# Patient Record
Sex: FEMALE | Race: WHITE | Employment: OTHER | URBAN - METROPOLITAN AREA
[De-identification: names, ages, dates, MRNs, and addresses within clinical notes are randomized per-mention and may not be internally consistent; named-entity substitution may affect disease eponyms.]

---

## 2017-02-02 ENCOUNTER — DOCUMENTATION ONLY (OUTPATIENT)
Dept: FAMILY MEDICINE CLINIC | Age: 67
End: 2017-02-02

## 2017-02-02 NOTE — PROGRESS NOTES
Patient on Passport report for [facility] ED visit on 2/1 and was DX with URI. Patient not called. Patient has F/U appt with Dr Santos Sullivan on 3/21.

## 2017-02-23 DIAGNOSIS — M79.7 FIBROMYALGIA: ICD-10-CM

## 2017-02-23 RX ORDER — TRAMADOL HYDROCHLORIDE 50 MG/1
50 TABLET ORAL
Qty: 90 TAB | Refills: 1 | Status: SHIPPED | OUTPATIENT
Start: 2017-02-23 | End: 2017-05-15 | Stop reason: SDUPTHER

## 2017-03-16 ENCOUNTER — HOSPITAL ENCOUNTER (EMERGENCY)
Age: 67
Discharge: HOME | End: 2017-03-16
Attending: EMERGENCY MEDICINE | Admitting: EMERGENCY MEDICINE
Payer: MEDICARE

## 2017-03-16 ENCOUNTER — APPOINTMENT (OUTPATIENT)
Dept: CT IMAGING | Age: 67
End: 2017-03-16
Attending: EMERGENCY MEDICINE
Payer: MEDICARE

## 2017-03-16 VITALS
RESPIRATION RATE: 16 BRPM | HEIGHT: 64 IN | SYSTOLIC BLOOD PRESSURE: 147 MMHG | WEIGHT: 185.19 LBS | DIASTOLIC BLOOD PRESSURE: 70 MMHG | OXYGEN SATURATION: 96 % | HEART RATE: 85 BPM | TEMPERATURE: 98.2 F | BODY MASS INDEX: 31.62 KG/M2

## 2017-03-16 DIAGNOSIS — R31.9 HEMATURIA: ICD-10-CM

## 2017-03-16 DIAGNOSIS — R10.2 SUPRAPUBIC PAIN: ICD-10-CM

## 2017-03-16 DIAGNOSIS — R31.9 URINARY TRACT INFECTION WITH HEMATURIA, SITE UNSPECIFIED: Primary | ICD-10-CM

## 2017-03-16 DIAGNOSIS — N39.0 URINARY TRACT INFECTION WITH HEMATURIA, SITE UNSPECIFIED: Primary | ICD-10-CM

## 2017-03-16 LAB
ALBUMIN SERPL BCP-MCNC: 3.7 G/DL (ref 3.5–5)
ALBUMIN/GLOB SERPL: 1.3 {RATIO} (ref 1.1–2.2)
ALP SERPL-CCNC: 80 U/L (ref 45–117)
ALT SERPL-CCNC: 27 U/L (ref 12–78)
ANION GAP BLD CALC-SCNC: 9 MMOL/L (ref 5–15)
APPEARANCE UR: ABNORMAL
AST SERPL W P-5'-P-CCNC: 21 U/L (ref 15–37)
BACTERIA URNS QL MICRO: NEGATIVE /HPF
BASOPHILS # BLD AUTO: 0.1 K/UL (ref 0–0.1)
BASOPHILS # BLD: 1 % (ref 0–1)
BILIRUB SERPL-MCNC: 0.4 MG/DL (ref 0.2–1)
BILIRUB UR QL: NEGATIVE
BUN SERPL-MCNC: 20 MG/DL (ref 6–20)
BUN/CREAT SERPL: 14 (ref 12–20)
CALCIUM SERPL-MCNC: 8.8 MG/DL (ref 8.5–10.1)
CHLORIDE SERPL-SCNC: 105 MMOL/L (ref 97–108)
CO2 SERPL-SCNC: 26 MMOL/L (ref 21–32)
COLOR UR: ABNORMAL
CREAT SERPL-MCNC: 1.39 MG/DL (ref 0.55–1.02)
EOSINOPHIL # BLD: 0.3 K/UL (ref 0–0.4)
EOSINOPHIL NFR BLD: 2 % (ref 0–7)
EPITH CASTS URNS QL MICRO: ABNORMAL /LPF
ERYTHROCYTE [DISTWIDTH] IN BLOOD BY AUTOMATED COUNT: 12.8 % (ref 11.5–14.5)
GLOBULIN SER CALC-MCNC: 2.9 G/DL (ref 2–4)
GLUCOSE SERPL-MCNC: 107 MG/DL (ref 65–100)
GLUCOSE UR STRIP.AUTO-MCNC: NEGATIVE MG/DL
HCT VFR BLD AUTO: 35.2 % (ref 35–47)
HGB BLD-MCNC: 12 G/DL (ref 11.5–16)
HGB UR QL STRIP: ABNORMAL
KETONES UR QL STRIP.AUTO: NEGATIVE MG/DL
LEUKOCYTE ESTERASE UR QL STRIP.AUTO: ABNORMAL
LYMPHOCYTES # BLD AUTO: 20 % (ref 12–49)
LYMPHOCYTES # BLD: 2.3 K/UL (ref 0.8–3.5)
MCH RBC QN AUTO: 32.3 PG (ref 26–34)
MCHC RBC AUTO-ENTMCNC: 34.1 G/DL (ref 30–36.5)
MCV RBC AUTO: 94.6 FL (ref 80–99)
MONOCYTES # BLD: 1.1 K/UL (ref 0–1)
MONOCYTES NFR BLD AUTO: 10 % (ref 5–13)
NEUTS SEG # BLD: 7.9 K/UL (ref 1.8–8)
NEUTS SEG NFR BLD AUTO: 67 % (ref 32–75)
NITRITE UR QL STRIP.AUTO: NEGATIVE
PH UR STRIP: 6.5 [PH] (ref 5–8)
PLATELET # BLD AUTO: 258 K/UL (ref 150–400)
POTASSIUM SERPL-SCNC: 3.8 MMOL/L (ref 3.5–5.1)
PROT SERPL-MCNC: 6.6 G/DL (ref 6.4–8.2)
PROT UR STRIP-MCNC: >300 MG/DL
RBC # BLD AUTO: 3.72 M/UL (ref 3.8–5.2)
RBC #/AREA URNS HPF: >100 /HPF (ref 0–5)
SODIUM SERPL-SCNC: 140 MMOL/L (ref 136–145)
SP GR UR REFRACTOMETRY: 1.02 (ref 1–1.03)
UA: UC IF INDICATED,UAUC: ABNORMAL
UROBILINOGEN UR QL STRIP.AUTO: 0.2 EU/DL (ref 0.2–1)
WBC # BLD AUTO: 11.7 K/UL (ref 3.6–11)
WBC URNS QL MICRO: ABNORMAL /HPF (ref 0–4)

## 2017-03-16 PROCEDURE — 81001 URINALYSIS AUTO W/SCOPE: CPT | Performed by: EMERGENCY MEDICINE

## 2017-03-16 PROCEDURE — 74176 CT ABD & PELVIS W/O CONTRAST: CPT

## 2017-03-16 PROCEDURE — 80053 COMPREHEN METABOLIC PANEL: CPT | Performed by: EMERGENCY MEDICINE

## 2017-03-16 PROCEDURE — 74011250636 HC RX REV CODE- 250/636: Performed by: EMERGENCY MEDICINE

## 2017-03-16 PROCEDURE — 87077 CULTURE AEROBIC IDENTIFY: CPT | Performed by: EMERGENCY MEDICINE

## 2017-03-16 PROCEDURE — 87086 URINE CULTURE/COLONY COUNT: CPT | Performed by: EMERGENCY MEDICINE

## 2017-03-16 PROCEDURE — 85025 COMPLETE CBC W/AUTO DIFF WBC: CPT | Performed by: EMERGENCY MEDICINE

## 2017-03-16 PROCEDURE — 36415 COLL VENOUS BLD VENIPUNCTURE: CPT | Performed by: EMERGENCY MEDICINE

## 2017-03-16 PROCEDURE — 99283 EMERGENCY DEPT VISIT LOW MDM: CPT

## 2017-03-16 PROCEDURE — 74011250637 HC RX REV CODE- 250/637: Performed by: EMERGENCY MEDICINE

## 2017-03-16 PROCEDURE — 87186 SC STD MICRODIL/AGAR DIL: CPT | Performed by: EMERGENCY MEDICINE

## 2017-03-16 PROCEDURE — 96365 THER/PROPH/DIAG IV INF INIT: CPT

## 2017-03-16 PROCEDURE — 74011000258 HC RX REV CODE- 258: Performed by: EMERGENCY MEDICINE

## 2017-03-16 RX ORDER — HYDROCODONE BITARTRATE AND ACETAMINOPHEN 7.5; 325 MG/1; MG/1
1 TABLET ORAL
Status: COMPLETED | OUTPATIENT
Start: 2017-03-16 | End: 2017-03-16

## 2017-03-16 RX ORDER — CEPHALEXIN 500 MG/1
500 CAPSULE ORAL 3 TIMES DAILY
Qty: 21 CAP | Refills: 0 | Status: SHIPPED | OUTPATIENT
Start: 2017-03-16 | End: 2017-03-16

## 2017-03-16 RX ORDER — CEPHALEXIN 500 MG/1
500 CAPSULE ORAL 3 TIMES DAILY
Qty: 21 CAP | Refills: 0 | Status: SHIPPED | OUTPATIENT
Start: 2017-03-16 | End: 2017-03-23

## 2017-03-16 RX ADMIN — HYDROCODONE BITARTRATE AND ACETAMINOPHEN 1 TABLET: 7.5; 325 TABLET ORAL at 07:35

## 2017-03-16 RX ADMIN — CEFTRIAXONE 1 G: 1 INJECTION, POWDER, FOR SOLUTION INTRAMUSCULAR; INTRAVENOUS at 06:20

## 2017-03-16 NOTE — ED PROVIDER NOTES
HPI Comments: Georgia Aleman is a 74 y.o. female with PMhx significant for HTN, GERD, arthritis, hypercholesterolemia, and fibromyalgia who presents ambulatory to the ED with cc of hematuria x \"couple of hours\" PTA. She also c/o lower abdominal pain, dysuria, and urinary frequency. She notes that the blood was initially light pink but turned red PTA. She states that her abdominal pain yesterday was in her right upper quadrant and radiated to the right lower quadrant. She reports a h/o kidney stones. She specifically denies back pain, fever, nausea, vomiting, or other acute complaints at this time. SHx: +EtOH, -tobacco, -illicit drug use    PCP: Trish Leigh MD    There are no other complaints, changes or physical findings at this time. The history is provided by the patient. No  was used.         Past Medical History:   Diagnosis Date    Arthritis     Cancer ([facility] [address]     skin cancer    Chronic pain     CHRONIC BOWEL PAIN    Diverticulitis     Fibromyalgia     GERD (gastroesophageal reflux disease)     Goiter     Hiatal hernia     Hypercholesterolemia     Hypertension     IBS (irritable bowel syndrome)     IBS (irritable bowel syndrome)     CONSTIPATION, CHRONIC SINCE HER 25s    Ill-defined condition     rectocele    Insomnia     TAKES TRAZODONE NIGHTLY    Migraine     REDUCED IN FREQUENCY AND SEVERITY SINCE MENOPAUSE    Other ill-defined conditions(799.89)     Psychiatric disorder 3/11    DEPRESSION       Past Surgical History:   Procedure Laterality Date    COLONOSCOPY N/A 6/21/2016    COLONOSCOPY performed by Herminio Pang [facility]    ENDOSCOPY, COLON, DIAGNOSTIC  11/2010    Dr. Severo Pastures-     HX CHOLECYSTECTOMY  2006   Wilian Marrero  2007    Dr. Pond/ diverticulitis    HX GI  X3  LAST ONE 12/10    COLONOSCOPY    HX GYN  1982    D&C WITH SUCTION    HX HYSTERECTOMY      HX ORTHOPAEDIC      right foot surgery    HX ORTHOPAEDIC neck surgery 3/21/11 Dr. Jame Abbasi HX TONSILLECTOMY           Family History:   Problem Relation Age of Onset    Cancer Father      lung and bone    Stroke Sister     Cancer Sister 76     PANCREATIC    Hypertension Mother     Alzheimer Mother     High Cholesterol Mother     Cancer Other      COLON    Cancer Other      COUSIN    Diabetes Maternal Aunt     Cancer Maternal Uncle      COLON    Cancer Maternal Grandfather      COLON    Cancer Daughter      IN REMISSION-OVARIAN CA-IN CLINICAL TRIAL       Social History     Social History    Marital status:      Spouse name: N/A    Number of children: N/A    Years of education: N/A     Occupational History    Not on file. Social History Main Topics    Smoking status: Former Smoker     Packs/day: 1.00     Years: 45.00     Quit date: 1/1/2007    Smokeless tobacco: Never Used    Alcohol use 0.0 oz/week     0 Standard drinks or equivalent per week      Comment: rarely    Drug use: No    Sexual activity: Yes     Partners: Male     Other Topics Concern    Not on file     Social History Narrative         ALLERGIES: Codeine; Morphine; Other medication; and Shellfish containing products    Review of Systems   Constitutional: Negative for activity change, appetite change, fatigue and fever. HENT: Negative. Negative for congestion, rhinorrhea and sore throat. Respiratory: Negative. Negative for cough, shortness of breath and wheezing. Cardiovascular: Negative. Negative for chest pain and leg swelling. Gastrointestinal: Positive for abdominal pain (lower). Negative for abdominal distention, constipation, diarrhea, nausea and vomiting. Endocrine: Negative. Genitourinary: Positive for dysuria, frequency and hematuria. Negative for difficulty urinating, menstrual problem, vaginal bleeding and vaginal discharge. Musculoskeletal: Negative. Negative for arthralgias, back pain, joint swelling and myalgias. Skin: Negative.   Negative for rash.   Neurological: Negative. Negative for dizziness, weakness, light-headedness and headaches. Psychiatric/Behavioral: Negative. Patient Vitals for the past 12 hrs:   Temp Pulse Resp BP SpO2   03/16/17 0730 - - - 147/70 96 %   03/16/17 0433 98.2 °F (36.8 °C) 85 16 133/71 100 %            Physical Exam   Constitutional: She is oriented to person, place, and time. She appears well-developed and well-nourished. Comfortable appearing, in no acute distress. HENT:   Head: Atraumatic. Eyes: EOM are normal.   Cardiovascular: Normal rate, regular rhythm, normal heart sounds and intact distal pulses. Exam reveals no gallop and no friction rub. No murmur heard. Pulmonary/Chest: Effort normal and breath sounds normal. No respiratory distress. She has no wheezes. She has no rales. She exhibits no tenderness. Abdominal: Soft. Bowel sounds are normal. She exhibits no distension and no mass. There is no rebound and no guarding. Mild suprapubic tenderness. Musculoskeletal: Normal range of motion. She exhibits no edema or tenderness. Neurological: She is oriented to person, place, and time. Skin: Skin is warm. Psychiatric: She has a normal mood and affect. Nursing note and vitals reviewed. MDM  Number of Diagnoses or Management Options  Hematuria:   Suprapubic pain:   Urinary tract infection with hematuria, site unspecified:   Diagnosis management comments:   Hematuria with unknown etiology; possibly secondary to kidney stones, UTI, cystitis, RCC. Will obtain basic blood work, UA, and CT scan to rule out stones/mass.        Amount and/or Complexity of Data Reviewed  Clinical lab tests: ordered and reviewed  Tests in the radiology section of CPT®: ordered and reviewed  Review and summarize past medical records: yes  Independent visualization of images, tracings, or specimens: yes    Patient Progress  Patient progress: stable    ED Course       Procedures      Progress Note:  7:29 AM  Pt was reevaluated; she is still feeling suprapubic discomfort. Requested dose of pain medication for discharge. No vomiting or additional episode of hematuria. Written by Maryam Spence ED Scribe, as dictated by Praveen Johnson MD.        LABORATORY TESTS:  Recent Results (from the past 12 hour(s))   URINALYSIS W/ REFLEX CULTURE    Collection Time: 03/16/17  4:55 AM   Result Value Ref Range    Color RED      Appearance HAZY (A) CLEAR      Specific gravity 1.025 1.003 - 1.030      pH (UA) 6.5 5.0 - 8.0      Protein >300 (A) NEG mg/dL    Glucose NEGATIVE  NEG mg/dL    Ketone NEGATIVE  NEG mg/dL    Bilirubin NEGATIVE  NEG      Blood LARGE (A) NEG      Urobilinogen 0.2 0.2 - 1.0 EU/dL    Nitrites NEGATIVE  NEG      Leukocyte Esterase LARGE (A) NEG      WBC 10-20 0 - 4 /hpf    RBC >100 (H) 0 - 5 /hpf    Epithelial cells FEW FEW /lpf    Bacteria NEGATIVE  NEG /hpf    UA:UC IF INDICATED URINE CULTURE ORDERED (A) CNI     CBC WITH AUTOMATED DIFF    Collection Time: 03/16/17  4:55 AM   Result Value Ref Range    WBC 11.7 (H) 3.6 - 11.0 K/uL    RBC 3.72 (L) 3.80 - 5.20 M/uL    HGB 12.0 11.5 - 16.0 g/dL    HCT 35.2 35.0 - 47.0 %    MCV 94.6 80.0 - 99.0 FL    MCH 32.3 26.0 - 34.0 PG    MCHC 34.1 30.0 - 36.5 g/dL    RDW 12.8 11.5 - 14.5 %    PLATELET 887 758 - 871 K/uL    NEUTROPHILS 67 32 - 75 %    LYMPHOCYTES 20 12 - 49 %    MONOCYTES 10 5 - 13 %    EOSINOPHILS 2 0 - 7 %    BASOPHILS 1 0 - 1 %    ABS. NEUTROPHILS 7.9 1.8 - 8.0 K/UL    ABS. LYMPHOCYTES 2.3 0.8 - 3.5 K/UL    ABS. MONOCYTES 1.1 (H) 0.0 - 1.0 K/UL    ABS. EOSINOPHILS 0.3 0.0 - 0.4 K/UL    ABS.  BASOPHILS 0.1 0.0 - 0.1 K/UL   METABOLIC PANEL, COMPREHENSIVE    Collection Time: 03/16/17  4:55 AM   Result Value Ref Range    Sodium 140 136 - 145 mmol/L    Potassium 3.8 3.5 - 5.1 mmol/L    Chloride 105 97 - 108 mmol/L    CO2 26 21 - 32 mmol/L    Anion gap 9 5 - 15 mmol/L    Glucose 107 (H) 65 - 100 mg/dL    BUN 20 6 - 20 MG/DL    Creatinine 1.39 (H) 0.55 - 1.02 MG/DL BUN/Creatinine ratio 14 12 - 20      GFR est AA 46 (L) >60 ml/min/1.73m2    GFR est non-AA 38 (L) >60 ml/min/1.73m2    Calcium 8.8 8.5 - 10.1 MG/DL    Bilirubin, total 0.4 0.2 - 1.0 MG/DL    ALT (SGPT) 27 12 - 78 U/L    AST (SGOT) 21 15 - 37 U/L    Alk. phosphatase 80 45 - 117 U/L    Protein, total 6.6 6.4 - 8.2 g/dL    Albumin 3.7 3.5 - 5.0 g/dL    Globulin 2.9 2.0 - 4.0 g/dL    A-G Ratio 1.3 1.1 - 2.2         IMAGING RESULTS:  CT ABD PELV WO CONT   Final Result   INDICATION: hematuria, flank pain, r/o KS     COMPARISON: None     TECHNIQUE:   Thin axial images were obtained through the abdomen and pelvis. Coronal and  sagittal reconstructions were generated. Oral contrast was not administered. CT  dose reduction was achieved through use of a standardized protocol tailored for  this examination and automatic exposure control for dose modulation.      The absence of intravenous contrast material reduces the sensitivity for  evaluation of the solid parenchymal organs of the abdomen.      FINDINGS:   LUNG BASES: Clear. INCIDENTALLY IMAGED HEART AND MEDIASTINUM: Unremarkable. LIVER: No mass or biliary dilatation. GALLBLADDER: Surgically absent  SPLEEN: No mass. PANCREAS: No mass or ductal dilatation. ADRENALS: Unremarkable. KIDNEYS/URETERS: No mass, calculus, or hydronephrosis. STOMACH: Unremarkable. SMALL BOWEL: No dilatation or wall thickening. COLON: No dilatation or wall thickening. Patient is post sigmoid resection  APPENDIX: Unremarkable. PERITONEUM: No ascites or pneumoperitoneum. RETROPERITONEUM: No lymphadenopathy or aortic aneurysm. REPRODUCTIVE ORGANS: Uterus is surgically absent  URINARY BLADDER: Bladder is decompressed  BONES: No destructive bone lesion. ADDITIONAL COMMENTS: N/A     IMPRESSION  IMPRESSION:  1. No evidence of renal or ureteral calculus or urinary tract obstruction. 2. No evidence of acute abdominal or pelvic process.         MEDICATIONS GIVEN:  Medications   cefTRIAXone (ROCEPHIN) 1 g in 0.9% sodium chloride (MBP/ADV) 50 mL (1 g IntraVENous New Bag 3/16/17 6596)   HYDROcodone-acetaminophen (NORCO) 7.5-325 mg per tablet 1 Tab (1 Tab Oral Given 3/16/17 4330)       IMPRESSION:  1. Urinary tract infection with hematuria, site unspecified    2. Hematuria    3. Suprapubic pain        PLAN:  1. Current Discharge Medication List      START taking these medications    Details   cephALEXin (KEFLEX) 500 mg capsule Take 1 Cap by mouth three (3) times daily for 7 days. Qty: 21 Cap, Refills: 0         STOP taking these medications       VOLTAREN 1 % gel Comments:   Reason for Stopping:         acetaminophen (TYLENOL ARTHRITIS PAIN) 650 mg CR tablet Comments:   Reason for Stopping:         acetaminophen (TYLENOL) 325 mg tablet Comments:   Reason for Stoppin.   Follow-up Information     Follow up With Details Comments [address], [place] Schedule an appointment as soon as possible for a visit in 1 week  [address] [facility]  [address] [phone]      Lindsay Frausto MD In 3 days As needed if ongoing pain and discomfort [address]  [address] [facility]  [phone]      [facility] EMERGENCY DEPT  As needed, If symptoms worsen [address]  [address]   [address]        Return to ED if worse       DISCHARGE NOTE:  7:29 AM  The patient has been re-evaluated and is ready for discharge. Reviewed available results with patient. Counseled patient on diagnosis and care plan. Patient has expressed understanding, and all questions have been answered. Patient agrees with plan and agrees to follow up as recommended, or return to the ED if their symptoms worsen. Discharge instructions have been provided and explained to the patient, along with reasons to return to the ED.       This note is prepared by Viral Partida, acting as Scribe for Rakel Herrmann MD.    Rakel Herrmann MD: The scribe's documentation has been prepared under my direction and personally reviewed by me in its entirety. I confirm that the note above accurately reflects all work, treatment, procedures, and medical decision making performed by me.

## 2017-03-16 NOTE — ED NOTES
Discharge instructions reviewed with patient, copy given by Dr. Tari Francisco . Pt is accomponied by family, denies use of wheelchair.

## 2017-03-16 NOTE — ED NOTES
Pt ambulatory to ED from triage. Pt A+Ox4. Pt speaking in full sentences. Pt denies chest pain and shortness of breath. Pt c/o blood in urine since yesterday and a constant pressure in her abdomen.

## 2017-03-19 LAB
BACTERIA SPEC CULT: ABNORMAL
CC UR VC: ABNORMAL
SERVICE CMNT-IMP: ABNORMAL

## 2017-03-21 ENCOUNTER — OFFICE VISIT (OUTPATIENT)
Dept: FAMILY MEDICINE CLINIC | Age: 67
End: 2017-03-21

## 2017-03-21 ENCOUNTER — HOSPITAL ENCOUNTER (OUTPATIENT)
Dept: LAB | Age: 67
Discharge: HOME | End: 2017-03-21
Payer: MEDICARE

## 2017-03-21 VITALS
SYSTOLIC BLOOD PRESSURE: 130 MMHG | OXYGEN SATURATION: 97 % | BODY MASS INDEX: 31.24 KG/M2 | DIASTOLIC BLOOD PRESSURE: 78 MMHG | HEART RATE: 76 BPM | TEMPERATURE: 98.7 F | WEIGHT: 183 LBS | RESPIRATION RATE: 16 BRPM | HEIGHT: 64 IN

## 2017-03-21 DIAGNOSIS — Z00.00 ROUTINE GENERAL MEDICAL EXAMINATION AT A HEALTH CARE FACILITY: Primary | ICD-10-CM

## 2017-03-21 DIAGNOSIS — I10 ESSENTIAL HYPERTENSION: ICD-10-CM

## 2017-03-21 DIAGNOSIS — E55.9 HYPOVITAMINOSIS D: ICD-10-CM

## 2017-03-21 DIAGNOSIS — N18.2 CKD (CHRONIC KIDNEY DISEASE), STAGE 2 (MILD): ICD-10-CM

## 2017-03-21 DIAGNOSIS — Z11.59 NEED FOR HEPATITIS C SCREENING TEST: ICD-10-CM

## 2017-03-21 DIAGNOSIS — K58.1 IRRITABLE BOWEL SYNDROME WITH CONSTIPATION: ICD-10-CM

## 2017-03-21 DIAGNOSIS — F32.A DEPRESSION, UNSPECIFIED DEPRESSION TYPE: ICD-10-CM

## 2017-03-21 DIAGNOSIS — M79.7 FIBROMYALGIA: ICD-10-CM

## 2017-03-21 DIAGNOSIS — E78.2 MIXED HYPERLIPIDEMIA: ICD-10-CM

## 2017-03-21 DIAGNOSIS — Z51.81 ENCOUNTER FOR MEDICATION MONITORING: ICD-10-CM

## 2017-03-21 DIAGNOSIS — K21.9 GASTROESOPHAGEAL REFLUX DISEASE WITHOUT ESOPHAGITIS: ICD-10-CM

## 2017-03-21 LAB
BILIRUB UR QL STRIP: NEGATIVE
GLUCOSE UR-MCNC: NEGATIVE MG/DL
KETONES P FAST UR STRIP-MCNC: NEGATIVE MG/DL
PH UR STRIP: 6.5 [PH] (ref 4.6–8)
PROT UR QL STRIP: NEGATIVE MG/DL
SP GR UR STRIP: 1.02 (ref 1–1.03)
UA UROBILINOGEN AMB POC: NORMAL (ref 0.2–1)
URINALYSIS CLARITY POC: CLEAR
URINALYSIS COLOR POC: YELLOW
URINE BLOOD POC: NEGATIVE
URINE LEUKOCYTES POC: NEGATIVE
URINE NITRITES POC: NEGATIVE

## 2017-03-21 PROCEDURE — 86803 HEPATITIS C AB TEST: CPT

## 2017-03-21 PROCEDURE — 36415 COLL VENOUS BLD VENIPUNCTURE: CPT

## 2017-03-21 PROCEDURE — 80048 BASIC METABOLIC PNL TOTAL CA: CPT

## 2017-03-21 PROCEDURE — 80061 LIPID PANEL: CPT

## 2017-03-21 PROCEDURE — 82306 VITAMIN D 25 HYDROXY: CPT

## 2017-03-21 RX ORDER — ROSUVASTATIN CALCIUM 20 MG/1
20 TABLET, COATED ORAL
Qty: 90 TAB | Refills: 1 | Status: ON HOLD | OUTPATIENT
Start: 2017-03-21 | End: 2017-08-30 | Stop reason: SDUPTHER

## 2017-03-21 RX ORDER — ROSUVASTATIN CALCIUM 40 MG/1
10 TABLET, COATED ORAL
COMMUNITY
End: 2017-03-21 | Stop reason: CLARIF

## 2017-03-21 NOTE — MR AVS SNAPSHOT
Visit Information Date & Time Provider Department Dept. Phone Encounter #  
 3/21/2017  9:15 AM Melissa Lucas MD [facility] [phone] [id number] Follow-up Instructions Return in about 3 months (around 6/21/2017). Upcoming Health Maintenance Date Due Hepatitis C Screening 1950 MEDICARE YEARLY EXAM 1/26/2017 BREAST CANCER SCRN MAMMOGRAM 4/21/2018 GLAUCOMA SCREENING Q2Y 11/2/2018 COLONOSCOPY 6/21/2021 DTaP/Tdap/Td series (3 - Td) 11/21/2026 Allergies as of 3/21/2017  Review Complete On: 3/21/2017 By: Melissa Lucas MD  
  
 Severity Noted Reaction Type Reactions Codeine High 03/15/2010    Other (comments) Severe Headache Morphine High 03/15/2010    Hives Other Medication  03/15/2011    Rash BANDAIDS Shellfish Containing Products  03/15/2011    Hives Current Immunizations  Reviewed on 3/21/2017 Name Date Influenza High Dose Vaccine PF 9/19/2016 10:00 AM, 10/30/2015, 11/11/2014 Influenza Vaccine 11/19/2013 Influenza Vaccine Split 11/26/2012, 12/12/2011, 9/24/2010 Pneumococcal Conjugate (PCV-13) 5/18/2015 Pneumococcal Vaccine (Unspecified Type) 11/19/2013 Tdap 5/26/2014 12:40 PM  
 Zoster Vaccine, Live 4/16/2012 Reviewed by Melissa Lucas MD on 3/21/2017 at 10:34 AM  
You Were Diagnosed With   
  
 Codes Comments Routine general medical examination at a health care facility    -  Primary ICD-10-CM: Z00.00 ICD-9-CM: V70.0 Essential hypertension     ICD-10-CM: I10 
ICD-9-CM: 401.9 Mixed hyperlipidemia     ICD-10-CM: E78.2 ICD-9-CM: 272.2 Fibromyalgia     ICD-10-CM: M79.7 ICD-9-CM: 729.1 Gastroesophageal reflux disease without esophagitis     ICD-10-CM: K21.9 ICD-9-CM: 530.81 Hypovitaminosis D     ICD-10-CM: E55.9 ICD-9-CM: 268.9 Depression, unspecified depression type     ICD-10-CM: F32.9 ICD-9-CM: 550 Irritable bowel syndrome with constipation     ICD-10-CM: K58.1 ICD-9-CM: 831.9 CKD (chronic kidney disease), stage 2 (mild)     ICD-10-CM: N18.2 ICD-9-CM: 585.2 Encounter for medication monitoring     ICD-10-CM: Z51.81 
ICD-9-CM: V58.83 Need for hepatitis C screening test     ICD-10-CM: Z11.59 
ICD-9-CM: V73.89 Vitals BP Pulse Temp Resp Height(growth percentile) Weight(growth percentile) 130/78 (BP 1 Location: Right arm, BP Patient Position: Sitting) 76 98.7 °F (37.1 °C) (Oral) 16 5' 3.5\" (1.613 m) 183 lb (83 kg) SpO2 BMI OB Status Smoking Status 97% 31.91 kg/m2 Hysterectomy Former Smoker Vitals History BMI and BSA Data Body Mass Index Body Surface Area  
 31.91 kg/m 2 1.93 m 2 Preferred Pharmacy Pharmacy Name Phone [address] [facility] [phone] Your Updated Medication List  
  
   
This list is accurate as of: 3/21/17 10:54 AM.  Always use your most recent med list.  
  
  
  
  
 ALPRAZolam 0.5 mg tablet Commonly known as:  XANAX  
TAKE 1 TABLET BY MOUTH TWICE A DAY AS NEEDED FOR ANXIETY  
  
 aspirin delayed-release 81 mg tablet Take 81 mg by mouth daily. cephALEXin 500 mg capsule Commonly known as:  Reyes Rapp Take 1 Cap by mouth three (3) times daily for 7 days. citalopram 40 mg tablet Commonly known as:  Evelyn Babe Take 1 Tab by mouth daily. cyclobenzaprine 10 mg tablet Commonly known as:  FLEXERIL  
TAKE 1 TABLET BY MOUTH 3 TIMES A DAY AS NEEDED FOR MUSCLE SPASMS  
  
 dicyclomine 10 mg capsule Commonly known as:  BENTYL TAKE 2 CAPSULES FOUR TIMES A DAY AS NEEDED DULoxetine 60 mg capsule Commonly known as:  CYMBALTA Take 1 Cap by mouth two (2) times a day.  
  
 gabapentin 300 mg capsule Commonly known as:  NEURONTIN  
TAKE 1  CAPSULES TWICE A DAY AND 2 CAPSULES AT BEDTIME  
  
 hydroCHLOROthiazide 25 mg tablet Commonly known as:  HYDRODIURIL  
TAKE 1 TABLET DAILY  
  
 lisinopril 40 mg tablet Commonly known as:  PRINIVIL, ZESTRIL  
TAKE 1 TABLET DAILY NexIUM 40 mg capsule Generic drug:  esomeprazole TAKE 1 CAPSULE DAILY  
  
 rosuvastatin 20 mg tablet Commonly known as:  CRESTOR Take 1 Tab by mouth nightly. sucralfate 1 gram tablet Commonly known as:  CARAFATE  
TAKE 1 TABLET FOUR TIMES A DAY  
  
 traMADol 50 mg tablet Commonly known as:  ULTRAM  
Take 1 Tab by mouth every six (6) hours as needed for Pain. traZODone 50 mg tablet Commonly known as:  DESYREL  
TAKE TWO TABLETS NIGHTLY Prescriptions Sent to Pharmacy Refills  
 rosuvastatin (CRESTOR) 20 mg tablet 1 Sig: Take 1 Tab by mouth nightly. Class: Normal  
 Pharmacy: [address], [address] [place] [address] #: [phone] Route: Oral  
  
We Performed the Following AMB POC URINALYSIS DIP STICK AUTO W/ MICRO [91602 CPT(R)] LIPID PANEL [07032 CPT(R)] METABOLIC PANEL, BASIC [03696 CPT(R)] VITAMIN D, 25 HYDROXY [id number] CPT(R)] Follow-up Instructions Return in about 3 months (around 6/21/2017). To-Do List   
 03/27/2017 9:00 AM  
  Appointment with [facility] CT 2 at [facility] ([phone]) CONTRAST STUDY: 1. The patient should not eat solid food four hours before the appointment but should be encouraged to drink clear liquids. 2.  If you have to drink oral contrast, please pick it up any weekday prior to your appointment, if you cannot please check in 2 hrs before appt time. 3.  The patient will require IV access for contrast administration. 4.  The patient should not take Ibuprofen (Advil, Motrin, etc.) and Naproxen Sodium (Aleve, etc.)  on the day of the exam. Stopping non-steroidal anti-inflammatory agents (NSAIDs) like Ibuprofen decreases the risk of kidney damage from the x-ray contrast (dye).  5.  Bring any non [facility] facility films/images pertaining to the area of interest with you on the day of appointment. 6.  Bring current lab work if available (within last 90 days CMP) ***If scheduled at [facility], iSTAT is not available, labs will need to be done before appointment*** 7. Check in at registration at least 30 minutes before appt time unless you were instructed to do otherwise. Introducing hospitals & [facility] SERVICES! Dear Marcella Reza: 
Thank you for requesting a [employer] account. Our records indicate that you already have an active [employer] account. You can access your account anytime at [email or web address] Did you know that you can access your hospital and ER discharge instructions at any time in [employer]? You can also review all of your test results from your hospital stay or ER visit. Additional Information If you have questions, please visit the Frequently Asked Questions section of the [employer] website at [email or web address] Remember, [employer] is NOT to be used for urgent needs. For medical emergencies, dial 911. Now available from your iPhone and Android! Please provide this summary of care documentation to your next provider. Your primary care clinician is listed as Maria A Rashid. If you have any questions after today's visit, please call [phone].

## 2017-03-21 NOTE — PROGRESS NOTES
This is a Subsequent Medicare Annual Wellness Visit providing Personalized Prevention Plan Services (PPPS) (Performed 12 months after initial AWV and PPPS )    I have reviewed the patient's medical history in detail and updated the computerized patient record. HPI:  Pt was recently treated at the ED for gross hematuria, was given Keflex. Symptoms are resolving and she is being followed by Urology for this issue. She has a CT w/contrast scheduled for 3/27/17. HTN follow up:  Compliant w/ meds, low salt diet, and exercise. No home bp monitoring. No swelling, headache or dizziness. No chest pain, SOB, palpitations. Otherwise feeling well since the last visit. Hypercholesterolemia follow up:  Compliant w/ low fat, low cholesterol diet. Livalo was not covered by her insurance. waitin for prior auth. Exercising some. No muscle more than her usual from the fibromyalgia, no abdominal pain, no skin discoloration. Patient is not fasting today. Depression Review:  Patient is seen for followup of depression and fibromyalgia and IBS. Pain has been stable on current medication. Has hx of cervical neck disc disease. Currently taking cymbalta and Celexa and gabapentin. Ongoing symptoms include fatigue and stress. She experiences the following side effects from the treatment: none.     History     Past Medical History:   Diagnosis Date    Arthritis     Cancer ([facility] [address]     skin cancer    Chronic pain     CHRONIC BOWEL PAIN    Diverticulitis     Fibromyalgia     GERD (gastroesophageal reflux disease)     Goiter     Hiatal hernia     Hypercholesterolemia     Hypertension     IBS (irritable bowel syndrome)     IBS (irritable bowel syndrome)     CONSTIPATION, CHRONIC SINCE HER 25s    Ill-defined condition     rectocele    Insomnia     TAKES TRAZODONE NIGHTLY    Migraine     REDUCED IN FREQUENCY AND SEVERITY SINCE MENOPAUSE    Other ill-defined conditions(249.89)     Psychiatric disorder 3/11    DEPRESSION Past Surgical History:   Procedure Laterality Date    COLONOSCOPY N/A 6/21/2016    COLONOSCOPY performed by Governor Alfred MD at [facility] ENDOSCOPY    ENDOSCOPY, COLON, DIAGNOSTIC  11/2010    Dr. Aj Connors-     HX CHOLECYSTECTOMY  2006   Piter Haile  2007    Dr. Pond/ diverticulitis    HX GI  X3  LAST ONE 12/10    COLONOSCOPY    HX GYN  1982    D&C WITH SUCTION    HX HYSTERECTOMY      HX ORTHOPAEDIC      right foot surgery    HX ORTHOPAEDIC      neck surgery 3/21/11 Dr. Mara Rashid HX TONSILLECTOMY       Current Outpatient Prescriptions   Medication Sig Dispense Refill    cephALEXin (KEFLEX) 500 mg capsule Take 1 Cap by mouth three (3) times daily for 7 days. 21 Cap 0    traMADol (ULTRAM) 50 mg tablet Take 1 Tab by mouth every six (6) hours as needed for Pain. 90 Tab 1    lisinopril (PRINIVIL, ZESTRIL) 40 mg tablet TAKE 1 TABLET DAILY 90 Tab 3    NEXIUM 40 mg capsule TAKE 1 CAPSULE DAILY 90 Cap 1    cyclobenzaprine (FLEXERIL) 10 mg tablet TAKE 1 TABLET BY MOUTH 3 TIMES A DAY AS NEEDED FOR MUSCLE SPASMS  0    ALPRAZolam (XANAX) 0.5 mg tablet TAKE 1 TABLET BY MOUTH TWICE A DAY AS NEEDED FOR ANXIETY 180 Tab 1    predniSONE (STERAPRED DS) 10 mg dose pack See administration instruction per 10mg dose pack 21 Tab 0    sucralfate (CARAFATE) 1 gram tablet TAKE 1 TABLET FOUR TIMES A  Tab 3    pitavastatin (LIVALO) 4 mg tab tablet Take 1 Tab by mouth nightly. 30 Tab 6    linaclotide (LINZESS) 145 mcg cap capsule Take 145 mcg by mouth every other day.  gabapentin (NEURONTIN) 300 mg capsule TAKE 1  CAPSULES TWICE A DAY AND 2 CAPSULES AT BEDTIME 360 Cap 1    traZODone (DESYREL) 50 mg tablet TAKE TWO TABLETS NIGHTLY 135 Tab 2    dicyclomine (BENTYL) 10 mg capsule TAKE 2 CAPSULES FOUR TIMES A DAY AS NEEDED 360 Cap 2    DULoxetine (CYMBALTA) 60 mg capsule Take 1 Cap by mouth two (2) times a day.  180 Cap 3    hydrochlorothiazide (HYDRODIURIL) 25 mg tablet TAKE 1 TABLET DAILY 90 Tab 3    potassium 99 mg tablet Take 99 mg by mouth two (2) times a week.  citalopram (CELEXA) 40 mg tablet Take 1 Tab by mouth daily. 90 Tab 3    aspirin delayed-release 81 mg tablet Take 81 mg by mouth daily. Allergies   Allergen Reactions    Codeine Other (comments)     Severe Headache    Morphine Hives    Other Medication Rash     BANDAIDS    Shellfish Containing Products Hives     Family History   Problem Relation Age of Onset   [facility] Cancer Father      lung and bone    Stroke Sister     Cancer Sister 76     PANCREATIC    Hypertension Mother     Alzheimer Mother     High Cholesterol Mother     Cancer Other      COLON    Cancer Other      COUSIN    Diabetes Maternal Aunt     Cancer Maternal Uncle      COLON    Cancer Maternal Grandfather      COLON    Cancer Daughter      IN REMISSION-OVARIAN CA-IN CLINICAL TRIAL     Social History   Substance Use Topics    Smoking status: Former Smoker     Packs/day: 1.00     Years: 45.00     Quit date: 1/1/2007    Smokeless tobacco: Never Used    Alcohol use 0.0 oz/week     0 Standard drinks or equivalent per week      Comment: rarely     Patient Active Problem List   Diagnosis Code    IBS (irritable bowel syndrome) K58.9    Fibromyalgia M79.7    Hiatal hernia K44.9    GERD (gastroesophageal reflux disease) K21.9    Hypercholesterolemia E78.00    Hypovitaminosis D E55.9    Diverticulitis K57.92    Depression F32.9    Essential hypertension I10    Encounter for medication monitoring Z51.81    DDD (degenerative disc disease), cervical M50.30       Depression Risk Factor Screening:   No flowsheet data found. Alcohol Risk Factor Screening: On any occasion during the past 3 months, have you had more than 3 drinks containing alcohol? No    Do you average more than 7 drinks per week? No      Functional Ability and Level of Safety:     Hearing Loss   none    Activities of Daily Living   Self-care.    Requires assistance with: no ADLs    Fall Risk Fall Risk Assessment, last 12 mths 3/21/2017   Able to walk? Yes   Fall in past 12 months? No     Abuse Screen   Patient is not abused    Review of Systems   Constitutional: negative  Eyes: negative  Ears, nose, mouth, throat, and face: negative  Respiratory: negative  Cardiovascular: negative  Gastrointestinal: negative  Genitourinary:negative  Integument/breast: negative  Hematologic/lymphatic: negative  Musculoskeletal:negative  Neurological: negative  Behavioral/Psych: negative    Physical Examination     Evaluation of Cognitive Function:  Mood/affect:  happy  Appearance: age appropriate  Family member/caregiver input: none    Visit Vitals    /78 (BP 1 Location: Right arm, BP Patient Position: Sitting)    Pulse 76    Temp 98.7 °F (37.1 °C) (Oral)    Resp 16    Ht 5' 3.5\" (1.613 m)    Wt 183 lb (83 kg)    SpO2 97%    BMI 31.91 kg/m2     General appearance: alert, cooperative, no distress, appears stated age  Head: Normocephalic, without obvious abnormality, atraumatic  Eyes: conjunctivae/corneas clear. PERRL, EOM's intact. Fundi benign  Ears: normal TM's and external ear canals AU  Nose: Nares normal. Septum midline. Mucosa normal. No drainage or sinus tenderness. Throat: Lips, mucosa, and tongue normal. Teeth and gums normal  Neck: supple, symmetrical, trachea midline, no adenopathy, thyroid: enlarged, no carotid bruit and no JVD  Lungs: clear to auscultation bilaterally  Breasts: normal appearance, no masses or tenderness  Heart: regular rate and rhythm, S1, S2 normal, no murmur, click, rub or gallop  Abdomen: soft, non-tender.  Bowel sounds normal. No masses,  no organomegaly  Pulses: 2+ and symmetric  Lymph nodes: Cervical, supraclavicular, and axillary nodes normal.    Patient Care Team:  Ramón Ansari MD as PCP - General    Advice/Referrals/Counseling   Education and counseling provided:  Are appropriate based on today's review and evaluation  Colorectal cancer screening tests      Assessment/Plan   Mala Hyde was seen today for complete physical.    Diagnoses and all orders for this visit:    Routine general medical examination at a health care facility  -     AMB POC URINALYSIS DIP STICK AUTO W/ MICRO    Essential hypertension  Discussed sodium restriction, high k rich diet, maintaining ideal body weight and regular exercise program such as daily walking 30 min perday 4-5 times per week, as physiologic means to achieve blood pressure control.  Medication compliance advised. Mixed hyperlipidemia  -     LIPID PANEL        -     rosuvastatin (CRESTOR) 20 mg tablet; Take 1 Tab by mouth nightly. Fibromyalgia        -     Stable on current medicaitons    Gastroesophageal reflux disease without esophagitis        -     Stable    Hypovitaminosis D  -     VITAMIN D, 25 HYDROXY    Depression, unspecified depression type        -     Stable on current medications    Irritable bowel syndrome with constipation        -      Stable    CKD (chronic kidney disease), stage 2 (mild)  -     METABOLIC PANEL, BASIC    Encounter for medication monitoring    Need for hepatitis C screening test  Check level. Follow-up Disposition:  Return in about 3 months (around 6/21/2017). the following changes in treatment are made: rosuvastatin (CRESTOR) 20 mg tablet; Take 1 Tab by mouth nightly. Pt was previously taking Crestor 10 mg tablet. reviewed diet, exercise and weight control  cardiovascular risk and specific lipid/LDL goals reviewed. Pt was seen with student NP and I agree with note as outline. Assessment and plan discussed with the pt who understand her dx and treatment plan. I have discussed diagnosis listed in this note with pt and/or family. I have discussed treatment plans and options and the risk/benefit analysis of those options, including safe use of medications and possible medication side effects. Through the use of shared decision making we have agreed to the above plan. The patient has received an after-visit summary and questions were answered concerning future plans and follow up. Advise pt of any urgent changes then to proceed to the ER.

## 2017-03-22 LAB
25(OH)D3+25(OH)D2 SERPL-MCNC: 37.7 NG/ML (ref 30–100)
BUN SERPL-MCNC: 18 MG/DL (ref 8–27)
BUN/CREAT SERPL: 16 (ref 11–26)
CALCIUM SERPL-MCNC: 9.4 MG/DL (ref 8.7–10.3)
CHLORIDE SERPL-SCNC: 103 MMOL/L (ref 96–106)
CHOLEST SERPL-MCNC: 160 MG/DL (ref 100–199)
CO2 SERPL-SCNC: 24 MMOL/L (ref 18–29)
CREAT SERPL-MCNC: 1.14 MG/DL (ref 0.57–1)
GLUCOSE SERPL-MCNC: 100 MG/DL (ref 65–99)
HDLC SERPL-MCNC: 43 MG/DL
INTERPRETATION, 910389: NORMAL
INTERPRETATION: NORMAL
LDLC SERPL CALC-MCNC: 95 MG/DL (ref 0–99)
PDF IMAGE, 910387: NORMAL
POTASSIUM SERPL-SCNC: 4.9 MMOL/L (ref 3.5–5.2)
SODIUM SERPL-SCNC: 141 MMOL/L (ref 134–144)
TRIGL SERPL-MCNC: 111 MG/DL (ref 0–149)
VLDLC SERPL CALC-MCNC: 22 MG/DL (ref 5–40)

## 2017-03-23 LAB — HCV AB S/CO SERPL IA: 0.1 S/CO RATIO (ref 0–0.9)

## 2017-03-27 ENCOUNTER — HOSPITAL ENCOUNTER (OUTPATIENT)
Dept: CT IMAGING | Age: 67
Discharge: HOME | End: 2017-03-27
Attending: UROLOGY
Payer: MEDICARE

## 2017-03-27 DIAGNOSIS — R31.9 HEMATURIA: ICD-10-CM

## 2017-03-27 PROCEDURE — 74011636320 HC RX REV CODE- 636/320: Performed by: UROLOGY

## 2017-03-27 PROCEDURE — 74178 CT ABD&PLV WO CNTR FLWD CNTR: CPT

## 2017-03-27 PROCEDURE — 74011250636 HC RX REV CODE- 250/636: Performed by: UROLOGY

## 2017-03-27 RX ORDER — SODIUM CHLORIDE 9 MG/ML
50 INJECTION, SOLUTION INTRAVENOUS
Status: COMPLETED | OUTPATIENT
Start: 2017-03-27 | End: 2017-03-27

## 2017-03-27 RX ORDER — SODIUM CHLORIDE 0.9 % (FLUSH) 0.9 %
10 SYRINGE (ML) INJECTION
Status: COMPLETED | OUTPATIENT
Start: 2017-03-27 | End: 2017-03-27

## 2017-03-27 RX ADMIN — IOPAMIDOL 100 ML: 612 INJECTION, SOLUTION INTRAVENOUS at 08:54

## 2017-03-27 RX ADMIN — SODIUM CHLORIDE 50 ML/HR: 900 INJECTION, SOLUTION INTRAVENOUS at 08:54

## 2017-03-27 RX ADMIN — Medication 10 ML: at 08:54

## 2017-03-30 DIAGNOSIS — F32.A DEPRESSION: ICD-10-CM

## 2017-03-30 RX ORDER — CITALOPRAM 40 MG/1
TABLET, FILM COATED ORAL
Qty: 90 TAB | Refills: 2 | Status: SHIPPED | OUTPATIENT
Start: 2017-03-30 | End: 2018-02-16 | Stop reason: SDUPTHER

## 2017-05-15 ENCOUNTER — HOSPITAL ENCOUNTER (OUTPATIENT)
Dept: LAB | Age: 67
Discharge: HOME | End: 2017-05-15
Payer: MEDICARE

## 2017-05-15 ENCOUNTER — OFFICE VISIT (OUTPATIENT)
Dept: FAMILY MEDICINE CLINIC | Age: 67
End: 2017-05-15

## 2017-05-15 VITALS
HEIGHT: 64 IN | DIASTOLIC BLOOD PRESSURE: 69 MMHG | TEMPERATURE: 98.2 F | HEART RATE: 74 BPM | BODY MASS INDEX: 31.86 KG/M2 | RESPIRATION RATE: 16 BRPM | SYSTOLIC BLOOD PRESSURE: 140 MMHG | WEIGHT: 186.6 LBS | OXYGEN SATURATION: 96 %

## 2017-05-15 DIAGNOSIS — M25.50 ARTHRALGIA OF MULTIPLE JOINTS: ICD-10-CM

## 2017-05-15 DIAGNOSIS — G89.29 ENCOUNTER FOR CHRONIC PAIN MANAGEMENT: Primary | ICD-10-CM

## 2017-05-15 DIAGNOSIS — M79.7 FIBROMYALGIA: ICD-10-CM

## 2017-05-15 DIAGNOSIS — M50.30 DDD (DEGENERATIVE DISC DISEASE), CERVICAL: ICD-10-CM

## 2017-05-15 PROCEDURE — 80307 DRUG TEST PRSMV CHEM ANLYZR: CPT

## 2017-05-15 RX ORDER — TRAMADOL HYDROCHLORIDE 50 MG/1
50 TABLET ORAL
Qty: 90 TAB | Refills: 1 | Status: SHIPPED | OUTPATIENT
Start: 2017-05-15 | End: 2017-08-14 | Stop reason: SDUPTHER

## 2017-05-15 RX ORDER — OXYBUTYNIN CHLORIDE 5 MG/1
TABLET ORAL
Refills: 0 | COMMUNITY
Start: 2017-04-07 | End: 2017-07-17

## 2017-05-15 RX ORDER — ROSUVASTATIN CALCIUM 10 MG/1
TABLET, FILM COATED ORAL
COMMUNITY
Start: 2017-02-21 | End: 2017-07-13

## 2017-05-15 RX ORDER — CIPROFLOXACIN 500 MG/1
TABLET ORAL
Refills: 0 | COMMUNITY
Start: 2017-03-31 | End: 2017-05-15 | Stop reason: ALTCHOICE

## 2017-05-15 RX ORDER — LINACLOTIDE 290 UG/1
290 CAPSULE, GELATIN COATED ORAL 3 TIMES WEEKLY
COMMUNITY
Start: 2017-03-03 | End: 2019-03-27 | Stop reason: ALTCHOICE

## 2017-05-15 RX ORDER — MELATONIN
1000 DAILY
COMMUNITY
End: 2019-12-06 | Stop reason: ALTCHOICE

## 2017-05-15 NOTE — MR AVS SNAPSHOT
Visit Information Date & Time Provider Department Dept. Phone Encounter #  
 5/15/2017  3:15 PM Areatha Mcardle, MD [facility] [phone] [id number] Follow-up Instructions Return in about 3 months (around 8/15/2017). Upcoming Health Maintenance Date Due INFLUENZA AGE 9 TO ADULT 8/1/2017 MEDICARE YEARLY EXAM 3/22/2018 BREAST CANCER SCRN MAMMOGRAM 4/21/2018 GLAUCOMA SCREENING Q2Y 11/2/2018 COLONOSCOPY 6/21/2021 DTaP/Tdap/Td series (3 - Td) 11/21/2026 Allergies as of 5/15/2017  Review Complete On: 3/21/2017 By: Areatha Mcardle, MD  
  
 Severity Noted Reaction Type Reactions Codeine High 03/15/2010    Other (comments) Severe Headache Morphine High 03/15/2010    Hives Other Medication  03/15/2011    Rash BANDAIDS Shellfish Containing Products  03/15/2011    Hives Current Immunizations  Reviewed on 5/15/2017 Name Date Influenza High Dose Vaccine PF 9/19/2016 10:00 AM, 10/30/2015, 11/11/2014 Influenza Vaccine 11/19/2013 Influenza Vaccine Split 11/26/2012, 12/12/2011, 9/24/2010 Pneumococcal Conjugate (PCV-13) 5/18/2015 Pneumococcal Vaccine (Unspecified Type) 11/19/2013 Tdap 5/26/2014 12:40 PM  
 Zoster Vaccine, Live 4/16/2012 Reviewed by Areatha Mcardle, MD on 5/15/2017 at  4:03 PM  
You Were Diagnosed With   
  
 Codes Comments Fibromyalgia    -  Primary ICD-10-CM: M79.7 ICD-9-CM: 729.1 DDD (degenerative disc disease), cervical     ICD-10-CM: M50.30 ICD-9-CM: 722.4 Encounter for chronic pain management     ICD-10-CM: G89.29 ICD-9-CM: V65.49 Vitals BP Pulse Temp Resp Height(growth percentile) Weight(growth percentile) 140/69 (BP 1 Location: Left arm, BP Patient Position: Sitting) 74 98.2 °F (36.8 °C) (Oral) 16 5' 3.5\" (1.613 m) 186 lb 9.6 oz (84.6 kg) SpO2 BMI OB Status Smoking Status 96% 32.54 kg/m2 Hysterectomy Former Smoker BMI and BSA Data Body Mass Index Body Surface Area 32.54 kg/m 2 1.95 m 2 Preferred Pharmacy Pharmacy Name [address] [facility] [phone] Your Updated Medication List  
  
   
This list is accurate as of: 5/15/17  4:29 PM.  Always use your most recent med list.  
  
  
  
  
 ALPRAZolam 0.5 mg tablet Commonly known as:  XANAX  
TAKE 1 TABLET BY MOUTH TWICE A DAY AS NEEDED FOR ANXIETY  
  
 aspirin delayed-release 81 mg tablet Take 81 mg by mouth every other day. citalopram 40 mg tablet Commonly known as:  CELEXA  
TAKE 1 TABLET DAILY  
  
 cyclobenzaprine 10 mg tablet Commonly known as:  FLEXERIL  
TAKE 1 TABLET BY MOUTH 3 TIMES A DAY AS NEEDED FOR MUSCLE SPASMS  
  
 dicyclomine 10 mg capsule Commonly known as:  BENTYL TAKE 2 CAPSULES FOUR TIMES A DAY AS NEEDED DULoxetine 60 mg capsule Commonly known as:  CYMBALTA Take 1 Cap by mouth two (2) times a day.  
  
 gabapentin 300 mg capsule Commonly known as:  NEURONTIN  
TAKE 1  CAPSULES TWICE A DAY AND 2 CAPSULES AT BEDTIME  
  
 hydroCHLOROthiazide 25 mg tablet Commonly known as:  HYDRODIURIL  
TAKE 1 TABLET DAILY LINZESS 290 mcg Cap capsule Generic drug:  linaclotide Take 290 mcg by mouth daily as needed. lisinopril 40 mg tablet Commonly known as:  PRINIVIL, ZESTRIL  
TAKE 1 TABLET DAILY NexIUM 40 mg capsule Generic drug:  esomeprazole TAKE 1 CAPSULE DAILY  
  
 oxybutynin 5 mg tablet Commonly known as:  MEREUIYW Take 1 tab twice daily * CRESTOR 10 mg tablet Generic drug:  rosuvastatin * rosuvastatin 20 mg tablet Commonly known as:  CRESTOR Take 1 Tab by mouth nightly. sucralfate 1 gram tablet Commonly known as:  CARAFATE  
TAKE 1 TABLET FOUR TIMES A DAY  
  
 traMADol 50 mg tablet Commonly known as:  ULTRAM  
Take 1 Tab by mouth every six (6) hours as needed for Pain. traZODone 50 mg tablet Commonly known as:  DESYREL  
TAKE TWO TABLETS NIGHTLY AS NEEDED  
  
 VITAMIN D3 1,000 unit tablet Generic drug:  cholecalciferol Take 1,000 Units by mouth every other day. * Notice: This list has 2 medication(s) that are the same as other medications prescribed for you. Read the directions carefully, and ask your doctor or other care provider to review them with you. Prescriptions Printed Refills  
 traMADol (ULTRAM) 50 mg tablet 1 Sig: Take 1 Tab by mouth every six (6) hours as needed for Pain. Class: Print Route: Oral  
  
We Performed the Following 9-DRUG SCREEN + OXY, UR [id number] CPT(R)] Follow-up Instructions Return in about 3 months (around 8/15/2017). Introducing [facility]! Dear Fabrizio Dempsey: 
Thank you for requesting a [facility] account. Our records indicate that you already have an active [facility] account. You can access your account anytime at [email or web address] Did you know that you can access your hospital and ER discharge instructions at any time in [facility]? You can also review all of your test results from your hospital stay or ER visit. Additional Information If you have questions, please visit the Frequently Asked Questions section of the [facility] website at [email or web address] Remember, [facility] is NOT to be used for urgent needs. For medical emergencies, dial 911. Now available from your iPhone and Android! Please provide this summary of care documentation to your next provider. Your primary care clinician is listed as Raeann Rios. If you have any questions after today's visit, please call [phone].

## 2017-05-15 NOTE — PROGRESS NOTES
HISTORY OF PRESENT ILLNESS  Danna Alejo is a 74 y.o. female. HPI   Encounter for pain management. 1./2. Medical history/Past medical History  Chronic Pain:  Patient has long standing fibromyalgia. Has increased pain in the neck with pain radiating down the arms. Has increase pain in the arms and hands at night also. Aches in the legs and feet and back. Pain is 7-8/10. Has hx of cervical neck disc disease also. Currently taking cymbalta and Celexa and gabapentin. She also taken motrin for less severe pain. Pt takes tramadol for more severe pain  Ongoing symptoms include fatigue and pain. 3. Applicable records from prior treatment providers are apart of [facility] under the media tab. 4. Diagnostic, therapeutic and laboratory results are available in the [address] [place] [address] chart. 5. Consultation notes are available for review in the media tab of the [address] [place] [address] chart. 6. Treatment goals include pain control so that the pt may be as active and function with her daily activities and improved comfort level. Previously pt has been limited by pain. 7. The risks and benefits of treatment has been discussed at this office visit with the pt. She understands that the medication has addicting potential.  Additionally the pt has been advised that narcotic pain medication may impair mental and/or physical ability required for performance of tasks such as driving or operating any other machinery. 8. Pt has an updated signed pain contract on file and can be found under the FYI section of the [facility] chart. 9. The pain contract is reviewed. Pain medication will be continued at the previous dosage. Urine drug screening will be done today. Diagnostic studies are not indicated at this time. Interventional procedure are not indicated at this time. 10. Medication prescibed is .  has been reviewed at this OV by me.     11. Patient instructions have been reviewed in detail as outlined above and in the pain contract. 12. Re-eval is planned for 3 months. Patient Active Problem List   Diagnosis Code    IBS (irritable bowel syndrome) K58.9    Fibromyalgia M79.7    Hiatal hernia K44.9    GERD (gastroesophageal reflux disease) K21.9    Hypercholesterolemia E78.00    Hypovitaminosis D E55.9    Diverticulitis K57.92    Depression F32.9    Essential hypertension I10    Encounter for medication monitoring Z51.81    DDD (degenerative disc disease), cervical M50.30       Current Outpatient Prescriptions   Medication Sig Dispense Refill    traMADol (ULTRAM) 50 mg tablet Take 1 Tab by mouth every six (6) hours as needed for Pain. 90 Tab 1    LINZESS 290 mcg cap capsule Take 290 mcg by mouth daily as needed.  oxybutynin (DITROPAN) 5 mg tablet Take 1 tab twice daily  0    cholecalciferol (VITAMIN D3) 1,000 unit tablet Take 1,000 Units by mouth every other day.  citalopram (CELEXA) 40 mg tablet TAKE 1 TABLET DAILY 90 Tab 2    rosuvastatin (CRESTOR) 20 mg tablet Take 1 Tab by mouth nightly. 90 Tab 1    lisinopril (PRINIVIL, ZESTRIL) 40 mg tablet TAKE 1 TABLET DAILY 90 Tab 3    NEXIUM 40 mg capsule TAKE 1 CAPSULE DAILY 90 Cap 1    cyclobenzaprine (FLEXERIL) 10 mg tablet TAKE 1 TABLET BY MOUTH 3 TIMES A DAY AS NEEDED FOR MUSCLE SPASMS  0    ALPRAZolam (XANAX) 0.5 mg tablet TAKE 1 TABLET BY MOUTH TWICE A DAY AS NEEDED FOR ANXIETY 180 Tab 1    sucralfate (CARAFATE) 1 gram tablet TAKE 1 TABLET FOUR TIMES A  Tab 3    gabapentin (NEURONTIN) 300 mg capsule TAKE 1  CAPSULES TWICE A DAY AND 2 CAPSULES AT BEDTIME 360 Cap 1    traZODone (DESYREL) 50 mg tablet TAKE TWO TABLETS NIGHTLY AS NEEDED 135 Tab 2    dicyclomine (BENTYL) 10 mg capsule TAKE 2 CAPSULES FOUR TIMES A DAY AS NEEDED 360 Cap 2    DULoxetine (CYMBALTA) 60 mg capsule Take 1 Cap by mouth two (2) times a day.  180 Cap 3    hydrochlorothiazide (HYDRODIURIL) 25 mg tablet TAKE 1 TABLET DAILY 90 Tab 3    aspirin delayed-release 81 mg tablet Take 81 mg by mouth every other day.       CRESTOR 10 mg tablet          Allergies   Allergen Reactions    Codeine Other (comments)     Severe Headache    Morphine Hives    Other Medication Rash     BANDAIDS    Shellfish Containing Products Hives       Past Medical History:   Diagnosis Date    Arthritis     Cancer ([address]     skin cancer    Chronic pain     CHRONIC BOWEL PAIN    Diverticulitis     Fibromyalgia     GERD (gastroesophageal reflux disease)     Goiter     Hiatal hernia     Hypercholesterolemia     Hypertension     IBS (irritable bowel syndrome)     IBS (irritable bowel syndrome)     CONSTIPATION, CHRONIC SINCE HER 25s    Ill-defined condition     rectocele    Insomnia     TAKES TRAZODONE NIGHTLY    Migraine     REDUCED IN FREQUENCY AND SEVERITY SINCE MENOPAUSE    Other ill-defined conditions     Psychiatric disorder 3/11    DEPRESSION       Past Surgical History:   Procedure Laterality Date    COLONOSCOPY N/A 6/21/2016    COLON[facility]    ENDOSCOPY, COLON, DIAGNOSTIC  11/2010    Dr. Lenard Moura-     HX CHOLECYSTECTOMY  2006   Rosendo Davidson  2007    Dr. Pond/ diverticulitis    HX GI  X3  LAST ONE 12/10    COLONOSCOPY    HX GYN  1982    D&C WITH SUCTION    HX HYSTERECTOMY      HX ORTHOPAEDIC      right foot surgery    HX ORTHOPAEDIC      neck surgery 3/21/11 Dr. Leonor Nyhan HX TONSILLECTOMY         Family History   Problem Relation Age of Onset    Cancer Father      lung and bone    Stroke Sister     Cancer Sister 76     PANCREATIC    Hypertension Mother     Alzheimer Mother     High Cholesterol Mother     Cancer Other      COLON    Cancer Other      COUSIN    Diabetes Maternal Aunt     Cancer Maternal Uncle      COLON    Cancer Maternal Grandfather      COLON    Cancer Daughter      IN REMISSION-OVARIAN CA-IN CLINICAL TRIAL       Social History   Substance Use Topics    Smoking status: Former Smoker Packs/day: 1.00     Years: 45.00     Quit date: 1/1/2007    Smokeless tobacco: Never Used    Alcohol use 0.0 oz/week     0 Standard drinks or equivalent per week      Comment: rarely       Review of Systems   Constitutional: Positive for malaise/fatigue. HENT: Negative for congestion. Eyes: Negative for blurred vision. Respiratory: Negative for cough and shortness of breath. Cardiovascular: Negative for chest pain, palpitations and leg swelling. Gastrointestinal: Negative for abdominal pain, constipation and heartburn. Genitourinary: Negative for dysuria, frequency and urgency. Musculoskeletal: Positive for back pain, joint pain, myalgias and neck pain. Negative for falls. Neurological: Negative for dizziness, tingling and headaches. Endo/Heme/Allergies: Negative for environmental allergies. Psychiatric/Behavioral: Negative for depression. The patient does not have insomnia. Physical Exam   Constitutional: She appears well-developed and well-nourished. /69 (BP 1 Location: Left arm, BP Patient Position: Sitting)  Pulse 74  Temp 98.2 °F (36.8 °C) (Oral)   Resp 16  Ht 5' 3.5\" (1.613 m)  Wt 186 lb 9.6 oz (84.6 kg)  SpO2 96%  BMI 32.54 kg/m2     HENT:   Right Ear: Tympanic membrane and ear canal normal.   Left Ear: Tympanic membrane and ear canal normal.   Nose: No mucosal edema or rhinorrhea. Mouth/Throat: Oropharynx is clear and moist and mucous membranes are normal.   Neck: Normal range of motion. Neck supple. No thyromegaly present. Cardiovascular: Normal rate and regular rhythm. No murmur heard. Pulmonary/Chest: Effort normal and breath sounds normal.   Abdominal: Soft. Bowel sounds are normal. There is no tenderness. Musculoskeletal: Normal range of motion. She exhibits no edema. Cervical back: She exhibits tenderness. She exhibits normal range of motion and no bony tenderness. Lumbar back: She exhibits tenderness.  She exhibits normal range of motion and no bony tenderness. Lymphadenopathy:     She has no cervical adenopathy. Skin: Skin is warm and dry. Psychiatric: She has a normal mood and affect. Nursing note and vitals reviewed. ASSESSMENT and PLAN  Hannah Huitron was seen today for medication management. Diagnoses and all orders for this visit:    Encounter for chronic pain management  -     9-DRUG SCREEN + OXY, UR  The pt has signed medication agreement. Pain contract is reviewed. Pain medications will be continued at the previous dosage. Urine drug screening will be done today. Diagnostic  studies are not indicated at this time. Interventional procedure are not indicated at this time. Re-eval in 3 months. Fibromyalgia  -     traMADol (ULTRAM) 50 mg tablet; Take 1 Tab by mouth every six (6) hours as needed for Pain. Arthralgia of multiple joints  -     traMADol (ULTRAM) 50 mg tablet; Take 1 Tab by mouth every six (6) hours as needed for Pain. DDD (degenerative disc disease), cervical  -     traMADol (ULTRAM) 50 mg tablet; Take 1 Tab by mouth every six (6) hours as needed for Pain. Follow-up Disposition:  Return in about 3 months (around 8/15/2017). reviewed medications and side effects in detail    I have discussed diagnosis listed in this note with pt and/or family. I have discussed treatment plans and options and the risk/benefit analysis of those options, including safe use of medications and possible medication side effects. Through the use of shared decision making we have agreed to the above plan. The patient has received an after-visit summary and questions were answered concerning future plans and follow up. Advise pt of any urgent changes then to proceed to the ER.

## 2017-05-16 LAB
AMPHETAMINES UR QL SCN: NEGATIVE NG/ML
BARBITURATES UR QL SCN: NEGATIVE NG/ML
BENZODIAZ UR QL SCN: NEGATIVE NG/ML
BZE UR QL SCN: NEGATIVE NG/ML
CANNABINOIDS UR QL SCN: NEGATIVE NG/ML
CREAT UR-MCNC: 135.3 MG/DL (ref 20–300)
METHADONE UR QL SCN: NEGATIVE NG/ML
OPIATES UR QL SCN: NEGATIVE NG/ML
OXYCODONE+OXYMORPHONE UR QL SCN: NEGATIVE NG/ML
PCP UR QL SCN: NEGATIVE NG/ML
PH UR: 5.7 [PH] (ref 4.5–8.9)
PLEASE NOTE:, 733163: NORMAL
PROPOXYPH UR QL SCN: NEGATIVE NG/ML

## 2017-05-21 RX ORDER — HYDROCHLOROTHIAZIDE 25 MG/1
TABLET ORAL
Qty: 90 TAB | Refills: 2 | Status: SHIPPED | OUTPATIENT
Start: 2017-05-21 | End: 2018-06-21 | Stop reason: SDUPTHER

## 2017-05-30 DIAGNOSIS — K21.9 GASTROESOPHAGEAL REFLUX DISEASE WITHOUT ESOPHAGITIS: ICD-10-CM

## 2017-05-30 RX ORDER — ESOMEPRAZOLE MAGNESIUM 40 MG/1
CAPSULE, DELAYED RELEASE ORAL
Qty: 90 CAP | Refills: 3 | Status: SHIPPED | OUTPATIENT
Start: 2017-05-30 | End: 2019-05-31 | Stop reason: ALTCHOICE

## 2017-06-06 DIAGNOSIS — F32.A DEPRESSION, UNSPECIFIED DEPRESSION TYPE: ICD-10-CM

## 2017-06-06 DIAGNOSIS — K58.9 IRRITABLE BOWEL SYNDROME WITHOUT DIARRHEA: ICD-10-CM

## 2017-06-06 RX ORDER — ALPRAZOLAM 0.5 MG/1
TABLET ORAL
Qty: 180 TAB | Refills: 1 | Status: SHIPPED | OUTPATIENT
Start: 2017-06-06 | End: 2017-10-25 | Stop reason: SDUPTHER

## 2017-06-06 RX ORDER — DICYCLOMINE HYDROCHLORIDE 10 MG/1
CAPSULE ORAL
Qty: 720 CAP | Refills: 3 | Status: SHIPPED | OUTPATIENT
Start: 2017-06-06 | End: 2018-06-21 | Stop reason: SDUPTHER

## 2017-06-15 NOTE — TELEPHONE ENCOUNTER
----- Message from Cachorro Sloan sent at 6/15/2017 10:06 AM EDT -----  Regarding: Dr. Mony Collins  Pt is checking on status of prescription of Xanax. Best contact number is [phone].

## 2017-06-15 NOTE — TELEPHONE ENCOUNTER
Spoke to [facility], mailed Xanax out on 6/8, patient should receive medication today. Patient notified.

## 2017-07-13 ENCOUNTER — APPOINTMENT (OUTPATIENT)
Dept: GENERAL RADIOLOGY | Age: 67
End: 2017-07-13
Attending: EMERGENCY MEDICINE
Payer: MEDICARE

## 2017-07-13 ENCOUNTER — HOSPITAL ENCOUNTER (EMERGENCY)
Age: 67
Discharge: HOME | End: 2017-07-13
Attending: EMERGENCY MEDICINE
Payer: MEDICARE

## 2017-07-13 VITALS
HEIGHT: 64 IN | OXYGEN SATURATION: 97 % | SYSTOLIC BLOOD PRESSURE: 130 MMHG | RESPIRATION RATE: 20 BRPM | HEART RATE: 81 BPM | WEIGHT: 186.29 LBS | TEMPERATURE: 97.8 F | DIASTOLIC BLOOD PRESSURE: 69 MMHG | BODY MASS INDEX: 31.8 KG/M2

## 2017-07-13 DIAGNOSIS — R07.9 ACUTE CHEST PAIN: Primary | ICD-10-CM

## 2017-07-13 LAB
ALBUMIN SERPL BCP-MCNC: 3.7 G/DL (ref 3.5–5)
ALBUMIN/GLOB SERPL: 1.2 {RATIO} (ref 1.1–2.2)
ALP SERPL-CCNC: 81 U/L (ref 45–117)
ALT SERPL-CCNC: 32 U/L (ref 12–78)
ANION GAP BLD CALC-SCNC: 6 MMOL/L (ref 5–15)
AST SERPL W P-5'-P-CCNC: 23 U/L (ref 15–37)
BASOPHILS # BLD AUTO: 0.1 K/UL (ref 0–0.1)
BASOPHILS # BLD: 1 % (ref 0–1)
BILIRUB SERPL-MCNC: 0.2 MG/DL (ref 0.2–1)
BUN SERPL-MCNC: 28 MG/DL (ref 6–20)
BUN/CREAT SERPL: 20 (ref 12–20)
CALCIUM SERPL-MCNC: 9.2 MG/DL (ref 8.5–10.1)
CHLORIDE SERPL-SCNC: 105 MMOL/L (ref 97–108)
CK MB CFR SERPL CALC: 2.5 % (ref 0–2.5)
CK MB SERPL-MCNC: 3.2 NG/ML (ref 5–25)
CK SERPL-CCNC: 127 U/L (ref 26–192)
CK SERPL-CCNC: 132 U/L (ref 26–192)
CO2 SERPL-SCNC: 31 MMOL/L (ref 21–32)
CREAT SERPL-MCNC: 1.37 MG/DL (ref 0.55–1.02)
EOSINOPHIL # BLD: 0.2 K/UL (ref 0–0.4)
EOSINOPHIL NFR BLD: 4 % (ref 0–7)
ERYTHROCYTE [DISTWIDTH] IN BLOOD BY AUTOMATED COUNT: 12.9 % (ref 11.5–14.5)
GLOBULIN SER CALC-MCNC: 3 G/DL (ref 2–4)
GLUCOSE SERPL-MCNC: 107 MG/DL (ref 65–100)
HCT VFR BLD AUTO: 35.2 % (ref 35–47)
HGB BLD-MCNC: 11.9 G/DL (ref 11.5–16)
LYMPHOCYTES # BLD AUTO: 38 % (ref 12–49)
LYMPHOCYTES # BLD: 2.6 K/UL (ref 0.8–3.5)
MCH RBC QN AUTO: 32.4 PG (ref 26–34)
MCHC RBC AUTO-ENTMCNC: 33.8 G/DL (ref 30–36.5)
MCV RBC AUTO: 95.9 FL (ref 80–99)
MONOCYTES # BLD: 0.6 K/UL (ref 0–1)
MONOCYTES NFR BLD AUTO: 9 % (ref 5–13)
NEUTS SEG # BLD: 3.3 K/UL (ref 1.8–8)
NEUTS SEG NFR BLD AUTO: 48 % (ref 32–75)
PLATELET # BLD AUTO: 245 K/UL (ref 150–400)
POTASSIUM SERPL-SCNC: 4.2 MMOL/L (ref 3.5–5.1)
PROT SERPL-MCNC: 6.7 G/DL (ref 6.4–8.2)
RBC # BLD AUTO: 3.67 M/UL (ref 3.8–5.2)
SODIUM SERPL-SCNC: 142 MMOL/L (ref 136–145)
TROPONIN I SERPL-MCNC: <0.04 NG/ML
TROPONIN I SERPL-MCNC: <0.04 NG/ML
WBC # BLD AUTO: 6.7 K/UL (ref 3.6–11)

## 2017-07-13 PROCEDURE — 84484 ASSAY OF TROPONIN QUANT: CPT | Performed by: EMERGENCY MEDICINE

## 2017-07-13 PROCEDURE — 71020 XR CHEST PA LAT: CPT

## 2017-07-13 PROCEDURE — 85025 COMPLETE CBC W/AUTO DIFF WBC: CPT | Performed by: EMERGENCY MEDICINE

## 2017-07-13 PROCEDURE — 36415 COLL VENOUS BLD VENIPUNCTURE: CPT | Performed by: EMERGENCY MEDICINE

## 2017-07-13 PROCEDURE — 82553 CREATINE MB FRACTION: CPT | Performed by: EMERGENCY MEDICINE

## 2017-07-13 PROCEDURE — 82550 ASSAY OF CK (CPK): CPT | Performed by: EMERGENCY MEDICINE

## 2017-07-13 PROCEDURE — 99285 EMERGENCY DEPT VISIT HI MDM: CPT

## 2017-07-13 PROCEDURE — 93005 ELECTROCARDIOGRAM TRACING: CPT

## 2017-07-13 PROCEDURE — 80053 COMPREHEN METABOLIC PANEL: CPT | Performed by: EMERGENCY MEDICINE

## 2017-07-13 NOTE — ED PROVIDER NOTES
HPI Comments: Birdie Jarrell is a 74 y.o. female with significant PMHx of hypertension, hiatal hernia, fibromyalgia, and GERD, presents ambulatory to the ED with c/o substernal chest pain x ~1530. Pt notes associated symptoms chest tightness and pain radiates to right ear. She states she took 162 mg of ASA today without relief. She reports she has been having intermittent episodes with similar symptoms for a \"while\" and notes symptoms resolve after she takes some ASA. Pt denies cardiac history, SOB, nausea, vomiting, lightheadedness, any exacerbating/modifying factors, tobacco use, alcohol use, or illicit drug use. PCP: Krystal Núñez MD      There are no other complaints, changes or physical findings at this time. Written by JOSE ALBERTO Borja, as dictated by Ricardo Holder DO. The history is provided by the patient. No  was used.         Past Medical History:   Diagnosis Date    Arthritis     [address] [address]     skin cancer    Chronic pain     CHRONIC BOWEL PAIN    Diverticulitis     Fibromyalgia     GERD (gastroesophageal reflux disease)     Goiter     Hiatal hernia     Hypercholesterolemia     Hypertension     IBS (irritable bowel syndrome)     IBS (irritable bowel syndrome)     CONSTIPATION, CHRONIC SINCE HER 25s    Ill-defined condition     rectocele    Insomnia     TAKES TRAZODONE NIGHTLY    Migraine     REDUCED IN FREQUENCY AND SEVERITY SINCE MENOPAUSE    Other ill-defined conditions     Psychiatric disorder 3/11    DEPRESSION       Past Surgical History:   Procedure Laterality Date    COLONOSCOPY N/A 6/21/2016    [facility] ENDOSCOPY    ENDOSCOPY, COLON, DIAGNOSTIC  11/2010    Dr. Saravanan Davila-     HX CHOLECYSTECTOMY  2006   Macy Necessary  2007    Dr. Pond/ diverticulitis    HX GI  X3  LAST ONE 12/10    COLONOSCOPY    HX GYN  1982    D&C WITH SUCTION    HX HYSTERECTOMY      HX ORTHOPAEDIC right foot surgery    HX ORTHOPAEDIC      neck surgery 3/21/11 Dr. Eva Ibarra HX TONSILLECTOMY           Family History:   Problem Relation Age of Onset    Cancer Father      lung and bone    Stroke Sister     Cancer Sister 76     PANCREATIC    Hypertension Mother     Alzheimer Mother     High Cholesterol Mother     Cancer Other      COLON    Cancer Other      COUSIN    Diabetes Maternal Aunt     Cancer Maternal Uncle      COLON    Cancer Maternal Grandfather      COLON    Cancer Daughter      IN REMISSION-OVARIAN CA-IN CLINICAL TRIAL       Social History     Social History    Marital status:      Spouse name: N/A    Number of children: N/A    Years of education: N/A     Occupational History    Not on file. Social History Main Topics    Smoking status: Former Smoker     Packs/day: 1.00     Years: 45.00     Quit date: 1/1/2007    Smokeless tobacco: Never Used    Alcohol use 0.0 oz/week     0 Standard drinks or equivalent per week      Comment: rarely    Drug use: No    Sexual activity: Yes     Partners: Male     Other Topics Concern    Not on file     Social History Narrative         ALLERGIES: Codeine; Morphine; Other medication; and Shellfish containing products    Review of Systems   Constitutional: Negative for fatigue and fever. HENT: Negative. Eyes: Negative. Respiratory: Positive for chest tightness. Negative for shortness of breath and wheezing. Cardiovascular: Positive for chest pain (substernal). Negative for leg swelling. Gastrointestinal: Negative for blood in stool, constipation, diarrhea, nausea and vomiting. Endocrine: Negative. Genitourinary: Negative for difficulty urinating and dysuria. Musculoskeletal: Positive for myalgias (pain radiates to right ear). Skin: Negative for rash. Allergic/Immunologic: Negative. Neurological: Negative for weakness, light-headedness and numbness. Hematological: Negative. Psychiatric/Behavioral: Negative. Patient Vitals for the past 12 hrs:   Temp Pulse Resp BP SpO2   07/13/17 1815 - 73 18 106/43 97 %   07/13/17 1805 - 77 22 - 98 %   07/13/17 1804 - - - 133/56 -   07/13/17 1740 - 80 18 100/59 98 %   07/13/17 1605 97.8 °F (36.6 °C) 83 18 (!) 119/98 98 %              Physical Exam   Constitutional: She is oriented to person, place, and time. She appears well-developed and well-nourished. No distress. HENT:   Head: Normocephalic and atraumatic. Mouth/Throat: Oropharynx is clear and moist.   Eyes: Conjunctivae and EOM are normal.   Neck: Neck supple. No JVD present. No tracheal deviation present. Cardiovascular: Normal rate, regular rhythm and intact distal pulses. Exam reveals no gallop and no friction rub. No murmur heard. Pulmonary/Chest: Effort normal and breath sounds normal. No stridor. No respiratory distress. She has no wheezes. Abdominal: Soft. Bowel sounds are normal. She exhibits no distension and no mass. There is no tenderness. There is no guarding. Musculoskeletal: Normal range of motion. She exhibits no edema or tenderness. No deformity   Neurological: She is alert and oriented to person, place, and time. She has normal strength. No focal deficits   Skin: Skin is warm, dry and intact. No rash noted. Psychiatric: She has a normal mood and affect. Her behavior is normal. Judgment and thought content normal.   Nursing note and vitals reviewed.        MDM  Number of Diagnoses or Management Options  Acute chest pain:   Diagnosis management comments: DDx: acs, atypical chest pain, anxiety, GERD, malignancy, pneumonia       Amount and/or Complexity of Data Reviewed  Clinical lab tests: ordered and reviewed  Tests in the radiology section of CPT®: reviewed and ordered  Tests in the medicine section of CPT®: ordered and reviewed  Independent visualization of images, tracings, or specimens: yes    Patient Progress  Patient progress: stable    ED Course       Procedures    EKG interpretation: (Preliminary) 16:11  Rhythm: normal sinus rhythm; and regular . Rate (approx.): 82; Axis: left axis deviation; TN interval: normal; QRS interval: normal ; ST/T wave: normal; Other findings: normal.  Written by JOSE ABLERTO Paula, as dictated by Jordin Khalil DO.    LABORATORY TESTS:  Recent Results (from the past 12 hour(s))   EKG, 12 LEAD, INITIAL    Collection Time: 07/13/17  4:11 PM   Result Value Ref Range    Ventricular Rate 82 BPM    Atrial Rate 82 BPM    QRS Duration 84 ms    Q-T Interval 394 ms    QTC Calculation (Bezet) 460 ms    Calculated R Axis -29 degrees    Calculated T Axis 44 degrees    Diagnosis       Accelerated Junctional rhythm  Cannot rule out Anterior infarct , age undetermined  When compared with ECG of 10-NOV-2016 09:19,  Junctional rhythm has replaced Sinus rhythm  ST now depressed in Anterior leads  T wave inversion no longer evident in Inferior leads  T wave inversion now evident in Lateral leads     CBC WITH AUTOMATED DIFF    Collection Time: 07/13/17  5:10 PM   Result Value Ref Range    WBC 6.7 3.6 - 11.0 K/uL    RBC 3.67 (L) 3.80 - 5.20 M/uL    HGB 11.9 11.5 - 16.0 g/dL    HCT 35.2 35.0 - 47.0 %    MCV 95.9 80.0 - 99.0 FL    MCH 32.4 26.0 - 34.0 PG    MCHC 33.8 30.0 - 36.5 g/dL    RDW 12.9 11.5 - 14.5 %    PLATELET 440 630 - 941 K/uL    NEUTROPHILS 48 32 - 75 %    LYMPHOCYTES 38 12 - 49 %    MONOCYTES 9 5 - 13 %    EOSINOPHILS 4 0 - 7 %    BASOPHILS 1 0 - 1 %    ABS. NEUTROPHILS 3.3 1.8 - 8.0 K/UL    ABS. LYMPHOCYTES 2.6 0.8 - 3.5 K/UL    ABS. MONOCYTES 0.6 0.0 - 1.0 K/UL    ABS. EOSINOPHILS 0.2 0.0 - 0.4 K/UL    ABS.  BASOPHILS 0.1 0.0 - 0.1 K/UL   METABOLIC PANEL, COMPREHENSIVE    Collection Time: 07/13/17  5:10 PM   Result Value Ref Range    Sodium 142 136 - 145 mmol/L    Potassium 4.2 3.5 - 5.1 mmol/L    Chloride 105 97 - 108 mmol/L    CO2 31 21 - 32 mmol/L    Anion gap 6 5 - 15 mmol/L    Glucose 107 (H) 65 - 100 mg/dL    BUN 28 (H) 6 - 20 MG/DL Creatinine 1.37 (H) 0.55 - 1.02 MG/DL    BUN/Creatinine ratio 20 12 - 20      GFR est AA 46 (L) >60 ml/min/1.73m2    GFR est non-AA 38 (L) >60 ml/min/1.73m2    Calcium 9.2 8.5 - 10.1 MG/DL    Bilirubin, total 0.2 0.2 - 1.0 MG/DL    ALT (SGPT) 32 12 - 78 U/L    AST (SGOT) 23 15 - 37 U/L    Alk. phosphatase 81 45 - 117 U/L    Protein, total 6.7 6.4 - 8.2 g/dL    Albumin 3.7 3.5 - 5.0 g/dL    Globulin 3.0 2.0 - 4.0 g/dL    A-G Ratio 1.2 1.1 - 2.2     CK W/ REFLX CKMB    Collection Time: 07/13/17  5:10 PM   Result Value Ref Range     26 - 192 U/L   TROPONIN I    Collection Time: 07/13/17  5:10 PM   Result Value Ref Range    Troponin-I, Qt. <0.04 <0.05 ng/mL   CK W/ CKMB & INDEX    Collection Time: 07/13/17  7:23 PM   Result Value Ref Range     26 - 192 U/L    CK - MB 3.2 <3.6 NG/ML    CK-MB Index 2.5 0 - 2.5     TROPONIN I    Collection Time: 07/13/17  7:23 PM   Result Value Ref Range    Troponin-I, Qt. <0.04 <0.05 ng/mL       IMAGING RESULTS:    History: Chest pain.     Frontal and lateral views of the chest show clear lungs. The heart, mediastinum  and pulmonary vasculature are normal.  The bony thorax is unremarkable. The  patient is status post cervical spine surgery.     IMPRESSION  IMPRESSION:  NORMAL CHEST. IMPRESSION:  1. Acute chest pain        PLAN:  1. Follow-up Information     Follow up With Details Comments Contact Shahrzad Floyd MD Schedule an appointment as soon as possible for a visit  [address] [address] [phone]      [facility] Schedule an appointment as soon as possible for a visit  [address]  [address]   [phone]      [facility] EMERGENCY DEPT  As needed, If symptoms worsen [address] [facility]  [address]   [address]        2. Return to ED if worse     DISCHARGE NOTE  8:16 PM  The patient has been re-evaluated and is ready for discharge.  Reviewed available results with patient. Counseled pt on diagnosis and care plan. Pt has expressed understanding, and all questions have been answered. Pt agrees with plan and agrees to F/U as recommended, or return to the ED if their sxs worsen. Discharge instructions have been provided and explained to the pt, along with reasons to return to the ED. Written by Marissa Joyner ED Scribe, as dictated by Joey Guadalupe DO. This note is prepared by Marissa Joyner, acting as Scribe for Joey Guadalupe DO. Joey Guadalupe DO : The scribe's documentation has been prepared under my direction and personally reviewed by me in its entirety. I confirm that the note above accurately reflects all work, treatment, procedures, and medical decision making performed by me.

## 2017-07-13 NOTE — ED TRIAGE NOTES
Patient arrives ambulatory to ED with complaint of substernal chest tightness started approx 30 mins PTA while lying on sofa; pt states pain radiates to R ear and no where else; pt denies any cardiac hx; pt denies N/V.

## 2017-07-14 ENCOUNTER — PATIENT OUTREACH (OUTPATIENT)
Dept: FAMILY MEDICINE CLINIC | Age: 67
End: 2017-07-14

## 2017-07-14 LAB
ATRIAL RATE: 82 BPM
CALCULATED R AXIS, ECG10: -29 DEGREES
CALCULATED T AXIS, ECG11: 44 DEGREES
DIAGNOSIS, 93000: NORMAL
Q-T INTERVAL, ECG07: 394 MS
QRS DURATION, ECG06: 84 MS
QTC CALCULATION (BEZET), ECG08: 460 MS
VENTRICULAR RATE, ECG03: 82 BPM

## 2017-07-14 NOTE — ED NOTES
Dr. Phill Cifuentes at bedside to provide discharge paperwork. Vital signs stable. Pt in no apparent distress at this time. Mental status at baseline. Ambulatory to waiting room with steady gate, discharge paperwork in hand. Accompanied by significant other.

## 2017-07-14 NOTE — PROGRESS NOTES
[facility] . Patient  to ED [facility] ED on  with C/P. Contacted patient to perform post ED discharge assessment. Verified  and address with patient as identifiers. Provided introduction to self, and explanation of the NN role. Performed medication reconciliation with patient, and patient verbalizes understanding of administration of home medications. Reviewed discharge instructions with patient. Patient verbalizes understand of discharge instructions and follow-up care. Patient scheduled to f/u with Dr Dorantes Both on  at 10:435am.  Reviewed red flags with patient, and patient verbalizes understanding. Patient given an opportunity to ask questions. Contact information provided to the patient for future reference or further questions. Patient states she has not had any other episodes of C/P, has not been doing much today, just taking it easy and cleaning kitchen. Has not made an appt with [facility], gave patient # to call and make an appt today. Red Flags:  Return to C/P, SOB, N/V or palpitations.

## 2017-07-14 NOTE — DISCHARGE INSTRUCTIONS
Chest Pain: Care Instructions  Your Care Instructions  There are many things that can cause chest pain. Some are not serious and will get better on their own in a few days. But some kinds of chest pain need more testing and treatment. Your doctor may have recommended a follow-up visit in the next 8 to 12 hours. If you are not getting better, you may need more tests or treatment. Even though your doctor has released you, you still need to watch for any problems. The doctor carefully checked you, but sometimes problems can develop later. If you have new symptoms or if your symptoms do not get better, get medical care right away. If you have worse or different chest pain or pressure that lasts more than 5 minutes or you passed out (lost consciousness), call 911 or seek other emergency help right away. A medical visit is only one step in your treatment. Even if you feel better, you still need to do what your doctor recommends, such as going to all suggested follow-up appointments and taking medicines exactly as directed. This will help you recover and help prevent future problems. How can you care for yourself at home? · Rest until you feel better. · Take your medicine exactly as prescribed. Call your doctor if you think you are having a problem with your medicine. · Do not drive after taking a prescription pain medicine. When should you call for help? Call 911 if:  · You passed out (lost consciousness). · You have severe difficulty breathing. · You have symptoms of a heart attack. These may include:  ¨ Chest pain or pressure, or a strange feeling in your chest.  ¨ Sweating. ¨ Shortness of breath. ¨ Nausea or vomiting. ¨ Pain, pressure, or a strange feeling in your back, neck, jaw, or upper belly or in one or both shoulders or arms. ¨ Lightheadedness or sudden weakness. ¨ A fast or irregular heartbeat.   After you call 911, the  may tell you to chew 1 adult-strength or 2 to 4 low-dose aspirin. Wait for an ambulance. Do not try to drive yourself. Call your doctor today if:  · You have any trouble breathing. · Your chest pain gets worse. · You are dizzy or lightheaded, or you feel like you may faint. · You are not getting better as expected. · You are having new or different chest pain. Where can you learn more? Go to http://mila-mary.info/. Enter A120 in the search box to learn more about \"Chest Pain: Care Instructions. \"  Current as of: March 20, 2017  Content Version: 11.3  © 9323-0882 [employer]. Care instructions adapted under license by [employer] (which disclaims liability or warranty for this information). If you have questions about a medical condition or this instruction, always ask your healthcare professional. [address] any warranty or liability for your use of this information.

## 2017-07-17 ENCOUNTER — OFFICE VISIT (OUTPATIENT)
Dept: FAMILY MEDICINE CLINIC | Age: 67
End: 2017-07-17

## 2017-07-17 ENCOUNTER — HOSPITAL ENCOUNTER (OUTPATIENT)
Dept: LAB | Age: 67
Discharge: HOME | End: 2017-07-17
Payer: MEDICARE

## 2017-07-17 VITALS
SYSTOLIC BLOOD PRESSURE: 110 MMHG | DIASTOLIC BLOOD PRESSURE: 52 MMHG | WEIGHT: 187.8 LBS | TEMPERATURE: 97.6 F | HEIGHT: 64 IN | OXYGEN SATURATION: 97 % | BODY MASS INDEX: 32.06 KG/M2 | RESPIRATION RATE: 16 BRPM | HEART RATE: 76 BPM

## 2017-07-17 DIAGNOSIS — K58.1 IRRITABLE BOWEL SYNDROME WITH CONSTIPATION: ICD-10-CM

## 2017-07-17 DIAGNOSIS — M50.30 DDD (DEGENERATIVE DISC DISEASE), CERVICAL: ICD-10-CM

## 2017-07-17 DIAGNOSIS — E78.00 HYPERCHOLESTEROLEMIA: ICD-10-CM

## 2017-07-17 DIAGNOSIS — M79.7 FIBROMYALGIA: ICD-10-CM

## 2017-07-17 DIAGNOSIS — I10 ESSENTIAL HYPERTENSION: ICD-10-CM

## 2017-07-17 DIAGNOSIS — Z51.81 ENCOUNTER FOR MEDICATION MONITORING: ICD-10-CM

## 2017-07-17 DIAGNOSIS — F32.A DEPRESSION, UNSPECIFIED DEPRESSION TYPE: ICD-10-CM

## 2017-07-17 DIAGNOSIS — R07.9 CHEST PAIN, UNSPECIFIED TYPE: Primary | ICD-10-CM

## 2017-07-17 DIAGNOSIS — Z12.31 ENCOUNTER FOR SCREENING MAMMOGRAM FOR BREAST CANCER: ICD-10-CM

## 2017-07-17 PROCEDURE — 80061 LIPID PANEL: CPT

## 2017-07-17 PROCEDURE — 36415 COLL VENOUS BLD VENIPUNCTURE: CPT

## 2017-07-17 NOTE — PROGRESS NOTES
HISTORY OF PRESENT ILLNESS  Cirilo Cohen is a 74 y.o. female. HPI   Follow up from ER. Had episode of chest discomfort and had had a few times over the past several weeks. Pain in the middle of the chest that feels like an ache with tightness. Comes on just at rest usually. Nonexertional.  No radiation of pain. No diaphoresis or SOB. No palpitations. Labs and EKG in the ER were normal but told to follow up with cardiology. HTN follow up:  Compliant w/ meds, low salt diet, and exercise. No home bp monitoring. No swelling, headache or dizziness. Hypercholesterolemia follow up:  Compliant w/ low fat, low cholesterol diet. Tolerating crestor. Exercising some. No muscle more than her usual from the fibromyalgia, no abdominal pain, no skin discoloration. Patient is not fasting today. Depression Review:  Patient is seen for followup of depression and fibromyalgia and IBS. Pain is 7-8/10. Has hx of cervical neck disc disease. Currently taking cymbalta and Celexa and gabapentin. Ongoing symptoms include fatigue and stress. She experiences the following side effects from the treatment: none.     Patient Active Problem List   Diagnosis Code    IBS (irritable bowel syndrome) K58.9    Fibromyalgia M79.7    Hiatal hernia K44.9    GERD (gastroesophageal reflux disease) K21.9    Hypercholesterolemia E78.00    Hypovitaminosis D E55.9    Diverticulitis K57.92    Depression F32.9    Essential hypertension I10    Encounter for medication monitoring Z51.81    DDD (degenerative disc disease), cervical M50.30       Current Outpatient Prescriptions   Medication Sig Dispense Refill    ALPRAZolam (XANAX) 0.5 mg tablet TAKE 1 TABLET BY MOUTH TWICE A DAY AS NEEDED FOR ANXIETY 180 Tab 1    dicyclomine (BENTYL) 10 mg capsule TAKE 2 CAPSULES FOUR TIMES A DAY AS NEEDED 720 Cap 3    NEXIUM 40 mg capsule TAKE 1 CAPSULE DAILY 90 Cap 3    hydroCHLOROthiazide (HYDRODIURIL) 25 mg tablet TAKE 1 TABLET DAILY 90 Tab 2    traMADol (ULTRAM) 50 mg tablet Take 1 Tab by mouth every six (6) hours as needed for Pain. 90 Tab 1    LINZESS 290 mcg cap capsule Take 290 mcg by mouth Three (3) times a week.  cholecalciferol (VITAMIN D3) 1,000 unit tablet Take 1,000 Units by mouth every other day.  citalopram (CELEXA) 40 mg tablet TAKE 1 TABLET DAILY 90 Tab 2    rosuvastatin (CRESTOR) 20 mg tablet Take 1 Tab by mouth nightly. 90 Tab 1    lisinopril (PRINIVIL, ZESTRIL) 40 mg tablet TAKE 1 TABLET DAILY 90 Tab 3    cyclobenzaprine (FLEXERIL) 10 mg tablet TAKE 1 TABLET BY MOUTH 3 TIMES A DAY AS NEEDED FOR MUSCLE SPASMS  0    sucralfate (CARAFATE) 1 gram tablet TAKE 1 TABLET FOUR TIMES A  Tab 3    gabapentin (NEURONTIN) 300 mg capsule Take 600 mg by mouth three (3) times daily. 360 Cap 1    traZODone (DESYREL) 50 mg tablet TAKE TWO TABLETS NIGHTLY AS NEEDED 135 Tab 2    DULoxetine (CYMBALTA) 60 mg capsule Take 1 Cap by mouth two (2) times a day. 180 Cap 3    aspirin delayed-release 81 mg tablet Take 81 mg by mouth every other day.          Allergies   Allergen Reactions    Codeine Other (comments)     Severe Headache    Morphine Hives    Other Medication Rash     BANDAIDS    Shellfish Containing Products Hives         Past Medical History:   Diagnosis Date    Arthritis     Cancer ([facility] [address]     skin cancer    Chronic pain     CHRONIC BOWEL PAIN    Diverticulitis     Fibromyalgia     GERD (gastroesophageal reflux disease)     Goiter     Hiatal hernia     Hypercholesterolemia     Hypertension     IBS (irritable bowel syndrome)     IBS (irritable bowel syndrome)     CONSTIPATION, CHRONIC SINCE HER 25s    Ill-defined condition     rectocele    Insomnia     TAKES TRAZODONE NIGHTLY    Migraine     REDUCED IN FREQUENCY AND SEVERITY SINCE MENOPAUSE    Other ill-defined conditions     Psychiatric disorder 3/11    DEPRESSION         Past Surgical History:   Procedure Laterality Date    COLONOSCOPY N/A 6/21/2016    COLONOSCOPY performed by [facility]undrea [facility]man [facility], DIAGNOSTIC  11/2010    Dr. Sixto Putnam-     HX CHOLECYSTECTOMY  2006   Dana Morrison  2007    Dr. Pond/ diverticulitis    HX GI  X3  LAST ONE 12/10    COLONOSCOPY    HX GYN  1982    D&C WITH SUCTION    HX HYSTERECTOMY      HX ORTHOPAEDIC      right foot surgery    HX ORTHOPAEDIC      neck surgery 3/21/11 Dr. Arben Suh HX TONSILLECTOMY           Family History   Problem Relation Age of Onset    Cancer Father      lung and bone    Stroke Sister     Cancer Sister 76     PANCREATIC    Hypertension Mother     Alzheimer Mother     High Cholesterol Mother     Cancer Other      COLON    Cancer Other      COUSIN    Diabetes Maternal Aunt     Cancer Maternal Uncle      COLON    Cancer Maternal Grandfather      COLON    Cancer Daughter      IN REMISSION-OVARIAN CA-IN CLINICAL TRIAL       Social History   Substance Use Topics    Smoking status: Former Smoker     Packs/day: 1.00     Years: 45.00     Quit date: 1/1/2007    Smokeless tobacco: Never Used    Alcohol use 0.0 oz/week     0 Standard drinks or equivalent per week      Comment: rarely        Patient Active Problem List   Diagnosis Code    IBS (irritable bowel syndrome) K58.9    Fibromyalgia M79.7    Hiatal hernia K44.9    GERD (gastroesophageal reflux disease) K21.9    Hypercholesterolemia E78.00    Hypovitaminosis D E55.9    Diverticulitis K57.92    Depression F32.9    Essential hypertension I10    Encounter for medication monitoring Z51.81    DDD (degenerative disc disease), cervical M50.30       Current Outpatient Prescriptions   Medication Sig Dispense Refill    ALPRAZolam (XANAX) 0.5 mg tablet TAKE 1 TABLET BY MOUTH TWICE A DAY AS NEEDED FOR ANXIETY 180 Tab 1    dicyclomine (BENTYL) 10 mg capsule TAKE 2 CAPSULES FOUR TIMES A DAY AS NEEDED 720 Cap 3    NEXIUM 40 mg capsule TAKE 1 CAPSULE DAILY 90 Cap 3    hydroCHLOROthiazide (HYDRODIURIL) 25 mg tablet TAKE 1 TABLET DAILY 90 Tab 2    traMADol (ULTRAM) 50 mg tablet Take 1 Tab by mouth every six (6) hours as needed for Pain. 90 Tab 1    LINZESS 290 mcg cap capsule Take 290 mcg by mouth Three (3) times a week.  cholecalciferol (VITAMIN D3) 1,000 unit tablet Take 1,000 Units by mouth every other day.  citalopram (CELEXA) 40 mg tablet TAKE 1 TABLET DAILY 90 Tab 2    rosuvastatin (CRESTOR) 20 mg tablet Take 1 Tab by mouth nightly. 90 Tab 1    lisinopril (PRINIVIL, ZESTRIL) 40 mg tablet TAKE 1 TABLET DAILY 90 Tab 3    cyclobenzaprine (FLEXERIL) 10 mg tablet TAKE 1 TABLET BY MOUTH 3 TIMES A DAY AS NEEDED FOR MUSCLE SPASMS  0    sucralfate (CARAFATE) 1 gram tablet TAKE 1 TABLET FOUR TIMES A  Tab 3    gabapentin (NEURONTIN) 300 mg capsule Take 600 mg by mouth three (3) times daily. 360 Cap 1    traZODone (DESYREL) 50 mg tablet TAKE TWO TABLETS NIGHTLY AS NEEDED 135 Tab 2    DULoxetine (CYMBALTA) 60 mg capsule Take 1 Cap by mouth two (2) times a day. 180 Cap 3    aspirin delayed-release 81 mg tablet Take 81 mg by mouth every other day.          Allergies   Allergen Reactions    Codeine Other (comments)     Severe Headache    Morphine Hives    Other Medication Rash     BANDAIDS    Shellfish Containing Products Hives         Past Medical History:   Diagnosis Date    Arthritis     Cancer ([facility] [address]     skin cancer    Chronic pain     CHRONIC BOWEL PAIN    Diverticulitis     Fibromyalgia     GERD (gastroesophageal reflux disease)     Goiter     Hiatal hernia     Hypercholesterolemia     Hypertension     IBS (irritable bowel syndrome)     IBS (irritable bowel syndrome)     CONSTIPATION, CHRONIC SINCE HER 25s    Ill-defined condition     rectocele    Insomnia     TAKES TRAZODONE NIGHTLY    Migraine     REDUCED IN FREQUENCY AND SEVERITY SINCE MENOPAUSE    Other ill-defined conditions     Psychiatric disorder 3/11    DEPRESSION         Past Surgical History: Procedure Laterality Date    COLONOSCOPY N/A 6/21/2016    COLONOSCOPY performed [facility] Juni [facility]cedo [facility], [facility]STIC  11/2010    Dr. Roberto Burciaga-     HX CHOLECYSTECTOMY  2006   Allie Chong  2007    Dr. Pond/ diverticulitis    HX GI  X3  LAST ONE 12/10    COLONOSCOPY    HX GYN  1982    D&C WITH SUCTION    HX HYSTERECTOMY      HX ORTHOPAEDIC      right foot surgery    HX ORTHOPAEDIC      neck surgery 3/21/11 Dr. Saskia Espinosa HX TONSILLECTOMY           Family History   Problem Relation Age of Onset    Cancer Father      lung and bone    Stroke Sister     Cancer Sister 76     PANCREATIC    Hypertension Mother     Alzheimer Mother     High Cholesterol Mother     Cancer Other      COLON    Cancer Other      COUSIN    Diabetes Maternal Aunt     Cancer Maternal Uncle      COLON    Cancer Maternal Grandfather      COLON    Cancer Daughter      IN REMISSION-OVARIAN CA-IN CLINICAL TRIAL       Social History   Substance Use Topics    Smoking status: Former Smoker     Packs/day: 1.00     Years: 45.00     Quit date: 1/1/2007    Smokeless tobacco: Never Used    Alcohol use 0.0 oz/week     0 Standard drinks or equivalent per week      Comment: rarely     Lab Results   Component Value Date/Time    WBC 6.7 07/13/2017 05:10 PM    Hemoglobin (POC) 12.2 08/19/2010 11:52 AM    HGB 11.9 07/13/2017 05:10 PM    Hematocrit (POC) 36 08/19/2010 11:52 AM    HCT 35.2 07/13/2017 05:10 PM    PLATELET 795 12/85/5952 05:10 PM    MCV 95.9 07/13/2017 05:10 PM       Lab Results   Component Value Date/Time    Cholesterol, total 160 03/21/2017 10:56 AM    HDL Cholesterol 43 03/21/2017 10:56 AM    LDL, calculated 95 03/21/2017 10:56 AM    Triglyceride 111 03/21/2017 10:56 AM    CHOL/HDL Ratio 4.1 08/20/2010 03:00 AM       Lab Results   Component Value Date/Time    Sodium 142 07/13/2017 05:10 PM    Potassium 4.2 07/13/2017 05:10 PM    Chloride 105 07/13/2017 05:10 PM    CO2 31 07/13/2017 05:10 PM Anion gap 6 07/13/2017 05:10 PM    Glucose 107 07/13/2017 05:10 PM    BUN 28 07/13/2017 05:10 PM    Creatinine 1.37 07/13/2017 05:10 PM    BUN/Creatinine ratio 20 07/13/2017 05:10 PM    GFR est AA 46 07/13/2017 05:10 PM    GFR est non-AA 38 07/13/2017 05:10 PM    Calcium 9.2 07/13/2017 05:10 PM    Bilirubin, total 0.2 07/13/2017 05:10 PM    ALT (SGPT) 32 07/13/2017 05:10 PM    AST (SGOT) 23 07/13/2017 05:10 PM    Alk. phosphatase 81 07/13/2017 05:10 PM    Protein, total 6.7 07/13/2017 05:10 PM    Albumin 3.7 07/13/2017 05:10 PM    Globulin 3.0 07/13/2017 05:10 PM    A-G Ratio 1.2 07/13/2017 05:10 PM      Lab Results   Component Value Date/Time    Hemoglobin A1c 5.5 11/19/2013 10:40 AM    Hemoglobin A1c (POC) 5.1 09/19/2016 11:30 AM           Review of Systems   Constitutional: Positive for malaise/fatigue. HENT: Negative for congestion. Eyes: Negative for blurred vision. Respiratory: Negative for cough and shortness of breath. Cardiovascular: Negative for palpitations and leg swelling. Gastrointestinal: Negative for abdominal pain, constipation and heartburn. Genitourinary: Negative for dysuria, frequency and urgency. Neurological: Negative for dizziness, tingling and headaches. Endo/Heme/Allergies: Negative for environmental allergies. Psychiatric/Behavioral: Negative for depression. The patient does not have insomnia. Physical Exam   Constitutional: She appears well-developed and well-nourished. /52 (BP 1 Location: Left arm, BP Patient Position: Sitting)  Pulse 76  Temp 97.6 °F (36.4 °C) (Oral)   Resp 16  Ht 5' 4\" (1.626 m)  Wt 187 lb 12.8 oz (85.2 kg)  SpO2 97%  BMI 32.24 kg/m2     HENT:   Right Ear: Tympanic membrane and ear canal normal.   Left Ear: Tympanic membrane and ear canal normal.   Nose: No mucosal edema or rhinorrhea. Mouth/Throat: Oropharynx is clear and moist and mucous membranes are normal.   Neck: Normal range of motion. Neck supple.  No thyromegaly present. Cardiovascular: Normal rate and regular rhythm. No murmur heard. Pulmonary/Chest: Effort normal and breath sounds normal.   Abdominal: Soft. Bowel sounds are normal. There is no tenderness. Musculoskeletal: Normal range of motion. She exhibits no edema. Lymphadenopathy:     She has no cervical adenopathy. Neurological: She has normal strength. No sensory deficit. Coordination and gait normal.   Skin: Skin is warm and dry. Psychiatric: She has a normal mood and affect. Nursing note and vitals reviewed. ASSESSMENT and PLAN  Checo Collins was seen today for hospital follow up. Diagnoses and all orders for this visit:    Chest pain, unspecified type  -     REFERRAL TO CARDIOLOGY  -     STRESS ECHO ICLUD PERFORM ECG ALDO WITH     Essential hypertension  Stable     Hypercholesterolemia  -     LIPID PANEL    DDD (degenerative disc disease), cervical  Stable     Depression, unspecified depression type  Stable     Irritable bowel syndrome with constipation  Stable     Fibromyalgia  Stable     Encounter for medication monitoring    Encounter for screening mammogram for breast cancer  -     [facility] MAMMO BI SCREENING INCL CAD; Future      Follow-up Disposition: I have discussed diagnosis listed in this note with pt and/or family. I have discussed treatment plans and options and the risk/benefit analysis of those options, including safe use of medications and possible medication side effects. Through the use of shared decision making we have agreed to the above plan. The patient has received an after-visit summary and questions were answered concerning future plans and follow up. Advise pt of any urgent changes then to proceed to the ER.       reviewed diet, exercise and weight control  cardiovascular risk and specific lipid/LDL goals reviewed  reviewed medications and side effects in detail

## 2017-07-17 NOTE — MR AVS SNAPSHOT
Visit Information Date & Time Provider Department Dept. Phone Encounter #  
 7/17/2017 10:45 AM Lennox Means, MD [facility] [phone] [id number] Your Appointments 7/26/2017  1:30 PM  
STRESS TEST with [facility]ES[facility], [facility]) Appt Note: STRESS ECHO ICLUD PERFORM ECG ALDO WITH  [[id number]] (Order [id number]) jamie w/Marvin ordered,cp jaandres [address] [facility]  
[phone] [address] [facility]  
  
    
 8/2/2017 10:00 AM  
New Patient with Arnold Julio MD  
[facility]) Appt Note: 0cp,,np, cp annelise; np, Dr. Yordan Hankins referred/ stress s/d 7/26/17,jaa  
 [id number] [facility]  
[phone] [address] [facility]  
  
    
 8/14/2017  9:45 AM  
ROUTINE CARE with Lennox Means, MD  
[facility]) Appt Note: routine follow up  
 [address] [facility] [id number] [phone] [address] Upcoming Health Maintenance Date Due INFLUENZA AGE 9 TO ADULT 8/1/2017 MEDICARE YEARLY EXAM 3/22/2018 BREAST CANCER SCRN MAMMOGRAM 4/21/2018 GLAUCOMA SCREENING Q2Y 11/2/2018 COLONOSCOPY 6/21/2021 DTaP/Tdap/Td series (3 - Td) 11/21/2026 Allergies as of 7/17/2017  Review Complete On: 7/17/2017 By: Rhett Jimenez LPN Severity Noted Reaction Type Reactions Codeine High 03/15/2010    Other (comments) Severe Headache Morphine High 03/15/2010    Hives Other Medication  03/15/2011    Rash BANDAIDS Shellfish Containing Products  03/15/2011    Hives Current Immunizations  Reviewed on 7/17/2017 Name Date Influenza High Dose Vaccine PF 9/19/2016 10:00 AM, 10/30/2015, 11/11/2014 Influenza Vaccine 11/19/2013 Influenza Vaccine Split 11/26/2012, 12/12/2011, 9/24/2010 Pneumococcal Conjugate (PCV-13) 5/18/2015 Pneumococcal Vaccine (Unspecified Type) 11/19/2013 Tdap 5/26/2014 12:40 PM  
 Zoster Vaccine, Live 4/16/2012 Reviewed by Traci French MD on 7/17/2017 at 11:27 AM  
You Were Diagnosed With   
  
 Codes Comments Hypercholesterolemia    -  Primary ICD-10-CM: E78.00 ICD-9-CM: 272.0 Chest pain, unspecified type     ICD-10-CM: R07.9 ICD-9-CM: 786.50   
 DDD (degenerative disc disease), cervical     ICD-10-CM: M50.30 ICD-9-CM: 722.4 Essential hypertension     ICD-10-CM: I10 
ICD-9-CM: 401.9 Depression, unspecified depression type     ICD-10-CM: F32.9 ICD-9-CM: 679 Irritable bowel syndrome with constipation     ICD-10-CM: K58.1 ICD-9-CM: 440.3 Fibromyalgia     ICD-10-CM: M79.7 ICD-9-CM: 729.1 Encounter for medication monitoring     ICD-10-CM: Z51.81 
ICD-9-CM: V58.83 Vitals BP Pulse Temp Resp Height(growth percentile) Weight(growth percentile) 110/52 (BP 1 Location: Left arm, BP Patient Position: Sitting) 76 97.6 °F (36.4 °C) (Oral) 16 5' 4\" (1.626 m) 187 lb 12.8 oz (85.2 kg) SpO2 BMI OB Status Smoking Status 97% 32.24 kg/m2 Hysterectomy Former Smoker Vitals History BMI and BSA Data Body Mass Index Body Surface Area  
 32.24 kg/m 2 1.96 m 2 Preferred Pharmacy Pharmacy Name [address] [facility] [phone] Your Updated Medication List  
  
   
This list is accurate as of: 7/17/17 11:51 AM.  Always use your most recent med list.  
  
  
  
  
 ALPRAZolam 0.5 mg tablet Commonly known as:  XANAX  
TAKE 1 TABLET BY MOUTH TWICE A DAY AS NEEDED FOR ANXIETY  
  
 aspirin delayed-release 81 mg tablet Take 81 mg by mouth every other day. citalopram 40 mg tablet Commonly known as:  CELEXA  
TAKE 1 TABLET DAILY cyclobenzaprine 10 mg tablet Commonly known as:  FLEXERIL  
TAKE 1 TABLET BY MOUTH 3 TIMES A DAY AS NEEDED FOR MUSCLE SPASMS  
  
 dicyclomine 10 mg capsule Commonly known as:  BENTYL TAKE 2 CAPSULES FOUR TIMES A DAY AS NEEDED DULoxetine 60 mg capsule Commonly known as:  CYMBALTA Take 1 Cap by mouth two (2) times a day.  
  
 gabapentin 300 mg capsule Commonly known as:  NEURONTIN Take 600 mg by mouth three (3) times daily. hydroCHLOROthiazide 25 mg tablet Commonly known as:  HYDRODIURIL  
TAKE 1 TABLET DAILY LINZESS 290 mcg Cap capsule Generic drug:  linaclotide Take 290 mcg by mouth Three (3) times a week. lisinopril 40 mg tablet Commonly known as:  PRINIVIL, ZESTRIL  
TAKE 1 TABLET DAILY NexIUM 40 mg capsule Generic drug:  esomeprazole TAKE 1 CAPSULE DAILY  
  
 oxybutynin 5 mg tablet Commonly known as:  DHIFOJKW Take 1 tab twice daily  
  
 rosuvastatin 20 mg tablet Commonly known as:  CRESTOR Take 1 Tab by mouth nightly. sucralfate 1 gram tablet Commonly known as:  CARAFATE  
TAKE 1 TABLET FOUR TIMES A DAY  
  
 traMADol 50 mg tablet Commonly known as:  ULTRAM  
Take 1 Tab by mouth every six (6) hours as needed for Pain. traZODone 50 mg tablet Commonly known as:  DESYREL  
TAKE TWO TABLETS NIGHTLY AS NEEDED  
  
 VITAMIN D3 1,000 unit tablet Generic drug:  cholecalciferol Take 1,000 Units by mouth every other day. We Performed the Following LIPID PANEL [43725 CPT(R)] REFERRAL TO CARDIOLOGY [YDG90 Custom] Comments:  
 Stress echo and move her appt up from 8/29 to see cardiology. STRESS ECHO ICLUD PERFORM ECG LADO WITH DR [84973 CPT(R)] Referral Information Referral ID Referred By Referred To  
  
 [id number] Miles DOWELL Not Available Visits Status Start Date End Date 1 New Request 7/17/17 7/17/18  If your referral has a status of pending review or denied, additional information will be sent to support the outcome of this decision. Introducing [facility]! Dear Angie Agudelo: 
Thank you for requesting a [employer] account. Our records indicate that you already have an active [employer] account. You can access your account anytime at [email or web address] Did you know that you can access your hospital and ER discharge instructions at any time in [employer]? You can also review all of your test results from your hospital stay or ER visit. Additional Information If you have questions, please visit the Frequently Asked Questions section of the [employer] website at [email or web address] Remember, [employer] is NOT to be used for urgent needs. For medical emergencies, dial 911. Now available from your iPhone and Android! Please provide this summary of care documentation to your next provider. Your primary care clinician is listed as Timmy Loza. If you have any questions after today's visit, please call [phone].

## 2017-07-17 NOTE — PROGRESS NOTES
Chief Complaint   Patient presents with   [facility] Follow Up     ED [facility] ER 7/13/2017 for chest pain       Patient denies any more chest pain since ER visit. Patient states she has an appt with Dr. Jason Hoang 8/29/2017 at 10am.    CMP 7/13/2016    Lipid 3/21/2017    Mammogram 4/21/2016    Colonoscopy 6/21/2016 by Dr. aBssam Chacon- repeat in 3 years.

## 2017-07-17 NOTE — PROGRESS NOTES
Appointment at [facility] for a stress echo on July 26, 2017 at 1:30 and an office visit with Dr Kashif Robles on August 2, 2017 at 10:00 am to go over the test results.

## 2017-07-18 LAB
CHOLEST SERPL-MCNC: 166 MG/DL (ref 100–199)
HDLC SERPL-MCNC: 40 MG/DL
INTERPRETATION, 910389: NORMAL
LDLC SERPL CALC-MCNC: 86 MG/DL (ref 0–99)
TRIGL SERPL-MCNC: 201 MG/DL (ref 0–149)
VLDLC SERPL CALC-MCNC: 40 MG/DL (ref 5–40)

## 2017-07-26 ENCOUNTER — CLINICAL SUPPORT (OUTPATIENT)
Dept: CARDIOLOGY CLINIC | Age: 67
End: 2017-07-26

## 2017-07-26 DIAGNOSIS — R07.89 CHEST PAIN, ATYPICAL: Primary | ICD-10-CM

## 2017-07-27 ENCOUNTER — HOSPITAL ENCOUNTER (OUTPATIENT)
Dept: MRI IMAGING | Age: 67
Discharge: HOME | End: 2017-07-27
Attending: ORTHOPAEDIC SURGERY
Payer: MEDICARE

## 2017-07-27 DIAGNOSIS — M54.42 LOW BACK PAIN WITH BILATERAL SCIATICA: ICD-10-CM

## 2017-07-27 DIAGNOSIS — M54.6 PAIN IN THORACIC SPINE: ICD-10-CM

## 2017-07-27 DIAGNOSIS — M47.817 LUMBOSACRAL SPONDYLOSIS WITHOUT MYELOPATHY: ICD-10-CM

## 2017-07-27 DIAGNOSIS — M54.41 LOW BACK PAIN WITH BILATERAL SCIATICA: ICD-10-CM

## 2017-07-27 PROCEDURE — 72146 MRI CHEST SPINE W/O DYE: CPT

## 2017-07-27 PROCEDURE — 72148 MRI LUMBAR SPINE W/O DYE: CPT

## 2017-08-02 ENCOUNTER — OFFICE VISIT (OUTPATIENT)
Dept: CARDIOLOGY CLINIC | Age: 67
End: 2017-08-02

## 2017-08-02 VITALS
BODY MASS INDEX: 31.96 KG/M2 | HEIGHT: 64 IN | WEIGHT: 187.2 LBS | DIASTOLIC BLOOD PRESSURE: 70 MMHG | RESPIRATION RATE: 16 BRPM | SYSTOLIC BLOOD PRESSURE: 124 MMHG | OXYGEN SATURATION: 97 % | HEART RATE: 73 BPM

## 2017-08-02 DIAGNOSIS — R07.9 CHEST PAIN, UNSPECIFIED TYPE: Primary | ICD-10-CM

## 2017-08-02 DIAGNOSIS — E78.00 HYPERCHOLESTEROLEMIA: ICD-10-CM

## 2017-08-02 DIAGNOSIS — K44.9 HIATAL HERNIA: ICD-10-CM

## 2017-08-02 DIAGNOSIS — I10 ESSENTIAL HYPERTENSION: ICD-10-CM

## 2017-08-02 DIAGNOSIS — K21.9 GASTROESOPHAGEAL REFLUX DISEASE, ESOPHAGITIS PRESENCE NOT SPECIFIED: ICD-10-CM

## 2017-08-02 NOTE — PROGRESS NOTES
Chief Complaint   Patient presents with   [address]     new pt; referred by pcp for sharp chest pain x 1 mo; per pt, duration 10 mins, goes away with [facility]

## 2017-08-02 NOTE — PROGRESS NOTES
Subjective/HPI:     Tonya Peter is a 74 y.o. female is here for new patient consultation. The patient has medical hx significant for HTN, hyperlipidemia, GERD, and hiatal hernia. The pt presents with c/o sharp midsternal chest pain that radiated up into her right neck and ear. This was occurring about once a week for a month or so and when it didn't go away with ASA she proceeded to the ED on 7/13. She was evaluated with acute w/u and was told to f/u with cardiology outpt. Her PCP ordered a stress test for further evaluation before her appt today. Previous cardiac evaluation includes stress test in 2012. She reports since the ED visit she has not had any further episodes of chest pain. She denies sob/KASPER, palpitations, lightheadedness, edema, orthopnea, or PND. Family med hx significant for cancer.     Patient Active Problem List    Diagnosis Date Noted    DDD (degenerative disc disease), cervical 11/21/2016    Encounter for medication monitoring 10/14/2015    Essential hypertension 05/18/2015    Depression 08/13/2012    Diverticulitis     Hypovitaminosis D 08/19/2010    IBS (irritable bowel syndrome)     Fibromyalgia     Hiatal hernia     GERD (gastroesophageal reflux disease)     Hypercholesterolemia       Brady Morocho MD  Past Medical History:   Diagnosis Date    Arthritis     Cancer ([facility] [address]     skin cancer    Chronic pain     CHRONIC BOWEL PAIN    Diverticulitis     Fibromyalgia     GERD (gastroesophageal reflux disease)     Goiter     Hiatal hernia     Hypercholesterolemia     Hypertension     IBS (irritable bowel syndrome)     IBS (irritable bowel syndrome)     CONSTIPATION, CHRONIC SINCE HER 25s    Ill-defined condition     rectocele    Insomnia     TAKES TRAZODONE NIGHTLY    Migraine     REDUCED IN FREQUENCY AND SEVERITY SINCE MENOPAUSE    Other ill-defined conditions     Psychiatric disorder 3/11    DEPRESSION      Past Surgical History:   Procedure Laterality Date    COLONOSCOPY N/A 6/21/2016    COLON[facility] performed [facility]el [facility] at [facility] ENDOSCOPY    ENDOSCOPY, COLON, DIAGNOSTIC  11/2010    Dr. Banegas [place]-     HX CHOLECYSTECTOMY  2006   Carlito Hughes  2007    Dr. Pond/ diverticulitis    HX GI  X3  LAST ONE 12/10    COLONOSCOPY    HX GYN  1982    D&C WITH SUCTION    HX HYSTERECTOMY      HX ORTHOPAEDIC      right foot surgery    HX ORTHOPAEDIC      neck surgery 3/21/11 Dr. Sanchez Ria HX TONSILLECTOMY       Allergies   Allergen Reactions    Latex Hives    Codeine Other (comments)     Severe Headache    Morphine Hives    Other Medication Rash     BANDAIDS    Shellfish Containing Products Hives      Family History   Problem Relation Age of Onset    Cancer Father      lung and bone    Stroke Sister     Cancer Sister 76     PANCREATIC    Hypertension Mother     Alzheimer Mother     High Cholesterol Mother     Cancer Other      COLON    Cancer Other      COUSIN    Diabetes Maternal Aunt     Cancer Maternal Uncle      COLON    Cancer Maternal Grandfather      COLON    Cancer Daughter      IN REMISSION-OVARIAN CA-IN CLINICAL TRIAL      Current Outpatient Prescriptions   Medication Sig    ALPRAZolam (XANAX) 0.5 mg tablet TAKE 1 TABLET BY MOUTH TWICE A DAY AS NEEDED FOR ANXIETY    dicyclomine (BENTYL) 10 mg capsule TAKE 2 CAPSULES FOUR TIMES A DAY AS NEEDED    NEXIUM 40 mg capsule TAKE 1 CAPSULE DAILY    hydroCHLOROthiazide (HYDRODIURIL) 25 mg tablet TAKE 1 TABLET DAILY    traMADol (ULTRAM) 50 mg tablet Take 1 Tab by mouth every six (6) hours as needed for Pain.  LINZESS 290 mcg cap capsule Take 290 mcg by mouth Three (3) times a week.  cholecalciferol (VITAMIN D3) 1,000 unit tablet Take 1,000 Units by mouth every other day.  citalopram (CELEXA) 40 mg tablet TAKE 1 TABLET DAILY    rosuvastatin (CRESTOR) 20 mg tablet Take 1 Tab by mouth nightly.     lisinopril (PRINIVIL, ZESTRIL) 40 mg tablet TAKE 1 TABLET DAILY    cyclobenzaprine (FLEXERIL) 10 mg tablet TAKE 1 TABLET BY MOUTH 3 TIMES A DAY AS NEEDED FOR MUSCLE SPASMS    sucralfate (CARAFATE) 1 gram tablet TAKE 1 TABLET FOUR TIMES A DAY    gabapentin (NEURONTIN) 300 mg capsule Take 600 mg by mouth three (3) times daily.  traZODone (DESYREL) 50 mg tablet TAKE TWO TABLETS NIGHTLY AS NEEDED    DULoxetine (CYMBALTA) 60 mg capsule Take 1 Cap by mouth two (2) times a day.  aspirin delayed-release 81 mg tablet Take 81 mg by mouth every other day. No current facility-administered medications for this visit. Vitals:    08/02/17 0959 08/02/17 1011   BP: 120/70 124/70   Pulse: 73    Resp: 16    SpO2: 97%    Weight: 187 lb 3.2 oz (84.9 kg)    Height: 5' 4\" (1.626 m)        I have reviewed the nurses notes, vitals, problem list, allergy list, medical history, family, social history and medications. Review of Symptoms:  General: Pt denies excessive weight gain or loss. Pt is able to conduct ADL's  HEENT: Denies blurred vision, headaches, epistaxis and difficulty swallowing. Respiratory: Denies shortness of breath, KASPER, wheezing or stridor. Cardiovascular: + precordial pain, denies palpitations, edema or PND  Gastrointestinal: Denies poor appetite, indigestion, abdominal pain or blood in stool  Urinary: Denies dysuria, pyuria  Musculoskeletal: Denies pain or swelling from muscles or joints  Neurologic: Denies tremor, paresthesias, or sensory motor disturbance  Skin: Denies rash, itching or texture change. Psych: Denies depression        Physical Exam:      General: Well developed, cooperative, alert in no acute distress, appears states age. HEENT: Supple, No carotid bruits, no JVD, trach is midline. PERRL, EOM intact  Heart:  Normal S1/S2 negative S3 or S4.  Regular, no murmur, gallop or rub.   Respiratory: Clear bilaterally x 4, no wheezing or rales  Abdomen:   Soft, non-tender, no masses, bowel sounds are active.   Extremities:  No edema, normal cap refill, no cyanosis, atraumatic. Neuro: A&Ox3, speech clear, gait stable. Skin: Skin color is normal. No rashes or lesions. Non diaphoretic  Vascular: 2+ pulses symmetric in all extremities    Cardiographics    ECG: SR    Stress echo-Stress results: Duration of exercise was 6 min and 0 sec. Maximal work  rate was 7 METs. Target heart rate was achieved. There was normal resting  blood pressure with an appropriate response to stress. There was no chest  pain during stress. Maximal systolic blood pressure during stress was 160  mmHg. Maximal diastolic blood pressure during stress was 70 mmHg. ECG conclusions: The stress ECG was negative for ischemia. Baseline: Left ventricular size was normal. Overall left ventricular  systolic function was normal.    Peak stress: There was no evidence for new left ventricular regional wall  motion abnormalities. There was an appropriate reduction in left  ventricular size. There was an appropriate augmentation in LV function. Impressions and recommendations: This was a normal stress echocardiography  Study.       Results for orders placed or performed during the hospital encounter of 07/13/17   EKG, 12 LEAD, INITIAL   Result Value Ref Range    Ventricular Rate 82 BPM    Atrial Rate 82 BPM    QRS Duration 84 ms    Q-T Interval 394 ms    QTC Calculation (Bezet) 460 ms    Calculated R Axis -29 degrees    Calculated T Axis 44 degrees    Diagnosis       Sinus rhythm  Leftward axis  When compared with ECG of 10-NOV-2016 09:19,  No significant change  Confirmed by Deniz Ojeda ([id number]) on 7/14/2017 8:05:13 AM           Cardiology Labs:  Lab Results   Component Value Date/Time    Cholesterol, total 166 07/17/2017 11:31 AM    HDL Cholesterol 40 07/17/2017 11:31 AM    LDL, calculated 86 07/17/2017 11:31 AM    Triglyceride 201 07/17/2017 11:31 AM    CHOL/HDL Ratio 4.1 08/20/2010 03:00 AM       Lab Results   Component Value Date/Time    Sodium 142 07/13/2017 05:10 PM    Potassium 4.2 07/13/2017 05:10 PM    Chloride 105 07/13/2017 05:10 PM    CO2 31 07/13/2017 05:10 PM    Anion gap 6 07/13/2017 05:10 PM    Glucose 107 07/13/2017 05:10 PM    BUN 28 07/13/2017 05:10 PM    Creatinine 1.37 07/13/2017 05:10 PM    BUN/Creatinine ratio 20 07/13/2017 05:10 PM    GFR est AA 46 07/13/2017 05:10 PM    GFR est non-AA 38 07/13/2017 05:10 PM    Calcium 9.2 07/13/2017 05:10 PM    AST (SGOT) 23 07/13/2017 05:10 PM    Alk. phosphatase 81 07/13/2017 05:10 PM    Protein, total 6.7 07/13/2017 05:10 PM    Albumin 3.7 07/13/2017 05:10 PM    Globulin 3.0 07/13/2017 05:10 PM    A-G Ratio 1.2 07/13/2017 05:10 PM    ALT (SGPT) 32 07/13/2017 05:10 PM           Assessment:     Assessment:      Diagnoses and all orders for this visit:    1. Chest pain, unspecified type  -     AMB POC EKG ROUTINE W/ 12 LEADS, INTER & REP    2. Hypercholesterolemia    3. Essential hypertension    4. Gastroesophageal reflux disease, esophagitis presence not specified    5. Hiatal hernia      Specialty Problems        Cardiology Problems    Hypercholesterolemia        Essential hypertension              ICD-10-CM ICD-9-CM    1. Chest pain, unspecified type R07.9 786.50 AMB POC EKG ROUTINE W/ 12 LEADS, INTER & REP   2. Hypercholesterolemia E78.00 272.0    3. Essential hypertension I10 401.9    4. Gastroesophageal reflux disease, esophagitis presence not specified K21.9 530.81    5. Hiatal hernia K44.9 553.3      Orders Placed This Encounter    AMB POC EKG ROUTINE W/ 12 LEADS, INTER & REP     Order Specific Question:   Reason for Exam:     Answer:   Routine       PLAN:    Patient presents with c/o midsternal chest pain that radiated up her right neck. She reports no further episodes since the ED visit on 7/13. EKG is normotensive. Stress echo is neg, NL EF. Lipids at goal. No further evaluation indicated at this time. Follow up as needed.      Karl Ayala MD

## 2017-08-14 ENCOUNTER — OFFICE VISIT (OUTPATIENT)
Dept: FAMILY MEDICINE CLINIC | Age: 67
End: 2017-08-14

## 2017-08-14 ENCOUNTER — HOSPITAL ENCOUNTER (OUTPATIENT)
Dept: LAB | Age: 67
Discharge: HOME | End: 2017-08-14
Payer: MEDICARE

## 2017-08-14 VITALS
RESPIRATION RATE: 16 BRPM | HEART RATE: 75 BPM | SYSTOLIC BLOOD PRESSURE: 129 MMHG | HEIGHT: 64 IN | OXYGEN SATURATION: 96 % | DIASTOLIC BLOOD PRESSURE: 53 MMHG | WEIGHT: 191.4 LBS | TEMPERATURE: 97.6 F | BODY MASS INDEX: 32.68 KG/M2

## 2017-08-14 DIAGNOSIS — M79.7 FIBROMYALGIA: ICD-10-CM

## 2017-08-14 DIAGNOSIS — E78.00 HYPERCHOLESTEROLEMIA: ICD-10-CM

## 2017-08-14 DIAGNOSIS — M48.061 SPINAL STENOSIS OF LUMBAR REGION: ICD-10-CM

## 2017-08-14 DIAGNOSIS — I10 ESSENTIAL HYPERTENSION: Primary | ICD-10-CM

## 2017-08-14 DIAGNOSIS — M50.30 DDD (DEGENERATIVE DISC DISEASE), CERVICAL: ICD-10-CM

## 2017-08-14 DIAGNOSIS — Z51.81 ENCOUNTER FOR MEDICATION MONITORING: ICD-10-CM

## 2017-08-14 DIAGNOSIS — M25.50 ARTHRALGIA OF MULTIPLE JOINTS: ICD-10-CM

## 2017-08-14 DIAGNOSIS — G89.29 ENCOUNTER FOR CHRONIC PAIN MANAGEMENT: ICD-10-CM

## 2017-08-14 DIAGNOSIS — F32.A DEPRESSION, UNSPECIFIED DEPRESSION TYPE: ICD-10-CM

## 2017-08-14 DIAGNOSIS — R21 RASH AND NONSPECIFIC SKIN ERUPTION: ICD-10-CM

## 2017-08-14 PROCEDURE — 85027 COMPLETE CBC AUTOMATED: CPT

## 2017-08-14 PROCEDURE — 80307 DRUG TEST PRSMV CHEM ANLYZR: CPT

## 2017-08-14 PROCEDURE — 82570 ASSAY OF URINE CREATININE: CPT

## 2017-08-14 PROCEDURE — 85651 RBC SED RATE NONAUTOMATED: CPT

## 2017-08-14 PROCEDURE — 36415 COLL VENOUS BLD VENIPUNCTURE: CPT

## 2017-08-14 RX ORDER — TRAMADOL HYDROCHLORIDE 50 MG/1
50 TABLET ORAL
Qty: 90 TAB | Refills: 0 | Status: SHIPPED | OUTPATIENT
Start: 2017-08-14 | End: 2017-08-30

## 2017-08-14 RX ORDER — TRIAMCINOLONE ACETONIDE 1 MG/G
CREAM TOPICAL
Qty: 15 G | Refills: 0 | Status: SHIPPED | OUTPATIENT
Start: 2017-08-14 | End: 2017-08-14 | Stop reason: SDUPTHER

## 2017-08-14 RX ORDER — TRIAMCINOLONE ACETONIDE 1 MG/G
CREAM TOPICAL
Qty: 15 G | Refills: 0 | Status: ON HOLD | OUTPATIENT
Start: 2017-08-14 | End: 2018-06-27

## 2017-08-14 NOTE — MR AVS SNAPSHOT
Visit Information Date & Time Provider Department Dept. Phone Encounter #  
 8/14/2017  9:45 AM Chetan Paige MD [facility] [phone] [id number] Follow-up Instructions Return in about 4 months (around 12/14/2017). Upcoming Health Maintenance Date Due INFLUENZA AGE 9 TO ADULT 8/1/2017 MEDICARE YEARLY EXAM 3/22/2018 BREAST CANCER SCRN MAMMOGRAM 4/21/2018 GLAUCOMA SCREENING Q2Y 11/2/2018 COLONOSCOPY 6/21/2021 DTaP/Tdap/Td series (3 - Td) 11/21/2026 Allergies as of 8/14/2017  Review Complete On: 8/14/2017 By: Jett Marie LPN Severity Noted Reaction Type Reactions Latex  07/19/2017    Hives Codeine High 03/15/2010    Other (comments) Severe Headache Morphine High 03/15/2010    Hives Other Medication  03/15/2011    Rash BANDAIDS Shellfish Containing Products  03/15/2011    Hives Current Immunizations  Reviewed on 8/14/2017 Name Date Influenza High Dose Vaccine PF 9/19/2016 10:00 AM, 10/30/2015, 11/11/2014 Influenza Vaccine 11/19/2013 Influenza Vaccine Split 11/26/2012, 12/12/2011, 9/24/2010 Pneumococcal Conjugate (PCV-13) 5/18/2015 Pneumococcal Vaccine (Unspecified Type) 11/19/2013 Tdap 5/26/2014 12:40 PM  
 Zoster Vaccine, Live 4/16/2012 Reviewed by Chetan Paige MD on 8/14/2017 at 10:04 AM  
You Were Diagnosed With   
  
 Codes Comments Spinal stenosis of lumbar region    -  Primary ICD-10-CM: M48.06 
ICD-9-CM: 724.02 Essential hypertension     ICD-10-CM: I10 
ICD-9-CM: 401.9 Fibromyalgia     ICD-10-CM: M79.7 ICD-9-CM: 729.1 Hypercholesterolemia     ICD-10-CM: E78.00 ICD-9-CM: 272.0 Encounter for chronic pain management     ICD-10-CM: G89.29 ICD-9-CM: V65.49 Arthralgia of multiple joints     ICD-10-CM: M25.50 ICD-9-CM: 719.49   
 DDD (degenerative disc disease), cervical     ICD-10-CM: M50.30 ICD-9-CM: 722.4 Rash and nonspecific skin eruption     ICD-10-CM: R21 
ICD-9-CM: 782.1 Vitals BP Pulse Temp Resp Height(growth percentile) Weight(growth percentile) 129/53 (BP 1 Location: Left arm, BP Patient Position: Sitting) 75 97.6 °F (36.4 °C) (Oral) 16 5' 4\" (1.626 m) 191 lb 6.4 oz (86.8 kg) SpO2 BMI OB Status Smoking Status 96% 32.85 kg/m2 Hysterectomy Former Smoker Vitals History BMI and BSA Data Body Mass Index Body Surface Area  
 32.85 kg/m 2 1.98 m 2 Preferred Pharmacy Pharmacy Name Phone [phone], [address] AT [place] [address] [phone] Your Updated Medication List  
  
   
This list is accurate as of: 8/14/17 10:30 AM.  Always use your most recent med list.  
  
  
  
  
 ALPRAZolam 0.5 mg tablet Commonly known as:  XANAX  
TAKE 1 TABLET BY MOUTH TWICE A DAY AS NEEDED FOR ANXIETY  
  
 aspirin delayed-release 81 mg tablet Take 81 mg by mouth every other day. citalopram 40 mg tablet Commonly known as:  CELEXA  
TAKE 1 TABLET DAILY  
  
 cyclobenzaprine 10 mg tablet Commonly known as:  FLEXERIL  
TAKE 1 TABLET BY MOUTH 3 TIMES A DAY AS NEEDED FOR MUSCLE SPASMS  
  
 dicyclomine 10 mg capsule Commonly known as:  BENTYL TAKE 2 CAPSULES FOUR TIMES A DAY AS NEEDED DULoxetine 60 mg capsule Commonly known as:  CYMBALTA Take 1 Cap by mouth two (2) times a day.  
  
 gabapentin 300 mg capsule Commonly known as:  NEURONTIN Take 600 mg by mouth three (3) times daily. hydroCHLOROthiazide 25 mg tablet Commonly known as:  HYDRODIURIL  
TAKE 1 TABLET DAILY LINZESS 290 mcg Cap capsule Generic drug:  linaclotide Take 290 mcg by mouth Three (3) times a week. lisinopril 40 mg tablet Commonly known as:  PRINIVIL, ZESTRIL  
TAKE 1 TABLET DAILY NexIUM 40 mg capsule Generic drug:  esomeprazole TAKE 1 CAPSULE DAILY rosuvastatin 20 mg tablet Commonly known as:  CRESTOR Take 1 Tab by mouth nightly. traMADol 50 mg tablet Commonly known as:  ULTRAM  
Take 1 Tab by mouth every six (6) hours as needed for Pain. traZODone 50 mg tablet Commonly known as:  DESYREL  
TAKE TWO TABLETS NIGHTLY AS NEEDED  
  
 triamcinolone acetonide 0.1 % topical cream  
Commonly known as:  KENALOG Apply  to affected area two (2) times daily as needed for Skin Irritation. use thin layer VITAMIN D3 1,000 unit tablet Generic drug:  cholecalciferol Take 1,000 Units by mouth every other day. Prescriptions Printed Refills  
 traMADol (ULTRAM) 50 mg tablet 0 Sig: Take 1 Tab by mouth every six (6) hours as needed for Pain. Class: Print Route: Oral  
  
Prescriptions Sent to Pharmacy Refills  
 triamcinolone acetonide (KENALOG) 0.1 % topical cream 0 Sig: Apply  to affected area two (2) times daily as needed for Skin Irritation. use thin layer Class: Normal  
 Pharmacy: [facility] [address] [place] [address] Ph #: [phone] Route: Topical  
  
We Performed the Following 9-DRUG SCREEN + OXY, UR [id number] CPT(R)] CBC W/O DIFF [11734 CPT(R)] [address] MONITORING [[id number] Custom] SED RATE (ESR) [id number] CPT(R)] Follow-up Instructions Return in about 4 months (around 12/14/2017). To-Do List   
 08/28/2017 10:50 AM  
  Appointment with [facility]GIL[facility] 1 at [facility] ([phone]) Shower or bathe using soap and water. Do not use deodorant, powder, perfumes, or lotion the day of your exam.  If your prior mammograms were not performed at [facility] 6 please bring films with you or forward prior images 2 days before your procedure.   Check in at registration 15min before your appointment time unless you were instructed to do otherwise. A script is not necessary, but if you have one, please bring it on the day of the mammogram or have it faxed to the department. [facility] [phone] [facility]  [phone] [facility] [address]  [phone] [phone] [facility] 1[address] [facility] [phone] [facility]! Dear Sergio Jennings: 
Thank you for requesting a [employer] account. Our records indicate that you already have an active [employer] account. You can access your account anytime at [email or web address] Did you know that you can access your hospital and ER discharge instructions at any time in [employer]? You can also review all of your test results from your hospital stay or ER visit. Additional Information If you have questions, please visit the Frequently Asked Questions section of the [employer] website at [email or web address] Remember, [employer] is NOT to be used for urgent needs. For medical emergencies, dial 911. Now available from your iPhone and Android! Please provide this summary of care documentation to your next provider. Your primary care clinician is listed as Abbe Dean. If you have any questions after today's visit, please call [phone].

## 2017-08-14 NOTE — PROGRESS NOTES
Chief Complaint   Patient presents with    Hypertension     follow up     1. Have you been to the ER, urgent care clinic since your last visit? Hospitalized since your last visit? No    2. Have you seen or consulted any other health care providers outside of the [address] [place] [address] since your last visit? Include any pap smears or colon screening.  No

## 2017-08-14 NOTE — PROGRESS NOTES
HISTORY OF PRESENT ILLNESS  Mariposa Greene is a 74 y.o. female. HPI   Follow up on chronic medical problems. Follow up on chest pain. Sx have resolved. Had negative cardiac eval.  Planning for back surgery for the end of this month. HTN follow up:  Compliant w/ meds, low salt diet, and exercise. No home bp monitoring. No swelling, headache or dizziness. No chest pain, SOB, palpitations. Otherwise feeling well since the last visit. Hypercholesterolemia follow up:  Compliant w/ low fat, low cholesterol diet. Exercising some. Tolerating crestor. No muscle more than her usual from the fibromyalgia, no abdominal pain, no skin discoloration. Patient is not fasting today. Depression Review:  Patient is seen for followup of depression and fibromyalgia and IBS. Increase pain in the neck with pain radiating down the arms over the past several weeks. Has increase pain in the arms hands at night also. Pain is 7-8/10. Has hx of cervical neck disc disease. However the lower back pain and leg pain has been worse over the past several months. She has spinal stenosis. Currently taking cymbalta and Celexa and gabapentin. Ongoing symptoms include fatigue and stress. She experiences the following side effects from the treatment: none. Encounter for pain management. 1./2. Medical history/Past medical History  Chronic Pain:  Patient has long standing fibromyalgia. Has increased pain in the neck  And lowe back pain d/t spinal stenosis. Pain radiating down the arms. Has increase pain in the arms and hands at night also. Aches in the legs and feet and back. Pain is 7-8/10. Currently taking cymbalta and Celexa and gabapentin. She also takes motrin for less severe pain. Pt takes tramadol for more severe pain  Ongoing symptoms include fatigue and pain. 3. Applicable records from prior treatment providers are apart of [facility] under the media tab.   4. Diagnostic, therapeutic and laboratory results are available in the [facility] chart. 5. Consultation notes are available for review in the media tab of the [facility] chart. 6. Treatment goals include pain control so that the pt may be as active and function with her daily activities and improved comfort level. Previously pt has been limited by pain. 7. The risks and benefits of treatment has been discussed at this office visit with the pt. She understands that the medication has addicting potential.  Additionally the pt has been advised that narcotic pain medication may impair mental and/or physical ability required for performance of tasks such as driving or operating any other machinery. 8. Pt has an updated signed pain contract on file and can be found under the FYI section of the [facility] chart. 9. The pain contract is reviewed. Pain medication will be continued at the previous dosage. Urine drug screening will be done today. Diagnostic studies are not indicated at this time. Interventional procedure are not indicated at this time. 10. Medication prescibed is .  has been reviewed at this OV by me. 11. Patient instructions have been reviewed in detail as outlined above and in the pain contract. 12. Re-eval is planned for 3 months. Patient Active Problem List   Diagnosis Code    IBS (irritable bowel syndrome) K58.9    Fibromyalgia M79.7    Hiatal hernia K44.9    GERD (gastroesophageal reflux disease) K21.9    Hypercholesterolemia E78.00    Hypovitaminosis D E55.9    Diverticulitis K57.92    Depression F32.9    Essential hypertension I10    Encounter for medication monitoring Z51.81    DDD (degenerative disc disease), cervical M50.30       Current Outpatient Prescriptions   Medication Sig Dispense Refill    traMADol (ULTRAM) 50 mg tablet Take 1 Tab by mouth every six (6) hours as needed for Pain.  90 Tab 0    triamcinolone acetonide (KENALOG) 0.1 % topical cream Apply  to affected area two (2) times daily as needed for Skin Irritation. use thin layer 15 g 0    ALPRAZolam (XANAX) 0.5 mg tablet TAKE 1 TABLET BY MOUTH TWICE A DAY AS NEEDED FOR ANXIETY 180 Tab 1    dicyclomine (BENTYL) 10 mg capsule TAKE 2 CAPSULES FOUR TIMES A DAY AS NEEDED 720 Cap 3    NEXIUM 40 mg capsule TAKE 1 CAPSULE DAILY 90 Cap 3    hydroCHLOROthiazide (HYDRODIURIL) 25 mg tablet TAKE 1 TABLET DAILY 90 Tab 2    LINZESS 290 mcg cap capsule Take 290 mcg by mouth Three (3) times a week.  cholecalciferol (VITAMIN D3) 1,000 unit tablet Take 1,000 Units by mouth every other day.  citalopram (CELEXA) 40 mg tablet TAKE 1 TABLET DAILY 90 Tab 2    rosuvastatin (CRESTOR) 20 mg tablet Take 1 Tab by mouth nightly. 90 Tab 1    lisinopril (PRINIVIL, ZESTRIL) 40 mg tablet TAKE 1 TABLET DAILY 90 Tab 3    cyclobenzaprine (FLEXERIL) 10 mg tablet TAKE 1 TABLET BY MOUTH 3 TIMES A DAY AS NEEDED FOR MUSCLE SPASMS  0    gabapentin (NEURONTIN) 300 mg capsule Take 600 mg by mouth three (3) times daily. 360 Cap 1    traZODone (DESYREL) 50 mg tablet TAKE TWO TABLETS NIGHTLY AS NEEDED 135 Tab 2    DULoxetine (CYMBALTA) 60 mg capsule Take 1 Cap by mouth two (2) times a day. 180 Cap 3    aspirin delayed-release 81 mg tablet Take 81 mg by mouth every other day.          Allergies   Allergen Reactions    Latex Hives    Codeine Other (comments)     Severe Headache    Morphine Hives    Other Medication Rash     BANDAIDS    Shellfish Containing Products Hives         Past Medical History:   Diagnosis Date    Arthritis     Cancer ([facility] [address]     skin cancer    Chronic pain     CHRONIC BOWEL PAIN    Diverticulitis     Fibromyalgia     GERD (gastroesophageal reflux disease)     Goiter     Hiatal hernia     Hypercholesterolemia     Hypertension     IBS (irritable bowel syndrome)     IBS (irritable bowel syndrome)     CONSTIPATION, CHRONIC SINCE HER 20s    Ill-defined condition     rectocele    Insomnia     TAKES TRAZODONE NIGHTLY    Migraine REDUCED IN FREQUENCY AND SEVERITY SINCE MENOPAUSE    Other ill-defined conditions     Psychiatric disorder 3/11    DEPRESSION         Past Surgical History:   Procedure Laterality Date    COLONOSCOPY N/A 6/21/2016    COLON[facility], DI[facility]NOSTIC  11/2010    Dr. Marga Holloway-     HX CHOLECYSTECTOMY  2006   Delynn Najjar  2007    Dr. Pond/ diverticulitis    HX GI  X3  LAST ONE 12/10    COLONOSCOPY    HX GYN  1982    D&C WITH SUCTION    HX HYSTERECTOMY      HX ORTHOPAEDIC      right foot surgery    HX ORTHOPAEDIC      neck surgery 3/21/11 Dr. Nina Short HX TONSILLECTOMY           Family History   Problem Relation Age of Onset    Cancer Father      lung and bone    Stroke Sister     Cancer Sister 76     PANCREATIC    Hypertension Mother     Alzheimer Mother     High Cholesterol Mother     Cancer Other      COLON    Cancer Other      COUSIN    Diabetes Maternal Aunt     Cancer Maternal Uncle      COLON    Cancer Maternal Grandfather      COLON    Cancer Daughter      IN REMISSION-OVARIAN CA-IN CLINICAL TRIAL       Social History   Substance Use Topics    Smoking status: Former Smoker     Packs/day: 1.00     Years: 45.00     Quit date: 1/1/2007    Smokeless tobacco: Never Used    Alcohol use No        Patient Active Problem List   Diagnosis Code    IBS (irritable bowel syndrome) K58.9    Fibromyalgia M79.7    Hiatal hernia K44.9    GERD (gastroesophageal reflux disease) K21.9    Hypercholesterolemia E78.00    Hypovitaminosis D E55.9    Diverticulitis K57.92    Depression F32.9    Essential hypertension I10    Encounter for medication monitoring Z51.81    DDD (degenerative disc disease), cervical M50.30       Current Outpatient Prescriptions   Medication Sig Dispense Refill    traMADol (ULTRAM) 50 mg tablet Take 1 Tab by mouth every six (6) hours as needed for Pain.  90 Tab 0    triamcinolone acetonide (KENALOG) 0.1 % topical cream Apply  to affected area two (2) times daily as needed for Skin Irritation. use thin layer 15 g 0    ALPRAZolam (XANAX) 0.5 mg tablet TAKE 1 TABLET BY MOUTH TWICE A DAY AS NEEDED FOR ANXIETY 180 Tab 1    dicyclomine (BENTYL) 10 mg capsule TAKE 2 CAPSULES FOUR TIMES A DAY AS NEEDED 720 Cap 3    NEXIUM 40 mg capsule TAKE 1 CAPSULE DAILY 90 Cap 3    hydroCHLOROthiazide (HYDRODIURIL) 25 mg tablet TAKE 1 TABLET DAILY 90 Tab 2    LINZESS 290 mcg cap capsule Take 290 mcg by mouth Three (3) times a week.  cholecalciferol (VITAMIN D3) 1,000 unit tablet Take 1,000 Units by mouth every other day.  citalopram (CELEXA) 40 mg tablet TAKE 1 TABLET DAILY 90 Tab 2    rosuvastatin (CRESTOR) 20 mg tablet Take 1 Tab by mouth nightly. 90 Tab 1    lisinopril (PRINIVIL, ZESTRIL) 40 mg tablet TAKE 1 TABLET DAILY 90 Tab 3    cyclobenzaprine (FLEXERIL) 10 mg tablet TAKE 1 TABLET BY MOUTH 3 TIMES A DAY AS NEEDED FOR MUSCLE SPASMS  0    gabapentin (NEURONTIN) 300 mg capsule Take 600 mg by mouth three (3) times daily. 360 Cap 1    traZODone (DESYREL) 50 mg tablet TAKE TWO TABLETS NIGHTLY AS NEEDED 135 Tab 2    DULoxetine (CYMBALTA) 60 mg capsule Take 1 Cap by mouth two (2) times a day. 180 Cap 3    aspirin delayed-release 81 mg tablet Take 81 mg by mouth every other day.          Allergies   Allergen Reactions    Latex Hives    Codeine Other (comments)     Severe Headache    Morphine Hives    Other Medication Rash     BANDAIDS    Shellfish Containing Products Hives         Past Medical History:   Diagnosis Date    Arthritis     Cancer ([facility] [address]     skin cancer    Chronic pain     CHRONIC BOWEL PAIN    Diverticulitis     Fibromyalgia     GERD (gastroesophageal reflux disease)     Goiter     Hiatal hernia     Hypercholesterolemia     Hypertension     IBS (irritable bowel syndrome)     IBS (irritable bowel syndrome)     CONSTIPATION, CHRONIC SINCE HER 20s    Ill-defined condition     rectocele    Insomnia     TAKES TRAZODONE NIGHTLY    Migraine     REDUCED IN FREQUENCY AND SEVERITY SINCE MENOPAUSE    Other ill-defined conditions     Psychiatric disorder 3/11    DEPRESSION         Past Surgical History:   Procedure Laterality Date    COLONOSCOPY N/A 6/21/2016    COL[facility]el[facility], DI[facility]ST[facility]  11/2010    Dr. Ramiro Ann-     HX CHOLECYSTECTOMY  2006   Gracie Allen  2007    Dr. Pond/ diverticulitis    HX GI  X3  LAST ONE 12/10    COLONOSCOPY    HX GYN  1982    D&C WITH SUCTION    HX HYSTERECTOMY      HX ORTHOPAEDIC      right foot surgery    HX ORTHOPAEDIC      neck surgery 3/21/11 Dr. Sia Montero HX TONSILLECTOMY           Family History   Problem Relation Age of Onset    Cancer Father      lung and bone    Stroke Sister     Cancer Sister 76     PANCREATIC    Hypertension Mother     Alzheimer Mother     High Cholesterol Mother     Cancer Other      COLON    Cancer Other      COUSIN    Diabetes Maternal Aunt     Cancer Maternal Uncle      COLON    Cancer Maternal Grandfather      COLON    Cancer Daughter      IN REMISSION-OVARIAN CA-IN CLINICAL TRIAL       Social History   Substance Use Topics    Smoking status: Former Smoker     Packs/day: 1.00     Years: 45.00     Quit date: 1/1/2007    Smokeless tobacco: Never Used    Alcohol use No     Lab Results   Component Value Date/Time    WBC 6.7 07/13/2017 05:10 PM    Hemoglobin (POC) 12.2 08/19/2010 11:52 AM    HGB 11.9 07/13/2017 05:10 PM    Hematocrit (POC) 36 08/19/2010 11:52 AM    HCT 35.2 07/13/2017 05:10 PM    PLATELET 194 46/86/4287 05:10 PM    MCV 95.9 07/13/2017 05:10 PM       Lab Results   Component Value Date/Time    Cholesterol, total 166 07/17/2017 11:31 AM    HDL Cholesterol 40 07/17/2017 11:31 AM    LDL, calculated 86 07/17/2017 11:31 AM    Triglyceride 201 07/17/2017 11:31 AM    CHOL/HDL Ratio 4.1 08/20/2010 03:00 AM       Lab Results   Component Value Date/Time    Sodium 142 07/13/2017 05:10 PM    Potassium 4.2 07/13/2017 05:10 PM    Chloride 105 07/13/2017 05:10 PM    CO2 31 07/13/2017 05:10 PM    Anion gap 6 07/13/2017 05:10 PM    Glucose 107 07/13/2017 05:10 PM    BUN 28 07/13/2017 05:10 PM    Creatinine 1.37 07/13/2017 05:10 PM    BUN/Creatinine ratio 20 07/13/2017 05:10 PM    GFR est AA 46 07/13/2017 05:10 PM    GFR est non-AA 38 07/13/2017 05:10 PM    Calcium 9.2 07/13/2017 05:10 PM    Bilirubin, total 0.2 07/13/2017 05:10 PM    ALT (SGPT) 32 07/13/2017 05:10 PM    AST (SGOT) 23 07/13/2017 05:10 PM    Alk. phosphatase 81 07/13/2017 05:10 PM    Protein, total 6.7 07/13/2017 05:10 PM    Albumin 3.7 07/13/2017 05:10 PM    Globulin 3.0 07/13/2017 05:10 PM    A-G Ratio 1.2 07/13/2017 05:10 PM      Lab Results   Component Value Date/Time    Hemoglobin A1c 5.5 11/19/2013 10:40 AM    Hemoglobin A1c (POC) 5.1 09/19/2016 11:30 AM           Review of Systems   Constitutional: Positive for malaise/fatigue. HENT: Negative for congestion. Eyes: Negative for blurred vision. Respiratory: Negative for cough and shortness of breath. Cardiovascular: Negative for chest pain, palpitations and leg swelling. Gastrointestinal: Negative for abdominal pain, constipation and heartburn. Genitourinary: Negative for dysuria, frequency and urgency. Musculoskeletal: Positive for back pain, joint pain, myalgias and neck pain. Negative for falls. Neurological: Negative for dizziness, tingling and headaches. Endo/Heme/Allergies: Negative for environmental allergies. Psychiatric/Behavioral: Negative for depression. The patient does not have insomnia. Skin: has rash around the ankle area over the past 2-3 days. No itching noted. Physical Exam   Constitutional: She appears well-developed and well-nourished.    /53 (BP 1 Location: Left arm, BP Patient Position: Sitting)  Pulse 75  Temp 97.6 °F (36.4 °C) (Oral)   Resp 16  Ht 5' 4\" (1.626 m)  Wt 191 lb 6.4 oz (86.8 kg)  SpO2 96%  BMI 32.85 kg/m2    HENT:   Right Ear: Tympanic membrane and ear canal normal.   Left Ear: Tympanic membrane and ear canal normal.   Nose: No mucosal edema or rhinorrhea. Mouth/Throat: Oropharynx is clear and moist and mucous membranes are normal.   Neck: Normal range of motion. Neck supple. No thyromegaly present. Cardiovascular: Normal rate and regular rhythm. No murmur heard. Pulmonary/Chest: Effort normal and breath sounds normal.   Abdominal: Soft. Bowel sounds are normal. There is no tenderness. Musculoskeletal: Normal range of motion. She exhibits no edema. Cervical back and lumbar spine: She exhibits tenderness, bony tenderness and pain. She exhibits normal range of motion and no spasm. Lymphadenopathy:     She has no cervical adenopathy. Neurological: She has normal strength. No sensory deficit. Coordination and gait normal.   Skin: Skin is warm and dry. Nonspecific skin rash on the left ankle greater than the left. Psychiatric: She has a normal mood and affect. Nursing note and vitals reviewed. ASSESSMENT and PLAN  Diagnoses and all orders for this visit:    1. Essential hypertension  Stable     2. Hypercholesterolemia  Continue to monitor. Work on diet and exercise. 3. Spinal stenosis of lumbar region//  4. Arthralgia of multiple joints  -     traMADol (ULTRAM) 50 mg tablet; Take 1 Tab by mouth every six (6) hours as needed for Pain. Pt is cleared for upcoming lumbar back surgery pending her pre-op labs and EKG being WNL. 5. DDD (degenerative disc disease), cervical  -     traMADol (ULTRAM) 50 mg tablet; Take 1 Tab by mouth every six (6) hours as needed for Pain. 8. Fibromyalgia  -     traMADol (ULTRAM) 50 mg tablet; Take 1 Tab by mouth every six (6) hours as needed for Pain    6.  Encounter for chronic pain management  -     9-DRUG SCREEN + OXY, UR  -     COMPLIANCE DRUG SCREEN/PRESCRIPTION MONITORING  The pt has signed medication agreement. Pain contract is reviewed. Pain medications will be continued at the previous dosage. Urine drug screening will be done today. Diagnostic  studies are not indicated at this time. Interventional procedure are not indicated at this time. Re-eval in 3 months. 7. Rash and nonspecific skin eruption  -     CBC W/O DIFF  -     SED RATE (ESR)  -     triamcinolone acetonide (KENALOG) 0.1 % topical cream; Apply  to affected area two (2) times daily as needed for Skin Irritation. use thin layer    9. Depression, unspecified depression type  Stable. 10. Encounter for medication monitoring          Follow-up Disposition:  Return in about 4 months (around 12/14/2017). reviewed diet, exercise and weight control  cardiovascular risk and specific lipid/LDL goals reviewed  reviewed medications and side effects in detail    I have discussed diagnosis listed in this note with pt and/or family. I have discussed treatment plans and options and the risk/benefit analysis of those options, including safe use of medications and possible medication side effects. Through the use of shared decision making we have agreed to the above plan. The patient has received an after-visit summary and questions were answered concerning future plans and follow up. Advise pt of any urgent changes then to proceed to the ER.

## 2017-08-15 ENCOUNTER — PATIENT OUTREACH (OUTPATIENT)
Dept: FAMILY MEDICINE CLINIC | Age: 67
End: 2017-08-15

## 2017-08-15 LAB
AMPHETAMINES UR QL SCN: NEGATIVE NG/ML
BARBITURATES UR QL SCN: NEGATIVE NG/ML
BENZODIAZ UR QL SCN: POSITIVE NG/ML
BZE UR QL SCN: NEGATIVE NG/ML
CANNABINOIDS UR QL SCN: NEGATIVE NG/ML
CREAT UR-MCNC: 92.6 MG/DL (ref 20–300)
ERYTHROCYTE [DISTWIDTH] IN BLOOD BY AUTOMATED COUNT: 12.7 % (ref 12.3–15.4)
ERYTHROCYTE [SEDIMENTATION RATE] IN BLOOD BY WESTERGREN METHOD: 7 MM/HR (ref 0–40)
HCT VFR BLD AUTO: 33.9 % (ref 34–46.6)
HGB BLD-MCNC: 10.9 G/DL (ref 11.1–15.9)
MCH RBC QN AUTO: 31.1 PG (ref 26.6–33)
MCHC RBC AUTO-ENTMCNC: 32.2 G/DL (ref 31.5–35.7)
MCV RBC AUTO: 97 FL (ref 79–97)
METHADONE UR QL SCN: NEGATIVE NG/ML
OPIATES UR QL SCN: NEGATIVE NG/ML
OXYCODONE+OXYMORPHONE UR QL SCN: NEGATIVE NG/ML
PCP UR QL SCN: NEGATIVE NG/ML
PH UR: 5.3 [PH] (ref 4.5–8.9)
PLATELET # BLD AUTO: 291 X10E3/UL (ref 150–379)
PLEASE NOTE:, 733163: NORMAL
PROPOXYPH UR QL SCN: NEGATIVE NG/ML
RBC # BLD AUTO: 3.51 X10E6/UL (ref 3.77–5.28)
WBC # BLD AUTO: 6.9 X10E3/UL (ref 3.4–10.8)

## 2017-08-18 ENCOUNTER — HOSPITAL ENCOUNTER (OUTPATIENT)
Dept: PREADMISSION TESTING | Age: 67
Discharge: HOME | End: 2017-08-18
Attending: ORTHOPAEDIC SURGERY
Payer: MEDICARE

## 2017-08-18 VITALS
DIASTOLIC BLOOD PRESSURE: 83 MMHG | BODY MASS INDEX: 32.18 KG/M2 | HEIGHT: 64 IN | OXYGEN SATURATION: 97 % | HEART RATE: 87 BPM | TEMPERATURE: 98.7 F | WEIGHT: 188.49 LBS | SYSTOLIC BLOOD PRESSURE: 162 MMHG

## 2017-08-18 LAB
25(OH)D3 SERPL-MCNC: 31.2 NG/ML (ref 30–100)
ABO + RH BLD: NORMAL
ALBUMIN SERPL-MCNC: 3.9 G/DL (ref 3.5–5)
ALBUMIN/GLOB SERPL: 1 {RATIO} (ref 1.1–2.2)
ALP SERPL-CCNC: 102 U/L (ref 45–117)
ALT SERPL-CCNC: 40 U/L (ref 12–78)
ANION GAP SERPL CALC-SCNC: 7 MMOL/L (ref 5–15)
APPEARANCE UR: ABNORMAL
AST SERPL-CCNC: 29 U/L (ref 15–37)
BACTERIA URNS QL MICRO: ABNORMAL /HPF
BILIRUB SERPL-MCNC: 0.4 MG/DL (ref 0.2–1)
BILIRUB UR QL: NEGATIVE
BLOOD GROUP ANTIBODIES SERPL: NORMAL
BUN SERPL-MCNC: 24 MG/DL (ref 6–20)
BUN/CREAT SERPL: 16 (ref 12–20)
CALCIUM SERPL-MCNC: 9 MG/DL (ref 8.5–10.1)
CHLORIDE SERPL-SCNC: 106 MMOL/L (ref 97–108)
CO2 SERPL-SCNC: 28 MMOL/L (ref 21–32)
COLOR UR: ABNORMAL
CREAT SERPL-MCNC: 1.47 MG/DL (ref 0.55–1.02)
EPITH CASTS URNS QL MICRO: ABNORMAL /LPF
ERYTHROCYTE [DISTWIDTH] IN BLOOD BY AUTOMATED COUNT: 12.8 % (ref 11.5–14.5)
EST. AVERAGE GLUCOSE BLD GHB EST-MCNC: 134 MG/DL
GLOBULIN SER CALC-MCNC: 4 G/DL (ref 2–4)
GLUCOSE SERPL-MCNC: 73 MG/DL (ref 65–100)
GLUCOSE UR STRIP.AUTO-MCNC: NEGATIVE MG/DL
HBA1C MFR BLD: 6.3 % (ref 4.2–6.3)
HCT VFR BLD AUTO: 34.9 % (ref 35–47)
HGB BLD-MCNC: 11.7 G/DL (ref 11.5–16)
HGB UR QL STRIP: NEGATIVE
INR PPP: 1 (ref 0.9–1.1)
KETONES UR QL STRIP.AUTO: NEGATIVE MG/DL
LEUKOCYTE ESTERASE UR QL STRIP.AUTO: ABNORMAL
MCH RBC QN AUTO: 31.6 PG (ref 26–34)
MCHC RBC AUTO-ENTMCNC: 33.5 G/DL (ref 30–36.5)
MCV RBC AUTO: 94.3 FL (ref 80–99)
NITRITE UR QL STRIP.AUTO: NEGATIVE
PH UR STRIP: 5.5 [PH] (ref 5–8)
PLATELET # BLD AUTO: 296 K/UL (ref 150–400)
POTASSIUM SERPL-SCNC: 3.4 MMOL/L (ref 3.5–5.1)
PREALB SERPL-MCNC: 30.4 MG/DL (ref 20–40)
PROT SERPL-MCNC: 7.9 G/DL (ref 6.4–8.2)
PROT UR STRIP-MCNC: NEGATIVE MG/DL
PROTHROMBIN TIME: 10.1 SEC (ref 9–11.1)
RBC # BLD AUTO: 3.7 M/UL (ref 3.8–5.2)
RBC #/AREA URNS HPF: ABNORMAL /HPF (ref 0–5)
SODIUM SERPL-SCNC: 141 MMOL/L (ref 136–145)
SP GR UR REFRACTOMETRY: 1.03 (ref 1–1.03)
SPECIMEN EXP DATE BLD: NORMAL
UA: UC IF INDICATED,UAUC: ABNORMAL
UROBILINOGEN UR QL STRIP.AUTO: 0.2 EU/DL (ref 0.2–1)
WBC # BLD AUTO: 7.5 K/UL (ref 3.6–11)
WBC URNS QL MICRO: ABNORMAL /HPF (ref 0–4)

## 2017-08-18 PROCEDURE — 85610 PROTHROMBIN TIME: CPT | Performed by: ORTHOPAEDIC SURGERY

## 2017-08-18 PROCEDURE — 86900 BLOOD TYPING SEROLOGIC ABO: CPT | Performed by: ORTHOPAEDIC SURGERY

## 2017-08-18 PROCEDURE — 84134 ASSAY OF PREALBUMIN: CPT | Performed by: ORTHOPAEDIC SURGERY

## 2017-08-18 PROCEDURE — 83036 HEMOGLOBIN GLYCOSYLATED A1C: CPT | Performed by: ORTHOPAEDIC SURGERY

## 2017-08-18 PROCEDURE — 87086 URINE CULTURE/COLONY COUNT: CPT | Performed by: ORTHOPAEDIC SURGERY

## 2017-08-18 PROCEDURE — 82306 VITAMIN D 25 HYDROXY: CPT | Performed by: ORTHOPAEDIC SURGERY

## 2017-08-18 PROCEDURE — 36415 COLL VENOUS BLD VENIPUNCTURE: CPT | Performed by: ORTHOPAEDIC SURGERY

## 2017-08-18 PROCEDURE — 85027 COMPLETE CBC AUTOMATED: CPT | Performed by: ORTHOPAEDIC SURGERY

## 2017-08-18 PROCEDURE — 81001 URINALYSIS AUTO W/SCOPE: CPT | Performed by: ORTHOPAEDIC SURGERY

## 2017-08-18 PROCEDURE — 80053 COMPREHEN METABOLIC PANEL: CPT | Performed by: ORTHOPAEDIC SURGERY

## 2017-08-18 RX ORDER — SUCRALFATE 1 G/1
1 TABLET ORAL 4 TIMES DAILY
COMMUNITY
End: 2018-04-25

## 2017-08-18 RX ORDER — PREGABALIN 150 MG/1
150 CAPSULE ORAL ONCE
Status: CANCELLED | OUTPATIENT
Start: 2017-08-29 | End: 2017-08-29

## 2017-08-18 RX ORDER — ACETAMINOPHEN 500 MG
1000 TABLET ORAL ONCE
Status: CANCELLED | OUTPATIENT
Start: 2017-08-29 | End: 2017-08-29

## 2017-08-18 RX ORDER — SODIUM CHLORIDE, SODIUM LACTATE, POTASSIUM CHLORIDE, CALCIUM CHLORIDE 600; 310; 30; 20 MG/100ML; MG/100ML; MG/100ML; MG/100ML
25 INJECTION, SOLUTION INTRAVENOUS CONTINUOUS
Status: CANCELLED | OUTPATIENT
Start: 2017-08-29

## 2017-08-18 NOTE — PERIOP NOTES
[facility]  Preoperative Instructions        Surgery Date 08/29/2017          Time of Arrival 0830 am Contact # [phone]    1. On the day of your surgery, please report to the Surgical Services Registration Desk and sign in at your designated time. The Surgery Center is located to the right of the Emergency Room. 2. You must have someone with you to drive you home. You should not drive a car for 24 hours following surgery. Please make arrangements for a friend or family member to stay with you for the first 24 hours after your surgery. 3. Do not have anything to eat or drink (including water, gum, mints, coffee, juice) after midnight ?? .? This may not apply to medications prescribed by your physician. ?(Please note below the special instructions with medications to take the morning of your procedure.)    4. We recommend you do not drink any alcoholic beverages for 24 hours before and after your surgery. 5. Stop all Aspirin, non-steroidal anti-inflammatory drugs (i.e. Advil, Aleve), vitamins, and supplements?as directed by your surgeon's office. ? **If you are currently taking Plavix, Coumadin, or other blood-thinning agents, contact your surgeon for instructions. **    6. Wear comfortable clothes. Wear glasses instead of contacts. Do not bring any money or jewelry. Please bring picture ID, insurance card, and any prearranged co-payment or hospital payment. Do not wear make-up, particularly mascara the morning of your surgery. Do not wear nail polish, particularly if you are having foot /hand surgery. Wear your hair loose or down, no ponytails, buns, stalin pins or clips. All body piercings must be removed. Please shower with antibacterial soap for three consecutive days before and on the morning of surgery, but do not apply any lotions, powders or deodorants after the shower on the day of surgery. Please use a fresh towels after each shower.  Please sleep in clean clothes and change bed linens the night before surgery. Please do not shave for 48 hours prior to surgery. Shaving of the face is acceptable. 7. You should understand that if you do not follow these instructions your surgery may be cancelled. If your physical condition changes (I.e. fever, cold or flu) please contact your surgeon as soon as possible. 8. It is important that you be on time. If a situation occurs where you may be late, please call [phone] (OR Holding Area). 9. If you have any questions and or problems, please call [phone] (Pre-admission Testing). 10. Your surgery time may be subject to change. You will receive a phone call the evening prior if your time changes. 11.  If having outpatient surgery, you must have someone to drive you here, stay with you during the duration of your stay, and to drive you home at time of discharge. Special Instructions:    MEDICATIONS TO TAKE THE MORNING OF SURGERY WITH A SIP OF WATER:xana if needed, celexa, flexeril if needed, bentyl if needed, gabapentin, linzess, nexium, tramadol if needed      I understand a pre-operative phone call will be made to verify my surgery time. In the event that I am not available, I give permission for a message to be left on my answering service and/or with another person?   Yes       ___________________      __________   _________    (Signature of Patient)             (Witness)                (Date and Time)

## 2017-08-19 LAB
BACTERIA SPEC CULT: NORMAL
BACTERIA SPEC CULT: NORMAL
SERVICE CMNT-IMP: NORMAL

## 2017-08-20 LAB
BACTERIA SPEC CULT: NORMAL
CC UR VC: NORMAL
SERVICE CMNT-IMP: NORMAL

## 2017-08-21 NOTE — PERIOP NOTES
Faxed preop labs to Dr Rashid Dodd noting CBC,CMP, final urine culture- any treatment? Fax confirmed.

## 2017-08-22 LAB — DRUGS UR: NORMAL

## 2017-08-23 ENCOUNTER — TELEPHONE (OUTPATIENT)
Dept: FAMILY MEDICINE CLINIC | Age: 67
End: 2017-08-23

## 2017-08-23 NOTE — TELEPHONE ENCOUNTER
Spoke with Tristian Denise at Dr. Tiny Ballesteros office and pt is having surgery 8/29/2017 and needs to be cleared and wanted to know if Dr. Tim could to an addendum to 8/14/2017 note to clear pt for surgery. Tristian Denise stated to call [place] back at [phone].    Will forward message to Dr. Cuauhtemoc Perrin

## 2017-08-23 NOTE — PERIOP NOTES
Spoke with DR. Aidan Erickson nurse about note in [address] [place] [address] doesn't state that pt is medically cleared for surgery. If she could add that addendum. The nurse will talk with the doctor and see what she says.

## 2017-08-25 ENCOUNTER — TELEPHONE (OUTPATIENT)
Dept: CARDIOLOGY CLINIC | Age: 67
End: 2017-08-25

## 2017-08-25 NOTE — PERIOP NOTES
Called Dr Clara Guthrie office and spoke to Juanita about clearance. She said that pt is being cleared by Dr Arturo Pro and she will call to follow up for clearance.

## 2017-08-25 NOTE — TELEPHONE ENCOUNTER
Dr Jermaine Colin ofc called pt need cc for back surgery for 8-29, Need cardiac note, please fax to# [phone].     Thanks

## 2017-08-25 NOTE — TELEPHONE ENCOUNTER
Pt needs clearance for back surgery on 8/29/17 per Dr. Zackary Arroyo office. Pt's LOV: 08/02/17. Please advise.

## 2017-08-28 ENCOUNTER — HOSPITAL ENCOUNTER (OUTPATIENT)
Dept: MAMMOGRAPHY | Age: 67
Discharge: HOME | End: 2017-08-28
Attending: FAMILY MEDICINE
Payer: MEDICARE

## 2017-08-28 DIAGNOSIS — Z12.31 ENCOUNTER FOR SCREENING MAMMOGRAM FOR BREAST CANCER: ICD-10-CM

## 2017-08-28 PROCEDURE — 77067 SCR MAMMO BI INCL CAD: CPT

## 2017-08-28 NOTE — TELEPHONE ENCOUNTER
Spoke to Sarina Grimaldo, Dr. Milan Bethany assistant, and informed that Dr. Tim has addended note to 8/14/2017,in [facility], to clear pt for surgery. Sarina Grimaldo will let Dr. Rebecca Arenas know.

## 2017-08-29 ENCOUNTER — HOSPITAL ENCOUNTER (OUTPATIENT)
Age: 67
Setting detail: OBSERVATION
Discharge: HOME HEALTH | End: 2017-08-30
Attending: ORTHOPAEDIC SURGERY | Admitting: ORTHOPAEDIC SURGERY
Payer: MEDICARE

## 2017-08-29 ENCOUNTER — APPOINTMENT (OUTPATIENT)
Dept: GENERAL RADIOLOGY | Age: 67
End: 2017-08-29
Attending: ORTHOPAEDIC SURGERY
Payer: MEDICARE

## 2017-08-29 ENCOUNTER — ANESTHESIA EVENT (OUTPATIENT)
Dept: SURGERY | Age: 67
End: 2017-08-29
Payer: MEDICARE

## 2017-08-29 ENCOUNTER — ANESTHESIA (OUTPATIENT)
Dept: SURGERY | Age: 67
End: 2017-08-29
Payer: MEDICARE

## 2017-08-29 PROBLEM — M48.062 SPINAL STENOSIS OF LUMBAR REGION WITH NEUROGENIC CLAUDICATION: Status: ACTIVE | Noted: 2017-08-29

## 2017-08-29 LAB
ANION GAP BLD CALC-SCNC: 14 MMOL/L (ref 5–15)
BUN BLD-MCNC: 39 MG/DL (ref 9–20)
CA-I BLD-MCNC: 1.12 MMOL/L (ref 1.12–1.32)
CHLORIDE BLD-SCNC: 102 MMOL/L (ref 98–107)
CO2 BLD-SCNC: 29 MMOL/L (ref 21–32)
CREAT BLD-MCNC: 1.3 MG/DL (ref 0.6–1.3)
GLUCOSE BLD-MCNC: 97 MG/DL (ref 65–100)
HCT VFR BLD CALC: 35 % (ref 35–47)
HGB BLD-MCNC: 11.9 GM/DL (ref 11.5–16)
POTASSIUM BLD-SCNC: 5.3 MMOL/L (ref 3.5–5.1)
SERVICE CMNT-IMP: ABNORMAL
SODIUM BLD-SCNC: 139 MMOL/L (ref 136–145)

## 2017-08-29 PROCEDURE — G8978 MOBILITY CURRENT STATUS: HCPCS

## 2017-08-29 PROCEDURE — 74011250636 HC RX REV CODE- 250/636

## 2017-08-29 PROCEDURE — G8979 MOBILITY GOAL STATUS: HCPCS

## 2017-08-29 PROCEDURE — 76001 XR FLUOROSCOPY OVER 60 MINUTES: CPT

## 2017-08-29 PROCEDURE — 77030011266 HC ELECTRD BLD INSL COVD -A: Performed by: ORTHOPAEDIC SURGERY

## 2017-08-29 PROCEDURE — 77030014007 HC SPNG HEMSTAT J&J -B: Performed by: ORTHOPAEDIC SURGERY

## 2017-08-29 PROCEDURE — 65390000012 HC CONDITION CODE 44 OBSERVATION

## 2017-08-29 PROCEDURE — 77030004391 HC BUR FLUT MEDT -C: Performed by: ORTHOPAEDIC SURGERY

## 2017-08-29 PROCEDURE — 77030032490 HC SLV COMPR SCD KNE COVD -B: Performed by: ORTHOPAEDIC SURGERY

## 2017-08-29 PROCEDURE — 65270000029 HC RM PRIVATE

## 2017-08-29 PROCEDURE — 77030022704 HC SUT VLOC COVD -B: Performed by: ORTHOPAEDIC SURGERY

## 2017-08-29 PROCEDURE — 74011250637 HC RX REV CODE- 250/637: Performed by: ORTHOPAEDIC SURGERY

## 2017-08-29 PROCEDURE — 74011000258 HC RX REV CODE- 258: Performed by: ORTHOPAEDIC SURGERY

## 2017-08-29 PROCEDURE — 77030008771 HC TU NG SALEM SUMP -A: Performed by: NURSE ANESTHETIST, CERTIFIED REGISTERED

## 2017-08-29 PROCEDURE — 77030034849: Performed by: ORTHOPAEDIC SURGERY

## 2017-08-29 PROCEDURE — 77030003029 HC SUT VCRL J&J -B: Performed by: ORTHOPAEDIC SURGERY

## 2017-08-29 PROCEDURE — 77030029099 HC BN WAX SSPC -A: Performed by: ORTHOPAEDIC SURGERY

## 2017-08-29 PROCEDURE — 77030018836 HC SOL IRR NACL ICUM -A: Performed by: ORTHOPAEDIC SURGERY

## 2017-08-29 PROCEDURE — 74011000250 HC RX REV CODE- 250

## 2017-08-29 PROCEDURE — 77030008684 HC TU ET CUF COVD -B: Performed by: NURSE ANESTHETIST, CERTIFIED REGISTERED

## 2017-08-29 PROCEDURE — 76210000016 HC OR PH I REC 1 TO 1.5 HR: Performed by: ORTHOPAEDIC SURGERY

## 2017-08-29 PROCEDURE — 74011000272 HC RX REV CODE- 272: Performed by: ORTHOPAEDIC SURGERY

## 2017-08-29 PROCEDURE — 74011250636 HC RX REV CODE- 250/636: Performed by: ANESTHESIOLOGY

## 2017-08-29 PROCEDURE — P9045 ALBUMIN (HUMAN), 5%, 250 ML: HCPCS

## 2017-08-29 PROCEDURE — 77030003028 HC SUT VCRL J&J -A: Performed by: ORTHOPAEDIC SURGERY

## 2017-08-29 PROCEDURE — 74011000250 HC RX REV CODE- 250: Performed by: ORTHOPAEDIC SURGERY

## 2017-08-29 PROCEDURE — 77030033138 HC SUT PGA STRATFX J&J -B: Performed by: ORTHOPAEDIC SURGERY

## 2017-08-29 PROCEDURE — 97530 THERAPEUTIC ACTIVITIES: CPT

## 2017-08-29 PROCEDURE — 77030034475 HC MISC IMPL SPN: Performed by: ORTHOPAEDIC SURGERY

## 2017-08-29 PROCEDURE — 77030019908 HC STETH ESOPH SIMS -A: Performed by: NURSE ANESTHETIST, CERTIFIED REGISTERED

## 2017-08-29 PROCEDURE — 97161 PT EVAL LOW COMPLEX 20 MIN: CPT

## 2017-08-29 PROCEDURE — 99218 HC RM OBSERVATION: CPT

## 2017-08-29 PROCEDURE — 76010000172 HC OR TIME 2.5 TO 3 HR INTENSV-TIER 1: Performed by: ORTHOPAEDIC SURGERY

## 2017-08-29 PROCEDURE — 77030035129: Performed by: ORTHOPAEDIC SURGERY

## 2017-08-29 PROCEDURE — 72020 X-RAY EXAM OF SPINE 1 VIEW: CPT

## 2017-08-29 PROCEDURE — 77030012961 HC IRR KT CYSTO/TUR ICUM -A: Performed by: ORTHOPAEDIC SURGERY

## 2017-08-29 PROCEDURE — 77030014006 HC SPNG HEMSTAT J&J -A: Performed by: ORTHOPAEDIC SURGERY

## 2017-08-29 PROCEDURE — 77030034479 HC ADH SKN CLSR PRINEO J&J -B: Performed by: ORTHOPAEDIC SURGERY

## 2017-08-29 PROCEDURE — 77030008467 HC STPLR SKN COVD -B: Performed by: ORTHOPAEDIC SURGERY

## 2017-08-29 PROCEDURE — 77030021678 HC GLIDESCP STAT DISP VERT -B: Performed by: NURSE ANESTHETIST, CERTIFIED REGISTERED

## 2017-08-29 PROCEDURE — 77030013079 HC BLNKT BAIR HGGR 3M -A: Performed by: NURSE ANESTHETIST, CERTIFIED REGISTERED

## 2017-08-29 PROCEDURE — 77030016570 HC BLNKT BAIR HGGR 3M -B: Performed by: ORTHOPAEDIC SURGERY

## 2017-08-29 PROCEDURE — 80047 BASIC METABLC PNL IONIZED CA: CPT

## 2017-08-29 PROCEDURE — 77010033678 HC OXYGEN DAILY

## 2017-08-29 PROCEDURE — 74011250636 HC RX REV CODE- 250/636: Performed by: ORTHOPAEDIC SURGERY

## 2017-08-29 PROCEDURE — 77030003666 HC NDL SPINAL BD -A: Performed by: ORTHOPAEDIC SURGERY

## 2017-08-29 PROCEDURE — 76060000036 HC ANESTHESIA 2.5 TO 3 HR: Performed by: ORTHOPAEDIC SURGERY

## 2017-08-29 PROCEDURE — 77030018846 HC SOL IRR STRL H20 ICUM -A: Performed by: ORTHOPAEDIC SURGERY

## 2017-08-29 RX ORDER — ALPRAZOLAM 0.5 MG/1
0.5 TABLET ORAL 2 TIMES DAILY
Status: DISCONTINUED | OUTPATIENT
Start: 2017-08-29 | End: 2017-08-30 | Stop reason: HOSPADM

## 2017-08-29 RX ORDER — LIDOCAINE HYDROCHLORIDE 20 MG/ML
INJECTION, SOLUTION EPIDURAL; INFILTRATION; INTRACAUDAL; PERINEURAL AS NEEDED
Status: DISCONTINUED | OUTPATIENT
Start: 2017-08-29 | End: 2017-08-29 | Stop reason: HOSPADM

## 2017-08-29 RX ORDER — LISINOPRIL 20 MG/1
40 TABLET ORAL DAILY
Status: DISCONTINUED | OUTPATIENT
Start: 2017-08-30 | End: 2017-08-30 | Stop reason: HOSPADM

## 2017-08-29 RX ORDER — PREGABALIN 75 MG/1
150 CAPSULE ORAL ONCE
Status: COMPLETED | OUTPATIENT
Start: 2017-08-29 | End: 2017-08-29

## 2017-08-29 RX ORDER — ATORVASTATIN CALCIUM 40 MG/1
40 TABLET, FILM COATED ORAL DAILY
Status: DISCONTINUED | OUTPATIENT
Start: 2017-08-30 | End: 2017-08-30 | Stop reason: HOSPADM

## 2017-08-29 RX ORDER — NALOXONE HYDROCHLORIDE 0.4 MG/ML
0.4 INJECTION, SOLUTION INTRAMUSCULAR; INTRAVENOUS; SUBCUTANEOUS AS NEEDED
Status: DISCONTINUED | OUTPATIENT
Start: 2017-08-29 | End: 2017-08-30 | Stop reason: HOSPADM

## 2017-08-29 RX ORDER — HYDROXYZINE HYDROCHLORIDE 10 MG/1
10 TABLET, FILM COATED ORAL
Status: DISCONTINUED | OUTPATIENT
Start: 2017-08-29 | End: 2017-08-30 | Stop reason: HOSPADM

## 2017-08-29 RX ORDER — POLYETHYLENE GLYCOL 3350 17 G/17G
17 POWDER, FOR SOLUTION ORAL DAILY
Status: DISCONTINUED | OUTPATIENT
Start: 2017-08-30 | End: 2017-08-30 | Stop reason: HOSPADM

## 2017-08-29 RX ORDER — TRIAMCINOLONE ACETONIDE 1 MG/G
CREAM TOPICAL
Status: DISCONTINUED | OUTPATIENT
Start: 2017-08-29 | End: 2017-08-30 | Stop reason: HOSPADM

## 2017-08-29 RX ORDER — CEFAZOLIN SODIUM IN 0.9 % NACL 2 G/100 ML
2 PLASTIC BAG, INJECTION (ML) INTRAVENOUS EVERY 8 HOURS
Status: COMPLETED | OUTPATIENT
Start: 2017-08-29 | End: 2017-08-30

## 2017-08-29 RX ORDER — PHENYLEPHRINE HCL IN 0.9% NACL 0.4MG/10ML
SYRINGE (ML) INTRAVENOUS AS NEEDED
Status: DISCONTINUED | OUTPATIENT
Start: 2017-08-29 | End: 2017-08-29 | Stop reason: HOSPADM

## 2017-08-29 RX ORDER — MELATONIN
1000 EVERY OTHER DAY
Status: DISCONTINUED | OUTPATIENT
Start: 2017-08-29 | End: 2017-08-30 | Stop reason: HOSPADM

## 2017-08-29 RX ORDER — ONDANSETRON 2 MG/ML
INJECTION INTRAMUSCULAR; INTRAVENOUS AS NEEDED
Status: DISCONTINUED | OUTPATIENT
Start: 2017-08-29 | End: 2017-08-29 | Stop reason: HOSPADM

## 2017-08-29 RX ORDER — FENTANYL CITRATE 50 UG/ML
INJECTION, SOLUTION INTRAMUSCULAR; INTRAVENOUS AS NEEDED
Status: DISCONTINUED | OUTPATIENT
Start: 2017-08-29 | End: 2017-08-29 | Stop reason: HOSPADM

## 2017-08-29 RX ORDER — NEOSTIGMINE METHYLSULFATE 1 MG/ML
INJECTION INTRAVENOUS AS NEEDED
Status: DISCONTINUED | OUTPATIENT
Start: 2017-08-29 | End: 2017-08-29 | Stop reason: HOSPADM

## 2017-08-29 RX ORDER — AMOXICILLIN 250 MG
1 CAPSULE ORAL 2 TIMES DAILY
Status: DISCONTINUED | OUTPATIENT
Start: 2017-08-29 | End: 2017-08-30 | Stop reason: HOSPADM

## 2017-08-29 RX ORDER — DULOXETIN HYDROCHLORIDE 30 MG/1
60 CAPSULE, DELAYED RELEASE ORAL 2 TIMES DAILY
Status: DISCONTINUED | OUTPATIENT
Start: 2017-08-29 | End: 2017-08-30 | Stop reason: HOSPADM

## 2017-08-29 RX ORDER — DIAZEPAM 5 MG/1
5 TABLET ORAL
Status: DISCONTINUED | OUTPATIENT
Start: 2017-08-29 | End: 2017-08-30 | Stop reason: HOSPADM

## 2017-08-29 RX ORDER — DEXAMETHASONE SODIUM PHOSPHATE 4 MG/ML
10 INJECTION, SOLUTION INTRA-ARTICULAR; INTRALESIONAL; INTRAMUSCULAR; INTRAVENOUS; SOFT TISSUE ONCE
Status: COMPLETED | OUTPATIENT
Start: 2017-08-29 | End: 2017-08-29

## 2017-08-29 RX ORDER — ACETAMINOPHEN 10 MG/ML
INJECTION, SOLUTION INTRAVENOUS AS NEEDED
Status: DISCONTINUED | OUTPATIENT
Start: 2017-08-29 | End: 2017-08-29 | Stop reason: HOSPADM

## 2017-08-29 RX ORDER — DEXAMETHASONE SODIUM PHOSPHATE 4 MG/ML
INJECTION, SOLUTION INTRA-ARTICULAR; INTRALESIONAL; INTRAMUSCULAR; INTRAVENOUS; SOFT TISSUE AS NEEDED
Status: DISCONTINUED | OUTPATIENT
Start: 2017-08-29 | End: 2017-08-29 | Stop reason: HOSPADM

## 2017-08-29 RX ORDER — FAMOTIDINE 20 MG/1
20 TABLET, FILM COATED ORAL 2 TIMES DAILY
Status: DISCONTINUED | OUTPATIENT
Start: 2017-08-29 | End: 2017-08-30

## 2017-08-29 RX ORDER — SODIUM CHLORIDE 0.9 % (FLUSH) 0.9 %
5-10 SYRINGE (ML) INJECTION EVERY 8 HOURS
Status: DISCONTINUED | OUTPATIENT
Start: 2017-08-30 | End: 2017-08-30 | Stop reason: HOSPADM

## 2017-08-29 RX ORDER — PANTOPRAZOLE SODIUM 40 MG/1
40 TABLET, DELAYED RELEASE ORAL
Status: DISCONTINUED | OUTPATIENT
Start: 2017-08-30 | End: 2017-08-30 | Stop reason: HOSPADM

## 2017-08-29 RX ORDER — ASPIRIN 81 MG/1
81 TABLET ORAL EVERY OTHER DAY
Status: DISCONTINUED | OUTPATIENT
Start: 2017-08-29 | End: 2017-08-30 | Stop reason: HOSPADM

## 2017-08-29 RX ORDER — ALBUMIN HUMAN 50 G/1000ML
SOLUTION INTRAVENOUS AS NEEDED
Status: DISCONTINUED | OUTPATIENT
Start: 2017-08-29 | End: 2017-08-29 | Stop reason: HOSPADM

## 2017-08-29 RX ORDER — ACETAMINOPHEN 500 MG
1000 TABLET ORAL EVERY 6 HOURS
Status: DISCONTINUED | OUTPATIENT
Start: 2017-08-29 | End: 2017-08-30 | Stop reason: HOSPADM

## 2017-08-29 RX ORDER — MIDAZOLAM HYDROCHLORIDE 1 MG/ML
INJECTION, SOLUTION INTRAMUSCULAR; INTRAVENOUS AS NEEDED
Status: DISCONTINUED | OUTPATIENT
Start: 2017-08-29 | End: 2017-08-29 | Stop reason: HOSPADM

## 2017-08-29 RX ORDER — ACETAMINOPHEN 500 MG
1000 TABLET ORAL ONCE
Status: COMPLETED | OUTPATIENT
Start: 2017-08-29 | End: 2017-08-29

## 2017-08-29 RX ORDER — HYDROMORPHONE HYDROCHLORIDE 1 MG/ML
1 INJECTION, SOLUTION INTRAMUSCULAR; INTRAVENOUS; SUBCUTANEOUS
Status: ACTIVE | OUTPATIENT
Start: 2017-08-29 | End: 2017-08-30

## 2017-08-29 RX ORDER — HYDROMORPHONE HYDROCHLORIDE 2 MG/ML
INJECTION, SOLUTION INTRAMUSCULAR; INTRAVENOUS; SUBCUTANEOUS AS NEEDED
Status: DISCONTINUED | OUTPATIENT
Start: 2017-08-29 | End: 2017-08-29 | Stop reason: HOSPADM

## 2017-08-29 RX ORDER — SODIUM CHLORIDE 0.9 % (FLUSH) 0.9 %
5-10 SYRINGE (ML) INJECTION AS NEEDED
Status: DISCONTINUED | OUTPATIENT
Start: 2017-08-29 | End: 2017-08-30 | Stop reason: HOSPADM

## 2017-08-29 RX ORDER — ONDANSETRON 2 MG/ML
4 INJECTION INTRAMUSCULAR; INTRAVENOUS
Status: ACTIVE | OUTPATIENT
Start: 2017-08-29 | End: 2017-08-30

## 2017-08-29 RX ORDER — GABAPENTIN 300 MG/1
600 CAPSULE ORAL 3 TIMES DAILY
Status: DISCONTINUED | OUTPATIENT
Start: 2017-08-29 | End: 2017-08-30 | Stop reason: HOSPADM

## 2017-08-29 RX ORDER — GLYCOPYRROLATE 0.2 MG/ML
INJECTION INTRAMUSCULAR; INTRAVENOUS AS NEEDED
Status: DISCONTINUED | OUTPATIENT
Start: 2017-08-29 | End: 2017-08-29 | Stop reason: HOSPADM

## 2017-08-29 RX ORDER — TRAZODONE HYDROCHLORIDE 100 MG/1
100 TABLET ORAL
Status: DISCONTINUED | OUTPATIENT
Start: 2017-08-29 | End: 2017-08-30 | Stop reason: HOSPADM

## 2017-08-29 RX ORDER — KETOROLAC TROMETHAMINE 30 MG/ML
15 INJECTION, SOLUTION INTRAMUSCULAR; INTRAVENOUS EVERY 6 HOURS
Status: COMPLETED | OUTPATIENT
Start: 2017-08-29 | End: 2017-08-30

## 2017-08-29 RX ORDER — FACIAL-BODY WIPES
10 EACH TOPICAL DAILY PRN
Status: DISCONTINUED | OUTPATIENT
Start: 2017-08-31 | End: 2017-08-30 | Stop reason: HOSPADM

## 2017-08-29 RX ORDER — DICYCLOMINE HYDROCHLORIDE 10 MG/1
10 CAPSULE ORAL 3 TIMES DAILY
Status: DISCONTINUED | OUTPATIENT
Start: 2017-08-29 | End: 2017-08-30 | Stop reason: HOSPADM

## 2017-08-29 RX ORDER — ROCURONIUM BROMIDE 10 MG/ML
INJECTION, SOLUTION INTRAVENOUS AS NEEDED
Status: DISCONTINUED | OUTPATIENT
Start: 2017-08-29 | End: 2017-08-29 | Stop reason: HOSPADM

## 2017-08-29 RX ORDER — HYDROCHLOROTHIAZIDE 25 MG/1
25 TABLET ORAL DAILY
Status: DISCONTINUED | OUTPATIENT
Start: 2017-08-30 | End: 2017-08-30 | Stop reason: HOSPADM

## 2017-08-29 RX ORDER — PROPOFOL 10 MG/ML
INJECTION, EMULSION INTRAVENOUS AS NEEDED
Status: DISCONTINUED | OUTPATIENT
Start: 2017-08-29 | End: 2017-08-29 | Stop reason: HOSPADM

## 2017-08-29 RX ORDER — CITALOPRAM 20 MG/1
40 TABLET, FILM COATED ORAL DAILY
Status: DISCONTINUED | OUTPATIENT
Start: 2017-08-30 | End: 2017-08-30 | Stop reason: HOSPADM

## 2017-08-29 RX ORDER — HYDROMORPHONE HYDROCHLORIDE 2 MG/1
2-4 TABLET ORAL
Status: DISCONTINUED | OUTPATIENT
Start: 2017-08-29 | End: 2017-08-30 | Stop reason: HOSPADM

## 2017-08-29 RX ORDER — SODIUM CHLORIDE 9 MG/ML
125 INJECTION, SOLUTION INTRAVENOUS CONTINUOUS
Status: DISPENSED | OUTPATIENT
Start: 2017-08-29 | End: 2017-08-30

## 2017-08-29 RX ORDER — CYCLOBENZAPRINE HCL 10 MG
10 TABLET ORAL
Status: DISCONTINUED | OUTPATIENT
Start: 2017-08-29 | End: 2017-08-30 | Stop reason: HOSPADM

## 2017-08-29 RX ORDER — SUCCINYLCHOLINE CHLORIDE 20 MG/ML
INJECTION INTRAMUSCULAR; INTRAVENOUS AS NEEDED
Status: DISCONTINUED | OUTPATIENT
Start: 2017-08-29 | End: 2017-08-29 | Stop reason: HOSPADM

## 2017-08-29 RX ORDER — SODIUM CHLORIDE, SODIUM LACTATE, POTASSIUM CHLORIDE, CALCIUM CHLORIDE 600; 310; 30; 20 MG/100ML; MG/100ML; MG/100ML; MG/100ML
25 INJECTION, SOLUTION INTRAVENOUS CONTINUOUS
Status: DISCONTINUED | OUTPATIENT
Start: 2017-08-29 | End: 2017-08-29 | Stop reason: HOSPADM

## 2017-08-29 RX ORDER — TRAMADOL HYDROCHLORIDE 50 MG/1
50-100 TABLET ORAL
Status: DISCONTINUED | OUTPATIENT
Start: 2017-08-29 | End: 2017-08-30 | Stop reason: HOSPADM

## 2017-08-29 RX ORDER — SUCRALFATE 1 G/1
1 TABLET ORAL 4 TIMES DAILY
Status: DISCONTINUED | OUTPATIENT
Start: 2017-08-29 | End: 2017-08-30 | Stop reason: HOSPADM

## 2017-08-29 RX ADMIN — HYDROMORPHONE HYDROCHLORIDE 0.2 MG: 2 INJECTION, SOLUTION INTRAMUSCULAR; INTRAVENOUS; SUBCUTANEOUS at 11:17

## 2017-08-29 RX ADMIN — SODIUM CHLORIDE, SODIUM LACTATE, POTASSIUM CHLORIDE, AND CALCIUM CHLORIDE: 600; 310; 30; 20 INJECTION, SOLUTION INTRAVENOUS at 12:00

## 2017-08-29 RX ADMIN — CEFAZOLIN 2 G: 10 INJECTION, POWDER, FOR SOLUTION INTRAVENOUS; PARENTERAL at 20:05

## 2017-08-29 RX ADMIN — FENTANYL CITRATE 25 MCG: 50 INJECTION, SOLUTION INTRAMUSCULAR; INTRAVENOUS at 12:46

## 2017-08-29 RX ADMIN — DEXAMETHASONE SODIUM PHOSPHATE 10 MG: 4 INJECTION, SOLUTION INTRAMUSCULAR; INTRAVENOUS at 15:53

## 2017-08-29 RX ADMIN — SODIUM CHLORIDE, SODIUM LACTATE, POTASSIUM CHLORIDE, AND CALCIUM CHLORIDE: 600; 310; 30; 20 INJECTION, SOLUTION INTRAVENOUS at 10:35

## 2017-08-29 RX ADMIN — SODIUM CHLORIDE 125 ML/HR: 900 INJECTION, SOLUTION INTRAVENOUS at 13:05

## 2017-08-29 RX ADMIN — TRAMADOL HYDROCHLORIDE 50 MG: 50 TABLET, FILM COATED ORAL at 15:53

## 2017-08-29 RX ADMIN — ROCURONIUM BROMIDE 15 MG: 10 INJECTION, SOLUTION INTRAVENOUS at 10:27

## 2017-08-29 RX ADMIN — LIDOCAINE HYDROCHLORIDE 40 MG: 20 INJECTION, SOLUTION EPIDURAL; INFILTRATION; INTRACAUDAL; PERINEURAL at 10:21

## 2017-08-29 RX ADMIN — TRAZODONE HYDROCHLORIDE 100 MG: 100 TABLET ORAL at 21:41

## 2017-08-29 RX ADMIN — Medication 80 MCG: at 11:56

## 2017-08-29 RX ADMIN — PROPOFOL 160 MG: 10 INJECTION, EMULSION INTRAVENOUS at 10:21

## 2017-08-29 RX ADMIN — Medication 40 MCG: at 11:07

## 2017-08-29 RX ADMIN — ACETAMINOPHEN 1000 MG: 10 INJECTION, SOLUTION INTRAVENOUS at 11:19

## 2017-08-29 RX ADMIN — Medication 40 MCG: at 11:59

## 2017-08-29 RX ADMIN — Medication 40 MCG: at 10:43

## 2017-08-29 RX ADMIN — DULOXETINE HYDROCHLORIDE 60 MG: 30 CAPSULE, DELAYED RELEASE ORAL at 17:35

## 2017-08-29 RX ADMIN — GABAPENTIN 600 MG: 300 CAPSULE ORAL at 21:40

## 2017-08-29 RX ADMIN — SODIUM CHLORIDE 125 ML/HR: 900 INJECTION, SOLUTION INTRAVENOUS at 20:11

## 2017-08-29 RX ADMIN — FENTANYL CITRATE 50 MCG: 50 INJECTION, SOLUTION INTRAMUSCULAR; INTRAVENOUS at 10:21

## 2017-08-29 RX ADMIN — FAMOTIDINE 20 MG: 20 TABLET ORAL at 17:35

## 2017-08-29 RX ADMIN — DOCUSATE SODIUM AND SENNOSIDES 1 TABLET: 8.6; 5 TABLET, FILM COATED ORAL at 17:35

## 2017-08-29 RX ADMIN — Medication 80 MCG: at 10:49

## 2017-08-29 RX ADMIN — TRAMADOL HYDROCHLORIDE 50 MG: 50 TABLET, FILM COATED ORAL at 22:15

## 2017-08-29 RX ADMIN — GABAPENTIN 600 MG: 300 CAPSULE ORAL at 15:53

## 2017-08-29 RX ADMIN — ALPRAZOLAM 0.5 MG: 0.5 TABLET ORAL at 17:35

## 2017-08-29 RX ADMIN — ASPIRIN 81 MG: 81 TABLET, COATED ORAL at 15:53

## 2017-08-29 RX ADMIN — MIDAZOLAM HYDROCHLORIDE 2 MG: 1 INJECTION, SOLUTION INTRAMUSCULAR; INTRAVENOUS at 10:12

## 2017-08-29 RX ADMIN — ALBUMIN HUMAN 250 ML: 50 SOLUTION INTRAVENOUS at 12:06

## 2017-08-29 RX ADMIN — HYDROMORPHONE HYDROCHLORIDE 0.2 MG: 2 INJECTION, SOLUTION INTRAMUSCULAR; INTRAVENOUS; SUBCUTANEOUS at 12:00

## 2017-08-29 RX ADMIN — HYDROMORPHONE HYDROCHLORIDE 0.2 MG: 2 INJECTION, SOLUTION INTRAMUSCULAR; INTRAVENOUS; SUBCUTANEOUS at 12:09

## 2017-08-29 RX ADMIN — SODIUM CHLORIDE, SODIUM LACTATE, POTASSIUM CHLORIDE, AND CALCIUM CHLORIDE 25 ML/HR: 600; 310; 30; 20 INJECTION, SOLUTION INTRAVENOUS at 09:24

## 2017-08-29 RX ADMIN — SUCRALFATE 1 G: 1 TABLET ORAL at 17:43

## 2017-08-29 RX ADMIN — CYCLOBENZAPRINE HYDROCHLORIDE 10 MG: 10 TABLET, FILM COATED ORAL at 15:53

## 2017-08-29 RX ADMIN — ROCURONIUM BROMIDE 5 MG: 10 INJECTION, SOLUTION INTRAVENOUS at 10:21

## 2017-08-29 RX ADMIN — ACETAMINOPHEN 1000 MG: 500 TABLET, FILM COATED ORAL at 17:35

## 2017-08-29 RX ADMIN — PREGABALIN 150 MG: 75 CAPSULE ORAL at 09:34

## 2017-08-29 RX ADMIN — ACETAMINOPHEN 1000 MG: 500 TABLET, FILM COATED ORAL at 23:25

## 2017-08-29 RX ADMIN — ACETAMINOPHEN 1000 MG: 500 TABLET, FILM COATED ORAL at 09:34

## 2017-08-29 RX ADMIN — DEXAMETHASONE SODIUM PHOSPHATE 10 MG: 4 INJECTION, SOLUTION INTRA-ARTICULAR; INTRALESIONAL; INTRAMUSCULAR; INTRAVENOUS; SOFT TISSUE at 10:30

## 2017-08-29 RX ADMIN — Medication 40 MCG: at 10:54

## 2017-08-29 RX ADMIN — ONDANSETRON 4 MG: 2 INJECTION INTRAMUSCULAR; INTRAVENOUS at 10:30

## 2017-08-29 RX ADMIN — CEFAZOLIN 2 G: 1 INJECTION, POWDER, FOR SOLUTION INTRAMUSCULAR; INTRAVENOUS; PARENTERAL at 10:40

## 2017-08-29 RX ADMIN — GLYCOPYRROLATE 0.4 MG: 0.2 INJECTION INTRAMUSCULAR; INTRAVENOUS at 12:27

## 2017-08-29 RX ADMIN — VITAMIN D, TAB 1000IU (100/BT) 1000 UNITS: 25 TAB at 15:53

## 2017-08-29 RX ADMIN — HYDROMORPHONE HYDROCHLORIDE 0.2 MG: 2 INJECTION, SOLUTION INTRAMUSCULAR; INTRAVENOUS; SUBCUTANEOUS at 11:30

## 2017-08-29 RX ADMIN — DICYCLOMINE HYDROCHLORIDE 10 MG: 10 CAPSULE ORAL at 21:41

## 2017-08-29 RX ADMIN — KETOROLAC TROMETHAMINE 15 MG: 30 INJECTION, SOLUTION INTRAMUSCULAR at 17:34

## 2017-08-29 RX ADMIN — FENTANYL CITRATE 25 MCG: 50 INJECTION, SOLUTION INTRAMUSCULAR; INTRAVENOUS at 10:30

## 2017-08-29 RX ADMIN — DICYCLOMINE HYDROCHLORIDE 10 MG: 10 CAPSULE ORAL at 15:53

## 2017-08-29 RX ADMIN — Medication 40 MCG: at 11:04

## 2017-08-29 RX ADMIN — Medication 40 MCG: at 11:00

## 2017-08-29 RX ADMIN — NEOSTIGMINE METHYLSULFATE 2 MG: 1 INJECTION INTRAVENOUS at 12:27

## 2017-08-29 RX ADMIN — KETOROLAC TROMETHAMINE 15 MG: 30 INJECTION, SOLUTION INTRAMUSCULAR at 23:25

## 2017-08-29 RX ADMIN — HYDROMORPHONE HYDROCHLORIDE 0.2 MG: 2 INJECTION, SOLUTION INTRAMUSCULAR; INTRAVENOUS; SUBCUTANEOUS at 12:35

## 2017-08-29 RX ADMIN — SUCCINYLCHOLINE CHLORIDE 160 MG: 20 INJECTION INTRAMUSCULAR; INTRAVENOUS at 10:21

## 2017-08-29 NOTE — ANESTHESIA PREPROCEDURE EVALUATION
Anesthetic History   No history of anesthetic complications            Review of Systems / Medical History  Patient summary reviewed, nursing notes reviewed and pertinent labs reviewed    Pulmonary  Within defined limits                 Neuro/Psych         Headaches and psychiatric history     Cardiovascular    Hypertension: well controlled          Hyperlipidemia    Exercise tolerance: >4 METS     GI/Hepatic/Renal     GERD: well controlled      Hiatal hernia     Endo/Other      Hypothyroidism: well controlled  Obesity and arthritis     Other Findings   Comments: Fibromyalgia           Physical Exam    Airway  Mallampati: III  TM Distance: 4 - 6 cm  Neck ROM: normal range of motion   Mouth opening: Normal     Cardiovascular    Rhythm: regular  Rate: normal         Dental    Dentition: Lower dentition intact, Upper dentition intact and Implants     Pulmonary  Breath sounds clear to auscultation               Abdominal  GI exam deferred       Other Findings            Anesthetic Plan    ASA: 2  Anesthesia type: general          Induction: Intravenous  Anesthetic plan and risks discussed with: Patient

## 2017-08-29 NOTE — PERIOP NOTES
TRANSFER - OUT REPORT:    Verbal report given to Rah Hutchinson RN(name) on Joselito Tolentino  being transferred to [phone] (unit) for routine post - op       Report consisted of patients Situation, Background, Assessment and   Recommendations(SBAR). Information from the following report(s) SBAR, OR Summary, Intake/Output, MAR, Recent Results and Med Rec Status was reviewed with the receiving nurse. Opportunity for questions and clarification was provided.       Patient transported with:   O2 @ 2 liters  Tech

## 2017-08-29 NOTE — PROGRESS NOTES
Bedside and Verbal shift change report given to Segun (oncoming nurse) by Irene Hollingsworth RN (offgoing nurse). Report included the following information Intake/Output, MAR and Recent Results.

## 2017-08-29 NOTE — BRIEF OP NOTE
BRIEF OPERATIVE NOTE    Date of Procedure: 8/29/2017   Preoperative Diagnosis: LUMBAGO, RADICULOPATHY, STENOSIS  Postoperative Diagnosis: LUMBAGO, RADICULOPATHY, STENOSIS    Procedure(s):  L3-S1 DECOMPRESSION WITH BONE MARROW ASPIRATION FROM ILIAC CREST Nydia Flaming Solution)  Surgeon(s) and Role:     * Juan Diego Jean MD - Primary         Assistant Staff:  Physician Assistant: Renae Morenoma    Surgical Staff:  Circ-1: Arturo Mcdonald RN  Circ-2: Brook Seip, RN  Circ-Relief: Liya Danielson RN  Physician Assistant: CORNELL Moreno  Scrub Tech-1: James Turner  Event Time In   Incision Start 1053   Incision Close      Anesthesia: General   Estimated Blood Loss: 400cc  Specimens: * No specimens in log *   Findings: stenosis   Complications: None  Implants:   Implant Name Type Inv.  Item Serial No.  Lot No. LRB No. Used Action   BACTERIN 3 VERNA [employer]ORTICAL FIBERS 30.0 CC     [id number] [employer] [id number] N/A 1 Implanted

## 2017-08-29 NOTE — ROUTINE PROCESS
Patient: Dianna Delaney MRN: [id number]  SSN: [id number]   YOB: 1950  Age: 74 y.o. Sex: female     Patient is status post Procedure(s):  L3-S1 DECOMPRESSION WITH BONE MARROW ASPIRATION FROM ILIAC CREST Rosalba Promise Solution). Surgeon(s) and Role:     * Jovita Griffin MD - Primary    Local/Dose/Irrigation:  100mL wang solution                  Peripheral IV 08/29/17 Right Hand (Active)   Site Assessment Clean, dry, & intact 8/29/2017  9:22 AM   Phlebitis Assessment 0 8/29/2017  9:22 AM   Infiltration Assessment 0 8/29/2017  9:22 AM   Dressing Status Clean, dry, & intact 8/29/2017  9:22 AM   Dressing Type Tape;Transparent 8/29/2017  9:22 AM   Hub Color/Line Status Pink; Infusing;Patent 8/29/2017  9:22 AM   Action Taken Blood drawn 8/29/2017  9:22 AM   Alcohol Cap Used Yes 8/29/2017  9:22 AM          Nestor-Keenan Drain 08/29/17 Mid Back (Active)      Airway - Endotracheal Tube 08/29/17 Oral (Active)   Line Jorge Lips 8/29/2017 12:00 AM                   Dressing/Packing:  Wound Spine Mid-DRESSING TYPE: Topical skin adhesive/glue (08/29/17 1220)  Splint/Cast:  ]

## 2017-08-29 NOTE — PROGRESS NOTES
Problem: Mobility Impaired (Adult and Pediatric)  Goal: *Acute Goals and Plan of Care (Insert Text)  Physical Therapy Goals  Initiated 8/29/2017    1. Patient will move from supine to sit and sit to supine , scoot up and down and roll side to side in bed with independence within 4 days. 2. Patient will perform sit to stand with independence within 4 days. 3. Patient will ambulate with independence for 500 feet with the least restrictive device within 4 days. 4. Patient will ascend/descend 12 stairs with single handrail(s) with independence within 4 days. 5. Patient will verbalize and demonstrate understanding of spinal precautions (No bending, lifting greater than 5 lbs, or twisting; log-roll technique; frequent repositioning as instructed) within 4 days. PHYSICAL THERAPY EVALUATION  Patient: Anne Hsieh (26 y.o. female)  Date: 8/29/2017  Primary Diagnosis: LUMBAGO, RADICULOPATHY, STENOSIS  Spinal stenosis of lumbar region with neurogenic claudication  Procedure(s) (LRB):  L3-S1 DECOMPRESSION WITH BONE MARROW ASPIRATION FROM ILIAC CREST Curry Brazen Solution) (N/A) Day of Surgery   Precautions:   Spinal      ASSESSMENT :  Based on the objective data described below, the patient presents with expected post operative mild instability impacting functional mobility. Patient I PTA. Received in supine, agreeable, supportive family present. She mobilized with S-CGA for transfers, bed mobility via log roll, as well as gait without device where noted poor arm swing and slowed gait likely impacting stability. With VC's improved over time. Expect good progress ahead. Family questioning ADL's ahead with spinal precaution restrictions; happy to hear that OT is to visit. No therapy likely needed at [place]. Polina Gibson Patient will benefit from skilled intervention to address the above impairments.   Patients rehabilitation potential is considered to be Excellent  Factors which may influence rehabilitation potential include:   [X] None noted  [ ]         Mental ability/status  [ ]         Medical condition  [ ]         Home/family situation and support systems  [ ]         Safety awareness  [ ]         Pain tolerance/management  [ ]         Other:        PLAN :  Recommendations and Planned Interventions:  [X]           Bed Mobility Training             [ ]    Neuromuscular Re-Education  [X]           Transfer Training                   [ ]    Orthotic/Prosthetic Training  [X]           Gait Training                         [ ]    Modalities  [X]           Therapeutic Exercises           [ ]    Edema Management/Control  [X]           Therapeutic Activities            [X]    Patient and Family Training/Education  [ ]           Other (comment):     Frequency/Duration: Patient will be followed by physical therapy  twice daily to address goals. Discharge Recommendations: None  Further Equipment Recommendations for Discharge: none at this time        SUBJECTIVE:   Patient stated I'm good.       OBJECTIVE DATA SUMMARY:   HISTORY:    Past Medical History:   Diagnosis Date    Arthritis      Cancer ([facility] [address]       skin cancer    Chronic pain       CHRONIC BOWEL PAIN    Diverticulitis      Fibromyalgia      GERD (gastroesophageal reflux disease)      Goiter      Hiatal hernia      Hypercholesterolemia      Hypertension      IBS (irritable bowel syndrome)      IBS (irritable bowel syndrome)       CONSTIPATION, CHRONIC SINCE HER 25s    Ill-defined condition       rectocele    Insomnia       TAKES TRAZODONE NIGHTLY    Migraine       REDUCED IN FREQUENCY AND SEVERITY SINCE MENOPAUSE    Other ill-defined conditions      Psychiatric disorder 3/11     DEPRESSION     Past Surgical History:   Procedure Laterality Date    COLONOSCOPY N/A 6/21/2016     COLONOSCOPY performed by Flaquita Genao MD at hospitals ENDOSCOPY    ENDOSCOPY, COLON, DIAGNOSTIC   11/2010     Dr. Maida Portillo   2006    HX COLECTOMY   2007      Dejah/ diverticulitis    HX GI   X3  LAST ONE 12/10     COLONOSCOPY    HX GYN   1982     D&C WITH SUCTION    HX HYSTERECTOMY        HX ORTHOPAEDIC         right foot surgery    HX ORTHOPAEDIC         neck surgery 3/21/11 Dr. Angélica Klein [address] [facility]         EGD    HX TONSILLECTOMY         Prior Level of Function/Home Situation: Independent, retired, living with family. Personal factors and/or comorbidities impacting plan of care:      Home Situation  Home Environment: Private residence  # Steps to Enter: 3  Rails to Enter: No  Wheelchair Ramp: No  One/Two Story Residence: One story  Living Alone: No  Support Systems: Family member(s)  Patient Expects to be Discharged to[de-identified] Private residence  Current DME Used/Available at Home: None  Tub or Shower Type: Shower     EXAMINATION/PRESENTATION/DECISION MAKING:   Critical Behavior:  Neurologic State: Alert  Orientation Level: Oriented X4  Cognition: Appropriate decision making  Safety/Judgement: Awareness of environment  Hearing: Auditory  Auditory Impairment: None  Range Of Motion:  AROM: Within functional limits                       Strength:    Strength: Within functional limits                    Tone & Sensation:   Tone: Normal              Sensation: Impaired (dullness in B feet )               Coordination:  Coordination: Within functional limits     Functional Mobility:  Bed Mobility:  Rolling: Supervision (VC's for log roll technique )     Sit to Supine: Supervision  Scooting: Supervision  Transfers:  Sit to Stand: Contact guard assistance  Stand to Sit: Contact guard assistance                       Balance:   Sitting: Intact; Without support  Standing: Impaired; Without support  Standing - Static: Good;Constant support; Fair (initial fair standing EOB )  Standing - Dynamic : Good  Ambulation/Gait Training:  Distance (ft): 260 Feet (ft)  Assistive Device: Gait belt  Ambulation - Level of Assistance: Contact guard assistance     Gait Description (WDL): Exceptions to WDL  Gait Abnormalities: Altered arm swing;Decreased step clearance              Speed/Xiomara: Pace decreased (<100 feet/min)           Interventions: Verbal cues                         Functional Measure:  Barthel Index:      Bathin  Bladder: 10  Bowels: 10  Groomin  Dressin  Feeding: 10  Mobility: 10  Stairs: 0  Toilet Use: 5  Transfer (Bed to Chair and Back): 10  Total: 65         Barthel and G-code impairment scale:  Percentage of impairment CH  0% CI  1-19% CJ  20-39% CK  40-59% CL  60-79% CM  80-99% CN  100%   Barthel Score 0-100 100 99-80 79-60 59-40 20-39 1-19    0   Barthel Score 0-20 20 17-19 13-16 9-12 5-8 1-4 0      The Barthel ADL Index: Guidelines  1. The index should be used as a record of what a patient does, not as a record of what a patient could do. 2. The main aim is to establish degree of independence from any help, physical or verbal, however minor and for whatever reason. 3. The need for supervision renders the patient not independent. 4. A patient's performance should be established using the best available evidence. Asking the patient, friends/relatives and nurses are the usual sources, but direct observation and common sense are also important. However direct testing is not needed. 5. Usually the patient's performance over the preceding 24-48 hours is important, but occasionally longer periods will be relevant. 6. Middle categories imply that the patient supplies over 50 per cent of the effort. 7. Use of aids to be independent is allowed. Natalie Wyatt., Barthel, D.W. ([id number]). Functional evaluation: the Barthel Index. [address] [facility] (14)2. FREDDY Vargas, Evelin Ellis., Elyssa Arizmendi., Georgina, [address] [place] [address] (). Measuring the change indisability after inpatient rehabilitation; comparison of the responsiveness of the Barthel Index and Functional [place] Measure. Journal of Neurology, Neurosurgery, and Psychiatry, 66(4), 023-055.   CRAIG Torres.PROMISE, Damian Casillas M.A. (2004.) Assessment of post-stroke quality of life in cost-effectiveness studies: The usefulness of the Barthel Index and the EuroQoL-5D. Quality of Life Research, 13, 228-13            G codes: In compliance with CMSs Claims Based Outcome Reporting, the following G-code set was chosen for this patient based on their primary functional limitation being treated: The outcome measure chosen to determine the severity of the functional limitation was the barthel with a score of 65/100 which was correlated with the impairment scale. · Mobility - Walking and Moving Around:               - CURRENT STATUS:    CJ - 20%-39% impaired, limited or restricted               - GOAL STATUS:           CI - 1%-19% impaired, limited or restricted               - D/C STATUS:                       ---------------To be determined---------------      Pain:  Pain Scale 1: Numeric (0 - 10)  Pain Intensity 1: 4  Pain Location 1: Back  Pain Orientation 1: Lower  Pain Description 1: Dull  Pain Intervention(s) 1: Medication (see MAR)  Activity Tolerance: WNL     Please refer to the flowsheet for vital signs taken during this treatment. After treatment:   [ ]         Patient left in no apparent distress sitting up in chair  [X]         Patient left in no apparent distress in bed  [X]         Call bell left within reach  [X]         Nursing notified  [X]         Caregiver present  [ ]         Bed alarm activated      COMMUNICATION/EDUCATION:   The patients plan of care was discussed with: Registered Nurse.  [X]         Fall prevention education was provided and the patient/caregiver indicated understanding. [X]         Patient/family have participated as able in goal setting and plan of care. [X]         Patient/family agree to work toward stated goals and plan of care.   [ ]         Patient understands intent and goals of therapy, but is neutral about his/her participation. [ ]         Patient is unable to participate in goal setting and plan of care.      Thank you for this referral.  Sanket Ceron, PT, DPT    Time Calculation: 18 mins

## 2017-08-29 NOTE — IP AVS SNAPSHOT
[address] [facility] 
[phone] Patient: Rosmery Esteves MRN: [id number] AWK:10/0/2911 You are allergic to the following Allergen Reactions Latex Hives Codeine Other (comments) Severe Headache Morphine Hives Other Medication Rash BANDAIDS Shellfish Containing Products Hives Recent Documentation Height Weight BMI OB Status Smoking Status 1.626 m 85.2 kg 32.24 kg/m2 Hysterectomy Former Smoker Emergency Contacts Name Discharge Info Relation Home Work Mobile HubertAnkush DISCHARGE CAREGIVER [3] Spouse [3] [phone] About your hospitalization You were admitted on:  August 29, 2017 You last received care in the:  [facility] ORTHOPEDICS You were discharged on:  August 30, 2017 Unit phone number:  [phone] Why you were hospitalized Your primary diagnosis was:  Spinal Stenosis Of Lumbar Region With Neurogenic Claudication Providers Seen During Your Hospitalizations Provider Role Specialty Primary office phone Zenovia Phoenix, MD Attending Provider Orthopedic Surgery [phone] Your Primary Care Physician (PCP) Primary Care Physician Office Phone Office Fax Koreen Or [phone] Follow-up Information Follow up With Details Comments Contact Info Zenovia Phoenix, MD In 2 weeks  [facility] [address] [facility] 
[phone] Riya Soriano MD   [address] [place] [address] [phone] AT HOME CARE On 8/30/2017 This is your home health provider. If you do not hear from them within 24 hours please contact them [address] [place] [address] 
[phone] Current Discharge Medication List  
  
START taking these medications Dose & Instructions Dispensing Information Comments Morning Noon Evening Bedtime  
 acetaminophen 500 mg tablet Commonly known as:  TYLENOL Your last dose was: Your next dose is:    
   
   
 Dose:  1000 mg Take 2 Tabs by mouth every six (6) hours for 14 days. Quantity:  112 Tab Refills:  0 HYDROmorphone 2 mg tablet Commonly known as:  DILAUDID Your last dose was: Your next dose is:    
   
   
 Dose:  2-4 mg Take 1-2 Tabs by mouth every three (3) hours as needed. Max Daily Amount: 32 mg. Quantity:  80 Tab Refills:  0  
     
   
   
   
  
 naloxone 4 mg/actuation Spry Commonly known as:  [employer] Your last dose was: Your next dose is:    
   
   
 Dose:  4 mg 4 mg by Nasal route as needed (respiratory depression). Give single spray into one nostril. Call 911. Give additional doses every 2 to 3 minutes alternating nostrils until assistance arrives using a new nasal spray with each dose, if patient does not respond or responds and then relapses. Quantity:  1 Box Refills:  0  
     
   
   
   
  
 polyethylene glycol 17 gram packet Commonly known as:  Samuel Goodwin Your last dose was: Your next dose is:    
   
   
 Dose:  17 g Take 1 Packet by mouth daily as needed (constipation) for up to 15 days. Quantity:  15 Packet Refills:  0  
     
   
   
   
  
 senna-docusate 8.6-50 mg per tablet Commonly known as:  Davies Akin Your last dose was: Your next dose is:    
   
   
 Dose:  1 Tab Take 1 Tab by mouth daily. Quantity:  30 Tab Refills:  0 CONTINUE these medications which have CHANGED Dose & Instructions Dispensing Information Comments Morning Noon Evening Bedtime  
 rosuvastatin 20 mg tablet Commonly known as:  CRESTOR What changed:  See the new instructions. Your last dose was: Your next dose is: TAKE 1 TABLET NIGHTLY Quantity:  90 Tab Refills:  1 CONTINUE these medications which have NOT CHANGED Dose & Instructions Dispensing Information Comments Morning Noon Evening Bedtime ALPRAZolam 0.5 mg tablet Commonly known as:  Asia Israel Your last dose was: Your next dose is: TAKE 1 TABLET BY MOUTH TWICE A DAY AS NEEDED FOR ANXIETY Quantity:  180 Tab Refills:  1  
     
   
   
   
  
 aspirin delayed-release 81 mg tablet Your last dose was: Your next dose is:    
   
   
 Dose:  81 mg Take 81 mg by mouth every other day. Refills:  0  
     
   
   
   
  
 CARAFATE 1 gram tablet Generic drug:  sucralfate Your last dose was: Your next dose is:    
   
   
 Dose:  1 g Take 1 g by mouth four (4) times daily. Refills:  0  
     
   
   
   
  
 citalopram 40 mg tablet Commonly known as:  Yris Carbajal Your last dose was: Your next dose is: TAKE 1 TABLET DAILY Quantity:  90 Tab Refills:  2  
     
   
   
   
  
 cyclobenzaprine 10 mg tablet Commonly known as:  FLEXERIL Your last dose was: Your next dose is: TAKE 1 TABLET BY MOUTH 3 TIMES A DAY AS NEEDED FOR MUSCLE SPASMS Refills:  0  
     
   
   
   
  
 dicyclomine 10 mg capsule Commonly known as:  BENTYL Your last dose was: Your next dose is: TAKE 2 CAPSULES FOUR TIMES A DAY AS NEEDED Quantity:  720 Cap Refills:  3 DULoxetine 60 mg capsule Commonly known as:  CYMBALTA Your last dose was: Your next dose is:    
   
   
 Dose:  60 mg Take 1 Cap by mouth two (2) times a day. Quantity:  180 Cap Refills:  3  
     
   
   
   
  
 gabapentin 300 mg capsule Commonly known as:  NEURONTIN Your last dose was: Your next dose is:    
   
   
 Dose:  600 mg Take 600 mg by mouth three (3) times daily. Quantity:  360 Cap Refills:  1  
     
   
   
   
  
 hydroCHLOROthiazide 25 mg tablet Commonly known as:  HYDRODIURIL Your last dose was: Your next dose is: TAKE 1 TABLET DAILY Quantity:  90 Tab Refills:  2 LINZESS 290 mcg Cap capsule Generic drug:  linaclotide Your last dose was: Your next dose is:    
   
   
 Dose:  290 mcg Take 290 mcg by mouth Three (3) times a week. Refills:  0  
     
   
   
   
  
 lisinopril 40 mg tablet Commonly known as:  Mechele Livings Your last dose was: Your next dose is: TAKE 1 TABLET DAILY Quantity:  90 Tab Refills:  3 NexIUM 40 mg capsule Generic drug:  esomeprazole Your last dose was: Your next dose is: TAKE 1 CAPSULE DAILY Quantity:  90 Cap Refills:  3  
     
   
   
   
  
 traZODone 50 mg tablet Commonly known as:  Gayathri Hina Your last dose was: Your next dose is: TAKE TWO TABLETS NIGHTLY AS NEEDED Quantity:  135 Tab Refills:  2  
     
   
   
   
  
 triamcinolone acetonide 0.1 % topical cream  
Commonly known as:  KENALOG Your last dose was: Your next dose is:    
   
   
 Apply  to affected area two (2) times daily as needed for Skin Irritation. use thin layer Quantity:  15 g Refills:  0  
     
   
   
   
  
 VITAMIN D3 1,000 unit tablet Generic drug:  cholecalciferol Your last dose was: Your next dose is:    
   
   
 Dose:  1000 Units Take 1,000 Units by mouth every other day. Refills:  0 STOP taking these medications   
 traMADol 50 mg tablet Commonly known as:  ULTRAM  
   
  
  
  
Where to Get Your Medications These medications were sent to [address], [address] [place] [address] Phone:  [phone] rosuvastatin 20 mg tablet Information on where to get these meds will be given to you by the nurse or doctor. ! Ask your nurse or doctor about these medications  
  acetaminophen 500 mg tablet HYDROmorphone 2 mg tablet  
 naloxone 4 mg/actuation Spry  
 polyethylene glycol 17 gram packet  
 senna-docusate 8.6-50 mg per tablet Discharge Instructions [address] [place] [facility] Daily Discharge Instruction Sheet: Lumbar Laminectomy Emily Maloney Pain control: 
Typically, we will prescribe a narcotic usually 1-2 tabs every four hours is sufficient for the pain. Most patients need this only for the first few weeks. You should discontinue this as the pain decreases. You should not drive while taking any narcotic pain medications. Constipation Pain medicines and anesthesia can be constipating-this can be prevented by gentle physical activity and drinking plenty of fluid. It should be treated with over-the-counter medications such as Miralax or suppositories, and/or Fleets enema. You should have a bowel movement at least every other day following surgery. Incision care Keep this area clean and dry. Your dressing is designed to stay in place for 5-7 days. You will be sent home with one additional dressing to change at that time. Leave this new dressing in place until our follow up visit in the office in about 10-14 days. If staples are in place, they should be removed about 14-20 days after surgery. DO NOT take a tub bath or go swimming until cleared by your doctor. DO NOT apply lotions, oils, or creams to incision. Cover the wound with an impermiable dressing to shower for then next 5 days, then no cover is needed. To increase and promote healing: 
? Stop Smoking (or at least cut back on smoking). ? Eat a well-balanced diet (high in protein and vitamin C) ? If your appetite is poor, consider nutritional supplements like Ensure, Glucerna, or [place] Instant Breakfast. 
? If you are diabetic, controlling you blood sugars is very important to prevent infection and promote wound healing. Nutrition: ? If you were on a supplement such as Ensure or Glucerna) while in the hospital, please continue using them with each meal for the next 30 days. ? Eat a well-balanced diet - High in protein, high in vitamins and minerals, especially vitamin C and zinc.  
 
Restrictions: 
Limited bending at waist 
Lift no more than 10 pounds Warning signs : Please call your physician immediately at [phone] if you have ? Bleeding from incision that is constant. ? Change in mental status (unusual behavior or confusion) ? If your incision develops redness or swelling 
? Change in wound drainage (increase in amount, color, or foul odor) ? Chesaning over 101.5 degrees Fahrenheit  
? Headache that is not relieved with pain medication ? Tenderness or redness in the calf of your leg Emergency: CALL 911 if you have ? Shortness of breath ? Chest pain ? Localized chest pain when coughing or taking a deep breath Follow-up Please call Dr. Leyda Calderon office for a follow up appointment in 2 weeks at [phone]. You can return to work when cleared by a physician. During normal business hours you may reach Dr. Neel Resendiz' team directly at [phone] if you have concerns or questions. Reece Treviño Discharge Orders None ACO Transitions of Care Introducing [employer] [address] offers a voluntary care coordination program to provide high quality service and care to [facility] fee-for-service beneficiaries. Rj Ya was designed to help you enhance your health and well-being through the following services: ? Transitions of Care  support for individuals who are transitioning from one care setting to another (example: Hospital to home). ? Chronic and Complex Care Coordination  support for individuals and caregivers of those with serious or chronic illnesses or with more than one chronic (ongoing) condition and those who take a number of different medications. If you meet specific medical criteria, a [facility] [address] may call you directly to coordinate your care with your primary care physician and your other care providers. For questions about the [facility] programs, please, contact your physicians office. For general questions or additional information about Accountable Care Organizations: 
Please visit www.medicare.gov/acos. html or call 1-800-MEDICARE (8-187.723.2773) TTY users should call 1-917.816.3491. [employer] Announcement We are excited to announce that we are making your provider's discharge notes available to you in [employer]. You will see these notes when they are completed and signed by the physician that discharged you from your recent hospital stay. If you have any questions or concerns about any information you see in [employer], please call the Health Information Department where you were seen or reach out to your Primary Care Provider for more information about your plan of care. Introducing [facility]! Dear Alvah Gaucher: 
Thank you for requesting a [employer] account. Our records indicate that you already have an active [employer] account. You can access your account anytime at [email or web address] Did you know that you can access your hospital and ER discharge instructions at any time in [employer]? You can also review all of your test results from your hospital stay or ER visit. Additional Information If you have questions, please visit the Frequently Asked Questions section of the [employer] website at [email or web address] Remember, MyChart is NOT to be used for urgent needs. For medical emergencies, dial 911. Now available from your iPhone and Android! General Information Please provide this summary of care documentation to your next provider. Patient Signature:  ____________________________________________________________ Date:  ____________________________________________________________  
  
Gwendloyn Finger Provider Signature:  ____________________________________________________________ Date:  ____________________________________________________________

## 2017-08-29 NOTE — IP AVS SNAPSHOT
[address] [facility] 
[phone] Patient: Kristen Giles MRN: [id number] OQV:87/3/2411 Current Discharge Medication List  
  
START taking these medications Dose & Instructions Dispensing Information Comments Morning Noon Evening Bedtime  
 acetaminophen 500 mg tablet Commonly known as:  TYLENOL Your last dose was: Your next dose is:    
   
   
 Dose:  1000 mg Take 2 Tabs by mouth every six (6) hours for 14 days. Quantity:  112 Tab Refills:  0 HYDROmorphone 2 mg tablet Commonly known as:  DILAUDID Your last dose was: Your next dose is:    
   
   
 Dose:  2-4 mg Take 1-2 Tabs by mouth every three (3) hours as needed. Max Daily Amount: 32 mg. Quantity:  80 Tab Refills:  0  
     
   
   
   
  
 naloxone 4 mg/actuation Spry Commonly known as:  [employer] Your last dose was: Your next dose is:    
   
   
 Dose:  4 mg 4 mg by Nasal route as needed (respiratory depression). Give single spray into one nostril. Call 911. Give additional doses every 2 to 3 minutes alternating nostrils until assistance arrives using a new nasal spray with each dose, if patient does not respond or responds and then relapses. Quantity:  1 Box Refills:  0  
     
   
   
   
  
 polyethylene glycol 17 gram packet Commonly known as:  Samuel Milaiel Your last dose was: Your next dose is:    
   
   
 Dose:  17 g Take 1 Packet by mouth daily as needed (constipation) for up to 15 days. Quantity:  15 Packet Refills:  0  
     
   
   
   
  
 senna-docusate 8.6-50 mg per tablet Commonly known as:  Samson Hensley Your last dose was: Your next dose is:    
   
   
 Dose:  1 Tab Take 1 Tab by mouth daily. Quantity:  30 Tab Refills:  0 CONTINUE these medications which have CHANGED Dose & Instructions Dispensing Information Comments Morning Noon Evening Bedtime  
 rosuvastatin 20 mg tablet Commonly known as:  CRESTOR What changed:  See the new instructions. Your last dose was: Your next dose is: TAKE 1 TABLET NIGHTLY Quantity:  90 Tab Refills:  1 CONTINUE these medications which have NOT CHANGED Dose & Instructions Dispensing Information Comments Morning Noon Evening Bedtime ALPRAZolam 0.5 mg tablet Commonly known as:  Lorie Weldon Your last dose was: Your next dose is: TAKE 1 TABLET BY MOUTH TWICE A DAY AS NEEDED FOR ANXIETY Quantity:  180 Tab Refills:  1  
     
   
   
   
  
 aspirin delayed-release 81 mg tablet Your last dose was: Your next dose is:    
   
   
 Dose:  81 mg Take 81 mg by mouth every other day. Refills:  0  
     
   
   
   
  
 CARAFATE 1 gram tablet Generic drug:  sucralfate Your last dose was: Your next dose is:    
   
   
 Dose:  1 g Take 1 g by mouth four (4) times daily. Refills:  0  
     
   
   
   
  
 citalopram 40 mg tablet Commonly known as:  Monashtyn Medellin Your last dose was: Your next dose is: TAKE 1 TABLET DAILY Quantity:  90 Tab Refills:  2  
     
   
   
   
  
 cyclobenzaprine 10 mg tablet Commonly known as:  FLEXERIL Your last dose was: Your next dose is: TAKE 1 TABLET BY MOUTH 3 TIMES A DAY AS NEEDED FOR MUSCLE SPASMS Refills:  0  
     
   
   
   
  
 dicyclomine 10 mg capsule Commonly known as:  BENTYL Your last dose was: Your next dose is: TAKE 2 CAPSULES FOUR TIMES A DAY AS NEEDED Quantity:  720 Cap Refills:  3 DULoxetine 60 mg capsule Commonly known as:  CYMBALTA Your last dose was:     
   
Your next dose is:    
   
   
 Dose:  60 mg  
 Take 1 Cap by mouth two (2) times a day. Quantity:  180 Cap Refills:  3  
     
   
   
   
  
 gabapentin 300 mg capsule Commonly known as:  NEURONTIN Your last dose was: Your next dose is:    
   
   
 Dose:  600 mg Take 600 mg by mouth three (3) times daily. Quantity:  360 Cap Refills:  1  
     
   
   
   
  
 hydroCHLOROthiazide 25 mg tablet Commonly known as:  HYDRODIURIL Your last dose was: Your next dose is: TAKE 1 TABLET DAILY Quantity:  90 Tab Refills:  2 LINZESS 290 mcg Cap capsule Generic drug:  linaclotide Your last dose was: Your next dose is:    
   
   
 Dose:  290 mcg Take 290 mcg by mouth Three (3) times a week. Refills:  0  
     
   
   
   
  
 lisinopril 40 mg tablet Commonly known as:  Valene Pencil Your last dose was: Your next dose is: TAKE 1 TABLET DAILY Quantity:  90 Tab Refills:  3 NexIUM 40 mg capsule Generic drug:  esomeprazole Your last dose was: Your next dose is: TAKE 1 CAPSULE DAILY Quantity:  90 Cap Refills:  3  
     
   
   
   
  
 traZODone 50 mg tablet Commonly known as:  Illene Dus Your last dose was: Your next dose is: TAKE TWO TABLETS NIGHTLY AS NEEDED Quantity:  135 Tab Refills:  2  
     
   
   
   
  
 triamcinolone acetonide 0.1 % topical cream  
Commonly known as:  KENALOG Your last dose was: Your next dose is:    
   
   
 Apply  to affected area two (2) times daily as needed for Skin Irritation. use thin layer Quantity:  15 g Refills:  0  
     
   
   
   
  
 VITAMIN D3 1,000 unit tablet Generic drug:  cholecalciferol Your last dose was: Your next dose is:    
   
   
 Dose:  1000 Units Take 1,000 Units by mouth every other day. Refills:  0 STOP taking these medications   
 traMADol 50 mg tablet Commonly known as:  ULTRAM  
   
  
  
  
Where to Get Your Medications These medications were sent to [address], [address] [facility], [address] [place], [place] [id number] Phone:  [phone]  
  rosuvastatin 20 mg tablet Information on where to get these meds will be given to you by the nurse or doctor. ! Ask your nurse or doctor about these medications  
  acetaminophen 500 mg tablet HYDROmorphone 2 mg tablet  
 naloxone 4 mg/actuation Spry  
 polyethylene glycol 17 gram packet  
 senna-docusate 8.6-50 mg per tablet

## 2017-08-29 NOTE — ANESTHESIA POSTPROCEDURE EVALUATION
Post-Anesthesia Evaluation and Assessment    Patient: Alis Pendleton MRN: [id number]  SSN: [id number]    YOB: 1950  Age: 74 y.o. Sex: female       Cardiovascular Function/Vital Signs  Visit Vitals    /40 (BP 1 Location: Left arm, BP Patient Position: At rest)    Pulse 92    Temp 36.7 °C (98.1 °F)    Resp 17    Ht 5' 4\" (1.626 m)    Wt 85.2 kg (187 lb 13.3 oz)    SpO2 93%    BMI 32.24 kg/m2       Patient is status post general anesthesia for Procedure(s):  L3-S1 DECOMPRESSION WITH BONE MARROW ASPIRATION FROM ILIAC CREST Stephane Juan Miguel Solution). Nausea/Vomiting: None    Postoperative hydration reviewed and adequate. Pain:  Pain Scale 1: Numeric (0 - 10) (08/29/17 1345)  Pain Intensity 1: 3 (dozing off during conversation) (08/29/17 1345)   Managed    Neurological Status:   Neuro (WDL): Exceptions to WDL (08/29/17 1251)  Neuro  Neurologic State: Appropriate for age;Drowsy; Eyes open spontaneously; Eyes open to voice (08/29/17 1251)  Orientation Level: Oriented to person (08/29/17 1251)  Cognition: Follows commands (08/29/17 1251)  Speech: Appropriate for age;Clear (08/29/17 0905)  LUE Motor Response: Purposeful (08/29/17 1251)  LLE Motor Response: Purposeful (08/29/17 1251)  RUE Motor Response: Purposeful (08/29/17 1251)  RLE Motor Response: Purposeful (08/29/17 1251)   At baseline    Mental Status and Level of Consciousness: Arousable    Pulmonary Status:   O2 Device: Nasal cannula (08/29/17 1345)   Adequate oxygenation and airway patent    Complications related to anesthesia: None    Post-anesthesia assessment completed.  No concerns    Signed By: Miah Diggs MD     August 29, 2017

## 2017-08-29 NOTE — H&P
Subjective:     Patient ID: Aníbal Salas is a 74 y.o. female. Chief Complaint: Pain of the Spine        HPI:  Aníbal Salas is a 74 y.o. female with complaints of numbness from the waist down in addition to low back pain. She has some difficulty with walking and standing due to leg numbness. Se also has some back pain worse with standing and walking. She has been taking flexeril and gabapentin. It is rated 7 out of 10 on the VAS. She has pain and numbness constantly and says this is really starting to effect her quality of life.        Patient Active Problem List    Diagnosis Date Noted    DDD (degenerative disc disease), cervical 11/21/2016    Encounter for medication monitoring 10/14/2015    Essential hypertension 05/18/2015    Depression 08/13/2012    Diverticulitis     Hypovitaminosis D 08/19/2010    IBS (irritable bowel syndrome)     Fibromyalgia     Hiatal hernia     GERD (gastroesophageal reflux disease)     Hypercholesterolemia        Family History   Problem Relation Age of Onset    Cancer Father      lung and bone    Stroke Sister     Cancer Sister 76     PANCREATIC    Hypertension Mother     High Cholesterol Mother     Alzheimer Mother      late 62s early 76s    Cancer Other      COLON    Cancer Other      breast    Diabetes Maternal Aunt     Cancer Maternal Uncle      COLON    Cancer Maternal Grandfather      COLON    Ovarian Cancer Daughter 28        Social History   Substance Use Topics    Smoking status: Former Smoker     Packs/day: 1.00     Years: 45.00     Quit date: 1/1/2007    Smokeless tobacco: Never Used    Alcohol use 0.0 oz/week     0 Standard drinks or equivalent per week      Comment: rare       Past Medical History:   Diagnosis Date    Arthritis     Cancer ([facility] [address]     skin cancer    Chronic pain     CHRONIC BOWEL PAIN    Diverticulitis     Fibromyalgia     GERD (gastroesophageal reflux disease)     Goiter     Hiatal hernia     Hypercholesterolemia     Hypertension     IBS (irritable bowel syndrome)     IBS (irritable bowel syndrome)     CONSTIPATION, CHRONIC SINCE HER 25s    Ill-defined condition     rectocele    Insomnia     TAKES TRAZODONE NIGHTLY    Migraine     REDUCED IN FREQUENCY AND SEVERITY SINCE MENOPAUSE    Other ill-defined conditions     Psychiatric disorder 3/11    DEPRESSION        Past Surgical History:   Procedure Laterality Date    COLONOSCOPY N/A 6/21/2016    COLONOSCOPY performed by Orlando Jones MD at \[facility]"" ENDOSCOPY    ENDOSCOPY, COLON, DIAGNOSTIC  11/2010    Dr. Juanita Clemons-     HX CHOLECYSTECTOMY  2006   [place]e Hum  2007    Dr. Pond/ diverticulitis    HX GI  X3  LAST ONE 12/10    COLONOSCOPY    HX GYN  1982    D&C WITH SUCTION    HX HYSTERECTOMY      HX ORTHOPAEDIC      right foot surgery    HX ORTHOPAEDIC      neck surgery 3/21/11 Dr. Herrera Petros [facility]      EGD    HX TONSILLECTOMY            Current Facility-Administered Medications:     ceFAZolin (ANCEF) 2 g in 0.9% sodium chloride 50 mL IVPB, 2 g, IntraVENous, ONCE, Adelita Vanegas MD    lactated Ringers infusion, 25 mL/hr, IntraVENous, CONTINUOUS, Lissette Loaiza MD, Last Rate: 25 mL/hr at 08/29/17 0924, 25 mL/hr at 08/29/17 7756    Dr. Sam Brace solution (COMPOUND) w/ fentanyl, , Injection, ONCE, Adelita Vanegas MD    Allergies   Allergen Reactions    Latex Hives    Codeine Other (comments)     Severe Headache    Morphine Hives    Other Medication Rash     BANDAIDS    Shellfish Containing Products Hives        ROS:   No new bowel or bladder incontinence. No fevers or chills. No saddle anesthesia. Objective:     Visit Vitals    /61 (BP 1 Location: Left arm, BP Patient Position: At rest)    Pulse 82    Temp 98.9 °F (37.2 °C)    Resp 18    Ht 5' 4\" (1.626 m)    Wt 85.2 kg (187 lb 13.3 oz)    SpO2 99%    BMI 32.24 kg/m2       Body mass index is 32.24 kg/(m^2). , a BMI over 30 is considered obese and a BMI over 40 has been associated with a higher risk of surgical complications. Constitutional: No acute distress. Well nourished. HEENT: Normocephalic. Respiratory:  No labored breathing. Cardiovascular:  No marked cyanosis. Skin:  No marked skin ulcers/lesions on bilateral upper or lower extremities. Psychiatric: Alert and oriented x3. Inspection: No gross deformity of bilateral upper or lower extremities. Musculoskeletal/Neurological:   Gait/balance:  - Dragging right foot  Thoracolumbar spine:  - Tender across the low back   - Full range of motion. Right lower extremity:  - No tenderness to palpation   - Full range of motion  - No pain with internal/external rotation of the hip  - Strength:  - 5 out of 5 to hip flexors  - 5 out of 5 to knee extensors  - 5 out of 5 to ankle dorsiflexors  - 5 out of 5 to great toe extensors  - 5 out of 5 to ankle plantar flexors  - Negative straight leg raise  Left lower extremity:  - No tenderness to palpation   - Full range of motion  - No pain with internal/external rotation of the hip  - Strength:  - 5 out of 5 to hip flexors  - 5 out of 5 to knee extensors  - 5 out of 5 to ankle dorsiflexors  - 5 out of 5 to great toe extensors  - 5 out of 5 to ankle plantar flexors  - Negative straight leg raise  Sensation:  - Intact to light touch  Reflexes:  - +2 Patellar tendon   - +2 Achilles tendon   Downgoing Babinski's       Radiographs:       [facility] Mamm[facility] Bi Screening Incl Cad    Result Date: 8/28/2017  STUDY: Bilateral digital screening mammogram INDICATION:  Screening. COMPARISON:  Prior studies dating back to 2009 BREAST COMPOSITION:  There are scattered areas of fibroglandular density. FINDINGS: Bilateral digital screening mammography was performed and is interpreted in conjunction with a computer assisted detection (CAD) system. No suspicious masses or calcifications are identified. There has been no significant change. IMPRESSION: BI-RADS 1: Negative.  No mammographic evidence of malignancy. RECOMMENDATIONS: Next screening mammogram is recommended in one year. The patient will be notified of these results. RIs of the lumbar and thoracic spine which show the following: lumbar degenerative changes with severe stenosis L3-L4 and L4-L5 and mild stenosis L5-S1. Thoracic spine MRI is normal.      Assessment:     1. Fibromyalgia    2. Lumbosacral spondylosis without myelopathy    3. Pain in thoracic spine    4. Bilateral sciatica    5. Low back pain with bilateral sciatica, unspecified back pain laterality, unspecified chronicity    6. Spinal stenosis, lumbar region, with neurogenic claudication              Plan:     I have discussed the  L3-S1 bilateral decompression in detail with Tylor Patel and mentioned complications, including but not limited to: death, permanent disability, heart attack, stroke, lung injury or infection, blindness, ileus, bladder or bowel problems, ureter injury, bleeding, nerve injury (including numbness, pain and weakness), paralysis (which may be permanent), failure to heal, failure to fuse bone together in fusion procedures, failure to relief symptoms, failure to relief pain, increased pain, need for further surgeries, failure or breakage or hardware, malpositioning of hardware, need to fuse or operate on additional levels determined either during or after surgery, destabilization of the spine (which may require fusion or later surgery), infections (which may or may not require additional surgery), dural tears (tears of the sac holding in nerves and spinal fluid), meningitis, voice changes, vocal cord injury, hoarseness, blood clots, pulmonary embolus, Nu syndrome, recurrent disc herniation, diaphragm paralysis, and anesthetic complications. Comorbidities such as obesity, smoking, rheumatoid arthritis, chronic steroid use and diabetes increase these risks. Tylor Patel understands and wants to proceed.              Gaby Schwab MD

## 2017-08-29 NOTE — IP AVS SNAPSHOT
[address] 
[phone] Patient: Terrie Velarde MRN: [id number] VIQ:61/[birth year]/0209 You are allergic to the following Allergen Reactions Latex Hives Codeine Other (comments) Severe Headache Morphine Hives Other Medication Rash BANDAIDS Shellfish Containing Products Hives Recent Documentation Height Weight BMI OB Status Smoking Status 1.626 m 85.2 kg 32.24 kg/m2 Hysterectomy Former Smoker Emergency Contacts Name Discharge Info Relation Home Work Mobile Ankush Gaspar DISCHARGE CAREGIVER [3] Spouse [3] [phone] About your hospitalization You were admitted on:  August 29, 2017 You last received care in the:  [facility] ORTHOPEDICS You were discharged on:  August 30, 2017 Why you were hospitalized Your primary diagnosis was:  Spinal Stenosis Of Lumbar Region With Neurogenic Claudication Providers Seen During Your Hospitalizations Provider Role Specialty Primary office phone Fannie Montiel MD Attending Provider Orthopedic Surgery [phone] Your Primary Care Physician (PCP) Primary Care Physician Office Phone Office Fax Mago Liter [phone] Follow-up Information Follow up With Details Comments Contact Info Fannie Montiel MD In 2 weeks  [address] [phone] Yaa Dumas MD   [address] [place] [address] [address] [place] [id number] [phone] AT HOME CARE On 8/30/2017 This is your home health provider. If you do not hear from them within 24 hours please contact them [address] [place] [address] 
[phone] Current Discharge Medication List  
  
START taking these medications Dose & Instructions Dispensing Information Comments Morning Noon Evening Bedtime  
 acetaminophen 500 mg tablet Commonly known as:  TYLENOL Your last dose was: Your next dose is:    
   
   
 Dose:  1000 mg Take 2 Tabs by mouth every six (6) hours for 14 days. Quantity:  112 Tab Refills:  0 HYDROmorphone 2 mg tablet Commonly known as:  DILAUDID Your last dose was: Your next dose is:    
   
   
 Dose:  2-4 mg Take 1-2 Tabs by mouth every three (3) hours as needed. Max Daily Amount: 32 mg. Quantity:  80 Tab Refills:  0  
     
   
   
   
  
 naloxone 4 mg/actuation Spry Commonly known as:  [employer] Your last dose was: Your next dose is:    
   
   
 Dose:  4 mg 4 mg by Nasal route as needed (respiratory depression). Give single spray into one nostril. Call 911. Give additional doses every 2 to 3 minutes alternating nostrils until assistance arrives using a new nasal spray with each dose, if patient does not respond or responds and then relapses. Quantity:  1 Box Refills:  0  
     
   
   
   
  
 polyethylene glycol 17 gram packet Commonly known as:  Manual Rouleau Your last dose was: Your next dose is:    
   
   
 Dose:  17 g Take 1 Packet by mouth daily as needed (constipation) for up to 15 days. Quantity:  15 Packet Refills:  0  
     
   
   
   
  
 senna-docusate 8.6-50 mg per tablet Commonly known as:  [place] Your last dose was: Your next dose is:    
   
   
 Dose:  1 Tab Take 1 Tab by mouth daily. Quantity:  30 Tab Refills:  0 CONTINUE these medications which have CHANGED Dose & Instructions Dispensing Information Comments Morning Noon Evening Bedtime  
 rosuvastatin 20 mg tablet Commonly known as:  CRESTOR What changed:  See the new instructions. Your last dose was: Your next dose is: TAKE 1 TABLET NIGHTLY Quantity:  90 Tab Refills:  1 CONTINUE these medications which have NOT CHANGED Dose & Instructions Dispensing Information Comments Morning Noon Evening Bedtime ALPRAZolam 0.5 mg tablet Commonly known as:  Yonatan Fess Your last dose was: Your next dose is: TAKE 1 TABLET BY MOUTH TWICE A DAY AS NEEDED FOR ANXIETY Quantity:  180 Tab Refills:  1  
     
   
   
   
  
 aspirin delayed-release 81 mg tablet Your last dose was: Your next dose is:    
   
   
 Dose:  81 mg Take 81 mg by mouth every other day. Refills:  0  
     
   
   
   
  
 CARAFATE 1 gram tablet Generic drug:  sucralfate Your last dose was: Your next dose is:    
   
   
 Dose:  1 g Take 1 g by mouth four (4) times daily. Refills:  0  
     
   
   
   
  
 citalopram 40 mg tablet Commonly known as:  Santos Frederick Your last dose was: Your next dose is: TAKE 1 TABLET DAILY Quantity:  90 Tab Refills:  2  
     
   
   
   
  
 cyclobenzaprine 10 mg tablet Commonly known as:  FLEXERIL Your last dose was: Your next dose is: TAKE 1 TABLET BY MOUTH 3 TIMES A DAY AS NEEDED FOR MUSCLE SPASMS Refills:  0  
     
   
   
   
  
 dicyclomine 10 mg capsule Commonly known as:  BENTYL Your last dose was: Your next dose is: TAKE 2 CAPSULES FOUR TIMES A DAY AS NEEDED Quantity:  720 Cap Refills:  3 DULoxetine 60 mg capsule Commonly known as:  CYMBALTA Your last dose was: Your next dose is:    
   
   
 Dose:  60 mg Take 1 Cap by mouth two (2) times a day. Quantity:  180 Cap Refills:  3  
     
   
   
   
  
 gabapentin 300 mg capsule Commonly known as:  NEURONTIN Your last dose was: Your next dose is:    
   
   
 Dose:  600 mg Take 600 mg by mouth three (3) times daily. Quantity:  360 Cap Refills:  1  
     
   
   
   
  
 hydroCHLOROthiazide 25 mg tablet Commonly known as:  HYDRODIURIL Your last dose was: Your next dose is: TAKE 1 TABLET DAILY Quantity:  90 Tab Refills:  2 LINZESS 290 mcg Cap capsule Generic drug:  linaclotide Your last dose was: Your next dose is:    
   
   
 Dose:  290 mcg Take 290 mcg by mouth Three (3) times a week. Refills:  0  
     
   
   
   
  
 lisinopril 40 mg tablet Commonly known as:  Min Human Your last dose was: Your next dose is: TAKE 1 TABLET DAILY Quantity:  90 Tab Refills:  3 NexIUM 40 mg capsule Generic drug:  esomeprazole Your last dose was: Your next dose is: TAKE 1 CAPSULE DAILY Quantity:  90 Cap Refills:  3  
     
   
   
   
  
 traZODone 50 mg tablet Commonly known as:  [place]ira Ty Ty Your last dose was: Your next dose is: TAKE TWO TABLETS NIGHTLY AS NEEDED Quantity:  135 Tab Refills:  2  
     
   
   
   
  
 triamcinolone acetonide 0.1 % topical cream  
Commonly known as:  KENALOG Your last dose was: Your next dose is:    
   
   
 Apply  to affected area two (2) times daily as needed for Skin Irritation. use thin layer Quantity:  15 g Refills:  0  
     
   
   
   
  
 VITAMIN D3 1,000 unit tablet Generic drug:  cholecalciferol Your last dose was: Your next dose is:    
   
   
 Dose:  1000 Units Take 1,000 Units by mouth every other day. Refills:  0 STOP taking these medications   
 traMADol 50 mg tablet Commonly known as:  ULTRAM  
   
  
  
  
Where to Get Your Medications These medications were sent to [address], [address] [place], [place] [address] Phone:  [phone] rosuvastatin 20 mg tablet Information on where to get these meds will be given to you by the nurse or doctor. ! Ask your nurse or doctor about these medications  
  acetaminophen 500 mg tablet HYDROmorphone 2 mg tablet  
 naloxone 4 mg/actuation Spry  
 polyethylene glycol 17 gram packet  
 senna-docusate 8.6-50 mg per tablet Discharge Instructions [facility] Dis[facility] Instruction Sheet: Lumbar Laminectomy Aure Marroquin. Joni Comfort Pain control: 
Typically, we will prescribe a narcotic usually 1-2 tabs every four hours is sufficient for the pain. Most patients need this only for the first few weeks. You should discontinue this as the pain decreases. You should not drive while taking any narcotic pain medications. Constipation Pain medicines and anesthesia can be constipating-this can be prevented by gentle physical activity and drinking plenty of fluid. It should be treated with over-the-counter medications such as Miralax or suppositories, and/or Fleets enema. You should have a bowel movement at least every other day following surgery. Incision care Keep this area clean and dry. Your dressing is designed to stay in place for 5-7 days. You will be sent home with one additional dressing to change at that time. Leave this new dressing in place until our follow up visit in the office in about 10-14 days. If staples are in place, they should be removed about 14-20 days after surgery. DO NOT take a tub bath or go swimming until cleared by your doctor. DO NOT apply lotions, oils, or creams to incision. Cover the wound with an impermiable dressing to shower for then next 5 days, then no cover is needed. To increase and promote healing: 
? Stop Smoking (or at least cut back on smoking). ? Eat a well-balanced diet (high in protein and vitamin C) ? If your appetite is poor, consider nutritional supplements like Ensure, Glucerna, or [place] Instant Breakfast. 
? If you are diabetic, controlling you blood sugars is very important to prevent infection and promote wound healing. Nutrition: ? If you were on a supplement such as Ensure or Glucerna) while in the hospital, please continue using them with each meal for the next 30 days. ? Eat a well-balanced diet - High in protein, high in vitamins and minerals, especially vitamin C and zinc.  
 
Restrictions: 
Limited bending at waist 
Lift no more than 10 pounds Warning signs : Please call your physician immediately at [phone] if you have ? Bleeding from incision that is constant. ? Change in mental status (unusual behavior or confusion) ? If your incision develops redness or swelling 
? Change in wound drainage (increase in amount, color, or foul odor) ? Hilliards over 101.5 degrees Fahrenheit  
? Headache that is not relieved with pain medication ? Tenderness or redness in the calf of your leg Emergency: CALL 911 if you have ? Shortness of breath ? Chest pain ? Localized chest pain when coughing or taking a deep breath Follow-up Please call Dr. Bravo Man office for a follow up appointment in 2 weeks at [phone]. You can return to work when cleared by a physician. During normal business hours you may reach Dr. Rashid Dodd' team directly at [phone] if you have concerns or questions. Mariposa Greene Discharge Orders None General Information Please provide this summary of care documentation to your next provider. Introducing [facility]! Dear Robbie Yousif: 
Thank you for requesting a [employer] account. Our records indicate that you already have an active [employer] account. You can access your account anytime at [email or web address] Did you know that you can access your hospital and ER discharge instructions at any time in MyChart? You can also review all of your test results from your hospital stay or ER visit. Additional Information If you have questions, please visit the Frequently Asked Questions section of the [employer] [facility] website at [email or web address] Remember, MyChart is NOT to be used for urgent needs. For medical emergencies, dial 911. Now available from your iPhone and Android! Patient Signature:  ____________________________________________________________ Date:  ____________________________________________________________  
  
Michel Redding Provider Signature:  ____________________________________________________________ Date:  ____________________________________________________________

## 2017-08-29 NOTE — OP NOTES
[address], [address]   OP NOTE       Name:  Vipin Manuel   MR#:  [id number]   :  1950   Account #:  [de-identified]    Surgery Date:  2017   Date of Adm:  2017       PREOPERATIVE DIAGNOSES   1. Spinal stenosis with claudication from L3 through S1.   2. Foraminal stenosis, L4-S1.   3. Fibromyalgia. 4. Lumbago. 5. Sciatica. POSTOPERATIVE DIAGNOSES   1. Spinal stenosis with claudication from L3 through S1.   2. Foraminal stenosis, L4-S1.   3. Fibromyalgia. 4. Lumbago. 5. Sciatica. PROCEDURES PERFORMED   1. L3-L4 laminectomy, facetectomy, foraminotomy for purpose of   neural decompression of the L3 and L4 nerve roots. 2. L4-L5 laminectomy, facetectomy, foraminotomy for purposes of   neural decompression of the L4 and L5 nerve roots. 3. L5 left-sided far lateral decompression for purpose of neural   decompression of the L5 nerve root. 4. L4-L5 posterior fusion. 5. L5-S1 posterior fusion. 6. L3-L4 posterior fusion. 7. Use of allograft bone for spine fusion. 8. Local autograft bone for spine fusion. 9. Bone marrow aspirate from the right posterior superior iliac spine for   spinal fusion augmentation. SURGEON: Jacqueline Suh MD.    FIRST ASSISTANT: Duarte Lockett    ESTIMATED BLOOD LOSS: 400 mL. COMPLICATIONS: None. SPECIMENS REMOVED: None. ANESTHESIA: General.    DRAINS: x1. IMPLANTS: None. INDICATIONS FOR PROCEDURE: The patient is a pleasant 70-year-  old lady with severe spinal stenosis resulting in lumbago and sciatica   that has failed to improve with nonoperative treatment. She would like   to proceed with surgical intervention. DESCRIPTION OF PROCEDURE: Informed consent was obtained   and the operative site was properly marked. The patient was moved   back in the operating room and underwent general endotracheal   anesthesia.  She was positioned prone on the operating table using the   [employer] frame. Her arms were placed in a 90/90   position and knees were gently bent with pillows. Fluoroscopy was   used to peg the level of the incision. We then proceeded to prep and   drape in the usual manner. Time-out was obtained, verifying that this   was the correct the patient, the correct surgery, the correct site, as well   as that she had received IV antibiotics within 30 minutes of the   incision. We then proceeded to perform a standard posterior approach to the   lumbar spine, exposing from L3 to S1. Once that area was exposed   and hemostasis was obtained, fluoroscopy was used to verify we were   in the correct level. I then proceeded to, beginning on the right side,   perform a U cut laminectomy at L3 and a J cut at L4. I was able to   remove the intervening bone from the laminectomy field and   proceeded to detach the ligamentum flavum from its superior leading   edge. It was flipped into the defect and I proceeded to remove the   ligamentum flavum as well as the medial overhang of the facet, freeing   the thecal sac centrally and the traversing nerve root. Once that area   was fully decompressed, I inspected the neural foramen at L4-L5,   verified that that was free and foramina at L3-4 on that side. Once   this was completed, I repeated the procedure in the same manner on   the opposite side. With the L3-4 fully decompressed, I noticed that   about 60% to 70% of each facet had to be removed to fully   decompress severe stenosis. I proceeded to move down to L4-5 and   repeated the procedure in the exact same manner bilaterally and   noticed again that there was significant arthropathy of the facet due to the   preexisting stenosis. At the conclusion of this, I also noticed that the   foramen at L5-S1 on the left side was stenotic with compression of the   exiting nerve root.  I proceeded to drill inferior to the L5 pedicle on the   left and then proceeded to remove the lateral aspect of the pars and   the superior aspect of the facet by using the [employer] and drilling   inferior to the pedicle and then lateral. I was able to find the nerve root   inferior to the pedicle, follow it, deroof it, remove the superior articular   process of the L5-S1 facet that was causing most of the stenosis and   freed the nerve root completely. Once the far lateral decompression   was completed, I irrigated the wound with 3 L of normal saline,   inspected the facets again, and noticed 60%-70% removal of bilateral   facets at L3 and L4, therefore, creating iatrogenic instability. I proceeded then   to decorticate the posterior elements bilaterally, aspirate 60 mL of bone   marrow from the right posterior suprailiac spine using the Jamshidi   needle, and then mix cortical bone fibers with local autograft bone and   bone marrow aspirate and place this in the posterior elements   bilaterally for the posterior fusion from L3 to S1. Once that was   completed, I proceeded then to close the fascia with #1 Vicryl figure-of-  eight interrupted sutures, irrigated the subcutaneous, closed   subcutaneous with 2-0 Vicryl and the skin with 3-0 running Monocryl   and Dermabond. Sterile dressing was applied. The patient was then   awakened and transferred to the PACU in stable condition. POSTOPERATIVE PLAN: The patient is going to remain here for 1 or   2 days. We are going to give her SCDs and HERNAN warnere for DVT   prophylaxis and Ancef for infection prophylaxis.         Susen Gloss, MD Lilli Ahumada / Eduard.Jamie   D:  08/29/2017   12:25   T:  08/29/2017   13:04   Job #:  [id number]

## 2017-08-29 NOTE — PERIOP NOTES
Handoff Report from Operating Room to PACU    Report received from SANTA Oshe[place] and Humble Lucero CRNA regarding Prisca Hancock. Surgeon(s):  Fan Field MD  And Procedure(s) (LRB):  L3-S1 DECOMPRESSION WITH BONE MARROW ASPIRATION FROM ILIAC CREST Jannine Contras Solution) (N/A)  confirmed   with allergies, drains and dressings discussed. Anesthesia type, drugs, patient history, complications, estimated blood loss, vital signs, intake and output, and last pain medication, lines, reversal medications and temperature were reviewed.

## 2017-08-30 VITALS
DIASTOLIC BLOOD PRESSURE: 40 MMHG | BODY MASS INDEX: 32.07 KG/M2 | SYSTOLIC BLOOD PRESSURE: 108 MMHG | TEMPERATURE: 97.8 F | HEIGHT: 64 IN | RESPIRATION RATE: 18 BRPM | HEART RATE: 77 BPM | WEIGHT: 187.83 LBS | OXYGEN SATURATION: 97 %

## 2017-08-30 PROCEDURE — 97165 OT EVAL LOW COMPLEX 30 MIN: CPT

## 2017-08-30 PROCEDURE — 97535 SELF CARE MNGMENT TRAINING: CPT

## 2017-08-30 PROCEDURE — 97530 THERAPEUTIC ACTIVITIES: CPT | Performed by: PHYSICAL THERAPIST

## 2017-08-30 PROCEDURE — 74011250636 HC RX REV CODE- 250/636: Performed by: ORTHOPAEDIC SURGERY

## 2017-08-30 PROCEDURE — G8987 SELF CARE CURRENT STATUS: HCPCS

## 2017-08-30 PROCEDURE — 99218 HC RM OBSERVATION: CPT

## 2017-08-30 PROCEDURE — G8988 SELF CARE GOAL STATUS: HCPCS

## 2017-08-30 PROCEDURE — 74011250637 HC RX REV CODE- 250/637: Performed by: ORTHOPAEDIC SURGERY

## 2017-08-30 PROCEDURE — 97116 GAIT TRAINING THERAPY: CPT | Performed by: PHYSICAL THERAPIST

## 2017-08-30 PROCEDURE — 65390000012 HC CONDITION CODE 44 OBSERVATION

## 2017-08-30 RX ORDER — POLYETHYLENE GLYCOL 3350 17 G/17G
17 POWDER, FOR SOLUTION ORAL
Qty: 15 PACKET | Refills: 0 | Status: SHIPPED | OUTPATIENT
Start: 2017-08-30 | End: 2017-09-14

## 2017-08-30 RX ORDER — HYDROMORPHONE HYDROCHLORIDE 2 MG/1
2-4 TABLET ORAL
Qty: 80 TAB | Refills: 0 | Status: SHIPPED | OUTPATIENT
Start: 2017-08-30 | End: 2017-12-20 | Stop reason: SINTOL

## 2017-08-30 RX ORDER — ROSUVASTATIN CALCIUM 20 MG/1
TABLET, COATED ORAL
Qty: 90 TAB | Refills: 1 | Status: SHIPPED | OUTPATIENT
Start: 2017-08-30 | End: 2018-02-26 | Stop reason: SDUPTHER

## 2017-08-30 RX ORDER — AMOXICILLIN 250 MG
1 CAPSULE ORAL DAILY
Qty: 30 TAB | Refills: 0 | Status: SHIPPED | OUTPATIENT
Start: 2017-08-30 | End: 2018-03-20

## 2017-08-30 RX ORDER — ACETAMINOPHEN 500 MG
1000 TABLET ORAL EVERY 6 HOURS
Qty: 112 TAB | Refills: 0 | Status: SHIPPED | OUTPATIENT
Start: 2017-08-30 | End: 2017-09-13

## 2017-08-30 RX ORDER — NALOXONE HYDROCHLORIDE 4 MG/.1ML
4 SPRAY NASAL AS NEEDED
Qty: 1 BOX | Refills: 0 | Status: ON HOLD | OUTPATIENT
Start: 2017-08-30 | End: 2018-06-27

## 2017-08-30 RX ADMIN — CITALOPRAM HYDROBROMIDE 40 MG: 20 TABLET ORAL at 10:01

## 2017-08-30 RX ADMIN — KETOROLAC TROMETHAMINE 15 MG: 30 INJECTION, SOLUTION INTRAMUSCULAR at 06:39

## 2017-08-30 RX ADMIN — DOCUSATE SODIUM AND SENNOSIDES 1 TABLET: 8.6; 5 TABLET, FILM COATED ORAL at 10:01

## 2017-08-30 RX ADMIN — ACETAMINOPHEN 1000 MG: 500 TABLET, FILM COATED ORAL at 11:04

## 2017-08-30 RX ADMIN — DICYCLOMINE HYDROCHLORIDE 10 MG: 10 CAPSULE ORAL at 10:02

## 2017-08-30 RX ADMIN — SUCRALFATE 1 G: 1 TABLET ORAL at 11:04

## 2017-08-30 RX ADMIN — ALPRAZOLAM 0.5 MG: 0.5 TABLET ORAL at 10:00

## 2017-08-30 RX ADMIN — FAMOTIDINE 20 MG: 20 TABLET ORAL at 10:02

## 2017-08-30 RX ADMIN — TRAMADOL HYDROCHLORIDE 50 MG: 50 TABLET, FILM COATED ORAL at 10:00

## 2017-08-30 RX ADMIN — POLYETHYLENE GLYCOL 3350 17 G: 17 POWDER, FOR SOLUTION ORAL at 10:02

## 2017-08-30 RX ADMIN — SODIUM CHLORIDE 125 ML/HR: 900 INJECTION, SOLUTION INTRAVENOUS at 06:28

## 2017-08-30 RX ADMIN — CEFAZOLIN 2 G: 10 INJECTION, POWDER, FOR SOLUTION INTRAVENOUS; PARENTERAL at 04:36

## 2017-08-30 RX ADMIN — HYDROMORPHONE HYDROCHLORIDE 2 MG: 2 TABLET ORAL at 06:39

## 2017-08-30 RX ADMIN — GABAPENTIN 600 MG: 300 CAPSULE ORAL at 10:00

## 2017-08-30 RX ADMIN — PANTOPRAZOLE SODIUM 40 MG: 40 TABLET, DELAYED RELEASE ORAL at 06:39

## 2017-08-30 RX ADMIN — ATORVASTATIN CALCIUM 40 MG: 40 TABLET, FILM COATED ORAL at 10:02

## 2017-08-30 RX ADMIN — DULOXETINE HYDROCHLORIDE 60 MG: 30 CAPSULE, DELAYED RELEASE ORAL at 10:02

## 2017-08-30 RX ADMIN — KETOROLAC TROMETHAMINE 15 MG: 30 INJECTION, SOLUTION INTRAMUSCULAR at 11:04

## 2017-08-30 RX ADMIN — Medication 10 ML: at 06:28

## 2017-08-30 NOTE — DISCHARGE INSTRUCTIONS
[facility]    Discharge Instruction Sheet: Lumbar Laminectomy    DR. Refugio Valadez    Pain control:  Typically, we will prescribe a narcotic usually 1-2 tabs every four hours is sufficient for the pain. Most patients need this only for the first few weeks. You should discontinue this as the pain decreases. You should not drive while taking any narcotic pain medications. Constipation  Pain medicines and anesthesia can be constipating-this can be prevented by gentle physical activity and drinking plenty of fluid. It should be treated with over-the-counter medications such as Miralax or suppositories, and/or Fleets enema. You should have a bowel movement at least every other day following surgery. Incision care  Keep this area clean and dry. Your dressing is designed to stay in place for 5-7 days. You will be sent home with one additional dressing to change at that time. Leave this new dressing in place until our follow up visit in the office in about 10-14 days. If staples are in place, they should be removed about 14-20 days after surgery. DO NOT take a tub bath or go swimming until cleared by your doctor. DO NOT apply lotions, oils, or creams to incision. Cover the wound with an impermiable dressing to shower for then next 5 days, then no cover is needed. To increase and promote healing:   Stop Smoking (or at least cut back on smoking).  Eat a well-balanced diet (high in protein and vitamin C)   If your appetite is poor, consider nutritional supplements like Ensure, Glucerna, or [place] Instant Breakfast.   If you are diabetic, controlling you blood sugars is very important to prevent infection and promote wound healing. Nutrition:   If you were on a supplement such as Ensure or Glucerna) while in the hospital, please continue using them with each meal for the next 30 days.    Eat a well-balanced diet - High in protein, high in vitamins and minerals, especially vitamin C and zinc.     Restrictions:  Limited bending at waist  Lift no more than 10 pounds    Warning signs : Please call your physician immediately at [phone] if you have   Bleeding from incision that is constant.  Change in mental status (unusual behavior or confusion)   If your incision develops redness or swelling   Change in wound drainage (increase in amount, color, or foul odor)   [place] over 101.5 degrees Fahrenheit    Headache that is not relieved with pain medication   Tenderness or redness in the calf of your leg    Emergency: CALL 911 if you have   Shortness of breath   Chest pain   Localized chest pain when coughing or taking a deep breath    Follow-up  Please call Dr. Judd Romano office for a follow up appointment in 2 weeks at [phone]. You can return to work when cleared by a physician. During normal business hours you may reach Dr. Nancy Wray' team directly at [phone] if you have concerns or questions.     Winnie Orourke

## 2017-08-30 NOTE — PROGRESS NOTES
Problem: Mobility Impaired (Adult and Pediatric)  Goal: *Acute Goals and Plan of Care (Insert Text)  Physical Therapy Goals  Initiated 8/29/2017    1. Patient will move from supine to sit and sit to supine , scoot up and down and roll side to side in bed with independence within 4 days. 2. Patient will perform sit to stand with independence within 4 days. 3. Patient will ambulate with independence for 500 feet with the least restrictive device within 4 days. 4. Patient will ascend/descend 12 stairs with single handrail(s) with independence within 4 days. 5. Patient will verbalize and demonstrate understanding of spinal precautions (No bending, lifting greater than 5 lbs, or twisting; log-roll technique; frequent repositioning as instructed) within 4 days. PHYSICAL THERAPY TREATMENT  Patient: Tylor Patel (6 y.o. female)  Date: 8/30/2017  Diagnosis: LUMBAGO, RADICULOPATHY, STENOSIS  Spinal stenosis of lumbar region with neurogenic claudication <principal problem not specified>  Procedure(s) (LRB):  L3-S1 DECOMPRESSION WITH BONE MARROW ASPIRATION FROM ILIAC CREST Elane Burkitt Solution) (N/A) 1 Day Post-Op  Precautions: Back (Spinal non instrumental fusion)      ASSESSMENT: Patient demonstrating slow steady progress in therapy. Gait remains mildly unsteady and guarded with decreased vicki, widened base of support and increased trunk sway without use of AD, requiring CGA for safety. Overall improvements noted with use of RW. Patient requiring supervision to [id number] for overall mobility. Patient safe on stairs. Patient and family instructed in spinal precautions- patient with poor recall but family demonstrating good understanding and frequently cueing patient during tx session. Patient has good family support, therefore recommend return to home with HHPT following discharge. Patient has RW.  Patient may also benefit from transition to OP PT for higher level balance training once cleared by MD Rosas toward goals:  [ ]      Improving appropriately and progressing toward goals  [X]      Improving slowly and progressing toward goals  [ ]      Not making progress toward goals and plan of care will be adjusted       PLAN:  Patient continues to benefit from skilled intervention to address the above impairments. Continue treatment per established plan of care. Discharge Recommendations:  Home Health  Further Equipment Recommendations for Discharge:  Has RW as needed       SUBJECTIVE:   Patient stated I'm doing pretty good but my right leg still feels weak.    The patient stated 0/3 back precautions. Reviewed all 3 with patient. Family able to recall 3/3 precautions      OBJECTIVE DATA SUMMARY:   Critical Behavior:  Neurologic State: Alert, Appropriate for age  Orientation Level: Oriented X4  Cognition: Appropriate decision making, Appropriate for age attention/concentration, Follows commands  Safety/Judgement: Awareness of environment, Fall prevention, Home safety, Insight into deficits  Functional Mobility Training:  Bed Mobility:  Log Rolling: Supervision  Supine to Sit: Supervision  Sit to Supine: Contact guard assistance;Minimum assistance  Scooting: Supervision        Brace donned with  moderate assistance    Transfers:  Sit to Stand: Supervision  Stand to Sit: Supervision        Bed to Chair: Minimum assistance                    Balance:  Sitting: Intact; Without support  Standing: Impaired; Without support  Standing - Static: Good; Unsupported  Standing - Dynamic :  (good using RW; fair/good without device)  Ambulation/Gait Training:  Distance (ft): 350 Feet (ft)     Ambulation - Level of Assistance: Contact guard assistance        Gait Abnormalities: Altered arm swing;Decreased step clearance;Trunk sway increased        Base of Support: Widened     Speed/Xiomara: Pace decreased (<100 feet/min); Slow;Shuffled  Step Length: Left shortened;Right shortened       Patient ambulated 200 feet without device- Gait is slow and guarded demonstrating short shuffled steps with widened NORA and increased trunk sway. Patient transitioned to RW with noted improvements in gait stability and vicki- patient ambulated 150 using RW. Stairs:  Number of Stairs Trained: 8  Stairs - Level of Assistance: Contact guard assistance  Rail Use: Left      Pain:  Pain Scale 1: Numeric (0 - 10)  Pain Intensity 1: 3  Pain Location 1: Back  Pain Orientation 1: Lower  Pain Description 1: Aching  Pain Intervention(s) 1: Distraction; Family Support; Therapeutic presence; Therapeutic touch     After treatment:   [ ]  Patient left in no apparent distress sitting up in chair  [X]  Patient left in no apparent distress in bed  [X]  Call bell left within reach  [X]  Nursing notified  [X]  Caregiver present  [ ]  Bed alarm activated      COMMUNICATION/COLLABORATION:   The patients plan of care was discussed with: Occupational Therapist, Registered Nurse,  and Rehabilitation Attendant     Madelin Bryan PT   Time Calculation: 29 mins

## 2017-08-30 NOTE — PROGRESS NOTES
POORNIMA episode send to pt NN at PCP office through [address] [place] [address].     Nannette Marvin  Ext [phone]

## 2017-08-30 NOTE — PROGRESS NOTES
0710- Bedside and Verbal shift change report given to Sebastián Velazco (oncoming nurse) by Nanda Gordon RN (offgoing nurse). Report included the following information SBAR, Kardex, ED Summary, Procedure Summary, Intake/Output, MAR, Accordion, Recent Results and Med Rec Status.

## 2017-08-30 NOTE — PROGRESS NOTES
ORTHO POST OP SPINE PROGRESS NOTE    2017  Admit Date: 2017  Admit Diagnosis: LUMBAGO, RADICULOPATHY, STENOSIS  Spinal stenosis of lumbar region with neurogenic claudication  Procedure: Procedure(s):  L3-S1 DECOMPRESSION WITH BONE MARROW ASPIRATION FROM ILIAC CREST Maxim[facility] [place] Solution)  Post Op day: 1 Day Post-Op    Subjective:     Aixa Mckenna is a patient who has complaints of mild LBP, no LE pain s/p L3-S1 decomp with noninstrumented fusion. pt tolerating po and able to void. Has been up and walking last night. happy with results of surgery. .     Review of Systems: Pertinent items are noted in HPI. Objective:     PT/OT:   Distance Ambulated:           Time Ambulated (min):        Ambulation Response: Activity Response: Tolerated well  Assistive Device:              Assistive Device: Fall prevention device    Vital Signs:    Blood pressure 121/65, pulse 88, temperature 98.2 °F (36.8 °C), resp. rate 18, height 5' 4\" (1.626 m), weight 85.2 kg (187 lb 13.3 oz), SpO2 93 %. Temp (24hrs), Av.2 °F (36.8 °C), Min:97.8 °F (36.6 °C), Max:98.9 °F (37.2 °C)         1901 -  0700  In: 2545.8 [I.V.:2545.8]  Out: 4696 [Urine:1000; Drains:20]    LAB:    No results for input(s): HGB, HGBEXT, WBC, PLT, PLTEXT, HGBEXT, PLTEXT in the last 72 hours.     Wound/Drain Assessment:  Drain:      Dressing:     Physical Exam:  Neurological: no deficit  Incision clean, dry, and intact  5/5 BLE    Assessment:      Patient Active Problem List   Diagnosis Code    IBS (irritable bowel syndrome) K58.9    Fibromyalgia M79.7    Hiatal hernia K44.9    GERD (gastroesophageal reflux disease) K21.9    Hypercholesterolemia E78.00    Hypovitaminosis D E55.9    Diverticulitis K57.92    Depression F32.9    Essential hypertension I10    Encounter for medication monitoring Z51.81    DDD (degenerative disc disease), cervical M50.30    Spinal stenosis of lumbar region with neurogenic claudication M48.06 Plan:     Continue PT/OT/Rehab  Discontinue: IV and drain (lumbar)  Consult: PT  and OT    Discharge To: home.  ? if [facility]. today       Signed By: CORNELL Colon

## 2017-08-30 NOTE — PROGRESS NOTES
HH/PT/OT order send to [facility]. HH accepted. FOC given copy attatched to pt bedside chart. Provider info updated to pt AVS. As per UR pt status changed to Observation code 44 given copy attached to pt bedside chart.     Nadine Estrada  Ext [phone]

## 2017-08-30 NOTE — PROGRESS NOTES
Problem: Falls - Risk of  Goal: *Absence of Falls  Document Anny Fall Risk and appropriate interventions in the flowsheet.    Outcome: Progressing Towards Goal  Fall Risk Interventions:        Mentation Interventions: Adequate sleep, hydration, pain control     Medication Interventions: Patient to call before getting OOB     Elimination Interventions: Patient to call for help with toileting needs     History of Falls Interventions: Bed/chair exit alarm

## 2017-08-30 NOTE — DISCHARGE SUMMARY
Spine Discharge Summary    Patient ID:  Reece Treviño  [id number]  female  74 y.o.  1950    Admit date: 8/29/2017    Discharge date: 8/30/2017    Admitting Physician: Lucius Washington MD     Consulting Physician(s):   Treatment Team: Attending Provider: Lucius Washington MD; Utilization Review: Rupesh Lau RN    Date of Surgery:   8/29/2017     Preoperative Diagnosis:  LUMBAGO, RADICULOPATHY, STENOSIS    Postoperative Diagnosis:   LUMBAGO, RADICULOPATHY, STENOSIS    Procedure(s):  L3-S1 DECOMPRESSION WITH BONE MARROW ASPIRATION FROM ILIAC CREST Vessie Natter Solution)     Anesthesia Type:   General     Surgeon: Lucius Washington MD                            HPI:  Pt is a 74 y.o. female who has a history of LUMBAGO, RADICULOPATHY, STENOSIS  with pain and limitations of activities of daily living who presents at this time for a L3-S1 decompression following the failure of conservative management. PMH:   Past Medical History:   Diagnosis Date    Arthritis     Cancer ([facility] [address]     skin cancer    Chronic pain     CHRONIC BOWEL PAIN    Diverticulitis     Fibromyalgia     GERD (gastroesophageal reflux disease)     Goiter     Hiatal hernia     Hypercholesterolemia     Hypertension     IBS (irritable bowel syndrome)     IBS (irritable bowel syndrome)     CONSTIPATION, CHRONIC SINCE HER 25s    Ill-defined condition     rectocele    Insomnia     TAKES TRAZODONE NIGHTLY    Migraine     REDUCED IN FREQUENCY AND SEVERITY SINCE MENOPAUSE    Other ill-defined conditions     Psychiatric disorder 3/11    DEPRESSION       Body mass index is 32.24 kg/(m^2). : A BMI > 30 is classified as obesity and > 40 is classified as morbid obesity. Medications upon admission :   Prior to Admission Medications   Prescriptions Last Dose Informant Patient Reported? Taking?    ALPRAZolam (XANAX) 0.5 mg tablet 8/28/2017 at Unknown time  No Yes   Sig: TAKE 1 TABLET BY MOUTH TWICE A DAY AS NEEDED FOR ANXIETY   DULoxetine (CYMBALTA) 60 mg capsule 8/29/2017 at 0700  No Yes   Sig: Take 1 Cap by mouth two (2) times a day. LINZESS 290 mcg cap capsule 7/29/2017 at Unknown time  Yes Yes   Sig: Take 290 mcg by mouth Three (3) times a week. NEXIUM 40 mg capsule 8/28/2017 at Unknown time  No Yes   Sig: TAKE 1 CAPSULE DAILY   aspirin delayed-release 81 mg tablet 8/18/2017  Yes No   Sig: Take 81 mg by mouth every other day. cholecalciferol (VITAMIN D3) 1,000 unit tablet 8/18/2017  Yes No   Sig: Take 1,000 Units by mouth every other day. citalopram (CELEXA) 40 mg tablet 8/29/2017 at 0700  No Yes   Sig: TAKE 1 TABLET DAILY   cyclobenzaprine (FLEXERIL) 10 mg tablet 8/29/2017 at 0700  Yes Yes   Sig: TAKE 1 TABLET BY MOUTH 3 TIMES A DAY AS NEEDED FOR MUSCLE SPASMS   dicyclomine (BENTYL) 10 mg capsule 8/28/2017 at Unknown time  No Yes   Sig: TAKE 2 CAPSULES FOUR TIMES A DAY AS NEEDED   gabapentin (NEURONTIN) 300 mg capsule 8/29/2017 at 0700  Yes Yes   Sig: Take 600 mg by mouth three (3) times daily. hydroCHLOROthiazide (HYDRODIURIL) 25 mg tablet 8/29/2017 at 0700  No Yes   Sig: TAKE 1 TABLET DAILY   lisinopril (PRINIVIL, ZESTRIL) 40 mg tablet 8/29/2017 at 0700  No Yes   Sig: TAKE 1 TABLET DAILY   rosuvastatin (CRESTOR) 20 mg tablet   No No   Sig: TAKE 1 TABLET NIGHTLY   sucralfate (CARAFATE) 1 gram tablet 8/28/2017 at Unknown time  Yes Yes   Sig: Take 1 g by mouth four (4) times daily. traMADol (ULTRAM) 50 mg tablet 8/28/2017 at Unknown time  No Yes   Sig: Take 1 Tab by mouth every six (6) hours as needed for Pain. traZODone (DESYREL) 50 mg tablet 8/28/2017 at Unknown time  Yes Yes   Sig: TAKE TWO TABLETS NIGHTLY AS NEEDED   triamcinolone acetonide (KENALOG) 0.1 % topical cream Unknown at Unknown time  No No   Sig: Apply  to affected area two (2) times daily as needed for Skin Irritation. use thin layer      Facility-Administered Medications: None        Allergies:     Allergies   Allergen Reactions    Latex Hives    Codeine Other (comments) Severe Headache    Morphine Hives    Other Medication Rash     BANDAIDS    Shellfish Containing YONATHAN LIND [facility] Course: The patient underwent surgery. Complications:  None; patient tolerated the procedure well. Was taken to the PACU in stable condition and then transferred to the ortho floor. Perioperative Antibiotics:  Ancef     Postoperative Pain Management:  Oxycodone      Postoperative transfusions:    Number of units banked PRBCs =   none     Post Op complications: none    Hemoglobin at discharge:    Lab Results   Component Value Date/Time    HGB 11.7 08/18/2017 03:27 PM    INR 1.0 08/18/2017 03:27 PM       Dressing was changed on POD # 1. Incision - clean, dry and intact. No significant erythema or swelling. Neurovascular exam found to be within normal limits. Wound appears to be healing without any evidence of infection. Pt had a HVAC drain that was removed on POD# 1. Physical Therapy started on the day following surgery and participated in bed mobility, transfers and ambulation. Gait:  Gait  Speed/Xiomara: Pace decreased (<100 feet/min)  Gait Abnormalities: Altered arm swing, Decreased step clearance  Ambulation - Level of Assistance: Contact guard assistance  Distance (ft): 260 Feet (ft)  Assistive Device: Gait belt  Interventions: Verbal cues            Interventions: Verbal cues      Discharged to: Home. Condition on Discharge:   stable    Discharge instructions:    - Take pain medications as prescribed  - Resume pre hospital diet      - Discharge activity: activity as tolerated  - Ambulate as tolerated  - Wound Care Keep wound clean and dry. See discharge instruction sheet.  - Staples to be removed 10 days after surgery            -DISCHARGE MEDICATION LIST     Current Discharge Medication List      START taking these medications    Details   acetaminophen (TYLENOL) 500 mg tablet Take 2 Tabs by mouth every six (6) hours for 14 days.   Qty: 112 Tab, Refills: 0 HYDROmorphone (DILAUDID) 2 mg tablet Take 1-2 Tabs by mouth every three (3) hours as needed. Max Daily Amount: 32 mg. Qty: 80 Tab, Refills: 0      polyethylene glycol (MIRALAX) 17 gram packet Take 1 Packet by mouth daily as needed (constipation) for up to 15 days. Qty: 15 Packet, Refills: 0      senna-docusate (PERICOLACE) 8.6-50 mg per tablet Take 1 Tab by mouth daily. Qty: 30 Tab, Refills: 0      naloxone (NARCAN) 4 mg/actuation spry 4 mg by Nasal route as needed (respiratory depression). Give single spray into one nostril. Call 911. Give additional doses every 2 to 3 minutes alternating nostrils until assistance arrives using a new nasal spray with each dose, if patient does not respond or responds and then relapses. Qty: 1 Box, Refills: 0         CONTINUE these medications which have NOT CHANGED    Details   sucralfate (CARAFATE) 1 gram tablet Take 1 g by mouth four (4) times daily. ALPRAZolam (XANAX) 0.5 mg tablet TAKE 1 TABLET BY MOUTH TWICE A DAY AS NEEDED FOR ANXIETY  Qty: 180 Tab, Refills: 1    Associated Diagnoses: Depression, unspecified depression type      dicyclomine (BENTYL) 10 mg capsule TAKE 2 CAPSULES FOUR TIMES A DAY AS NEEDED  Qty: 720 Cap, Refills: 3    Associated Diagnoses: Irritable bowel syndrome without diarrhea      NEXIUM 40 mg capsule TAKE 1 CAPSULE DAILY  Qty: 90 Cap, Refills: 3    Associated Diagnoses: Gastroesophageal reflux disease without esophagitis      hydroCHLOROthiazide (HYDRODIURIL) 25 mg tablet TAKE 1 TABLET DAILY  Qty: 90 Tab, Refills: 2      LINZESS 290 mcg cap capsule Take 290 mcg by mouth Three (3) times a week.       citalopram (CELEXA) 40 mg tablet TAKE 1 TABLET DAILY  Qty: 90 Tab, Refills: 2    Associated Diagnoses: Depression      lisinopril (PRINIVIL, ZESTRIL) 40 mg tablet TAKE 1 TABLET DAILY  Qty: 90 Tab, Refills: 3    Associated Diagnoses: Essential hypertension with goal blood pressure less than 140/90      cyclobenzaprine (FLEXERIL) 10 mg tablet TAKE 1 TABLET BY MOUTH 3 TIMES A DAY AS NEEDED FOR MUSCLE SPASMS  Refills: 0    Associated Diagnoses: Fibromyalgia      gabapentin (NEURONTIN) 300 mg capsule Take 600 mg by mouth three (3) times daily. Qty: 360 Cap, Refills: 1    Associated Diagnoses: Fibromyalgia      traZODone (DESYREL) 50 mg tablet TAKE TWO TABLETS NIGHTLY AS NEEDED  Qty: 135 Tab, Refills: 2    Associated Diagnoses: Fibromyalgia      DULoxetine (CYMBALTA) 60 mg capsule Take 1 Cap by mouth two (2) times a day. Qty: 180 Cap, Refills: 3    Associated Diagnoses: Fibromyalgia      rosuvastatin (CRESTOR) 20 mg tablet TAKE 1 TABLET NIGHTLY  Qty: 90 Tab, Refills: 1      triamcinolone acetonide (KENALOG) 0.1 % topical cream Apply  to affected area two (2) times daily as needed for Skin Irritation. use thin layer  Qty: 15 g, Refills: 0      cholecalciferol (VITAMIN D3) 1,000 unit tablet Take 1,000 Units by mouth every other day. aspirin delayed-release 81 mg tablet Take 81 mg by mouth every other day. STOP taking these medications       traMADol (ULTRAM) 50 mg tablet Comments:   Reason for Stopping:            per medical continuation form      -Follow up in office in 2 weeks      Signed:  Simón Stratton.  Kit Saldaña, MSN, ACNP, ONP-C  Orthopaedic Nurse Practitioner    8/30/2017  11:22 AM

## 2017-09-20 RX ORDER — TRAMADOL HYDROCHLORIDE 50 MG/1
50 TABLET ORAL
Qty: 90 TAB | Refills: 0 | OUTPATIENT
Start: 2017-09-20 | End: 2017-10-25 | Stop reason: SDUPTHER

## 2017-10-25 DIAGNOSIS — F32.A DEPRESSION, UNSPECIFIED DEPRESSION TYPE: ICD-10-CM

## 2017-10-25 DIAGNOSIS — M79.7 FIBROMYALGIA: ICD-10-CM

## 2017-10-25 RX ORDER — ALPRAZOLAM 0.5 MG/1
TABLET ORAL
Qty: 180 TAB | Refills: 1 | Status: SHIPPED | OUTPATIENT
Start: 2017-10-25 | End: 2017-11-15 | Stop reason: SDUPTHER

## 2017-10-25 RX ORDER — TRAMADOL HYDROCHLORIDE 50 MG/1
50 TABLET ORAL
Qty: 90 TAB | Refills: 0 | Status: SHIPPED | OUTPATIENT
Start: 2017-10-25 | End: 2017-11-03 | Stop reason: SDUPTHER

## 2017-10-25 RX ORDER — TRAZODONE HYDROCHLORIDE 50 MG/1
TABLET ORAL
Qty: 135 TAB | Refills: 2 | Status: SHIPPED | OUTPATIENT
Start: 2017-10-25 | End: 2018-04-25 | Stop reason: SDUPTHER

## 2017-10-25 NOTE — TELEPHONE ENCOUNTER
----- Message from Madison Chavarria sent at 10/25/2017  2:58 PM EDT -----  Regarding: Dr Don Rayo  Pt stated she would like a Rx refill on \"Tramadol\" \"Alprozolan\" and \"Boltran Cream\" sent to the pharmacy on file.     Contact [phone]

## 2017-10-30 DIAGNOSIS — M79.7 FIBROMYALGIA: ICD-10-CM

## 2017-10-30 RX ORDER — DULOXETIN HYDROCHLORIDE 60 MG/1
CAPSULE, DELAYED RELEASE ORAL
Qty: 180 CAP | Refills: 3 | Status: SHIPPED | OUTPATIENT
Start: 2017-10-30 | End: 2018-10-06 | Stop reason: SDUPTHER

## 2017-11-03 RX ORDER — TRAMADOL HYDROCHLORIDE 50 MG/1
50 TABLET ORAL
Qty: 90 TAB | Refills: 0 | OUTPATIENT
Start: 2017-11-03 | End: 2017-12-20 | Stop reason: SDUPTHER

## 2017-11-03 NOTE — TELEPHONE ENCOUNTER
Spoke with Isis at [address] and inquiring about Tramadol prescription and pt was told prescription was pending. Isis stated she was not sure why prescription was pending and prescription will go out today. Called pt and informed have spoken with Isis at [address] and RX was pending and Isis was not sure why prescription for Tramadol was pending. Isis stated Tramadol will be shipped today. Pt verbalized understanding.

## 2017-11-03 NOTE — TELEPHONE ENCOUNTER
Patient called about refill for traMADol (ULTRAM) 50 mg . System shows rx was send to mail order pharmacy, pt says as of 11/2 Pharmacy still had not received.  Please call patient @ [phone]

## 2017-11-15 DIAGNOSIS — F32.A DEPRESSION, UNSPECIFIED DEPRESSION TYPE: ICD-10-CM

## 2017-11-15 RX ORDER — ALPRAZOLAM 0.5 MG/1
TABLET ORAL
Qty: 180 TAB | Refills: 1 | Status: SHIPPED | OUTPATIENT
Start: 2017-11-15 | End: 2018-06-15 | Stop reason: SDUPTHER

## 2017-11-15 RX ORDER — ALPRAZOLAM 0.5 MG/1
TABLET ORAL
Qty: 14 TAB | Refills: 0 | OUTPATIENT
Start: 2017-11-15 | End: 2017-12-20 | Stop reason: SDUPTHER

## 2017-11-15 NOTE — TELEPHONE ENCOUNTER
----- Message from Ron Torres sent at 11/15/2017  2:19 PM EST -----  Regarding: /refill request  Pt needs a refill for alprazolam .5mg and daltaran gel ointment sent to the [facility] mail order  [facility]armacy. Pt still has some ointment left. Pt is completely out of the medication. Pt would like a week's worth of medication sent to a [facility] pharmacy located on [address]. Pt can be reached at [phone].

## 2017-12-20 ENCOUNTER — OFFICE VISIT (OUTPATIENT)
Dept: FAMILY MEDICINE CLINIC | Age: 67
End: 2017-12-20

## 2017-12-20 ENCOUNTER — HOSPITAL ENCOUNTER (OUTPATIENT)
Dept: LAB | Age: 67
Discharge: HOME | End: 2017-12-20
Payer: MEDICARE

## 2017-12-20 VITALS
BODY MASS INDEX: 32.13 KG/M2 | WEIGHT: 188.2 LBS | SYSTOLIC BLOOD PRESSURE: 129 MMHG | TEMPERATURE: 98.2 F | RESPIRATION RATE: 16 BRPM | OXYGEN SATURATION: 96 % | DIASTOLIC BLOOD PRESSURE: 78 MMHG | HEIGHT: 64 IN | HEART RATE: 76 BPM

## 2017-12-20 DIAGNOSIS — I10 ESSENTIAL HYPERTENSION: Primary | ICD-10-CM

## 2017-12-20 DIAGNOSIS — K58.1 IRRITABLE BOWEL SYNDROME WITH CONSTIPATION: ICD-10-CM

## 2017-12-20 DIAGNOSIS — E78.00 HYPERCHOLESTEROLEMIA: ICD-10-CM

## 2017-12-20 DIAGNOSIS — R73.02 IGT (IMPAIRED GLUCOSE TOLERANCE): ICD-10-CM

## 2017-12-20 DIAGNOSIS — F32.A DEPRESSION, UNSPECIFIED DEPRESSION TYPE: ICD-10-CM

## 2017-12-20 DIAGNOSIS — M48.062 SPINAL STENOSIS OF LUMBAR REGION WITH NEUROGENIC CLAUDICATION: ICD-10-CM

## 2017-12-20 DIAGNOSIS — G89.29 ENCOUNTER FOR CHRONIC PAIN MANAGEMENT: ICD-10-CM

## 2017-12-20 DIAGNOSIS — K21.9 GASTROESOPHAGEAL REFLUX DISEASE, ESOPHAGITIS PRESENCE NOT SPECIFIED: ICD-10-CM

## 2017-12-20 DIAGNOSIS — Z51.81 ENCOUNTER FOR MEDICATION MONITORING: ICD-10-CM

## 2017-12-20 DIAGNOSIS — M50.30 DDD (DEGENERATIVE DISC DISEASE), CERVICAL: ICD-10-CM

## 2017-12-20 DIAGNOSIS — M79.7 FIBROMYALGIA: ICD-10-CM

## 2017-12-20 DIAGNOSIS — E66.9 NON MORBID OBESITY: ICD-10-CM

## 2017-12-20 LAB
GLUCOSE POC: 77 MG/DL
HBA1C MFR BLD HPLC: 5.4 %

## 2017-12-20 PROCEDURE — 80048 BASIC METABOLIC PNL TOTAL CA: CPT

## 2017-12-20 PROCEDURE — 80307 DRUG TEST PRSMV CHEM ANLYZR: CPT

## 2017-12-20 RX ORDER — DICLOFENAC SODIUM 75 MG/1
75 TABLET, DELAYED RELEASE ORAL
Qty: 90 TAB | Refills: 1 | Status: SHIPPED | OUTPATIENT
Start: 2017-12-20 | End: 2017-12-21 | Stop reason: SDUPTHER

## 2017-12-20 RX ORDER — TRAMADOL HYDROCHLORIDE 50 MG/1
50 TABLET ORAL
Qty: 90 TAB | Refills: 0 | Status: SHIPPED | OUTPATIENT
Start: 2017-12-20 | End: 2018-02-16 | Stop reason: SDUPTHER

## 2017-12-20 RX ORDER — DICLOFENAC SODIUM 10 MG/G
2 GEL TOPICAL
COMMUNITY
End: 2017-12-20 | Stop reason: SDUPTHER

## 2017-12-20 RX ORDER — MAGNESIUM CITRATE
296 SOLUTION, ORAL ORAL
COMMUNITY
Start: 2017-09-13 | End: 2017-12-20 | Stop reason: SDUPTHER

## 2017-12-20 RX ORDER — DICLOFENAC SODIUM 10 MG/G
2 GEL TOPICAL
Qty: 100 G | Refills: 1 | Status: SHIPPED | OUTPATIENT
Start: 2017-12-20 | End: 2018-03-20 | Stop reason: SDUPTHER

## 2017-12-20 RX ORDER — MAGNESIUM CITRATE
SOLUTION, ORAL ORAL
Refills: 2 | Status: ON HOLD | COMMUNITY
Start: 2017-09-13 | End: 2018-06-27

## 2017-12-20 RX ORDER — CIPROFLOXACIN 500 MG/1
500 TABLET ORAL 2 TIMES DAILY
Refills: 0 | COMMUNITY
Start: 2017-09-14 | End: 2017-12-20 | Stop reason: ALTCHOICE

## 2017-12-20 RX ORDER — NALOXONE HYDROCHLORIDE 4 MG/.1ML
4 SPRAY NASAL
COMMUNITY
Start: 2017-08-30 | End: 2017-12-20 | Stop reason: SDUPTHER

## 2017-12-20 RX ORDER — DICLOFENAC SODIUM 75 MG/1
75 TABLET, DELAYED RELEASE ORAL
COMMUNITY
End: 2017-12-20 | Stop reason: SDUPTHER

## 2017-12-20 RX ORDER — HYDROMORPHONE HYDROCHLORIDE 2 MG/1
2-4 TABLET ORAL
COMMUNITY
Start: 2017-08-30 | End: 2017-12-20 | Stop reason: SINTOL

## 2017-12-20 NOTE — MR AVS SNAPSHOT
Visit Information Date & Time Provider Department Dept. Phone Encounter #  
 12/20/2017  3:00 PM Asad Stanley MD [facility] [phone] [id number] Follow-up Instructions Return in about 3 months (around 3/26/2018). Upcoming Health Maintenance Date Due Influenza Age 5 to Adult 1/29/2018* MEDICARE YEARLY EXAM 3/22/2018 GLAUCOMA SCREENING Q2Y 11/2/2018 COLONOSCOPY 6/21/2021 DTaP/Tdap/Td series (3 - Td) 11/21/2026 *Topic was postponed. The date shown is not the original due date. Allergies as of 12/20/2017  Review Complete On: 12/20/2017 By: Mimi García LPN Severity Noted Reaction Type Reactions Latex  07/19/2017    Hives Codeine High 03/15/2010    Other (comments) Severe Headache Morphine High 03/15/2010    Hives Dilaudid [Hydromorphone]  12/20/2017    Other (comments) Memory loss Other Medication  03/15/2011    Rash BANDAIDS Shellfish Containing Products  03/15/2011    Hives Current Immunizations  Reviewed on 8/14/2017 Name Date Influenza High Dose Vaccine PF 9/19/2016 10:00 AM, 10/30/2015, 11/11/2014 Influenza Vaccine 11/19/2013 Influenza Vaccine Split 11/26/2012, 12/12/2011, 9/24/2010 Pneumococcal Conjugate (PCV-13) 5/18/2015 Pneumococcal Vaccine (Unspecified Type) 11/19/2013 Tdap 5/26/2014 12:40 PM  
 Zoster Vaccine, Live 4/16/2012 Not reviewed this visit You Were Diagnosed With   
  
 Codes Comments Essential hypertension    -  Primary ICD-10-CM: I10 
ICD-9-CM: 401.9 Fibromyalgia     ICD-10-CM: M79.7 ICD-9-CM: 729.1 Spinal stenosis of lumbar region with neurogenic claudication     ICD-10-CM: M48.062 
ICD-9-CM: 724.03   
 DDD (degenerative disc disease), cervical     ICD-10-CM: M50.30 ICD-9-CM: 722.4 IGT (impaired glucose tolerance)     ICD-10-CM: R73.02 
ICD-9-CM: 790.22  Encounter for medication monitoring     ICD-10-CM: Z51.81 
 ICD-9-CM: V58.83 Encounter for chronic pain management     ICD-10-CM: G89.29 ICD-9-CM: V65.49 Vitals BP Pulse Temp Resp Height(growth percentile) Weight(growth percentile) 129/78 (BP 1 Location: Left arm, BP Patient Position: Sitting) 76 98.2 °F (36.8 °C) (Oral) 16 5' 4\" (1.626 m) 188 lb 3.2 oz (85.4 kg) SpO2 BMI OB Status Smoking Status 96% 32.3 kg/m2 Hysterectomy Former Smoker Vitals History BMI and BSA Data Body Mass Index Body Surface Area  
 32.3 kg/m 2 1.96 m 2 Preferred Pharmacy Pharmacy Name [address] [facility] [phone] Your Updated Medication List  
  
   
This list is accurate as of: 12/20/17  4:20 PM.  Always use your most recent med list.  
  
  
  
  
 ALPRAZolam 0.5 mg tablet Commonly known as:  XANAX  
TAKE 1 TABLET BY MOUTH TWICE A DAY AS NEEDED FOR ANXIETY  
  
 aspirin delayed-release 81 mg tablet Take 81 mg by mouth every other day. aspirin-calcium carbonate 81 mg-300 mg calcium(777 mg) Tab Take 81 mg by mouth. CARAFATE 1 gram tablet Generic drug:  sucralfate Take 1 g by mouth four (4) times daily. citalopram 40 mg tablet Commonly known as:  CELEXA  
TAKE 1 TABLET DAILY  
  
 cyclobenzaprine 10 mg tablet Commonly known as:  FLEXERIL  
TAKE 1 TABLET BY MOUTH 3 TIMES A DAY AS NEEDED FOR MUSCLE SPASMS * diclofenac 1 % Gel Commonly known as:  VOLTAREN Apply 2 g to affected area four (4) times daily as needed. * diclofenac EC 75 mg EC tablet Commonly known as:  VOLTAREN Take 1 Tab by mouth two (2) times daily as needed. dicyclomine 10 mg capsule Commonly known as:  BENTYL TAKE 2 CAPSULES FOUR TIMES A DAY AS NEEDED DULoxetine 60 mg capsule Commonly known as:  CYMBALTA TAKE 1 CAPSULE TWICE A DAY  
  
 gabapentin 300 mg capsule Commonly known as:  NEURONTIN Take 600 mg by mouth three (3) times daily. hydroCHLOROthiazide 25 mg tablet Commonly known as:  HYDRODIURIL  
TAKE 1 TABLET DAILY LINZESS 290 mcg Cap capsule Generic drug:  linaclotide Take 290 mcg by mouth Three (3) times a week. lisinopril 40 mg tablet Commonly known as:  PRINIVIL, ZESTRIL  
TAKE 1 TABLET DAILY  
  
 magnesium citrate solution USE 1 BOTTLE BY MOUTH DAILY  
  
 naloxone 4 mg/actuation nasal spray Commonly known as:  NARCAN  
4 mg by Nasal route as needed (respiratory depression). Give single spray into one nostril. Call 911. Give additional doses every 2 to 3 minutes alternating nostrils until assistance arrives using a new nasal spray with each dose, if patient does not respond or responds and then relapses. NexIUM 40 mg capsule Generic drug:  esomeprazole TAKE 1 CAPSULE DAILY  
  
 rosuvastatin 20 mg tablet Commonly known as:  CRESTOR  
TAKE 1 TABLET NIGHTLY  
  
 senna-docusate 8.6-50 mg per tablet Commonly known as:  Smooth Spikes Take 1 Tab by mouth daily. traMADol 50 mg tablet Commonly known as:  ULTRAM  
Take 1 Tab by mouth every six (6) hours as needed for Pain. Max Daily Amount: 200 mg.  
  
 traZODone 50 mg tablet Commonly known as:  DESYREL  
TAKE TWO TABLETS NIGHTLY AS NEEDED  
  
 triamcinolone acetonide 0.1 % topical cream  
Commonly known as:  KENALOG Apply  to affected area two (2) times daily as needed for Skin Irritation. use thin layer VITAMIN D3 1,000 unit tablet Generic drug:  cholecalciferol Take 1,000 Units by mouth every other day. * Notice: This list has 2 medication(s) that are the same as other medications prescribed for you. Read the directions carefully, and ask your doctor or other care provider to review them with you. Prescriptions Printed Refills  
 traMADol (ULTRAM) 50 mg tablet 0 Sig: Take 1 Tab by mouth every six (6) hours as needed for Pain. Max Daily Amount: 200 mg. Class: Print  Route: Oral  
  
 Prescriptions Sent to Pharmacy Refills  
 diclofenac (VOLTAREN) 1 % gel 1 Sig: Apply 2 g to affected area four (4) times daily as needed. Class: Normal  
 Pharmacy: [address], [address] Ph #: [phone] Route: Topical  
 diclofenac EC (VOLTAREN) 75 mg EC tablet 1 Sig: Take 1 Tab by mouth two (2) times daily as needed. Class: Normal  
 Pharmacy: [address], [address] Ph #: [phone] Route: Oral  
  
We Performed the Following 9-DRUG SCREEN + OXY, UR [id number] CPT(R)] AMB POC GLUCOSE, QUANTITATIVE, BLOOD [14736 CPT(R)] AMB POC HEMOGLOBIN A1C [30859 CPT(R)] METABOLIC PANEL, BASIC [98867 CPT(R)] Follow-up Instructions Return in about 3 months (around 3/26/2018). Introducing [facility]! Dear Laina Morse: 
Thank you for requesting a [employer] account. Our records indicate that you already have an active [employer] account. You can access your account anytime at [email or web address] Did you know that you can access your hospital and ER discharge instructions at any time in [employer]? You can also review all of your test results from your hospital stay or ER visit. Additional Information If you have questions, please visit the Frequently Asked Questions section of the [employer] website at [email or web address] Remember, [employer] is NOT to be used for urgent needs. For medical emergencies, dial 911. Now available from your iPhone and Android! Please provide this summary of care documentation to your next provider. Your primary care clinician is listed as Celine Hopkins. If you have any questions after today's visit, please call [phone].

## 2017-12-20 NOTE — PROGRESS NOTES
HISTORY OF PRESENT ILLNESS  Linda Dempsey is a 74 y.o. female. HPI   Follow up on chronic medical problems. Recovering well from back surgery. HTN follow up:  Compliant w/ meds, low salt diet, and exercise. No home bp monitoring. No swelling, headache or dizziness. No chest pain, SOB, palpitations. Otherwise feeling well since the last visit. Hypercholesterolemia follow up:  Compliant w/ low fat, low cholesterol diet. Exercising some. Tolerating crestor. No muscle more than her usual from the fibromyalgia, no abdominal pain, no skin discoloration. Patient is not fasting today. Hypercholesterolemia follow up:  Compliant w/ meds, low fat, low cholesterol diet. Exercising some. No muscle nor abdominal pain, no skin discoloration. Patient fasting today. Depression Review:  Patient is seen for followup of depression and fibromyalgia and IBS. Currently taking cymbalta and Celexa and gabapentin. Ongoing symptoms include fatigue and stress. She experiences the following side effects from the treatment: none. Encounter for pain management. 1./2. Medical history/Past medical History  Chronic Pain:  Patient has long standing fibromyalgia. Has increased pain in the neck  And lowe back pain d/t spinal stenosis. Had recent surgery for the spinal stenosis. Still has aching in the legs and feet and back. Pain is 7-8/10. Currently taking cymbalta and Celexa and gabapentin. She also takes motrin or diclofenac for less severe pain. Pt takes tramadol for more severe pain  Ongoing symptoms include fatigue and pain. 3. Applicable records from prior treatment providers are apart of [facility] under the media tab. 4. Diagnostic, therapeutic and laboratory results are available in the [address] [place] [address] chart. 5. Consultation notes are available for review in the media tab of the [address] [place] [address] chart.   6. Treatment goals include pain control so that the pt may be as active and function with her daily activities and improved comfort level. Previously pt has been limited by pain. 7. The risks and benefits of treatment has been discussed at this office visit with the pt. She understands that the medication has addicting potential.  Additionally the pt has been advised that narcotic pain medication may impair mental and/or physical ability required for performance of tasks such as driving or operating any other machinery. 8. Pt has an updated signed pain contract on file and can be found under the FYI section of the [facility] chart. 9. The pain contract is reviewed. Pain medication will be continued at the previous dosage. Urine drug screening will be done today. Diagnostic studies are not indicated at this time. Interventional procedure are not indicated at this time. 10. Medication prescibed is traMADol (ULTRAM) 50 mg tablet.  has been reviewed at this OV by me. 11. Patient instructions have been reviewed in detail as outlined above and in the pain contract. 12. Re-eval is planned for 3 months. Patient Active Problem List   Diagnosis Code    IBS (irritable bowel syndrome) K58.9    Fibromyalgia M79.7    Hiatal hernia K44.9    GERD (gastroesophageal reflux disease) K21.9    Hypercholesterolemia E78.00    Hypovitaminosis D E55.9    Diverticulitis K57.92    Depression F32.9    Essential hypertension I10    Encounter for medication monitoring Z51.81    DDD (degenerative disc disease), cervical M50.30       Current Outpatient Prescriptions   Medication Sig Dispense Refill    traMADol (ULTRAM) 50 mg tablet Take 1 Tab by mouth every six (6) hours as needed for Pain. 90 Tab 0    triamcinolone acetonide (KENALOG) 0.1 % topical cream Apply  to affected area two (2) times daily as needed for Skin Irritation.  use thin layer 15 g 0    ALPRAZolam (XANAX) 0.5 mg tablet TAKE 1 TABLET BY MOUTH TWICE A DAY AS NEEDED FOR ANXIETY 180 Tab 1    dicyclomine (BENTYL) 10 mg capsule TAKE 2 CAPSULES FOUR TIMES A DAY AS NEEDED 720 Cap 3    NEXIUM 40 mg capsule TAKE 1 CAPSULE DAILY 90 Cap 3    hydroCHLOROthiazide (HYDRODIURIL) 25 mg tablet TAKE 1 TABLET DAILY 90 Tab 2    LINZESS 290 mcg cap capsule Take 290 mcg by mouth Three (3) times a week.  cholecalciferol (VITAMIN D3) 1,000 unit tablet Take 1,000 Units by mouth every other day.  citalopram (CELEXA) 40 mg tablet TAKE 1 TABLET DAILY 90 Tab 2    rosuvastatin (CRESTOR) 20 mg tablet Take 1 Tab by mouth nightly. 90 Tab 1    lisinopril (PRINIVIL, ZESTRIL) 40 mg tablet TAKE 1 TABLET DAILY 90 Tab 3    cyclobenzaprine (FLEXERIL) 10 mg tablet TAKE 1 TABLET BY MOUTH 3 TIMES A DAY AS NEEDED FOR MUSCLE SPASMS  0    gabapentin (NEURONTIN) 300 mg capsule Take 600 mg by mouth three (3) times daily. 360 Cap 1    traZODone (DESYREL) 50 mg tablet TAKE TWO TABLETS NIGHTLY AS NEEDED 135 Tab 2    DULoxetine (CYMBALTA) 60 mg capsule Take 1 Cap by mouth two (2) times a day. 180 Cap 3    aspirin delayed-release 81 mg tablet Take 81 mg by mouth every other day.          Allergies   Allergen Reactions    Latex Hives    Codeine Other (comments)     Severe Headache    Morphine Hives    Other Medication Rash     BANDAIDS    Shellfish Containing Products Hives         Past Medical History:   Diagnosis Date    Arthritis     Cancer ([address]     skin cancer    Chronic pain     CHRONIC BOWEL PAIN    Diverticulitis     Fibromyalgia     GERD (gastroesophageal reflux disease)     Goiter     Hiatal hernia     Hypercholesterolemia     Hypertension     IBS (irritable bowel syndrome)     IBS (irritable bowel syndrome)     CONSTIPATION, CHRONIC SINCE HER 25s    Ill-defined condition     rectocele    Insomnia     TAKES TRAZODONE NIGHTLY    Migraine     REDUCED IN FREQUENCY AND SEVERITY SINCE MENOPAUSE    Other ill-defined conditions     Psychiatric disorder 3/11    DEPRESSION         Past Surgical History:   Procedure Laterality Date    COLONOSCOPY N/A 6/21/2016    COLONOSCOPY performed by Nneka Maradiaga [facility] at [facility], DIAGNOSTIC  11/2010    Dr. Carter Dickens-     HX CHOLECYSTECTOMY  2006   Charisse Sever  2007    Dr. Pond/ diverticulitis    HX GI  X3  LAST ONE 12/10    COLONOSCOPY    HX GYN  1982    D&C WITH SUCTION    HX HYSTERECTOMY      HX ORTHOPAEDIC      right foot surgery    HX ORTHOPAEDIC      neck surgery 3/21/11 Dr. Linda Ruiz HX TONSILLECTOMY           Family History   Problem Relation Age of Onset    Cancer Father      lung and bone    Stroke Sister     Cancer Sister 76     PANCREATIC    Hypertension Mother     Alzheimer Mother     High Cholesterol Mother     Cancer Other      COLON    Cancer Other      COUSIN    Diabetes Maternal Aunt     Cancer Maternal Uncle      COLON    Cancer Maternal Grandfather      COLON    Cancer Daughter      IN REMISSION-OVARIAN CA-IN CLINICAL TRIAL       Social History   Substance Use Topics    Smoking status: Former Smoker     Packs/day: 1.00     Years: 45.00     Quit date: 1/1/2007    Smokeless tobacco: Never Used    Alcohol use No        Patient Active Problem List   Diagnosis Code    IBS (irritable bowel syndrome) K58.9    Fibromyalgia M79.7    Hiatal hernia K44.9    GERD (gastroesophageal reflux disease) K21.9    Hypercholesterolemia E78.00    Hypovitaminosis D E55.9    Diverticulitis K57.92    Depression F32.9    Essential hypertension I10    Encounter for medication monitoring Z51.81    DDD (degenerative disc disease), cervical M50.30       Current Outpatient Prescriptions   Medication Sig Dispense Refill    traMADol (ULTRAM) 50 mg tablet Take 1 Tab by mouth every six (6) hours as needed for Pain. 90 Tab 0    triamcinolone acetonide (KENALOG) 0.1 % topical cream Apply  to affected area two (2) times daily as needed for Skin Irritation.  use thin layer 15 g 0    ALPRAZolam (XANAX) 0.5 mg tablet TAKE 1 TABLET BY MOUTH TWICE A DAY AS NEEDED FOR ANXIETY 180 Tab 1    dicyclomine (BENTYL) 10 mg capsule TAKE 2 CAPSULES FOUR TIMES A DAY AS NEEDED 720 Cap 3    NEXIUM 40 mg capsule TAKE 1 CAPSULE DAILY 90 Cap 3    hydroCHLOROthiazide (HYDRODIURIL) 25 mg tablet TAKE 1 TABLET DAILY 90 Tab 2    LINZESS 290 mcg cap capsule Take 290 mcg by mouth Three (3) times a week.  cholecalciferol (VITAMIN D3) 1,000 unit tablet Take 1,000 Units by mouth every other day.  citalopram (CELEXA) 40 mg tablet TAKE 1 TABLET DAILY 90 Tab 2    rosuvastatin (CRESTOR) 20 mg tablet Take 1 Tab by mouth nightly. 90 Tab 1    lisinopril (PRINIVIL, ZESTRIL) 40 mg tablet TAKE 1 TABLET DAILY 90 Tab 3    cyclobenzaprine (FLEXERIL) 10 mg tablet TAKE 1 TABLET BY MOUTH 3 TIMES A DAY AS NEEDED FOR MUSCLE SPASMS  0    gabapentin (NEURONTIN) 300 mg capsule Take 600 mg by mouth three (3) times daily. 360 Cap 1    traZODone (DESYREL) 50 mg tablet TAKE TWO TABLETS NIGHTLY AS NEEDED 135 Tab 2    DULoxetine (CYMBALTA) 60 mg capsule Take 1 Cap by mouth two (2) times a day. 180 Cap 3    aspirin delayed-release 81 mg tablet Take 81 mg by mouth every other day.          Allergies   Allergen Reactions    Latex Hives    Codeine Other (comments)     Severe Headache    Morphine Hives    Other Medication Rash     BANDAIDS    Shellfish Containing Products Hives         Past Medical History:   Diagnosis Date    Arthritis     [address] [address]     skin cancer    Chronic pain     CHRONIC BOWEL PAIN    Diverticulitis     Fibromyalgia     GERD (gastroesophageal reflux disease)     Goiter     Hiatal hernia     Hypercholesterolemia     Hypertension     IBS (irritable bowel syndrome)     IBS (irritable bowel syndrome)     CONSTIPATION, CHRONIC SINCE HER 25s    Ill-defined condition     rectocele    Insomnia     TAKES TRAZODONE NIGHTLY    Migraine     REDUCED IN FREQUENCY AND SEVERITY SINCE MENOPAUSE    Other ill-defined conditions     Psychiatric disorder 3/11    DEPRESSION         Past Surgical History:   Procedure Laterality Date    COLONOSCOPY N/A 6/21/2016    COLONOSCOPY performed [facility]ios [facility]    ENDOSCOPY, COLON, DIAGNOSTIC  11/2010    Dr. Alessandra Wray-     HX CHOLECYSTECTOMY  2006 Janise Du 2007    Dr. Pond/ diverticulitis    HX GI  X3  LAST ONE 12/10    COLONOSCOPY    HX GYN  1982    D&C WITH SUCTION    HX HYSTERECTOMY      HX ORTHOPAEDIC      right foot surgery    HX ORTHOPAEDIC      neck surgery 3/21/11 Dr. Ruth Young HX TONSILLECTOMY           Family History   Problem Relation Age of Onset    Cancer Father      lung and bone    Stroke Sister     Cancer Sister 76     PANCREATIC    Hypertension Mother     Alzheimer Mother     High Cholesterol Mother     Cancer Other      COLON    Cancer Other      COUSIN    Diabetes Maternal Aunt     Cancer Maternal Uncle      COLON    Cancer Maternal Grandfather      COLON    Cancer Daughter      IN REMISSION-OVARIAN CA-IN CLINICAL TRIAL       Social History   Substance Use Topics    Smoking status: Former Smoker     Packs/day: 1.00     Years: 45.00     Quit date: 1/1/2007    Smokeless tobacco: Never Used    Alcohol use No     Lab Results   Component Value Date/Time    WBC 6.7 07/13/2017 05:10 PM    Hemoglobin (POC) 12.2 08/19/2010 11:52 AM    HGB 11.9 07/13/2017 05:10 PM    Hematocrit (POC) 36 08/19/2010 11:52 AM    HCT 35.2 07/13/2017 05:10 PM    PLATELET 574 13/34/7924 05:10 PM    MCV 95.9 07/13/2017 05:10 PM       Lab Results   Component Value Date/Time    Cholesterol, total 166 07/17/2017 11:31 AM    HDL Cholesterol 40 07/17/2017 11:31 AM    LDL, calculated 86 07/17/2017 11:31 AM    Triglyceride 201 07/17/2017 11:31 AM    CHOL/HDL Ratio 4.1 08/20/2010 03:00 AM       Lab Results   Component Value Date/Time    Sodium 142 07/13/2017 05:10 PM    Potassium 4.2 07/13/2017 05:10 PM    Chloride 105 07/13/2017 05:10 PM    CO2 31 07/13/2017 05:10 PM    Anion gap 6 07/13/2017 05:10 PM    Glucose 107 07/13/2017 05:10 PM    BUN 28 07/13/2017 05:10 PM    Creatinine 1.37 07/13/2017 05:10 PM    BUN/Creatinine ratio 20 07/13/2017 05:10 PM    GFR est AA 46 07/13/2017 05:10 PM    GFR est non-AA 38 07/13/2017 05:10 PM    Calcium 9.2 07/13/2017 05:10 PM    Bilirubin, total 0.2 07/13/2017 05:10 PM    ALT (SGPT) 32 07/13/2017 05:10 PM    AST (SGOT) 23 07/13/2017 05:10 PM    Alk. phosphatase 81 07/13/2017 05:10 PM    Protein, total 6.7 07/13/2017 05:10 PM    Albumin 3.7 07/13/2017 05:10 PM    Globulin 3.0 07/13/2017 05:10 PM    A-G Ratio 1.2 07/13/2017 05:10 PM      Lab Results   Component Value Date/Time    Hemoglobin A1c 5.5 11/19/2013 10:40 AM    Hemoglobin A1c (POC) 5.1 09/19/2016 11:30 AM           Review of Systems   Constitutional: Positive for malaise/fatigue. HENT: Negative for congestion. Eyes: Negative for blurred vision. Respiratory: Negative for cough and shortness of breath. Cardiovascular: Negative for chest pain, palpitations and leg swelling. Gastrointestinal: Negative for abdominal pain, constipation and heartburn. Genitourinary: Negative for dysuria, frequency and urgency. Musculoskeletal: Positive for back pain, joint pain, myalgias and neck pain. Negative for falls. Neurological: Negative for dizziness, tingling and headaches. Endo/Heme/Allergies: Negative for environmental allergies. Psychiatric/Behavioral: Negative for depression. The patient does not have insomnia. Skin: has rash around the ankle area over the past 2-3 days. No itching noted. Physical Exam   Constitutional: She appears well-developed and well-nourished. /53 (BP 1 Location: Left arm, BP Patient Position: Sitting)  Pulse 75  Temp 97.6 °F (36.4 °C) (Oral)   Resp 16  Ht 5' 4\" (1.626 m)  Wt 191 lb 6.4 oz (86.8 kg)  SpO2 96%  BMI 32.85 kg/m2    HENT:   Right Ear: Tympanic membrane and ear canal normal.   Left Ear: Tympanic membrane and ear canal normal.   Nose: No mucosal edema or rhinorrhea. Mouth/Throat: Oropharynx is clear and moist and mucous membranes are normal.   Neck: Normal range of motion. Neck supple. No thyromegaly present. Cardiovascular: Normal rate and regular rhythm. No murmur heard. Pulmonary/Chest: Effort normal and breath sounds normal.   Abdominal: Soft. Bowel sounds are normal. There is no tenderness. Musculoskeletal: Normal range of motion. She exhibits no edema. Cervical back and lumbar spine: She exhibits tenderness, bony tenderness and pain. She exhibits normal range of motion and no spasm. Lymphadenopathy:     She has no cervical adenopathy. Neurological: She has normal strength. No sensory deficit. Coordination and gait normal.   Skin: Skin is warm and dry. Nonspecific skin rash on the left ankle greater than the left. Psychiatric: She has a normal mood and affect. Nursing note and vitals reviewed. ASSESSMENT and PLAN  Diagnoses and all orders for this visit:    1. Essential hypertension  Stable     2. Hypercholesterolemia  Continue to monitor. Work on diet and exercise. 3. IGT (impaired glucose tolerance)  -     AMB POC HEMOGLOBIN A1C  -     AMB POC GLUCOSE, QUANTITATIVE, BLOOD    4. Fibromyalgia  -    refill traMADol (ULTRAM) 50 mg tablet; Take 1 Tab by mouth every six (6) hours as needed for Pain. Max Daily Amount: 200 mg.    5. Spinal stenosis of lumbar region with neurogenic claudication, S/p lumbar decompression.    -     Refill traMADol (ULTRAM) 50 mg tablet; Take 1 Tab by mouth every six (6) hours as needed for Pain. Max Daily Amount: 200 mg.    6. DDD (degenerative disc disease), cervical  -    refill traMADol (ULTRAM) 50 mg tablet; Take 1 Tab by mouth every six (6) hours as needed for Pain. Max Daily Amount: 200 mg.  -     diclofenac (VOLTAREN) 1 % gel; Apply 2 g to affected area four (4) times daily as needed. -     diclofenac EC (VOLTAREN) 75 mg EC tablet; Take 1 Tab by mouth two (2) times daily as needed.     7. Encounter for chronic pain management  -     traMADol (ULTRAM) 50 mg tablet; Take 1 Tab by mouth every six (6) hours as needed for Pain. Max Daily Amount: 200 mg.  -     9-DRUG SCREEN + OXY, UR  The pt has signed medication agreement. Pain contract is reviewed. Pain medications will be continued at the previous dosage. Urine drug screening will be done today. Diagnostic  studies are not indicated at this time. Interventional procedure are not indicated at this time. Re-eval in 3 months. 8. Depression, unspecified depression type  Stable     9. Irritable bowel syndrome with constipation//  10. Gastroesophageal reflux disease, esophagitis presence not specified  Stable       11. Encounter for medication monitoring  -     METABOLIC PANEL, BASIC    12. Non morbid obesity  I have reviewed/discussed the above normal BMI with the patient. I have recommended the following interventions: dietary management education, guidance, and counseling . Follow-up Disposition:  Return in about 3 months (around 3/26/2018). reviewed diet, exercise and weight control  cardiovascular risk and specific lipid/LDL goals reviewed  reviewed medications and side effects in detail    I have discussed diagnosis listed in this note with pt and/or family. I have discussed treatment plans and options and the risk/benefit analysis of those options, including safe use of medications and possible medication side effects. Through the use of shared decision making we have agreed to the above plan. The patient has received an after-visit summary and questions were answered concerning future plans and follow up. Advise pt of any urgent changes then to proceed to the ER.

## 2017-12-20 NOTE — PROGRESS NOTES
Chief Complaint   Patient presents with    Hypertension     follow up    Cholesterol Problem     follow up    Sore Throat     x 3 days       Mammogram 8/28/2017    Colonoscopy 6/21/2016 by Dr. Angelina Srivastava- repeat in 5 years. Patient states last eye exam was oer a year ago.

## 2017-12-21 ENCOUNTER — DOCUMENTATION ONLY (OUTPATIENT)
Dept: FAMILY MEDICINE CLINIC | Age: 67
End: 2017-12-21

## 2017-12-21 LAB
AMPHETAMINES UR QL SCN: NEGATIVE NG/ML
BARBITURATES UR QL SCN: NEGATIVE NG/ML
BENZODIAZ UR QL SCN: POSITIVE NG/ML
BUN SERPL-MCNC: 24 MG/DL (ref 8–27)
BUN/CREAT SERPL: 20 (ref 12–28)
BZE UR QL SCN: NEGATIVE NG/ML
CALCIUM SERPL-MCNC: 9.9 MG/DL (ref 8.7–10.3)
CANNABINOIDS UR QL SCN: NEGATIVE NG/ML
CHLORIDE SERPL-SCNC: 99 MMOL/L (ref 96–106)
CO2 SERPL-SCNC: 25 MMOL/L (ref 18–29)
CREAT SERPL-MCNC: 1.23 MG/DL (ref 0.57–1)
CREAT UR-MCNC: 185.6 MG/DL (ref 20–300)
GLUCOSE SERPL-MCNC: 79 MG/DL (ref 65–99)
INTERPRETATION: NORMAL
METHADONE UR QL SCN: NEGATIVE NG/ML
OPIATES UR QL SCN: NEGATIVE NG/ML
OXYCODONE+OXYMORPHONE UR QL SCN: NEGATIVE NG/ML
PCP UR QL SCN: NEGATIVE NG/ML
PH UR: 5.4 [PH] (ref 4.5–8.9)
PLEASE NOTE:, 733163: NORMAL
POTASSIUM SERPL-SCNC: 4.2 MMOL/L (ref 3.5–5.2)
PROPOXYPH UR QL SCN: NEGATIVE NG/ML
SODIUM SERPL-SCNC: 140 MMOL/L (ref 134–144)

## 2017-12-21 NOTE — PROGRESS NOTES
LVM for patient to call office, [facility] is out stock on Diclofenac tablets. Need to ask patient if she wants us send rx to local pharmacy.

## 2017-12-22 RX ORDER — DICLOFENAC SODIUM 75 MG/1
75 TABLET, DELAYED RELEASE ORAL
Qty: 60 TAB | Refills: 3 | Status: SHIPPED | OUTPATIENT
Start: 2017-12-22 | End: 2018-03-20 | Stop reason: SDUPTHER

## 2018-01-01 NOTE — DISCHARGE INSTRUCTIONS
Pelvic Pain: Care Instructions  Your Care Instructions    Pelvic pain, or pain in the lower belly, can have many causes. Often pelvic pain is not serious and gets better in a few days. If your pain continues or gets worse, you may need tests and treatment. Tell your doctor about any new symptoms. These may be signs of a serious problem. Follow-up care is a key part of your treatment and safety. Be sure to make and go to all appointments, and call your doctor if you are having problems. It's also a good idea to know your test results and keep a list of the medicines you take. How can you care for yourself at home? · Rest until you feel better. Lie down, and raise your legs by placing a pillow under your knees. · Drink plenty of fluids. You may find that small, frequent sips are easier on your stomach than if you drink a lot at once. Avoid drinks with carbonation or caffeine, such as soda pop, tea, or coffee. · Try eating several small meals instead of 2 or 3 large ones. Eat mild foods, such as rice, dry toast or crackers, bananas, and applesauce. Avoid fatty and spicy foods, other fruits, and alcohol until 48 hours after your symptoms have gone away. · Take an over-the-counter pain medicine, such as acetaminophen (Tylenol), ibuprofen (Advil, Motrin), or naproxen (Aleve). Read and follow all instructions on the label. · Do not take two or more pain medicines at the same time unless the doctor told you to. Many pain medicines have acetaminophen, which is Tylenol. Too much acetaminophen (Tylenol) can be harmful. · You can put a heating pad, a warm cloth, or moist heat on your belly to relieve pain. When should you call for help? Call 911 anytime you think you may need emergency care. For example, call if:  · You passed out (lost consciousness). Call your doctor now or seek immediate medical care if:  · Your pain is getting worse. · Your pain becomes focused in one area of your belly.   · You have severe vaginal bleeding. Severe means that you are soaking through your usual pads or tampons every hour for 2 or more hours and passing clots of blood. · You have new symptoms such as fever, urinary problems or unexpected vaginal bleeding. · You are dizzy or lightheaded, or you feel like you may faint. Watch closely for changes in your health, and be sure to contact your doctor if:  · You do not get better as expected. Where can you learn more? Go to http://mila-mary.info/. Enter [id number] in the search box to learn more about \"Pelvic Pain: Care Instructions. \"  Current as of: February 25, 2016  Content Version: 11.1  © 8635-9728 [employer]. Care instructions adapted under license by [employer] (which disclaims liability or warranty for this information). If you have questions about a medical condition or this instruction, always ask your healthcare professional. [place] [address] any warranty or liability for your use of this information. Blood in the Urine: Care Instructions  Your Care Instructions  Blood in the urine, or hematuria, may make the urine look red, brown, or pink. There may be blood every time you urinate or just from time to time. You cannot always see blood in the urine, but it will show up in a urine test.  Blood in the urine may be serious. It should always be checked by a doctor. Your doctor may recommend more tests, including an X-ray, a CT scan, or a cystoscopy (which lets a doctor look inside the urethra and bladder). Blood in the urine can be a sign of another problem. Common causes are bladder infections and kidney stones. An injury to your groin or your genital area can also cause bleeding in the urinary tract. Very hard exercise--such as running a marathon--can cause blood in the urine. Blood in the urine can also be a sign of kidney disease or cancer in the bladder or kidney.  Many cases of blood in the urine are caused by a harmless condition that runs in families. This is called benign familial hematuria. It does not need any treatment. Sometimes your urine may look red or brown even though it does not contain blood. For example, not getting enough fluids (dehydration), taking certain medicines, or having a liver problem can change the color of your urine. Eating foods such as beets, rhubarb, or blackberries or foods with red food coloring can make your urine look red or pink. Follow-up care is a key part of your treatment and safety. Be sure to make and go to all appointments, and call your doctor if you are having problems. It's also a good idea to know your test results and keep a list of the medicines you take. When should you call for help? Call your doctor now or seek immediate medical care if:  · You have symptoms of a urinary infection. For example:  ¨ You have pus in your urine. ¨ You have pain in your back just below your rib cage. This is called flank pain. ¨ You have a fever, chills, or body aches. ¨ It hurts to urinate. ¨ You have groin or belly pain. · You have more blood in your urine. Watch closely for changes in your health, and be sure to contact your doctor if:  · You have new urination problems. · You do not get better as expected. Where can you learn more? Go to http://mila-mary.info/. Enter N439 in the search box to learn more about \"Blood in the Urine: Care Instructions. \"  Current as of: August 12, 2016  Content Version: 11.1  © 0037-5916 [employer]. Care instructions adapted under license by [employer] (which disclaims liability or warranty for this information). If you have questions about a medical condition or this instruction, always ask your healthcare professional. [place] [address] any warranty or liability for your use of this information.          Urinary Tract Infection in Women: Care Instructions  Your Care Instructions    A urinary tract infection, or UTI, is a general term for an infection anywhere between the kidneys and the urethra (where urine comes out). Most UTIs are bladder infections. They often cause pain or burning when you urinate. UTIs are caused by bacteria and can be cured with antibiotics. Be sure to complete your treatment so that the infection goes away. Follow-up care is a key part of your treatment and safety. Be sure to make and go to all appointments, and call your doctor if you are having problems. It's also a good idea to know your test results and keep a list of the medicines you take. How can you care for yourself at home? · Take your antibiotics as directed. Do not stop taking them just because you feel better. You need to take the full course of antibiotics. · Drink extra water and other fluids for the next day or two. This may help wash out the bacteria that are causing the infection. (If you have kidney, heart, or liver disease and have to limit fluids, talk with your doctor before you increase your fluid intake.)  · Avoid drinks that are carbonated or have caffeine. They can irritate the bladder. · Urinate often. Try to empty your bladder each time. · To relieve pain, take a hot bath or lay a heating pad set on low over your lower belly or genital area. Never go to sleep with a heating pad in place. To prevent UTIs  · Drink plenty of water each day. This helps you urinate often, which clears bacteria from your system. (If you have kidney, heart, or liver disease and have to limit fluids, talk with your doctor before you increase your fluid intake.)  · Consider adding cranberry juice to your diet. · Urinate when you need to. · Urinate right after you have sex. · Change sanitary pads often. · Avoid douches, bubble baths, feminine hygiene sprays, and other feminine hygiene products that have deodorants. · After going to the bathroom, wipe from front to back.   When should you call for help? Call your doctor now or seek immediate medical care if:  · Symptoms such as fever, chills, nausea, or vomiting get worse or appear for the first time. · You have new pain in your back just below your rib cage. This is called flank pain. · There is new blood or pus in your urine. · You have any problems with your antibiotic medicine. Watch closely for changes in your health, and be sure to contact your doctor if:  · You are not getting better after taking an antibiotic for 2 days. · Your symptoms go away but then come back. Where can you learn more? Go to http://mila-mary.info/. Enter G272 in the search box to learn more about \"Urinary Tract Infection in Women: Care Instructions. \"  Current as of: August 12, 2016  Content Version: 11.1  © 5851-0106 [employer]. Care instructions adapted under license by [employer] (which disclaims liability or warranty for this information). If you have questions about a medical condition or this instruction, always ask your healthcare professional. [place] [address] any warranty or liability for your use of this information. none

## 2018-01-30 RX ORDER — SULFAMETHOXAZOLE AND TRIMETHOPRIM 800; 160 MG/1; MG/1
1 TABLET ORAL 2 TIMES DAILY
Qty: 10 TAB | Refills: 0 | Status: SHIPPED | OUTPATIENT
Start: 2018-01-30 | End: 2018-02-04

## 2018-01-30 RX ORDER — CIPROFLOXACIN 250 MG/1
250 TABLET, FILM COATED ORAL 2 TIMES DAILY
Qty: 10 TAB | Refills: 0 | Status: CANCELLED | OUTPATIENT
Start: 2018-01-30 | End: 2018-02-04

## 2018-01-30 NOTE — TELEPHONE ENCOUNTER
Patient c/o urinary urgency and abd pressure. Patient states she is taking Linzess but still having some issue with constipation. Will check with Dr. Tim. Called patient back dr. Tim will be sending in a RX to the pharm and to take a stool softener 1 tab 2 times a day and to drink plenty of water. Patient verbalized understanding.

## 2018-01-30 NOTE — TELEPHONE ENCOUNTER
Patient had surgery a few months ago and is still having issues and would like to see Felicita Burnett next week. Patient can be reached at [phone].

## 2018-02-16 DIAGNOSIS — M79.7 FIBROMYALGIA: ICD-10-CM

## 2018-02-16 DIAGNOSIS — M48.062 SPINAL STENOSIS OF LUMBAR REGION WITH NEUROGENIC CLAUDICATION: ICD-10-CM

## 2018-02-16 DIAGNOSIS — G89.29 ENCOUNTER FOR CHRONIC PAIN MANAGEMENT: ICD-10-CM

## 2018-02-16 DIAGNOSIS — M50.30 DDD (DEGENERATIVE DISC DISEASE), CERVICAL: ICD-10-CM

## 2018-02-16 RX ORDER — CITALOPRAM 40 MG/1
TABLET, FILM COATED ORAL
Qty: 90 TAB | Refills: 3 | Status: SHIPPED | OUTPATIENT
Start: 2018-02-16 | End: 2019-03-25 | Stop reason: SDUPTHER

## 2018-02-16 RX ORDER — TRAMADOL HYDROCHLORIDE 50 MG/1
50 TABLET ORAL
Qty: 90 TAB | Refills: 0 | OUTPATIENT
Start: 2018-02-16 | End: 2018-04-18 | Stop reason: SDUPTHER

## 2018-02-16 NOTE — TELEPHONE ENCOUNTER
Patient wants to get the medication citalopram (CELEXA) 40 mg tablet & traMADol (ULTRAM) 50 mg tablet .   If any questions please give her a call @ [phone]

## 2018-02-26 RX ORDER — ROSUVASTATIN CALCIUM 20 MG/1
TABLET, COATED ORAL
Qty: 90 TAB | Refills: 1 | Status: SHIPPED | OUTPATIENT
Start: 2018-02-26 | End: 2018-09-22 | Stop reason: SDUPTHER

## 2018-03-20 ENCOUNTER — OFFICE VISIT (OUTPATIENT)
Dept: FAMILY MEDICINE CLINIC | Age: 68
End: 2018-03-20

## 2018-03-20 ENCOUNTER — HOSPITAL ENCOUNTER (OUTPATIENT)
Dept: LAB | Age: 68
Discharge: HOME | End: 2018-03-20
Payer: MEDICARE

## 2018-03-20 VITALS
SYSTOLIC BLOOD PRESSURE: 149 MMHG | TEMPERATURE: 98.3 F | HEART RATE: 75 BPM | DIASTOLIC BLOOD PRESSURE: 73 MMHG | WEIGHT: 192 LBS | HEIGHT: 64 IN | BODY MASS INDEX: 32.78 KG/M2 | OXYGEN SATURATION: 98 % | RESPIRATION RATE: 20 BRPM

## 2018-03-20 DIAGNOSIS — Z51.81 ENCOUNTER FOR MEDICATION MONITORING: ICD-10-CM

## 2018-03-20 DIAGNOSIS — M79.7 FIBROMYALGIA: ICD-10-CM

## 2018-03-20 DIAGNOSIS — D64.9 ANEMIA, UNSPECIFIED TYPE: ICD-10-CM

## 2018-03-20 DIAGNOSIS — F32.A DEPRESSION, UNSPECIFIED DEPRESSION TYPE: ICD-10-CM

## 2018-03-20 DIAGNOSIS — E78.2 MIXED HYPERLIPIDEMIA: ICD-10-CM

## 2018-03-20 DIAGNOSIS — I10 ESSENTIAL HYPERTENSION: Primary | ICD-10-CM

## 2018-03-20 DIAGNOSIS — E66.9 NON MORBID OBESITY: ICD-10-CM

## 2018-03-20 DIAGNOSIS — K21.9 GASTROESOPHAGEAL REFLUX DISEASE, ESOPHAGITIS PRESENCE NOT SPECIFIED: ICD-10-CM

## 2018-03-20 DIAGNOSIS — R68.89 GENERAL SYMPTOM: ICD-10-CM

## 2018-03-20 DIAGNOSIS — G89.29 ENCOUNTER FOR CHRONIC PAIN MANAGEMENT: ICD-10-CM

## 2018-03-20 DIAGNOSIS — M50.30 DDD (DEGENERATIVE DISC DISEASE), CERVICAL: ICD-10-CM

## 2018-03-20 PROCEDURE — 82728 ASSAY OF FERRITIN: CPT

## 2018-03-20 PROCEDURE — 83550 IRON BINDING TEST: CPT

## 2018-03-20 PROCEDURE — 82607 VITAMIN B-12: CPT

## 2018-03-20 RX ORDER — DICLOFENAC SODIUM 10 MG/G
2 GEL TOPICAL
Qty: 100 G | Refills: 3 | Status: SHIPPED | OUTPATIENT
Start: 2018-03-20 | End: 2018-04-18 | Stop reason: SDUPTHER

## 2018-03-20 RX ORDER — PREDNISONE 10 MG/1
TABLET ORAL
Qty: 21 TAB | Refills: 0 | Status: SHIPPED | OUTPATIENT
Start: 2018-03-20 | End: 2018-03-20 | Stop reason: SDUPTHER

## 2018-03-20 RX ORDER — PREDNISONE 10 MG/1
TABLET ORAL
Qty: 21 TAB | Refills: 0 | Status: SHIPPED | OUTPATIENT
Start: 2018-03-20 | End: 2018-04-25 | Stop reason: ALTCHOICE

## 2018-03-20 RX ORDER — OXYBUTYNIN CHLORIDE 5 MG/1
TABLET ORAL
Refills: 0 | COMMUNITY
Start: 2018-03-08 | End: 2020-01-28 | Stop reason: SDUPTHER

## 2018-03-20 RX ORDER — DICLOFENAC SODIUM 75 MG/1
75 TABLET, DELAYED RELEASE ORAL
Qty: 60 TAB | Refills: 3 | Status: SHIPPED | OUTPATIENT
Start: 2018-03-20 | End: 2018-10-11 | Stop reason: SDUPTHER

## 2018-03-20 NOTE — PROGRESS NOTES
Chief Complaint   Patient presents with    Hypertension     follow up    Cholesterol Problem     follow up       Mammogram 08/28/2017    Colonoscopy 06/21/2016-By Dr. Renetta Ferrer exam 1 year ago  Patient states she cannot remember the name of her eye doctor but she ayesha to [facility] [place] for her eye exam.    1. Have you been to the ER, urgent care clinic since your last visit? NO    Hospitalized since your last visit? No    2. Have you seen or consulted any other health care providers outside of the [address] [place] [address] since your last visit? NO    Patient states pain in left arm 5/10.

## 2018-03-20 NOTE — MR AVS SNAPSHOT
Shannon Monaco 
 
 
 [address] [facility] [place] [address] 
[phone] Patient: Leeroy  MRN: [id number] HEQ:[id number] Visit Information Date & Time Provider Department Dept. Phone Encounter #  
 3/20/2018  3:00 PM Geno Bingham MD [facility] [phone] [id number] Follow-up Instructions Return in about 3 months (around 6/20/2018). Your Appointments 6/20/2018  9:45 AM  
ROUTINE CARE with Geno Bingham MD  
[facility] [address]) Appt Note: routine follow up  
 [address] [facility] [place] [id number]  
[phone] [address] [facility] [address] Upcoming Health Maintenance Date Due Influenza Age 5 to Adult 8/1/2017 MEDICARE YEARLY EXAM 3/22/2018 GLAUCOMA SCREENING Q2Y 11/2/2018 BREAST CANCER SCRN MAMMOGRAM 8/28/2019 COLONOSCOPY 6/21/2021 DTaP/Tdap/Td series (3 - Td) 11/21/2026 Allergies as of 3/20/2018  Review Complete On: 3/20/2018 By: Prabhjot Wahl LPN Severity Noted Reaction Type Reactions Latex  07/19/2017    Hives Codeine High 03/15/2010    Other (comments) Severe Headache Morphine High 03/15/2010    Hives Dilaudid [Hydromorphone]  12/20/2017    Other (comments) Memory loss Other Medication  03/15/2011    Rash BANDAIDS Shellfish Containing Products  03/15/2011    Hives Current Immunizations  Reviewed on 3/20/2018 Name Date Influenza High Dose Vaccine PF 9/19/2016 10:00 AM, 10/30/2015, 11/11/2014 Influenza Vaccine 11/19/2013 Influenza Vaccine Split 11/26/2012, 12/12/2011, 9/24/2010 Pneumococcal Conjugate (PCV-13) 5/18/2015 Pneumococcal Vaccine (Unspecified Type) 11/19/2013 Tdap 5/26/2014 12:40 PM  
 Zoster Vaccine, Live 4/16/2012  Reviewed by Geno Bingham MD on 3/20/2018 at  4:02 PM  
You Were Diagnosed With   
  
 Codes Comments Essential hypertension    -  Primary ICD-10-CM: I10 
ICD-9-CM: 401.9 Mixed hyperlipidemia     ICD-10-CM: E78.2 ICD-9-CM: 272.2 Gastroesophageal reflux disease, esophagitis presence not specified     ICD-10-CM: K21.9 ICD-9-CM: 530.81 Fibromyalgia     ICD-10-CM: M79.7 ICD-9-CM: 729.1 Non morbid obesity     ICD-10-CM: E66.9 ICD-9-CM: 278.00 Encounter for medication monitoring     ICD-10-CM: Z51.81 
ICD-9-CM: V58.83 Encounter for chronic pain management     ICD-10-CM: G89.29 ICD-9-CM: V65.49 Vitals BP Pulse Temp Resp Height(growth percentile) Weight(growth percentile) 149/73 (BP 1 Location: Left arm, BP Patient Position: Sitting) 75 98.3 °F (36.8 °C) (Oral) 20 5' 4\" (1.626 m) 192 lb (87.1 kg) SpO2 BMI OB Status Smoking Status 98% 32.96 kg/m2 Hysterectomy Former Smoker Vitals History BMI and BSA Data Body Mass Index Body Surface Area  
 32.96 kg/m 2 1.98 m 2 Preferred Pharmacy Pharmacy Name Phone [facility] [phone] [phone], [address] AT [place] [address] [phone] Your Updated Medication List  
  
   
This list is accurate as of 3/20/18  5:34 PM.  Always use your most recent med list.  
  
  
  
  
 ALPRAZolam 0.5 mg tablet Commonly known as:  XANAX  
TAKE 1 TABLET BY MOUTH TWICE A DAY AS NEEDED FOR ANXIETY  
  
 aspirin delayed-release 81 mg tablet Take 81 mg by mouth every other day. aspirin-calcium carbonate 81 mg-300 mg calcium(777 mg) Tab Take 81 mg by mouth. CARAFATE 1 gram tablet Generic drug:  sucralfate Take 1 g by mouth four (4) times daily. citalopram 40 mg tablet Commonly known as:  CELEXA  
TAKE 1 TABLET DAILY  
  
 cyclobenzaprine 10 mg tablet Commonly known as:  FLEXERIL  
TAKE 1 TABLET BY MOUTH 3 TIMES A DAY AS NEEDED FOR MUSCLE SPASMS * diclofenac 1 % Gel Commonly known as:  VOLTAREN  
 Apply 2 g to affected area four (4) times daily as needed. * diclofenac EC 75 mg EC tablet Commonly known as:  VOLTAREN Take 1 Tab by mouth two (2) times daily as needed. dicyclomine 10 mg capsule Commonly known as:  BENTYL TAKE 2 CAPSULES FOUR TIMES A DAY AS NEEDED DULoxetine 60 mg capsule Commonly known as:  CYMBALTA TAKE 1 CAPSULE TWICE A DAY  
  
 gabapentin 300 mg capsule Commonly known as:  NEURONTIN Take 600 mg by mouth three (3) times daily. hydroCHLOROthiazide 25 mg tablet Commonly known as:  HYDRODIURIL  
TAKE 1 TABLET DAILY LINZESS 290 mcg Cap capsule Generic drug:  linaclotide Take 290 mcg by mouth Three (3) times a week. lisinopril 40 mg tablet Commonly known as:  PRINIVIL, ZESTRIL  
TAKE 1 TABLET DAILY  
  
 magnesium citrate solution USE 1 BOTTLE BY MOUTH DAILY  
  
 naloxone 4 mg/actuation nasal spray Commonly known as:  NARCAN  
4 mg by Nasal route as needed (respiratory depression). Give single spray into one nostril. Call 911. Give additional doses every 2 to 3 minutes alternating nostrils until assistance arrives using a new nasal spray with each dose, if patient does not respond or responds and then relapses. NexIUM 40 mg capsule Generic drug:  esomeprazole TAKE 1 CAPSULE DAILY  
  
 oxybutynin 5 mg tablet Commonly known as:  DITROPAN  
take 1 tablet 2 times a day  
  
 predniSONE 10 mg dose pack Commonly known as:  STERAPRED DS See administration instruction per 10mg dose pack  
  
 rosuvastatin 20 mg tablet Commonly known as:  CRESTOR  
TAKE 1 TABLET NIGHTLY  
  
 senna-docusate 8.6-50 mg per tablet Commonly known as:  Dellia Bustle Take 1 Tab by mouth daily. traMADol 50 mg tablet Commonly known as:  ULTRAM  
Take 1 Tab by mouth every six (6) hours as needed for Pain. Max Daily Amount: 200 mg.  
  
 traZODone 50 mg tablet Commonly known as:  DESYREL  
TAKE TWO TABLETS NIGHTLY AS NEEDED  
  
 triamcinolone acetonide 0.1 % topical cream  
Commonly known as:  KENALOG Apply  to affected area two (2) times daily as needed for Skin Irritation. use thin layer VITAMIN D3 1,000 unit tablet Generic drug:  cholecalciferol Take 1,000 Units by mouth daily. * Notice: This list has 2 medication(s) that are the same as other medications prescribed for you. Read the directions carefully, and ask your doctor or other care provider to review them with you. Prescriptions Sent to Pharmacy Refills  
 diclofenac (VOLTAREN) 1 % gel 3 Sig: Apply 2 g to affected area four (4) times daily as needed. Class: Normal  
 Pharmacy: [address], [address] Ph #: [phone] Route: Topical  
 diclofenac EC (VOLTAREN) 75 mg EC tablet 3 Sig: Take 1 Tab by mouth two (2) times daily as needed. Class: Normal  
 Pharmacy: [address], [address] Ph #: [phone] Route: Oral  
 predniSONE (STERAPRED DS) 10 mg dose pack 0 Sig: See administration instruction per 10mg dose pack Class: Normal  
 Pharmacy: [facility] [address] [place] [address] Ph #: [phone] We Performed the Following 9-DRUG SCREEN + OXY, UR [id number] CPT(R)] CBC W/O DIFF [93081 CPT(R)] LIPID PANEL [89872 CPT(R)] METABOLIC PANEL, COMPREHENSIVE [92926 CPT(R)] Follow-up Instructions Return in about 3 months (around 6/20/2018). Introducing [facility]! Dear Chivo Rodriguez: 
Thank you for requesting a [employer] account. Our records indicate that you already have an active [employer] account. You can access your account anytime at [email or web address] Did you know that you can access your hospital and ER discharge instructions at any time in [employer]?   You can also review all of your test results from your hospital stay or ER visit. Additional Information If you have questions, please visit the Frequently Asked Questions section of the [employer] website at [email or web address] Remember, [employer] is NOT to be used for urgent needs. For medical emergencies, dial 911. Now available from your iPhone and Android! Please provide this summary of care documentation to your next provider. Your primary care clinician is listed as Hebert Gan. If you have any questions after today's visit, please call [phone].

## 2018-03-20 NOTE — PROGRESS NOTES
HISTORY OF PRESENT ILLNESS  Chalino Singh is a 74 y.o. female. HPI   Follow up on chronic medical problems. HTN follow up:  Compliant w/ meds, low salt diet, and exercise. No home bp monitoring. No swelling, headache or dizziness. No chest pain, SOB, palpitations. Otherwise feeling well since the last visit. Hypercholesterolemia follow up:  Compliant w/ low fat, low cholesterol diet. Exercising some. Tolerating crestor. No muscle more than her usual from the fibromyalgia, no abdominal pain, no skin discoloration. Patient is not fasting today. Hypercholesterolemia follow up:  Compliant w/ meds, low fat, low cholesterol diet. Exercising some. No muscle nor abdominal pain, no skin discoloration. Patient fasting today. Depression Review:  Patient is seen for followup of depression and fibromyalgia and IBS. Currently taking cymbalta and Celexa and gabapentin. Ongoing symptoms include fatigue and stress. She experiences the following side effects from the treatment: none. Encounter for pain management. 1./2. Medical history/Past medical History  Chronic Pain:  Patient has long standing fibromyalgia. Had surgery for the spinal stenosis. Still has aching in the legs and feet and back. Has been worse over the past few weeks. Pain is 7-8/10. Currently taking cymbalta and Celexa and gabapentin. She also takes motrin or diclofenac for less severe pain. Pt takes tramadol for more severe pain  Ongoing symptoms include fatigue and pain. 3. Applicable records from prior treatment providers are apart of [facility] under the media tab. 4. Diagnostic, therapeutic and laboratory results are available in the [facility] chart. 5. Consultation notes are available for review in the media tab of the [facility] chart. 6. Treatment goals include pain control so that the pt may be as active and function with her daily activities and improved comfort level.   Previously pt has been limited by pain.    7. The risks and benefits of treatment has been discussed at this office visit with the pt. She understands that the medication has addicting potential.  Additionally the pt has been advised that narcotic pain medication may impair mental and/or physical ability required for performance of tasks such as driving or operating any other machinery. 8. Pt has an updated signed pain contract on file and can be found under the FYI section of the [facility] chart. 9. The pain contract is reviewed. Pain medication will be continued at the previous dosage. Urine drug screening will be done today. Diagnostic studies are not indicated at this time. Interventional procedure are not indicated at this time. 10. Medication prescibed is traMADol (ULTRAM) 50 mg tablet.  has been reviewed at this OV by me. 11. Patient instructions have been reviewed in detail as outlined above and in the pain contract. 12. Re-eval is planned for 3 months. Patient Active Problem List   Diagnosis Code    IBS (irritable bowel syndrome) K58.9    Fibromyalgia M79.7    Hiatal hernia K44.9    GERD (gastroesophageal reflux disease) K21.9    Hypercholesterolemia E78.00    Hypovitaminosis D E55.9    Diverticulitis K57.92    Depression F32.9    Essential hypertension I10    Encounter for medication monitoring Z51.81    DDD (degenerative disc disease), cervical M50.30    Spinal stenosis of lumbar region with neurogenic claudication M48.062    Non morbid obesity E66.9       Current Outpatient Prescriptions   Medication Sig Dispense Refill    oxybutynin (DITROPAN) 5 mg tablet take 1 tablet 2 times a day  0    diclofenac (VOLTAREN) 1 % gel Apply 2 g to affected area four (4) times daily as needed. 100 g 3    diclofenac EC (VOLTAREN) 75 mg EC tablet Take 1 Tab by mouth two (2) times daily as needed.  60 Tab 3    predniSONE (STERAPRED DS) 10 mg dose pack See administration instruction per 10mg dose pack 21 Tab 0    rosuvastatin (CRESTOR) 20 mg tablet TAKE 1 TABLET NIGHTLY 90 Tab 1    citalopram (CELEXA) 40 mg tablet TAKE 1 TABLET DAILY 90 Tab 3    traMADol (ULTRAM) 50 mg tablet Take 1 Tab by mouth every six (6) hours as needed for Pain. Max Daily Amount: 200 mg. 90 Tab 0    magnesium citrate solution USE 1 BOTTLE BY MOUTH DAILY  2    ALPRAZolam (XANAX) 0.5 mg tablet TAKE 1 TABLET BY MOUTH TWICE A DAY AS NEEDED FOR ANXIETY 180 Tab 1    DULoxetine (CYMBALTA) 60 mg capsule TAKE 1 CAPSULE TWICE A  Cap 3    traZODone (DESYREL) 50 mg tablet TAKE TWO TABLETS NIGHTLY AS NEEDED 135 Tab 2    sucralfate (CARAFATE) 1 gram tablet Take 1 g by mouth four (4) times daily.  triamcinolone acetonide (KENALOG) 0.1 % topical cream Apply  to affected area two (2) times daily as needed for Skin Irritation. use thin layer 15 g 0    dicyclomine (BENTYL) 10 mg capsule TAKE 2 CAPSULES FOUR TIMES A DAY AS NEEDED 720 Cap 3    NEXIUM 40 mg capsule TAKE 1 CAPSULE DAILY 90 Cap 3    hydroCHLOROthiazide (HYDRODIURIL) 25 mg tablet TAKE 1 TABLET DAILY 90 Tab 2    LINZESS 290 mcg cap capsule Take 290 mcg by mouth Three (3) times a week.  cholecalciferol (VITAMIN D3) 1,000 unit tablet Take 1,000 Units by mouth daily.  lisinopril (PRINIVIL, ZESTRIL) 40 mg tablet TAKE 1 TABLET DAILY 90 Tab 3    cyclobenzaprine (FLEXERIL) 10 mg tablet TAKE 1 TABLET BY MOUTH 3 TIMES A DAY AS NEEDED FOR MUSCLE SPASMS  0    gabapentin (NEURONTIN) 300 mg capsule Take 600 mg by mouth three (3) times daily. 360 Cap 1    aspirin delayed-release 81 mg tablet Take 81 mg by mouth every other day.  naloxone (NARCAN) 4 mg/actuation spry 4 mg by Nasal route as needed (respiratory depression). Give single spray into one nostril. Call 911. Give additional doses every 2 to 3 minutes alternating nostrils until assistance arrives using a new nasal spray with each dose, if patient does not respond or responds and then relapses.  1 Box 0       Allergies   Allergen Reactions    Latex Hives    Codeine Other (comments)     Severe Headache    Morphine Hives    Dilaudid [Hydromorphone] Other (comments)     Memory loss    Other Medication Rash     BANDAIDS    Shellfish Containing Products Hives       Past Medical History:   Diagnosis Date    Arthritis     Cancer ([address]     skin cancer    Chronic pain     CHRONIC BOWEL PAIN    Diverticulitis     Fibromyalgia     GERD (gastroesophageal reflux disease)     Goiter     Hiatal hernia     Hypercholesterolemia     Hypertension     IBS (irritable bowel syndrome)     IBS (irritable bowel syndrome)     CONSTIPATION, CHRONIC SINCE HER 25s    Ill-defined condition     rectocele    Insomnia     TAKES TRAZODONE NIGHTLY    Migraine     REDUCED IN FREQUENCY AND SEVERITY SINCE MENOPAUSE    Other ill-defined conditions(799.89)     Psychiatric disorder 3/11    DEPRESSION       Past Surgical History:   Procedure Laterality Date    COLONOSCOPY N/A 6/21/2016    COLONOSCOPY performed by Sera Winters MD at [facility] ENDOSCOPY    ENDOSCOPY, COLON, DIAGNOSTIC  11/2010    Dr. Payne Egangela-     HX CHOLECYSTECTOMY  2006   Richie Isabel  2007    Dr. Pond/ diverticulitis    HX GI  X3  LAST ONE 12/10    COLONOSCOPY    HX GYN  1982    D&C WITH SUCTION    HX HYSTERECTOMY      HX ORTHOPAEDIC      right foot surgery    HX ORTHOPAEDIC      neck surgery 3/21/11 Dr. Son Alvarez HX ORTHOPAEDIC  08/31/2017    back surgery    HX OTHER SURGICAL      EGD    HX TONSILLECTOMY         Family History   Problem Relation Age of Onset   [address] [facility] Cancer Father      lung and bone    Stroke Sister     Cancer Sister 76     PANCREATIC    Hypertension Mother     High Cholesterol Mother     Alzheimer Mother      late 62s early 76s    Cancer Other      COLON    Cancer Other      breast    Diabetes Maternal Aunt     Cancer Maternal Uncle      COLON    Cancer Maternal Grandfather      COLON    Ovarian Cancer Daughter 28       Social History   Substance Use Topics    Smoking status: Former Smoker     Packs/day: 1.00     Years: 45.00     Quit date: 1/1/2007    Smokeless tobacco: Never Used    Alcohol use 0.0 oz/week     0 Standard drinks or equivalent per week      Comment: rare       Lab Results  Component Value Date/Time   WBC 7.5 08/18/2017 03:27 PM   HGB 11.7 08/18/2017 03:27 PM   Hemoglobin (POC) 11.9 08/29/2017 09:26 AM   HCT 34.9 (L) 08/18/2017 03:27 PM   Hematocrit (POC) 35 08/29/2017 09:26 AM   PLATELET 297 86/26/8022 03:27 PM   MCV 94.3 08/18/2017 03:27 PM     Lab Results  Component Value Date/Time   Cholesterol, total 166 07/17/2017 11:31 AM   HDL Cholesterol 40 07/17/2017 11:31 AM   LDL, calculated 86 07/17/2017 11:31 AM   Triglyceride 201 (H) 07/17/2017 11:31 AM   CHOL/HDL Ratio 4.1 08/20/2010 03:00 AM     Lab Results  Component Value Date/Time   TSH 1.980 09/19/2016 11:30 AM   T4, Free 1.25 12/11/2013 03:45 PM      Lab Results   Component Value Date/Time    Sodium 140 12/20/2017 04:09 PM    Potassium 4.2 12/20/2017 04:09 PM    Chloride 99 12/20/2017 04:09 PM    CO2 25 12/20/2017 04:09 PM    Anion gap 7 08/18/2017 03:27 PM    Glucose 79 12/20/2017 04:09 PM    BUN 24 12/20/2017 04:09 PM    Creatinine 1.23 (H) 12/20/2017 04:09 PM    BUN/Creatinine ratio 20 12/20/2017 04:09 PM    GFR est AA 52 (L) 12/20/2017 04:09 PM    GFR est non-AA 45 (L) 12/20/2017 04:09 PM    Calcium 9.9 12/20/2017 04:09 PM    Bilirubin, total 0.4 08/18/2017 03:27 PM    ALT (SGPT) 40 08/18/2017 03:27 PM    AST (SGOT) 29 08/18/2017 03:27 PM    Alk. phosphatase 102 08/18/2017 03:27 PM    Protein, total 7.9 08/18/2017 03:27 PM    Albumin 3.9 08/18/2017 03:27 PM    Globulin 4.0 08/18/2017 03:27 PM    A-G Ratio 1.0 (L) 08/18/2017 03:27 PM      Lab Results   Component Value Date/Time    Hemoglobin A1c 6.3 08/18/2017 03:27 PM    Hemoglobin A1c (POC) 5.4 12/20/2017 04:09 PM           Review of Systems   Constitutional: Positive for malaise/fatigue(baseline). HENT: Negative for congestion. Eyes: Negative for blurred vision. Respiratory: Negative for cough and shortness of breath. Cardiovascular: Negative for chest pain, palpitations and leg swelling. Gastrointestinal: Negative for abdominal pain, constipation and heartburn. Genitourinary: Negative for dysuria, frequency and urgency. Musculoskeletal: Positive for back pain, joint pain, myalgias and neck pain. Negative for falls. Neurological: Negative for dizziness, tingling and headaches. Endo/Heme/Allergies: Negative for environmental allergies. Psychiatric/Behavioral: Negative for depression. The patient does not have insomnia. Skin: has rash around the ankle area over the past 2-3 days. No itching noted. Physical Exam   Constitutional: She appears well-developed and well-nourished. /73 (BP 1 Location: Left arm, BP Patient Position: Sitting)  Pulse 75  Temp 98.3 °F (36.8 °C) (Oral)   Resp 20  Ht 5' 4\" (1.626 m)  Wt 192 lb (87.1 kg)  SpO2 98%  BMI 32.96 kg/m2    HENT:   Right Ear: Tympanic membrane and ear canal normal.   Left Ear: Tympanic membrane and ear canal normal.   Nose: No mucosal edema or rhinorrhea. Mouth/Throat: Oropharynx is clear and moist and mucous membranes are normal.   Neck: Normal range of motion. Neck supple. No thyromegaly present. Cardiovascular: Normal rate and regular rhythm. No murmur heard. Pulmonary/Chest: Effort normal and breath sounds normal.   Abdominal: Soft. Bowel sounds are normal. There is no tenderness. Musculoskeletal: Normal range of motion. She exhibits no edema. Cervical back and lumbar spine: She exhibits tenderness, bony tenderness and pain. She exhibits normal range of motion and no spasm. Lymphadenopathy:     She has no cervical adenopathy. Neurological: She has normal strength. No sensory deficit. Coordination and gait normal.   Skin: Skin is warm and dry. Nonspecific skin rash on the left ankle greater than the left.     Psychiatric: She has a normal mood and affect. Nursing note and vitals reviewed. ASSESSMENT and PLAN  Diagnoses and all orders for this visit:    1. Essential hypertension  Thinks that her BP is elevated d/t stress. Has been under more stress with several family member passing away in the past few months. Discussed sodium restriction, high k rich diet, maintaining ideal body weight and regular exercise program such as daily walking 30 min perday 4-5 times per week, as physiologic means to achieve blood pressure control.  Medication compliance advised. 2. Mixed hyperlipidemia  -     LIPID PANEL    3. Gastroesophageal reflux disease, esophagitis presence not specified  Stable     4. Fibromyalgia  -    Add predniSONE (STERAPRED DS) 10 mg dose pack; See administration instruction per 10mg dose pack  5. DDD (degenerative disc disease), cervical//  6. Encounter for chronic pain management  -     9-DRUG SCREEN + OXY, UR  -     diclofenac (VOLTAREN) 1 % gel; Apply 2 g to affected area four (4) times daily as needed. -     diclofenac EC (VOLTAREN) 75 mg EC tablet; Take 1 Tab by mouth two (2) times daily as needed. The pt has signed medication agreement. Pain contract is reviewed. Pain medications will be continued at the previous dosage. Urine drug screening will be done today. Diagnostic  studies are not indicated at this time. Interventional procedure are not indicated at this time. Re-eval in 3 months. 7. Depression, unspecified depression type  Stable on celexa. 8. Non morbid obesity  I have reviewed/discussed the above normal BMI with the patient. I have recommended the following interventions: dietary management education, guidance, and counseling . 9. Encounter for medication monitoring  -     METABOLIC PANEL, COMPREHENSIVE  -     CBC W/O DIFF    Follow-up Disposition:  Return in about 3 months (around 6/20/2018).   reviewed diet, exercise and weight control  cardiovascular risk and specific lipid/LDL goals reviewed    I have discussed diagnosis listed in this note with pt and/or family. I have discussed treatment plans and options and the risk/benefit analysis of those options, including safe use of medications and possible medication side effects. Through the use of shared decision making we have agreed to the above plan. The patient has received an after-visit summary and questions were answered concerning future plans and follow up. Advise pt of any urgent changes then to proceed to the ER.       reviewed medications and side effects in detail

## 2018-03-21 LAB
ALBUMIN SERPL-MCNC: 4.2 G/DL (ref 3.6–4.8)
ALBUMIN/GLOB SERPL: 1.6 {RATIO} (ref 1.2–2.2)
ALP SERPL-CCNC: 82 IU/L (ref 39–117)
ALT SERPL-CCNC: 23 IU/L (ref 0–32)
AMPHETAMINES UR QL SCN: NEGATIVE NG/ML
AST SERPL-CCNC: 25 IU/L (ref 0–40)
BARBITURATES UR QL SCN: NEGATIVE NG/ML
BENZODIAZ UR QL SCN: POSITIVE NG/ML
BILIRUB SERPL-MCNC: 0.2 MG/DL (ref 0–1.2)
BUN SERPL-MCNC: 20 MG/DL (ref 8–27)
BUN/CREAT SERPL: 19 (ref 12–28)
BZE UR QL SCN: NEGATIVE NG/ML
CALCIUM SERPL-MCNC: 9.3 MG/DL (ref 8.7–10.3)
CANNABINOIDS UR QL SCN: NEGATIVE NG/ML
CHLORIDE SERPL-SCNC: 103 MMOL/L (ref 96–106)
CHOLEST SERPL-MCNC: 170 MG/DL (ref 100–199)
CO2 SERPL-SCNC: 23 MMOL/L (ref 18–29)
CREAT SERPL-MCNC: 1.08 MG/DL (ref 0.57–1)
CREAT UR-MCNC: 50.5 MG/DL (ref 20–300)
ERYTHROCYTE [DISTWIDTH] IN BLOOD BY AUTOMATED COUNT: 14.8 % (ref 12.3–15.4)
GFR SERPLBLD CREATININE-BSD FMLA CKD-EPI: 53 ML/MIN/1.73
GFR SERPLBLD CREATININE-BSD FMLA CKD-EPI: 61 ML/MIN/1.73
GLOBULIN SER CALC-MCNC: 2.6 G/DL (ref 1.5–4.5)
GLUCOSE SERPL-MCNC: 72 MG/DL (ref 65–99)
HCT VFR BLD AUTO: 29.8 % (ref 34–46.6)
HDLC SERPL-MCNC: 40 MG/DL
HGB BLD-MCNC: 9.6 G/DL (ref 11.1–15.9)
INTERPRETATION, 910389: NORMAL
INTERPRETATION: NORMAL
LDLC SERPL CALC-MCNC: 84 MG/DL (ref 0–99)
MCH RBC QN AUTO: 27.6 PG (ref 26.6–33)
MCHC RBC AUTO-ENTMCNC: 32.2 G/DL (ref 31.5–35.7)
MCV RBC AUTO: 86 FL (ref 79–97)
METHADONE UR QL SCN: NEGATIVE NG/ML
OPIATES UR QL SCN: NEGATIVE NG/ML
OXYCODONE+OXYMORPHONE UR QL SCN: NEGATIVE NG/ML
PCP UR QL SCN: NEGATIVE NG/ML
PDF IMAGE, 910387: NORMAL
PH UR: 6.7 [PH] (ref 4.5–8.9)
PLATELET # BLD AUTO: 359 X10E3/UL (ref 150–379)
PLEASE NOTE:, 733163: NORMAL
POTASSIUM SERPL-SCNC: 4 MMOL/L (ref 3.5–5.2)
PROPOXYPH UR QL SCN: NEGATIVE NG/ML
PROT SERPL-MCNC: 6.8 G/DL (ref 6–8.5)
RBC # BLD AUTO: 3.48 X10E6/UL (ref 3.77–5.28)
SODIUM SERPL-SCNC: 143 MMOL/L (ref 134–144)
TRIGL SERPL-MCNC: 228 MG/DL (ref 0–149)
VLDLC SERPL CALC-MCNC: 46 MG/DL (ref 5–40)
WBC # BLD AUTO: 7.7 X10E3/UL (ref 3.4–10.8)

## 2018-03-23 LAB
FERRITIN SERPL-MCNC: 13 NG/ML (ref 15–150)
FOLATE SERPL-MCNC: 16.5 NG/ML
IRON SATN MFR SERPL: 10 % (ref 15–55)
IRON SERPL-MCNC: 38 UG/DL (ref 27–139)
TIBC SERPL-MCNC: 396 UG/DL (ref 250–450)
UIBC SERPL-MCNC: 358 UG/DL (ref 118–369)
VIT B12 SERPL-MCNC: 433 PG/ML (ref 232–1245)

## 2018-03-29 ENCOUNTER — TELEPHONE (OUTPATIENT)
Dept: FAMILY MEDICINE CLINIC | Age: 68
End: 2018-03-29

## 2018-04-18 DIAGNOSIS — G89.29 ENCOUNTER FOR CHRONIC PAIN MANAGEMENT: Primary | ICD-10-CM

## 2018-04-18 DIAGNOSIS — M50.30 DDD (DEGENERATIVE DISC DISEASE), CERVICAL: ICD-10-CM

## 2018-04-18 DIAGNOSIS — M48.062 SPINAL STENOSIS OF LUMBAR REGION WITH NEUROGENIC CLAUDICATION: ICD-10-CM

## 2018-04-18 DIAGNOSIS — M79.7 FIBROMYALGIA: ICD-10-CM

## 2018-04-18 RX ORDER — DICLOFENAC SODIUM 10 MG/G
2 GEL TOPICAL
Qty: 100 G | Refills: 3 | Status: SHIPPED | OUTPATIENT
Start: 2018-04-18 | End: 2021-09-15 | Stop reason: ALTCHOICE

## 2018-04-18 RX ORDER — TRAMADOL HYDROCHLORIDE 50 MG/1
50 TABLET ORAL
Qty: 90 TAB | Refills: 0 | Status: SHIPPED | OUTPATIENT
Start: 2018-04-18 | End: 2018-06-15 | Stop reason: SDUPTHER

## 2018-04-18 NOTE — TELEPHONE ENCOUNTER
----- Message from Shwetha Mode sent at 4/18/2018 11:47 AM EDT -----  Regarding: /Refill  The pt is requesting a refill for \"Voltaren-Gel and Tramadol\" be sent to [facility]. Best Contact [phone].

## 2018-04-25 ENCOUNTER — HOSPITAL ENCOUNTER (OUTPATIENT)
Dept: LAB | Age: 68
Discharge: HOME | End: 2018-04-25
Payer: MEDICARE

## 2018-04-25 ENCOUNTER — OFFICE VISIT (OUTPATIENT)
Dept: FAMILY MEDICINE CLINIC | Age: 68
End: 2018-04-25

## 2018-04-25 VITALS
BODY MASS INDEX: 32.3 KG/M2 | SYSTOLIC BLOOD PRESSURE: 130 MMHG | RESPIRATION RATE: 20 BRPM | HEART RATE: 80 BPM | OXYGEN SATURATION: 95 % | WEIGHT: 189.2 LBS | DIASTOLIC BLOOD PRESSURE: 69 MMHG | TEMPERATURE: 97.7 F | HEIGHT: 64 IN

## 2018-04-25 DIAGNOSIS — D64.9 ANEMIA, UNSPECIFIED TYPE: Primary | ICD-10-CM

## 2018-04-25 DIAGNOSIS — R68.89 GENERAL SYMPTOM: ICD-10-CM

## 2018-04-25 PROCEDURE — 82607 VITAMIN B-12: CPT

## 2018-04-25 PROCEDURE — 36415 COLL VENOUS BLD VENIPUNCTURE: CPT

## 2018-04-25 PROCEDURE — 83550 IRON BINDING TEST: CPT

## 2018-04-25 PROCEDURE — 82728 ASSAY OF FERRITIN: CPT

## 2018-04-25 RX ORDER — SUCRALFATE 1 G/1
1 TABLET ORAL
COMMUNITY

## 2018-04-25 RX ORDER — ESTRADIOL 0.1 MG/G
CREAM VAGINAL
COMMUNITY
End: 2020-01-28 | Stop reason: SDUPTHER

## 2018-04-25 NOTE — PROGRESS NOTES
Chief Complaint   Patient presents with    Anemia     follow up     Mammogram 08/28/2017    Colonoscopy 06/21/2016 Dr Roel Junior repeat in 5 years. Patient stated she will schedule eye exam appointment. 1. Have you been to the ER, urgent care clinic since your last visit? Hospitalized since your last visit? No    2. Have you seen or consulted any other health care providers outside of the [address] [place] [address] since your last visit? Include any pap smears or colon screening.  No     Patient c/o 5/10 dull pain right side abdomen

## 2018-04-25 NOTE — MR AVS SNAPSHOT
[facility] 
 
 
 [address] [facility] [place] [address] 
[phone] Patient: Aure Thornton MRN: [id number] Visit Information Date & Time Provider Department Dept. Phone Encounter #  
 4/25/2018 10:00 AM Lynette Dick MD [facility] [phone] [id number] Follow-up Instructions Return in about 3 months (around 7/25/2018). Your Appointments 6/20/2018  9:45 AM  
ROUTINE CARE with Lynette Dick MD  
[facility] [address] [facility]) Appt Note: routine follow up  
 [address] [facility] [place] [address]  
[phone] [address] [facility] [address] Upcoming Health Maintenance Date Due  
 MEDICARE YEARLY EXAM 3/22/2018 GLAUCOMA SCREENING Q2Y 11/2/2018 BREAST CANCER SCRN MAMMOGRAM 8/28/2019 COLONOSCOPY 6/21/2021 DTaP/Tdap/Td series (3 - Td) 11/21/2026 Allergies as of 4/25/2018  Review Complete On: 4/25/2018 By: Lynette Dick MD  
  
 Severity Noted Reaction Type Reactions Latex  07/19/2017    Hives Codeine High 03/15/2010    Other (comments) Severe Headache Morphine High 03/15/2010    Hives Dilaudid [Hydromorphone]  12/20/2017    Other (comments) Memory loss Other Medication  03/15/2011    Rash BANDAIDS Shellfish Containing Products  03/15/2011    Hives Current Immunizations  Reviewed on 3/20/2018 Name Date Influenza High Dose Vaccine PF 9/19/2016 10:00 AM, 10/30/2015, 11/11/2014 Influenza Vaccine 11/19/2013 Influenza Vaccine Split 11/26/2012, 12/12/2011, 9/24/2010 Pneumococcal Conjugate (PCV-13) 5/18/2015 Pneumococcal Vaccine (Unspecified Type) 11/19/2013 Tdap 5/26/2014 12:40 PM  
 Zoster Vaccine, Live 4/16/2012 Not reviewed this visit You Were Diagnosed With   
  
 Codes Comments Anemia, unspecified type    -  Primary ICD-10-CM: D64.9 ICD-9-CM: 285.9 General symptom     ICD-10-CM: R68.89 ICD-9-CM: 780.99 Vitals BP Pulse Temp Resp Height(growth percentile) Weight(growth percentile) 130/69 (BP 1 Location: Left arm, BP Patient Position: Sitting) 80 97.7 °F (36.5 °C) (Oral) 20 5' 4\" (1.626 m) 189 lb 3.2 oz (85.8 kg) SpO2 BMI OB Status Smoking Status 95% 32.48 kg/m2 Hysterectomy Former Smoker BMI and BSA Data Body Mass Index Body Surface Area  
 32.48 kg/m 2 1.97 m 2 Preferred Pharmacy Pharmacy Name Phone [facility], [facility] [phone] Your Updated Medication List  
  
   
This list is accurate as of 4/25/18 11:36 AM.  Always use your most recent med list.  
  
  
  
  
 ALPRAZolam 0.5 mg tablet Commonly known as:  XANAX  
TAKE 1 TABLET BY MOUTH TWICE A DAY AS NEEDED FOR ANXIETY  
  
 aspirin delayed-release 81 mg tablet Take 81 mg by mouth every other day. * CARAFATE 1 gram tablet Generic drug:  sucralfate Take 1 g by mouth four (4) times daily. * sucralfate 1 gram tablet Commonly known as:  Rama Geralds Take 1 tablet by mouth as needed  
  
 citalopram 40 mg tablet Commonly known as:  CELEXA  
TAKE 1 TABLET DAILY  
  
 cyclobenzaprine 10 mg tablet Commonly known as:  FLEXERIL  
TAKE 1 TABLET BY MOUTH 3 TIMES A DAY AS NEEDED FOR MUSCLE SPASMS * diclofenac EC 75 mg EC tablet Commonly known as:  VOLTAREN Take 1 Tab by mouth two (2) times daily as needed. * diclofenac 1 % Gel Commonly known as:  VOLTAREN Apply 2 g to affected area four (4) times daily as needed. dicyclomine 10 mg capsule Commonly known as:  BENTYL TAKE 2 CAPSULES FOUR TIMES A DAY AS NEEDED DULoxetine 60 mg capsule Commonly known as:  CYMBALTA TAKE 1 CAPSULE TWICE A DAY  
  
 estradiol 0.01 % (0.1 mg/gram) vaginal cream  
Commonly known as:  ESTRACE Apply to Vaginal twice a week with fingertip gabapentin 300 mg capsule Commonly known as:  NEURONTIN Take 600 mg by mouth three (3) times daily. hydroCHLOROthiazide 25 mg tablet Commonly known as:  HYDRODIURIL  
TAKE 1 TABLET DAILY LINZESS 290 mcg Cap capsule Generic drug:  linaclotide Take 290 mcg by mouth Three (3) times a week. lisinopril 40 mg tablet Commonly known as:  PRINIVIL, ZESTRIL  
TAKE 1 TABLET DAILY  
  
 magnesium citrate solution USE 1 BOTTLE BY MOUTH DAILY  
  
 naloxone 4 mg/actuation nasal spray Commonly known as:  NARCAN  
4 mg by Nasal route as needed (respiratory depression). Give single spray into one nostril. Call 911. Give additional doses every 2 to 3 minutes alternating nostrils until assistance arrives using a new nasal spray with each dose, if patient does not respond or responds and then relapses. NexIUM 40 mg capsule Generic drug:  esomeprazole TAKE 1 CAPSULE DAILY  
  
 oxybutynin 5 mg tablet Commonly known as:  DITROPAN  
take 1 tablet 2 times a day  
  
 rosuvastatin 20 mg tablet Commonly known as:  CRESTOR  
TAKE 1 TABLET NIGHTLY  
  
 traMADol 50 mg tablet Commonly known as:  ULTRAM  
Take 1 Tab by mouth every six (6) hours as needed for Pain. Max Daily Amount: 200 mg.  
  
 triamcinolone acetonide 0.1 % topical cream  
Commonly known as:  KENALOG Apply  to affected area two (2) times daily as needed for Skin Irritation. use thin layer VITAMIN D3 1,000 unit tablet Generic drug:  cholecalciferol Take 1,000 Units by mouth daily. * Notice: This list has 4 medication(s) that are the same as other medications prescribed for you. Read the directions carefully, and ask your doctor or other care provider to review them with you. We Performed the Following AMB POC COMPLETE CBC, AUTOMATED [34182 CPT(R)] FERRITIN [22495 CPT(R)] IRON PROFILE [id number] CPT(R)] VITAMIN B12 & FOLATE [46102 CPT(R)] Follow-up Instructions Return in about 3 months (around 7/25/2018). Introducing [facility]! Dear Checo Collins: 
Thank you for requesting a [employer] account. Our records indicate that you already have an active [employer] account. You can access your account anytime at [email or web address] Did you know that you can access your hospital and ER discharge instructions at any time in [employer]? You can also review all of your test results from your hospital stay or ER visit. Additional Information If you have questions, please visit the Frequently Asked Questions section of the [employer] website at [email or web address] Remember, [employer] is NOT to be used for urgent needs. For medical emergencies, dial 911. Now available from your iPhone and Android! Please provide this summary of care documentation to your next provider. Your primary care clinician is listed as Luis Felipe Dean. If you have any questions after today's visit, please call [phone].

## 2018-04-25 NOTE — PROGRESS NOTES
HISTORY OF PRESENT ILLNESS  Anne Hsieh is a 69 y.o. female. HPI   1 month follow up on anemia. She has seen GI and planning for upper endo and colonoscopy for June. No abd pain. No blood noted in the stool. No melena. Overall feeling better. Not feeling as tired. Patient Active Problem List   Diagnosis Code    IBS (irritable bowel syndrome) K58.9    Fibromyalgia M79.7    Hiatal hernia K44.9    GERD (gastroesophageal reflux disease) K21.9    Hypercholesterolemia E78.00    Diverticulitis K57.92    Depression F32.9    Essential hypertension I10    Encounter for medication monitoring Z51.81    DDD (degenerative disc disease), cervical M50.30    Spinal stenosis of lumbar region with neurogenic claudication M48.062    Non morbid obesity E66.9       Current Outpatient Prescriptions   Medication Sig Dispense Refill    sucralfate (CARAFATE) 1 gram tablet Take 1 tablet by mouth as needed      estradiol (ESTRACE) 0.01 % (0.1 mg/gram) vaginal cream Apply to Vaginal twice a week with fingertip      traMADol (ULTRAM) 50 mg tablet Take 1 Tab by mouth every six (6) hours as needed for Pain. Max Daily Amount: 200 mg. 90 Tab 0    diclofenac (VOLTAREN) 1 % gel Apply 2 g to affected area four (4) times daily as needed. 100 g 3    oxybutynin (DITROPAN) 5 mg tablet take 1 tablet 2 times a day  0    diclofenac EC (VOLTAREN) 75 mg EC tablet Take 1 Tab by mouth two (2) times daily as needed.  60 Tab 3    rosuvastatin (CRESTOR) 20 mg tablet TAKE 1 TABLET NIGHTLY 90 Tab 1    citalopram (CELEXA) 40 mg tablet TAKE 1 TABLET DAILY 90 Tab 3    magnesium citrate solution USE 1 BOTTLE BY MOUTH DAILY  2    ALPRAZolam (XANAX) 0.5 mg tablet TAKE 1 TABLET BY MOUTH TWICE A DAY AS NEEDED FOR ANXIETY 180 Tab 1    DULoxetine (CYMBALTA) 60 mg capsule TAKE 1 CAPSULE TWICE A  Cap 3    dicyclomine (BENTYL) 10 mg capsule TAKE 2 CAPSULES FOUR TIMES A DAY AS NEEDED 720 Cap 3    NEXIUM 40 mg capsule TAKE 1 CAPSULE DAILY 90 Cap 3    hydroCHLOROthiazide (HYDRODIURIL) 25 mg tablet TAKE 1 TABLET DAILY 90 Tab 2    LINZESS 290 mcg cap capsule Take 290 mcg by mouth Three (3) times a week.  cholecalciferol (VITAMIN D3) 1,000 unit tablet Take 1,000 Units by mouth daily.  lisinopril (PRINIVIL, ZESTRIL) 40 mg tablet TAKE 1 TABLET DAILY 90 Tab 3    gabapentin (NEURONTIN) 300 mg capsule Take 600 mg by mouth three (3) times daily. 360 Cap 1    aspirin delayed-release 81 mg tablet Take 81 mg by mouth every other day.  naloxone (NARCAN) 4 mg/actuation spry 4 mg by Nasal route as needed (respiratory depression). Give single spray into one nostril. Call 911. Give additional doses every 2 to 3 minutes alternating nostrils until assistance arrives using a new nasal spray with each dose, if patient does not respond or responds and then relapses. 1 Box 0    triamcinolone acetonide (KENALOG) 0.1 % topical cream Apply  to affected area two (2) times daily as needed for Skin Irritation.  use thin layer 15 g 0       Allergies   Allergen Reactions    Latex Hives    Codeine Other (comments)     Severe Headache    Morphine Hives    Dilaudid [Hydromorphone] Other (comments)     Memory loss    Other Medication Rash     BANDAIDS    Shellfish Containing Products Hives       Past Medical History:   Diagnosis Date    Arthritis     Cancer ([facility] [address]     skin cancer    Chronic pain     CHRONIC BOWEL PAIN    Diverticulitis     Fibromyalgia     GERD (gastroesophageal reflux disease)     Goiter     Hiatal hernia     Hypercholesterolemia     Hypertension     IBS (irritable bowel syndrome)     IBS (irritable bowel syndrome)     CONSTIPATION, CHRONIC SINCE HER 25s    Ill-defined condition     rectocele    Insomnia     TAKES TRAZODONE NIGHTLY    Migraine     REDUCED IN FREQUENCY AND SEVERITY SINCE MENOPAUSE    Other ill-defined conditions(059.89)     Psychiatric disorder 3/11    DEPRESSION       Past Surgical History:   Procedure Laterality Date    COLONOSCOPY N/A 6/21/2016    COLONOSCOPY performed by Aundrea Beckman [facility] at [facility], DIAGNOSTIC  11/2010    Dr. Sixto Putnam-     HX CHOLECYSTECTOMY  2006   Dana Morrison  2007    Dr. Pond/ diverticulitis    HX GI  X3  LAST ONE 12/10    COLONOSCOPY    HX GYN  1982    D&C WITH SUCTION    HX HYSTERECTOMY      HX ORTHOPAEDIC      right foot surgery    HX ORTHOPAEDIC      neck surgery 3/21/11 Dr. Arben Suh HX ORTHOPAEDIC  08/31/2017    back surgery    HX OTHER SURGICAL      EGD    HX TONSILLECTOMY         Family History   Problem Relation Age of Onset    Cancer Father      lung and bone    Stroke Sister     Cancer Sister 76     PANCREATIC    Hypertension Mother     High Cholesterol Mother     Alzheimer Mother      late 62s early 76s    Cancer Other      COLON    Cancer Other      breast    Diabetes Maternal Aunt     Cancer Maternal Uncle      COLON    Cancer Maternal Grandfather      COLON    Ovarian Cancer Daughter 28       Social History   Substance Use Topics    Smoking status: Former Smoker     Packs/day: 1.00     Years: 45.00     Quit date: 1/1/2007    Smokeless tobacco: Never Used    Alcohol use 0.0 oz/week     0 Standard drinks or equivalent per week      Comment: rare        Lab Results  Component Value Date/Time   WBC 7.7 03/20/2018 04:21 PM   HGB 9.6 (L) 03/20/2018 04:21 PM   Hemoglobin (POC) 11.9 08/29/2017 09:26 AM   HCT 29.8 (L) 03/20/2018 04:21 PM   Hematocrit (POC) 35 08/29/2017 09:26 AM   PLATELET 480 35/48/9753 04:21 PM   MCV 86 03/20/2018 04:21 PM     Lab Results  Component Value Date/Time   Cholesterol, total 170 03/20/2018 04:21 PM   HDL Cholesterol 40 03/20/2018 04:21 PM   LDL, calculated 84 03/20/2018 04:21 PM   Triglyceride 228 (H) 03/20/2018 04:21 PM   CHOL/HDL Ratio 4.1 08/20/2010 03:00 AM     Lab Results  Component Value Date/Time   TSH 1.980 09/19/2016 11:30 AM   T4, Free 1.25 12/11/2013 03:45 PM      Lab Results Component Value Date/Time    Sodium 143 03/20/2018 04:21 PM    Potassium 4.0 03/20/2018 04:21 PM    Chloride 103 03/20/2018 04:21 PM    CO2 23 03/20/2018 04:21 PM    Anion gap 7 08/18/2017 03:27 PM    Glucose 72 03/20/2018 04:21 PM    BUN 20 03/20/2018 04:21 PM    Creatinine 1.08 (H) 03/20/2018 04:21 PM    BUN/Creatinine ratio 19 03/20/2018 04:21 PM    GFR est AA 61 03/20/2018 04:21 PM    GFR est non-AA 53 (L) 03/20/2018 04:21 PM    Calcium 9.3 03/20/2018 04:21 PM    Bilirubin, total 0.2 03/20/2018 04:21 PM    ALT (SGPT) 23 03/20/2018 04:21 PM    AST (SGOT) 25 03/20/2018 04:21 PM    Alk. phosphatase 82 03/20/2018 04:21 PM    Protein, total 6.8 03/20/2018 04:21 PM    Albumin 4.2 03/20/2018 04:21 PM    Globulin 4.0 08/18/2017 03:27 PM    A-G Ratio 1.6 03/20/2018 04:21 PM      Lab Results   Component Value Date/Time    Hemoglobin A1c 6.3 08/18/2017 03:27 PM    Hemoglobin A1c (POC) 5.4 12/20/2017 04:09 PM       Review of Systems   Constitutional: Negative for malaise/fatigue. HENT: Negative for congestion. Eyes: Negative for blurred vision. Respiratory: Negative for cough and shortness of breath. Cardiovascular: Negative for chest pain, palpitations and leg swelling. Gastrointestinal: Negative for abdominal pain, constipation and heartburn. Genitourinary: Negative for dysuria, frequency and urgency. Musculoskeletal: Negative for back pain and joint pain. Neurological: Negative for dizziness, tingling and headaches. Endo/Heme/Allergies: Negative for environmental allergies. Psychiatric/Behavioral: Negative for depression. The patient does not have insomnia. Physical Exam   Constitutional: She appears well-developed and well-nourished.    /69 (BP 1 Location: Left arm, BP Patient Position: Sitting)  Pulse 80  Temp 97.7 °F (36.5 °C) (Oral)   Resp 20  Ht 5' 4\" (1.626 m)  Wt 189 lb 3.2 oz (85.8 kg)  SpO2 95%  BMI 32.48 kg/m2   HENT:   Right Ear: Tympanic membrane and ear canal normal.   Left Ear: Tympanic membrane and ear canal normal.   Nose: No mucosal edema or rhinorrhea. Mouth/Throat: Oropharynx is clear and moist and mucous membranes are normal.   Neck: Normal range of motion. Neck supple. No thyromegaly present. Cardiovascular: Normal rate and regular rhythm. No murmur heard. Pulmonary/Chest: Effort normal and breath sounds normal.   Abdominal: Soft. Bowel sounds are normal. There is no tenderness. Musculoskeletal: Normal range of motion. She exhibits no edema. Lymphadenopathy:     She has no cervical adenopathy. Skin: Skin is warm and dry. Psychiatric: She has a normal mood and affect. Nursing note and vitals reviewed. ASSESSMENT and PLAN  Diagnoses and all orders for this visit:    1. Anemia, unspecified type  Hg is up to 10.9 from 9.6%  -     AMB POC COMPLETE CBC, AUTOMATED  -     IRON PROFILE  -     FERRITIN  -     VITAMIN B12 & FOLATE  Continue with GI work up as planned. 2. General symptom  -     VITAMIN B12 & FOLATE      Follow-up Disposition:  Return in about 3 months (around 7/25/2018). reviewed medications and side effects in detail    I have discussed diagnosis listed in this note with pt and/or family. I have discussed treatment plans and options and the risk/benefit analysis of those options, including safe use of medications and possible medication side effects. Through the use of shared decision making we have agreed to the above plan. The patient has received an after-visit summary and questions were answered concerning future plans and follow up. Advise pt of any urgent changes then to proceed to the ER.

## 2018-04-27 LAB
FERRITIN SERPL-MCNC: 22 NG/ML (ref 15–150)
FOLATE SERPL-MCNC: >20 NG/ML
IRON SATN MFR SERPL: 12 % (ref 15–55)
IRON SERPL-MCNC: 46 UG/DL (ref 27–139)
TIBC SERPL-MCNC: 389 UG/DL (ref 250–450)
UIBC SERPL-MCNC: 343 UG/DL (ref 118–369)
VIT B12 SERPL-MCNC: 427 PG/ML (ref 232–1245)

## 2018-06-15 DIAGNOSIS — F32.A DEPRESSION, UNSPECIFIED DEPRESSION TYPE: ICD-10-CM

## 2018-06-15 DIAGNOSIS — M50.30 DDD (DEGENERATIVE DISC DISEASE), CERVICAL: ICD-10-CM

## 2018-06-15 DIAGNOSIS — M48.062 SPINAL STENOSIS OF LUMBAR REGION WITH NEUROGENIC CLAUDICATION: ICD-10-CM

## 2018-06-15 DIAGNOSIS — M79.7 FIBROMYALGIA: ICD-10-CM

## 2018-06-15 DIAGNOSIS — G89.29 ENCOUNTER FOR CHRONIC PAIN MANAGEMENT: ICD-10-CM

## 2018-06-15 RX ORDER — ALPRAZOLAM 0.5 MG/1
TABLET ORAL
Qty: 180 TAB | Refills: 1 | Status: SHIPPED | OUTPATIENT
Start: 2018-06-15 | End: 2019-02-14 | Stop reason: SDUPTHER

## 2018-06-15 RX ORDER — TRAMADOL HYDROCHLORIDE 50 MG/1
50 TABLET ORAL
Qty: 90 TAB | Refills: 0 | Status: SHIPPED | OUTPATIENT
Start: 2018-06-15 | End: 2018-07-25 | Stop reason: SDUPTHER

## 2018-06-21 DIAGNOSIS — M79.7 FIBROMYALGIA: ICD-10-CM

## 2018-06-21 DIAGNOSIS — G89.29 ENCOUNTER FOR CHRONIC PAIN MANAGEMENT: ICD-10-CM

## 2018-06-21 DIAGNOSIS — M48.062 SPINAL STENOSIS OF LUMBAR REGION WITH NEUROGENIC CLAUDICATION: ICD-10-CM

## 2018-06-21 DIAGNOSIS — K58.9 IRRITABLE BOWEL SYNDROME WITHOUT DIARRHEA: ICD-10-CM

## 2018-06-21 DIAGNOSIS — M50.30 DDD (DEGENERATIVE DISC DISEASE), CERVICAL: ICD-10-CM

## 2018-06-21 RX ORDER — DICYCLOMINE HYDROCHLORIDE 10 MG/1
CAPSULE ORAL
Qty: 720 CAP | Refills: 3 | Status: SHIPPED | OUTPATIENT
Start: 2018-06-21 | End: 2020-12-30 | Stop reason: SDUPTHER

## 2018-06-21 RX ORDER — TRAMADOL HYDROCHLORIDE 50 MG/1
50 TABLET ORAL
Qty: 90 TAB | Refills: 0 | OUTPATIENT
Start: 2018-06-21

## 2018-06-21 RX ORDER — HYDROCHLOROTHIAZIDE 25 MG/1
TABLET ORAL
Qty: 90 TAB | Refills: 2 | Status: SHIPPED | OUTPATIENT
Start: 2018-06-21 | End: 2022-01-04 | Stop reason: SDUPTHER

## 2018-06-21 NOTE — TELEPHONE ENCOUNTER
Patient wants a return call regarding getting the  traMADol (ULTRAM) 50 mg tablet .   Please give her a call @ [phone]

## 2018-06-27 ENCOUNTER — ANESTHESIA (OUTPATIENT)
Dept: ENDOSCOPY | Age: 68
End: 2018-06-27
Payer: MEDICARE

## 2018-06-27 ENCOUNTER — ANESTHESIA EVENT (OUTPATIENT)
Dept: ENDOSCOPY | Age: 68
End: 2018-06-27
Payer: MEDICARE

## 2018-06-27 ENCOUNTER — HOSPITAL ENCOUNTER (OUTPATIENT)
Age: 68
Setting detail: OUTPATIENT SURGERY
Discharge: HOME | End: 2018-06-27
Attending: SPECIALIST | Admitting: SPECIALIST
Payer: MEDICARE

## 2018-06-27 VITALS
BODY MASS INDEX: 32.32 KG/M2 | WEIGHT: 189.31 LBS | DIASTOLIC BLOOD PRESSURE: 80 MMHG | SYSTOLIC BLOOD PRESSURE: 116 MMHG | HEART RATE: 62 BPM | HEIGHT: 64 IN | OXYGEN SATURATION: 100 % | RESPIRATION RATE: 18 BRPM

## 2018-06-27 PROCEDURE — 76040000007: Performed by: SPECIALIST

## 2018-06-27 PROCEDURE — 77030013992 HC SNR POLYP ENDOSC BSC -B: Performed by: SPECIALIST

## 2018-06-27 PROCEDURE — 88305 TISSUE EXAM BY PATHOLOGIST: CPT | Performed by: SPECIALIST

## 2018-06-27 PROCEDURE — 74011250636 HC RX REV CODE- 250/636

## 2018-06-27 PROCEDURE — 76060000032 HC ANESTHESIA 0.5 TO 1 HR: Performed by: SPECIALIST

## 2018-06-27 PROCEDURE — 74011000250 HC RX REV CODE- 250

## 2018-06-27 PROCEDURE — 77030019988 HC FCPS ENDOSC DISP BSC -B: Performed by: SPECIALIST

## 2018-06-27 PROCEDURE — 74011250636 HC RX REV CODE- 250/636: Performed by: SPECIALIST

## 2018-06-27 RX ORDER — LIDOCAINE HYDROCHLORIDE 20 MG/ML
INJECTION, SOLUTION EPIDURAL; INFILTRATION; INTRACAUDAL; PERINEURAL AS NEEDED
Status: DISCONTINUED | OUTPATIENT
Start: 2018-06-27 | End: 2018-06-27 | Stop reason: HOSPADM

## 2018-06-27 RX ORDER — SODIUM CHLORIDE 9 MG/ML
50 INJECTION, SOLUTION INTRAVENOUS CONTINUOUS
Status: DISCONTINUED | OUTPATIENT
Start: 2018-06-27 | End: 2018-06-27 | Stop reason: HOSPADM

## 2018-06-27 RX ORDER — DEXTROMETHORPHAN/PSEUDOEPHED 2.5-7.5/.8
1.2 DROPS ORAL
Status: DISCONTINUED | OUTPATIENT
Start: 2018-06-27 | End: 2018-06-27 | Stop reason: HOSPADM

## 2018-06-27 RX ORDER — SODIUM CHLORIDE 0.9 % (FLUSH) 0.9 %
5-10 SYRINGE (ML) INJECTION EVERY 8 HOURS
Status: DISCONTINUED | OUTPATIENT
Start: 2018-06-27 | End: 2018-06-27 | Stop reason: HOSPADM

## 2018-06-27 RX ORDER — DEXTROMETHORPHAN/PSEUDOEPHED 2.5-7.5/.8
DROPS ORAL
Status: DISCONTINUED
Start: 2018-06-27 | End: 2018-06-27 | Stop reason: HOSPADM

## 2018-06-27 RX ORDER — PROPOFOL 10 MG/ML
INJECTION, EMULSION INTRAVENOUS AS NEEDED
Status: DISCONTINUED | OUTPATIENT
Start: 2018-06-27 | End: 2018-06-27 | Stop reason: HOSPADM

## 2018-06-27 RX ORDER — SODIUM CHLORIDE 0.9 % (FLUSH) 0.9 %
5-10 SYRINGE (ML) INJECTION AS NEEDED
Status: DISCONTINUED | OUTPATIENT
Start: 2018-06-27 | End: 2018-06-27 | Stop reason: HOSPADM

## 2018-06-27 RX ORDER — FENTANYL CITRATE 50 UG/ML
25 INJECTION, SOLUTION INTRAMUSCULAR; INTRAVENOUS
Status: DISCONTINUED | OUTPATIENT
Start: 2018-06-27 | End: 2018-06-27 | Stop reason: HOSPADM

## 2018-06-27 RX ORDER — MIDAZOLAM HYDROCHLORIDE 1 MG/ML
1-2 INJECTION, SOLUTION INTRAMUSCULAR; INTRAVENOUS
Status: DISCONTINUED | OUTPATIENT
Start: 2018-06-27 | End: 2018-06-27 | Stop reason: HOSPADM

## 2018-06-27 RX ADMIN — LIDOCAINE HYDROCHLORIDE 50 MG: 20 INJECTION, SOLUTION EPIDURAL; INFILTRATION; INTRACAUDAL; PERINEURAL at 07:37

## 2018-06-27 RX ADMIN — PROPOFOL 580 MG: 10 INJECTION, EMULSION INTRAVENOUS at 08:08

## 2018-06-27 RX ADMIN — SODIUM CHLORIDE 50 ML/HR: 900 INJECTION, SOLUTION INTRAVENOUS at 07:12

## 2018-06-27 NOTE — ANESTHESIA PREPROCEDURE EVALUATION
Anesthetic History   No history of anesthetic complications            Review of Systems / Medical History  Patient summary reviewed, nursing notes reviewed and pertinent labs reviewed    Pulmonary  Within defined limits                 Neuro/Psych         Psychiatric history     Cardiovascular    Hypertension              Exercise tolerance: >4 METS     GI/Hepatic/Renal     GERD           Endo/Other      Hypothyroidism  Morbid obesity and arthritis     Other Findings              Physical Exam    Airway  Mallampati: II  TM Distance: 4 - 6 cm  Neck ROM: normal range of motion   Mouth opening: Normal     Cardiovascular  Regular rate and rhythm,  S1 and S2 normal,  no murmur, click, rub, or gallop             Dental    Dentition: Implants     Pulmonary  Breath sounds clear to auscultation               Abdominal  GI exam deferred       Other Findings            Anesthetic Plan    ASA: 2  Anesthesia type: total IV anesthesia and MAC          Induction: Intravenous  Anesthetic plan and risks discussed with: Patient

## 2018-06-27 NOTE — ANESTHESIA POSTPROCEDURE EVALUATION
Post-Anesthesia Evaluation and Assessment    Patient: Jose Aragon MRN: [id number]  SSN: [id number]    YOB: 1950  Age: 69 y.o. Sex: female       Cardiovascular Function/Vital Signs  Visit Vitals    /86    Pulse 67    Resp 20    Ht 5' 4\" (1.626 m)    Wt 85.9 kg (189 lb 5 oz)    SpO2 100%    BMI 32.5 kg/m2       Patient is status post total IV anesthesia, MAC anesthesia for Procedure(s):  ESOPHAGOGASTRODUODENOSCOPY (EGD)     COLONOSCOPY  ESOPHAGOGASTRODUODENAL (EGD) BIOPSY  ESOPHAGEAL DILATION  ENDOSCOPIC POLYPECTOMY. Nausea/Vomiting: None    Postoperative hydration reviewed and adequate. Pain:  Pain Scale 1: Numeric (0 - 10) (06/27/18 0824)  Pain Intensity 1: 6 (06/27/18 0824)   Managed    Neurological Status: At baseline    Mental Status and Level of Consciousness: Arousable    Pulmonary Status:   O2 Device: Room air (06/27/18 0824)   Adequate oxygenation and airway patent    Complications related to anesthesia: None    Post-anesthesia assessment completed.  No concerns    Signed By: James Rowland MD     June 27, 2018

## 2018-06-27 NOTE — IP AVS SNAPSHOT
[address] [facility] 
[phone] Patient: Ana Kohler MRN: [id number] NPU:[id number] About your hospitalization You were admitted on:  June 27, 2018 You last received care in the:  [facility] You were discharged on:  June 27, 2018 Why you were hospitalized Your primary diagnosis was:  Not on File Follow-up Information None Your Scheduled Appointments Wednesday July 25, 2018 11:00 AM EDT ROUTINE CARE with Kyara Restrepo MD  
[address]) [address] [facility] [place] [id number] [phone] Discharge Orders None A check peg indicates which time of day the medication should be taken. My Medications CONTINUE taking these medications Instructions Each Dose to Equal  
 Morning Noon Evening Bedtime ALPRAZolam 0.5 mg tablet Commonly known as:  Ahsan Mcghee Your last dose was: Your next dose is: TAKE 1 TABLET BY MOUTH TWICE A DAY AS NEEDED FOR ANXIETY  
     
   
   
   
  
 aspirin delayed-release 81 mg tablet Your last dose was: Your next dose is: Take 81 mg by mouth every other day. 81 mg  
    
   
   
   
  
 citalopram 40 mg tablet Commonly known as:  Sujatha Alvarez Your last dose was: Your next dose is: TAKE 1 TABLET DAILY  
     
   
   
   
  
 * diclofenac EC 75 mg EC tablet Commonly known as:  VOLTAREN Your last dose was: Your next dose is: Take 1 Tab by mouth two (2) times daily as needed. 75 mg  
    
   
   
   
  
 * diclofenac 1 % Gel Commonly known as:  VOLTAREN Your last dose was: Your next dose is:    
   
   
 Apply 2 g to affected area four (4) times daily as needed. 2 g  
    
   
   
   
  
 dicyclomine 10 mg capsule Commonly known as:  BENTYL Your last dose was: Your next dose is: TAKE 2 CAPSULES FOUR TIMES A DAY AS NEEDED DULoxetine 60 mg capsule Commonly known as:  CYMBALTA Your last dose was: Your next dose is: TAKE 1 CAPSULE TWICE A DAY  
     
   
   
   
  
 estradiol 0.01 % (0.1 mg/gram) vaginal cream  
Commonly known as:  ESTRACE Your last dose was: Your next dose is:    
   
   
 Apply to Vaginal twice a week with fingertip  
     
   
   
   
  
 gabapentin 300 mg capsule Commonly known as:  NEURONTIN Your last dose was: Your next dose is: Take 600 mg by mouth three (3) times daily. 600 mg  
    
   
   
   
  
 hydroCHLOROthiazide 25 mg tablet Commonly known as:  HYDRODIURIL Your last dose was: Your next dose is: TAKE 1 TABLET DAILY LINZESS 290 mcg Cap capsule Generic drug:  linaclotide Your last dose was: Your next dose is: Take 290 mcg by mouth Three (3) times a week. 290 mcg  
    
   
   
   
  
 lisinopril 40 mg tablet Commonly known as:  Tildon Ash Your last dose was: Your next dose is: TAKE 1 TABLET DAILY NexIUM 40 mg capsule Generic drug:  esomeprazole Your last dose was: Your next dose is: TAKE 1 CAPSULE DAILY  
     
   
   
   
  
 oxybutynin 5 mg tablet Commonly known as:  LMBRNZYN Your last dose was: Your next dose is:    
   
   
 take 1 tablet 2 times a day  
     
   
   
   
  
 rosuvastatin 20 mg tablet Commonly known as:  CRESTOR Your last dose was: Your next dose is: TAKE 1 TABLET NIGHTLY  
     
   
   
   
  
 sucralfate 1 gram tablet Commonly known as:  Josephine Constant Your last dose was: Your next dose is: Take 1 tablet by mouth as needed traMADol 50 mg tablet Commonly known as:  ULTRAM  
   
Your last dose was: Your next dose is: Take 1 Tab by mouth every six (6) hours as needed for Pain. Max Daily Amount: 200 mg.  
 50 mg  
    
   
   
   
  
 VITAMIN D3 1,000 unit tablet Generic drug:  cholecalciferol Your last dose was: Your next dose is: Take 1,000 Units by mouth daily. 1000 Units * Notice: This list has 2 medication(s) that are the same as other medications prescribed for you. Read the directions carefully, and ask your doctor or other care provider to review them with you. Opioid Education Prescription Opioids: What You Need to Know: 
 
Prescription opioids can be used to help relieve moderate-to-severe pain and are often prescribed following a surgery or injury, or for certain health conditions. These medications can be an important part of treatment but also come with serious risks. Opioids are strong pain medicines. Examples include hydrocodone, oxycodone, fentanyl, and morphine. Heroin is an example of an illegal opioid. It is important to work with your health care provider to make sure you are getting the safest, most effective care. WHAT ARE THE RISKS AND SIDE EFFECTS OF OPIOID USE? Prescription opioids carry serious risks of addiction and overdose, especially with prolonged use. An opioid overdose, often marked by slow breathing, can cause sudden death. The use of prescription opioids can have a number of side effects as well, even when taken as directed. · Tolerance-meaning you might need to take more of a medication for the same pain relief · Physical dependence-meaning you have symptoms of withdrawal when the medication is stopped. Withdrawal symptoms can include nausea, sweating, chills, diarrhea, stomach cramps, and muscle aches.   Withdrawal can last up to several weeks, depending on which drug you took and how long you took it. · Increased sensitivity to pain · Constipation · Nausea, vomiting, and dry mouth · Sleepiness and dizziness · Confusion · Depression · Low levels of testosterone that can result in lower sex drive, energy, and strength · Itching and sweating RISKS ARE GREATER WITH:      
· History of drug misuse, substance use disorder, or overdose · Mental health conditions (such as depression or anxiety) · Sleep apnea · Older age (72 years or older) · Pregnancy Avoid alcohol while taking prescription opioids. Also, unless specifically advised by your health care provider, medications to avoid include: · Benzodiazepines (such as Xanax or Valium) · Muscle relaxants (such as Soma or Flexeril) · Hypnotics (such as Ambien or Lunesta) · Other prescription opioids KNOW YOUR OPTIONS Talk to your health care provider about ways to manage your pain that don't involve prescription opioids. Some of these options may actually work better and have fewer risks and side effects. Options may include: 
· Pain relievers such as acetaminophen, ibuprofen, and naproxen · Some medications that are also used for depression or seizures · Physical therapy and exercise · Counseling to help patients learn how to cope better with triggers of pain and stress. · Application of heat or cold compress · Massage therapy · Relaxation techniques Be Informed Make sure you know the name of your medication, how much and how often to take it, and its potential risks & side effects. IF YOU ARE PRESCRIBED OPIOIDS FOR PAIN: 
· Never take opioids in greater amounts or more often than prescribed. Remember the goal is not to be pain-free but to manage your pain at a tolerable level. · Follow up with your primary care provider to: · Work together to create a plan on how to manage your pain. · Talk about ways to help manage your pain that don't involve prescription opioids. · Talk about any and all concerns and side effects. · Help prevent misuse and abuse. · Never sell or share prescription opioids · Help prevent misuse and abuse. · Store prescription opioids in a secure place and out of reach of others (this may include visitors, children, friends, and family). · Safely dispose of unused/unwanted prescription opioids: Find your community drug take-back program or your pharmacy mail-back program, or flush them down the toilet, following guidance from the Food and Drug Administration (www.fda.gov/Drugs/ResourcesForYou). · Visit www.cdc.gov/drugoverdose to learn about the risks of opioid abuse and overdose. · If you believe you may be struggling with addiction, tell your health care provider and ask for guidance or call [facility] at 0-627-631-IUMW. Discharge Instructions Tylor Patel [phone] 
1950 COLON / EGD DISCHARGE INSTRUCTIONS Discomfort: 
Sore throat- throat lozenges or warm salt water gargle Redness at IV site- apply warm compress to area; if redness or soreness persist- contact your physician There may be a slight amount of blood passed from the rectum Gaseous discomfort- walking, belching will help relieve any discomfort You may not operate a vehicle for 12 hours You may not engage in an occupation involving machinery or appliances for rest of today You may not drink alcoholic beverages for at least 12 hours Avoid making any critical decisions for at least 24 hour DIET: 
 Regular diet.  however -  remember your colon is empty and a heavy meal will produce gas. Avoid these foods:  vegetables, fried / greasy foods, carbonated drinks for today ACTIVITY: 
You may  resume your normal daily activities it is recommended that you spend the remainder of the day resting -  avoid any strenuous activity. CALL M.D.   ANY SIGN OF:  
 Increasing pain, nausea, vomiting Abdominal distension (swelling) New increased bleeding (oral or rectal) Fever (chills) Pain in chest area Bloody discharge from nose or mouth Shortness of breath ONLY  Tylenol as needed for pain. Follow-up Instructions: 
 Call Dr. Emerson Wu for results of procedure / biopsy in 7 days at telephone #  [phone] Additional instructions:  Make a follow up visit with Dr. Emerson Wu Repeat Colonoscopy in 5 years. Awanda Daily [phone] 
1950 DISCHARGE SUMMARY from Nurse The following personal items collected during your admission are returned to you:  
Dental Appliance: Dental Appliances: None Vision: Visual Aid: Glasses Hearing Aid:   
Jewelry:   
Clothing:   
Other Valuables:   
Valuables sent to safe:   
 
PATIENT INSTRUCTIONS: 
 
Take Home Medications: 
 
 
 
 
 
 
 
[facility]O Transitions of Care [facility] offers a voluntary care coordination program to provide high quality service and care to [facility] fee-for-service beneficiaries. Perfecto Scarlet was designed to help you enhance your health and well-being through the following services: ? Transitions of Care  support for individuals who are transitioning from one care setting to another (example: Hospital to home). ? Chronic and Complex Care Coordination  support for individuals and caregivers of those with serious or chronic illnesses or with more than one chronic (ongoing) condition and those who take a number of different medications. If you meet specific medical criteria, a [address] [place] [address] Rd may call you directly to coordinate your care with your primary care physician and your other care providers. For questions about the [facility] programs, please, contact your physicians office. For general questions or additional information about Accountable Care Organizations: 
Please visit www.medicare.gov/acos. html or call 1-800-MEDICARE (1-703.712.2865) TTY users should call 6-563.407.7620. Introducing [facility]! Dear Emily: 
Thank you for requesting a Nixle account. Our records indicate that you already have an active Nixle account. You can access your account anytime at [email or web address] Did you know that you can access your hospital and ER discharge instructions at any time in Nixle? You can also review all of your test results from your hospital stay or ER visit. Additional Information If you have questions, please visit the Frequently Asked Questions section of the Nixle website at [email or web address] Remember, Nixle is NOT to be used for urgent needs. For medical emergencies, dial 911. Now available from your iPhone and Android! Introducing Joon Grissom As a [employer] patient, I wanted to make you aware of our electronic visit tool called Joon Grissom. [employer] 24/7 allows you to connect within minutes with a medical provider 24 hours a day, seven days a week via a mobile device or tablet or logging into a secure website from your computer. You can access Joon Grissom from anywhere in the [place]. A virtual visit might be right for you when you have a simple condition and feel like you just dont want to get out of bed, or cant get away from work for an appointment, when your regular [employer] provider is not available (evenings, weekends or holidays), or when youre out of town and need minor care. Electronic visits cost only $49 and if the [employer] 24/7 provider determines a prescription is needed to treat your condition, one can be electronically transmitted to a nearby pharmacy*. Please take a moment to enroll today if you have not already done so. The enrollment process is free and takes just a few minutes. To enroll, please download the [facility] 24/7 ariel to your tablet or phone, or visit [email or web address] to enroll on your computer. And, as an [address] [place] [address] patient with a Reebee account, the results of your visits will be scanned into your electronic medical record and your primary care provider will be able to view the scanned results. We urge you to continue to see your regular [facility] provider for your ongoing medical care. And while your primary care provider may not be the one available when you seek a InfoAssure virtual visit, the peace of mind you get from getting a real diagnosis real time can be priceless. For more information on InfoAssure, view our Frequently Asked Questions (FAQs) at [email or web address]. Sincerely, 
 
Marian Nolen MD 
Chief Medical Officer [address] [place] [address] *:  certain medications cannot be prescribed via InfoAssure Providers Seen During Your Hospitalization Provider Specialty Primary office phone Ryley Tidwell MD Gastroenterology [phone] Your Primary Care Physician (PCP) Primary Care Physician Office Phone Office Fax Inés Bowling [phone] You are allergic to the following Allergen Reactions Latex Hives Codeine Other (comments) Severe Headache Morphine Hives Dilaudid (Hydromorphone) Other (comments) Memory loss Other Medication Rash BANDAIDS Shellfish Containing Products Hives Recent Documentation Height Weight BMI OB Status Smoking Status 1.626 m 85.9 kg 32.5 kg/m2 Hysterectomy Former Smoker Emergency Contacts Name Discharge Info Relation Home Work Mobile Ankush Gaspar DISCHARGE CAREGIVER [3] Spouse [3] [phone] Patient Belongings The following personal items are in your possession at time of discharge: 
  Dental Appliances: None  Visual Aid: Glasses Please provide this summary of care documentation to your next provider. Signatures-by signing, you are acknowledging that this After Visit Summary has been reviewed with you and you have received a copy. Patient Signature:  ____________________________________________________________ Date:  ____________________________________________________________  
  
[facility] Provider Signature:  ____________________________________________________________ Date:  ____________________________________________________________

## 2018-06-27 NOTE — DISCHARGE INSTRUCTIONS
Teddy Price  [id number]  1950    COLON / EGD DISCHARGE INSTRUCTIONS  Discomfort:  Sore throat- throat lozenges or warm salt water gargle  Redness at IV site- apply warm compress to area; if redness or soreness persist- contact your physician  There may be a slight amount of blood passed from the rectum  Gaseous discomfort- walking, belching will help relieve any discomfort  You may not operate a vehicle for 12 hours  You may not engage in an occupation involving machinery or appliances for rest of today  You may not drink alcoholic beverages for at least 12 hours  Avoid making any critical decisions for at least 24 hour  DIET:   Regular diet. - however -  remember your colon is empty and a heavy meal will produce gas. Avoid these foods:  vegetables, fried / greasy foods, carbonated drinks for today     ACTIVITY:  You may  resume your normal daily activities it is recommended that you spend the remainder of the day resting -  avoid any strenuous activity. CALL M.D. ANY SIGN OF:   Increasing pain, nausea, vomiting  Abdominal distension (swelling)  New increased bleeding (oral or rectal)  Fever (chills)  Pain in chest area  Bloody discharge from nose or mouth  Shortness of breath    ONLY  Tylenol as needed for pain. Follow-up Instructions:   Call Dr. Ramiro Ann for results of procedure / biopsy in 7 days at telephone #  [phone]  Additional instructions:  Make a follow up visit with Dr. Ramiro Ann                                        Repeat Colonoscopy in 5 years.                   Teddy Price  [id number]  1950        DISCHARGE SUMMARY from Nurse    The following personal items collected during your admission are returned to you:   Dental Appliance: Dental Appliances: None  Vision: Visual Aid: Glasses  Hearing Aid:    Jewelry:    Clothing:    Other Valuables:    Valuables sent to safe:      PATIENT INSTRUCTIONS:    Take Home Medications:

## 2018-06-27 NOTE — PROCEDURES
Colonoscopy Procedure Note    Indications:   Iron deficiency anemia    Referring Physician: Tai Warren MD  Anesthesia/Sedation: MAC anesthesia Propofol  Endoscopist:  Dr. Kee Salgado    Procedure in Detail:  Informed consent was obtained for the procedure, including sedation. Risks of perforation, hemorrhage, adverse drug reaction, and aspiration were discussed. The patient was placed in the left lateral decubitus position. Based on the pre-procedure assessment, including review of the patient's medical history, medications, allergies, and review of systems, she had been deemed to be an appropriate candidate for moderate sedation; she was therefore sedated with the medications listed above. The patient was monitored continuously with ECG tracing, pulse oximetry, blood pressure monitoring, and direct observations. A rectal examination was performed. The [id number] was inserted into the rectum and advanced under direct vision to the terminal ileum. The quality of the colonic preparation was adequate. A careful inspection was made as the colonoscope was withdrawn, including a retroflexed view of the rectum; findings and interventions are described below. Appropriate photodocumentation was obtained. Findings:     1. Scope advanced to the cecum and terminal ileum. 2.  There was a small 4 mm polyp in the rectum s/p cold snare removal.  3.  Normal colo-colonic anastomosis in distal sigmoid. 4.  Scattered diverticulosis in the sigmoid and descending colon. 5.  Small internal hemorrhoids. Therapies:  See above    Specimen: Specimens were collected as described above and sent to pathology. Complications: None were encountered during the procedure. EBL: < 10 ml.     Recommendations:   -f/u path  -repeat Colonoscopy in 5 years  Signed By: Ryley Tidwell MD                        June 27, 2018

## 2018-06-27 NOTE — PERIOP NOTES
Anesthesia reports 580 mg Propofol, 50 mg Lidocaine and 300 mL NS given during procedure. Received report from anesthesia staff on vital signs and status of patient.

## 2018-06-27 NOTE — ROUTINE PROCESS
Wilmar Argueta [facility]  1950  [id number]    Situation:  Verbal report received from: Tyson Andersen RN  Procedure: Procedure(s) with comments:  ESOPHAGOGASTRODUODENOSCOPY (EGD)    - EGD with bx and dil  COLONOSCOPY - colon with polyp  ESOPHAGOGASTRODUODENAL (EGD) BIOPSY  ESOPHAGEAL DILATION  ENDOSCOPIC POLYPECTOMY    Background:    Preoperative diagnosis: constipation, predominant ibs, anemia, esophogial dysphasia, gerd  Postoperative diagnosis:   gastritis, dysphagia, diverticulosis,  colon polyp    :  Dr. Katelyn Landry  Assistant(s): Endoscopy Technician-1: Candia Gosselin  Endoscopy RN-1: Beni Monaco    Specimens:   ID Type Source Tests Collected by Time Destination   1 : bx Preservative Gastric  Sky Darby MD 6/27/2018 0801 Pathology   2 : bx Preservative Esophagus, Distal  Sky Darby MD 6/27/2018 0801 Pathology   3 : bx Preservative Esophagus, Mid  Sky Darby MD 6/27/2018 0802 Pathology   4 : bx Preservative Duodenum  Sky Darby MD 6/27/2018 0803 Pathology   5 : polyp Preservative Rectum  Sky Darby MD 6/27/2018 3345 Pathology     H. Pylori  no    Assessment:      Anesthesia gave intra-procedure sedation and medications, see anesthesia flow sheet yes    Intravenous fluids: NS@ KVO     Vital signs stable     Abdominal assessment: round and soft     Recommendation:  Discharge patient per MD order.     Family or Friend   Permission to share finding with family or friend yes

## 2018-06-27 NOTE — H&P
Gastroenterology Outpatient History and Physical    Patient: Neal Vanegas    Physician: Juni aSucedo MD    Vital Signs: Blood pressure 135/77, pulse 72, resp. rate 22, height 5' 4\" (1.626 m), weight 85.9 kg (189 lb 5 oz), SpO2 97 %. Allergies: Allergies   Allergen Reactions    Latex Hives    Codeine Other (comments)     Severe Headache    Morphine Hives    Dilaudid [Hydromorphone] Other (comments)     Memory loss    Other Medication Rash     BANDAIDS    Shellfish Containing Products Hives       Chief Complaint: Iron Def Anemia    History of Present Illness: 70 yo WF with iron deficiency anemia and dysphagia.     Justification for Procedure: above    History:  Past Medical History:   Diagnosis Date    Arthritis     Cancer ([facility] [address]     skin cancer    Chronic pain     CHRONIC BOWEL PAIN    Diverticulitis     Fibromyalgia     GERD (gastroesophageal reflux disease)     Goiter     Hiatal hernia     Hypercholesterolemia     Hypertension     IBS (irritable bowel syndrome)     IBS (irritable bowel syndrome)     CONSTIPATION, CHRONIC SINCE HER 25s    Ill-defined condition     rectocele    Insomnia     TAKES TRAZODONE NIGHTLY    Migraine     REDUCED IN FREQUENCY AND SEVERITY SINCE MENOPAUSE    Other ill-defined conditions(799.89)     Psychiatric disorder 3/11    DEPRESSION      Past Surgical History:   Procedure Laterality Date    COLONOSCOPY N/A 6/21/2016    COLONOSCOPY performed by Juni Saucedo MD at [facility] ENDOSCOPY    ENDOSCOPY, COLON, DIAGNOSTIC  11/2010    Dr. Roberto Burciaga-     HX CHOLECYSTECTOMY  2006   Allie Chong  2007    Dr. Pond/ diverticulitis    HX GI  X3  LAST ONE 12/10    COLONOSCOPY    HX GYN  1982    D&C WITH SUCTION    HX HYSTERECTOMY      HX ORTHOPAEDIC      right foot surgery    HX ORTHOPAEDIC      neck surgery 3/21/11 Dr. Saskia Espinosa [address]X [address]  08/31/2017    back surgery    HX OTHER SURGICAL      EGD    HX TONSILLECTOMY        Social History     Social History    Marital status:      Spouse name: N/A    Number of children: N/A    Years of education: N/A     Social History Main Topics    Smoking status: Former Smoker     Packs/day: 1.00     Years: 45.00     Quit date: 1/1/2007    Smokeless tobacco: Never Used    Alcohol use 0.0 oz/week     0 Standard drinks or equivalent per week      Comment: rare    Drug use: No    Sexual activity: Yes     Partners: Male     Other Topics Concern    None     Social History Narrative      Family History   Problem Relation Age of Onset    Cancer Father      lung and bone    Stroke Sister     Cancer Sister 76     PANCREATIC    Hypertension Mother     High Cholesterol Mother     Alzheimer Mother      late 62s early 76s    Cancer Other      COLON    Cancer Other      breast    Diabetes Maternal Aunt     Cancer Maternal Uncle      COLON    Cancer Maternal Grandfather      COLON    Ovarian Cancer Daughter 28       Medications:   Prior to Admission medications    Medication Sig Start Date End Date Taking? Authorizing Provider   traMADol (ULTRAM) 50 mg tablet Take 1 Tab by mouth every six (6) hours as needed for Pain. Max Daily Amount: 200 mg. 6/15/18  Yes Zahraa Ya MD   estradiol (ESTRACE) 0.01 % (0.1 mg/gram) vaginal cream Apply to Vaginal twice a week with fingertip   Yes Historical Provider   lisinopril (PRINIVIL, ZESTRIL) 40 mg tablet TAKE 1 TABLET DAILY 12/19/16  Yes Zarhaa Ya MD   aspirin delayed-release 81 mg tablet Take 81 mg by mouth every other day.    Yes Historical Provider   dicyclomine (BENTYL) 10 mg capsule TAKE 2 CAPSULES FOUR TIMES A DAY AS NEEDED 6/21/18   Zahraa Ya MD   hydroCHLOROthiazide (HYDRODIURIL) 25 mg tablet TAKE 1 TABLET DAILY 6/21/18   Zahraa Ya MD   ALPRAZolam [facility]) 0.5 mg tablet TAKE 1 TABLET BY MOUTH TWICE A DAY AS NEEDED FOR ANXIETY 6/15/18   Zahraa Ya MD   sucralfate (CARAFATE) 1 gram tablet Take 1 tablet by mouth as needed    Historical Provider   diclofenac (VOLTAREN) 1 % gel Apply 2 g to affected area four (4) times daily as needed. 4/18/18   Rambo Jones MD   oxybutynin (DITROPAN) 5 mg tablet take 1 tablet 2 times a day 3/8/18   Historical Provider   diclofenac EC (VOLTAREN) 75 mg EC tablet Take 1 Tab by mouth two (2) times daily as needed. 3/20/18   Rambo Jones MD   rosuvastatin (CRESTOR) 20 mg tablet TAKE 1 TABLET NIGHTLY 2/26/18   Rambo Jones MD   citalopram (CELEXA) 40 mg tablet TAKE 1 TABLET DAILY 2/16/18   Rambo Jones MD   DULoxetine (CYMBALTA) 60 mg capsule TAKE 1 CAPSULE TWICE A DAY 10/30/17   Rambo Jones MD   NEXIUM 40 mg capsule TAKE 1 CAPSULE DAILY 5/30/17   Rambo Jones MD   LINZESS 290 mcg cap capsule Take 290 mcg by mouth Three (3) times a week. 3/3/17   Historical Provider   cholecalciferol (VITAMIN D3) 1,000 unit tablet Take 1,000 Units by mouth daily. Historical Provider   gabapentin (NEURONTIN) 300 mg capsule Take 600 mg by mouth three (3) times daily. 9/19/16   Rambo Jones MD       Physical Exam:   General: alert, no distress   HEENT: Head: Normocephalic, no lesions, without obvious abnormality.    Heart: regular rate and rhythm, S1, S2 normal, no murmur, click, rub or gallop   Lungs: chest clear, no wheezing, rales, normal symmetric air entry   Abdominal: soft, NT/ND + BS   Neurological: Grossly normal   Extremities: extremities normal, atraumatic, no cyanosis or edema     Findings/Diagnosis: Iron Def Anemia, Dysphagia    Plan of Care/Planned Procedure: EGD and Colonoscopy     Signed By: Iban Broderick MD     June 27, 2018

## 2018-06-27 NOTE — PROCEDURES
Esophagogastroduodenoscopy Procedure Note      Cirilo Cohen  1950  [id number]    Indication:  Iron Deficiency Anemia; Dysphagia     Endoscopist: Sky Darby MD    Referring Provider:  Ryann Astorga MD    Sedation:  MAC anesthesia Propofol    Procedure Details:  After infomed consent was obtained for the procedure, with all risks and benefits of procedure explained the patient was taken to the endoscopy suite and placed in the left lateral decubitus position. Following sequential administration of sedation as per above, the endoscope was inserted into the mouth and advanced under direct vision to second portion of the duodenum. A careful inspection was made as the gastroscope was withdrawn, including a retroflexed view of the proximal stomach; findings and interventions are described below. Findings:     Esophagus:   + There was normal mucosa throughout the entire esophagus without any erosions.  + S/P Empiric dilation with 54 FR Savary over dilation.  + Irregular Z line at 38 cm from incisors s/p Bx of distal and mid esophagus. Stomach:   + There was diffuse erythema c/w gastritis s/p Bx. No erosions. + Wide open pylorus. Duodenum:   - The bulb and post bulbar mucosa is normal in appearance to the second portion. The duodenal folds appeared normal.  Cold forceps biopsies to r/o celiac. Therapies:  See above    Specimen: Specimens were collected as described and send to the laboratory. Complications:   None were encountered during the procedure. EBL:  < 10 ml.           Recommendations:   -f/u path  -f/u with Dr. Michel Starkey MD  6/27/2018  8:11 AM

## 2018-07-02 ENCOUNTER — TELEPHONE (OUTPATIENT)
Dept: FAMILY MEDICINE CLINIC | Age: 68
End: 2018-07-02

## 2018-07-02 NOTE — TELEPHONE ENCOUNTER
Spoke with patient and states she had an EGD and colonoscopy 6/27/2018 and Dr. Sylvia Mcfarland informed her to follow up with Dr. Cornel Brown on her blood count. Informed patient she has an appt 7/25/2018. Writer asked patient if Dr. Sylvia Mcfarland knew she already had an appt. Patient stated no but she will call Dr. Behzad Griffin  office and see if she can wait until 7/25/2018 or  See if Dr. Vinnie Rick wants labs sooner. Patient stated she will call back to inform writer.

## 2018-07-02 NOTE — TELEPHONE ENCOUNTER
Patient states that she needs an appointment early because she had a procedure done by Dr Jasmina Martinez.

## 2018-07-25 ENCOUNTER — OFFICE VISIT (OUTPATIENT)
Dept: FAMILY MEDICINE CLINIC | Age: 68
End: 2018-07-25

## 2018-07-25 ENCOUNTER — HOSPITAL ENCOUNTER (OUTPATIENT)
Dept: LAB | Age: 68
Discharge: HOME | End: 2018-07-25
Payer: MEDICARE

## 2018-07-25 VITALS
DIASTOLIC BLOOD PRESSURE: 92 MMHG | SYSTOLIC BLOOD PRESSURE: 158 MMHG | WEIGHT: 193.8 LBS | RESPIRATION RATE: 16 BRPM | BODY MASS INDEX: 33.09 KG/M2 | HEART RATE: 74 BPM | HEIGHT: 64 IN | OXYGEN SATURATION: 96 % | TEMPERATURE: 97.7 F

## 2018-07-25 DIAGNOSIS — F32.A DEPRESSION, UNSPECIFIED DEPRESSION TYPE: ICD-10-CM

## 2018-07-25 DIAGNOSIS — Z23 NEED FOR SHINGLES VACCINE: ICD-10-CM

## 2018-07-25 DIAGNOSIS — G89.29 ENCOUNTER FOR CHRONIC PAIN MANAGEMENT: ICD-10-CM

## 2018-07-25 DIAGNOSIS — I10 ESSENTIAL HYPERTENSION: Primary | ICD-10-CM

## 2018-07-25 DIAGNOSIS — M79.7 FIBROMYALGIA: ICD-10-CM

## 2018-07-25 DIAGNOSIS — Z51.81 ENCOUNTER FOR MEDICATION MONITORING: ICD-10-CM

## 2018-07-25 DIAGNOSIS — E78.00 HYPERCHOLESTEROLEMIA: ICD-10-CM

## 2018-07-25 DIAGNOSIS — M48.062 SPINAL STENOSIS OF LUMBAR REGION WITH NEUROGENIC CLAUDICATION: ICD-10-CM

## 2018-07-25 DIAGNOSIS — M50.30 DDD (DEGENERATIVE DISC DISEASE), CERVICAL: ICD-10-CM

## 2018-07-25 DIAGNOSIS — D64.9 CHRONIC ANEMIA: ICD-10-CM

## 2018-07-25 PROCEDURE — 80307 DRUG TEST PRSMV CHEM ANLYZR: CPT

## 2018-07-25 RX ORDER — TRAMADOL HYDROCHLORIDE 50 MG/1
50 TABLET ORAL
Qty: 90 TAB | Refills: 0 | Status: SHIPPED | OUTPATIENT
Start: 2018-07-25 | End: 2018-07-25

## 2018-07-25 RX ORDER — POLYETHYLENE GLYCOL-3350, SODIUM CHLORIDE, POTASSIUM CHLORIDE AND SODIUM BICARBONATE 420; 11.2; 5.72; 1.48 G/438.4G; G/438.4G; G/438.4G; G/438.4G
POWDER, FOR SOLUTION ORAL
Refills: 0 | COMMUNITY
Start: 2018-06-25 | End: 2018-07-25 | Stop reason: ALTCHOICE

## 2018-07-25 NOTE — PROGRESS NOTES
HISTORY OF PRESENT ILLNESS  Landen Romo is a 74 y.o. female. HPI   Follow up on chronic medical problems. HTN follow up:  Compliant w/ meds, low salt diet, and exercise. No home bp monitoring. No swelling, headache or dizziness. No chest pain, SOB, palpitations. Otherwise feeling well since the last visit. Hypercholesterolemia follow up:  Compliant w/ low fat, low cholesterol diet. Exercising some. Tolerating crestor. No muscle more than her usual from the fibromyalgia, no abdominal pain, no skin discoloration. Patient is not fasting today. Hypercholesterolemia follow up:  Compliant w/ meds, low fat, low cholesterol diet. Exercising some. No muscle nor abdominal pain, no skin discoloration. Patient fasting today. Depression Review:  Patient is seen for followup of depression and fibromyalgia and IBS. Currently taking cymbalta and Celexa and gabapentin. Ongoing symptoms include fatigue and stress. She experiences the following side effects from the treatment: none. Encounter for pain management. 1./2. Medical history/Past medical History  Chronic Pain:  Patient has long standing fibromyalgia. Had surgery for the spinal stenosis. Still has aching in the legs and feet and back. Has been worse over the past few weeks. Pain is 7-8/10. Currently taking cymbalta and Celexa and gabapentin. She also takes motrin or diclofenac for less severe pain. Pt takes tramadol for more severe pain  Ongoing symptoms include fatigue and pain. 3. Applicable records from prior treatment providers are apart of [facility] under the media tab. 4. Diagnostic, therapeutic and laboratory results are available in the [facility] chart. 5. Consultation notes are available for review in the media tab of the [facility] chart. 6. Treatment goals include pain control so that the pt may be as active and function with her daily activities and improved comfort level.   Previously pt has been limited by pain.    7. The risks and benefits of treatment has been discussed at this office visit with the pt. She understands that the medication has addicting potential.  Additionally the pt has been advised that narcotic pain medication may impair mental and/or physical ability required for performance of tasks such as driving or operating any other machinery. 8. Pt has an updated signed pain contract on file and can be found under the FYI section of the [facility] chart. 9. The pain contract is reviewed. Pain medication will be continued at the previous dosage. Urine drug screening will be done today. Diagnostic studies are not indicated at this time. Interventional procedure are not indicated at this time. 10. Medication prescibed is traMADol (ULTRAM) 50 mg tablet.  has been reviewed at this OV by me. 11. Patient instructions have been reviewed in detail as outlined above and in the pain contract. 12. Re-eval is planned for 3 months. Patient Active Problem List   Diagnosis Code    IBS (irritable bowel syndrome) K58.9    Fibromyalgia M79.7    Hiatal hernia K44.9    GERD (gastroesophageal reflux disease) K21.9    Hypercholesterolemia E78.00    Diverticulitis K57.92    Depression F32.9    Essential hypertension I10    Encounter for medication monitoring Z51.81    DDD (degenerative disc disease), cervical M50.30    Spinal stenosis of lumbar region with neurogenic claudication M48.062    Non morbid obesity E66.9       Current Outpatient Prescriptions   Medication Sig Dispense Refill    dicyclomine (BENTYL) 10 mg capsule TAKE 2 CAPSULES FOUR TIMES A DAY AS NEEDED 720 Cap 3    hydroCHLOROthiazide (HYDRODIURIL) 25 mg tablet TAKE 1 TABLET DAILY 90 Tab 2    traMADol (ULTRAM) 50 mg tablet Take 1 Tab by mouth every six (6) hours as needed for Pain.  Max Daily Amount: 200 mg. 90 Tab 0    ALPRAZolam (XANAX) 0.5 mg tablet TAKE 1 TABLET BY MOUTH TWICE A DAY AS NEEDED FOR ANXIETY 180 Tab 1    sucralfate (CARAFATE) 1 gram tablet Take 1 tablet by mouth as needed      estradiol (ESTRACE) 0.01 % (0.1 mg/gram) vaginal cream Apply to Vaginal twice a week with fingertip      diclofenac (VOLTAREN) 1 % gel Apply 2 g to affected area four (4) times daily as needed. 100 g 3    oxybutynin (DITROPAN) 5 mg tablet take 1 tablet 2 times a day  0    diclofenac EC (VOLTAREN) 75 mg EC tablet Take 1 Tab by mouth two (2) times daily as needed. 60 Tab 3    rosuvastatin (CRESTOR) 20 mg tablet TAKE 1 TABLET NIGHTLY 90 Tab 1    citalopram (CELEXA) 40 mg tablet TAKE 1 TABLET DAILY 90 Tab 3    DULoxetine (CYMBALTA) 60 mg capsule TAKE 1 CAPSULE TWICE A  Cap 3    NEXIUM 40 mg capsule TAKE 1 CAPSULE DAILY 90 Cap 3    LINZESS 290 mcg cap capsule Take 290 mcg by mouth Three (3) times a week.  cholecalciferol (VITAMIN D3) 1,000 unit tablet Take 1,000 Units by mouth daily.  lisinopril (PRINIVIL, ZESTRIL) 40 mg tablet TAKE 1 TABLET DAILY 90 Tab 3    gabapentin (NEURONTIN) 300 mg capsule Take 600 mg by mouth three (3) times daily. 360 Cap 1    aspirin delayed-release 81 mg tablet Take 81 mg by mouth every other day.          Allergies   Allergen Reactions    Latex Hives    Codeine Other (comments)     Severe Headache    Morphine Hives    Dilaudid [Hydromorphone] Other (comments)     Memory loss    Other Medication Rash     BANDAIDS    Shellfish Containing Products Hives         Past Medical History:   Diagnosis Date    Arthritis     Cancer ([facility] [address]     skin cancer    Chronic pain     CHRONIC BOWEL PAIN    Diverticulitis     Fibromyalgia     GERD (gastroesophageal reflux disease)     Goiter     Hiatal hernia     Hypercholesterolemia     Hypertension     IBS (irritable bowel syndrome)     IBS (irritable bowel syndrome)     CONSTIPATION, CHRONIC SINCE HER 20s    Ill-defined condition     rectocele    Insomnia     TAKES TRAZODONE NIGHTLY    Migraine     REDUCED IN FREQUENCY AND SEVERITY SINCE MENOPAUSE    Other ill-defined conditions(799.89)     Psychiatric disorder 3/11    DEPRESSION         Past Surgical History:   Procedure Laterality Date    COLONOSCOPY N/A 6/21/2016    COLONOSCOPY performed by Emeli Caal MD at [facility] ENDOSCOPY    COLONOSCOPY N/A 6/27/2018    COLONOSCOPY performed by Emeli Caal MD at [facility] [place], DIAGNOSTIC  11/2010    Dr. Óscar Mccloud-     HX CHOLECYSTECTOMY  2006   Charbel Cerda  2007    Dr. Pond/ diverticulitis    HX GI  X3  LAST ONE 12/10    COLONOSCOPY    HX GYN  1982    D&C WITH SUCTION    HX HYSTERECTOMY      HX ORTHOPAEDIC      right foot surgery    HX ORTHOPAEDIC      neck surgery 3/21/11 Dr. Juventino Goldberg HX ORTHOPAEDIC  08/31/2017    back surgery    HX OTHER SURGICAL      EGD    HX TONSILLECTOMY           Family History   Problem Relation Age of Onset    Cancer Father      lung and bone    Stroke Sister     Cancer Sister 76     PANCREATIC    Hypertension Mother     High Cholesterol Mother     Alzheimer Mother      late 62s early 76s    Cancer Other      COLON    Cancer Other      breast    Diabetes Maternal Aunt     Cancer Maternal Uncle      COLON    Cancer Maternal Grandfather      COLON    Ovarian Cancer Daughter 28       Social History   Substance Use Topics    Smoking status: Former Smoker     Packs/day: 1.00     Years: 45.00     Quit date: 1/1/2007    Smokeless tobacco: Never Used    Alcohol use 0.0 oz/week     0 Standard drinks or equivalent per week      Comment: rare       Lab Results   Component Value Date/Time    WBC 7.7 03/20/2018 04:21 PM    HGB 9.6 (L) 03/20/2018 04:21 PM    Hemoglobin (POC) 11.9 08/29/2017 09:26 AM    HCT 29.8 (L) 03/20/2018 04:21 PM    Hematocrit (POC) 35 08/29/2017 09:26 AM    PLATELET 980 85/71/6311 04:21 PM    MCV 86 03/20/2018 04:21 PM     Lab Results   Component Value Date/Time    Cholesterol, total 170 03/20/2018 04:21 PM    HDL Cholesterol 40 03/20/2018 04:21 PM    LDL, calculated 84 03/20/2018 04:21 PM    Triglyceride 228 (H) 03/20/2018 04:21 PM    CHOL/HDL Ratio 4.1 08/20/2010 03:00 AM     Lab Results   Component Value Date/Time    TSH 1.980 09/19/2016 11:30 AM    T4, Free 1.25 12/11/2013 03:45 PM      Lab Results   Component Value Date/Time    Sodium 143 03/20/2018 04:21 PM    Potassium 4.0 03/20/2018 04:21 PM    Chloride 103 03/20/2018 04:21 PM    CO2 23 03/20/2018 04:21 PM    Anion gap 7 08/18/2017 03:27 PM    Glucose 72 03/20/2018 04:21 PM    BUN 20 03/20/2018 04:21 PM    Creatinine 1.08 (H) 03/20/2018 04:21 PM    BUN/Creatinine ratio 19 03/20/2018 04:21 PM    GFR est AA 61 03/20/2018 04:21 PM    GFR est non-AA 53 (L) 03/20/2018 04:21 PM    Calcium 9.3 03/20/2018 04:21 PM    Bilirubin, total 0.2 03/20/2018 04:21 PM    ALT (SGPT) 23 03/20/2018 04:21 PM    AST (SGOT) 25 03/20/2018 04:21 PM    Alk. phosphatase 82 03/20/2018 04:21 PM    Protein, total 6.8 03/20/2018 04:21 PM    Albumin 4.2 03/20/2018 04:21 PM    Globulin 4.0 08/18/2017 03:27 PM    A-G Ratio 1.6 03/20/2018 04:21 PM      Lab Results   Component Value Date/Time    Hemoglobin A1c 6.3 08/18/2017 03:27 PM    Hemoglobin A1c (POC) 5.4 12/20/2017 04:09 PM           Review of Systems   Constitutional: Positive for malaise/fatigue(baseline). HENT: Negative for congestion. Eyes: Negative for blurred vision. Respiratory: Negative for cough and shortness of breath. Cardiovascular: Negative for chest pain, palpitations and leg swelling. Gastrointestinal: Negative for abdominal pain, constipation and heartburn. Genitourinary: Negative for dysuria, frequency and urgency. Musculoskeletal: Positive for back pain, joint pain, myalgias and neck pain. Negative for falls. Neurological: Negative for dizziness, tingling and headaches. Endo/Heme/Allergies: Negative for environmental allergies. Psychiatric/Behavioral: Negative for depression. The patient does not have insomnia.     Skin: has rash around the ankle area over the past 2-3 days. No itching noted. Physical Exam   Constitutional: She appears well-developed and well-nourished. BP (!) 158/92  Pulse 74  Temp 97.7 °F (36.5 °C) (Oral)   Resp 16  Ht 5' 4\" (1.626 m)  Wt 193 lb 12.8 oz (87.9 kg)  SpO2 96%  BMI 33.27 kg/m2      HENT:   Right Ear: Tympanic membrane and ear canal normal.   Left Ear: Tympanic membrane and ear canal normal.   Nose: No mucosal edema or rhinorrhea. Mouth/Throat: Oropharynx is clear and moist and mucous membranes are normal.   Neck: Normal range of motion. Neck supple. No thyromegaly present. Cardiovascular: Normal rate and regular rhythm. No murmur heard. Pulmonary/Chest: Effort normal and breath sounds normal.   Abdominal: Soft. Bowel sounds are normal. There is no tenderness. Musculoskeletal: Normal range of motion. She exhibits no edema. Cervical back and lumbar spine: She exhibits tenderness, bony tenderness and pain. She exhibits normal range of motion and no spasm. Lymphadenopathy:     She has no cervical adenopathy. Neurological: She has normal strength. No sensory deficit. Coordination and gait normal.   Skin: Skin is warm and dry. Nonspecific skin rash on the left ankle greater than the left. Psychiatric: She has a normal mood and affect. Nursing note and vitals reviewed. ASSESSMENT and PLAN  Diagnoses and all orders for this visit:    1. Essential hypertension  Has not taken her bp medications for the day. Discussed sodium restriction, high k rich diet, maintaining ideal body weight and regular exercise program such as daily walking 30 min perday 4-5 times per week, as physiologic means to achieve blood pressure control.  Medication compliance advised. \  2. Hypercholesterolemia  Continue to monitor. Work on diet and exercise. 3. Depression, unspecified depression type  Stable     4. DDD (degenerative disc disease), cervical//  5.  Spinal stenosis of lumbar region with neurogenic claudication  6. Fibromyalgia  Encourage increase exercise with Yoga. Heat and Salt water soaking baths. 7. Chronic anemia  -     AMB POC COMPLETE CBC, AUTOMATED  Hg is up to 11.6%. Had upper endo showing gastritis and colonoscopy had rectal polypectomy    8. Encounter for medication monitoring  -     METABOLIC PANEL, BASIC    9. Encounter for chronic pain management  -     9-DRUG SCREEN + OXY, UR  The pt has signed medication agreement. Pain contract is reviewed. Pain medications will be continued at the previous dosage. Urine drug screening will be done today. Diagnostic  studies are not indicated at this time. Interventional procedure are not indicated at this time. Re-eval in 3 months. 12. Need for shingle Vaccine  -     varicella-zoster recombinant, PF, (SHINGRIX) 50 mcg/0.5 mL susr injection; 0.5 mL by IntraMUSCular route once for 1 dose. Repeat second dose in 6 months. Follow-up Disposition:  Return in about 8 weeks (around 9/17/2018) for medicare wellness exam.  reviewed diet, exercise and weight control  cardiovascular risk and specific lipid/LDL goals reviewed  reviewed medications and side effects in detail    I have discussed diagnosis listed in this note with pt and/or family. I have discussed treatment plans and options and the risk/benefit analysis of those options, including safe use of medications and possible medication side effects. Through the use of shared decision making we have agreed to the above plan. The patient has received an after-visit summary and questions were answered concerning future plans and follow up. Advise pt of any urgent changes then to proceed to the ER.

## 2018-07-25 NOTE — PROGRESS NOTES
Chief Complaint   Patient presents with    Hypertension    Diabetes     Mammogram 8/28/2017    Colonoscopy 6/27/2018 by Dr. Reese Suarez- repeat in 5 years    Patient states she hasn't had an eye exam in about 2 years. 1. Have you been to the ER, urgent care clinic since your last visit? Hospitalized since your last visit? no    2. Have you seen or consulted any other health care providers outside of the [facility] since your last visit? Include any pap smears or colon screening.  no

## 2018-07-25 NOTE — PATIENT INSTRUCTIONS
Ankle: Exercises  Your Care Instructions  Here are some examples of exercises for your ankle. Start each exercise slowly. Ease off the exercise if you start to have pain. Your doctor or physical therapist will tell you when you can start these exercises and which ones will work best for you. How to do the exercises  \"Alphabet\" exercise    1. Trace the alphabet with your toe. This helps your ankle move in all directions. Side-to-side knee swing exercise    1. Sit in a chair with your foot flat on the floor. 2. Slowly move your knee from side to side while keeping your foot pressed flat. 3. Continue this exercise for 2 to 3 minutes. Towel curl    1. While sitting, place your foot on a towel on the floor and scrunch the towel toward you with your toes. 2. Then use your toes to push the towel away from you. 3. Make this exercise more challenging by placing a weighted object, such as a soup can, on the other end of the towel. Towel stretch    1. Sit with your legs extended and knees straight. 2. Place a towel around your foot just under the toes. 3. Hold each end of the towel in each hand, with your hands above your knees. 4. Pull back with the towel so that your foot stretches toward you. 5. Hold the position for at least 15 to 30 seconds. 6. Repeat 2 to 4 times a session, up to 5 sessions a day. Ankle eversion exercise    1. Start by sitting with your foot flat on the floor and pushing it outward against an immovable object such as the wall or heavy furniture. Hold for about 6 seconds, then relax. Repeat 8 to 12 times. 2. After you feel comfortable with this, try using rubber tubing looped around the outside of your feet for resistance. Push your foot out to the side against the tubing, and then count to 10 as you slowly bring your foot back to the middle. Repeat 8 to 12 times. Isometric opposition exercises    1. While sitting, put your feet together flat on the floor.   2. Press your injured foot inward against your other foot. Hold for about 6 seconds, and relax. Repeat 8 to 12 times. 3. Then place the heel of your other foot on top of the injured one. Push down with the top heel while trying to push up with your injured foot. Hold for about 6 seconds, and relax. Repeat 8 to 12 times. Follow-up care is a key part of your treatment and safety. Be sure to make and go to all appointments, and call your doctor if you are having problems. It's also a good idea to know your test results and keep a list of the medicines you take. Where can you learn more? Go to http://mila-mary.info/. Enter Q742 in the search box to learn more about \"Ankle: Exercises. \"  Current as of: November 29, 2017  Content Version: 11.7  © 3661-0109 [employer]. Care instructions adapted under license by [employer] (which disclaims liability or warranty for this information). If you have questions about a medical condition or this instruction, always ask your healthcare professional. [place] [address] any warranty or liability for your use of this information. Knee: Exercises  Your Care Instructions  Here are some examples of exercises for your knee. Start each exercise slowly. Ease off the exercise if you start to have pain. Your doctor or physical therapist will tell you when you can start these exercises and which ones will work best for you. How to do the exercises  Quad sets    2. Sit with your leg straight and supported on the floor or a firm bed. (If you feel discomfort in the front or back of your knee, place a small towel roll under your knee.)  3. Tighten the muscles on top of your thigh by pressing the back of your knee flat down to the floor. (If you feel discomfort under your kneecap, place a small towel roll under your knee.)  4. Hold for about 6 seconds, then rest for up to 10 seconds. 5. Do 8 to 12 repetitions several times a day.   Straight-leg raises to the front    4. Lie on your back with your good knee bent so that your foot rests flat on the floor. Your injured leg should be straight. Make sure that your low back has a normal curve. You should be able to slip your flat hand in between the floor and the small of your back, with your palm touching the floor and your back touching the back of your hand. 5. Tighten the thigh muscles in the injured leg by pressing the back of your knee flat down to the floor. Hold your knee straight. 6. Keeping the thigh muscles tight, lift your injured leg up so that your heel is about 12 inches off the floor. Hold for about 6 seconds and then lower slowly. 7. Do 8 to 12 repetitions, 3 times a day. Straight-leg raises to the outside    4. Lie on your side, with your injured leg on top. 5. Tighten the front thigh muscles of your injured leg to keep your knee straight. 6. Keep your hip and your leg straight in line with the rest of your body, and keep your knee pointing forward. Do not drop your hip back. 7. Lift your injured leg straight up toward the ceiling, about 12 inches off the floor. Hold for about 6 seconds, then slowly lower your leg. 8. Do 8 to 12 repetitions. Straight-leg raises to the back    7. Lie on your stomach, and lift your leg straight up behind you (toward the ceiling). 8. Lift your toes about 6 inches off the floor, hold for about 6 seconds, then lower slowly. 9. Do 8 to 12 repetitions. Straight-leg raises to the inside    3. Lie on the side of your body with the injured leg. 4. You can either prop your other (good) leg up on a chair, or you can bend your good knee and put that foot in front of your injured knee. Do not drop your hip back. 5. Tighten the muscles on the front of your thigh to straighten your injured knee. 6. Keep your kneecap pointing forward, and lift your whole leg up toward the ceiling about 6 inches. Hold for about 6 seconds, then lower slowly.   7. Do 8 to 12 repetitions. Heel dig bridging    4. Lie on your back with both knees bent and your ankles bent so that only your heels are digging into the floor. Your knees should be bent about 90 degrees. 5. Then push your heels into the floor, squeeze your buttocks, and lift your hips off the floor until your shoulders, hips, and knees are all in a straight line. 6. Hold for about 6 seconds as you continue to breathe normally, and then slowly lower your hips back down to the floor and rest for up to 10 seconds. 7. Do 8 to 12 repetitions. Hamstring curls    1. Lie on your stomach with your knees straight. If your kneecap is uncomfortable, roll up a washcloth and put it under your leg just above your kneecap. 2. Lift the foot of your injured leg by bending the knee so that you bring the foot up toward your buttock. If this motion hurts, try it without bending your knee quite as far. This may help you avoid any painful motion. 3. Slowly lower your leg back to the floor. 4. Do 8 to 12 repetitions. 5. With permission from your doctor or physical therapist, you may also want to add a cuff weight to your ankle (not more than 5 pounds). With weight, you do not have to lift your leg more than 12 inches to get a hamstring workout. Shallow standing knee bends    Do this exercise only if you have very little pain; if you have no clicking, locking, or giving way if you have an injured knee; and if it does not hurt while you are doing 8 to 12 repetitions. 1. Stand with your hands lightly resting on a counter or chair in front of you. Put your feet shoulder-width apart. 2. Slowly bend your knees so that you squat down like you are going to sit in a chair. Make sure your knees do not go in front of your toes. 3. Lower yourself about 6 inches. Your heels should remain on the floor at all times. 4. Rise slowly to a standing position. Heel raises    1.  Stand with your feet a few inches apart, with your hands lightly resting on a counter or chair in front of you. 2. Slowly raise your heels off the floor while keeping your knees straight. 3. Hold for about 6 seconds, then slowly lower your heels to the floor. 4. Do 8 to 12 repetitions several times during the day. Follow-up care is a key part of your treatment and safety. Be sure to make and go to all appointments, and call your doctor if you are having problems. It's also a good idea to know your test results and keep a list of the medicines you take. Where can you learn more? Go to http://mila-mary.info/. Enter W908 in the search box to learn more about \"Knee: Exercises. \"  Current as of: November 29, 2017  Content Version: 11.7  © 1248-2062 [employer]. Care instructions adapted under license by [employer] (which disclaims liability or warranty for this information). If you have questions about a medical condition or this instruction, always ask your healthcare professional. [place] [address] any warranty or liability for your use of this information.

## 2018-07-25 NOTE — MR AVS SNAPSHOT
[address] [facility] 
 
 
 [address] [facility] [place] [id number] [phone] Patient: Landen Romo MRN: [id number] ITH:[id number] Visit Information Date & Time Provider Department Dept. Phone Encounter #  
 7/25/2018 11:00 AM Petr Kumar Shun [phone] [id number] Follow-up Instructions Return in about 8 weeks (around 9/17/2018) for medicare wellness exam. Upcoming Health Maintenance Date Due  
 MEDICARE YEARLY EXAM 3/22/2018 Influenza Age 5 to Adult 8/1/2018 GLAUCOMA SCREENING Q2Y 11/2/2018 BREAST CANCER SCRN MAMMOGRAM 8/28/2019 COLONOSCOPY 6/27/2023 DTaP/Tdap/Td series (3 - Td) 11/21/2026 Allergies as of 7/25/2018  Review Complete On: 7/25/2018 By: Yomaira Esteban MD  
  
 Severity Noted Reaction Type Reactions Latex  07/19/2017    Hives Codeine High 03/15/2010    Other (comments) Severe Headache Morphine High 03/15/2010    Hives Dilaudid [Hydromorphone]  12/20/2017    Other (comments) Memory loss Other Medication  03/15/2011    Rash BANDAIDS Shellfish Containing Products  03/15/2011    Hives Current Immunizations  Reviewed on 7/25/2018 Name Date Influenza High Dose Vaccine PF 9/19/2016 10:00 AM, 10/30/2015, 11/11/2014 Influenza Vaccine 11/19/2013 Influenza Vaccine Split 11/26/2012, 12/12/2011, 9/24/2010 Pneumococcal Conjugate (PCV-13) 5/18/2015 Pneumococcal Vaccine (Unspecified Type) 11/19/2013 Tdap 5/26/2014 12:40 PM  
 Zoster Vaccine, Live 4/16/2012 Reviewed by Yomaira Esteban MD on 7/25/2018 at 12:08 PM  
You Were Diagnosed With   
  
 Codes Comments Essential hypertension    -  Primary ICD-10-CM: I10 
ICD-9-CM: 401.9 Hypercholesterolemia     ICD-10-CM: E78.00 ICD-9-CM: 272.0 Depression, unspecified depression type     ICD-10-CM: F32.9 ICD-9-CM: 474 DDD (degenerative disc disease), cervical     ICD-10-CM: M50.30 ICD-9-CM: 722.4 Spinal stenosis of lumbar region with neurogenic claudication     ICD-10-CM: M48.062 
ICD-9-CM: 724.03 Fibromyalgia     ICD-10-CM: M79.7 ICD-9-CM: 729.1 Chronic anemia     ICD-10-CM: D64.9 ICD-9-CM: 285.9 Encounter for medication monitoring     ICD-10-CM: Z51.81 
ICD-9-CM: V58.83 Encounter for chronic pain management     ICD-10-CM: G89.29 ICD-9-CM: V65.49 Vitals BP Pulse Temp Resp Height(growth percentile) Weight(growth percentile) (!) 158/92 74 97.7 °F (36.5 °C) (Oral) 16 5' 4\" (1.626 m) 193 lb 12.8 oz (87.9 kg) SpO2 BMI OB Status Smoking Status 96% 33.27 kg/m2 Hysterectomy Former Smoker Vitals History BMI and BSA Data Body Mass Index Body Surface Area  
 33.27 kg/m 2 1.99 m 2 Preferred Pharmacy Pharmacy Name Phone [place] [phone], [address] AT [place] [address] [phone] Your Updated Medication List  
  
   
This list is accurate as of 7/25/18 12:54 PM.  Always use your most recent med list.  
  
  
  
  
 ALPRAZolam 0.5 mg tablet Commonly known as:  XANAX  
TAKE 1 TABLET BY MOUTH TWICE A DAY AS NEEDED FOR ANXIETY  
  
 aspirin delayed-release 81 mg tablet Take 81 mg by mouth two (2) times a week. citalopram 40 mg tablet Commonly known as:  CELEXA  
TAKE 1 TABLET DAILY  
  
 * diclofenac EC 75 mg EC tablet Commonly known as:  VOLTAREN Take 1 Tab by mouth two (2) times daily as needed. * diclofenac 1 % Gel Commonly known as:  VOLTAREN Apply 2 g to affected area four (4) times daily as needed. dicyclomine 10 mg capsule Commonly known as:  BENTYL TAKE 2 CAPSULES FOUR TIMES A DAY AS NEEDED DULoxetine 60 mg capsule Commonly known as:  CYMBALTA TAKE 1 CAPSULE TWICE A DAY  
  
 estradiol 0.01 % (0.1 mg/gram) vaginal cream  
 Commonly known as:  ESTRACE Apply to Vaginal twice a week with fingertip  
  
 gabapentin 300 mg capsule Commonly known as:  NEURONTIN Take 600 mg by mouth three (3) times daily. hydroCHLOROthiazide 25 mg tablet Commonly known as:  HYDRODIURIL  
TAKE 1 TABLET DAILY LINZESS 290 mcg Cap capsule Generic drug:  linaclotide Take 290 mcg by mouth Three (3) times a week. lisinopril 40 mg tablet Commonly known as:  PRINIVIL, ZESTRIL  
TAKE 1 TABLET DAILY NexIUM 40 mg capsule Generic drug:  esomeprazole TAKE 1 CAPSULE DAILY  
  
 oxybutynin 5 mg tablet Commonly known as:  DITROPAN  
take 1 tablet 2 times a day  
  
 rosuvastatin 20 mg tablet Commonly known as:  CRESTOR  
TAKE 1 TABLET NIGHTLY  
  
 sucralfate 1 gram tablet Commonly known as:  Gaylan [place] Take 1 g by mouth Three (3) times a week. Take 1 tablet by mouth as needed  
  
 varicella-zoster recombinant (PF) 50 mcg/0.5 mL Susr injection Commonly known as:  SHINGRIX  
0.5 mL by IntraMUSCular route once for 1 dose. Repeat second dose in 6 months. VITAMIN D3 1,000 unit tablet Generic drug:  cholecalciferol Take 1,000 Units by mouth daily. * Notice: This list has 2 medication(s) that are the same as other medications prescribed for you. Read the directions carefully, and ask your doctor or other care provider to review them with you. Prescriptions Printed Refills  
 varicella-zoster recombinant, PF, (SHINGRIX) 50 mcg/0.5 mL susr injection 1 Si.5 mL by IntraMUSCular route once for 1 dose. Repeat second dose in 6 months. Class: Print Route: IntraMUSCular We Performed the Following 9-DRUG SCREEN + OXY, UR [id number] CPT(R)] AMB POC COMPLETE CBC, AUTOMATED [62091 CPT(R)] METABOLIC PANEL, BASIC [96246 CPT(R)] Follow-up Instructions Return in about 8 weeks (around 2018) for medicare wellness exam.  
  
  
Patient Instructions Ankle: Exercises Your Care Instructions Here are some examples of exercises for your ankle. Start each exercise slowly. Ease off the exercise if you start to have pain. Your doctor or physical therapist will tell you when you can start these exercises and which ones will work best for you. How to do the exercises \"Alphabet\" exercise 1. Trace the alphabet with your toe. This helps your ankle move in all directions. Side-to-side knee swing exercise 1. Sit in a chair with your foot flat on the floor. 2. Slowly move your knee from side to side while keeping your foot pressed flat. 3. Continue this exercise for 2 to 3 minutes. Towel curl 1. While sitting, place your foot on a towel on the floor and scrunch the towel toward you with your toes. 2. Then use your toes to push the towel away from you. 3. Make this exercise more challenging by placing a weighted object, such as a soup can, on the other end of the towel. Towel stretch 1. Sit with your legs extended and knees straight. 2. Place a towel around your foot just under the toes. 3. Hold each end of the towel in each hand, with your hands above your knees. 4. Pull back with the towel so that your foot stretches toward you. 5. Hold the position for at least 15 to 30 seconds. 6. Repeat 2 to 4 times a session, up to 5 sessions a day. Ankle eversion exercise 1. Start by sitting with your foot flat on the floor and pushing it outward against an immovable object such as the wall or heavy furniture. Hold for about 6 seconds, then relax. Repeat 8 to 12 times. 2. After you feel comfortable with this, try using rubber tubing looped around the outside of your feet for resistance. Push your foot out to the side against the tubing, and then count to 10 as you slowly bring your foot back to the middle. Repeat 8 to 12 times. Isometric opposition exercises 1. While sitting, put your feet together flat on the floor. 2. Press your injured foot inward against your other foot. Hold for about 6 seconds, and relax. Repeat 8 to 12 times. 3. Then place the heel of your other foot on top of the injured one. Push down with the top heel while trying to push up with your injured foot. Hold for about 6 seconds, and relax. Repeat 8 to 12 times. Follow-up care is a key part of your treatment and safety. Be sure to make and go to all appointments, and call your doctor if you are having problems. It's also a good idea to know your test results and keep a list of the medicines you take. Where can you learn more? Go to http://milaStyleSharemary.info/. Enter K041 in the search box to learn more about \"Ankle: Exercises. \" Current as of: November 29, 2017 Content Version: 11.7 © 4575-9311 [employer]. Care instructions adapted under license by [employer] (which disclaims liability or warranty for this information). If you have questions about a medical condition or this instruction, always ask your healthcare professional. [address] any warranty or liability for your use of this information. Knee: Exercises Your Care Instructions Here are some examples of exercises for your knee. Start each exercise slowly. Ease off the exercise if you start to have pain. Your doctor or physical therapist will tell you when you can start these exercises and which ones will work best for you. How to do the exercises [facility] 2. Sit with your leg straight and supported on the floor or a firm bed. (If you feel discomfort in the front or back of your knee, place a small towel roll under your knee.) 3. Tighten the muscles on top of your thigh by pressing the back of your knee flat down to the floor. (If you feel discomfort under your kneecap, place a small towel roll under your knee.) 4. Hold for about 6 seconds, then rest for up to 10 seconds. 5. Do 8 to 12 repetitions several times a day. Straight-leg raises to the front 4. Lie on your back with your good knee bent so that your foot rests flat on the floor. Your injured leg should be straight. Make sure that your low back has a normal curve. You should be able to slip your flat hand in between the floor and the small of your back, with your palm touching the floor and your back touching the back of your hand. 5. Tighten the thigh muscles in the injured leg by pressing the back of your knee flat down to the floor. Hold your knee straight. 6. Keeping the thigh muscles tight, lift your injured leg up so that your heel is about 12 inches off the floor. Hold for about 6 seconds and then lower slowly. 7. Do 8 to 12 repetitions, 3 times a day. Straight-leg raises to the outside 4. Lie on your side, with your injured leg on top. 5. Tighten the front thigh muscles of your injured leg to keep your knee straight. 6. Keep your hip and your leg straight in line with the rest of your body, and keep your knee pointing forward. Do not drop your hip back. 7. Lift your injured leg straight up toward the ceiling, about 12 inches off the floor. Hold for about 6 seconds, then slowly lower your leg. 8. Do 8 to 12 repetitions. Straight-leg raises to the back 7. Lie on your stomach, and lift your leg straight up behind you (toward the ceiling). 8. Lift your toes about 6 inches off the floor, hold for about 6 seconds, then lower slowly. 9. Do 8 to 12 repetitions. Straight-leg raises to the inside 3. Lie on the side of your body with the injured leg. 4. You can either prop your other (good) leg up on a chair, or you can bend your good knee and put that foot in front of your injured knee. Do not drop your hip back. 5. Tighten the muscles on the front of your thigh to straighten your injured knee.  
6. Keep your kneecap pointing forward, and lift your whole leg up toward the ceiling about 6 inches. Hold for about 6 seconds, then lower slowly. 7. Do 8 to 12 repetitions. Heel dig bridging 4. Lie on your back with both knees bent and your ankles bent so that only your heels are digging into the floor. Your knees should be bent about 90 degrees. 5. Then push your heels into the floor, squeeze your buttocks, and lift your hips off the floor until your shoulders, hips, and knees are all in a straight line. 6. Hold for about 6 seconds as you continue to breathe normally, and then slowly lower your hips back down to the floor and rest for up to 10 seconds. 7. Do 8 to 12 repetitions. Hamstring curls 1. Lie on your stomach with your knees straight. If your kneecap is uncomfortable, roll up a washcloth and put it under your leg just above your kneecap. 2. Lift the foot of your injured leg by bending the knee so that you bring the foot up toward your buttock. If this motion hurts, try it without bending your knee quite as far. This may help you avoid any painful motion. 3. Slowly lower your leg back to the floor. 4. Do 8 to 12 repetitions. 5. With permission from your doctor or physical therapist, you may also want to add a cuff weight to your ankle (not more than 5 pounds). With weight, you do not have to lift your leg more than 12 inches to get a hamstring workout. Shallow standing knee bends Do this exercise only if you have very little pain; if you have no clicking, locking, or giving way if you have an injured knee; and if it does not hurt while you are doing 8 to 12 repetitions. 1. Stand with your hands lightly resting on a counter or chair in front of you. Put your feet shoulder-width apart. 2. Slowly bend your knees so that you squat down like you are going to sit in a chair. Make sure your knees do not go in front of your toes. 3. Lower yourself about 6 inches. Your heels should remain on the floor at all times. 4. Rise slowly to a standing position. Heel raises 1. Stand with your feet a few inches apart, with your hands lightly resting on a counter or chair in front of you. 2. Slowly raise your heels off the floor while keeping your knees straight. 3. Hold for about 6 seconds, then slowly lower your heels to the floor. 4. Do 8 to 12 repetitions several times during the day. Follow-up care is a key part of your treatment and safety. Be sure to make and go to all appointments, and call your doctor if you are having problems. It's also a good idea to know your test results and keep a list of the medicines you take. Where can you learn more? Go to http://mila-mary.info/. Enter X633 in the search box to learn more about \"Knee: Exercises. \" Current as of: November 29, 2017 Content Version: 11.7 © 1258-2681 [employer]. Care instructions adapted under license by [employer] (which disclaims liability or warranty for this information). If you have questions about a medical condition or this instruction, always ask your healthcare professional. [address] any warranty or liability for your use of this information. Introducing [facility]! Dear Diane Crenshaw: 
Thank you for requesting a [employer] account. Our records indicate that you already have an active [employer] account. You can access your account anytime at [email or web address]. [email or web address] Did you know that you can access your hospital and ER discharge instructions at any time in [employer]? You can also review all of your test results from your hospital stay or ER visit. Additional Information If you have questions, please visit the Frequently Asked Questions section of the [employer] website at [email or web address] Remember, [employer] is NOT to be used for urgent needs. For medical emergencies, dial 911. Now available from your iPhone and Android! Please provide this summary of care documentation to your next provider. Your primary care clinician is listed as Yomaira Esteban. If you have any questions after today's visit, please call [phone].

## 2018-07-26 LAB
AMPHETAMINES UR QL SCN: NEGATIVE NG/ML
BARBITURATES UR QL SCN: NEGATIVE NG/ML
BENZODIAZ UR QL SCN: POSITIVE NG/ML
BUN SERPL-MCNC: 20 MG/DL (ref 8–27)
BUN/CREAT SERPL: 19 (ref 12–28)
BZE UR QL SCN: NEGATIVE NG/ML
CALCIUM SERPL-MCNC: 9.8 MG/DL (ref 8.7–10.3)
CANNABINOIDS UR QL SCN: NEGATIVE NG/ML
CHLORIDE SERPL-SCNC: 102 MMOL/L (ref 96–106)
CO2 SERPL-SCNC: 20 MMOL/L (ref 20–29)
CREAT SERPL-MCNC: 1.03 MG/DL (ref 0.57–1)
CREAT UR-MCNC: 90.1 MG/DL (ref 20–300)
GLUCOSE SERPL-MCNC: 80 MG/DL (ref 65–99)
INTERPRETATION: NORMAL
METHADONE UR QL SCN: NEGATIVE NG/ML
OPIATES UR QL SCN: NEGATIVE NG/ML
OXYCODONE+OXYMORPHONE UR QL SCN: NEGATIVE NG/ML
PCP UR QL: NEGATIVE NG/ML
PH UR: 5.5 [PH] (ref 4.5–8.9)
PLEASE NOTE:, 733163: ABNORMAL
POTASSIUM SERPL-SCNC: 4.4 MMOL/L (ref 3.5–5.2)
PROPOXYPH UR QL SCN: NEGATIVE NG/ML
SODIUM SERPL-SCNC: 139 MMOL/L (ref 134–144)

## 2018-08-13 RX ORDER — TRAMADOL HYDROCHLORIDE 50 MG/1
50 TABLET ORAL
Qty: 90 TAB | Refills: 0 | OUTPATIENT
Start: 2018-08-13

## 2018-08-13 NOTE — TELEPHONE ENCOUNTER
Check /Print      Last Visit: 7/2593-Xapgva-Tyxxor  Next Appt: 12/55-Isfzur-Qfolpi  Previous Refill Encounter:7/25-90+0    Requested Prescriptions     Pending Prescriptions Disp Refills    traMADol (ULTRAM) 50 mg tablet 90 Tab 0     Sig: Take 1 Tab by mouth every six (6) hours as needed for Pain. Max Daily Amount: 200 mg.

## 2018-09-18 NOTE — TELEPHONE ENCOUNTER
----- Message from Chelsea Jamison sent at 9/18/2018  4:03 PM EDT -----  Regarding: Dr. Velia Hardy  Pt spoke with someone at the practice about her medication Tramadole 20mg she meant to say 50mg. Best contact is [phone].

## 2018-09-19 DIAGNOSIS — G89.4 CHRONIC PAIN SYNDROME: ICD-10-CM

## 2018-09-19 RX ORDER — TRAMADOL HYDROCHLORIDE 50 MG/1
50 TABLET ORAL
Qty: 90 TAB | Refills: 0 | Status: SHIPPED | OUTPATIENT
Start: 2018-09-19 | End: 2018-10-11 | Stop reason: SDUPTHER

## 2018-09-24 RX ORDER — ROSUVASTATIN CALCIUM 20 MG/1
TABLET, COATED ORAL
Qty: 90 TAB | Refills: 1 | Status: SHIPPED | OUTPATIENT
Start: 2018-09-24 | End: 2020-01-28 | Stop reason: SDUPTHER

## 2018-09-26 ENCOUNTER — HOSPITAL ENCOUNTER (OUTPATIENT)
Dept: MAMMOGRAPHY | Age: 68
Discharge: HOME | End: 2018-09-26
Attending: FAMILY MEDICINE
Payer: MEDICARE

## 2018-09-26 DIAGNOSIS — Z12.31 VISIT FOR SCREENING MAMMOGRAM: ICD-10-CM

## 2018-09-26 PROCEDURE — 77067 SCR MAMMO BI INCL CAD: CPT

## 2018-09-28 DIAGNOSIS — I10 ESSENTIAL HYPERTENSION WITH GOAL BLOOD PRESSURE LESS THAN 140/90: ICD-10-CM

## 2018-09-28 RX ORDER — LISINOPRIL 40 MG/1
TABLET ORAL
Qty: 30 TAB | Refills: 1 | Status: SHIPPED | OUTPATIENT
Start: 2018-09-28 | End: 2018-10-01 | Stop reason: SDUPTHER

## 2018-09-28 NOTE — TELEPHONE ENCOUNTER
----- Message from Devan Chambers sent at 9/28/2018  8:46 AM EDT -----  Regarding: Dr. Gin Pak  Pt would like to have a rush put in on BP  Rx \" Lisinopril 40 mg.\" Pt's only have 4 pills left. When contacting [facility], tell them to have it express shipping. Or, it can be sent to the [facility] [address] Pharmacy Callback: [phone].  Pt's callback: [phone] [facility]: [phone]

## 2018-10-01 DIAGNOSIS — I10 ESSENTIAL HYPERTENSION WITH GOAL BLOOD PRESSURE LESS THAN 140/90: ICD-10-CM

## 2018-10-01 RX ORDER — LISINOPRIL 40 MG/1
TABLET ORAL
Qty: 90 TAB | Refills: 3 | Status: SHIPPED | OUTPATIENT
Start: 2018-10-01 | End: 2020-01-28 | Stop reason: SDUPTHER

## 2018-10-06 DIAGNOSIS — M79.7 FIBROMYALGIA: ICD-10-CM

## 2018-10-08 RX ORDER — DULOXETIN HYDROCHLORIDE 60 MG/1
CAPSULE, DELAYED RELEASE ORAL
Qty: 180 CAP | Refills: 3 | Status: SHIPPED | OUTPATIENT
Start: 2018-10-08 | End: 2020-01-28 | Stop reason: SDUPTHER

## 2018-10-11 DIAGNOSIS — G89.4 CHRONIC PAIN SYNDROME: ICD-10-CM

## 2018-10-11 NOTE — TELEPHONE ENCOUNTER
Last Visit: 7/25  Next Appt: 12/5  Previous Refill Encounter: 9/19-90+0    Requested Prescriptions     Pending Prescriptions Disp Refills    traMADol (ULTRAM) 50 mg tablet 90 Tab 0     Sig: Take 1 Tab by mouth every six (6) hours as needed for Pain. Max Daily Amount: 200 mg.

## 2018-10-11 NOTE — TELEPHONE ENCOUNTER
Last Visit: 7/25  Next Appt: 12/5  Previous Refill Encounter: 3/20-60+3    Requested Prescriptions     Pending Prescriptions Disp Refills    diclofenac EC (VOLTAREN) 75 mg EC tablet 60 Tab 3     Sig: Take 1 Tab by mouth two (2) times daily as needed.

## 2018-10-12 RX ORDER — DICLOFENAC SODIUM 75 MG/1
75 TABLET, DELAYED RELEASE ORAL
Qty: 60 TAB | Refills: 3 | Status: SHIPPED | OUTPATIENT
Start: 2018-10-12 | End: 2020-01-28 | Stop reason: SDUPTHER

## 2018-10-12 RX ORDER — TRAMADOL HYDROCHLORIDE 50 MG/1
50 TABLET ORAL
Qty: 90 TAB | Refills: 0 | OUTPATIENT
Start: 2018-10-12 | End: 2018-10-29 | Stop reason: SDUPTHER

## 2018-10-29 DIAGNOSIS — G89.4 CHRONIC PAIN SYNDROME: ICD-10-CM

## 2018-10-29 RX ORDER — TRAMADOL HYDROCHLORIDE 50 MG/1
50 TABLET ORAL
Qty: 90 TAB | Refills: 0 | Status: SHIPPED | OUTPATIENT
Start: 2018-10-29 | End: 2018-12-05 | Stop reason: SDUPTHER

## 2018-10-29 NOTE — TELEPHONE ENCOUNTER
Last Visit: 7/25  Next Appt: 12/5  Previous Refill Encounter: 10/12-90+0    Requested Prescriptions     Pending Prescriptions Disp Refills    traMADol (ULTRAM) 50 mg tablet 90 Tab 0     Sig: Take 1 Tab by mouth every six (6) hours as needed for Pain. Max Daily Amount: 200 mg.

## 2018-12-05 ENCOUNTER — HOSPITAL ENCOUNTER (OUTPATIENT)
Dept: LAB | Age: 68
Discharge: HOME | End: 2018-12-05
Payer: MEDICARE

## 2018-12-05 ENCOUNTER — OFFICE VISIT (OUTPATIENT)
Dept: FAMILY MEDICINE CLINIC | Age: 68
End: 2018-12-05

## 2018-12-05 VITALS
BODY MASS INDEX: 34.12 KG/M2 | TEMPERATURE: 97 F | OXYGEN SATURATION: 98 % | WEIGHT: 192.6 LBS | DIASTOLIC BLOOD PRESSURE: 71 MMHG | SYSTOLIC BLOOD PRESSURE: 109 MMHG | HEART RATE: 94 BPM | HEIGHT: 63 IN | RESPIRATION RATE: 16 BRPM

## 2018-12-05 DIAGNOSIS — E66.9 NON MORBID OBESITY: ICD-10-CM

## 2018-12-05 DIAGNOSIS — F32.A DEPRESSION, UNSPECIFIED DEPRESSION TYPE: ICD-10-CM

## 2018-12-05 DIAGNOSIS — Z23 NEED FOR SHINGLES VACCINE: ICD-10-CM

## 2018-12-05 DIAGNOSIS — M48.062 SPINAL STENOSIS OF LUMBAR REGION WITH NEUROGENIC CLAUDICATION: ICD-10-CM

## 2018-12-05 DIAGNOSIS — D64.9 CHRONIC ANEMIA: ICD-10-CM

## 2018-12-05 DIAGNOSIS — Z51.81 ENCOUNTER FOR MEDICATION MONITORING: ICD-10-CM

## 2018-12-05 DIAGNOSIS — R73.02 IGT (IMPAIRED GLUCOSE TOLERANCE): ICD-10-CM

## 2018-12-05 DIAGNOSIS — G89.29 ENCOUNTER FOR CHRONIC PAIN MANAGEMENT: ICD-10-CM

## 2018-12-05 DIAGNOSIS — Z00.00 MEDICARE ANNUAL WELLNESS VISIT, SUBSEQUENT: Primary | ICD-10-CM

## 2018-12-05 DIAGNOSIS — K21.9 GASTROESOPHAGEAL REFLUX DISEASE WITHOUT ESOPHAGITIS: ICD-10-CM

## 2018-12-05 DIAGNOSIS — I10 ESSENTIAL HYPERTENSION WITH GOAL BLOOD PRESSURE LESS THAN 140/90: ICD-10-CM

## 2018-12-05 DIAGNOSIS — E78.2 MIXED HYPERLIPIDEMIA: ICD-10-CM

## 2018-12-05 DIAGNOSIS — R07.89 ATYPICAL CHEST PAIN: ICD-10-CM

## 2018-12-05 DIAGNOSIS — M50.30 DDD (DEGENERATIVE DISC DISEASE), CERVICAL: ICD-10-CM

## 2018-12-05 DIAGNOSIS — M79.7 FIBROMYALGIA: ICD-10-CM

## 2018-12-05 LAB
BILIRUB UR QL STRIP: NEGATIVE
GLUCOSE UR-MCNC: NEGATIVE MG/DL
HBA1C MFR BLD HPLC: 5.3 %
KETONES P FAST UR STRIP-MCNC: NEGATIVE MG/DL
PH UR STRIP: 5.5 [PH] (ref 4.6–8)
PROT UR QL STRIP: NEGATIVE
SP GR UR STRIP: 1.01 (ref 1–1.03)
UA UROBILINOGEN AMB POC: NORMAL (ref 0.2–1)
URINALYSIS CLARITY POC: CLEAR
URINALYSIS COLOR POC: YELLOW
URINE BLOOD POC: NEGATIVE
URINE LEUKOCYTES POC: NEGATIVE
URINE NITRITES POC: NEGATIVE

## 2018-12-05 PROCEDURE — 80307 DRUG TEST PRSMV CHEM ANLYZR: CPT

## 2018-12-05 PROCEDURE — 80061 LIPID PANEL: CPT

## 2018-12-05 PROCEDURE — 82570 ASSAY OF URINE CREATININE: CPT

## 2018-12-05 PROCEDURE — 80053 COMPREHEN METABOLIC PANEL: CPT

## 2018-12-05 PROCEDURE — 36415 COLL VENOUS BLD VENIPUNCTURE: CPT

## 2018-12-05 RX ORDER — NALOXONE HYDROCHLORIDE 4 MG/.1ML
SPRAY NASAL
Qty: 1 EACH | Refills: 0 | Status: SHIPPED | OUTPATIENT
Start: 2018-12-05 | End: 2019-05-31 | Stop reason: ALTCHOICE

## 2018-12-05 RX ORDER — TRAMADOL HYDROCHLORIDE 50 MG/1
50 TABLET ORAL
Qty: 90 TAB | Refills: 0 | Status: SHIPPED | OUTPATIENT
Start: 2018-12-05 | End: 2019-02-14 | Stop reason: SDUPTHER

## 2018-12-05 RX ORDER — CHLORHEXIDINE GLUCONATE 1.2 MG/ML
RINSE ORAL
Refills: 2 | COMMUNITY
Start: 2018-11-16 | End: 2019-03-27 | Stop reason: ALTCHOICE

## 2018-12-05 NOTE — PROGRESS NOTES
Identified pt with two pt identifiers(name and ). Reviewed record in preparation for visit and have obtained necessary documentation. Chief Complaint   Patient presents with   Kathie Annual Wellness Visit        Health Maintenance Due   Topic    MEDICARE YEARLY EXAM     Influenza Age 5 to Adult     GLAUCOMA SCREENING Q2Y        Coordination of Care Questionnaire:  :   1) Have you been to an emergency room, urgent care, or hospitalized since your last visit?   no   If yes, where when, and reason for visit? 2. Have seen or consulted any other health care provider since your last visit? NO  If yes, where when, and reason for visit? 3) Do you have an Advanced Directive/ Living Will in place? YES  If yes, do we have a copy on file NO  If no, would you like information NO    Patient is accompanied by self I have received verbal consent from [address] to discuss any/all medical information while they are present in the room.     Visit Vitals  /71 (BP 1 Location: Left arm, BP Patient Position: Sitting)   Pulse 94   Temp 97 °F (36.1 °C) (Oral)   Resp 16   Ht 5' 3\" (1.6 m)   Wt 192 lb 9.6 oz (87.4 kg)   SpO2 98%   BMI 34.12 kg/m²     Yahaira English LPN

## 2018-12-05 NOTE — PROGRESS NOTES
This is a Subsequent Medicare Annual Wellness Exam (AWV) (Performed 12 months after IPPE or effective date of Medicare Part B enrollment)    I have reviewed the patient's medical history in detail and updated the computerized patient record. HTN follow up:  Compliant w/ meds, low salt diet, and exercise. No home bp monitoring. No swelling, headache or dizziness. No  SOB, palpitations. She has had some episodes of chest pain that she felt was not d/t her reflux over thepast couple of month. Radiates up the the neck. Felt like an aches in the chest.  No associated sx of SOB, diaphoresis or n/v. Nonexertional.  Had negative stress echo on last July. She does admit that she has had increased with family stress and illnesses. Hypercholesterolemia follow up:  Compliant w/ low fat, low cholesterol diet. Exercising some. Tolerating crestor. No muscle more than her usual from the fibromyalgia, no abdominal pain, no skin discoloration. Patient is not fasting today. Hypercholesterolemia follow up:  Compliant w/ meds, low fat, low cholesterol diet. Exercising some. No muscle nor abdominal pain, no skin discoloration. Patient fasting today. Depression Review:  Patient is seen for followup of depression and fibromyalgia and IBS. Currently taking cymbalta and Celexa and gabapentin. Ongoing symptoms include fatigue and stress. She experiences the following side effects from the treatment: none. Encounter for pain management. 1./2. Medical history/Past medical History  Chronic Pain:  Patient has long standing fibromyalgia. Had surgery for the spinal stenosis. Still has aching in the legs and feet and back. Has been worse over the past few weeks. Pain is 7-8/10. Currently taking cymbalta and Celexa and gabapentin. She also takes motrin or diclofenac for less severe pain. Pt takes tramadol for more severe pain  Ongoing symptoms include fatigue and pain.   3. Applicable records from prior treatment providers are apart of [facility] under the media tab. 4. Diagnostic, therapeutic and laboratory results are available in the [address] [place] [address] chart. 5. Consultation notes are available for review in the media tab of the [address] [place] [address] chart. 6. Treatment goals include pain control so that the pt may be as active and function with her daily activities and improved comfort level. Previously pt has been limited by pain. 7. The risks and benefits of treatment has been discussed at this office visit with the pt. She understands that the medication has addicting potential.  Additionally the pt has been advised that narcotic pain medication may impair mental and/or physical ability required for performance of tasks such as driving or operating any other machinery. 8. Pt has an updated signed pain contract on file and can be found under the FYI section of the [facility] chart. 9. The pain contract is reviewed. Pain medication will be continued at the previous dosage. Urine drug screening will be done today. Diagnostic studies are not indicated at this time. Interventional procedure are not indicated at this time. 10. Medication prescibed is traMADol (ULTRAM) 50 mg tablet.  has been reviewed at this OV by me. 11. Patient instructions have been reviewed in detail as outlined above and in the pain contract. 12. Re-eval is planned for 3 months. Pill Count :6  Last dose of the tramadol was 1 week ago.       History     Patient Active Problem List   Diagnosis Code    IBS (irritable bowel syndrome) K58.9    Fibromyalgia M79.7    Hiatal hernia K44.9    GERD (gastroesophageal reflux disease) K21.9    Hypercholesterolemia E78.00    Diverticulitis K57.92    Depression F32.9    Essential hypertension I10    Encounter for medication monitoring Z51.81    DDD (degenerative disc disease), cervical M50.30    Spinal stenosis of lumbar region with neurogenic claudication M48.062    Non morbid obesity E66.9       Current Outpatient Medications   Medication Sig Dispense Refill    chlorhexidine (PERIDEX) 0.12 % solution   2    varicella-zoster recombinant, PF, (SHINGRIX) 50 mcg/0.5 mL susr injection 0.5 mL by IntraMUSCular route once for 1 dose. Repeat second dose in 6 months. 0.5 mL 1    traMADol (ULTRAM) 50 mg tablet Take 1 Tab by mouth every six (6) hours as needed for Pain. Max Daily Amount: 150 mg. 90 Tab 0    naloxone (NARCAN) 4 mg/actuation nasal spray Use 1 spray intranasally, then discard. Repeat with new spray every 2 min as needed for opioid overdose symptoms, alternating nostrils. 1 Each 0    diclofenac EC (VOLTAREN) 75 mg EC tablet Take 1 Tab by mouth two (2) times daily as needed. 60 Tab 3    DULoxetine (CYMBALTA) 60 mg capsule TAKE 1 CAPSULE TWICE A  Cap 3    lisinopril (PRINIVIL, ZESTRIL) 40 mg tablet TAKE 1 TABLET DAILY 90 Tab 3    rosuvastatin (CRESTOR) 20 mg tablet TAKE 1 TABLET NIGHTLY 90 Tab 1    dicyclomine (BENTYL) 10 mg capsule TAKE 2 CAPSULES FOUR TIMES A DAY AS NEEDED 720 Cap 3    hydroCHLOROthiazide (HYDRODIURIL) 25 mg tablet TAKE 1 TABLET DAILY 90 Tab 2    ALPRAZolam (XANAX) 0.5 mg tablet TAKE 1 TABLET BY MOUTH TWICE A DAY AS NEEDED FOR ANXIETY 180 Tab 1    sucralfate (CARAFATE) 1 gram tablet Take 1 g by mouth Three (3) times a week. Take 1 tablet by mouth as needed       estradiol (ESTRACE) 0.01 % (0.1 mg/gram) vaginal cream Apply to Vaginal twice a week with fingertip      diclofenac (VOLTAREN) 1 % gel Apply 2 g to affected area four (4) times daily as needed. 100 g 3    oxybutynin (DITROPAN) 5 mg tablet take 1 tablet 2 times a day  0    citalopram (CELEXA) 40 mg tablet TAKE 1 TABLET DAILY 90 Tab 3    NEXIUM 40 mg capsule TAKE 1 CAPSULE DAILY 90 Cap 3    LINZESS 290 mcg cap capsule Take 290 mcg by mouth Three (3) times a week.  cholecalciferol (VITAMIN D3) 1,000 unit tablet Take 1,000 Units by mouth daily.       aspirin delayed-release 81 mg tablet Take 81 mg by mouth two (2) times a week.  gabapentin (NEURONTIN) 300 mg capsule Take 600 mg by mouth three (3) times daily.  360 Cap 1       Allergies   Allergen Reactions    Latex Hives    Codeine Other (comments)     Severe Headache    Morphine Hives    Dilaudid [Hydromorphone] Other (comments)     Memory loss    Other Medication Rash     BANDAIDS    Shellfish Containing Products Hives       Past Medical History:   Diagnosis Date    Arthritis     Cancer ([facility] [address]     skin cancer    Chronic pain     CHRONIC BOWEL PAIN    Diverticulitis     Fibromyalgia     GERD (gastroesophageal reflux disease)     Goiter     Hiatal hernia     Hypercholesterolemia     Hypertension     IBS (irritable bowel syndrome)     IBS (irritable bowel syndrome)     CONSTIPATION, CHRONIC SINCE HER 25s    Ill-defined condition     rectocele    Insomnia     TAKES TRAZODONE NIGHTLY    Migraine     REDUCED IN FREQUENCY AND SEVERITY SINCE MENOPAUSE    Other ill-defined conditions(799.89)     Psychiatric disorder 3/11    DEPRESSION       Past Surgical History:   Procedure Laterality Date    COLONOSCOPY N/A 6/21/2016    COL[facility] at [facility] ENDOSCOPY    COLONOSCOPY N/A 6/27/2018    COLONOSCOPY performed by Gabriel [facility] at [facility] ENDOSCOPY    ENDOSCOPY, COLON, DIAGNOSTIC  11/2010    Dr. Alessandro Villegas-     HX CHOLECYSTECTOMY  2006   [place]  2007    Dr. Pond/ diverticulitis    HX GI  X3  LAST ONE 12/10    COLONOSCOPY    HX GYN  1982    D&C WITH SUCTION    HX HYSTERECTOMY      HX ORTHOPAEDIC      right foot surgery    HX ORTHOPAEDIC      neck surgery 3/21/11 Dr. Simón Gupta HX ORTHOPAEDIC  08/31/2017    back surgery    HX OTHER SURGICAL      EGD    HX TONSILLECTOMY         Family History   Problem Relation Age of Onset   Manny Lubinra Cancer Father         lung and bone    Stroke Sister     Cancer Sister 76        PANCREATIC    Hypertension Mother     High Cholesterol Mother     Alzheimer Mother         late 62s early 76s    Cancer Other         COLON    Cancer Other         breast    Diabetes Maternal Aunt     Cancer Maternal Uncle         COLON    Cancer Maternal Grandfather         COLON    Ovarian Cancer Daughter 28       Social History     Tobacco Use    Smoking status: Former Smoker     Packs/day: 1.00     Years: 45.00     Pack years: 45.00     Last attempt to quit: 2007     Years since quittin.9    Smokeless tobacco: Never Used   Substance Use Topics    Alcohol use: Yes     Alcohol/week: 0.0 oz     Comment: rare       Lab Results   Component Value Date/Time    WBC 7.7 2018 04:21 PM    HGB 9.6 (L) 2018 04:21 PM    Hemoglobin (POC) 11.9 2017 09:26 AM    HCT 29.8 (L) 2018 04:21 PM    Hematocrit (POC) 35 2017 09:26 AM    PLATELET 420  04:21 PM    MCV 86 2018 04:21 PM     Lab Results   Component Value Date/Time    Cholesterol, total 170 2018 04:21 PM    HDL Cholesterol 40 2018 04:21 PM    LDL, calculated 84 2018 04:21 PM    Triglyceride 228 (H) 2018 04:21 PM    CHOL/HDL Ratio 4.1 2010 03:00 AM     Lab Results   Component Value Date/Time    TSH 1.980 2016 11:30 AM    T4, Free 1.25 2013 03:45 PM      Lab Results   Component Value Date/Time    Sodium 139 2018 12:38 PM    Potassium 4.4 2018 12:38 PM    Chloride 102 2018 12:38 PM    CO2 20 2018 12:38 PM    Anion gap 7 2017 03:27 PM    Glucose 80 2018 12:38 PM    BUN 20 2018 12:38 PM    Creatinine 1.03 (H) 2018 12:38 PM    BUN/Creatinine ratio 19 2018 12:38 PM    GFR est AA 64 2018 12:38 PM    GFR est non-AA 56 (L) 2018 12:38 PM    Calcium 9.8 2018 12:38 PM    Bilirubin, total 0.2 2018 04:21 PM    ALT (SGPT) 23 2018 04:21 PM    AST (SGOT) 25 2018 04:21 PM    Alk.  phosphatase 82 2018 04:21 PM    Protein, total 6.8 2018 04:21 PM    Albumin 4.2 03/20/2018 04:21 PM    Globulin 4.0 08/18/2017 03:27 PM    A-G Ratio 1.6 03/20/2018 04:21 PM      Lab Results   Component Value Date/Time    Hemoglobin A1c 6.3 08/18/2017 03:27 PM    Hemoglobin A1c (POC) 5.4 12/20/2017 04:09 PM         Depression Risk Factor Screening:     PHQ over the last two weeks 11/7/2017   Little interest or pleasure in doing things Not at all   Feeling down, depressed or hopeless Not at all   Total Score PHQ 2 0     Alcohol Risk Factor Screening: You do not drink alcohol or very rarely. Functional Ability and Level of Safety:   Hearing Loss  Hearing is good. Activities of Daily Living  The home contains: no safety equipment. Patient does total self care    Fall RiskFall Risk Assessment, last 12 mths 11/7/2017   Able to walk? Yes   Fall in past 12 months? No     Functional Ability:   Does the patient exhibit a steady gait? yes    How long did it take the patient to get up and walk from a sitting position? seconds    Is the patient self reliant? (ie can do own laundry, meals, household chores)  yes   Does the patient handle his/her own medications? yes   Does the patient handle his/her own money? yes   Is the patients home safe (ie good lighting, handrails on stairs and bath, etc.)? yes   Did you notice or did patient express any hearing difficulties? no   Did you notice or did patient express any vision difficulties? no     Advance Care Planning:   Patient was offered the opportunity to discuss advance care planning:  yes    Does patient have an Advance Directive: yes   If no, did you provide information on Caring Connections?   yes      Abuse Screen  Patient is not abused    Cognitive Screening   Evaluation of Cognitive Function:  Has your family/caregiver stated any concerns about your memory: no    Review of Systems   Constitutional: negative for fatigue and malaise  Eyes: negative for irritation and redness  Ears, nose, mouth, throat, and face: negative for earaches, nasal congestion and hoarseness  Respiratory: negative for cough, sputum or dyspnea on exertion  Cardiovascular: negative for dyspnea, palpitations, irregular heart beats, fatigue, lower extremity edema  Gastrointestinal: negative for dysphagia, dyspepsia, reflux symptoms, nausea, vomiting, change in bowel habits, melena, constipation and abdominal pain  Genitourinary:negative for frequency, dysuria, nocturia, urinary incontinence Integument/breast: negative for rash and dryness  Hematologic/lymphatic: negative for easy bruising and lymphadenopathy  Musculoskeletal:negative for myalgias, arthralgias, stiff joints, neck pain, back pain and muscle weakness  Neurological: negative for headaches, dizziness, tremor and weakness  Endocrine: negative for diabetic symptoms including polyuria and polydipsia      Physical Examination     Evaluation of Cognitive Function:  Mood/affect:  neutral  Appearance: age appropriate  Family member/caregiver input: NA    Visit Vitals  /71 (BP 1 Location: Left arm, BP Patient Position: Sitting)   Pulse 94   Temp 97 °F (36.1 °C) (Oral)   Resp 16   Ht 5' 3\" (1.6 m)   Wt 192 lb 9.6 oz (87.4 kg)   SpO2 98%   BMI 34.12 kg/m²       General:  Alert, cooperative, no distress, appears stated age. Head:  Normocephalic, without obvious abnormality, atraumatic. Ears:  Normal TMs and external ear canals both ears. Nose: Nares normal. Septum midline. Mucosa normal. No drainage or sinus tenderness. Throat: Lips, mucosa, and tongue normal. Teeth and gums normal.   Neck: Supple, symmetrical, trachea midline, no adenopathy, thyroid: no enlargement/tenderness/nodules, no carotid bruit and no JVD. Back:   Symmetric, no curvature. ROM normal. No CVA tenderness. Lungs:   Clear to auscultation bilaterally. Chest wall:  No tenderness or deformity. Heart:  Regular rate and rhythm, S1, S2 normal, no murmur, click, rub or gallop.    Breast Exam:  No tenderness, masses, or nipple abnormality. Abdomen:   Soft, non-tender. Bowel sounds normal. No masses,  No organomegaly. Rectal:  Deferred (colon test this past June)   Extremities: Extremities normal, atraumatic, no cyanosis or edema. Pulses: 2+ and symmetric all extremities. Skin: Skin color, texture, turgor normal. No rashes or lesions. Lymph nodes: Cervical, supraclavicular, and axillary nodes normal.   Neurologic: CNII-XII intact. Normal strength, sensation and reflexes throughout. Patient Care Team   Patient Care Team:  Rosanne Ruiz MD as PCP - General    Assessment/Plan   Education and counseling provided:  Are appropriate based on today's review and evaluation  End-of-Life planning (with patient's consent)    ASSESSMENT and PLAN  Diagnoses and all orders for this visit:    1. Medicare annual wellness visit, subsequent  -     AMB POC URINALYSIS DIP STICK AUTO W/ MICRO    2. Essential hypertension  Discussed sodium restriction, high k rich diet, maintaining ideal body weight and regular exercise program such as daily walking 30 min perday 4-5 times per week, as physiologic means to achieve blood pressure control.  Medication compliance advised. 3. Mixed hyperlipidemia  -     LIPID PANEL    4. IGT (impaired glucose tolerance)  -     AMB POC HEMOGLOBIN A1C    5. Atypical chest pain  -     AMB POC EKG ROUTINE W/ 12 LEADS, INTER & REP  NSR, WNL. Negative stress echo on last year. Will monitor. 6. Gastroesophageal reflux disease without esophagitis  Stable     7. Depression, unspecified depression type  Stable     8. Chronic anemia  -     AMB POC COMPLETE CBC, AUTOMATED    9. DDD (degenerative disc disease), cervical    10. Spinal stenosis of lumbar region with neurogenic claudication//11. Fibromyalgia  12. Encounter for chronic pain management  -     9-DRUG SCREEN + OXY, UR  -     COMPLIANCE DRUG SCREEN/PRESCRIPTION MONITORING  -     traMADol (ULTRAM) 50 mg tablet;  Take 1 Tab by mouth every six (6) hours as needed for Pain. Max Daily Amount: 150 mg.  -     naloxone (NARCAN) 4 mg/actuation nasal spray; Use 1 spray intranasally, then discard. Repeat with new spray every 2 min as needed for opioid overdose symptoms, alternating nostrils. The pt has signed medication agreement. Pain contract is reviewed. Pain medications will be continued at the previous dosage. Urine drug screening will be done today. Diagnostic  studies are not indicated at this time. Interventional procedure are not indicated at this time. Re-eval in 3 months. 13. Encounter for medication monitoring  -     METABOLIC PANEL, COMPREHENSIVE    14. Non morbid obesity  I have reviewed/discussed the above normal BMI with the patient. I have recommended the following interventions: dietary management education, guidance, and counseling . 15. Need for shingles vaccine. -     varicella-zoster recombinant, PF, (SHINGRIX) 50 mcg/0.5 mL susr injection; 0.5 mL by IntraMUSCular route once for 1 dose. Repeat second dose in 6 months. Follow-up Disposition:  Return in about 4 months (around 4/5/2019). reviewed diet, exercise and weight control  cardiovascular risk and specific lipid/LDL goals reviewed  reviewed medications and side effects in detail    I have discussed diagnosis listed in this note with pt and/or family. I have discussed treatment plans and options and the risk/benefit analysis of those options, including safe use of medications and possible medication side effects. Through the use of shared decision making we have agreed to the above plan. The patient has received an after-visit summary and questions were answered concerning future plans and follow up. Advise pt of any urgent changes then to proceed to the ER.

## 2018-12-06 LAB
ALBUMIN SERPL-MCNC: 4.8 G/DL (ref 3.5–4.8)
ALBUMIN/GLOB SERPL: 2.2 {RATIO} (ref 1.2–2.2)
ALP SERPL-CCNC: 89 IU/L (ref 39–117)
ALT SERPL-CCNC: 26 IU/L (ref 0–32)
AST SERPL-CCNC: 24 IU/L (ref 0–40)
BILIRUB SERPL-MCNC: 0.4 MG/DL (ref 0–1.2)
BUN SERPL-MCNC: 21 MG/DL (ref 8–27)
BUN/CREAT SERPL: 19 (ref 12–28)
CALCIUM SERPL-MCNC: 9.7 MG/DL (ref 8.7–10.3)
CHLORIDE SERPL-SCNC: 104 MMOL/L (ref 96–106)
CHOLEST SERPL-MCNC: 158 MG/DL (ref 100–199)
CO2 SERPL-SCNC: 24 MMOL/L (ref 20–29)
CREAT SERPL-MCNC: 1.09 MG/DL (ref 0.57–1)
GLOBULIN SER CALC-MCNC: 2.2 G/DL (ref 1.5–4.5)
GLUCOSE SERPL-MCNC: 103 MG/DL (ref 65–99)
HDLC SERPL-MCNC: 40 MG/DL
INTERPRETATION, 910389: NORMAL
INTERPRETATION: NORMAL
LDLC SERPL CALC-MCNC: 84 MG/DL (ref 0–99)
PDF IMAGE, 910387: NORMAL
POTASSIUM SERPL-SCNC: 4 MMOL/L (ref 3.5–5.2)
PROT SERPL-MCNC: 7 G/DL (ref 6–8.5)
SODIUM SERPL-SCNC: 143 MMOL/L (ref 134–144)
TRIGL SERPL-MCNC: 169 MG/DL (ref 0–149)
VLDLC SERPL CALC-MCNC: 34 MG/DL (ref 5–40)

## 2018-12-10 LAB
AMPHETAMINES UR QL SCN: NEGATIVE NG/ML
BARBITURATES UR QL SCN: NEGATIVE NG/ML
BENZODIAZ UR QL SCN: POSITIVE NG/ML
BZE UR QL SCN: NEGATIVE NG/ML
CANNABINOIDS UR QL SCN: NEGATIVE NG/ML
CREAT UR-MCNC: 68.6 MG/DL (ref 20–300)
DRUGS UR: NORMAL
METHADONE UR QL SCN: NEGATIVE NG/ML
OPIATES UR QL SCN: NEGATIVE NG/ML
OXYCODONE+OXYMORPHONE UR QL SCN: NEGATIVE NG/ML
PCP UR QL: NEGATIVE NG/ML
PH UR: 5.4 [PH] (ref 4.5–8.9)
PLEASE NOTE:, 733163: ABNORMAL
PROPOXYPH UR QL SCN: NEGATIVE NG/ML

## 2018-12-18 DIAGNOSIS — G89.29 ENCOUNTER FOR CHRONIC PAIN MANAGEMENT: ICD-10-CM

## 2018-12-18 RX ORDER — TRAMADOL HYDROCHLORIDE 50 MG/1
50 TABLET ORAL
Qty: 90 TAB | Refills: 0 | Status: CANCELLED | OUTPATIENT
Start: 2018-12-18

## 2018-12-19 NOTE — TELEPHONE ENCOUNTER
Patient states that Lesli Dumont was [facility] week for her tramadol and she hasn't heard anything she can be reached @ [phone]

## 2019-02-14 DIAGNOSIS — F32.A DEPRESSION, UNSPECIFIED DEPRESSION TYPE: ICD-10-CM

## 2019-02-14 DIAGNOSIS — G89.29 ENCOUNTER FOR CHRONIC PAIN MANAGEMENT: ICD-10-CM

## 2019-02-14 RX ORDER — TRAMADOL HYDROCHLORIDE 50 MG/1
50 TABLET ORAL
Qty: 90 TAB | Refills: 0 | Status: SHIPPED | OUTPATIENT
Start: 2019-02-14 | End: 2019-04-19 | Stop reason: SDUPTHER

## 2019-02-14 RX ORDER — ALPRAZOLAM 0.5 MG/1
TABLET ORAL
Qty: 180 TAB | Refills: 1 | Status: SHIPPED | OUTPATIENT
Start: 2019-02-14 | End: 2020-03-11 | Stop reason: SDUPTHER

## 2019-02-14 NOTE — TELEPHONE ENCOUNTER
Last Visit: 12/5  Next Appt: 4/3  Previous Refill Encounter: Tramadol-12/5-90+0  Alprazolam-6/+1    Requested Prescriptions     Pending Prescriptions Disp Refills    ALPRAZolam (XANAX) 0.5 mg tablet 180 Tab 1     Sig: TAKE 1 TABLET BY MOUTH TWICE A DAY AS NEEDED FOR ANXIETY    traMADol (ULTRAM) 50 mg tablet 90 Tab 0     Sig: Take 1 Tab by mouth every six (6) hours as needed for Pain. Max Daily Amount: 150 mg.

## 2019-02-18 ENCOUNTER — DOCUMENTATION ONLY (OUTPATIENT)
Dept: FAMILY MEDICINE CLINIC | Age: 69
End: 2019-02-18

## 2019-02-18 NOTE — PROGRESS NOTES
Notified patient that Xanax RX would come thru [facility] and Tramadol would be called into local pharmacy

## 2019-03-05 ENCOUNTER — TELEPHONE (OUTPATIENT)
Dept: FAMILY MEDICINE CLINIC | Age: 69
End: 2019-03-05

## 2019-03-05 NOTE — TELEPHONE ENCOUNTER
----- Message from Wil Lu sent at 3/5/2019 10:54 AM EST -----  Regarding: Dr. Jayla Wyatt  Pt stated she would like to become a pt of Dr. Cora Hall. He gave her permission to call and set up an appt. [phone] or [phone].

## 2019-03-27 ENCOUNTER — HOSPITAL ENCOUNTER (OUTPATIENT)
Dept: LAB | Age: 69
Discharge: HOME | End: 2019-03-27
Payer: MEDICARE

## 2019-03-27 ENCOUNTER — OFFICE VISIT (OUTPATIENT)
Dept: FAMILY MEDICINE CLINIC | Age: 69
End: 2019-03-27

## 2019-03-27 VITALS
SYSTOLIC BLOOD PRESSURE: 141 MMHG | HEIGHT: 63 IN | WEIGHT: 185 LBS | DIASTOLIC BLOOD PRESSURE: 62 MMHG | RESPIRATION RATE: 16 BRPM | HEART RATE: 72 BPM | OXYGEN SATURATION: 99 % | BODY MASS INDEX: 32.78 KG/M2 | TEMPERATURE: 97.4 F

## 2019-03-27 DIAGNOSIS — M50.30 DDD (DEGENERATIVE DISC DISEASE), CERVICAL: ICD-10-CM

## 2019-03-27 DIAGNOSIS — F33.2 SEVERE EPISODE OF RECURRENT MAJOR DEPRESSIVE DISORDER, WITHOUT PSYCHOTIC FEATURES (HCC): Primary | ICD-10-CM

## 2019-03-27 DIAGNOSIS — G89.4 CHRONIC PAIN SYNDROME: ICD-10-CM

## 2019-03-27 DIAGNOSIS — D50.9 IRON DEFICIENCY ANEMIA, UNSPECIFIED IRON DEFICIENCY ANEMIA TYPE: ICD-10-CM

## 2019-03-27 DIAGNOSIS — K58.9 IRRITABLE BOWEL SYNDROME WITHOUT DIARRHEA: ICD-10-CM

## 2019-03-27 DIAGNOSIS — M79.7 FIBROMYALGIA: ICD-10-CM

## 2019-03-27 DIAGNOSIS — I10 ESSENTIAL HYPERTENSION WITH GOAL BLOOD PRESSURE LESS THAN 140/90: ICD-10-CM

## 2019-03-27 DIAGNOSIS — E78.2 MIXED HYPERLIPIDEMIA: ICD-10-CM

## 2019-03-27 DIAGNOSIS — M48.062 SPINAL STENOSIS OF LUMBAR REGION WITH NEUROGENIC CLAUDICATION: ICD-10-CM

## 2019-03-27 DIAGNOSIS — R73.02 IGT (IMPAIRED GLUCOSE TOLERANCE): ICD-10-CM

## 2019-03-27 PROCEDURE — 36415 COLL VENOUS BLD VENIPUNCTURE: CPT

## 2019-03-27 PROCEDURE — 85027 COMPLETE CBC AUTOMATED: CPT

## 2019-03-27 PROCEDURE — 80048 BASIC METABOLIC PNL TOTAL CA: CPT

## 2019-03-27 RX ORDER — CYCLOBENZAPRINE HCL 10 MG
10 TABLET ORAL
Qty: 30 TAB | Refills: 0 | Status: SHIPPED | OUTPATIENT
Start: 2019-03-27 | End: 2019-08-02 | Stop reason: ALTCHOICE

## 2019-03-27 NOTE — PROGRESS NOTES
Chief Complaint   Patient presents with    New Patient     previous patient of Aj     1. Have you been to the ER, urgent care clinic since your last visit? Hospitalized since your last visit? yes [facility] 3/3/[facility] for hernia    2. Have you seen or consulted any other health care providers outside of the [address] [place] [address] since your last visit? Include any pap smears or colon screening.  no

## 2019-03-27 NOTE — PROGRESS NOTES
Subjective:     Chief Complaint   Patient presents with    New Patient     previous patient of Andrews Lees        She  is a 77 y.o. female who presents for evaluation of:  New pt to me - seen by my colleague Dr. Anderws Lees and transferring because of location. Sig PMH of depression, fibromyalgia, chronic pain, insomnia, migraines, IBS, HTN, HLD, and PreDM. Depression  She is an 77 y.o. female seen for new evaluation and treatment of depression. Chronic issue but recently set off by her daughter's dx of cancer. Also having a lot of trouble her dog being sick and older. Pt also thinks about her dad dying when he was 77 and she is now 77 yrs old. She complains of depressed mood, anhedonia, weight loss, insomnia, fatigue, feelings of worthlessness/guilt, difficulty concentrating, hopelessness, impaired memory and suicidal thoughts without plan. Onset was approximately many years ago, unchanged since that time. Had some fleeting SI a few months ago and has asked her son to take the guns out of the house. Family history significant for depression, Parkinson dementia. Possible organic causes contributing are: none. Currently taking Cymbalta, Celexa, and Xanax about 1-2 x per day most days. Prev seeing therapist years ago and did very well. Has tried Trazodone for insomnia and then had sig nightmares on this. Felt abnormal with Gabapentin so stopped taking this. Working with Dr. Keesha Plascencia on CTS sx.     ROS  Gen - no fever/chills  Resp - no dyspnea or cough  CV - no chest pain or KASPER  Rest per HPI    Past Medical History:   Diagnosis Date    Arthritis     Cancer ([facility] [address]     skin cancer    Chronic pain     CHRONIC BOWEL PAIN    Diverticulitis     Fibromyalgia     GERD (gastroesophageal reflux disease)     Goiter     Hiatal hernia     Hypercholesterolemia     Hypertension     IBS (irritable bowel syndrome)     IBS (irritable bowel syndrome)     CONSTIPATION, CHRONIC SINCE HER 20s    Ill-defined condition     rectocele    Insomnia     TAKES TRAZODONE NIGHTLY    Migraine     REDUCED IN FREQUENCY AND SEVERITY SINCE MENOPAUSE    Other ill-defined conditions(179.89)     Psychiatric disorder 3/11    DEPRESSION     Past Surgical History:   Procedure Laterality Date    COLONOSCOPY N/A 6/21/2016    COLON[facility]aint [facility] at hospitals ENDOSCOPY    COLONOSCOPY N/A 6/27/2018    COLONOSCOPY performed by Noelle Toussaint MD at hospitals ENDOSCOPY    ENDOSCOPY, COLON, DIAGNOSTIC  11/2010    Dr. Stone Zheng-     HX CHOLECYSTECTOMY  2006   [facility]  2007    Dr. Pond/ diverticulitis    HX GI  X3  LAST ONE 12/10    COLONOSCOPY    HX GYN  1982    D&C WITH SUCTION    HX HYSTERECTOMY      HX ORTHOPAEDIC      right foot surgery    HX ORTHOPAEDIC      neck surgery 3/21/11 Dr. Em Queen HX ORTHOPAEDIC  08/31/2017    back surgery    HX OTHER SURGICAL      EGD    HX TONSILLECTOMY       Current Outpatient Medications on File Prior to Visit   Medication Sig Dispense Refill    citalopram (CELEXA) 40 mg tablet TAKE 1 TABLET BY MOUTH DAILY 90 Tab 3    ALPRAZolam (XANAX) 0.5 mg tablet TAKE 1 TABLET BY MOUTH TWICE A DAY AS NEEDED FOR ANXIETY 180 Tab 1    traMADol (ULTRAM) 50 mg tablet Take 1 Tab by mouth every six (6) hours as needed for Pain. Max Daily Amount: 150 mg. 90 Tab 0    diclofenac EC (VOLTAREN) 75 mg EC tablet Take 1 Tab by mouth two (2) times daily as needed. 60 Tab 3    DULoxetine (CYMBALTA) 60 mg capsule TAKE 1 CAPSULE TWICE A  Cap 3    rosuvastatin (CRESTOR) 20 mg tablet TAKE 1 TABLET NIGHTLY 90 Tab 1    dicyclomine (BENTYL) 10 mg capsule TAKE 2 CAPSULES FOUR TIMES A DAY AS NEEDED 720 Cap 3    hydroCHLOROthiazide (HYDRODIURIL) 25 mg tablet TAKE 1 TABLET DAILY 90 Tab 2    sucralfate (CARAFATE) 1 gram tablet Take 1 g by mouth Three (3) times a week.  Take 1 tablet by mouth as needed       estradiol (ESTRACE) 0.01 % (0.1 mg/gram) vaginal cream Apply to Vaginal twice a week with fingertip      diclofenac (VOLTAREN) 1 % gel Apply 2 g to affected area four (4) times daily as needed. 100 g 3    oxybutynin (DITROPAN) 5 mg tablet take 1 tablet 2 times a day  0    NEXIUM 40 mg capsule TAKE 1 CAPSULE DAILY 90 Cap 3    cholecalciferol (VITAMIN D3) 1,000 unit tablet Take 1,000 Units by mouth daily.  aspirin delayed-release 81 mg tablet Take 81 mg by mouth two (2) times a week.  naloxone (NARCAN) 4 mg/actuation nasal spray Use 1 spray intranasally, then discard. Repeat with new spray every 2 min as needed for opioid overdose symptoms, alternating nostrils. 1 Each 0    lisinopril (PRINIVIL, ZESTRIL) 40 mg tablet TAKE 1 TABLET DAILY 90 Tab 3     No current facility-administered medications on file prior to visit. Objective:     Vitals:    03/27/19 0831 03/27/19 0944   BP: 152/82 141/62   Pulse: 72    Resp: 16    Temp: 97.4 °F (36.3 °C)    TempSrc: Oral    SpO2: 99%    Weight: 185 lb (83.9 kg)    Height: 5' 3\" (1.6 m)      Physical Examination:  General appearance - alert, well appearing, and in no distress  Eyes -sclera anicteric  Neck - supple, no significant adenopathy, no thyromegaly, no bruits  Chest - clear to auscultation, no wheezes, rales or rhonchi, symmetric air entry  Heart - normal rate, regular rhythm, normal S1, S2, no murmurs, rubs, clicks or gallops  Neurological - alert, oriented, no focal findings or movement disorder noted  Extremities-no edema  Psych-normal mood and affect    Assessment/ Plan:   Diagnoses and all orders for this visit:    1. Severe episode of recurrent major depressive disorder, without psychotic features ([facility] [address] - ct Celexa 40 mg and Cymbalta 60 mg with Xanax PRN. Goal would be to wean off Celexa and taper up on Cymbalta to 120 mg/day  Discussed other options like Wellbutrin and may consider this in future. Encouraged social support and exercise as able. Also discussed role of therapy    2.  Essential hypertension with goal blood pressure less than 140/90 - Bp slightly high today, recheck at next appt  -     METABOLIC PANEL, BASIC    3. Mixed hyperlipidemia - ct Crestor    4. IGT (impaired glucose tolerance) - encouraged weight loss with diet and exercise    5. DDD (degenerative disc disease), cervical  6. Spinal stenosis of lumbar region with neurogenic claudication  - Chronic issues, using Diclofenac and Tramadol PRN. Adding on Flexeril for a trial    7. Fibromyalgia - as above, taking Celexa, Cymbalta, Tramadol PRN, and adding on Flexeril. May benefit from Gabapentin in future  -     cyclobenzaprine (FLEXERIL) 10 mg tablet; Take 1 Tab by mouth nightly. 8. Chronic pain syndrome - as above    9. Irritable bowel syndrome without diarrhea - ct with SSRI and on SNRI. May benefit from low FODMAPs diet. 10. Iron deficiency anemia, unspecified iron deficiency anemia type - rechecking labs  -     CBC W/O DIFF    I have discussed the diagnosis with the patient and the intended plan as seen in the above orders. The patient has received an after-visit summary and questions were answered concerning future plans. I have discussed medication side effects and warnings with the patient as well. The patient verbalizes understanding and agreement with the plan. Follow-up and Dispositions    · Return in about 3 weeks (around 4/17/2019), or if symptoms worsen or fail to improve.

## 2019-03-27 NOTE — PATIENT INSTRUCTIONS
DEPRESSION SELF-MANAGEMENT      GOAL: I WILL STAY PHYSICALLY ACTIVE. I will spend ______ minutes doing _________________ (activity) ______ days per week. GOAL: I WILL MAKE TIME FOR PLEASUREABLE ACTIVITIES. I will spend ______ minutes doing _________________ (activity) ______ days per week. GOAL: I WILL KEEP IN TOUCH WITH FRIENDS AND LOVED ONES. I will make contact with ___________ for ______minutes doing/talking about _____________________. GOAL: I WILL PRACTICE RELAXING. I will practice relaxation for ______minutes _________ days per week. GOAL: I WILL TAKE MY MEDIACTIONS AS DIRECTED AND CALL WITH ANY QUESTIONS.   I will take my medication every day at _____________ (time.)

## 2019-03-28 LAB
BUN SERPL-MCNC: 18 MG/DL (ref 8–27)
BUN/CREAT SERPL: 15 (ref 12–28)
CALCIUM SERPL-MCNC: 9.6 MG/DL (ref 8.7–10.3)
CHLORIDE SERPL-SCNC: 102 MMOL/L (ref 96–106)
CO2 SERPL-SCNC: 25 MMOL/L (ref 20–29)
CREAT SERPL-MCNC: 1.18 MG/DL (ref 0.57–1)
ERYTHROCYTE [DISTWIDTH] IN BLOOD BY AUTOMATED COUNT: 13.5 % (ref 12.3–15.4)
GLUCOSE SERPL-MCNC: 91 MG/DL (ref 65–99)
HCT VFR BLD AUTO: 36.4 % (ref 34–46.6)
HGB BLD-MCNC: 11.9 G/DL (ref 11.1–15.9)
INTERPRETATION: NORMAL
MCH RBC QN AUTO: 31.6 PG (ref 26.6–33)
MCHC RBC AUTO-ENTMCNC: 32.7 G/DL (ref 31.5–35.7)
MCV RBC AUTO: 97 FL (ref 79–97)
PLATELET # BLD AUTO: 366 X10E3/UL (ref 150–379)
POTASSIUM SERPL-SCNC: 4.1 MMOL/L (ref 3.5–5.2)
RBC # BLD AUTO: 3.76 X10E6/UL (ref 3.77–5.28)
SODIUM SERPL-SCNC: 142 MMOL/L (ref 134–144)
WBC # BLD AUTO: 6.8 X10E3/UL (ref 3.4–10.8)

## 2019-04-19 ENCOUNTER — HOSPITAL ENCOUNTER (OUTPATIENT)
Dept: LAB | Age: 69
Discharge: HOME | End: 2019-04-19
Payer: MEDICARE

## 2019-04-19 ENCOUNTER — OFFICE VISIT (OUTPATIENT)
Dept: FAMILY MEDICINE CLINIC | Age: 69
End: 2019-04-19

## 2019-04-19 VITALS
BODY MASS INDEX: 33.45 KG/M2 | WEIGHT: 188.8 LBS | DIASTOLIC BLOOD PRESSURE: 55 MMHG | SYSTOLIC BLOOD PRESSURE: 128 MMHG | HEIGHT: 63 IN | RESPIRATION RATE: 18 BRPM | OXYGEN SATURATION: 97 % | TEMPERATURE: 97.7 F | HEART RATE: 85 BPM

## 2019-04-19 DIAGNOSIS — M50.30 DDD (DEGENERATIVE DISC DISEASE), CERVICAL: ICD-10-CM

## 2019-04-19 DIAGNOSIS — M48.062 SPINAL STENOSIS OF LUMBAR REGION WITH NEUROGENIC CLAUDICATION: ICD-10-CM

## 2019-04-19 DIAGNOSIS — F33.2 SEVERE EPISODE OF RECURRENT MAJOR DEPRESSIVE DISORDER, WITHOUT PSYCHOTIC FEATURES (HCC): ICD-10-CM

## 2019-04-19 DIAGNOSIS — G89.4 CHRONIC PAIN SYNDROME: ICD-10-CM

## 2019-04-19 DIAGNOSIS — F33.2 SEVERE EPISODE OF RECURRENT MAJOR DEPRESSIVE DISORDER, WITHOUT PSYCHOTIC FEATURES (HCC): Primary | ICD-10-CM

## 2019-04-19 DIAGNOSIS — M79.7 FIBROMYALGIA: ICD-10-CM

## 2019-04-19 DIAGNOSIS — G89.29 ENCOUNTER FOR CHRONIC PAIN MANAGEMENT: ICD-10-CM

## 2019-04-19 PROCEDURE — 80346 BENZODIAZEPINES1-12: CPT

## 2019-04-19 RX ORDER — CHLORHEXIDINE GLUCONATE 1.2 MG/ML
RINSE ORAL
Refills: 2 | COMMUNITY
Start: 2019-03-29 | End: 2019-05-31 | Stop reason: ALTCHOICE

## 2019-04-19 RX ORDER — BUPROPION HYDROCHLORIDE 150 MG/1
TABLET, EXTENDED RELEASE ORAL
Qty: 60 TAB | Refills: 2 | Status: SHIPPED | OUTPATIENT
Start: 2019-04-19 | End: 2019-04-19 | Stop reason: SDUPTHER

## 2019-04-19 RX ORDER — TRAMADOL HYDROCHLORIDE 50 MG/1
50 TABLET ORAL
Qty: 60 TAB | Refills: 0 | Status: SHIPPED | OUTPATIENT
Start: 2019-04-19 | End: 2019-05-19

## 2019-04-19 NOTE — PROGRESS NOTES
Subjective:     Chief Complaint   Patient presents with    Depression     3 weeks follow-up        She  is a 77 y.o. female who presents for evaluation of:  Sig PMH of depression, fibromyalgia, chronic pain, insomnia, migraines, IBS, HTN, HLD, and PreDM. Since last ariel, had 2 new changes - Daughter is now in remission. However, also found out dog needed to be put down. She is coping well with this. Depression  She is an 77 y.o. female seen for new evaluation and treatment of depression. Chronic issue but recently set off by her daughter's dx of cancer and her dog being sick. Pt also thinks about her dad dying when he was 79 and she is now 77 yrs old. She complains of depressed mood, anhedonia, weight loss, insomnia, fatigue, feelings of worthlessness/guilt, difficulty concentrating, hopelessness, impaired memory and suicidal thoughts without plan. Onset was approximately many years ago, unchanged since that time. Had some fleeting SI a few months ago and has asked her son to take the guns out of the house. Family history significant for depression, Parkinson dementia. Possible organic causes contributing are: none. Currently taking Cymbalta, Celexa, and Xanax about 1-2 x per day most days. Prev seeing therapist years ago and did very well. Has tried Trazodone for insomnia and then had sig nightmares on this so has stopped    Felt abnormal with Gabapentin so stopped taking this. Doing a little bit better with Flexeril recently. Hx of Gastritis noted on EGD with Dr. Maynor Rivas in 6/2018. Taking PPI. Uses Diclofenac but understands risks and uses sparingly.     ROS  Gen - no fever/chills  Resp - no dyspnea or cough  CV - no chest pain or KASPER  Rest per HPI    Past Medical History:   Diagnosis Date    Arthritis     Cancer ([facility] [address]     skin cancer    Chronic pain     CHRONIC BOWEL PAIN    Diverticulitis     Fibromyalgia     GERD (gastroesophageal reflux disease)     Goiter     Hiatal hernia  Hypercholesterolemia     Hypertension     IBS (irritable bowel syndrome)     IBS (irritable bowel syndrome)     CONSTIPATION, CHRONIC SINCE HER 25s    Ill-defined condition     rectocele    Insomnia     TAKES TRAZODONE NIGHTLY    Migraine     REDUCED IN FREQUENCY AND SEVERITY SINCE MENOPAUSE    Other ill-defined conditions(799.89)     Psychiatric disorder 3/11    DEPRESSION     Past Surgical History:   Procedure Laterality Date    COLONOSCOPY N/A 6/21/2016    COLONOSCOPY performed by Pattie Yip MD at [facility] ENDOSCOPY    COLONOSCOPY N/A 6/27/2018    COLONOSCOPY performed by Pattie Yip MD at [facility] ENDOSCOPY    ENDOSCOPY, COLON, DIAGNOSTIC  11/2010    Dr. [place]-     HX CHOLECYSTECTOMY  2006   Avani Boudreaux  2007    Dr. Pond/ diverticulitis    HX GI  X3  LAST ONE 12/10    COLONOSCOPY    HX GYN  1982    D&C WITH SUCTION    HX HYSTERECTOMY      HX ORTHOPAEDIC      right foot surgery    HX ORTHOPAEDIC      neck surgery 3/21/11 Dr. Elizabeth Conway HX ORTHOPAEDIC  08/31/2017    back surgery    HX OTHER SURGICAL      EGD    HX TONSILLECTOMY       Current Outpatient Medications on File Prior to Visit   Medication Sig Dispense Refill    chlorhexidine (PERIDEX) 0.12 % solution   2    cyclobenzaprine (FLEXERIL) 10 mg tablet Take 1 Tab by mouth nightly. 30 Tab 0    citalopram (CELEXA) 40 mg tablet TAKE 1 TABLET BY MOUTH DAILY 90 Tab 3    ALPRAZolam (XANAX) 0.5 mg tablet TAKE 1 TABLET BY MOUTH TWICE A DAY AS NEEDED FOR ANXIETY 180 Tab 1    diclofenac EC (VOLTAREN) 75 mg EC tablet Take 1 Tab by mouth two (2) times daily as needed.  60 Tab 3    DULoxetine (CYMBALTA) 60 mg capsule TAKE 1 CAPSULE TWICE A  Cap 3    lisinopril (PRINIVIL, ZESTRIL) 40 mg tablet TAKE 1 TABLET DAILY 90 Tab 3    rosuvastatin (CRESTOR) 20 mg tablet TAKE 1 TABLET NIGHTLY 90 Tab 1    dicyclomine (BENTYL) 10 mg capsule TAKE 2 CAPSULES FOUR TIMES A DAY AS NEEDED 720 Cap 3    hydroCHLOROthiazide (HYDRODIURIL) 25 mg tablet TAKE 1 TABLET DAILY 90 Tab 2    sucralfate (CARAFATE) 1 gram tablet Take 1 g by mouth Three (3) times a week. Take 1 tablet by mouth as needed       estradiol (ESTRACE) 0.01 % (0.1 mg/gram) vaginal cream Apply to Vaginal twice a week with fingertip      diclofenac (VOLTAREN) 1 % gel Apply 2 g to affected area four (4) times daily as needed. 100 g 3    oxybutynin (DITROPAN) 5 mg tablet take 1 tablet 2 times a day  0    NEXIUM 40 mg capsule TAKE 1 CAPSULE DAILY 90 Cap 3    cholecalciferol (VITAMIN D3) 1,000 unit tablet Take 1,000 Units by mouth daily.  aspirin delayed-release 81 mg tablet Take 81 mg by mouth two (2) times a week.  naloxone (NARCAN) 4 mg/actuation nasal spray Use 1 spray intranasally, then discard. Repeat with new spray every 2 min as needed for opioid overdose symptoms, alternating nostrils. 1 Each 0     No current facility-administered medications on file prior to visit. Objective:     Vitals:    04/19/19 0940   BP: 128/55   Pulse: 85   Resp: 18   Temp: 97.7 °F (36.5 °C)   TempSrc: Oral   SpO2: 97%   Weight: 188 lb 12.8 oz (85.6 kg)   Height: 5' 3\" (1.6 m)     Physical Examination:  General appearance - alert, well appearing, and in no distress  Eyes -sclera anicteric  Neck - supple, no significant adenopathy, no thyromegaly, no bruits  Chest - clear to auscultation, no wheezes, rales or rhonchi, symmetric air entry  Heart - normal rate, regular rhythm, normal S1, S2, no murmurs, rubs, clicks or gallops  Neurological - alert, oriented, no focal findings or movement disorder noted  Extremities-no edema  Psych-normal mood and affect    Assessment/ Plan:   Diagnoses and all orders for this visit:    1. Severe episode of recurrent major depressive disorder, without psychotic features ([facility] [address] - ct Celexa 40 mg and Cymbalta 60 mg BID with Xanax PRN. Starting Wellbutrin today. Discussed options of Seroquel and weaning off Celexa.   Encouraged social support and exercise as able.  Also discussed role of therapy  -     buPROPion SR (WELLBUTRIN SR) 150 mg SR tablet; Take 1 tab by mouth daily x 3 days and then increase to 1 tab twice daily    2. DDD (degenerative disc disease), cervical  3. Spinal stenosis of lumbar region with neurogenic claudication  4. Fibromyalgia  5. Chronic pain syndrome  6. Encounter for chronic pain management  - Chronic issues, using Diclofenac and Tramadol PRN. Adding on Flexeril for a trial  -     cyclobenzaprine (FLEXERIL) 10 mg tablet; Take 1 Tab by mouth nightly. I spent > 50% of the 25 min visit counseling and educating about chronic pain, fibromyalgia, and depression. I have discussed the diagnosis with the patient and the intended plan as seen in the above orders. The patient has received an after-visit summary and questions were answered concerning future plans. I have discussed medication side effects and warnings with the patient as well. The patient verbalizes understanding and agreement with the plan. Follow-up and Dispositions    · Return in about 3 months (around 7/19/2019), or if symptoms worsen or fail to improve.

## 2019-04-19 NOTE — PROGRESS NOTES
Chief Complaint   Patient presents with    Depression     3 weeks follow-up   1. Have you been to the ER, urgent care clinic since your last visit? Hospitalized since your last visit? No    2. Have you seen or consulted any other health care providers outside of the [address] [place] [address] since your last visit? Include any pap smears or colon screening.  No   Discuss pain medication refill and hands

## 2019-04-23 RX ORDER — BUPROPION HYDROCHLORIDE 150 MG/1
TABLET, EXTENDED RELEASE ORAL
Qty: 180 TAB | Refills: 2 | Status: SHIPPED | OUTPATIENT
Start: 2019-04-23 | End: 2019-07-30 | Stop reason: SDUPTHER

## 2019-04-27 LAB
ALPRAZ UR CFM-MCNC: 165 NG/ML
ALPRAZ UR QL: POSITIVE
AMPHETAMINES UR QL SCN: NEGATIVE NG/ML
BARBITURATES UR QL SCN: NEGATIVE NG/ML
BENZODIAZ UR QL: POSITIVE NG/ML
BZE UR QL SCN: NEGATIVE NG/ML
CANNABINOIDS UR QL SCN: NEGATIVE NG/ML
CLONAZEPAM UR QL: NEGATIVE
CREAT UR-MCNC: 123.4 MG/DL (ref 20–300)
FLURAZEPAM UR QL: NEGATIVE
LORAZEPAM UR QL: NEGATIVE
METHADONE UR QL SCN: NEGATIVE NG/ML
MIDAZOLAM UR QL CFM: NEGATIVE
NORDIAZEPAM UR QL: NEGATIVE
OPIATES UR QL SCN: NEGATIVE NG/ML
OXAZEPAM UR QL: NEGATIVE
OXYCODONE+OXYMORPHONE UR QL SCN: NEGATIVE NG/ML
PCP UR QL: NEGATIVE NG/ML
PH UR: 5.7 [PH] (ref 4.5–8.9)
PLEASE NOTE:, 733157: ABNORMAL
PROPOXYPH UR QL SCN: NEGATIVE NG/ML
SP GR UR: 1.01
TEMAZEPAM UR QL CFM: NEGATIVE
TRIAZOLAM UR QL: NEGATIVE

## 2019-05-31 ENCOUNTER — OFFICE VISIT (OUTPATIENT)
Dept: FAMILY MEDICINE CLINIC | Age: 69
End: 2019-05-31

## 2019-05-31 VITALS
BODY MASS INDEX: 33.45 KG/M2 | SYSTOLIC BLOOD PRESSURE: 131 MMHG | HEART RATE: 84 BPM | WEIGHT: 188.8 LBS | TEMPERATURE: 98.2 F | OXYGEN SATURATION: 96 % | HEIGHT: 63 IN | RESPIRATION RATE: 18 BRPM | DIASTOLIC BLOOD PRESSURE: 58 MMHG

## 2019-05-31 DIAGNOSIS — M79.7 FIBROMYALGIA: ICD-10-CM

## 2019-05-31 DIAGNOSIS — G89.4 CHRONIC PAIN SYNDROME: ICD-10-CM

## 2019-05-31 DIAGNOSIS — F33.2 SEVERE EPISODE OF RECURRENT MAJOR DEPRESSIVE DISORDER, WITHOUT PSYCHOTIC FEATURES (HCC): Primary | ICD-10-CM

## 2019-05-31 RX ORDER — RANITIDINE 150 MG/1
TABLET, FILM COATED ORAL
COMMUNITY
Start: 2019-05-08 | End: 2020-03-11 | Stop reason: ALTCHOICE

## 2019-05-31 NOTE — PROGRESS NOTES
Subjective:     Chief Complaint   Patient presents with    Depression     6 weeks follow-up        She  is a 77 y.o. female who presents for evaluation of:  Sig PMH of depression, fibromyalgia, chronic pain, insomnia, migraines, IBS, HTN, HLD, and PreDM. Since last ariel, had 2 new changes -son has lost his job and is now living parents and recently had issue with floors so redoing floors and having to move things that have been present for 40 years out of the house. Prior to last appointment, daughter had cancer treated and is now in remission. Also ended up having to put down her dog. She is coping well with this. Depression  She is an 77 y.o. female seen for new evaluation and treatment of depression. Chronic issue but recently set off by above issues. Pt also thinks about her dad dying when he was 77 and she is now 77 yrs old. She complains of depressed mood, anhedonia, weight loss, insomnia, fatigue, feelings of worthlessness/guilt, difficulty concentrating, hopelessness, impaired memory and suicidal thoughts without plan. Onset was approximately many years ago, unchanged since that time. Had some fleeting SI a few months ago and has asked her son to take the guns out of the house. Family history significant for depression, Parkinson dementia. Possible organic causes contributing are: none. Currently taking Cymbalta, Celexa, Wellbutrin, and Xanax about 1-2 x per day most days. Prev seeing therapist years ago and did very well. Has tried Trazodone for insomnia and then had sig nightmares on this so has stopped    Felt abnormal with Gabapentin so stopped taking this. Doing a little bit better with Flexeril recently. Hx of Gastritis noted on EGD with Dr. Meggan Rick in 6/2018. Taking PPI. Uses Diclofenac but understands risks and uses sparingly.     ROS  Gen - no fever/chills  Resp - no dyspnea or cough  CV - no chest pain or KASPER  Rest per HPI    Past Medical History:   Diagnosis Date    Arthritis     Cancer ([facility] [address]     skin cancer    Chronic pain     CHRONIC BOWEL PAIN    Diverticulitis     Fibromyalgia     GERD (gastroesophageal reflux disease)     Goiter     Hiatal hernia     Hypercholesterolemia     Hypertension     IBS (irritable bowel syndrome)     IBS (irritable bowel syndrome)     CONSTIPATION, CHRONIC SINCE HER 25s    Ill-defined condition     rectocele    Insomnia     TAKES TRAZODONE NIGHTLY    Migraine     REDUCED IN FREQUENCY AND SEVERITY SINCE MENOPAUSE    Other ill-defined conditions(799.89)     Psychiatric disorder 3/11    DEPRESSION     Past Surgical History:   Procedure Laterality Date    COLONOSCOPY N/A 6/21/2016    [facility] Sandy [facility] ENDOSCOPY    COLONOSCOPY N/A 6/27/2018    COLONOSCOPY performed by Sandy Norton MD at [facility] ENDOSCOPY    ENDOSCOPY, COLON, DIAGNOSTIC  11/2010    Dr. Lisette Rey-     HX CHOLECYSTECTOMY  2006   Rosemarie Scales  2007    Dr. Pond/ diverticulitis    HX GI  X3  LAST ONE 12/10    COLONOSCOPY    HX GYN  1982    D&C WITH SUCTION    HX HYSTERECTOMY      HX ORTHOPAEDIC      right foot surgery    HX ORTHOPAEDIC      neck surgery 3/21/11 Dr. Shira Dean HX ORTHOPAEDIC  08/31/2017    back surgery    HX OTHER SURGICAL      EGD    HX TONSILLECTOMY       Current Outpatient Medications on File Prior to Visit   Medication Sig Dispense Refill    raNITIdine (ZANTAC) 150 mg tablet       buPROPion SR (WELLBUTRIN SR) 150 mg SR tablet TAKE 1 TABLET BY MOUTH ONCE DAILY FOR 3 DAYS; THEN INCREASE TO 1 TABLET BY MOUTH TWICE DAILY (Patient taking differently: TAKE  1 TABLET BY MOUTH TWICE DAILY) 180 Tab 2    cyclobenzaprine (FLEXERIL) 10 mg tablet Take 1 Tab by mouth nightly.  30 Tab 0    citalopram (CELEXA) 40 mg tablet TAKE 1 TABLET BY MOUTH DAILY 90 Tab 3    ALPRAZolam (XANAX) 0.5 mg tablet TAKE 1 TABLET BY MOUTH TWICE A DAY AS NEEDED FOR ANXIETY 180 Tab 1    diclofenac EC (VOLTAREN) 75 mg EC tablet Take 1 Tab by mouth two (2) times daily as needed. 60 Tab 3    DULoxetine (CYMBALTA) 60 mg capsule TAKE 1 CAPSULE TWICE A  Cap 3    lisinopril (PRINIVIL, ZESTRIL) 40 mg tablet TAKE 1 TABLET DAILY 90 Tab 3    rosuvastatin (CRESTOR) 20 mg tablet TAKE 1 TABLET NIGHTLY 90 Tab 1    dicyclomine (BENTYL) 10 mg capsule TAKE 2 CAPSULES FOUR TIMES A DAY AS NEEDED 720 Cap 3    hydroCHLOROthiazide (HYDRODIURIL) 25 mg tablet TAKE 1 TABLET DAILY 90 Tab 2    sucralfate (CARAFATE) 1 gram tablet Take 1 g by mouth Three (3) times a week. Take 1 tablet by mouth as needed       estradiol (ESTRACE) 0.01 % (0.1 mg/gram) vaginal cream Apply to Vaginal twice a week with fingertip      diclofenac (VOLTAREN) 1 % gel Apply 2 g to affected area four (4) times daily as needed. 100 g 3    oxybutynin (DITROPAN) 5 mg tablet take 1 tablet 2 times a day  0    cholecalciferol (VITAMIN D3) 1,000 unit tablet Take 1,000 Units by mouth daily.  aspirin delayed-release 81 mg tablet Take 81 mg by mouth two (2) times a week. No current facility-administered medications on file prior to visit. Objective:     Vitals:    05/31/19 1033   BP: 131/58   Pulse: 84   Resp: 18   Temp: 98.2 °F (36.8 °C)   TempSrc: Oral   SpO2: 96%   Weight: 188 lb 12.8 oz (85.6 kg)   Height: 5' 3\" (1.6 m)     Physical Examination:  General appearance - alert, well appearing, and in no distress  Eyes -sclera anicteric  Neck - supple, no significant adenopathy, no thyromegaly, no bruits  Chest - clear to auscultation, no wheezes, rales or rhonchi, symmetric air entry  Heart - normal rate, regular rhythm, normal S1, S2, no murmurs, rubs, clicks or gallops  Neurological - alert, oriented, no focal findings or movement disorder noted  Extremities-no edema  Psych-normal mood and affect    Assessment/ Plan:   Diagnoses and all orders for this visit:    1.  Severe episode of recurrent major depressive disorder, without psychotic features ([address] - ct Celexa 40 mg,Cymbalta 60 mg BID, Wellbutrin 150 mg twice daily, with Xanax PRN. In the future, if symptoms do not continue to improve, would consider option of Seroquel and weaning off Celexa. Encouraged social support and exercise as able. Also discussed role of therapy    2. Fibromyalgia-seems to be improving with current medications and using Flexeril every night    3. Chronic pain syndrome- stable overall    I spent > 50% of the 25 min visit counseling and educating about chronic pain, fibromyalgia, and depression as well as reviewing medications with indications. I have discussed the diagnosis with the patient and the intended plan as seen in the above orders. The patient has received an after-visit summary and questions were answered concerning future plans. I have discussed medication side effects and warnings with the patient as well. The patient verbalizes understanding and agreement with the plan. Follow-up and Dispositions    · Return in about 3 months (around 8/31/2019), or if symptoms worsen or fail to improve.

## 2019-05-31 NOTE — PROGRESS NOTES
Chief Complaint   Patient presents with    Depression     6 weeks follow-up   1. Have you been to the ER, urgent care clinic since your last visit? Hospitalized since your last visit? No    2. Have you seen or consulted any other health care providers outside of the [address] [place] [address] since your last visit? Include any pap smears or colon screening.  Yes Urologist  Discuss Zantac,What medications you are changing  Denies SI/HI

## 2019-05-31 NOTE — PATIENT INSTRUCTIONS
Here is your new list of meds and why you are taking them: For your stomach (Acid Reflux and IBS)  raNITIdine (ZANTAC) 150 mg tablet  sucralfate (CARAFATE) 1 gram tablet   dicyclomine (BENTYL) 10 mg capsule    For anxiety and depression (and Fibromyalgia)  buPROPion SR (WELLBUTRIN SR) 150 mg SR tablet   citalopram (CELEXA) 40 mg tablet  DULoxetine (CYMBALTA) 60 mg capsule   ALPRAZolam (XANAX) 0.5 mg tablet - AS NEEDED    For arthritis and pain control  diclofenac EC (VOLTAREN) 75 mg EC tablet   diclofenac (VOLTAREN) 1 % gel   cyclobenzaprine (FLEXERIL) 10 mg tablet     For your blood pressure  lisinopril (PRINIVIL, ZESTRIL) 40 mg tablet   hydroCHLOROthiazide (HYDRODIURIL) 25 mg tablet     For your cholesterol  rosuvastatin (CRESTOR) 20 mg tablet  aspirin delayed-release 81 mg tablet     For vaginal dryness related to menopause  estradiol (ESTRACE) 0.01 % (0.1 mg/gram) vaginal cream     For urinary incontinence  oxybutynin (DITROPAN) 5 mg tablet     For low Vitamin D  cholecalciferol (VITAMIN D3) 1,000 unit tablet      **Lastly, please work on daily exercise, taking meds regularly, finding activities that you previously enjoyed (like candlemaking), and finding a therapist to work with regularly.

## 2019-07-30 DIAGNOSIS — F33.2 SEVERE EPISODE OF RECURRENT MAJOR DEPRESSIVE DISORDER, WITHOUT PSYCHOTIC FEATURES (HCC): ICD-10-CM

## 2019-07-30 RX ORDER — BUPROPION HYDROCHLORIDE 150 MG/1
TABLET, EXTENDED RELEASE ORAL
Qty: 60 TAB | Refills: 0 | Status: SHIPPED | OUTPATIENT
Start: 2019-07-30 | End: 2019-08-26 | Stop reason: SDUPTHER

## 2019-08-02 ENCOUNTER — OFFICE VISIT (OUTPATIENT)
Dept: FAMILY MEDICINE CLINIC | Age: 69
End: 2019-08-02

## 2019-08-02 VITALS
WEIGHT: 188.8 LBS | DIASTOLIC BLOOD PRESSURE: 72 MMHG | RESPIRATION RATE: 18 BRPM | OXYGEN SATURATION: 96 % | SYSTOLIC BLOOD PRESSURE: 130 MMHG | BODY MASS INDEX: 33.45 KG/M2 | HEART RATE: 72 BPM | HEIGHT: 63 IN | TEMPERATURE: 98 F

## 2019-08-02 DIAGNOSIS — Z79.899 ENCOUNTER FOR LONG-TERM (CURRENT) USE OF MEDICATIONS: ICD-10-CM

## 2019-08-02 DIAGNOSIS — I10 ESSENTIAL HYPERTENSION WITH GOAL BLOOD PRESSURE LESS THAN 140/90: ICD-10-CM

## 2019-08-02 DIAGNOSIS — G89.4 CHRONIC PAIN SYNDROME: ICD-10-CM

## 2019-08-02 DIAGNOSIS — M79.7 FIBROMYALGIA: ICD-10-CM

## 2019-08-02 DIAGNOSIS — F33.2 SEVERE EPISODE OF RECURRENT MAJOR DEPRESSIVE DISORDER, WITHOUT PSYCHOTIC FEATURES (HCC): Primary | ICD-10-CM

## 2019-08-02 RX ORDER — PHENAZOPYRIDINE HYDROCHLORIDE 200 MG/1
TABLET, FILM COATED ORAL
COMMUNITY
End: 2019-09-30 | Stop reason: ALTCHOICE

## 2019-08-02 RX ORDER — CITALOPRAM 40 MG/1
TABLET, FILM COATED ORAL
Qty: 90 TAB | Refills: 3 | Status: SHIPPED | OUTPATIENT
Start: 2019-08-02 | End: 2019-10-29 | Stop reason: ALTCHOICE

## 2019-08-02 RX ORDER — TRAZODONE HYDROCHLORIDE 50 MG/1
TABLET ORAL
COMMUNITY
Start: 2016-09-19 | End: 2019-12-06 | Stop reason: SINTOL

## 2019-08-02 NOTE — PROGRESS NOTES
Chief Complaint   Patient presents with    Depression     3 month follow-up   1. Have you been to the ER, urgent care clinic since your last visit? Hospitalized since your last visit? No    2. Have you seen or consulted any other health care providers outside of the [address] [place] [address] since your last visit? Include any pap smears or colon screening.  No   Discuss scratchy throat and ears

## 2019-08-02 NOTE — PROGRESS NOTES
Subjective:     Chief Complaint   Patient presents with    Depression     3 month follow-up        She  is a 77 y.o. female who presents for evaluation of:  Sig PMH of depression, fibromyalgia, chronic pain, insomnia, migraines, IBS, HTN, HLD, and PreDM. Doing ok since last appt. Feels like things are steady. Prior to last appointment, daughter's cancer stable in remission. Seems to be doing well after having to put down her dog. Still worried about her son. Depression -  Chronic issue but recently set off by above issues. She complains of depressed mood, anhedonia, weight loss, insomnia, fatigue, feelings of worthlessness/guilt, difficulty concentrating, hopelessness, impaired memory and suicidal thoughts without plan. Onset was approximately many years ago, unchanged since that time. Had some fleeting SI a few months ago and has asked her son to take the guns out of the house. Family history significant for depression, Parkinson dementia. Possible organic causes contributing are: none. Currently taking Cymbalta, Celexa, Wellbutrin, and Xanax about 1-2 x per day most days. Prev seeing therapist years ago and did very well. Has tried Trazodone for insomnia and then had sig nightmares on this so has stopped    Doing a little bit better with Flexeril recently. Hx of Gastritis noted on EGD with Dr. Kalpana Candelaria in 6/2018. Taking PPI. Uses Diclofenac but understands risks and uses sparingly.     ROS  Gen - no fever/chills  Resp - no dyspnea or cough  CV - no chest pain or KASPER  Rest per HPI    Past Medical History:   Diagnosis Date    Arthritis     Cancer ([facility] [address]     skin cancer    Chronic pain     CHRONIC BOWEL PAIN    Diverticulitis     Fibromyalgia     GERD (gastroesophageal reflux disease)     Goiter     Hiatal hernia     Hypercholesterolemia     Hypertension     IBS (irritable bowel syndrome)     IBS (irritable bowel syndrome)     CONSTIPATION, CHRONIC SINCE HER 20s    Ill-defined condition     rectocele    Insomnia     TAKES TRAZODONE NIGHTLY    Migraine     REDUCED IN FREQUENCY AND SEVERITY SINCE MENOPAUSE    Other ill-defined conditions(799.89)     Psychiatric disorder 3/11    DEPRESSION     Past Surgical History:   Procedure Laterality Date    COLONOSCOPY N/A 6/21/2016    COLON[facility] performed by [facility] Rest[facility] at [facility] ENDOSCOPY    COLONOSCOPY N/A 6/27/2018    COLONOSCOPY performed by Nedra Restrepo MD at [facility] ENDOSCOPY    ENDOSCOPY, COLON, DIAGNOSTIC  11/2010    Dr. Bridget Morris-     HX CHOLECYSTECTOMY  2006   Ashley Wang  2007    Dr. Pond/ diverticulitis    HX GI  X3  LAST ONE 12/10    COLONOSCOPY    HX GYN  1982    D&C WITH SUCTION    HX HYSTERECTOMY      HX ORTHOPAEDIC      right foot surgery    HX ORTHOPAEDIC      neck surgery 3/21/11 Dr. Almanza Million HX ORTHOPAEDIC  08/31/2017    back surgery    HX OTHER SURGICAL      EGD    HX TONSILLECTOMY       Current Outpatient Medications on File Prior to Visit   Medication Sig Dispense Refill    traZODone (DESYREL) 50 mg tablet TAKE TWO TABLETS NIGHTLY AS NEEDED      phenazopyridine (PYRIDIUM) 200 mg tablet Take  by mouth three (3) times daily as needed for Pain.  buPROPion SR (WELLBUTRIN SR) 150 mg SR tablet TAKE 1 TABLET BY MOUTH ONCE DAILY FOR 3 DAYS; THEN INCREASE TO 1 TABLET BY MOUTH TWICE DAILY 60 Tab 0    raNITIdine (ZANTAC) 150 mg tablet       ALPRAZolam (XANAX) 0.5 mg tablet TAKE 1 TABLET BY MOUTH TWICE A DAY AS NEEDED FOR ANXIETY 180 Tab 1    diclofenac EC (VOLTAREN) 75 mg EC tablet Take 1 Tab by mouth two (2) times daily as needed.  60 Tab 3    DULoxetine (CYMBALTA) 60 mg capsule TAKE 1 CAPSULE TWICE A  Cap 3    lisinopril (PRINIVIL, ZESTRIL) 40 mg tablet TAKE 1 TABLET DAILY 90 Tab 3    rosuvastatin (CRESTOR) 20 mg tablet TAKE 1 TABLET NIGHTLY 90 Tab 1    dicyclomine (BENTYL) 10 mg capsule TAKE 2 CAPSULES FOUR TIMES A DAY AS NEEDED 720 Cap 3    hydroCHLOROthiazide (HYDRODIURIL) 25 mg tablet TAKE 1 TABLET DAILY 90 Tab 2    sucralfate (CARAFATE) 1 gram tablet Take 1 g by mouth Three (3) times a week. Take 1 tablet by mouth as needed       estradiol (ESTRACE) 0.01 % (0.1 mg/gram) vaginal cream Apply to Vaginal twice a week with fingertip      diclofenac (VOLTAREN) 1 % gel Apply 2 g to affected area four (4) times daily as needed. 100 g 3    oxybutynin (DITROPAN) 5 mg tablet take 1 tablet 2 times a day  0    cholecalciferol (VITAMIN D3) 1,000 unit tablet Take 1,000 Units by mouth daily.  aspirin delayed-release 81 mg tablet Take 81 mg by mouth two (2) times a week. No current facility-administered medications on file prior to visit. Objective:     Vitals:    08/02/19 1023   BP: 130/72   Pulse: 72   Resp: 18   Temp: 98 °F (36.7 °C)   TempSrc: Oral   SpO2: 96%   Weight: 188 lb 12.8 oz (85.6 kg)   Height: 5' 3\" (1.6 m)     Physical Examination:  General appearance - alert, well appearing, and in no distress  Eyes -sclera anicteric  Neck - supple, no significant adenopathy, no thyromegaly, no bruits  Chest - clear to auscultation, no wheezes, rales or rhonchi, symmetric air entry  Heart - normal rate, regular rhythm, normal S1, S2, no murmurs, rubs, clicks or gallops  Neurological - alert, oriented, no focal findings or movement disorder noted  Extremities-no edema  Psych-normal mood and affect    Assessment/ Plan:   Diagnoses and all orders for this visit:    1. Severe episode of recurrent major depressive disorder, without psychotic features ([facility] [address]  2. Fibromyalgia  3. Chronic pain syndrome  -Ct Celexa 40 mg,Cymbalta 60 mg BID, Wellbutrin 150 mg twice daily, with Xanax PRN. Encouraged social support and exercise as able.   Not ready for therapy yet  -Fibromyalgia seems to be improving with current medications and using Flexeril every night  -     citalopram (CELEXA) 40 mg tablet; TAKE 1 TABLET BY MOUTH DAILY    4. Essential hypertension with goal blood pressure less than 895/10-[id number]  -     METABOLIC PANEL, BASIC    5. Encounter for long-term (current) use of medications  -     METABOLIC PANEL, BASIC    Also discussed mild allergy type sx x 1 day. Will monitor. I have discussed the diagnosis with the patient and the intended plan as seen in the above orders. The patient has received an after-visit summary and questions were answered concerning future plans. I have discussed medication side effects and warnings with the patient as well. The patient verbalizes understanding and agreement with the plan. Follow-up and Dispositions    · Return in about 4 months (around 12/6/2019), or if symptoms worsen or fail to improve.

## 2019-08-03 LAB
BUN SERPL-MCNC: 22 MG/DL (ref 8–27)
BUN/CREAT SERPL: 17 (ref 12–28)
CALCIUM SERPL-MCNC: 9.7 MG/DL (ref 8.7–10.3)
CHLORIDE SERPL-SCNC: 100 MMOL/L (ref 96–106)
CO2 SERPL-SCNC: 25 MMOL/L (ref 20–29)
CREAT SERPL-MCNC: 1.27 MG/DL (ref 0.57–1)
GLUCOSE SERPL-MCNC: 76 MG/DL (ref 65–99)
INTERPRETATION: NORMAL
POTASSIUM SERPL-SCNC: 4.3 MMOL/L (ref 3.5–5.2)
SODIUM SERPL-SCNC: 140 MMOL/L (ref 134–144)

## 2019-08-12 ENCOUNTER — HOSPITAL ENCOUNTER (EMERGENCY)
Age: 69
Discharge: HOME | End: 2019-08-12
Attending: EMERGENCY MEDICINE | Admitting: EMERGENCY MEDICINE
Payer: MEDICARE

## 2019-08-12 ENCOUNTER — APPOINTMENT (OUTPATIENT)
Dept: GENERAL RADIOLOGY | Age: 69
End: 2019-08-12
Attending: PHYSICIAN ASSISTANT
Payer: MEDICARE

## 2019-08-12 VITALS
SYSTOLIC BLOOD PRESSURE: 124 MMHG | RESPIRATION RATE: 18 BRPM | OXYGEN SATURATION: 100 % | WEIGHT: 190.04 LBS | DIASTOLIC BLOOD PRESSURE: 93 MMHG | TEMPERATURE: 98.1 F | HEART RATE: 83 BPM | BODY MASS INDEX: 33.66 KG/M2

## 2019-08-12 DIAGNOSIS — Z98.1 HISTORY OF FUSION OF CERVICAL SPINE: ICD-10-CM

## 2019-08-12 DIAGNOSIS — M48.02 SPINAL STENOSIS, CERVICAL REGION: ICD-10-CM

## 2019-08-12 DIAGNOSIS — M50.30 DDD (DEGENERATIVE DISC DISEASE), CERVICAL: Primary | ICD-10-CM

## 2019-08-12 PROCEDURE — 99283 EMERGENCY DEPT VISIT LOW MDM: CPT

## 2019-08-12 PROCEDURE — 74011250637 HC RX REV CODE- 250/637: Performed by: PHYSICIAN ASSISTANT

## 2019-08-12 PROCEDURE — 72050 X-RAY EXAM NECK SPINE 4/5VWS: CPT

## 2019-08-12 PROCEDURE — 74011250636 HC RX REV CODE- 250/636: Performed by: PHYSICIAN ASSISTANT

## 2019-08-12 PROCEDURE — 96372 THER/PROPH/DIAG INJ SC/IM: CPT

## 2019-08-12 RX ORDER — METHOCARBAMOL 750 MG/1
750 TABLET, FILM COATED ORAL 4 TIMES DAILY
Qty: 20 TAB | Refills: 0 | Status: SHIPPED | OUTPATIENT
Start: 2019-08-12 | End: 2019-09-30 | Stop reason: ALTCHOICE

## 2019-08-12 RX ORDER — DIAZEPAM 5 MG/1
5 TABLET ORAL
Status: COMPLETED | OUTPATIENT
Start: 2019-08-12 | End: 2019-08-12

## 2019-08-12 RX ORDER — KETOROLAC TROMETHAMINE 30 MG/ML
30 INJECTION, SOLUTION INTRAMUSCULAR; INTRAVENOUS
Status: COMPLETED | OUTPATIENT
Start: 2019-08-12 | End: 2019-08-12

## 2019-08-12 RX ADMIN — DIAZEPAM 5 MG: 5 TABLET ORAL at 10:53

## 2019-08-12 RX ADMIN — KETOROLAC TROMETHAMINE 30 MG: 30 INJECTION, SOLUTION INTRAMUSCULAR at 10:53

## 2019-08-12 NOTE — ED PROVIDER NOTES
EMERGENCY DEPARTMENT HISTORY AND PHYSICAL EXAM      Date: 8/12/2019  Patient Name: Merlin Jones    History of Presenting Illness     Chief Complaint   Patient presents with    Neck Pain     woke up friday with pain to the right side of her neck, worse with turning head. has taken gabapentin with no relief       History Provided By: Patient    HPI: Merlin Jones, 77 y.o. female with PMHx significant for chronic pain, reflux, hypertension, insomnia, depression, and surgical history significant for cervical fusion about 10 years ago, presents ambulatory to the ED with cc of right-sided neck pain since waking up on Friday. Patient states that she has been taking gabapentin with no lasting relief in her symptoms. She states that she has seen Dr. Bee Dixon and Dr. Zaire Núñez in the past.  She has not taken any medication prior to arrival today. She denies any numbness, tingling, headache, vision changes, chest pain, shortness of breath, jaw pain. PCP: Reinier Crowder MD    There are no other complaints, changes, or physical findings at this time. Current Outpatient Medications   Medication Sig Dispense Refill    methocarbamol (ROBAXIN-750) 750 mg tablet Take 1 Tab by mouth four (4) times daily. 20 Tab 0    traZODone (DESYREL) 50 mg tablet TAKE TWO TABLETS NIGHTLY AS NEEDED      phenazopyridine (PYRIDIUM) 200 mg tablet Take  by mouth three (3) times daily as needed for Pain.  citalopram (CELEXA) 40 mg tablet TAKE 1 TABLET BY MOUTH DAILY 90 Tab 3    buPROPion SR (WELLBUTRIN SR) 150 mg SR tablet TAKE 1 TABLET BY MOUTH ONCE DAILY FOR 3 DAYS; THEN INCREASE TO 1 TABLET BY MOUTH TWICE DAILY 60 Tab 0    raNITIdine (ZANTAC) 150 mg tablet       ALPRAZolam (XANAX) 0.5 mg tablet TAKE 1 TABLET BY MOUTH TWICE A DAY AS NEEDED FOR ANXIETY 180 Tab 1    diclofenac EC (VOLTAREN) 75 mg EC tablet Take 1 Tab by mouth two (2) times daily as needed.  60 Tab 3    DULoxetine (CYMBALTA) 60 mg capsule TAKE 1 CAPSULE TWICE A  Cap 3    lisinopril (PRINIVIL, ZESTRIL) 40 mg tablet TAKE 1 TABLET DAILY 90 Tab 3    rosuvastatin (CRESTOR) 20 mg tablet TAKE 1 TABLET NIGHTLY 90 Tab 1    dicyclomine (BENTYL) 10 mg capsule TAKE 2 CAPSULES FOUR TIMES A DAY AS NEEDED 720 Cap 3    hydroCHLOROthiazide (HYDRODIURIL) 25 mg tablet TAKE 1 TABLET DAILY 90 Tab 2    sucralfate (CARAFATE) 1 gram tablet Take 1 g by mouth Three (3) times a week. Take 1 tablet by mouth as needed       estradiol (ESTRACE) 0.01 % (0.1 mg/gram) vaginal cream Apply to Vaginal twice a week with fingertip      diclofenac (VOLTAREN) 1 % gel Apply 2 g to affected area four (4) times daily as needed. 100 g 3    oxybutynin (DITROPAN) 5 mg tablet take 1 tablet 2 times a day  0    cholecalciferol (VITAMIN D3) 1,000 unit tablet Take 1,000 Units by mouth daily.  aspirin delayed-release 81 mg tablet Take 81 mg by mouth two (2) times a week.          Past History     Past Medical History:  Past Medical History:   Diagnosis Date    Arthritis     Cancer ([facility] [address]     skin cancer    Chronic pain     CHRONIC BOWEL PAIN    Diverticulitis     Fibromyalgia     GERD (gastroesophageal reflux disease)     Goiter     Hiatal hernia     Hypercholesterolemia     Hypertension     IBS (irritable bowel syndrome)     IBS (irritable bowel syndrome)     CONSTIPATION, CHRONIC SINCE HER 25s    Ill-defined condition     rectocele    Insomnia     TAKES TRAZODONE NIGHTLY    Migraine     REDUCED IN FREQUENCY AND SEVERITY SINCE MENOPAUSE    Other ill-defined conditions(799.89)     Psychiatric disorder 3/11    DEPRESSION       Past Surgical History:  Past Surgical History:   Procedure Laterality Date    COLONOSCOPY N/A 6/21/2016    COL[facility] ENDOSCOPY    COLONOSCOPY N/A 6/27/2018    COLONOSCOPY performed by [facility] MD at [facility] ENDOSCOPY    ENDOSCOPY, COLON, DIAGNOSTIC  11/2010    Dr. Lamar Yorkr-     HX CHOLECYSTECTOMY  2006    HX COLECTOMY      Dr. Pond/ diverticulitis    HX GI  X3  LAST ONE 12/10    COLONOSCOPY    HX GYN  1982    D&C WITH SUCTION    HX HYSTERECTOMY      HX ORTHOPAEDIC      right foot surgery    HX ORTHOPAEDIC      neck surgery 3/21/11 Dr. Roseanne Auguste HX ORTHOPAEDIC  2017    back surgery    HX OTHER SURGICAL      EGD    HX TONSILLECTOMY         Family History:  Family History   Problem Relation Age of Onset    Cancer Father         lung and bone    Stroke Sister     Cancer Sister 76        PANCREATIC    Hypertension Mother     High Cholesterol Mother     Alzheimer Mother         late 62s early 76s    Cancer Other         COLON    Cancer Other         breast    Diabetes Maternal Aunt     Cancer Maternal Uncle         COLON    Cancer Maternal Grandfather         COLON    Ovarian Cancer Daughter 28       Social History:  Social History     Tobacco Use    Smoking status: Former Smoker     Packs/day: 1.00     Years: 45.00     Pack years: 45.00     Last attempt to quit: 2007     Years since quittin.6    Smokeless tobacco: Never Used   Substance Use Topics    Alcohol use: Yes     Alcohol/week: 0.0 standard drinks     Comment: holidays    Drug use: No       Allergies: Allergies   Allergen Reactions    Latex Hives    Codeine Other (comments)     Severe Headache    Morphine Hives    Ambien [Zolpidem] Other (comments)     Severe behavior changes    Dilaudid [Hydromorphone] Other (comments)     Memory loss    Other Medication Rash     BANDAIDS    Shellfish Containing Products Hives         Review of Systems   Review of Systems   Constitutional: Negative. Negative for activity change, appetite change, chills and fever. HENT: Negative for rhinorrhea and sore throat. Eyes: Negative for pain and visual disturbance. Respiratory: Negative for cough and shortness of breath. Cardiovascular: Negative for chest pain and palpitations.    Gastrointestinal: Negative for abdominal pain, diarrhea, nausea and vomiting. Genitourinary: Negative for dysuria and hematuria. Musculoskeletal: Positive for neck pain and neck stiffness. Negative for arthralgias and myalgias. Skin: Negative for color change, rash and wound. Neurological: Negative for dizziness, numbness and headaches. All other systems reviewed and are negative. Physical Exam   Physical Exam   Constitutional: She is oriented to person, place, and time. She appears well-developed and well-nourished. No distress. 77 y.o.  female in NAD  Communicates appropriately and in full sentences   HENT:   Head: Normocephalic and atraumatic. Eyes: Pupils are equal, round, and reactive to light. Conjunctivae are normal. Right eye exhibits no discharge. Left eye exhibits no discharge. Neck: Neck supple. No nuchal rigidity or meningeal signs  Patient unable to head to the right or the left. Difficult to discern whether this is acute or due to her fusion. Pulmonary/Chest: Effort normal. No respiratory distress. Musculoskeletal: She exhibits tenderness (To the right sided sternocleidomastoid muscle with no active spasm. No clavicle or shoulder tenderness elicited to palpation. ). She exhibits no edema or deformity. No neurologic, motor, vascular, or compartment embarrassment observed on exam. No focal neurologic deficits. Neurological: She is alert and oriented to person, place, and time. No focal neuro deficits. NVI. Neurologically intact of UE and LE B/L  Sensation intact and symmetrical of UE and LE B/L. Strength 5/5 of UE B/L, Strength 5/5 of LE B/L. Skin: Skin is warm and dry. No rash noted. She is not diaphoretic. No erythema. No pallor. Psychiatric: She has a normal mood and affect. Her behavior is normal.   Nursing note and vitals reviewed. Diagnostic Study Results     Labs -   No results found for this or any previous visit (from the past 12 hour(s)).     Radiologic Studies -   XR SPINE CERV 4 OR 5 V   Final Result   IMPRESSION:      Postoperative and degenerative changes as above slightly progressed in the   interval. No acute process        CT Results  (Last 48 hours)    None        CXR Results  (Last 48 hours)    None            Medical Decision Making   I am the first provider for this patient. I reviewed the vital signs, available nursing notes, past medical history, past surgical history, family history and social history. Vital Signs-Reviewed the patient's vital signs. Patient Vitals for the past 12 hrs:   Temp Pulse Resp BP SpO2   08/12/19 1021 98.1 °F (36.7 °C) 83 18 (!) 124/93 100 %       Records Reviewed: Nursing Notes and Old Medical Records    Provider Notes (Medical Decision Making):   Differential diagnosis includes chronic pain, radiculopathy, strain, sprain, spasm. 58-year-old female with past medical history of cervical fusion presents with right-sided neck pain since Friday. No evidence or clinical history digestive ACS. Patient has somewhat limited range of motion on exam.  Will evaluate with plain films. Dr Linda Li' office called while this provider was in the room to schedule an appointment with the patient. ED Course:   Initial assessment performed. The patients presenting problems have been discussed, and they are in agreement with the care plan formulated and outlined with them. I have encouraged them to ask questions as they arise throughout their visit. DISCHARGE NOTE:  Rica Gaspar's  results have been reviewed with her. She has been counseled regarding her diagnosis. She verbally conveys understanding and agreement of the signs, symptoms, diagnosis, treatment and prognosis and additionally agrees to follow up as recommended with Dr. Misty Oropeza MD in 24 - 48 hours. She also agrees with the care-plan and conveys that all of her questions have been answered.   I have also put together some discharge instructions for her that include: 1) educational information regarding their diagnosis, 2) how to care for their diagnosis at home, as well a 3) list of reasons why they would want to return to the ED prior to their follow-up appointment, should their condition change. She and/or family's questions have been answered. I have encouraged them to see the official results in [facility]\" or to retrieve the specifics of their results from medical records. PLAN:  1. Return precautions as discussed  2. Follow-up with providers as directed  3. Medications as prescribed    Return to ED if worse     Diagnosis     Clinical Impression:   1. DDD (degenerative disc disease), cervical    2. Spinal stenosis, cervical region    3. History of fusion of cervical spine        Discharge Medication List as of 8/12/2019 10:58 AM      START taking these medications    Details   methocarbamol (ROBAXIN-750) 750 mg tablet Take 1 Tab by mouth four (4) times daily. , Normal, Disp-20 Tab, R-0         CONTINUE these medications which have NOT CHANGED    Details   traZODone (DESYREL) 50 mg tablet TAKE TWO TABLETS NIGHTLY AS NEEDED, Historical Med      phenazopyridine (PYRIDIUM) 200 mg tablet Take  by mouth three (3) times daily as needed for Pain., Historical Med      citalopram (CELEXA) 40 mg tablet TAKE 1 TABLET BY MOUTH DAILY, Normal, Disp-90 Tab, R-3      buPROPion SR (WELLBUTRIN SR) 150 mg SR tablet TAKE 1 TABLET BY MOUTH ONCE DAILY FOR 3 DAYS; THEN INCREASE TO 1 TABLET BY MOUTH TWICE DAILY, Normal, Disp-60 Tab, R-0      raNITIdine (ZANTAC) 150 mg tablet Historical Med      ALPRAZolam (XANAX) 0.5 mg tablet TAKE 1 TABLET BY MOUTH TWICE A DAY AS NEEDED FOR ANXIETY, Print, Disp-180 Tab, R-1      diclofenac EC (VOLTAREN) 75 mg EC tablet Take 1 Tab by mouth two (2) times daily as needed., Normal, Disp-60 Tab, R-3      DULoxetine (CYMBALTA) 60 mg capsule TAKE 1 CAPSULE TWICE A DAY, Normal, Disp-180 Cap, R-3      lisinopril (PRINIVIL, ZESTRIL) 40 mg tablet TAKE 1 TABLET DAILY, Normal, Disp-90 Tab, R-3      rosuvastatin (CRESTOR) 20 mg tablet TAKE 1 TABLET NIGHTLY, Normal, Disp-90 Tab, R-1      dicyclomine (BENTYL) 10 mg capsule TAKE 2 CAPSULES FOUR TIMES A DAY AS NEEDED, Normal, Disp-720 Cap, R-3      hydroCHLOROthiazide (HYDRODIURIL) 25 mg tablet TAKE 1 TABLET DAILY, Normal, Disp-90 Tab, R-2      sucralfate (CARAFATE) 1 gram tablet Take 1 g by mouth Three (3) times a week. Take 1 tablet by mouth as needed , Historical Med      estradiol (ESTRACE) 0.01 % (0.1 mg/gram) vaginal cream Apply to Vaginal twice a week with fingertip, Historical Med      diclofenac (VOLTAREN) 1 % gel Apply 2 g to affected area four (4) times daily as needed., Normal, Disp-100 g, R-3      oxybutynin (DITROPAN) 5 mg tablet take 1 tablet 2 times a day, Historical Med, R-0      cholecalciferol (VITAMIN D3) 1,000 unit tablet Take 1,000 Units by mouth daily. , Historical Med      aspirin delayed-release 81 mg tablet Take 81 mg by mouth two (2) times a week., Historical Med             Follow-up Information     Follow up With Specialties Details Why Contact Info    Amy Lorenzo MD Family Practice Schedule an appointment as soon as possible for a visit in 2 days As needed, If symptoms worsen [facility] [address]  [phone]                This note will not be viewable in [address]

## 2019-08-12 NOTE — DISCHARGE INSTRUCTIONS
Patient Education        Cervical Disc Disease: Care Instructions  Your Care Instructions    Cervical disc disease results from damage, disease, or wear and tear to the discs between the bones (vertebra) in your neck. The discs act as shock absorbers for the spine and keep the spine flexible. When a disc is damaged, it can bulge out and press against the nerve roots or spinal cord. This is sometimes called a herniated or \"slipped disc. \" This pressure can cause pain and numbness or tingling in your arms and hands. It can also cause weakness in your legs. An accident can damage a disc and cause it to break open (rupture). Aging and hard physical work can also cause damage to cervical discs. The first treatments for cervical disc disease include physical therapy, special neck exercises, heat, and pain medicine. If these fail, your doctor may inject steroids and pain medicine into your neck. Surgery is usually done only if other treatments have not worked. Follow-up care is a key part of your treatment and safety. Be sure to make and go to all appointments, and call your doctor if you are having problems. It's also a good idea to know your test results and keep a list of the medicines you take. How can you care for yourself at home? · Take pain medicines exactly as directed. ? If the doctor gave you a prescription medicine for pain, take it as prescribed. ? If you are not taking a prescription pain medicine, ask your doctor if you can take an over-the-counter medicine. · Don't spend too long in one position. Take short breaks to move around and change positions. · Wear a seat belt and shoulder harness when you are in a car. · Sleep with a pillow under your head and neck that keeps your neck straight. · Follow your doctor's instructions for gentle neck-stretching exercises. · Do not smoke. Smoking can slow healing of your discs.  If you need help quitting, talk to your doctor about stop-smoking programs and medicines. These can increase your chances of quitting for good. · Avoid strenuous work or exercise until your doctor says it is okay. When should you call for help? Call 911 anytime you think you may need emergency care. For example, call if:    · You are unable to move an arm or a leg at all.   [facility] your doctor now or seek immediate medical care if:    · You have new or worse symptoms in your arms, legs, belly, or buttocks. Symptoms may include:  ? Numbness or tingling. ? Weakness. ? Pain.     · You lose bladder or bowel control.    Watch closely for changes in your health, and be sure to contact your doctor if:    · You do not get better as expected. Where can you learn more? Go to http://mila-mary.info/. Enter N118 in the search box to learn more about \"Cervical Disc Disease: Care Instructions. \"  Current as of: September 20, 2018  Content Version: 12.1  © 6631-9793 [employer]. Care instructions adapted under license by [employer] (which disclaims liability or warranty for this information). If you have questions about a medical condition or this instruction, always ask your healthcare professional. [place] [address] any warranty or liability for your use of this information. Patient Education        Cervical Spinal Stenosis: Care Instructions  Your Care Instructions    Spinal stenosis is a narrowing of the canal that surrounds the spinal cord and nerve roots. Sometimes bone and other tissue grow into this canal and press on the nerves that branch out from the spinal cord. This can happen as a part of aging. When the narrowing happens in your neck, it's called cervical spinal stenosis. It often causes stiffness, pain, numbness, and weakness in the neck, shoulders, arms, hands, or legs. It can even cause problems with your balance, coordination, and bowel or bladder control. But some people have no symptoms.   You may be able to get relief from the symptoms of spinal stenosis by taking pain medicine. Your doctor may suggest physical therapy and exercises to keep your spine strong and flexible. Some people try steroid shots to reduce swelling. If pain and numbness in your neck, arms, or legs are still so bad that you cannot do your normal activities, you may need surgery. Follow-up care is a key part of your treatment and safety. Be sure to make and go to all appointments, and call your doctor if you are having problems. It's also a good idea to know your test results and keep a list of the medicines you take. How can you care for yourself at home? · Ask your doctor if you can take an over-the-counter pain medicine, such as acetaminophen (Tylenol), ibuprofen (Advil, Motrin), or naproxen (Aleve). Be safe with medicines. Read and follow all instructions on the label. · Do not take two or more pain medicines at the same time unless the doctor told you to. Many pain medicines have acetaminophen, which is Tylenol. Too much acetaminophen (Tylenol) can be harmful. · Change positions often when you are standing or sitting. This may reduce pressure on the spinal cord and its nerves. · When you rest, use pillows or towel rolls to support your neck and head in a comfortable position. · Follow your doctor's instructions about activity. He or she may tell you not to do sports or activities that could injure your neck. · Stretch your neck and shoulders as your doctor or physical therapist recommends. If your doctor says it is okay to do them, these exercises may help:  ? Neck stretches to the side. Keep your shoulders relaxed and slowly tilt your head straight over toward one shoulder. Hold for 15 seconds. Let the weight of your head stretch your muscles. Then do the same toward the other shoulder. ? Neck rotations. Keep your chin level and slowly turn your head to one side. Hold for 15 seconds. Then do the same to the other side. ? Shoulder rolls. Roll your shoulders up, then back, and then down in a smooth, circular motion. Repeat several times. When should you call for help? Call 911 anytime you think you may need emergency care. For example, call if:    · You are unable to move an arm or a leg at all.   [facility] your doctor now or seek immediate medical care if:    · You have new or worse symptoms in your arms, legs, belly, or buttocks. Symptoms may include:  ? Numbness or tingling. ? Weakness. ? Pain.     · You lose bladder or bowel control.    Watch closely for changes in your health, and be sure to contact your doctor if:    · You do not get better as expected. Where can you learn more? Go to http://mila-mary.info/. Enter  in the search box to learn more about \"Cervical Spinal Stenosis: Care Instructions. \"  Current as of: September 20, 2018  Content Version: 12.1  © 4807-4954 [employer]. Care instructions adapted under license by [employer] (which disclaims liability or warranty for this information). If you have questions about a medical condition or this instruction, always ask your healthcare professional. [place] [address] any warranty or liability for your use of this information. Patient Education        Learning About Degenerative Disc Disease  What is degenerative disc disease? Degenerative disc disease is not really a disease. It's a term used to describe the normal changes in your spinal discs as you age. Spinal discs are small, spongy discs that separate the bones (vertebrae) that make up the spine. The discs act as shock absorbers for the spine, so it can flex, bend, and twist.  Degenerative disc disease can take place in one or more places along the spine. It most often occurs in the discs in the lower back and the neck. What causes it? As we age, our spinal discs break down, or degenerate.  This breakdown causes the symptoms of degenerative disc disease in some people. When the discs break down, they can lose fluid and dry out, and their outer layers can have tiny cracks or tears. This leads to less padding and less space between the bones in the spine. The body reacts to this by making bony growths on the spine called bone spurs. These spurs can press on the spinal nerve roots or spinal cord. This can cause pain and can affect how well the nerves work. What are the symptoms? Many people with degenerative disc disease have no pain. But others have severe pain or other symptoms that limit their activities. Some of the most common symptoms are:  · Pain in the back or neck. Where the pain occurs depends on which discs are affected. · Pain that gets worse when you move, such as bending over, reaching up, or twisting. · Pain that may occur in the rear end (buttocks), arm, or leg if a nerve is pinched. · Numbness or tingling in your arm or leg. How is it diagnosed? A doctor can often diagnose degenerative disc disease while doing a physical exam. If your exam shows no signs of a serious condition, imaging tests (such as an X-ray) aren't likely to help your doctor find the cause of your symptoms. Sometimes degenerative disc disease is found when an X-ray is taken for another reason, such as an injury or other health problem. But even if the doctor finds degenerative disc disease, that doesn't always mean that you will have symptoms. How is it treated? There are several things you can do at home to manage pain from this problem. · To relieve pain, use ice or heat (whichever feels better) on the affected area. ? Put ice or a cold pack on the area for 10 to 20 minutes at a time. Put a thin cloth between the ice and your skin. ? Put a warm water bottle, a heating pad set on low, or a warm cloth on your back. Put a thin cloth between the heating pad and your skin. Do not go to sleep with a heating pad on your skin.   · Ask your doctor if you can take acetaminophen (such as Tylenol) or nonsteroidal anti-inflammatory drugs, such as ibuprofen or naproxen. Your doctor can prescribe stronger medicines if needed. Be safe with medicines. Read and follow all instructions on the label. · Get some exercise every day. Exercise is one of the best ways to help your back feel better and stay better. It's best to start each exercise slowly. You may notice a little soreness, and that's okay. But if an exercise makes your pain worse, stop doing it. Here are things you can try:  ? Walking. It's the simplest and maybe the best activity for your back. It gets your blood moving and helps your muscles stay strong. ? Exercises that gently stretch and strengthen your stomach, back, and leg muscles. The stronger those muscles are, the better they're able to protect your back. If you have constant or severe pain in your back or spine, you may need other treatments, such as physical therapy. In some cases, your doctor may suggest surgery. Follow-up care is a key part of your treatment and safety. Be sure to make and go to all appointments, and call your doctor if you are having problems. It's also a good idea to know your test results and keep a list of the medicines you take. Where can you learn more? Go to http://mila-mary.info/. Enter K545 in the search box to learn more about \"Learning About Degenerative Disc Disease. \"  Current as of: September 20, 2018  Content Version: 12.1  © 8040-7186 [employer]. Care instructions adapted under license by [employer] (which disclaims liability or warranty for this information). If you have questions about a medical condition or this instruction, always ask your healthcare professional. [address] any warranty or liability for your use of this information.

## 2019-08-26 DIAGNOSIS — F33.2 SEVERE EPISODE OF RECURRENT MAJOR DEPRESSIVE DISORDER, WITHOUT PSYCHOTIC FEATURES (HCC): ICD-10-CM

## 2019-08-28 RX ORDER — BUPROPION HYDROCHLORIDE 150 MG/1
TABLET, EXTENDED RELEASE ORAL
Qty: 60 TAB | Refills: 0 | Status: SHIPPED | OUTPATIENT
Start: 2019-08-28 | End: 2019-09-30 | Stop reason: SDUPTHER

## 2019-09-30 ENCOUNTER — OFFICE VISIT (OUTPATIENT)
Dept: FAMILY MEDICINE CLINIC | Age: 69
End: 2019-09-30

## 2019-09-30 VITALS
TEMPERATURE: 97 F | SYSTOLIC BLOOD PRESSURE: 119 MMHG | DIASTOLIC BLOOD PRESSURE: 66 MMHG | OXYGEN SATURATION: 97 % | HEART RATE: 80 BPM | RESPIRATION RATE: 16 BRPM | BODY MASS INDEX: 33.77 KG/M2 | WEIGHT: 190.6 LBS | HEIGHT: 63 IN

## 2019-09-30 DIAGNOSIS — M79.7 FIBROMYALGIA: ICD-10-CM

## 2019-09-30 DIAGNOSIS — M50.30 DDD (DEGENERATIVE DISC DISEASE), CERVICAL: Primary | ICD-10-CM

## 2019-09-30 DIAGNOSIS — M48.02 FORAMINAL STENOSIS OF CERVICAL REGION: ICD-10-CM

## 2019-09-30 DIAGNOSIS — G89.4 CHRONIC PAIN SYNDROME: ICD-10-CM

## 2019-09-30 DIAGNOSIS — M54.12 CERVICAL RADICULOPATHY: ICD-10-CM

## 2019-09-30 DIAGNOSIS — F33.2 SEVERE EPISODE OF RECURRENT MAJOR DEPRESSIVE DISORDER, WITHOUT PSYCHOTIC FEATURES (HCC): ICD-10-CM

## 2019-09-30 RX ORDER — CYCLOBENZAPRINE HCL 10 MG
10 TABLET ORAL
Qty: 30 TAB | Refills: 0 | Status: SHIPPED | OUTPATIENT
Start: 2019-09-30 | End: 2019-10-29 | Stop reason: ALTCHOICE

## 2019-09-30 RX ORDER — PREDNISONE 20 MG/1
TABLET ORAL
Qty: 13 TAB | Refills: 0 | Status: SHIPPED | OUTPATIENT
Start: 2019-09-30 | End: 2019-10-29 | Stop reason: ALTCHOICE

## 2019-09-30 NOTE — PROGRESS NOTES
Subjective:     Chief Complaint   Patient presents with    Follow-up     ER Visit for Neck pain        She  is a 77 y.o. female who presents for evaluation of:  Sig PMH of depression, fibromyalgia, chronic pain, insomnia, migraines, IBS, HTN, HLD, and PreDM. Seen in ER recently for cervical radiculopathy. Hx of C spine sgy in past.  Has appt with Ortho soon. 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 Prior ACDF C5-7 with unchanged overall appearance of the fusion hardware and interbody grafts. There is no prevertebral soft tissue swelling. There is no acute fracture or subluxation. Disc space narrowing noted C3-4 slightly progressed in the interval. There is bilateral foraminal stenosis C6-7. The C1-2  relationship is maintained. \"    Depression -  Chronic issue. She complains of depressed mood, anhedonia, weight loss, insomnia, fatigue, feelings of worthlessness/guilt, difficulty concentrating, hopelessness, impaired memory and suicidal thoughts without plan. Onset was approximately many years ago, unchanged since that time. Had some fleeting SI about 6 months ago and has asked her son to take the guns out of the house. Family history significant for depression, Parkinson dementia. Possible organic causes contributing are: none. Currently taking Cymbalta, Celexa, Wellbutrin, and Xanax about 1-2 x per day most days. Prev seeing therapist years ago and did very well.   Has tried Trazodone for insomnia and then had sig nightmares on this so has stopped    ROS  Gen - no fever/chills  Resp - no dyspnea or cough  CV - no chest pain or KASPER  Rest per HPI    Past Medical History:   Diagnosis Date    Arthritis     Cancer ([address]     skin cancer    Chronic pain     CHRONIC BOWEL PAIN    Diverticulitis     Fibromyalgia     GERD (gastroesophageal reflux disease)     Goiter     Hiatal hernia     Hypercholesterolemia     Hypertension     IBS (irritable bowel syndrome)     IBS (irritable bowel syndrome)     CONSTIPATION, CHRONIC SINCE HER 20s    Ill-defined condition     rectocele    Insomnia     TAKES TRAZODONE NIGHTLY    Migraine     REDUCED IN FREQUENCY AND SEVERITY SINCE MENOPAUSE    Other ill-defined conditions(799.89)     Psychiatric disorder 3/11    DEPRESSION     Past Surgical History:   Procedure Laterality Date    COLONOSCOPY N/A 6/21/2016    COLONOSCOPY performed by Nadege Way MD at [facility] ENDOSCOPY    COLONOSCOPY N/A 6/27/2018    COLONOSCOPY performed by [facility]adege Way [facility] at [facility] ENDOSCOPY    ENDOSCOPY, COLON, DIAGNOSTIC  11/2010    Dr. Manuela Patel-     HX CHOLECYSTECTOMY  2006   Bonnie Rings  2007    Dr. Pond/ diverticulitis    HX GI  X3  LAST ONE 12/10    COLONOSCOPY    HX GYN  1982    D&C WITH SUCTION    HX HYSTERECTOMY      HX ORTHOPAEDIC      right foot surgery    HX ORTHOPAEDIC      neck surgery 3/21/11 Dr. Simon Ortiz HX ORTHOPAEDIC  08/31/2017    back surgery    HX OTHER SURGICAL      EGD    HX TONSILLECTOMY       Current Outpatient Medications on File Prior to Visit   Medication Sig Dispense Refill    buPROPion SR (WELLBUTRIN SR) 150 mg SR tablet TAKE 1 TABLET BY MOUTH ONCE DAILY FOR 3 DAYS; THEN INCREASE TO 1 TABLET BY MOUTH TWICE DAILY (Patient taking differently: TAKE  1 TABLET BY MOUTH TWICE DAILY) 60 Tab 0    traZODone (DESYREL) 50 mg tablet TAKE TWO TABLETS NIGHTLY AS NEEDED      citalopram (CELEXA) 40 mg tablet TAKE 1 TABLET BY MOUTH DAILY 90 Tab 3    raNITIdine (ZANTAC) 150 mg tablet       ALPRAZolam (XANAX) 0.5 mg tablet TAKE 1 TABLET BY MOUTH TWICE A DAY AS NEEDED FOR ANXIETY 180 Tab 1    diclofenac EC (VOLTAREN) 75 mg EC tablet Take 1 Tab by mouth two (2) times daily as needed.  60 Tab 3    DULoxetine (CYMBALTA) 60 mg capsule TAKE 1 CAPSULE TWICE A  Cap 3    lisinopril (PRINIVIL, ZESTRIL) 40 mg tablet TAKE 1 TABLET DAILY 90 Tab 3    rosuvastatin (CRESTOR) 20 mg tablet TAKE 1 TABLET NIGHTLY 90 Tab 1  dicyclomine (BENTYL) 10 mg capsule TAKE 2 CAPSULES FOUR TIMES A DAY AS NEEDED 720 Cap 3    hydroCHLOROthiazide (HYDRODIURIL) 25 mg tablet TAKE 1 TABLET DAILY 90 Tab 2    sucralfate (CARAFATE) 1 gram tablet Take 1 g by mouth Three (3) times a week. Take 1 tablet by mouth as needed       estradiol (ESTRACE) 0.01 % (0.1 mg/gram) vaginal cream Apply to Vaginal twice a week with fingertip      diclofenac (VOLTAREN) 1 % gel Apply 2 g to affected area four (4) times daily as needed. 100 g 3    oxybutynin (DITROPAN) 5 mg tablet take 1 tablet 2 times a day  0    cholecalciferol (VITAMIN D3) 1,000 unit tablet Take 1,000 Units by mouth daily.  aspirin delayed-release 81 mg tablet Take 81 mg by mouth two (2) times a week. No current facility-administered medications on file prior to visit. Objective:     Vitals:    09/30/19 1325   BP: 119/66   Pulse: 80   Resp: 16   Temp: 97 °F (36.1 °C)   TempSrc: Oral   SpO2: 97%   Weight: 190 lb 9.6 oz (86.5 kg)   Height: 5' 3\" (1.6 m)     Physical Examination:  General appearance - alert, well appearing, and in no distress  Eyes -sclera anicteric  Neck - supple, no significant adenopathy, no thyromegaly  Chest - clear to auscultation, no wheezes, rales or rhonchi, symmetric air entry  Heart - normal rate, regular rhythm, normal S1, S2, no murmurs, rubs, clicks or gallops  Neurological - alert, oriented, no focal findings or movement disorder noted  Extremities-no edema  Psych-normal mood and affect    Assessment/ Plan:   Diagnoses and all orders for this visit:    1. DDD (degenerative disc disease), cervical  2. Cervical radiculopathy  3. Foraminal stenosis of cervical region  -Will use prednisone taper and Flexeril to help with time. Planning to see orthospine soon  -     predniSONE (DELTASONE) 20 mg tablet; Take 3 pills for 2 days, then 2 pills for 2 days, then 1 pill for 3 days  -     cyclobenzaprine (FLEXERIL) 10 mg tablet;  Take 1 Tab by mouth nightly. 4. Severe episode of recurrent major depressive disorder, without psychotic features (HCC)-stable    5. Fibromyalgia- seemed to do better on Flexeril in the past.  Will restart this  -     cyclobenzaprine (FLEXERIL) 10 mg tablet; Take 1 Tab by mouth nightly. 6. Chronic pain syndrome-stable overall    Patient also mentions some abdominal pain that comes and goes. She is already set up to see GI again. We also discussed this could be potentially coming from her spinal stenosis of the lumbar spine with DDD as well as hip osteoarthritis so would expect to improve slightly on prednisone. We lastly discussed the risk of developing further gastritis with NSAIDs and prednisone so we will plan to try to avoid prolonged use of these in the future. We will plan to evaluate further at next appointment. I have discussed the diagnosis with the patient and the intended plan as seen in the above orders. The patient has received an after-visit summary and questions were answered concerning future plans. I have discussed medication side effects and warnings with the patient as well. The patient verbalizes understanding and agreement with the plan. Follow-up and Dispositions    · Return in about 4 weeks (around 10/28/2019), or if symptoms worsen or fail to improve.

## 2019-09-30 NOTE — PROGRESS NOTES
Chief Complaint   Patient presents with    Follow-up     ER Visit for Neck pain   1. Have you been to the ER, urgent care clinic since your last visit? Hospitalized since your last visit? Yes Where: [facility] Neck pain 8/2019    2. Have you seen or consulted any other health care providers outside of the [address] [place] [address] since your last visit? Include any pap smears or colon screening.  No   Continues to have pain radiating down arms   Scheduled 10/3/19 Ortho

## 2019-10-01 DIAGNOSIS — F33.2 SEVERE EPISODE OF RECURRENT MAJOR DEPRESSIVE DISORDER, WITHOUT PSYCHOTIC FEATURES (HCC): ICD-10-CM

## 2019-10-01 RX ORDER — BUPROPION HYDROCHLORIDE 150 MG/1
TABLET, EXTENDED RELEASE ORAL
Qty: 180 TAB | Refills: 2 | Status: SHIPPED | OUTPATIENT
Start: 2019-10-01 | End: 2019-12-06 | Stop reason: SDUPTHER

## 2019-10-07 ENCOUNTER — HOSPITAL ENCOUNTER (OUTPATIENT)
Dept: GENERAL RADIOLOGY | Age: 69
Discharge: HOME | End: 2019-10-07
Payer: MEDICARE

## 2019-10-07 DIAGNOSIS — R52 PAIN: ICD-10-CM

## 2019-10-07 PROCEDURE — 74018 RADEX ABDOMEN 1 VIEW: CPT

## 2019-10-10 ENCOUNTER — HOSPITAL ENCOUNTER (OUTPATIENT)
Dept: MRI IMAGING | Age: 69
Discharge: HOME | End: 2019-10-10
Attending: ORTHOPAEDIC SURGERY
Payer: MEDICARE

## 2019-10-10 DIAGNOSIS — M54.2 CERVICALGIA: ICD-10-CM

## 2019-10-10 DIAGNOSIS — Z98.1 S/P CERVICAL SPINAL FUSION: ICD-10-CM

## 2019-10-10 DIAGNOSIS — M54.12 CERVICAL RADICULOPATHY: ICD-10-CM

## 2019-10-10 DIAGNOSIS — M47.22 OSTEOARTHRITIS OF SPINE WITH RADICULOPATHY, CERVICAL REGION: ICD-10-CM

## 2019-10-10 PROCEDURE — 72141 MRI NECK SPINE W/O DYE: CPT

## 2019-10-29 ENCOUNTER — OFFICE VISIT (OUTPATIENT)
Dept: FAMILY MEDICINE CLINIC | Age: 69
End: 2019-10-29

## 2019-10-29 VITALS
RESPIRATION RATE: 16 BRPM | DIASTOLIC BLOOD PRESSURE: 64 MMHG | HEART RATE: 83 BPM | SYSTOLIC BLOOD PRESSURE: 129 MMHG | OXYGEN SATURATION: 95 % | HEIGHT: 63 IN | TEMPERATURE: 98.5 F | BODY MASS INDEX: 33.81 KG/M2 | WEIGHT: 190.8 LBS

## 2019-10-29 DIAGNOSIS — M48.02 FORAMINAL STENOSIS OF CERVICAL REGION: ICD-10-CM

## 2019-10-29 DIAGNOSIS — M54.12 CERVICAL RADICULOPATHY: ICD-10-CM

## 2019-10-29 DIAGNOSIS — Z23 ENCOUNTER FOR IMMUNIZATION: ICD-10-CM

## 2019-10-29 DIAGNOSIS — F33.1 MODERATE EPISODE OF RECURRENT MAJOR DEPRESSIVE DISORDER (HCC): Primary | ICD-10-CM

## 2019-10-29 DIAGNOSIS — M50.30 DDD (DEGENERATIVE DISC DISEASE), CERVICAL: ICD-10-CM

## 2019-10-29 DIAGNOSIS — M79.7 FIBROMYALGIA: ICD-10-CM

## 2019-10-29 RX ORDER — TIZANIDINE 4 MG/1
4 TABLET ORAL
COMMUNITY
Start: 2019-10-03 | End: 2019-12-13

## 2019-10-29 RX ORDER — ESOMEPRAZOLE MAGNESIUM 40 MG/1
40 CAPSULE, DELAYED RELEASE ORAL
COMMUNITY
Start: 2017-05-30 | End: 2019-12-06 | Stop reason: SDUPTHER

## 2019-10-29 NOTE — PROGRESS NOTES
Chief Complaint   Patient presents with    Follow-up     3 week   1. Have you been to the ER, urgent care clinic since your last visit? Hospitalized since your last visit? No    2. Have you seen or consulted any other health care providers outside of the [address] [place] [address] since your last visit? Include any pap smears or colon screening. Yes Where: Ortho      She denies any symptoms , reactions or allergies that would exclude them from being immunized today. Risks and adverse reactions were discussed and the VIS was given to them. All questions were addressed. She was observed for 15 min post injection. There were no reactions observed.     Salbador Arias LPN

## 2019-10-29 NOTE — PATIENT INSTRUCTIONS
Please check your meds at home. If you are still taking Celexa (Citalopram) 40 mg daily, then we need to wean off of this. Drop to 20 mg daily x 2 weeks and then 10 mg daily x 2 weeks and then stop. You should continue Cymbalta (Duloxetine) and Wellbutrin (bupropion). We may increase the Cymbalta after weaning off the Celexa.     With your concerns with memory, please stop taking the Flexeril (Cyclobenzaprin)

## 2019-10-29 NOTE — PROGRESS NOTES
Subjective:     Chief Complaint   Patient presents with    Follow-up     3 week        She  is a 77 y.o. female who presents for evaluation of:  Sig PMH of depression, fibromyalgia, chronic pain, insomnia, migraines, IBS, HTN, HLD, and PreDM. Following with Dr. Ulysses Ruiz for cervical radiculopathy. To have EMG soon. Will see Dr. Denny Rankin for ct issues with abd pain. Depression -  Chronic issue. She complains of depressed mood, anhedonia, weight loss, insomnia, fatigue, feelings of worthlessness/guilt, difficulty concentrating, hopelessness, impaired memory and suicidal thoughts without plan. Onset was approximately many years ago, unchanged since that time. Had some fleeting SI about 6 months ago and has asked her son to take the guns out of the house. Family history significant for depression, Parkinson dementia. Possible organic causes contributing are: none. Currently taking Cymbalta, Celexa, Wellbutrin, and Xanax about 1-2 x per day most days. Prev seeing therapist years ago and did very well. Has tried Trazodone for insomnia and then had sig nightmares on this so has stopped.     ROS  Gen - no fever/chills  Resp - no dyspnea or cough  CV - no chest pain or KASPER  Rest per HPI    Past Medical History:   Diagnosis Date    Arthritis     Cancer ([facility] [address]     skin cancer    Chronic pain     CHRONIC BOWEL PAIN    Diverticulitis     Fibromyalgia     GERD (gastroesophageal reflux disease)     Goiter     Hiatal hernia     Hypercholesterolemia     Hypertension     IBS (irritable bowel syndrome)     IBS (irritable bowel syndrome)     CONSTIPATION, CHRONIC SINCE HER 25s    Ill-defined condition     rectocele    Insomnia     TAKES TRAZODONE NIGHTLY    Migraine     REDUCED IN FREQUENCY AND SEVERITY SINCE MENOPAUSE    Other ill-defined conditions(799.89)     Psychiatric disorder 3/11    DEPRESSION     Past Surgical History:   Procedure Laterality Date    COLONOSCOPY N/A 6/21/2016    COLONOSCOPY performed by Mirian Garcia MD at [facility] ENDOSCOPY    COLONOSCOPY N/A 6/27/2018    COLONOSCOPY performed by Mirian Garcia MD at [facility] ENDOSCOPY    ENDOSCOPY, COLON, DIAGNOSTIC  11/2010    Dr. Jacklyn Kulkarni-     HX CHOLECYSTECTOMY  2006   [place]  2007    Dr. Pond/ diverticulitis    HX GI  X3  LAST ONE 12/10    COLONOSCOPY    HX GYN  1982    D&C WITH SUCTION    HX HYSTERECTOMY      HX ORTHOPAEDIC      right foot surgery    HX ORTHOPAEDIC      neck surgery 3/21/11 Dr. Pat Terrazas HX ORTHOPAEDIC  08/31/2017    back surgery    HX OTHER SURGICAL      EGD    HX TONSILLECTOMY       Current Outpatient Medications on File Prior to Visit   Medication Sig Dispense Refill    esomeprazole (NEXIUM) 40 mg capsule Take 40 mg by mouth.  buPROPion SR (WELLBUTRIN SR) 150 mg SR tablet TAKE 1 TABLET BY MOUTH TWICE DAILY 180 Tab 2    traZODone (DESYREL) 50 mg tablet TAKE TWO TABLETS NIGHTLY AS NEEDED      raNITIdine (ZANTAC) 150 mg tablet       ALPRAZolam (XANAX) 0.5 mg tablet TAKE 1 TABLET BY MOUTH TWICE A DAY AS NEEDED FOR ANXIETY 180 Tab 1    diclofenac EC (VOLTAREN) 75 mg EC tablet Take 1 Tab by mouth two (2) times daily as needed. 60 Tab 3    DULoxetine (CYMBALTA) 60 mg capsule TAKE 1 CAPSULE TWICE A  Cap 3    lisinopril (PRINIVIL, ZESTRIL) 40 mg tablet TAKE 1 TABLET DAILY 90 Tab 3    rosuvastatin (CRESTOR) 20 mg tablet TAKE 1 TABLET NIGHTLY 90 Tab 1    dicyclomine (BENTYL) 10 mg capsule TAKE 2 CAPSULES FOUR TIMES A DAY AS NEEDED 720 Cap 3    hydroCHLOROthiazide (HYDRODIURIL) 25 mg tablet TAKE 1 TABLET DAILY 90 Tab 2    sucralfate (CARAFATE) 1 gram tablet Take 1 g by mouth Three (3) times a week. Take 1 tablet by mouth as needed       estradiol (ESTRACE) 0.01 % (0.1 mg/gram) vaginal cream Apply to Vaginal twice a week with fingertip      diclofenac (VOLTAREN) 1 % gel Apply 2 g to affected area four (4) times daily as needed.  100 g 3    oxybutynin (DITROPAN) 5 mg tablet take 1 tablet 2 times a day  0    cholecalciferol (VITAMIN D3) 1,000 unit tablet Take 1,000 Units by mouth daily.  aspirin delayed-release 81 mg tablet Take 81 mg by mouth two (2) times a week.  tiZANidine (ZANAFLEX) 4 mg tablet Take 4 mg by mouth. No current facility-administered medications on file prior to visit. Objective:     Vitals:    10/29/19 1513   BP: 129/64   Pulse: 83   Resp: 16   Temp: 98.5 °F (36.9 °C)   TempSrc: Oral   SpO2: 95%   Weight: 190 lb 12.8 oz (86.5 kg)   Height: 5' 3\" (1.6 m)     Physical Examination:  General appearance - alert, well appearing, and in no distress  Eyes -sclera anicteric  Neck - supple, no significant adenopathy, no thyromegaly  Chest - clear to auscultation, no wheezes, rales or rhonchi, symmetric air entry  Heart - normal rate, regular rhythm, normal S1, S2, no murmurs, rubs, clicks or gallops  Neurological - alert, oriented, no focal findings or movement disorder noted  Extremities-no edema  Psych-normal mood and affect    Assessment/ Plan:   Diagnoses and all orders for this visit:    1. Moderate episode of recurrent major depressive disorder (HCC)-slowly improving. Given use of both Cymbalta and Celexa will plan to wean off Celexa and continue on Cymbalta and Wellbutrin. 2. Encounter for immunization  -     INFLUENZA VACCINE INACTIVATED (IIV), SUBUNIT, ADJUVANTED, IM  -     ADMIN INFLUENZA VIRUS VAC    3. Cervical radiculopathy  4. DDD (degenerative disc disease), cervical  5. Foraminal stenosis of cervical region  -Following with orthospine. 6. Fibromyalgia-ongoing issue and treating underlying depression and radiculopathy as above well. Given concerns with her memory I have advised her to stop taking Flexeril she has noticed side effects from this directly    I have discussed the diagnosis with the patient and the intended plan as seen in the above orders.   The patient has received an after-visit summary and questions were answered concerning future plans.  I have discussed medication side effects and warnings with the patient as well. The patient verbalizes understanding and agreement with the plan. Follow-up and Dispositions    · Return in about 6 weeks (around 12/10/2019).

## 2019-11-08 ENCOUNTER — HOSPITAL ENCOUNTER (OUTPATIENT)
Dept: GENERAL RADIOLOGY | Age: 69
Discharge: HOME | End: 2019-11-08
Payer: MEDICARE

## 2019-11-08 DIAGNOSIS — R10.30 ABDOMINAL PAIN, LOWER: ICD-10-CM

## 2019-11-08 DIAGNOSIS — K59.00 CONSTIPATION: ICD-10-CM

## 2019-11-08 PROCEDURE — 74018 RADEX ABDOMEN 1 VIEW: CPT

## 2019-11-14 ENCOUNTER — HOSPITAL ENCOUNTER (OUTPATIENT)
Dept: CT IMAGING | Age: 69
Discharge: HOME | End: 2019-11-14
Attending: PHYSICIAN ASSISTANT
Payer: MEDICARE

## 2019-11-14 DIAGNOSIS — R10.31 RIGHT LOWER QUADRANT PAIN: ICD-10-CM

## 2019-11-14 DIAGNOSIS — R19.4 CHANGE IN BOWEL HABIT: ICD-10-CM

## 2019-11-14 LAB — CREAT BLD-MCNC: 1.2 MG/DL (ref 0.6–1.3)

## 2019-11-14 PROCEDURE — 74011636320 HC RX REV CODE- 636/320: Performed by: PHYSICIAN ASSISTANT

## 2019-11-14 PROCEDURE — 74177 CT ABD & PELVIS W/CONTRAST: CPT

## 2019-11-14 PROCEDURE — 82565 ASSAY OF CREATININE: CPT

## 2019-11-14 RX ORDER — BARIUM SULFATE 20 MG/ML
900 SUSPENSION ORAL
Status: DISCONTINUED | OUTPATIENT
Start: 2019-11-14 | End: 2019-11-14

## 2019-11-14 RX ORDER — SODIUM CHLORIDE 0.9 % (FLUSH) 0.9 %
10 SYRINGE (ML) INJECTION
Status: COMPLETED | OUTPATIENT
Start: 2019-11-14 | End: 2019-11-14

## 2019-11-14 RX ADMIN — IOHEXOL 50 ML: 240 INJECTION, SOLUTION INTRATHECAL; INTRAVASCULAR; INTRAVENOUS; ORAL at 16:13

## 2019-11-14 RX ADMIN — IOPAMIDOL 100 ML: 755 INJECTION, SOLUTION INTRAVENOUS at 16:13

## 2019-11-14 RX ADMIN — Medication 10 ML: at 16:13

## 2019-12-06 ENCOUNTER — OFFICE VISIT (OUTPATIENT)
Dept: FAMILY MEDICINE CLINIC | Age: 69
End: 2019-12-06

## 2019-12-06 VITALS
BODY MASS INDEX: 33.24 KG/M2 | DIASTOLIC BLOOD PRESSURE: 70 MMHG | TEMPERATURE: 97.2 F | HEIGHT: 63 IN | OXYGEN SATURATION: 98 % | SYSTOLIC BLOOD PRESSURE: 124 MMHG | RESPIRATION RATE: 16 BRPM | HEART RATE: 96 BPM | WEIGHT: 187.6 LBS

## 2019-12-06 DIAGNOSIS — M50.30 DDD (DEGENERATIVE DISC DISEASE), CERVICAL: ICD-10-CM

## 2019-12-06 DIAGNOSIS — M48.02 FORAMINAL STENOSIS OF CERVICAL REGION: ICD-10-CM

## 2019-12-06 DIAGNOSIS — M54.12 CERVICAL RADICULOPATHY: ICD-10-CM

## 2019-12-06 DIAGNOSIS — M79.7 FIBROMYALGIA: ICD-10-CM

## 2019-12-06 DIAGNOSIS — Z12.31 ENCOUNTER FOR SCREENING MAMMOGRAM FOR BREAST CANCER: ICD-10-CM

## 2019-12-06 DIAGNOSIS — F33.1 MODERATE EPISODE OF RECURRENT MAJOR DEPRESSIVE DISORDER (HCC): Primary | ICD-10-CM

## 2019-12-06 DIAGNOSIS — I10 ESSENTIAL HYPERTENSION WITH GOAL BLOOD PRESSURE LESS THAN 140/90: ICD-10-CM

## 2019-12-06 DIAGNOSIS — R92.2 DENSE BREAST TISSUE ON MAMMOGRAM: ICD-10-CM

## 2019-12-06 PROBLEM — F33.2 DEPRESSION, MAJOR, SEVERE RECURRENCE (HCC): Status: ACTIVE | Noted: 2019-12-06

## 2019-12-06 RX ORDER — CITALOPRAM 40 MG/1
TABLET, FILM COATED ORAL
Refills: 3 | COMMUNITY
Start: 2019-10-30 | End: 2019-12-06 | Stop reason: ALTCHOICE

## 2019-12-06 RX ORDER — LUBIPROSTONE 24 UG/1
CAPSULE, GELATIN COATED ORAL
COMMUNITY
Start: 2019-12-03 | End: 2020-03-11 | Stop reason: SDUPTHER

## 2019-12-06 RX ORDER — FERROUS SULFATE, DRIED 160(50) MG
1000 TABLET, EXTENDED RELEASE ORAL
COMMUNITY
End: 2020-03-11

## 2019-12-06 RX ORDER — BUPROPION HYDROCHLORIDE 150 MG/1
150 TABLET, EXTENDED RELEASE ORAL 3 TIMES DAILY
Qty: 1 TAB | Refills: 0
Start: 2019-12-06 | End: 2020-03-11 | Stop reason: SDUPTHER

## 2019-12-06 RX ORDER — ESOMEPRAZOLE MAGNESIUM 40 MG/1
40 CAPSULE, DELAYED RELEASE ORAL DAILY
Qty: 30 CAP | Refills: 4 | Status: SHIPPED | OUTPATIENT
Start: 2019-12-06 | End: 2019-12-11 | Stop reason: SDUPTHER

## 2019-12-06 RX ORDER — CHLORHEXIDINE GLUCONATE 1.2 MG/ML
RINSE ORAL
COMMUNITY
Start: 2019-11-09 | End: 2020-09-03

## 2019-12-06 NOTE — PROGRESS NOTES
Chief Complaint   Patient presents with    Depression     6 week follow-up   1. Have you been to the ER, urgent care clinic since your last visit? Hospitalized since your last visit? Yes Where: [facility]    2. Have you seen or consulted any other health care providers outside of the [address] [place] [address] since your last visit? Include any pap smears or colon screening.  Yes [id number] [address]

## 2019-12-06 NOTE — PROGRESS NOTES
Subjective:     Chief Complaint   Patient presents with    Depression     6 week follow-up        She  is a 68 y.o. female who presents for evaluation of:  Since last appt, had ADRYAN with Dr. Jordan Plunkett. Had CTS injections with Dr. Yvan Dietz. Ct dealing with colon issues. Following with GI and has tried 2 different options that have caused some diarrhea. Depression -patient and family agree that depression symptoms seem to be improving overall.  does have some concerns about episodic periods of patient acting unlike herself. He notes some mild memory changes at times. Overall, she feels like she is slowly improving. She is still planning to find a therapist but has been unable to do so yet. She is not working with psychiatry yet either as recommended. She is compliant with her Cymbalta, Wellbutrin, and Xanax as needed. Continues to deny SI/HI. Still working on making time for herself and managing her stress.     ROS  Gen - no fever/chills  Resp - no dyspnea or cough  CV - no chest pain or KASPER  Rest per HPI    Past Medical History:   Diagnosis Date    Arthritis     Cancer ([facility] [address]     skin cancer    Chronic pain     CHRONIC BOWEL PAIN    Diverticulitis     Fibromyalgia     GERD (gastroesophageal reflux disease)     Goiter     Hiatal hernia     Hypercholesterolemia     Hypertension     IBS (irritable bowel syndrome)     IBS (irritable bowel syndrome)     CONSTIPATION, CHRONIC SINCE HER 25s    Ill-defined condition     rectocele    Insomnia     TAKES TRAZODONE NIGHTLY    Migraine     REDUCED IN FREQUENCY AND SEVERITY SINCE MENOPAUSE    Other ill-defined conditions(799.89)     Psychiatric disorder 3/11    DEPRESSION     Past Surgical History:   Procedure Laterality Date    COLONOSCOPY N/A 6/21/2016    COLONOSCOPY performed by Fadumo Mclaughlin MD at [facility] ENDOSCOPY    COLONOSCOPY N/A 6/27/2018    COLONOSCOPY performed by Fadumo Mclaughlin MD at [facility] ENDOSCOPY    ENDOSCOPY, COLON, DIAGNOSTIC  11/2010    Dr. Vonda Basilio-     HX CHOLECYSTECTOMY  2006   Jonathon Ferrer  2007    Dr. Pond/ diverticulitis    HX GI  X3  LAST ONE 12/10    COLONOSCOPY    HX GYN  1982    D&C WITH SUCTION    HX HYSTERECTOMY      HX ORTHOPAEDIC      right foot surgery    HX ORTHOPAEDIC      neck surgery 3/21/11 Dr. Glenn Alejandre HX ORTHOPAEDIC  08/31/2017    back surgery    HX OTHER SURGICAL      EGD    HX TONSILLECTOMY       Current Outpatient Medications on File Prior to Visit   Medication Sig Dispense Refill    AMITIZA 24 mcg capsule       chlorhexidine (PERIDEX) 0.12 % solution       calcium-vitamin D (OYSTER SHELL) 500 mg(1,250mg) -200 unit per tablet Take 1,000 Tabs by mouth.  OTHER 1 Caplet daily. Power C      raNITIdine (ZANTAC) 150 mg tablet       ALPRAZolam (XANAX) 0.5 mg tablet TAKE 1 TABLET BY MOUTH TWICE A DAY AS NEEDED FOR ANXIETY 180 Tab 1    diclofenac EC (VOLTAREN) 75 mg EC tablet Take 1 Tab by mouth two (2) times daily as needed. 60 Tab 3    DULoxetine (CYMBALTA) 60 mg capsule TAKE 1 CAPSULE TWICE A  Cap 3    lisinopril (PRINIVIL, ZESTRIL) 40 mg tablet TAKE 1 TABLET DAILY 90 Tab 3    rosuvastatin (CRESTOR) 20 mg tablet TAKE 1 TABLET NIGHTLY 90 Tab 1    dicyclomine (BENTYL) 10 mg capsule TAKE 2 CAPSULES FOUR TIMES A DAY AS NEEDED 720 Cap 3    hydroCHLOROthiazide (HYDRODIURIL) 25 mg tablet TAKE 1 TABLET DAILY 90 Tab 2    sucralfate (CARAFATE) 1 gram tablet Take 1 g by mouth Three (3) times a week. Take 1 tablet by mouth as needed       estradiol (ESTRACE) 0.01 % (0.1 mg/gram) vaginal cream Apply to Vaginal twice a week with fingertip      diclofenac (VOLTAREN) 1 % gel Apply 2 g to affected area four (4) times daily as needed. 100 g 3    oxybutynin (DITROPAN) 5 mg tablet take 1 tablet 2 times a day  0    aspirin delayed-release 81 mg tablet Take 81 mg by mouth two (2) times a week. No current facility-administered medications on file prior to visit.          Objective: Vitals:    12/06/19 1031   BP: 124/70   Pulse: 96   Resp: 16   Temp: 97.2 °F (36.2 °C)   TempSrc: Oral   SpO2: 98%   Weight: 187 lb 9.6 oz (85.1 kg)   Height: 5' 3\" (1.6 m)       Physical Examination:  General appearance - alert, well appearing, and in no distress  Eyes -sclera anicteric  Neck - supple, no significant adenopathy, no thyromegaly, no bruits  Chest - clear to auscultation, no wheezes, rales or rhonchi, symmetric air entry  Heart - normal rate, regular rhythm, normal S1, S2, no murmurs, rubs, clicks or gallops  Neurological - alert, oriented, no focal findings or movement disorder noted  Extremities-no edema  Psych-normal mood and affect    Assessment/ Plan:   Diagnoses and all orders for this visit:    1. Moderate episode of recurrent major depressive disorder ([address]- appears to be improving over time with medications. Have encouraged her to seek out therapy as well as seeing a psychiatrist given her overall history  -     buPROPion SR (WELLBUTRIN SR) 150 mg SR tablet; Take 1 Tab by mouth three (3) times daily. 2. Cervical radiculopathy  3. DDD (degenerative disc disease), cervical  4. Foraminal stenosis of cervical region  -Continues to work with Dr. Beryle Sinner on this    5. Fibromyalgia-chronic issue, episodic flares but mostly improving as her depression improves    6. Essential hypertension with goal blood pressure less than 140/90-controlled    7. Dense breast tissue on mammogram  8. Encounter for screening mammogram for breast cancer  -     [facility] 3D TANJA W MAMMO BI SCREENING INCL CAD; Future     I have discussed the diagnosis with the patient and the intended plan as seen in the above orders. The patient has received an after-visit summary and questions were answered concerning future plans. I have discussed medication side effects and warnings with the patient as well. The patient verbalizes understanding and agreement with the plan.     Follow-up and Dispositions    · Return in about 3 months (around 3/6/2020).

## 2019-12-11 RX ORDER — ESOMEPRAZOLE MAGNESIUM 40 MG/1
40 CAPSULE, DELAYED RELEASE ORAL DAILY
Qty: 30 CAP | Refills: 4 | Status: SHIPPED | OUTPATIENT
Start: 2019-12-11 | End: 2020-03-11

## 2019-12-11 NOTE — TELEPHONE ENCOUNTER
[facility] needs the script for Rose Monsalve resent       Their mix up       Best number to reach them is [phone]

## 2019-12-13 ENCOUNTER — HOSPITAL ENCOUNTER (EMERGENCY)
Age: 69
Discharge: HOME | End: 2019-12-14
Attending: EMERGENCY MEDICINE
Payer: MEDICARE

## 2019-12-13 ENCOUNTER — APPOINTMENT (OUTPATIENT)
Dept: ULTRASOUND IMAGING | Age: 69
End: 2019-12-13
Attending: NURSE PRACTITIONER
Payer: MEDICARE

## 2019-12-13 VITALS
RESPIRATION RATE: 18 BRPM | WEIGHT: 159.39 LBS | DIASTOLIC BLOOD PRESSURE: 84 MMHG | HEIGHT: 64 IN | BODY MASS INDEX: 27.21 KG/M2 | HEART RATE: 90 BPM | TEMPERATURE: 99.3 F | SYSTOLIC BLOOD PRESSURE: 139 MMHG | OXYGEN SATURATION: 100 %

## 2019-12-13 DIAGNOSIS — M79.604 RIGHT LEG PAIN: Primary | ICD-10-CM

## 2019-12-13 PROCEDURE — 74011250637 HC RX REV CODE- 250/637: Performed by: NURSE PRACTITIONER

## 2019-12-13 PROCEDURE — 93971 EXTREMITY STUDY: CPT

## 2019-12-13 PROCEDURE — 99283 EMERGENCY DEPT VISIT LOW MDM: CPT

## 2019-12-13 RX ORDER — METHOCARBAMOL 500 MG/1
500 TABLET, FILM COATED ORAL 4 TIMES DAILY
Qty: 30 TAB | Refills: 0 | Status: SHIPPED | OUTPATIENT
Start: 2019-12-13 | End: 2020-03-11

## 2019-12-13 RX ORDER — TRAMADOL HYDROCHLORIDE 50 MG/1
50 TABLET ORAL
Status: COMPLETED | OUTPATIENT
Start: 2019-12-13 | End: 2019-12-13

## 2019-12-13 RX ORDER — METHOCARBAMOL 500 MG/1
500 TABLET, FILM COATED ORAL 4 TIMES DAILY
Status: DISCONTINUED | OUTPATIENT
Start: 2019-12-13 | End: 2019-12-14 | Stop reason: HOSPADM

## 2019-12-13 RX ADMIN — METHOCARBAMOL TABLETS 500 MG: 500 TABLET, COATED ORAL at 22:52

## 2019-12-13 RX ADMIN — TRAMADOL HYDROCHLORIDE 50 MG: 50 TABLET, FILM COATED ORAL at 22:52

## 2019-12-14 ENCOUNTER — APPOINTMENT (OUTPATIENT)
Dept: GENERAL RADIOLOGY | Age: 69
End: 2019-12-14
Attending: NURSE PRACTITIONER
Payer: MEDICARE

## 2019-12-14 NOTE — ED PROVIDER NOTES
EMERGENCY DEPARTMENT HISTORY AND PHYSICAL EXAM      Date: 12/13/2019  Patient Name: Nilay Mercer    History of Presenting Illness     Chief Complaint   Patient presents with    Foot Pain     Pt report right foot pain that radiates up right leg x this 4 pm.  Pt denies any trauma or injury. History Provided By: Patient    HPI: Nilay Mercer, 68 y.o. female with PMHx significant for fibromyalgia, depression, hypertension, degenerative disc disease, spinal stenosis, presents by POV to the ED with cc of right lower leg pain since 4 PM.  Patient is present with her family members at bedside and they all complained that she has been complaining of right lower leg pain that she describes as spasms and sharp activity. Patient denies injury, direct contact blows, falls, lacerations, shortness of breath, chest pain. Patient states she is out of her Cymbalta and cannot get back into see her primary care provider until the end of next week. There are no other complaints, changes, or physical findings at this time. PCP: Pipo Bates MD    No current facility-administered medications on file prior to encounter. Current Outpatient Medications on File Prior to Encounter   Medication Sig Dispense Refill    esomeprazole (NEXIUM) 40 mg capsule Take 1 Cap by mouth daily. Take 40 mg by mouth. 30 Cap 4    AMITIZA 24 mcg capsule       chlorhexidine (PERIDEX) 0.12 % solution       calcium-vitamin D (OYSTER SHELL) 500 mg(1,250mg) -200 unit per tablet Take 1,000 Tabs by mouth.  OTHER 1 Caplet daily. Power C      buPROPion SR (WELLBUTRIN SR) 150 mg SR tablet Take 1 Tab by mouth three (3) times daily. 1 Tab 0    raNITIdine (ZANTAC) 150 mg tablet       ALPRAZolam (XANAX) 0.5 mg tablet TAKE 1 TABLET BY MOUTH TWICE A DAY AS NEEDED FOR ANXIETY 180 Tab 1    diclofenac EC (VOLTAREN) 75 mg EC tablet Take 1 Tab by mouth two (2) times daily as needed.  60 Tab 3    DULoxetine (CYMBALTA) 60 mg capsule TAKE 1 CAPSULE TWICE A  Cap 3    lisinopril (PRINIVIL, ZESTRIL) 40 mg tablet TAKE 1 TABLET DAILY 90 Tab 3    rosuvastatin (CRESTOR) 20 mg tablet TAKE 1 TABLET NIGHTLY 90 Tab 1    dicyclomine (BENTYL) 10 mg capsule TAKE 2 CAPSULES FOUR TIMES A DAY AS NEEDED 720 Cap 3    hydroCHLOROthiazide (HYDRODIURIL) 25 mg tablet TAKE 1 TABLET DAILY 90 Tab 2    sucralfate (CARAFATE) 1 gram tablet Take 1 g by mouth Three (3) times a week. Take 1 tablet by mouth as needed       estradiol (ESTRACE) 0.01 % (0.1 mg/gram) vaginal cream Apply to Vaginal twice a week with fingertip      diclofenac (VOLTAREN) 1 % gel Apply 2 g to affected area four (4) times daily as needed. 100 g 3    oxybutynin (DITROPAN) 5 mg tablet take 1 tablet 2 times a day  0    aspirin delayed-release 81 mg tablet Take 81 mg by mouth two (2) times a week.          Past History     Past Medical History:  Past Medical History:   Diagnosis Date    Arthritis     Cancer ([facility] [address]     skin cancer    Chronic pain     CHRONIC BOWEL PAIN    Diverticulitis     Fibromyalgia     GERD (gastroesophageal reflux disease)     Goiter     Hiatal hernia     Hypercholesterolemia     Hypertension     IBS (irritable bowel syndrome)     IBS (irritable bowel syndrome)     CONSTIPATION, CHRONIC SINCE HER 25s    Ill-defined condition     rectocele    Insomnia     TAKES TRAZODONE NIGHTLY    Migraine     REDUCED IN FREQUENCY AND SEVERITY SINCE MENOPAUSE    Other ill-defined conditions(799.89)     Psychiatric disorder 3/11    DEPRESSION       Past Surgical History:  Past Surgical History:   Procedure Laterality Date    COLONOSCOPY N/A 6/21/2016    COLON[facility]el [facility] ENDOSCOPY    COLONOSCOPY N/A 6/27/2018    COLONOSCOPY performed by [facility]el [facility] at [facility] ENDOSCOPY    ENDOSCOPY, COLON, DIAGNOSTIC  11/2010    Dr. Denny Rankin-     HX CHOLECYSTECTOMY  2006   Omer Reveal  2007    Dr. Pond/ diverticulitis    HX GI  X3  LAST ONE 12/10    COLONOSCOPY    HX GYN  1982    D&C WITH SUCTION    HX HYSTERECTOMY      HX ORTHOPAEDIC      right foot surgery    HX ORTHOPAEDIC      neck surgery 3/21/11 Dr. Frank Breath HX ORTHOPAEDIC  2017    back surgery    HX OTHER SURGICAL      EGD    HX TONSILLECTOMY         Family History:  Family History   Problem Relation Age of Onset   [address] [facility] Cancer Father         lung and bone    Stroke Sister     Cancer Sister 76        PANCREATIC    Hypertension Mother     High Cholesterol Mother     Alzheimer Mother         late 62s early 76s    Cancer Other         COLON    Cancer Other         breast    Diabetes Maternal Aunt     Cancer Maternal Uncle         COLON    Cancer Maternal Grandfather         COLON    Ovarian Cancer Daughter 28       Social History:  Social History     Tobacco Use    Smoking status: Former Smoker     Packs/day: 1.00     Years: 45.00     Pack years: 45.00     Last attempt to quit: 2007     Years since quittin.9    Smokeless tobacco: Never Used   Substance Use Topics    Alcohol use: Yes     Alcohol/week: 0.0 standard drinks     Comment: holidays    Drug use: No       Allergies: Allergies   Allergen Reactions    Latex Hives    Codeine Other (comments)     Severe Headache    Morphine Hives    Ambien [Zolpidem] Other (comments)     Severe behavior changes    Dilaudid [Hydromorphone] Other (comments)     Memory loss    Other Medication Rash     BANDAIDS    Shellfish Containing Products Hives         Review of Systems   Review of Systems   Constitutional: Negative for chills and fever. HENT: Negative for congestion, rhinorrhea and sore throat. Respiratory: Negative for cough and shortness of breath. Cardiovascular: Negative for chest pain. Gastrointestinal: Negative for abdominal pain, nausea and vomiting. Endocrine: Negative for polyuria. Genitourinary: Negative for dysuria and frequency. Musculoskeletal: Positive for arthralgias and myalgias.  Negative for back pain, gait problem, joint swelling, neck pain and neck stiffness. Skin: Negative for rash. Neurological: Negative for dizziness, weakness and headaches. All other systems reviewed and are negative. Physical Exam   Physical Exam  Vitals signs and nursing note reviewed. Constitutional:       General: She is not in acute distress. Appearance: She is well-developed. She is not diaphoretic. Comments: 68 y.o. female   HENT:      Head: Normocephalic and atraumatic. Nose: Nose normal.      Mouth/Throat:      Mouth: Mucous membranes are moist.      Pharynx: Oropharynx is clear. Eyes:      Conjunctiva/sclera: Conjunctivae normal.      Pupils: Pupils are equal, round, and reactive to light. Neck:      Musculoskeletal: Normal range of motion. Cardiovascular:      Rate and Rhythm: Normal rate and regular rhythm. Heart sounds: Normal heart sounds. No murmur. Pulmonary:      Effort: Pulmonary effort is normal. No respiratory distress. Breath sounds: Normal breath sounds. No wheezing. Abdominal:      General: Bowel sounds are normal.      Palpations: Abdomen is soft. Musculoskeletal: Normal range of motion. General: Tenderness present. Right lower leg: She exhibits tenderness. She exhibits no swelling, no deformity and no laceration. No edema. Skin:     General: Skin is warm and dry. Capillary Refill: Capillary refill takes less than 2 seconds. Neurological:      General: No focal deficit present. Mental Status: She is alert and oriented to person, place, and time. Mental status is at baseline. Psychiatric:         Behavior: Behavior normal.         Diagnostic Study Results     Labs -   No results found for this or any previous visit (from the past 12 hour(s)).     Radiologic Studies -     US:   Lower Extremity Venous Findings     Right Lower Venous     No evidence of deep vein thrombosis in the common femoral, profunda femoral, superficial femoral, popliteal, posterior tibial, and peroneal veins. The veins were imaged in the transverse and longitudinal planes. The vessels showed normal color filling and compressibility. Doppler interrogation showed phasic and spontaneous flow. Medical Decision Making   I am the first provider for this patient. I reviewed the vital signs, available nursing notes, past medical history, past surgical history, family history and social history. Vital Signs-Reviewed the patient's vital signs. No data found. Records Reviewed: Nursing Notes, Old Medical Records, Previous Radiology Studies and Previous Laboratory Studies    Provider Notes (Medical Decision Making):   Differential diagnoses include cellulitis, fibromyalgia exacerbation, peripheral nephropathy, DVT. ED Course:   Initial assessment performed. The patients presenting problems have been discussed, and they are in agreement with the care plan formulated and outlined with them. I have encouraged them to ask questions as they arise throughout their visit. Discussed negative findings on the ultrasound, no DVT found at this time. Advised patient and her family member to continue with home medication, make sure she keeps her appointment with her primary care provider and she may add the muscle relaxant given today by the emergency department. Advised that if new injury occur or pain is persistent or there is an increase in pain, swelling, redness, chest pain, shortness of breath to come back to the emergency department for further evaluation. Critical Care Time: None    Disposition:  DISCHARGE NOTE:  11:26 PM  The pt is ready for discharge. The pt's signs, symptoms, diagnosis, and discharge instructions have been discussed and pt has conveyed their understanding. The pt is to follow up as recommended or return to ER should their symptoms worsen. Plan has been discussed and pt is in agreement. Karen See NP  12/13/2019      PLAN:  1. Discharge Medication List as of 12/13/2019 11:26 PM      START taking these medications    Details   methocarbamol (ROBAXIN) 500 mg tablet Take 1 Tab by mouth four (4) times daily. , Normal, Disp-30 Tab, R-0         CONTINUE these medications which have NOT CHANGED    Details   esomeprazole (NEXIUM) 40 mg capsule Take 1 Cap by mouth daily. Take 40 mg by mouth., Normal, Disp-30 Cap, R-4      AMITIZA 24 mcg capsule Historical Med, ALVARADO      chlorhexidine (PERIDEX) 0.12 % solution Historical Med      calcium-vitamin D (OYSTER SHELL) 500 mg(1,250mg) -200 unit per tablet Take 1,000 Tabs by mouth., Historical Med      OTHER 1 Caplet daily. Power C, Historical Med      buPROPion SR (WELLBUTRIN SR) 150 mg SR tablet Take 1 Tab by mouth three (3) times daily. , No Print**Patient requests 90 days supply**Disp-1 Tab, R-0      raNITIdine (ZANTAC) 150 mg tablet Historical Med      ALPRAZolam (XANAX) 0.5 mg tablet TAKE 1 TABLET BY MOUTH TWICE A DAY AS NEEDED FOR ANXIETY, Print, Disp-180 Tab, R-1      diclofenac EC (VOLTAREN) 75 mg EC tablet Take 1 Tab by mouth two (2) times daily as needed., Normal, Disp-60 Tab, R-3      DULoxetine (CYMBALTA) 60 mg capsule TAKE 1 CAPSULE TWICE A DAY, Normal, Disp-180 Cap, R-3      lisinopril (PRINIVIL, ZESTRIL) 40 mg tablet TAKE 1 TABLET DAILY, Normal, Disp-90 Tab, R-3      rosuvastatin (CRESTOR) 20 mg tablet TAKE 1 TABLET NIGHTLY, Normal, Disp-90 Tab, R-1      dicyclomine (BENTYL) 10 mg capsule TAKE 2 CAPSULES FOUR TIMES A DAY AS NEEDED, Normal, Disp-720 Cap, R-3      hydroCHLOROthiazide (HYDRODIURIL) 25 mg tablet TAKE 1 TABLET DAILY, Normal, Disp-90 Tab, R-2      sucralfate (CARAFATE) 1 gram tablet Take 1 g by mouth Three (3) times a week.  Take 1 tablet by mouth as needed , Historical Med      estradiol (ESTRACE) 0.01 % (0.1 mg/gram) vaginal cream Apply to Vaginal twice a week with fingertip, Historical Med      diclofenac (VOLTAREN) 1 % gel Apply 2 g to affected area four (4) times daily as needed., Normal, Disp-100 g, R-3      oxybutynin (DITROPAN) 5 mg tablet take 1 tablet 2 times a day, Historical Med, R-0      aspirin delayed-release 81 mg tablet Take 81 mg by mouth two (2) times a week., Historical Med         STOP taking these medications       tiZANidine (ZANAFLEX) 4 mg tablet Comments:   Reason for Stoppin.   Follow-up Information     Follow up With Specialties Details Why Contact Info    [facility] EMERGENCY DEPT Emergency Medicine Go in 1 week As needed, If symptoms worsen [address] [facility] [address]    Pat Aguiar MD Family Practice Schedule an appointment as soon as possible for a visit in 3 days As needed, If symptoms worsen Enio Amor [facility] [address]  [phone]          Return to ED if worse     Diagnosis     Clinical Impression:   1. Right leg pain          Please note that this dictation was completed with Narvii, the computer voice recognition software. Quite often unanticipated grammatical, syntax, homophones, and other interpretive errors are inadvertently transcribed by the computer software. Please disregards these errors. Please excuse any errors that have escaped final proofreading. This note will not be viewable in [address]

## 2019-12-14 NOTE — DISCHARGE INSTRUCTIONS
Patient Education        Leg Pain: Care Instructions  Your Care Instructions  Many things can cause leg pain. Too much exercise or overuse can cause a muscle cramp (or charley horse). You can get leg cramps from not eating a balanced diet that has enough potassium, calcium, and other minerals. If you do not drink enough fluids or are taking certain medicines, you may develop leg cramps. Other causes of leg pain include injuries, blood flow problems, nerve damage, and twisted and enlarged veins (varicose veins). You can usually ease pain with self-care. Your doctor may recommend that you rest your leg and keep it elevated. Follow-up care is a key part of your treatment and safety. Be sure to make and go to all appointments, and call your doctor if you are having problems. It's also a good idea to know your test results and keep a list of the medicines you take. How can you care for yourself at home? · Take pain medicines exactly as directed. ? If the doctor gave you a prescription medicine for pain, take it as prescribed. ? If you are not taking a prescription pain medicine, ask your doctor if you can take an over-the-counter medicine. · Take any other medicines exactly as prescribed. Call your doctor if you think you are having a problem with your medicine. · Rest your leg while you have pain, and avoid standing for long periods of time. · Prop up your leg at or above the level of your heart when possible. · Make sure you are eating a balanced diet that is rich in calcium, potassium, and magnesium, especially if you are pregnant. · If directed by your doctor, put ice or a cold pack on the area for 10 to 20 minutes at a time. Put a thin cloth between the ice and your skin. · Your leg may be in a splint, a brace, or an elastic bandage, and you may have crutches to help you walk. Follow your doctor's directions about how long to wear supports and how to use the crutches.   When should you call for help?  Call 911 anytime you think you may need emergency care. For example, call if:    · You have sudden chest pain and shortness of breath, or you cough up blood.     · Your leg is cool or pale or changes color.    Call your doctor now or seek immediate medical care if:    · You have increasing or severe pain.     · Your leg suddenly feels weak and you cannot move it.     · You have signs of a blood clot, such as:  ? Pain in your calf, back of the knee, thigh, or groin. ? Redness and swelling in your leg or groin.     · You have signs of infection, such as:  ? Increased pain, swelling, warmth, or redness. ? Red streaks leading from the sore area. ? Pus draining from a place on your leg. ? A fever.     · You cannot bear weight on your leg.    Watch closely for changes in your health, and be sure to contact your doctor if:    · You do not get better as expected. Where can you learn more? Go to http://mila-mary.info/. Enter F629 in the search box to learn more about \"Leg Pain: Care Instructions. \"  Current as of: June 26, 2019  Content Version: 12.2  © 8131-6089 [email or web address], [employer]. Care instructions adapted under license by [employer] (which disclaims liability or warranty for this information). If you have questions about a medical condition or this instruction, always ask your healthcare professional. [place] [address] any warranty or liability for your use of this information.

## 2019-12-14 NOTE — ED NOTES
Patient discharged by Dafne Elizalde NP. Patient provided with discharge instructions Rx and instructions on follow up care. Patient out of ED via wheelchair accompanied by family.

## 2019-12-16 DIAGNOSIS — K21.9 GASTROESOPHAGEAL REFLUX DISEASE WITHOUT ESOPHAGITIS: Primary | ICD-10-CM

## 2019-12-16 RX ORDER — PANTOPRAZOLE SODIUM 40 MG/1
40 TABLET, DELAYED RELEASE ORAL DAILY
Qty: 30 TAB | Refills: 4 | Status: SHIPPED | OUTPATIENT
Start: 2019-12-16 | End: 2020-08-07

## 2019-12-16 NOTE — TELEPHONE ENCOUNTER
Left message on home/cell number that due to insurance denial of Esomeprazole Magnesium 40 mg per Dr. Tony Haney will change medication to Protonix 40 mg q day.

## 2019-12-30 ENCOUNTER — TELEPHONE (OUTPATIENT)
Dept: FAMILY MEDICINE CLINIC | Age: 69
End: 2019-12-30

## 2019-12-30 NOTE — TELEPHONE ENCOUNTER
----- Message from Ana Sparks sent at 12/30/2019  9:01 AM EST -----  Regarding: [place]/ Telephone  Contact: [phone]  Caller's first and last name and relationship (if not the patient):  Best contact number(s): [phone]  What are the symptoms: Frequent urination and bowel movements  Transfer successful - yes/no (include outcome): no/ Advised to send message  Transfer declined - yes/no (include reason): Yes/ Advised to send message  Was caller advised to seek appropriate level of care - yes/no:  YES  Details to clarify the request: Recently treated for gout

## 2019-12-30 NOTE — TELEPHONE ENCOUNTER
----- Message from Linda Bingham sent at 12/30/2019 11:21 AM EST -----  Regarding: Dr. Nita Guerrero: [phone]  Patient's , Ghazala Hooper would like a call back to see if his wife can be worked in today. Patient is having trouble urinating and she also has a fever. There was no answer when calling the back line. Please call them back as soon as possible. Patient was advised to seek appropriate medical attention.

## 2020-01-28 DIAGNOSIS — I10 ESSENTIAL HYPERTENSION WITH GOAL BLOOD PRESSURE LESS THAN 140/90: ICD-10-CM

## 2020-01-28 DIAGNOSIS — M79.7 FIBROMYALGIA: ICD-10-CM

## 2020-01-28 RX ORDER — ROSUVASTATIN CALCIUM 20 MG/1
TABLET, COATED ORAL
Qty: 90 TAB | Refills: 1 | Status: SHIPPED | OUTPATIENT
Start: 2020-01-28 | End: 2021-02-27

## 2020-01-28 RX ORDER — ESTRADIOL 0.1 MG/G
CREAM VAGINAL
Qty: 42.5 G | Refills: 1 | Status: SHIPPED | OUTPATIENT
Start: 2020-01-28 | End: 2021-02-27

## 2020-01-28 RX ORDER — LISINOPRIL 40 MG/1
TABLET ORAL
Qty: 90 TAB | Refills: 3 | Status: SHIPPED | OUTPATIENT
Start: 2020-01-28 | End: 2021-02-27

## 2020-01-28 RX ORDER — OXYBUTYNIN CHLORIDE 5 MG/1
TABLET ORAL
Qty: 90 TAB | Refills: 0 | Status: SHIPPED | OUTPATIENT
Start: 2020-01-28 | End: 2021-02-27

## 2020-01-28 RX ORDER — DULOXETIN HYDROCHLORIDE 60 MG/1
CAPSULE, DELAYED RELEASE ORAL
Qty: 180 CAP | Refills: 3 | Status: ON HOLD | OUTPATIENT
Start: 2020-01-28 | End: 2021-05-04

## 2020-01-28 RX ORDER — DICLOFENAC SODIUM 75 MG/1
75 TABLET, DELAYED RELEASE ORAL
Qty: 60 TAB | Refills: 3 | Status: SHIPPED | OUTPATIENT
Start: 2020-01-28 | End: 2020-09-14

## 2020-01-28 NOTE — TELEPHONE ENCOUNTER
Pt is requesting refills for     Lisinopril  Diclofenac  Estradiol cream  Duloxetine  Oxybutynin  Rosuvastatin         [facility]     Pls call to verify that this is being called in     She wants a rush on the lisinopril     Best number to reach her is [phone]

## 2020-01-29 ENCOUNTER — HOSPITAL ENCOUNTER (OUTPATIENT)
Dept: MAMMOGRAPHY | Age: 70
Discharge: HOME | End: 2020-01-29
Attending: FAMILY MEDICINE
Payer: MEDICARE

## 2020-01-29 DIAGNOSIS — R92.2 DENSE BREAST TISSUE ON MAMMOGRAM: ICD-10-CM

## 2020-01-29 DIAGNOSIS — Z12.31 ENCOUNTER FOR SCREENING MAMMOGRAM FOR BREAST CANCER: ICD-10-CM

## 2020-01-29 PROCEDURE — 77063 BREAST TOMOSYNTHESIS BI: CPT

## 2020-02-04 RX ORDER — COLCHICINE 0.6 MG/1
0.6 TABLET ORAL
COMMUNITY
Start: 2019-12-16 | End: 2020-02-05 | Stop reason: SDUPTHER

## 2020-02-04 NOTE — TELEPHONE ENCOUNTER
----- Message from Jarod Baig sent at 2/4/2020  3:01 PM EST -----  Regarding: Dr. Faith Brooks prescription request  Patient had an episode of gout in December. Pt was instructed to call back if she had another episode. Pt is having another episode of gout and is requesting that a prescription be prescribed. Pt would like for Dr. Alisson Roger to send the prescription to the [facility] pharmacy near [facility] (p) [phone]. Pt would like a return call to discuss the results of her mammogram that was done last week. Pt can be reached at [phone].

## 2020-02-05 RX ORDER — COLCHICINE 0.6 MG/1
0.6 TABLET ORAL DAILY
Qty: 30 TAB | Refills: 1 | Status: SHIPPED | OUTPATIENT
Start: 2020-02-05 | End: 2020-03-11

## 2020-03-11 ENCOUNTER — OFFICE VISIT (OUTPATIENT)
Dept: FAMILY MEDICINE CLINIC | Age: 70
End: 2020-03-11

## 2020-03-11 VITALS
OXYGEN SATURATION: 97 % | BODY MASS INDEX: 28.27 KG/M2 | TEMPERATURE: 97.3 F | RESPIRATION RATE: 16 BRPM | HEIGHT: 64 IN | DIASTOLIC BLOOD PRESSURE: 86 MMHG | WEIGHT: 165.6 LBS | SYSTOLIC BLOOD PRESSURE: 134 MMHG | HEART RATE: 81 BPM

## 2020-03-11 DIAGNOSIS — I10 ESSENTIAL HYPERTENSION WITH GOAL BLOOD PRESSURE LESS THAN 140/90: ICD-10-CM

## 2020-03-11 DIAGNOSIS — M48.062 SPINAL STENOSIS OF LUMBAR REGION WITH NEUROGENIC CLAUDICATION: ICD-10-CM

## 2020-03-11 DIAGNOSIS — M79.7 FIBROMYALGIA: ICD-10-CM

## 2020-03-11 DIAGNOSIS — E78.2 MIXED HYPERLIPIDEMIA: ICD-10-CM

## 2020-03-11 DIAGNOSIS — R73.02 IGT (IMPAIRED GLUCOSE TOLERANCE): ICD-10-CM

## 2020-03-11 DIAGNOSIS — Z98.890 STATUS POST LUMBAR SPINE SURGERY FOR DECOMPRESSION OF SPINAL CORD: ICD-10-CM

## 2020-03-11 DIAGNOSIS — Z79.899 ENCOUNTER FOR LONG-TERM (CURRENT) USE OF MEDICATIONS: ICD-10-CM

## 2020-03-11 DIAGNOSIS — G89.4 CHRONIC PAIN SYNDROME: ICD-10-CM

## 2020-03-11 DIAGNOSIS — F33.1 MODERATE EPISODE OF RECURRENT MAJOR DEPRESSIVE DISORDER (HCC): Primary | ICD-10-CM

## 2020-03-11 DIAGNOSIS — M54.50 ACUTE MIDLINE LOW BACK PAIN WITHOUT SCIATICA: ICD-10-CM

## 2020-03-11 RX ORDER — PREDNISONE 20 MG/1
TABLET ORAL
Qty: 13 TAB | Refills: 0 | Status: SHIPPED | OUTPATIENT
Start: 2020-03-11 | End: 2020-04-15 | Stop reason: ALTCHOICE

## 2020-03-11 RX ORDER — BUPROPION HYDROCHLORIDE 200 MG/1
200 TABLET, EXTENDED RELEASE ORAL 2 TIMES DAILY
Qty: 180 TAB | Refills: 0 | Status: SHIPPED | OUTPATIENT
Start: 2020-03-11 | End: 2021-02-27

## 2020-03-11 RX ORDER — ALPRAZOLAM 1 MG/1
1 TABLET ORAL
COMMUNITY
End: 2020-08-07 | Stop reason: ALTCHOICE

## 2020-03-11 RX ORDER — BUPROPION HYDROCHLORIDE 200 MG/1
200 TABLET, EXTENDED RELEASE ORAL 2 TIMES DAILY
Qty: 180 TAB | Refills: 0 | Status: SHIPPED | OUTPATIENT
Start: 2020-03-11 | End: 2020-03-11 | Stop reason: SDUPTHER

## 2020-03-11 RX ORDER — PHENAZOPYRIDINE HYDROCHLORIDE 100 MG/1
TABLET, FILM COATED ORAL
COMMUNITY
Start: 2020-01-31 | End: 2020-03-11 | Stop reason: ALTCHOICE

## 2020-03-11 RX ORDER — NAPROXEN 500 MG/1
TABLET ORAL
COMMUNITY
Start: 2019-12-16 | End: 2020-08-07 | Stop reason: ALTCHOICE

## 2020-03-11 RX ORDER — CYCLOBENZAPRINE HCL 5 MG
5 TABLET ORAL
Qty: 30 TAB | Refills: 0 | Status: SHIPPED | OUTPATIENT
Start: 2020-03-11 | End: 2020-04-15 | Stop reason: SDUPTHER

## 2020-03-11 RX ORDER — TRAMADOL HYDROCHLORIDE 50 MG/1
50 TABLET ORAL
Qty: 20 TAB | Refills: 0 | Status: SHIPPED | OUTPATIENT
Start: 2020-03-11 | End: 2020-03-16

## 2020-03-11 RX ORDER — LUBIPROSTONE 24 UG/1
24 CAPSULE, GELATIN COATED ORAL
COMMUNITY
Start: 2019-12-03 | End: 2021-02-03

## 2020-03-11 NOTE — PROGRESS NOTES
Chief Complaint   Patient presents with    Back Pain     x 1 month    1. Have you been to the ER, urgent care clinic since your last visit? Hospitalized since your last visit? Yes Where: [facility] ER Gout    2. Have you seen or consulted any other health care providers outside of the [address] [place] [address] since your last visit? Include any pap smears or colon screening.  Yes Urology  Discuss Wellbutrin not working as well as Celexa

## 2020-03-11 NOTE — PROGRESS NOTES
Subjective:     Chief Complaint   Patient presents with    Back Pain     x 1 month         She  is a 68 y.o. female who presents for evaluation of:  Since last appt, had ADRYAN with Dr. Janes Euceda. Had CTS injections with Dr. Joi Arevalo. Ct dealing with colon issues. Following with GI and has tried 2 different options that have caused some diarrhea. Depression -patient and family agree that depression symptoms seem to be improving overall.  does have some concerns about episodic periods of patient acting unlike herself. He notes some mild memory changes at times. Overall, she feels like she is slowly improving. She is still planning to find a therapist but has been unable to do so yet. She is not working with psychiatry yet either as recommended. She is compliant with her Cymbalta, Wellbutrin, and Xanax as needed. Continues to deny SI/HI. Still working on making time for herself and managing her stress. C/o back pain today. Has known DDD l spine. Last imaging was 7/2017 with MRI showing: \"Lumbar degenerative changes with moderate to severe L3-4, severe L4-5 and mild to moderate L5-S1 canal stenosis. \"  She ended up having L3-S1 Decompression with Dr. Martinez Reza in 8/2017 after this. No red flags with saddle anesthesia or incontinence. Ct to have pain x 1-2 months. Has not seen Ortho recently. Worse with doing anything out of the house and making depression worse. Worse with flexion and extension. Has been working with Ortho on Cervical neck pain, CTS, and has had steroid injection in neck. Does have chronic pain and fibromyalgia. Depression - worse x last few months and largely related to her pain as above. Decreased sleep with about 4-5 hours per night. Decreased energy, concentration, and attention. Decreased motivation but feels like this is related to pain. On Cymbalta and Wellbutrin.     ROS  Gen - no fever/chills  Resp - no dyspnea or cough  CV - no chest pain or KASPER  Rest per HPI    Past Medical History:   Diagnosis Date    Arthritis     Cancer ([facility] [address]     skin cancer    Chronic pain     CHRONIC BOWEL PAIN    Diverticulitis     Fibromyalgia     GERD (gastroesophageal reflux disease)     Goiter     Hiatal hernia     Hypercholesterolemia     Hypertension     IBS (irritable bowel syndrome)     CONSTIPATION, CHRONIC SINCE HER 20s    Insomnia     TAKES TRAZODONE NIGHTLY    Migraine     REDUCED IN FREQUENCY AND SEVERITY SINCE MENOPAUSE    Psychiatric disorder 3/11    DEPRESSION    Rectocele      Past Surgical History:   Procedure Laterality Date    COLONOSCOPY N/A 6/21/2016    COLONOSCOPY performed [facility]ka [facility]aga [facility] at [facility] ENDOSCOPY    COLONOSCOPY N/A 6/27/2018    COLONOSCOPY performed by [facility]neka [facility]aga MD at [facility] ENDOSCOPY    ENDOSCOPY, COLON, DIAGNOSTIC  11/2010    Dr. Carter Dickens-     HX CHOLECYSTECTOMY  2006   Charisse Sever  2007    Dr. Pond/ diverticulitis    HX GI  X3  LAST ONE 12/10    COLONOSCOPY    HX GYN  1982    D&C WITH SUCTION    HX HYSTERECTOMY      HX ORTHOPAEDIC      right foot surgery    HX ORTHOPAEDIC      neck surgery 3/21/11 Dr. Linda Ruiz HX ORTHOPAEDIC  08/31/2017    back surgery    HX OTHER SURGICAL      EGD    HX TONSILLECTOMY       Current Outpatient Medications on File Prior to Visit   Medication Sig Dispense Refill    ALPRAZolam (XANAX) 1 mg tablet 1 mg.  naproxen (NAPROSYN) 500 mg tablet TK 1 T PO  BID WITH MEALS FOR ANKLE PAIN      estradioL (ESTRACE) 0.01 % (0.1 mg/gram) vaginal cream Apply to Vaginal twice a week with fingertip 42.5 g 1    diclofenac EC (VOLTAREN) 75 mg EC tablet Take 1 Tab by mouth two (2) times daily as needed for Pain. 60 Tab 3    oxybutynin (DITROPAN) 5 mg tablet take 1 tablet 2 times a day (Patient taking differently: two (2) times daily as needed.  take 1 tablet 2 times a day) 90 Tab 0    rosuvastatin (CRESTOR) 20 mg tablet TAKE 1 TABLET NIGHTLY 90 Tab 1    lisinopril (PRINIVIL, ZESTRIL) 40 mg tablet TAKE 1 TABLET DAILY 90 Tab 3    DULoxetine (CYMBALTA) 60 mg capsule TAKE 1 CAPSULE TWICE A  Cap 3    pantoprazole (PROTONIX) 40 mg tablet Take 1 Tab by mouth daily. 30 Tab 4    chlorhexidine (PERIDEX) 0.12 % solution       buPROPion SR (WELLBUTRIN SR) 150 mg SR tablet Take 1 Tab by mouth three (3) times daily. 1 Tab 0    dicyclomine (BENTYL) 10 mg capsule TAKE 2 CAPSULES FOUR TIMES A DAY AS NEEDED 720 Cap 3    hydroCHLOROthiazide (HYDRODIURIL) 25 mg tablet TAKE 1 TABLET DAILY 90 Tab 2    sucralfate (CARAFATE) 1 gram tablet Take 1 g by mouth Three (3) times a week. Take 1 tablet by mouth as needed       diclofenac (VOLTAREN) 1 % gel Apply 2 g to affected area four (4) times daily as needed. 100 g 3    lubiPROStone (AMITIZA) 24 mcg capsule 24 mcg.  [DISCONTINUED] phenazopyridine (PYRIDIUM) 100 mg tablet TK 1 T PO TID WC PRN      [DISCONTINUED] colchicine 0.6 mg tablet Take 1 Tab by mouth daily. 0.6 mg. 30 Tab 1    [DISCONTINUED] methocarbamol (ROBAXIN) 500 mg tablet Take 1 Tab by mouth four (4) times daily. 30 Tab 0    [DISCONTINUED] esomeprazole (NEXIUM) 40 mg capsule Take 1 Cap by mouth daily. Take 40 mg by mouth. 30 Cap 4    [DISCONTINUED] AMITIZA 24 mcg capsule       [DISCONTINUED] calcium-vitamin D (OYSTER SHELL) 500 mg(1,250mg) -200 unit per tablet Take 1,000 Tabs by mouth.  [DISCONTINUED] OTHER 1 Caplet daily. Power C      [DISCONTINUED] raNITIdine (ZANTAC) 150 mg tablet       [DISCONTINUED] ALPRAZolam (XANAX) 0.5 mg tablet TAKE 1 TABLET BY MOUTH TWICE A DAY AS NEEDED FOR ANXIETY 180 Tab 1    aspirin delayed-release 81 mg tablet Take 81 mg by mouth two (2) times a week. No current facility-administered medications on file prior to visit.          Objective:     Vitals:    03/11/20 1353   BP: 134/86   Pulse: 81   Resp: 16   Temp: 97.3 °F (36.3 °C)   TempSrc: Oral   SpO2: 97%   Weight: 165 lb 9.6 oz (75.1 kg)   Height: 5' 4\" (1.626 m)     Physical Examination:  General appearance - alert, well appearing, and in no distress  Eyes -sclera anicteric  Neck - supple, no significant adenopathy, no thyromegaly, no bruits  Chest - clear to auscultation, no wheezes, rales or rhonchi, symmetric air entry  Heart - normal rate, regular rhythm, normal S1, S2, no murmurs, rubs, clicks or gallops  Neurological - alert, oriented, no focal findings or movement disorder noted  Back - FROM but pain with flex and extension, no ttp, nml strength, bilat decr sensation  Extremities-no edema  Psych-normal mood and affect    Assessment/ Plan:   Diagnoses and all orders for this visit:    1. Moderate episode of recurrent major depressive disorder (HCC) - seems to be worsening. Incr wellbutrin and ct Cymbalta. Working on pain and sleep and will consider Seroquel at next appt if not improving  -     buPROPion SR (WELLBUTRIN, ZYBAN) 200 mg SR tablet; Take 1 Tab by mouth two (2) times a day. 2. Fibromyalgia - flaring, starting Flexeril  -     cyclobenzaprine (FLEXERIL) 5 mg tablet; Take 1 Tab by mouth nightly. 3. Essential hypertension with goal blood pressure less than 140/90 - controlled  -     METABOLIC PANEL, COMPREHENSIVE    4. Mixed hyperlipidemia - stable  -     METABOLIC PANEL, COMPREHENSIVE  -     LIPID PANEL  -     HEMOGLOBIN A1C WITH EAG  -     TSH 3RD GENERATION    5. IGT (impaired glucose tolerance) - rechecking labs  -     HEMOGLOBIN A1C WITH EAG    6. Encounter for long-term (current) use of medications  -     METABOLIC PANEL, COMPREHENSIVE  -     LIPID PANEL  -     HEMOGLOBIN A1C WITH EAG  -     CBC W/O DIFF  -     TSH 3RD GENERATION    7. Chronic pain syndrome  8. Spinal stenosis of lumbar region with neurogenic claudication  9. Status post lumbar spine surgery for decompression of spinal cord  10. Acute midline low back pain without sciatica  - will use Prednisone, Tramadol, Flexeril and recheck x-rays  -     predniSONE (DELTASONE) 20 mg tablet;  Take 3 pills for 2 days, then 2 pills for 2 days, then 1 pill for 3 days  -     traMADoL (ULTRAM) 50 mg tablet; Take 1 Tab by mouth every six (6) hours as needed for Pain for up to 5 days. Max Daily Amount: 200 mg.  -     cyclobenzaprine (FLEXERIL) 5 mg tablet; Take 1 Tab by mouth nightly. -     XR SPINE LUMB 2 OR 3 V; Future    I spent > 50% of the 40 min visit counseling and educating about chronic pain, fibromyalgia, depression, lumbar pain with hx of lumbar decompression for spinal stenosis, HTN, HLD, IGT. I have discussed the diagnosis with the patient and the intended plan as seen in the above orders. The patient has received an after-visit summary and questions were answered concerning future plans. I have discussed medication side effects and warnings with the patient as well. The patient verbalizes understanding and agreement with the plan. Follow-up and Dispositions    · Return in about 3 months (around 6/11/2020), or if symptoms worsen or fail to improve.

## 2020-03-12 LAB
ALBUMIN SERPL-MCNC: 4.5 G/DL (ref 3.7–4.7)
ALBUMIN/GLOB SERPL: 2.1 {RATIO} (ref 1.2–2.2)
ALP SERPL-CCNC: 110 IU/L (ref 39–117)
ALT SERPL-CCNC: 17 IU/L (ref 0–32)
AST SERPL-CCNC: 20 IU/L (ref 0–40)
BILIRUB SERPL-MCNC: 0.3 MG/DL (ref 0–1.2)
BUN SERPL-MCNC: 22 MG/DL (ref 8–27)
BUN/CREAT SERPL: 18 (ref 12–28)
CALCIUM SERPL-MCNC: 10.1 MG/DL (ref 8.7–10.3)
CHLORIDE SERPL-SCNC: 102 MMOL/L (ref 96–106)
CHOLEST SERPL-MCNC: 164 MG/DL (ref 100–199)
CO2 SERPL-SCNC: 24 MMOL/L (ref 20–29)
CREAT SERPL-MCNC: 1.21 MG/DL (ref 0.57–1)
ERYTHROCYTE [DISTWIDTH] IN BLOOD BY AUTOMATED COUNT: 12.7 % (ref 11.7–15.4)
EST. AVERAGE GLUCOSE BLD GHB EST-MCNC: 100 MG/DL
GLOBULIN SER CALC-MCNC: 2.1 G/DL (ref 1.5–4.5)
GLUCOSE SERPL-MCNC: 85 MG/DL (ref 65–99)
HBA1C MFR BLD: 5.1 % (ref 4.8–5.6)
HCT VFR BLD AUTO: 36.5 % (ref 34–46.6)
HDLC SERPL-MCNC: 40 MG/DL
HGB BLD-MCNC: 12.4 G/DL (ref 11.1–15.9)
INTERPRETATION: NORMAL
LDLC SERPL CALC-MCNC: 90 MG/DL (ref 0–99)
MCH RBC QN AUTO: 31.4 PG (ref 26.6–33)
MCHC RBC AUTO-ENTMCNC: 34 G/DL (ref 31.5–35.7)
MCV RBC AUTO: 92 FL (ref 79–97)
PLATELET # BLD AUTO: 328 X10E3/UL (ref 150–450)
POTASSIUM SERPL-SCNC: 4.9 MMOL/L (ref 3.5–5.2)
PROT SERPL-MCNC: 6.6 G/DL (ref 6–8.5)
RBC # BLD AUTO: 3.95 X10E6/UL (ref 3.77–5.28)
SODIUM SERPL-SCNC: 142 MMOL/L (ref 134–144)
TRIGL SERPL-MCNC: 170 MG/DL (ref 0–149)
TSH SERPL DL<=0.005 MIU/L-ACNC: 1.34 UIU/ML (ref 0.45–4.5)
VLDLC SERPL CALC-MCNC: 34 MG/DL (ref 5–40)
WBC # BLD AUTO: 7.4 X10E3/UL (ref 3.4–10.8)

## 2020-04-15 ENCOUNTER — TELEPHONE (OUTPATIENT)
Dept: FAMILY MEDICINE CLINIC | Age: 70
End: 2020-04-15

## 2020-04-15 ENCOUNTER — VIRTUAL VISIT (OUTPATIENT)
Dept: FAMILY MEDICINE CLINIC | Age: 70
End: 2020-04-15

## 2020-04-15 VITALS — SYSTOLIC BLOOD PRESSURE: 144 MMHG | HEART RATE: 80 BPM | DIASTOLIC BLOOD PRESSURE: 78 MMHG

## 2020-04-15 DIAGNOSIS — M48.062 SPINAL STENOSIS OF LUMBAR REGION WITH NEUROGENIC CLAUDICATION: ICD-10-CM

## 2020-04-15 DIAGNOSIS — M79.7 FIBROMYALGIA: Primary | ICD-10-CM

## 2020-04-15 DIAGNOSIS — G89.4 CHRONIC PAIN SYNDROME: ICD-10-CM

## 2020-04-15 DIAGNOSIS — Z98.890 STATUS POST LUMBAR SPINE SURGERY FOR DECOMPRESSION OF SPINAL CORD: ICD-10-CM

## 2020-04-15 DIAGNOSIS — M54.50 ACUTE MIDLINE LOW BACK PAIN WITHOUT SCIATICA: ICD-10-CM

## 2020-04-15 RX ORDER — CYCLOBENZAPRINE HCL 5 MG
5 TABLET ORAL
Qty: 90 TAB | Refills: 0 | Status: SHIPPED | OUTPATIENT
Start: 2020-04-15 | End: 2021-02-03 | Stop reason: SDUPTHER

## 2020-04-15 RX ORDER — GABAPENTIN 300 MG/1
300 CAPSULE ORAL 2 TIMES DAILY
Qty: 180 CAP | Refills: 0 | Status: SHIPPED | OUTPATIENT
Start: 2020-04-15 | End: 2020-08-07 | Stop reason: SDUPTHER

## 2020-04-15 NOTE — PROGRESS NOTES
Wale Simon is a 68 y.o. female evaluated via telephone on 4/15/2020. Consent:  She and/or health care decision maker is aware that that she may receive a bill for this telephone service, depending on her insurance coverage, and has provided verbal consent to proceed: Yes    Documentation:  I communicated with the patient and/or health care decision maker about Fibromyalgia, lumbar radiculopathy  Details of this discussion including any medical advice provided: legs feeling very tight and cramping. Discussed neurogenic claudication. Had detailed conversation about medications and options for treating fibromyalgia. Restarting Flexeril and gabapentin and will watch for side effects. I affirm this is a Patient Initiated Episode with an Established Patient who has not had a related appointment within my department in the past 7 days or scheduled within the next 24 hours.     Total Time: minutes: 11-20 minutes    Note: not billable if this call serves to triage the patient into an appointment for the relevant concern      Hernan Maloney MD

## 2020-04-15 NOTE — TELEPHONE ENCOUNTER
----- Message from Jg Valentino sent at 4/15/2020 12:23 PM EDT -----  Regarding: Dr. Paulino Dus: [phone] (if not patient): pt  Relationship of caller (if not patient): pt  Reason: The virtual visit program crashed.   Best contact number(s): [phone] or [phone](cell)  Details to clarify the request: n/a

## 2020-06-10 ENCOUNTER — TELEPHONE (OUTPATIENT)
Dept: FAMILY MEDICINE CLINIC | Age: 70
End: 2020-06-10

## 2020-06-25 ENCOUNTER — HOSPITAL ENCOUNTER (OUTPATIENT)
Dept: GENERAL RADIOLOGY | Age: 70
Discharge: HOME | End: 2020-06-25
Payer: MEDICARE

## 2020-06-25 DIAGNOSIS — F32.A DEPRESSION: ICD-10-CM

## 2020-06-25 DIAGNOSIS — G89.4 CHRONIC PAIN SYNDROME: ICD-10-CM

## 2020-06-25 DIAGNOSIS — Z98.890 STATUS POST LUMBAR SPINE SURGERY FOR DECOMPRESSION OF SPINAL CORD: ICD-10-CM

## 2020-06-25 DIAGNOSIS — K58.9 IRRITABLE BOWEL SYNDROME: ICD-10-CM

## 2020-06-25 DIAGNOSIS — M48.062 SPINAL STENOSIS OF LUMBAR REGION WITH NEUROGENIC CLAUDICATION: ICD-10-CM

## 2020-06-25 PROCEDURE — 74018 RADEX ABDOMEN 1 VIEW: CPT

## 2020-06-25 PROCEDURE — 72100 X-RAY EXAM L-S SPINE 2/3 VWS: CPT

## 2020-07-31 ENCOUNTER — HOSPITAL ENCOUNTER (OUTPATIENT)
Dept: MRI IMAGING | Age: 70
Discharge: HOME | End: 2020-07-31
Attending: ORTHOPAEDIC SURGERY
Payer: MEDICARE

## 2020-07-31 DIAGNOSIS — M47.812 CERVICAL SPONDYLOSIS WITHOUT MYELOPATHY: ICD-10-CM

## 2020-07-31 DIAGNOSIS — M41.50 DEGENERATIVE SCOLIOSIS IN ADULT PATIENT: ICD-10-CM

## 2020-07-31 DIAGNOSIS — M43.10 ACQUIRED SPONDYLOLISTHESIS: ICD-10-CM

## 2020-07-31 DIAGNOSIS — M54.2 CERVICALGIA: ICD-10-CM

## 2020-07-31 DIAGNOSIS — M54.32 SCIATICA OF LEFT SIDE: ICD-10-CM

## 2020-07-31 DIAGNOSIS — Z98.1 S/P CERVICAL SPINAL FUSION: ICD-10-CM

## 2020-07-31 DIAGNOSIS — G56.03 CARPAL TUNNEL SYNDROME, BILATERAL: ICD-10-CM

## 2020-07-31 DIAGNOSIS — M54.50 LOW BACK PAIN, UNSPECIFIED BACK PAIN LATERALITY, UNSPECIFIED CHRONICITY, UNSPECIFIED WHETHER SCIATICA PRESENT: ICD-10-CM

## 2020-07-31 DIAGNOSIS — M47.817 LUMBOSACRAL SPONDYLOSIS WITHOUT MYELOPATHY: ICD-10-CM

## 2020-07-31 PROCEDURE — 72148 MRI LUMBAR SPINE W/O DYE: CPT

## 2020-08-07 ENCOUNTER — OFFICE VISIT (OUTPATIENT)
Dept: FAMILY MEDICINE CLINIC | Age: 70
End: 2020-08-07
Payer: MEDICARE

## 2020-08-07 VITALS
BODY MASS INDEX: 29.57 KG/M2 | DIASTOLIC BLOOD PRESSURE: 66 MMHG | HEIGHT: 64 IN | HEART RATE: 89 BPM | RESPIRATION RATE: 16 BRPM | TEMPERATURE: 96.8 F | SYSTOLIC BLOOD PRESSURE: 134 MMHG | WEIGHT: 173.2 LBS | OXYGEN SATURATION: 98 %

## 2020-08-07 DIAGNOSIS — R94.4 DECREASED GFR: ICD-10-CM

## 2020-08-07 DIAGNOSIS — Z79.899 ENCOUNTER FOR LONG-TERM (CURRENT) USE OF MEDICATIONS: ICD-10-CM

## 2020-08-07 DIAGNOSIS — F33.1 MODERATE EPISODE OF RECURRENT MAJOR DEPRESSIVE DISORDER (HCC): ICD-10-CM

## 2020-08-07 DIAGNOSIS — R73.02 IGT (IMPAIRED GLUCOSE TOLERANCE): ICD-10-CM

## 2020-08-07 DIAGNOSIS — Z87.891 PERSONAL HISTORY OF TOBACCO USE, PRESENTING HAZARDS TO HEALTH: ICD-10-CM

## 2020-08-07 DIAGNOSIS — Z71.89 ADVANCED DIRECTIVES, COUNSELING/DISCUSSION: ICD-10-CM

## 2020-08-07 DIAGNOSIS — M79.7 FIBROMYALGIA: ICD-10-CM

## 2020-08-07 DIAGNOSIS — G89.4 CHRONIC PAIN SYNDROME: ICD-10-CM

## 2020-08-07 DIAGNOSIS — Z00.00 MEDICARE ANNUAL WELLNESS VISIT, SUBSEQUENT: Primary | ICD-10-CM

## 2020-08-07 DIAGNOSIS — E78.2 MIXED HYPERLIPIDEMIA: ICD-10-CM

## 2020-08-07 DIAGNOSIS — I10 ESSENTIAL HYPERTENSION WITH GOAL BLOOD PRESSURE LESS THAN 140/90: ICD-10-CM

## 2020-08-07 PROCEDURE — G9711 PT HX TOT COL OR COLON CA: HCPCS | Performed by: FAMILY MEDICINE

## 2020-08-07 PROCEDURE — G8752 SYS BP LESS 140: HCPCS | Performed by: FAMILY MEDICINE

## 2020-08-07 PROCEDURE — G8754 DIAS BP LESS 90: HCPCS | Performed by: FAMILY MEDICINE

## 2020-08-07 PROCEDURE — G8399 PT W/DXA RESULTS DOCUMENT: HCPCS | Performed by: FAMILY MEDICINE

## 2020-08-07 PROCEDURE — G0439 PPPS, SUBSEQ VISIT: HCPCS | Performed by: FAMILY MEDICINE

## 2020-08-07 PROCEDURE — 99214 OFFICE O/P EST MOD 30 MIN: CPT | Performed by: FAMILY MEDICINE

## 2020-08-07 PROCEDURE — G9717 DOC PT DX DEP/BP F/U NT REQ: HCPCS | Performed by: FAMILY MEDICINE

## 2020-08-07 PROCEDURE — G8536 NO DOC ELDER MAL SCRN: HCPCS | Performed by: FAMILY MEDICINE

## 2020-08-07 PROCEDURE — 1100F PTFALLS ASSESS-DOCD GE2>/YR: CPT | Performed by: FAMILY MEDICINE

## 2020-08-07 PROCEDURE — G9899 SCRN MAM PERF RSLTS DOC: HCPCS | Performed by: FAMILY MEDICINE

## 2020-08-07 PROCEDURE — 3288F FALL RISK ASSESSMENT DOCD: CPT | Performed by: FAMILY MEDICINE

## 2020-08-07 PROCEDURE — G8417 CALC BMI ABV UP PARAM F/U: HCPCS | Performed by: FAMILY MEDICINE

## 2020-08-07 PROCEDURE — 1090F PRES/ABSN URINE INCON ASSESS: CPT | Performed by: FAMILY MEDICINE

## 2020-08-07 PROCEDURE — G8427 DOCREV CUR MEDS BY ELIG CLIN: HCPCS | Performed by: FAMILY MEDICINE

## 2020-08-07 RX ORDER — CHOLECALCIFEROL (VITAMIN D3) 125 MCG
1 CAPSULE ORAL DAILY
COMMUNITY

## 2020-08-07 RX ORDER — CYCLOBENZAPRINE HCL 10 MG
10 TABLET ORAL
COMMUNITY
Start: 2020-07-17 | End: 2020-08-07 | Stop reason: SDUPTHER

## 2020-08-07 RX ORDER — POLYETHYLENE GLYCOL 3350 17 G/17G
17 POWDER, FOR SOLUTION ORAL DAILY
COMMUNITY
Start: 2020-06-25

## 2020-08-07 RX ORDER — CHLORPHENIRAMINE MALEATE, IBUPROFEN, PSEUDOEPHEDRINE HCL 2; 200; 30 MG/1; MG/1; MG/1
TABLET, COATED ORAL DAILY
COMMUNITY
End: 2020-09-03

## 2020-08-07 RX ORDER — GABAPENTIN 300 MG/1
300 CAPSULE ORAL 3 TIMES DAILY
Qty: 270 CAP | Refills: 0 | Status: SHIPPED | OUTPATIENT
Start: 2020-08-07 | End: 2021-02-03 | Stop reason: ALTCHOICE

## 2020-08-07 NOTE — PROGRESS NOTES
Chief Complaint   Patient presents with   [facility] Annual Wellness Visit     Medicare   1. Have you been to the ER, urgent care clinic since your last visit? Hospitalized since your last visit? No    2. Have you seen or consulted any other health care providers outside of the [address] [place] [address] since your last visit? Include any pap smears or colon screening.  No   Scheduled for EMG next Friday Bilateral hand numbness and back pain-saw Dr Princess Camargo

## 2020-08-07 NOTE — ACP (ADVANCE CARE PLANNING)
Advance Care Planning       Advance Care Planning (ACP) Physician/NP/PA (Provider) Damir Aguilar        Date of ACP Conversation: 8/7/2020    Conversation Conducted with:   Patient with [address] Maker:    Current Designated Health Care Decision MakeSusan Sorto  [phone]          Care Preferences:    Hospitalization: \"If your health worsens and it becomes clear that your chance of recovery is unlikely, what would your preference be regarding hospitalization? \"  If the patient would want hospitalization, answer \"yes\". If the patient would prefer comfort-focused treatment without hospitalization, answer \"no\". no      Ventilation: \"If you were in your present state of health and suddenly became very ill and were unable to breathe on your own, what would your preference be about the use of a ventilator (breathing machine) if it was available to you? \"    If patient would desire the use of a ventilator (breathing machine), answer \"yes\", if not answer \"no\":yes    \"If your health worsens and it becomes clear that your chance of recovery is unlikely, what would your preference be about the use of a ventilator (breathing machine) if it was available to you? \"   no      Resuscitation:  \"CPR works best to restart the heart when there is a sudden event, like a heart attack, in someone who is otherwise healthy. Unfortunately, CPR does not typically restart the heart for people who have serious health conditions or who are very sick. \"    \"In the event your heart stopped as a result of an underlying serious health condition, would you want attempts to be made to restart your heart (answer \"yes\" for attempt to resuscitate) or would you prefer a natural death (answer \"no\" for do not attempt to resuscitate)? \"   no    NOTE: If the patient has a valid advance directive AND provides care preference(s) that are inconsistent with that prior directive, advise the patient to consider either: creating a new advance directive that complies with state-specific requirements; or, if that is not possible, orally revoking that prior directive in accordance with state-specific requirements, which must be documented in the EHR.     Conversation Outcomes / Follow-Up Plan:   Recommended completion of Advance Directive  Discussed with family      Length of Voluntary ACP Conversation in minutes:  12 minutes      Eros Boogei MD

## 2020-08-07 NOTE — PROGRESS NOTES
Subjective:     Chief Complaint   Patient presents with   Breanna Reyes Annual Wellness Visit     Medicare        She  is a 68 y.o. female who presents for evaluation of:  Doing well today. Continues to deal with pain    Depression -symptoms seem to be improving overall.  does have some concerns about episodic periods of patient acting unlike herself. Was planning to do therapy. She is not working with psychiatry yet either as recommended. She is compliant with her Cymbalta, Wellbutrin, and Xanax as needed. Continues to deny SI/HI. Still working on making time for herself and managing her stress. Seems to be worse with her chronic pain but better with her current life circumstances as her daughter and son are both doing well. Chronic pain, spinal stenosis, and fibromyalgia: At last appt, restarted Gabapentin and Flexeril and this is helped slightly. She sleeping better but continues to c/o back pain still. Has known DDD L spine. Last imaging was 7/2017 with MRI showing: \"Lumbar degenerative changes with moderate to severe L3-4, severe L4-5 and mild to moderate L5-S1 canal stenosis. \"  She ended up having L3-S1 Decompression with Dr. Danae Jamison in 8/2017 after this. No red flags with saddle anesthesia or incontinence. Worse with doing anything out of the house and making depression worse. Worse with flexion and extension. Has been working with Ortho on Cervical neck pain, CTS, and has had steroid injection in neck. Planning to see Ortho again in the next 2 weeks. MRI L spine 7/31/2020:  \"IMPRESSION:   1. L2-3 degenerative changes with marked progression since 2017. Probable  subacute to chronic mild right L3 superior endplate compression. Moderate to severe L2-3 central stenosis. 2. Status post surgical decompression at L3-4. Mild stenosis at this level. 3. L4-5 degenerative changes. Prominent posterior element hypertrophy resulting  in severe central spinal stenosis.   4. L1-2 degenerative changes with progression since 2017 resulting in moderate  central stenosis. \"    ROS  Gen - no fever/chills  Resp - no dyspnea or cough  CV - no chest pain or KASPER  Rest per HPI    Past Medical History:   Diagnosis Date    Arthritis     Cancer ([address]     skin cancer    Chronic pain     CHRONIC BOWEL PAIN    Diverticulitis     Fibromyalgia     GERD (gastroesophageal reflux disease)     Goiter     Hiatal hernia     Hypercholesterolemia     Hypertension     IBS (irritable bowel syndrome)     CONSTIPATION, CHRONIC SINCE HER 20s    Insomnia     TAKES TRAZODONE NIGHTLY    Migraine     REDUCED IN FREQUENCY AND SEVERITY SINCE MENOPAUSE    Psychiatric disorder 3/11    DEPRESSION    Rectocele      Past Surgical History:   Procedure Laterality Date    COLONOSCOPY N/A 6/21/2016    [facility]COPY    COLONOSCOPY N/A 6/27/2018    COLONOSCOPY performed by [facility] at [facility] ENDOSCOPY    ENDOSCOPY, COLON, DIAGNOSTIC  11/2010    Dr. Arturo Cam-     HX CHOLECYSTECTOMY  2006   Angela Patiño  2007    Dr. Pond/ diverticulitis    HX GI  X3  LAST ONE 12/10    COLONOSCOPY    HX GYN  1982    D&C WITH SUCTION    HX HYSTERECTOMY      HX ORTHOPAEDIC      right foot surgery    HX ORTHOPAEDIC      neck surgery 3/21/11 Dr. Latrice Topete HX ORTHOPAEDIC  08/31/2017    back surgery    HX OTHER SURGICAL      EGD    HX TONSILLECTOMY       Current Outpatient Medications on File Prior to Visit   Medication Sig Dispense Refill    polyethylene glycol (MIRALAX) 17 gram packet DIS 17 GRAMS IN LIQUID AND TK PO ONCE A DAY      OTHER Prevagen tab one daily      cholecalciferol, vitamin D3, (Vitamin D3) 50 mcg (2,000 unit) tab Take  by mouth daily.  chlorphen-pseudoeph-ibuprofen (Advil Allergy Sinus) 2- mg tab Take  by mouth daily.  cyclobenzaprine (FLEXERIL) 5 mg tablet Take 1 Tab by mouth nightly. 90 Tab 0    lubiPROStone (AMITIZA) 24 mcg capsule 24 mcg.       buPROPion SR (WELLBUTRIN, ZYBAN) 200 mg SR tablet Take 1 Tab by mouth two (2) times a day. 180 Tab 0    estradioL (ESTRACE) 0.01 % (0.1 mg/gram) vaginal cream Apply to Vaginal twice a week with fingertip 42.5 g 1    diclofenac EC (VOLTAREN) 75 mg EC tablet Take 1 Tab by mouth two (2) times daily as needed for Pain. 60 Tab 3    oxybutynin (DITROPAN) 5 mg tablet take 1 tablet 2 times a day (Patient taking differently: two (2) times daily as needed. take 1 tablet 2 times a day) 90 Tab 0    rosuvastatin (CRESTOR) 20 mg tablet TAKE 1 TABLET NIGHTLY 90 Tab 1    lisinopril (PRINIVIL, ZESTRIL) 40 mg tablet TAKE 1 TABLET DAILY 90 Tab 3    DULoxetine (CYMBALTA) 60 mg capsule TAKE 1 CAPSULE TWICE A  Cap 3    chlorhexidine (PERIDEX) 0.12 % solution       dicyclomine (BENTYL) 10 mg capsule TAKE 2 CAPSULES FOUR TIMES A DAY AS NEEDED 720 Cap 3    hydroCHLOROthiazide (HYDRODIURIL) 25 mg tablet TAKE 1 TABLET DAILY 90 Tab 2    sucralfate (CARAFATE) 1 gram tablet Take 1 g by mouth Three (3) times a week. Take 1 tablet by mouth as needed       diclofenac (VOLTAREN) 1 % gel Apply 2 g to affected area four (4) times daily as needed. 100 g 3    aspirin delayed-release 81 mg tablet Take 81 mg by mouth two (2) times a week.  [DISCONTINUED] cyclobenzaprine (FLEXERIL) 10 mg tablet Take 10 mg by mouth three (3) times daily as needed.  [DISCONTINUED] potassium 99 mg tablet Take 99 mg by mouth daily.  [DISCONTINUED] gabapentin (NEURONTIN) 300 mg capsule Take 1 Cap by mouth two (2) times a day. Max Daily Amount: 600 mg. 180 Cap 0    [DISCONTINUED] ALPRAZolam (XANAX) 1 mg tablet 1 mg.  [DISCONTINUED] naproxen (NAPROSYN) 500 mg tablet TK 1 T PO  BID WITH MEALS FOR ANKLE PAIN      [DISCONTINUED] pantoprazole (PROTONIX) 40 mg tablet Take 1 Tab by mouth daily. 30 Tab 4     No current facility-administered medications on file prior to visit.          Objective:     Vitals:    08/07/20 0947 08/07/20 1020   BP: 140/58 134/66 Pulse: 89    Resp: 16    Temp: 96.8 °F (36 °C)    TempSrc: Temporal    SpO2: 98%    Weight: 173 lb 3.2 oz (78.6 kg)    Height: 5' 4\" (1.626 m)      Physical Examination:  General appearance - alert, well appearing, and in no distress  Eyes -sclera anicteric  Neck - supple, no significant adenopathy, no thyromegaly, no bruits  Chest - clear to auscultation, no wheezes, rales or rhonchi, symmetric air entry  Heart - normal rate, regular rhythm, normal S1, S2, no murmurs, rubs, clicks or gallops  Neurological - alert, oriented, no focal findings or movement disorder noted  Extremities-no edema  Psych-normal mood and affect    Assessment/ Plan:   Diagnoses and all orders for this visit:    1. Medicare annual wellness visit, subsequent  2. Advanced directives, counseling/discussion  -     ADVANCE CARE PLANNING FIRST 30 MINS    3. Essential hypertension with goal blood pressure less than [id number]  -     METABOLIC PANEL, BASIC    4. Mixed hyperlipidemia-stable    5. IGT (impaired glucose tolerance)- stable    6. Encounter for long-term (current) use of medications  -     METABOLIC PANEL, BASIC    7. Moderate episode of recurrent major depressive disorder (HCC)-seems to be improved overall with medications and life circumstances. Pain still a major contributor    8. Chronic pain syndrome  9. Fibromyalgia  Spinal stenosis  -Increasing gabapentin slightly and will continue other medications and regimen. To see Ortho soon  -     gabapentin (NEURONTIN) 300 mg capsule; Take 1 Cap by mouth three (3) times daily. Max Daily Amount: 900 mg. 10. Decreased GFR-rechecking labs  -     METABOLIC PANEL, BASIC    11. Personal history of tobacco use, presenting hazards to health  -     CT LOW DOSE LUNG CANCER SCREENING; Future        3. Essential hypertension with goal blood pressure less than 140/90 - controlled  -     METABOLIC PANEL, COMPREHENSIVE    4.  Mixed hyperlipidemia - stable  -     METABOLIC PANEL, COMPREHENSIVE  -     LIPID PANEL  -     HEMOGLOBIN A1C WITH EAG  -     TSH 3RD GENERATION    5. IGT (impaired glucose tolerance) - rechecking labs  -     HEMOGLOBIN A1C WITH EAG    6. Encounter for long-term (current) use of medications  -     METABOLIC PANEL, COMPREHENSIVE  -     LIPID PANEL  -     HEMOGLOBIN A1C WITH EAG  -     CBC W/O DIFF  -     TSH 3RD GENERATION    7. Chronic pain syndrome  8. Spinal stenosis of lumbar region with neurogenic claudication  9. Status post lumbar spine surgery for decompression of spinal cord  - will use Prednisone, Tramadol, Flexeril and recheck x-rays    I spent > 50% of the 40 min visit counseling and educating about chronic pain, fibromyalgia, depression, lumbar pain with hx of lumbar decompression for spinal stenosis, HTN, HLD, IGT. I have discussed the diagnosis with the patient and the intended plan as seen in the above orders. The patient has received an after-visit summary and questions were answered concerning future plans. I have discussed medication side effects and warnings with the patient as well. The patient verbalizes understanding and agreement with the plan. Follow-up and Dispositions    · Return in about 3 months (around 11/7/2020), or if symptoms worsen or fail to improve. This is the Subsequent Medicare Annual Wellness Exam, performed 12 months or more after the Initial AWV or the last Subsequent AWV    I have reviewed the patient's medical history in detail and updated the computerized patient record.      History     Patient Active Problem List   Diagnosis Code    IBS (irritable bowel syndrome) K58.9    Fibromyalgia M79.7    Hiatal hernia K44.9    GERD (gastroesophageal reflux disease) K21.9    Hypercholesterolemia E78.00    Diverticulitis K57.92    Depression F32.9    Essential hypertension I10    Encounter for medication monitoring Z51.81    DDD (degenerative disc disease), cervical M50.30    Spinal stenosis of lumbar region with neurogenic claudication M48.062    Non morbid obesity E66.9    Depression, major, severe recurrence (HCC) F33.2     Past Medical History:   Diagnosis Date    Arthritis     Cancer ([address]     skin cancer    Chronic pain     CHRONIC BOWEL PAIN    Diverticulitis     Fibromyalgia     GERD (gastroesophageal reflux disease)     Goiter     Hiatal hernia     Hypercholesterolemia     Hypertension     IBS (irritable bowel syndrome)     CONSTIPATION, CHRONIC SINCE HER 20s    Insomnia     TAKES TRAZODONE NIGHTLY    Migraine     REDUCED IN FREQUENCY AND SEVERITY SINCE MENOPAUSE    Psychiatric disorder 3/11    DEPRESSION    Rectocele       Past Surgical History:   Procedure Laterality Date    COLONOSCOPY N/A 6/21/2016    [facility]el [facility] END[facility]COPY    COLONOSCOPY N/A 6/27/2018    COLONOSCOPY performed by [facility]el [facility] at [facility] ENDOSCOPY    Ian Snider, DIAGNOSTIC  11/2010    Dr. Denny Rankin-     HX CHOLECYSTECTOMY  2006   Omer Reveal  2007    Dr. Pond/ diverticulitis    HX GI  X3  LAST ONE 12/10    COLONOSCOPY    HX GYN  1982    D&C WITH SUCTION    HX HYSTERECTOMY      HX ORTHOPAEDIC      right foot surgery    HX ORTHOPAEDIC      neck surgery 3/21/11 Dr. Marysol Vaughn HX ORTHOPAEDIC  08/31/2017    back surgery    HX OTHER SURGICAL      EGD    HX TONSILLECTOMY       Current Outpatient Medications   Medication Sig Dispense Refill    polyethylene glycol (MIRALAX) 17 gram packet DIS 17 GRAMS IN LIQUID AND TK PO ONCE A DAY      OTHER Prevagen tab one daily      cholecalciferol, vitamin D3, (Vitamin D3) 50 mcg (2,000 unit) tab Take  by mouth daily.  chlorphen-pseudoeph-ibuprofen (Advil Allergy Sinus) 2- mg tab Take  by mouth daily.  gabapentin (NEURONTIN) 300 mg capsule Take 1 Cap by mouth three (3) times daily. Max Daily Amount: 900 mg. 270 Cap 0    cyclobenzaprine (FLEXERIL) 5 mg tablet Take 1 Tab by mouth nightly.  90 Tab 0    lubiPROStone (AMITIZA) 24 mcg capsule 24 mcg.  buPROPion SR (WELLBUTRIN, ZYBAN) 200 mg SR tablet Take 1 Tab by mouth two (2) times a day. 180 Tab 0    estradioL (ESTRACE) 0.01 % (0.1 mg/gram) vaginal cream Apply to Vaginal twice a week with fingertip 42.5 g 1    diclofenac EC (VOLTAREN) 75 mg EC tablet Take 1 Tab by mouth two (2) times daily as needed for Pain. 60 Tab 3    oxybutynin (DITROPAN) 5 mg tablet take 1 tablet 2 times a day (Patient taking differently: two (2) times daily as needed. take 1 tablet 2 times a day) 90 Tab 0    rosuvastatin (CRESTOR) 20 mg tablet TAKE 1 TABLET NIGHTLY 90 Tab 1    lisinopril (PRINIVIL, ZESTRIL) 40 mg tablet TAKE 1 TABLET DAILY 90 Tab 3    DULoxetine (CYMBALTA) 60 mg capsule TAKE 1 CAPSULE TWICE A  Cap 3    chlorhexidine (PERIDEX) 0.12 % solution       dicyclomine (BENTYL) 10 mg capsule TAKE 2 CAPSULES FOUR TIMES A DAY AS NEEDED 720 Cap 3    hydroCHLOROthiazide (HYDRODIURIL) 25 mg tablet TAKE 1 TABLET DAILY 90 Tab 2    sucralfate (CARAFATE) 1 gram tablet Take 1 g by mouth Three (3) times a week. Take 1 tablet by mouth as needed       diclofenac (VOLTAREN) 1 % gel Apply 2 g to affected area four (4) times daily as needed. 100 g 3    aspirin delayed-release 81 mg tablet Take 81 mg by mouth two (2) times a week.        Allergies   Allergen Reactions    Latex Hives    Codeine Other (comments)     Severe Headache    Morphine Hives    Ambien [Zolpidem] Other (comments)     Severe behavior changes    Dilaudid [Hydromorphone] Other (comments)     Memory loss    Other Medication Rash     BANDAIDS    Shellfish Containing Products Hives       Family History   Problem Relation Age of Onset    Cancer Father         lung and bone    Stroke Sister     Cancer Sister 76        PANCREATIC    Hypertension Mother     High Cholesterol Mother     Alzheimer Mother         late 62s early 76s    Cancer Other         COLON    Cancer Other         breast    Diabetes Maternal Aunt     Cancer Maternal Uncle         COLON    Cancer Maternal Grandfather         COLON    Ovarian Cancer Daughter 28     Social History     Tobacco Use    Smoking status: Former Smoker     Packs/day: 1.00     Years: 45.00     Pack years: 45.00     Last attempt to quit: 2007     Years since quittin.6    Smokeless tobacco: Never Used   Substance Use Topics    Alcohol use: Yes     Alcohol/week: 0.0 standard drinks     Comment: holidays       Depression Risk Factor Screening:     3 most recent PHQ Screens 3/27/2019   Little interest or pleasure in doing things Nearly every day   Feeling down, depressed, irritable, or hopeless Nearly every day   Total Score PHQ 2 6   Trouble falling or staying asleep, or sleeping too much Nearly every day   Feeling tired or having little energy Nearly every day   Poor appetite, weight loss, or overeating Nearly every day   Feeling bad about yourself - or that you are a failure or have let yourself or your family down Nearly every day   Trouble concentrating on things such as school, work, reading, or watching TV Nearly every day   Moving or speaking so slowly that other people could have noticed; or the opposite being so fidgety that others notice Several days   Thoughts of being better off dead, or hurting yourself in some way Several days   PHQ 9 Score 23       Alcohol Risk Factor Screening:   Do you average 1 drink per night or more than 7 drinks a week:  No    On any one occasion in the past three months have you have had more than 3 drinks containing alcohol:  No      Functional Ability and Level of Safety:   Hearing: Hearing is good. Activities of Daily Living: The home contains: no safety equipment. Patient does total self care     Ambulation: with no difficulty     Fall Risk:  Fall Risk Assessment, last 12 mths 2020   Able to walk? Yes   Fall in past 12 months? Yes   Fall with injury?  Yes   Number of falls in past 12 months 1   Fall Risk Score 2     Abuse Screen:  Patient is not abused       Cognitive Screening   Has your family/caregiver stated any concerns about your memory: no     Cognitive Screening: Normal - Verbal Fluency Test    Patient Care Team   Patient Care Team:  Pat Aguiar MD as PCP - General (Family Medicine)  Pat Aguiar MD as PCP - [facility]  Jackelyn Torres MD (Cardiology)    Assessment/Plan   Education and counseling provided:  Are appropriate based on today's review and evaluation    Diagnoses and all orders for this visit:    1. Medicare annual wellness visit, subsequent    2. Advanced directives, counseling/discussion  -     ADVANCE CARE PLANNING FIRST 30 MINS    3. Essential hypertension with goal blood pressure less than 456/27  -     METABOLIC PANEL, BASIC    4. Mixed hyperlipidemia    5. IGT (impaired glucose tolerance)    6. Encounter for long-term (current) use of medications  -     METABOLIC PANEL, BASIC    7. Moderate episode of recurrent major depressive disorder ([facility] [address]    8. Chronic pain syndrome  -     gabapentin (NEURONTIN) 300 mg capsule; Take 1 Cap by mouth three (3) times daily. Max Daily Amount: 900 mg.    9. Fibromyalgia  -     gabapentin (NEURONTIN) 300 mg capsule; Take 1 Cap by mouth three (3) times daily. Max Daily Amount: 900 mg. 10. Decreased GFR  -     METABOLIC PANEL, BASIC    11. Personal history of tobacco use, presenting hazards to health  -     CT LOW DOSE LUNG CANCER SCREENING; Future        Health Maintenance Due   Topic Date Due    Shingrix Vaccine Age 49> (1 of 2) 11/09/1998    GLAUCOMA SCREENING Q2Y  11/02/2018    Medicare Yearly Exam  12/06/2019    Influenza Age 9 to Adult  08/01/2020       Discussed with patient current guidelines for screening for lung cancer. Current recommendations are to obtain yearly screening LDCT yearly for age 46-80, or until smoke free for 15 years.   Patient has 39 pack year history of cigarette smoking and currently not smoking for 12 years.  Discussed with patient risks and benefits of screening, including over-diagnosis, false positive rate, and total radiation exposure. Patient currently exhibits no signs or symptoms suggestive of lung cancer. Discussed with patient importance of compliance with yearly annual lung cancer screenings and willingness to undergo diagnosis and treatment if screening scan is positive. In addition, patient was counseled regarding (remaining smoke free/total smoking cessation).

## 2020-08-07 NOTE — PATIENT INSTRUCTIONS
Medicare Wellness Visit, Female     The best way to live healthy is to have a lifestyle where you eat a well-balanced diet, exercise regularly, limit alcohol use, and quit all forms of tobacco/nicotine, if applicable. Regular preventive services are another way to keep healthy. Preventive services (vaccines, screening tests, monitoring & exams) can help personalize your care plan, which helps you manage your own care. Screening tests can find health problems at the earliest stages, when they are easiest to treat. [address] follows the current, evidence-based guidelines published by the [facility] ([facility]) when recommending preventive services for our patients. Because we follow these guidelines, sometimes recommendations change over time as research supports it. (For example, mammograms used to be recommended annually. Even though Medicare will still pay for an annual mammogram, the newer guidelines recommend a mammogram every two years for women of average risk.)  Of course, you and your doctor may decide to screen more often for some diseases, based on your risk and your health status. Preventive services for you include:  - Medicare offers their members a free annual wellness visit, which is time for you and your primary care provider to discuss and plan for your preventive service needs. Take advantage of this benefit every year!  -All adults over the age of 72 should receive the recommended pneumonia vaccines. Current USPSTF guidelines recommend a series of two vaccines for the best pneumonia protection.   -All adults should have a flu vaccine yearly and a tetanus vaccine every 10 years. All adults age 48 and older should receive a shingles vaccine once in their lifetime.    -A bone mass density test is recommended when a woman turns 65 to screen for osteoporosis. This test is only recommended one time, as a screening.  Some providers will use this same test as a disease monitoring tool if you already have osteoporosis. -All adults age 38-68 who are overweight should have a diabetes screening test once every three years.   -Other screening tests and preventive services for persons with diabetes include: an eye exam to screen for diabetic retinopathy, a kidney function test, a foot exam, and stricter control over your cholesterol.   -Cardiovascular screening for adults with routine risk involves an electrocardiogram (ECG) at intervals determined by your doctor.   -Colorectal cancer screenings should be done for adults age 54-65 with no increased risk factors for colorectal cancer. There are a number of acceptable methods of screening for this type of cancer. Each test has its own benefits and drawbacks. Discuss with your doctor what is most appropriate for you during your annual wellness visit. The different tests include: colonoscopy (considered the best screening method), a fecal occult blood test, a fecal DNA test, and sigmoidoscopy. -Breast cancer screenings are recommended every other year for women of normal risk, age 54-69.  -Cervical cancer screenings for women over age 72 are only recommended with certain risk factors.   -All adults born between [facility] should be screened once for Hepatitis C. Here is a list of your current Health Maintenance items (your personalized list of preventive services) with a due date:  Health Maintenance Due   Topic Date Due    Shingles Vaccine (1 of 2) 11/09/1998    Glaucoma Screening   11/02/2018    Annual Well Visit  12/06/2019    Flu Vaccine  08/01/2020         Medicare Wellness Visit, Female     The best way to live healthy is to have a lifestyle where you eat a well-balanced diet, exercise regularly, limit alcohol use, and quit all forms of tobacco/nicotine, if applicable. Regular preventive services are another way to keep healthy.  Preventive services (vaccines, screening tests, monitoring & exams) can help personalize your care plan, which helps you manage your own care. Screening tests can find health problems at the earliest stages, when they are easiest to treat. [facility] follows the current, evidence-based guidelines published by the [facility] ([facility]) when recommending preventive services for our patients. Because we follow these guidelines, sometimes recommendations change over time as research supports it. (For example, mammograms used to be recommended annually. Even though Medicare will still pay for an annual mammogram, the newer guidelines recommend a mammogram every two years for women of average risk). Of course, you and your doctor may decide to screen more often for some diseases, based on your risk and your co-morbidities (chronic disease you are already diagnosed with). Preventive services for you include:  - Medicare offers their members a free annual wellness visit, which is time for you and your primary care provider to discuss and plan for your preventive service needs. Take advantage of this benefit every year!  -All adults over the age of 72 should receive the recommended pneumonia vaccines. Current USPSTF guidelines recommend a series of two vaccines for the best pneumonia protection.   -All adults should have a flu vaccine yearly and a tetanus vaccine every 10 years.   -All adults age 48 and older should receive the shingles vaccines (series of two vaccines).       -All adults age 38-68 who are overweight should have a diabetes screening test once every three years.   -All adults born between [birth year] and 1967 should be screened once for Hepatitis C.  -Other screening tests and preventive services for persons with diabetes include: an eye exam to screen for diabetic retinopathy, a kidney function test, a foot exam, and stricter control over your cholesterol.   -Cardiovascular screening for adults with routine risk involves an electrocardiogram (ECG) at intervals determined by your doctor.   -Colorectal cancer screenings should be done for adults age 54-65 with no increased risk factors for colorectal cancer. There are a number of acceptable methods of screening for this type of cancer. Each test has its own benefits and drawbacks. Discuss with your doctor what is most appropriate for you during your annual wellness visit. The different tests include: colonoscopy (considered the best screening method), a fecal occult blood test, a fecal DNA test, and sigmoidoscopy.    -A bone mass density test is recommended when a woman turns 65 to screen for osteoporosis. This test is only recommended one time, as a screening. Some providers will use this same test as a disease monitoring tool if you already have osteoporosis. -Breast cancer screenings are recommended every other year for women of normal risk, age 54-69.  -Cervical cancer screenings for women over age 72 are only recommended with certain risk factors.      Here is a list of your current Health Maintenance items (your personalized list of preventive services) with a due date:  Health Maintenance Due   Topic Date Due    Shingles Vaccine (1 of 2) 11/09/1998    Glaucoma Screening   11/02/2018    Annual Well Visit  12/06/2019    Flu Vaccine  08/01/2020

## 2020-08-08 LAB
BUN SERPL-MCNC: 19 MG/DL (ref 8–27)
BUN/CREAT SERPL: 16 (ref 12–28)
CALCIUM SERPL-MCNC: 9.8 MG/DL (ref 8.7–10.3)
CHLORIDE SERPL-SCNC: 106 MMOL/L (ref 96–106)
CO2 SERPL-SCNC: 23 MMOL/L (ref 20–29)
CREAT SERPL-MCNC: 1.16 MG/DL (ref 0.57–1)
GLUCOSE SERPL-MCNC: 97 MG/DL (ref 65–99)
INTERPRETATION: NORMAL
POTASSIUM SERPL-SCNC: 4.6 MMOL/L (ref 3.5–5.2)
SODIUM SERPL-SCNC: 145 MMOL/L (ref 134–144)

## 2020-09-03 RX ORDER — MINERAL OIL
180 ENEMA (ML) RECTAL
COMMUNITY
End: 2021-09-15 | Stop reason: ALTCHOICE

## 2020-09-03 RX ORDER — PREGABALIN 75 MG/1
75 CAPSULE ORAL 2 TIMES DAILY
COMMUNITY
End: 2021-02-03 | Stop reason: ALTCHOICE

## 2020-09-03 NOTE — PERIOP NOTES
[facility]  Ambulatory Surgery Unit  Pre-operative Instructions    Surgery/Procedure Date  9/14            Tentative Arrival Time TBD      1. On the day of your surgery/procedure, please report to the Ambulatory Surgery Unit Registration Desk and sign in at your designated time. The Ambulatory Surgery Unit is located in [facility] on the [facility] side of the [facility] across from the [address] [place] [address]. Please have all of your health insurance cards and a photo ID. 2. You must have someone with you to drive you home, as you should not drive a car for 24 hours following anesthesia. Please make arrangements for a responsible adult friend or family member to stay with you for at least the first 24 hours after your surgery. 3. Do not have anything to eat or drink (including water, gum, mints, coffee, juice) after 11:59 PM  9/13. This may not apply to medications prescribed by your physician. (Please note below the special instructions with medications to take the morning of surgery, if applicable.)    4. We recommend you do not drink any alcoholic beverages for 24 hours before and after your surgery. 5. Contact your surgeons office for instructions on the following medications: non-steroidal anti-inflammatory drugs (i.e. Advil, Aleve), vitamins, and supplements. (Some surgeons will want you to stop these medications prior to surgery and others may allow you to take them)   **If you are currently taking Plavix, Coumadin, Aspirin and/or other blood-thinning agents, contact your surgeon for instructions. ** Your surgeon will partner with the physician prescribing these medications to determine if it is safe to stop or if you need to continue taking. Please do not stop taking these medications without instructions from your surgeon.     6. In an effort to help prevent surgical site infection, we ask that you shower with an anti-bacterial soap (i.e. Dial/Safeguard, or the soap provided to you at your preadmission testing appointment) for 3 days prior to and on the morning of surgery, using a fresh towel after each shower. (Please begin this process with fresh bed linens.) Do not apply any lotions, powders, or deodorants after the shower on the day of your procedure. If applicable, please do not shave the operative site for 48 hours prior to surgery. 7. Wear comfortable clothes. Wear glasses instead of contacts. Do not bring any jewelry or money (other than copays or fees as instructed). Do not wear make-up, particularly mascara, the morning of your surgery. Do not wear nail polish, particularly if you are having foot /hand surgery. Wear your hair loose or down, no ponytails, buns, stalin pins or clips. All body piercings must be removed. 8. You should understand that if you do not follow these instructions your surgery may be cancelled. If your physical condition changes (i.e. fever, cold or flu) please contact your surgeon as soon as possible. 9. It is important that you be on time. If a situation occurs where you may be late, or if you have any questions or problems, please call [phone].    10. Your surgery time may be subject to change. You will receive a phone call the day prior to surgery to confirm your arrival time. Special Instructions: Take all medications and inhalers, as prescribed, on the morning of surgery with a sip of water EXCEPT: none    I understand a pre-operative phone call will be made to verify my surgery time. In the event that I am not available, I give permission for a message to be left on my answering service and/or with another person? yes    Reviewed by phone with pt, verbalized understanding.   Pt aware of recommendation to self quarantine post covid testing.     ___________________      ___________________      ________________  (Signature of Patient)          (Witness)                   (Date and Time)

## 2020-09-10 ENCOUNTER — HOSPITAL ENCOUNTER (OUTPATIENT)
Dept: PREADMISSION TESTING | Age: 70
Discharge: HOME | End: 2020-09-10
Payer: MEDICARE

## 2020-09-10 PROCEDURE — 87635 SARS-COV-2 COVID-19 AMP PRB: CPT

## 2020-09-11 ENCOUNTER — ANESTHESIA EVENT (OUTPATIENT)
Dept: SURGERY | Age: 70
End: 2020-09-11
Payer: MEDICARE

## 2020-09-11 LAB
HEALTH STATUS, XMCV2T: NORMAL
SARS-COV-2, COV2NT: NOT DETECTED
SOURCE, COVRS: NORMAL
SPECIMEN SOURCE, FCOV2M: NORMAL
SPECIMEN TYPE, XMCV1T: NORMAL

## 2020-09-14 ENCOUNTER — HOSPITAL ENCOUNTER (OUTPATIENT)
Age: 70
Setting detail: OUTPATIENT SURGERY
Discharge: HOME | End: 2020-09-14
Attending: ORTHOPAEDIC SURGERY | Admitting: ORTHOPAEDIC SURGERY
Payer: MEDICARE

## 2020-09-14 ENCOUNTER — ANESTHESIA (OUTPATIENT)
Dept: SURGERY | Age: 70
End: 2020-09-14
Payer: MEDICARE

## 2020-09-14 VITALS
RESPIRATION RATE: 13 BRPM | HEART RATE: 66 BPM | OXYGEN SATURATION: 100 % | BODY MASS INDEX: 29.02 KG/M2 | WEIGHT: 170 LBS | HEIGHT: 64 IN | SYSTOLIC BLOOD PRESSURE: 128 MMHG | DIASTOLIC BLOOD PRESSURE: 78 MMHG | TEMPERATURE: 97.6 F

## 2020-09-14 PROCEDURE — 74011000250 HC RX REV CODE- 250

## 2020-09-14 PROCEDURE — 2709999900 HC NON-CHARGEABLE SUPPLY: Performed by: ORTHOPAEDIC SURGERY

## 2020-09-14 PROCEDURE — 74011250636 HC RX REV CODE- 250/636

## 2020-09-14 PROCEDURE — 76210000046 HC AMBSU PH II REC FIRST 0.5 HR: Performed by: ORTHOPAEDIC SURGERY

## 2020-09-14 PROCEDURE — 74011000250 HC RX REV CODE- 250: Performed by: ORTHOPAEDIC SURGERY

## 2020-09-14 PROCEDURE — 77030040356 HC CORD BPLR FRCP COVD -A: Performed by: ORTHOPAEDIC SURGERY

## 2020-09-14 PROCEDURE — 77030018836 HC SOL IRR NACL ICUM -A: Performed by: ORTHOPAEDIC SURGERY

## 2020-09-14 PROCEDURE — 76060000061 HC AMB SURG ANES 0.5 TO 1 HR: Performed by: ORTHOPAEDIC SURGERY

## 2020-09-14 PROCEDURE — 76030000000 HC AMB SURG OR TIME 0.5 TO 1: Performed by: ORTHOPAEDIC SURGERY

## 2020-09-14 PROCEDURE — 74011250636 HC RX REV CODE- 250/636: Performed by: ORTHOPAEDIC SURGERY

## 2020-09-14 PROCEDURE — 76210000040 HC AMBSU PH I REC FIRST 0.5 HR: Performed by: ORTHOPAEDIC SURGERY

## 2020-09-14 PROCEDURE — 74011250637 HC RX REV CODE- 250/637: Performed by: ORTHOPAEDIC SURGERY

## 2020-09-14 PROCEDURE — 77030002916 HC SUT ETHLN J&J -A: Performed by: ORTHOPAEDIC SURGERY

## 2020-09-14 PROCEDURE — 74011250636 HC RX REV CODE- 250/636: Performed by: ANESTHESIOLOGY

## 2020-09-14 PROCEDURE — 77030021352 HC CBL LD SYS DISP COVD -B: Performed by: ORTHOPAEDIC SURGERY

## 2020-09-14 PROCEDURE — 77030000032 HC CUF TRNQT ZIMM -B: Performed by: ORTHOPAEDIC SURGERY

## 2020-09-14 RX ORDER — LIDOCAINE HYDROCHLORIDE 20 MG/ML
INJECTION, SOLUTION EPIDURAL; INFILTRATION; INTRACAUDAL; PERINEURAL AS NEEDED
Status: DISCONTINUED | OUTPATIENT
Start: 2020-09-14 | End: 2020-09-14 | Stop reason: HOSPADM

## 2020-09-14 RX ORDER — SODIUM CHLORIDE 0.9 % (FLUSH) 0.9 %
5-40 SYRINGE (ML) INJECTION AS NEEDED
Status: DISCONTINUED | OUTPATIENT
Start: 2020-09-14 | End: 2020-09-14 | Stop reason: HOSPADM

## 2020-09-14 RX ORDER — OXYCODONE AND ACETAMINOPHEN 5; 325 MG/1; MG/1
1 TABLET ORAL
Status: DISCONTINUED | OUTPATIENT
Start: 2020-09-14 | End: 2020-09-14 | Stop reason: HOSPADM

## 2020-09-14 RX ORDER — PROPOFOL 10 MG/ML
INJECTION, EMULSION INTRAVENOUS
Status: DISCONTINUED | OUTPATIENT
Start: 2020-09-14 | End: 2020-09-14 | Stop reason: HOSPADM

## 2020-09-14 RX ORDER — SODIUM CHLORIDE 0.9 % (FLUSH) 0.9 %
5-40 SYRINGE (ML) INJECTION EVERY 8 HOURS
Status: DISCONTINUED | OUTPATIENT
Start: 2020-09-14 | End: 2020-09-14 | Stop reason: HOSPADM

## 2020-09-14 RX ORDER — ONDANSETRON 2 MG/ML
INJECTION INTRAMUSCULAR; INTRAVENOUS AS NEEDED
Status: DISCONTINUED | OUTPATIENT
Start: 2020-09-14 | End: 2020-09-14 | Stop reason: HOSPADM

## 2020-09-14 RX ORDER — SODIUM CHLORIDE, SODIUM LACTATE, POTASSIUM CHLORIDE, CALCIUM CHLORIDE 600; 310; 30; 20 MG/100ML; MG/100ML; MG/100ML; MG/100ML
25 INJECTION, SOLUTION INTRAVENOUS CONTINUOUS
Status: DISCONTINUED | OUTPATIENT
Start: 2020-09-14 | End: 2020-09-14 | Stop reason: HOSPADM

## 2020-09-14 RX ORDER — LIDOCAINE HYDROCHLORIDE 20 MG/ML
5 INJECTION, SOLUTION INFILTRATION; PERINEURAL ONCE
Status: COMPLETED | OUTPATIENT
Start: 2020-09-14 | End: 2020-09-14

## 2020-09-14 RX ORDER — IBUPROFEN 800 MG/1
800 TABLET ORAL
Qty: 30 TAB | Refills: 0 | Status: SHIPPED | OUTPATIENT
Start: 2020-09-14 | End: 2021-02-03 | Stop reason: ALTCHOICE

## 2020-09-14 RX ORDER — ONDANSETRON 2 MG/ML
4 INJECTION INTRAMUSCULAR; INTRAVENOUS AS NEEDED
Status: DISCONTINUED | OUTPATIENT
Start: 2020-09-14 | End: 2020-09-14 | Stop reason: HOSPADM

## 2020-09-14 RX ORDER — MIDAZOLAM HYDROCHLORIDE 1 MG/ML
INJECTION, SOLUTION INTRAMUSCULAR; INTRAVENOUS AS NEEDED
Status: DISCONTINUED | OUTPATIENT
Start: 2020-09-14 | End: 2020-09-14 | Stop reason: HOSPADM

## 2020-09-14 RX ORDER — LIDOCAINE HYDROCHLORIDE 10 MG/ML
0.1 INJECTION, SOLUTION EPIDURAL; INFILTRATION; INTRACAUDAL; PERINEURAL AS NEEDED
Status: DISCONTINUED | OUTPATIENT
Start: 2020-09-14 | End: 2020-09-14 | Stop reason: HOSPADM

## 2020-09-14 RX ORDER — DIPHENHYDRAMINE HYDROCHLORIDE 50 MG/ML
12.5 INJECTION, SOLUTION INTRAMUSCULAR; INTRAVENOUS AS NEEDED
Status: DISCONTINUED | OUTPATIENT
Start: 2020-09-14 | End: 2020-09-14 | Stop reason: HOSPADM

## 2020-09-14 RX ORDER — BUPIVACAINE HYDROCHLORIDE 2.5 MG/ML
5 INJECTION, SOLUTION EPIDURAL; INFILTRATION; INTRACAUDAL ONCE
Status: COMPLETED | OUTPATIENT
Start: 2020-09-14 | End: 2020-09-14

## 2020-09-14 RX ORDER — FENTANYL CITRATE 50 UG/ML
25 INJECTION, SOLUTION INTRAMUSCULAR; INTRAVENOUS
Status: DISCONTINUED | OUTPATIENT
Start: 2020-09-14 | End: 2020-09-14 | Stop reason: HOSPADM

## 2020-09-14 RX ADMIN — ONDANSETRON HYDROCHLORIDE 4 MG: 2 INJECTION, SOLUTION INTRAMUSCULAR; INTRAVENOUS at 11:15

## 2020-09-14 RX ADMIN — MIDAZOLAM HYDROCHLORIDE 2 MG: 1 INJECTION, SOLUTION INTRAMUSCULAR; INTRAVENOUS at 11:06

## 2020-09-14 RX ADMIN — SODIUM CHLORIDE, SODIUM LACTATE, POTASSIUM CHLORIDE, AND CALCIUM CHLORIDE 25 ML/HR: 600; 310; 30; 20 INJECTION, SOLUTION INTRAVENOUS at 09:46

## 2020-09-14 RX ADMIN — LIDOCAINE HYDROCHLORIDE 20 MG: 20 INJECTION, SOLUTION EPIDURAL; INFILTRATION; INTRACAUDAL; PERINEURAL at 11:12

## 2020-09-14 RX ADMIN — Medication 3 AMPULE: at 09:47

## 2020-09-14 RX ADMIN — PROPOFOL 50 MCG/KG/MIN: 10 INJECTION, EMULSION INTRAVENOUS at 11:13

## 2020-09-14 RX ADMIN — WATER 2 G: 1 INJECTION INTRAMUSCULAR; INTRAVENOUS; SUBCUTANEOUS at 11:05

## 2020-09-14 NOTE — OP NOTES
PATIENT NAME:  Jill Gaspar    SURGEON:  Diego Floyd MD    DATE OF SURGERY:  9/14/2020    [facility]    PREOPERATIVE DIAGNOSIS:   left carpal tunnel syndrome    POSTOPERATIVE DIAGNOSIS:  Same    PROCEDURE:  left Open carpal tunnel decompression             ANESTHESIA:  Local (1% lidocaine with 0.25% bupivicaine in a 1:1 mixture) with sedation     BLOOD LOSS:  Minimal    TOURNIQUET TIME:  10 min    OPERATIVE INDICATIONS: The patient is a 68 y.o. old female who has developed progressive left carpal tunnel syndrome, unresponsive to all conservative treatment. Symptoms have failed to respond consistently to conservative treatment such that patient has elected to undergo carpal tunnel decompression. She understands the alternatives to surgery, the nature of this elective procedure, the usual recovery, possible variations in healing, and the potential for shortcomings and complications (including but not exclusive to bleeding, infection, scar tenderness,  weakness, residual numbness, or thenar muscle weakness). DESCRIPTION  OF PROCEDURE: Patient identified correctly in the pre-operative holding area and correct extremity marked. Was then taken stable to the operating room and placed supine with the operative extremity on a hand table. After sedation was administered by the anesthesia team, the left hand and forearm were prepped and draped in a sterile field. A timeout was taken and the operative site was confirmed. Local anesthesia was instilled into the wound. The extremity was then elevated and exsanguinated and an forearm tourniquet was inflated to 200 mm of mercury. An incision was made in the proximal palm in line with the radial side of the ring finger from the distal wrist crease to Kaplans line. Dissection was carried through the subcutaneous tissue and the transverse carpal ligament was visualized.   This was released under direct visualization first distally followed by proximally and carried up past the wrist wrist crease. A complete decompression was confirmed by direct visualization of the decompressed median nerve as well as palpation. No other synovial pathology was noted. The tourniquet was then released. The nerve was noted to become hyperemic. Copious irrigation was performed. Hemostasis was obtained with bipolar cautery and the wound was closed with 3-0 nylon sutures. A sterile dressing was then applied leaving the fingers free for range of motion. The patient tolerated the procedure well and was discharged to the recovery area uneventfully. All instrument needle and lap counts were correct at the end of the case.

## 2020-09-14 NOTE — ANESTHESIA POSTPROCEDURE EVALUATION
Procedure(s):  LEFT OPEN CARPAL TUNNEL RELEASE ([facility] WITH LOCAL) (LATEX ALLERGY).     MAC    Anesthesia Post Evaluation      Multimodal analgesia: multimodal analgesia used between 6 hours prior to anesthesia start to PACU discharge  Patient location during evaluation: PACU  Patient participation: complete - patient participated  Level of consciousness: awake and alert  Pain score: 0  Airway patency: patent  Anesthetic complications: no  Cardiovascular status: acceptable  Respiratory status: acceptable  Hydration status: acceptable  Post anesthesia nausea and vomiting:  none  Final Post Anesthesia Temperature Assessment:  Normothermia (36.0-37.5 degrees C)      INITIAL Post-op Vital signs:   Vitals Value Taken Time   /78 9/14/2020 12:00 PM   Temp 36.4 °C (97.6 °F) 9/14/2020 11:56 AM   Pulse 66 9/14/2020 12:00 PM   Resp 13 9/14/2020 12:00 PM   SpO2 100 % 9/14/2020 12:00 PM

## 2020-09-14 NOTE — PERIOP NOTES
Westley Cabrera  1950  [id number]    Situation:  Verbal report given from: RN and CRNA  Procedure: Procedure(s):  LEFT OPEN CARPAL TUNNEL RELEASE ([facility] [address] [place] [address] WITH LOCAL) (LATEX ALLERGY)    Background:    Preoperative diagnosis: Carpal tunnel syndrome, bilateral [G56.03]    Postoperative diagnosis: Carpal tunnel syndrome, bilateral [G56.03]    :  Dr. Samina Mancuso    Assistant(s): Circ-1: Milagros Putnam RN  Scrub Tech-1: Aleena AGUILAR    Specimens: * No specimens in log *    Assessment:  Intra-procedure medications         Anesthesia gave intra-procedure sedation and medications, see anesthesia flow sheet     Intravenous fluids: LR@ KVO     Vital signs stable.  Pt denies pain or cill      Recommendation:    Permission to share finding with  : yes

## 2020-09-14 NOTE — PERIOP NOTES
Permission received to review discharge instructions and discuss private health information with Jennyfer Garcia, .

## 2020-09-14 NOTE — PERIOP NOTES
Patient: Elbert Fernandez MRN: [id number]  SSN: [id number]   YOB: 1950  Age: 68 y.o. Sex: female     Patient is status post Procedure(s):  LEFT OPEN CARPAL TUNNEL RELEASE ([facility] WITH LOCAL) (LATEX ALLERGY). Surgeon(s) and Role: Luis Felipe De La Cruz MD - Primary    Local/Dose/Irrigation:  SEE [facility]                  Peripheral IV 09/14/20 Right Hand (Active)   Site Assessment Clean, dry, & intact 09/14/20 0945   Phlebitis Assessment 0 09/14/20 0945   Infiltration Assessment 0 09/14/20 0945   Dressing Status New 09/14/20 0945   Dressing Type Transparent;Tape 09/14/20 0945   Hub Color/Line Status Pink; Infusing 09/14/20 0945                           Dressing/Packing:  Wound Hand Left-Dressing Type: 4 x 4;Cast padding;Xeroform; Other (Comment)(ACE WRAP) (09/14/20 0900)

## 2020-09-14 NOTE — H&P
Comes today for f/u of her CTS. Since I have last seen her, she has had significant worsening of symptoms particularly on the left. Has been worsening over the the last few months. No nocturnal symptoms. Numbness constant. Affects the whole hand.     Underwent a repeat EMG on 8/14/20. This is notable for a severe left CTS worse from prior. Mild R CTS slightly worsened from prior. PMH, PSH, ROS reviewed on prior intake forms     Objective:   Constitutional:  No acute distress. Well nourished. Well developed. Eyes:  Sclera are nonicteric. Respiratory:  No labored breathing. Cardiovascular:  No marked edema. Skin:  No marked skin ulcers. Neurological:  see below  Psychiatric: Alert and oriented x3. Musculoskeletal   Left hand:  -decreased whole hand sensation compared to contralateral side  -positive tinels at wrist  -negative tinels elbow     Radiographs:       No imaging obtained      Assessment:      1. Carpal tunnel syndrome, bilateral          Plan:   Discussed nature of condition as well as treatment options. She has failed non-operative management and her CTS on the left has significantly worsened and is severe. Have recommended surgical management. R/b/a open CTR discussed and she consents to proceed. Date of Surgery Update:  Anna Mcdonald was seen and examined. History and physical has been reviewed. The patient has been examined.  There have been no significant clinical changes since the completion of the originally dated History and Physical.    Signed By: Mindi Gallardo MD     September 14, 2020 10:43 AM

## 2020-09-14 NOTE — PERIOP NOTES
Pt. Alert. Denies pain or chill. Discharge instructions reviewed with caregiver and patient. Allowed and answered questions. Tolerating PO fluids. Both state ready for discharge.  1217 discharged to car without incident

## 2020-09-14 NOTE — ANESTHESIA PREPROCEDURE EVALUATION
Anesthetic History   No history of anesthetic complications            Review of Systems / Medical History  Patient summary reviewed, nursing notes reviewed and pertinent labs reviewed    Pulmonary  Within defined limits                 Neuro/Psych         Headaches and psychiatric history     Cardiovascular    Hypertension: well controlled          Hyperlipidemia    Exercise tolerance: >4 METS     GI/Hepatic/Renal     GERD: well controlled      Hiatal hernia    Comments: IBS Endo/Other      Hypothyroidism  Morbid obesity and arthritis     Other Findings   Comments: Left carpal tunnel syndrome    Fibromyalgia  Chronic pain  Thyroid Goiter           Physical Exam    Airway  Mallampati: II  TM Distance: 4 - 6 cm  Neck ROM: normal range of motion   Mouth opening: Normal     Cardiovascular    Rhythm: regular  Rate: normal         Dental    Dentition: Implants     Pulmonary  Breath sounds clear to auscultation               Abdominal  GI exam deferred       Other Findings            Anesthetic Plan    ASA: 2  Anesthesia type: MAC          Induction: Intravenous  Anesthetic plan and risks discussed with: Patient

## 2020-09-14 NOTE — DISCHARGE INSTRUCTIONS
[facility]  Post-operative instructions  For: [facility]    Your first postop appointment should be scheduled with Dr. Madi David for 2-3 weeks post-op. [address], [address]  [place]  Phone: [phone]  Hand Therapy Phone: [phone]  Fax: [phone]    Please follow these instructions for a safe and speedy recovery:    1. Surgical Bandage: Leave the bandage in place until 2 weeks after surgery. Please keep it clean and dry. To shower or bathe, apply a plastic bag or GLAD Press'n Seal® plastic wrap around the bandage or simply sponge bathe. After 2 weeks, you can remove the dressing and get incision wet but NO SOAKING. 2. Elevation: Hand swelling is best prevented by keeping your hand elevated above the level of your heart at all times, night and day. The opposite, dangling your hand below your waist, will cause additional pain, swelling, and later stiffness. You can elevate the hand in a sling or by propping it on a pillow at night. Occasionally, we will provide you with a custom-made foam block for elevating the arm. Ice compresses may help but do not rep[lace elevation. Frequently, extreme pain is caused by a tight bandage, which should be loosened. If pain is severe and progressive, call us at [phone] during the day (ask for immediate connection to Dr. Tanna Frankel Team) or during the night (ask for the on-call physician). 3. Medication: You will be provided with an appropriate pain medication (over-the-counter or prescription). Please fill this at a pharmacy promptly so you will have it available when all local anesthetic wears off. Take this to relieve pain as directed on the bottle. Please refrain from driving, drinking alcohol, and making important medical decisions while taking the medication. Please call us if you need something stronger.  Medication changes or refills must be made before 5pm or through your pharmacy. 4. Weight bearing: Do NOT bear any weight on the operative extremity for the first 2 weeks after surgery. After 2 weeks, you have a 5 pound weight lifting restriction. I want to thank you for choosing us for your hand care needs. My staff and I are committed to providing all our customers with the highest quality hand surgery and subsequent hand therapy care as possible. We want your recovery to be comfortable. If you have clinical non-emergent questions about your surgery or other hand conditions please call [phone] and ask for my team. Your call will be returned within 24 hours. DO NOT TAKE SLEEPING MEDICATIONS OR ANTIANXIETY MEDICATIONS THE NIGHT OF ANESTHESIA! CPAP PATIENTS BE SURE TO WEAR MACHINE WHENEVER NAPPING OR SLEEPING! DISCHARGE SUMMARY from Nurse    The following personal items collected during your admission are returned to you:   Dental Appliance: Dental Appliances: None  Vision: Visual Aid: Glasses()  Hearing Aid:    Jewelry: Jewelry: None  Clothing: Clothing: With patient  Other Valuables: Other Valuables: Purse, Eyeglasses()  Valuables sent to safe:        PATIENT INSTRUCTIONS:    After General Anesthesia or Intravenous Sedation, for 24 hours or while taking prescription Narcotics:        Someone should be with you for the next 24 hours. For your own safety, a responsible adult must drive you home. · Limit your activities  · Recommended activity: Rest today, up with assistance today. Do not climb stairs or shower unattended for the next 24 hours. · Please start with a soft bland diet and advance as tolerated (no nausea) to regular diet. · If you have a sore throat you should try the following: fluids, warm salt water gargles, or throat lozenges. If it does not improve after several days please follow up with your primary physician.   · Do not drive and operate hazardous machinery  · Do not make important personal or business decisions  · Do  not drink alcoholic beverages  · If you have not urinated within 8 hours after discharge, please contact your surgeon on call. Report the following to your surgeon:  · Excessive pain, swelling, redness or odor of or around the surgical area  · Temperature over 100.5  · Nausea and vomiting lasting longer than 4 hours or if unable to take medications  · Any signs of decreased circulation or nerve impairment to extremity: change in color, persistent  numbness, tingling, coldness or increase pain      · You will receive a Post Operative Call from one of the Recovery Room Nurses on the day after your surgery to check on you. It is very important for us to know how you are recovering after your surgery. If you have an issue or need to speak with someone, please call your surgeon, do not wait for the post operative call. · You may receive an e-mail or letter in the mail from [facility] regarding your experience with us in the Ambulatory Surgery Unit. Your feedback is valuable to us and we appreciate your participation in the survey. · If the above instructions are not adequate or you are having problems after your surgery, call the physician at their office number. · We wish you a speedy recovery ? What to do at Home:      *  Please give a list of your current medications to your Primary Care Provider. *  Please update this list whenever your medications are discontinued, doses are      changed, or new medications (including over-the-counter products) are added. *  Please carry medication information at all times in case of emergency situations. If you have not received your influenza and/or pneumococcal vaccine, please follow up with your primary care physician. The discharge information has been reviewed with the patient and caregiver. The patient and caregiver verbalized understanding. TO PREVENT AN INFECTION      1.  [facility] YOUR HANDS     To prevent infection, good handwashing is the most important thing you or your caregiver can do.  Wash your hands with soap and water or use the hand  we gave you before you touch any wounds. 2. SHOWER     Use the antibacterial soap we gave you when you take a shower.  Shower with this soap until your wounds are healed.  To reach all areas of your body, you may need someone to help you.  Dont forget to clean your belly button with every shower. 3.  USE CLEAN SHEETS     Use freshly cleaned sheets on your bed after surgery.  To keep the surgery site clean, do not allow pets to sleep with you while your wound is still healing. 4. STOP SMOKING     Stop smoking, or at least cut back on smoking     Smoking slows your healing. 5.  CONTROL YOUR BLOOD SUGAR     High blood sugars slow wound healing.  If you are diabetic, control your blood sugar levels before and after your surgery.

## 2020-09-23 RX ORDER — PANTOPRAZOLE SODIUM 40 MG/1
TABLET, DELAYED RELEASE ORAL
COMMUNITY
Start: 2019-12-16 | End: 2020-09-24 | Stop reason: SDUPTHER

## 2020-09-23 NOTE — TELEPHONE ENCOUNTER
Patient is requesting a RX refill pantoprazole (PROTONIX) 40 mg tablet  She can be reached @ [phone]

## 2020-09-24 RX ORDER — PANTOPRAZOLE SODIUM 40 MG/1
40 TABLET, DELAYED RELEASE ORAL DAILY
Qty: 90 TAB | Refills: 0 | Status: SHIPPED | OUTPATIENT
Start: 2020-09-24 | End: 2020-12-02 | Stop reason: SDUPTHER

## 2020-12-02 RX ORDER — PANTOPRAZOLE SODIUM 40 MG/1
40 TABLET, DELAYED RELEASE ORAL DAILY
Qty: 90 TAB | Refills: 0 | Status: SHIPPED | OUTPATIENT
Start: 2020-12-02 | End: 2021-02-27

## 2020-12-02 NOTE — TELEPHONE ENCOUNTER
Last visit:8/7/20  Next visit:not scheduled  Previous refill 9/24/20(90+0R)    Requested Prescriptions     Pending Prescriptions Disp Refills    pantoprazole (PROTONIX) 40 mg tablet 90 Tab 0     Sig: Take 1 Tab by mouth daily.

## 2020-12-29 DIAGNOSIS — K58.9 IRRITABLE BOWEL SYNDROME WITHOUT DIARRHEA: ICD-10-CM

## 2020-12-29 NOTE — TELEPHONE ENCOUNTER
----- Message from Edna Daigle sent at 12/29/2020  3:05 PM EST -----  Regarding: Dr. Mary Ragsdale telephone  General Message/Vendor Calls    Caller's first and last name: Pt      Reason for call:  Pt needs a new Rx  for dicyclomine 10mg tab 2 capsule 4 times a day as needed. Pt has been taking this med for years, the pt former PCP was the prescribing doctor. Pt uses [facility] mail order pharmacy [phone] on file. Pt is out of the med.       Callback required yes/no and why: yes      Best contact number(s): [phone]      Details to clarify the request: N/A      Edna Daigle

## 2020-12-30 RX ORDER — DICYCLOMINE HYDROCHLORIDE 10 MG/1
CAPSULE ORAL
Qty: 360 CAP | Refills: 0 | Status: SHIPPED | OUTPATIENT
Start: 2020-12-30 | End: 2021-02-27

## 2021-02-03 ENCOUNTER — OFFICE VISIT (OUTPATIENT)
Dept: FAMILY MEDICINE CLINIC | Age: 71
End: 2021-02-03
Payer: MEDICARE

## 2021-02-03 VITALS
SYSTOLIC BLOOD PRESSURE: 116 MMHG | TEMPERATURE: 97.1 F | DIASTOLIC BLOOD PRESSURE: 72 MMHG | RESPIRATION RATE: 16 BRPM | WEIGHT: 177 LBS | HEIGHT: 64 IN | BODY MASS INDEX: 30.22 KG/M2

## 2021-02-03 DIAGNOSIS — Z79.899 ENCOUNTER FOR LONG-TERM (CURRENT) USE OF MEDICATIONS: ICD-10-CM

## 2021-02-03 DIAGNOSIS — Z98.890 STATUS POST LUMBAR SPINE SURGERY FOR DECOMPRESSION OF SPINAL CORD: ICD-10-CM

## 2021-02-03 DIAGNOSIS — F33.1 MODERATE EPISODE OF RECURRENT MAJOR DEPRESSIVE DISORDER (HCC): ICD-10-CM

## 2021-02-03 DIAGNOSIS — M48.062 SPINAL STENOSIS OF LUMBAR REGION WITH NEUROGENIC CLAUDICATION: ICD-10-CM

## 2021-02-03 DIAGNOSIS — N18.31 CHRONIC RENAL FAILURE, STAGE 3A (HCC): ICD-10-CM

## 2021-02-03 DIAGNOSIS — R07.89 ATYPICAL CHEST PAIN: ICD-10-CM

## 2021-02-03 DIAGNOSIS — M79.7 FIBROMYALGIA: ICD-10-CM

## 2021-02-03 DIAGNOSIS — R00.2 PALPITATIONS: ICD-10-CM

## 2021-02-03 DIAGNOSIS — E78.2 MIXED HYPERLIPIDEMIA: ICD-10-CM

## 2021-02-03 DIAGNOSIS — I10 ESSENTIAL HYPERTENSION WITH GOAL BLOOD PRESSURE LESS THAN 140/90: Primary | ICD-10-CM

## 2021-02-03 DIAGNOSIS — G89.4 CHRONIC PAIN SYNDROME: ICD-10-CM

## 2021-02-03 DIAGNOSIS — R73.02 IGT (IMPAIRED GLUCOSE TOLERANCE): ICD-10-CM

## 2021-02-03 PROCEDURE — 1100F PTFALLS ASSESS-DOCD GE2>/YR: CPT | Performed by: FAMILY MEDICINE

## 2021-02-03 PROCEDURE — G8752 SYS BP LESS 140: HCPCS | Performed by: FAMILY MEDICINE

## 2021-02-03 PROCEDURE — G8754 DIAS BP LESS 90: HCPCS | Performed by: FAMILY MEDICINE

## 2021-02-03 PROCEDURE — G8536 NO DOC ELDER MAL SCRN: HCPCS | Performed by: FAMILY MEDICINE

## 2021-02-03 PROCEDURE — 99215 OFFICE O/P EST HI 40 MIN: CPT | Performed by: FAMILY MEDICINE

## 2021-02-03 PROCEDURE — G9899 SCRN MAM PERF RSLTS DOC: HCPCS | Performed by: FAMILY MEDICINE

## 2021-02-03 PROCEDURE — G8427 DOCREV CUR MEDS BY ELIG CLIN: HCPCS | Performed by: FAMILY MEDICINE

## 2021-02-03 PROCEDURE — G0463 HOSPITAL OUTPT CLINIC VISIT: HCPCS | Performed by: FAMILY MEDICINE

## 2021-02-03 PROCEDURE — G8399 PT W/DXA RESULTS DOCUMENT: HCPCS | Performed by: FAMILY MEDICINE

## 2021-02-03 PROCEDURE — G8417 CALC BMI ABV UP PARAM F/U: HCPCS | Performed by: FAMILY MEDICINE

## 2021-02-03 PROCEDURE — 93000 ELECTROCARDIOGRAM COMPLETE: CPT | Performed by: FAMILY MEDICINE

## 2021-02-03 PROCEDURE — G9711 PT HX TOT COL OR COLON CA: HCPCS | Performed by: FAMILY MEDICINE

## 2021-02-03 PROCEDURE — 1090F PRES/ABSN URINE INCON ASSESS: CPT | Performed by: FAMILY MEDICINE

## 2021-02-03 PROCEDURE — G9717 DOC PT DX DEP/BP F/U NT REQ: HCPCS | Performed by: FAMILY MEDICINE

## 2021-02-03 PROCEDURE — 3288F FALL RISK ASSESSMENT DOCD: CPT | Performed by: FAMILY MEDICINE

## 2021-02-03 PROCEDURE — 93005 ELECTROCARDIOGRAM TRACING: CPT | Performed by: FAMILY MEDICINE

## 2021-02-03 RX ORDER — DICLOFENAC SODIUM 75 MG/1
TABLET, DELAYED RELEASE ORAL
COMMUNITY
Start: 2020-11-13 | End: 2021-02-27

## 2021-02-03 RX ORDER — LORAZEPAM 1 MG/1
1 TABLET ORAL
COMMUNITY
Start: 2021-01-29 | End: 2021-04-21

## 2021-02-03 RX ORDER — PREGABALIN 150 MG/1
150 CAPSULE ORAL 2 TIMES DAILY
COMMUNITY
Start: 2021-01-29 | End: 2021-04-30 | Stop reason: ALTCHOICE

## 2021-02-03 RX ORDER — CYCLOBENZAPRINE HCL 10 MG
TABLET ORAL
COMMUNITY
Start: 2020-11-13 | End: 2021-04-16 | Stop reason: DRUGHIGH

## 2021-02-03 NOTE — PROGRESS NOTES
Lizette Boyd (: 1950) is a 65 y.o. female, established patient, here for evaluation of the following chief complaint(s):  Depression (6 month follow-up)       ASSESSMENT/PLAN:  Diagnoses and all orders for this visit:    1. Essential hypertension with goal blood pressure less than 140/90-controlled    2. Mixed hyperlipidemia-controlled with last lipid panel 3/11/2020. Labs today to help risk stratify    3. IGT (impaired glucose tolerance)-stable issue, rechecking labs    4. Moderate episode of recurrent major depressive disorder (HCC)-ongoing issue. Was seeming to improve until pandemic. Continue current meds and encouraged daily exercise    5. Chronic pain syndrome  6. Fibromyalgia  7. Spinal stenosis of lumbar region with neurogenic claudication  8. Status post lumbar spine surgery for decompression of spinal cord  -Ongoing issues. Working with Ortho for her lumbar spine pathology. Upcoming ADRYAN and considering having another MRI done    9. Chronic renal failure, stage 3a-ongoing issue with last GFR 47. Avoids NSAIDs, BP controlled, no DM, non-smoker, working on weight.  -     HEMOGLOBIN A1C WITH EAG  -     LIPID PANEL  -     METABOLIC PANEL, COMPREHENSIVE  -     TSH 3RD GENERATION  -     CBC W/O DIFF  -     URINALYSIS W/ RFLX MICROSCOPIC    10. Encounter for long-term (current) use of medications  -     HEMOGLOBIN A1C WITH EAG  -     LIPID PANEL  -     METABOLIC PANEL, COMPREHENSIVE  -     TSH 3RD GENERATION  -     CBC W/O DIFF  -     URINALYSIS W/ RFLX MICROSCOPIC    11. Palpitations  12. Atypical chest pain  -EKG with no major concerns. For stress test given her numerous risk factors including age of 65, HTN, HLD, CKD, previous smoking history (45-pack-year history though she quit in ), and BMI 30. Unable to perform stress test on treadmill due to her back pain so opting for nuclear.   Symptoms may be more consistent with her anxiety history  -     AMB POC EKG ROUTINE W/ 12 LEADS, INTER & REP  -     NUCLEAR CARDIAC STRESS TEST; Future    Return in about 6 weeks (around 3/17/2021). SUBJECTIVE/OBJECTIVE:  Depression -symptoms stable. Planned to do therapy but not currently doing this. She is not working with psychiatry as prev recommended. She is compliant with her Cymbalta, Wellbutrin, and Xanax as needed. Continues to deny SI/HI. Still working on making time for herself and managing her stressors but worse with pandemic. Seems to be worse with her chronic pain. No SI/HI.     Palpitations and chest pain-recently had an episode about 1 week ago. Prior to this has not had an episode for a few years. The symptoms feel very similar to what she did have a few years ago. At that time she had a stress test which came back normal.  Reports chest pain radiated up to her neck and ear on the right side. Symptoms lasted to 5-15 minutes. The chest pain was in the center of the chest and felt more like a burning/tightness. Denies any nausea, vomiting, diaphoresis, dyspnea. She reports being at rest started and taking aspirin when the symptoms started. Her symptoms spontaneously resolved she decided not to call EMS. Echo in 2017 results reviewed with no concerns. Chronic pain, spinal stenosis, and fibromyalgia:  Working with Dr. Alexandra Antoine on this recently. Ct on Gabapentin and Flexeril PRN. She sleeping better but continues to c/o back pain still. Has known DDD L spine. She is s/p L3-S1 Decompression in 8/2017. Not doing therapy for a while. MRI L spine 7/31/2020:  \"IMPRESSION:   1. L2-3 degenerative changes with marked progression since 2017. Probable  subacute to chronic mild right L3 superior endplate compression. Moderate to severe L2-3 central stenosis. 2. Status post surgical decompression at L3-4. Mild stenosis at this level. 3. L4-5 degenerative changes. Prominent posterior element hypertrophy resulting  in severe central spinal stenosis.   4. L1-2 degenerative changes with progression since 2017 resulting in moderate  central stenosis. \"    ROS  Gen - no fever/chills  Resp - no dyspnea or cough  CV -Per HPI  GI - followed with GI for IBS and abd pain. Rest per HPI    Past Medical History:   Diagnosis Date    Arthritis     Cancer ([address]     skin cancer    Chronic pain     CHRONIC BOWEL PAIN    Depression     Diverticulitis     Fibromyalgia     GERD (gastroesophageal reflux disease)     Goiter     Hiatal hernia     Hypercholesterolemia     Hypertension     IBS (irritable bowel syndrome)     CONSTIPATION, CHRONIC SINCE HER 20s    Insomnia     TAKES TRAZODONE NIGHTLY    Migraine     REDUCED IN FREQUENCY AND SEVERITY SINCE MENOPAUSE    Rectocele      Past Surgical History:   Procedure Laterality Date    COLONOSCOPY N/A 6/21/2016    [facility]COPY    COLONOSCOPY N/A 6/27/2018    COLONOSCOPY performed by [facility] MD at [facility] ENDOSCOPY    ENDOSCOPY, COLON, DIAGNOSTIC  11/2010    Dr. Leo Spine-    Luisa Vidales  2011    HX CHOLECYSTECTOMY  2006    HX COLECTOMY  2007    Dr. Pond/ diverticulitis    HX ENDOSCOPY      HX GI  X3  LAST ONE 12/10    COLONOSCOPY    HX GYN  1982    D&C WITH SUCTION    HX HYSTERECTOMY      HX LUMBAR FUSION  2017    HX ORTHOPAEDIC      right foot surgery    HX TONSILLECTOMY      IR INJ SPINE FLUORO GUIDED  8/28/2020     Current Outpatient Medications on File Prior to Visit   Medication Sig Dispense Refill    LORazepam (ATIVAN) 1 mg tablet Take 1 mg by mouth.  pregabalin (LYRICA) 150 mg capsule Take 150 mg by mouth two (2) times a day.  diclofenac EC (VOLTAREN) 75 mg EC tablet       cyclobenzaprine (FLEXERIL) 10 mg tablet       dicyclomine (BENTYL) 10 mg capsule TAKE 2 CAPSULES FOUR TIMES A DAY AS NEEDED 360 Cap 0    pantoprazole (PROTONIX) 40 mg tablet Take 1 Tab by mouth daily.  90 Tab 0    fexofenadine (ALLEGRA) 180 mg tablet Take 180 mg by mouth daily as needed for Allergies.  polyethylene glycol (MIRALAX) 17 gram packet Take 17 g by mouth daily.  OTHER Take 1 Tab by mouth daily. Prevagen      cholecalciferol, vitamin D3, (Vitamin D3) 50 mcg (2,000 unit) tab Take 1 Tab by mouth daily.  buPROPion SR (WELLBUTRIN, ZYBAN) 200 mg SR tablet Take 1 Tab by mouth two (2) times a day. 180 Tab 0    estradioL (ESTRACE) 0.01 % (0.1 mg/gram) vaginal cream Apply to Vaginal twice a week with fingertip 42.5 g 1    oxybutynin (DITROPAN) 5 mg tablet take 1 tablet 2 times a day (Patient taking differently: two (2) times daily as needed. take 1 tablet 2 times a day) 90 Tab 0    rosuvastatin (CRESTOR) 20 mg tablet TAKE 1 TABLET NIGHTLY 90 Tab 1    lisinopril (PRINIVIL, ZESTRIL) 40 mg tablet TAKE 1 TABLET DAILY 90 Tab 3    DULoxetine (CYMBALTA) 60 mg capsule TAKE 1 CAPSULE TWICE A  Cap 3    hydroCHLOROthiazide (HYDRODIURIL) 25 mg tablet TAKE 1 TABLET DAILY 90 Tab 2    sucralfate (CARAFATE) 1 gram tablet Take 1 g by mouth three (3) times daily as needed.  diclofenac (VOLTAREN) 1 % gel Apply 2 g to affected area four (4) times daily as needed. 100 g 3    aspirin delayed-release 81 mg tablet Take 81 mg by mouth two (2) times a week. Indications: \"good health\"      [DISCONTINUED] ibuprofen (MOTRIN) 800 mg tablet Take 1 Tab by mouth every eight (8) hours as needed for Pain. 30 Tab 0    [DISCONTINUED] pregabalin (Lyrica) 75 mg capsule Take 75 mg by mouth two (2) times a day.  [DISCONTINUED] gabapentin (NEURONTIN) 300 mg capsule Take 1 Cap by mouth three (3) times daily. Max Daily Amount: 900 mg. 270 Cap 0    [DISCONTINUED] cyclobenzaprine (FLEXERIL) 5 mg tablet Take 1 Tab by mouth nightly. 90 Tab 0    [DISCONTINUED] lubiPROStone (AMITIZA) 24 mcg capsule Take 24 mcg by mouth daily as needed. No current facility-administered medications on file prior to visit.          Objective:     Vitals:    02/03/21 1046   BP: 116/72   Resp: 16   Temp: 97.1 °F (36.2 °C)   TempSrc: Temporal   Weight: 177 lb (80.3 kg)   Height: 5' 4\" (1.626 m)     Physical Examination:  General appearance - alert, well appearing, and in no distress  Eyes -sclera anicteric  Neck - supple, no significant adenopathy, no thyromegaly, no bruits  Chest - clear to auscultation, no wheezes, rales or rhonchi, symmetric air entry  Heart - normal rate, regular rhythm, normal S1, S2, no murmurs, rubs, clicks or gallops  Neurological - alert, oriented, no focal findings or movement disorder noted  Extremities-no edema  Psych-normal mood and affect    On this date 02/03/21 I have spent 45 minutes reviewing previous notes, test results and face to face with the patient discussing the diagnosis and importance of compliance with the treatment plan as well as documenting on the day of the visit. An electronic signature was used to authenticate this note.   -- Joan Diane MD

## 2021-02-03 NOTE — PROGRESS NOTES
Chief Complaint   Patient presents with    Depression     6 month follow-up   1. Have you been to the ER, urgent care clinic since your last visit? Hospitalized since your last visit? No    2. Have you seen or consulted any other health care providers outside of the [address] [place] [address] since your last visit? Include any pap smears or colon screening.  Yes Where: Ortho      Has Fallen x 4 with no injury

## 2021-02-03 NOTE — PROGRESS NOTES
*ATTENTION:  This note has been created by a medical student for educational purposes only. Please do not refer to the content of this note for clinical decision-making, billing, or other purposes. Please see attending physicians note to obtain clinical information on this patient. *     Subjective:  Ms. Roxy Crain is a 66 y/o woman with medical history of hypertension, hyperlipidemia, depression, and chronic pain presents for 3 month follow-up. She reports continued challenges with back pain and associated muscle pain in her back and legs. She is seeing a specialist for these issues. She has been receiving cortisone shots regularly and is scheduled for her next injection on Friday. She historically has gained benefit from these injections but has had diminished response recently. She is working with the specialist to determine the best route forward, including potential MRI imaging and surgery. She had been feeling more tired lately and thinks it is in relation to her back pain and general discomfort. She would like to speak with her specialist before considering physical therapy. Ms. Kyle Mayberry is also seeing a GI specialist for her chronic GI issues (IBS) and feels that has been well controlled. She has begun taking multivitamins recommended by her daughter that she believes are causing more frequent bowel movements. She is not bothered by this effect. In terms of her mood, Ms. Kyle Mayberry has felt \"so-so\" given the challenges associated with the pandemic. She has support from her  and son, who live with her, as well as her daughter and son-in-law that live nearby. She also speaks with her niece regularly as well as her neighbor. She denies feelings of worthlessness, guilt, or SI/HI. On review, Ms. Gaspar endorsed an episode of palpitations and chest pain that radiated up her neck and to her ear. She described the pain as sharp and stabbing.  This occurred a few days ago and lasted between 5-15 mins. She does not recall doing anything to provoke the episode. She took aspirin at the time and sat down to rest until the pain subsided. She did not experience nausea/vomiting, dyspnea, or diaphoresis during this episode. She states she has experienced episodes like this in the past and prior workup did not show any significant finding. Ms. Rudy Candelario has been sleeping well, waking once nightly to use the restroom. She expresses need to improve diet by eating more fruits and vegetables. She has been doing minimal exercise, walking around her backyard with her dogs. She does not smoke or drink alcohol. ROS:  Gen--No fever, chills  Resp--No dyspnea, cough, congestion  --No urinary frequency, urgency, burning w/ urination  Rest as above        Current Outpatient Medications on File Prior to Visit   Medication Sig Dispense Refill    LORazepam (ATIVAN) 1 mg tablet Take 1 mg by mouth.  pregabalin (LYRICA) 150 mg capsule Take 150 mg by mouth two (2) times a day.  diclofenac EC (VOLTAREN) 75 mg EC tablet       cyclobenzaprine (FLEXERIL) 10 mg tablet       dicyclomine (BENTYL) 10 mg capsule TAKE 2 CAPSULES FOUR TIMES A DAY AS NEEDED 360 Cap 0    pantoprazole (PROTONIX) 40 mg tablet Take 1 Tab by mouth daily. 90 Tab 0    fexofenadine (ALLEGRA) 180 mg tablet Take 180 mg by mouth daily as needed for Allergies.  polyethylene glycol (MIRALAX) 17 gram packet Take 17 g by mouth daily.  OTHER Take 1 Tab by mouth daily. Prevagen      cholecalciferol, vitamin D3, (Vitamin D3) 50 mcg (2,000 unit) tab Take 1 Tab by mouth daily.  buPROPion SR (WELLBUTRIN, ZYBAN) 200 mg SR tablet Take 1 Tab by mouth two (2) times a day. 180 Tab 0    estradioL (ESTRACE) 0.01 % (0.1 mg/gram) vaginal cream Apply to Vaginal twice a week with fingertip 42.5 g 1    oxybutynin (DITROPAN) 5 mg tablet take 1 tablet 2 times a day (Patient taking differently: two (2) times daily as needed.  take 1 tablet 2 times a day) 90 Tab 0  rosuvastatin (CRESTOR) 20 mg tablet TAKE 1 TABLET NIGHTLY 90 Tab 1    lisinopril (PRINIVIL, ZESTRIL) 40 mg tablet TAKE 1 TABLET DAILY 90 Tab 3    DULoxetine (CYMBALTA) 60 mg capsule TAKE 1 CAPSULE TWICE A  Cap 3    hydroCHLOROthiazide (HYDRODIURIL) 25 mg tablet TAKE 1 TABLET DAILY 90 Tab 2    sucralfate (CARAFATE) 1 gram tablet Take 1 g by mouth three (3) times daily as needed.  diclofenac (VOLTAREN) 1 % gel Apply 2 g to affected area four (4) times daily as needed. 100 g 3    aspirin delayed-release 81 mg tablet Take 81 mg by mouth two (2) times a week. Indications: \"good health\"       No current facility-administered medications on file prior to visit.       Past Medical History:   Diagnosis Date    Arthritis     Cancer ([facility] [address]     skin cancer    Chronic pain     CHRONIC BOWEL PAIN    Depression     Diverticulitis     Fibromyalgia     GERD (gastroesophageal reflux disease)     Goiter     Hiatal hernia     Hypercholesterolemia     Hypertension     IBS (irritable bowel syndrome)     CONSTIPATION, CHRONIC SINCE HER 20s    Insomnia     TAKES TRAZODONE NIGHTLY    Migraine     REDUCED IN FREQUENCY AND SEVERITY SINCE MENOPAUSE    Rectocele      Past Surgical History:   Procedure Laterality Date    COLONOSCOPY N/A 6/21/2016    [facility]    COLONOSCOPY N/A 6/27/2018    COLONOSCOPY performed [facility] ENDOSCOPY    ENDOSCOPY, COLON, DIAGNOSTIC  11/2010    Dr. Ani Jackson  2011    HX CHOLECYSTECTOMY  2006    [place]  2007    Dr. Pond/ diverticulitis    HX ENDOSCOPY      HX GI  X3  LAST ONE 12/10    COLONOSCOPY    HX GYN  1982    D&C WITH SUCTION    HX HYSTERECTOMY      HX LUMBAR FUSION  2017    HX ORTHOPAEDIC      right foot surgery    HX TONSILLECTOMY      IR INJ SPINE FLUORO GUIDED  8/28/2020       Objective:  Visit Vitals  Temp 97.1 °F (36.2 °C) (Temporal)   Resp 16   Ht 5' 4\" (1.626 m)   Wt 177 lb (80.3 kg)   BMI 30.38 kg/m²     Physical Exam:  General Appearance-- alert, appropriately groomed, well-appearing, in no distress  Eyes-anicteric, pupils reactive to light  Neck: no LAD  CV: normal rate, regular rhythm. No murmurs, rubs, or extra sounds. Lungs: clear to auscultation bilaterally, no wheezes or crackles. Abdominal: Bowel sounds present. No tenderness to palpation  Extremities: No edema, pulses equal bilaterally  Psych: appropriate mood and affect    Assessment and Plan:  Ms. Aleah Harrison is a 68y/o woman with medical history of hypertension, hyperlipidemia, depression, and chronic pain presenting for 3 month follow-up of her chronic conditions. She reports episode of substernal chest pain and palpitations concerning for possible angina. She continues to have chronic back pain that is being appropriately managed by a specialist. She has some mood challenges but overall feels supported. Bowel concerns stable. 1. Chest Pain  --palpitations, central chest pain radiating to neck, metabolic risk factors concerning for cardiac etiology, particularly angina/infarction. EKG obtained with no abnormalities noted. Ordered nuclear cardiac stress test to further evaluate cardiovascular risk. --sharp pain, not brought on by exertion are atypical for cardiac etiology. Consider possible GI cause w/ hx of hiatal hernia and GERD. Lack of respiratory symptoms makes lung etiology unlikely. MSK unlikely given character of pain, no history of trauma to the area, and not reproduced on palpation. 2. Back Pain  --Continues to cause problems, specialist has appropriate plan for escalation that is agreeable to patient and . Discussed the option for physical therapy, will defer at this point until utility of cortisone shots and possibility of surgery more clear. 3. Depression  --generally stable, difficulties of pandemic and chronic pain contributing to low mood.  Advised to exercise (10min walk) daily in an effort to improve mood and increase activity. 4. Hypertension--stable  --taking medications as directed, encouraged to improve diet and increase activity  5. Metabolic  --blood work ordered to follow hyperlipidemia and glucose tolerance, Cr and GFR   6.  Irritable Bowel Syndrome--stable  --seen by specialist, current medications effective    Haydee Rahman, Medical Student

## 2021-02-19 ENCOUNTER — TRANSCRIBE ORDER (OUTPATIENT)
Dept: SCHEDULING | Age: 71
End: 2021-02-19

## 2021-02-19 DIAGNOSIS — M43.10 ACQUIRED SPONDYLOLISTHESIS: ICD-10-CM

## 2021-02-19 DIAGNOSIS — M54.32 SCIATICA OF LEFT SIDE: ICD-10-CM

## 2021-02-19 DIAGNOSIS — M41.50 DEGENERATIVE SCOLIOSIS IN ADULT PATIENT: ICD-10-CM

## 2021-02-19 DIAGNOSIS — M54.31 RIGHT SIDED SCIATICA: ICD-10-CM

## 2021-02-19 DIAGNOSIS — M47.817 LUMBOSACRAL SPONDYLOSIS WITHOUT MYELOPATHY: ICD-10-CM

## 2021-02-19 DIAGNOSIS — M54.50 LOW BACK PAIN, UNSPECIFIED BACK PAIN LATERALITY, UNSPECIFIED CHRONICITY, UNSPECIFIED WHETHER SCIATICA PRESENT: Primary | ICD-10-CM

## 2021-02-22 ENCOUNTER — HOSPITAL ENCOUNTER (OUTPATIENT)
Dept: MRI IMAGING | Age: 71
Discharge: HOME | End: 2021-02-22
Attending: ORTHOPAEDIC SURGERY
Payer: MEDICARE

## 2021-02-22 DIAGNOSIS — M47.817 LUMBOSACRAL SPONDYLOSIS WITHOUT MYELOPATHY: ICD-10-CM

## 2021-02-22 DIAGNOSIS — M54.50 LOW BACK PAIN, UNSPECIFIED BACK PAIN LATERALITY, UNSPECIFIED CHRONICITY, UNSPECIFIED WHETHER SCIATICA PRESENT: ICD-10-CM

## 2021-02-22 DIAGNOSIS — M54.32 SCIATICA OF LEFT SIDE: ICD-10-CM

## 2021-02-22 DIAGNOSIS — M54.31 RIGHT SIDED SCIATICA: ICD-10-CM

## 2021-02-22 DIAGNOSIS — M41.50 DEGENERATIVE SCOLIOSIS IN ADULT PATIENT: ICD-10-CM

## 2021-02-22 DIAGNOSIS — M43.10 ACQUIRED SPONDYLOLISTHESIS: ICD-10-CM

## 2021-02-22 PROCEDURE — 72148 MRI LUMBAR SPINE W/O DYE: CPT

## 2021-04-19 ENCOUNTER — HOSPITAL ENCOUNTER (OUTPATIENT)
Dept: LAB | Age: 71
Discharge: HOME | End: 2021-04-19
Payer: MEDICARE

## 2021-04-19 PROCEDURE — 83036 HEMOGLOBIN GLYCOSYLATED A1C: CPT

## 2021-04-19 PROCEDURE — 84443 ASSAY THYROID STIM HORMONE: CPT

## 2021-04-19 PROCEDURE — 80053 COMPREHEN METABOLIC PANEL: CPT

## 2021-04-19 PROCEDURE — 85027 COMPLETE CBC AUTOMATED: CPT

## 2021-04-19 PROCEDURE — 80061 LIPID PANEL: CPT

## 2021-04-19 PROCEDURE — 36415 COLL VENOUS BLD VENIPUNCTURE: CPT

## 2021-04-20 LAB
ALBUMIN SERPL-MCNC: 4.4 G/DL (ref 3.7–4.7)
ALBUMIN/GLOB SERPL: 1.9 {RATIO} (ref 1.2–2.2)
ALP SERPL-CCNC: 104 IU/L (ref 39–117)
ALT SERPL-CCNC: 15 IU/L (ref 0–32)
AST SERPL-CCNC: 19 IU/L (ref 0–40)
BILIRUB SERPL-MCNC: 0.4 MG/DL (ref 0–1.2)
BUN SERPL-MCNC: 22 MG/DL (ref 8–27)
BUN/CREAT SERPL: 17 (ref 12–28)
CALCIUM SERPL-MCNC: 9.9 MG/DL (ref 8.7–10.3)
CHLORIDE SERPL-SCNC: 105 MMOL/L (ref 96–106)
CHOLEST SERPL-MCNC: 154 MG/DL (ref 100–199)
CO2 SERPL-SCNC: 22 MMOL/L (ref 20–29)
CREAT SERPL-MCNC: 1.3 MG/DL (ref 0.57–1)
ERYTHROCYTE [DISTWIDTH] IN BLOOD BY AUTOMATED COUNT: 11.9 % (ref 11.7–15.4)
EST. AVERAGE GLUCOSE BLD GHB EST-MCNC: 103 MG/DL
GLOBULIN SER CALC-MCNC: 2.3 G/DL (ref 1.5–4.5)
GLUCOSE SERPL-MCNC: 90 MG/DL (ref 65–99)
HBA1C MFR BLD: 5.2 % (ref 4.8–5.6)
HCT VFR BLD AUTO: 36.7 % (ref 34–46.6)
HDLC SERPL-MCNC: 43 MG/DL
HGB BLD-MCNC: 12.4 G/DL (ref 11.1–15.9)
INTERPRETATION: NORMAL
LDLC SERPL CALC-MCNC: 92 MG/DL (ref 0–99)
MCH RBC QN AUTO: 31.9 PG (ref 26.6–33)
MCHC RBC AUTO-ENTMCNC: 33.8 G/DL (ref 31.5–35.7)
MCV RBC AUTO: 94 FL (ref 79–97)
PLATELET # BLD AUTO: 288 X10E3/UL (ref 150–450)
POTASSIUM SERPL-SCNC: 4.7 MMOL/L (ref 3.5–5.2)
PROT SERPL-MCNC: 6.7 G/DL (ref 6–8.5)
RBC # BLD AUTO: 3.89 X10E6/UL (ref 3.77–5.28)
SODIUM SERPL-SCNC: 143 MMOL/L (ref 134–144)
TRIGL SERPL-MCNC: 103 MG/DL (ref 0–149)
TSH SERPL DL<=0.005 MIU/L-ACNC: 2.46 UIU/ML (ref 0.45–4.5)
VLDLC SERPL CALC-MCNC: 19 MG/DL (ref 5–40)
WBC # BLD AUTO: 7.5 X10E3/UL (ref 3.4–10.8)

## 2021-04-21 ENCOUNTER — TRANSCRIBE ORDER (OUTPATIENT)
Dept: INTERNAL MEDICINE CLINIC | Age: 71
End: 2021-04-21

## 2021-04-21 ENCOUNTER — HOSPITAL ENCOUNTER (OUTPATIENT)
Dept: PREADMISSION TESTING | Age: 71
Discharge: HOME | End: 2021-04-21
Attending: ORTHOPAEDIC SURGERY
Payer: MEDICARE

## 2021-04-21 ENCOUNTER — HOSPITAL ENCOUNTER (OUTPATIENT)
Dept: GENERAL RADIOLOGY | Age: 71
Discharge: HOME | End: 2021-04-21
Attending: ORTHOPAEDIC SURGERY
Payer: MEDICARE

## 2021-04-21 VITALS
DIASTOLIC BLOOD PRESSURE: 74 MMHG | RESPIRATION RATE: 20 BRPM | HEIGHT: 64 IN | BODY MASS INDEX: 30.79 KG/M2 | WEIGHT: 180.34 LBS | TEMPERATURE: 98.6 F | HEART RATE: 77 BPM | OXYGEN SATURATION: 97 % | SYSTOLIC BLOOD PRESSURE: 142 MMHG

## 2021-04-21 LAB
25(OH)D3 SERPL-MCNC: 38.8 NG/ML (ref 30–100)
ABO + RH BLD: NORMAL
AMPHET UR QL SCN: NEGATIVE
APPEARANCE UR: ABNORMAL
BACTERIA URNS QL MICRO: ABNORMAL /HPF
BARBITURATES UR QL SCN: NEGATIVE
BENZODIAZ UR QL: NEGATIVE
BILIRUB UR QL: NEGATIVE
BLOOD GROUP ANTIBODIES SERPL: NORMAL
CANNABINOIDS UR QL SCN: POSITIVE
COCAINE UR QL SCN: NEGATIVE
COLOR UR: ABNORMAL
DRUG SCRN COMMENT,DRGCM: ABNORMAL
EPITH CASTS URNS QL MICRO: ABNORMAL /LPF
GLUCOSE UR STRIP.AUTO-MCNC: NEGATIVE MG/DL
HGB UR QL STRIP: ABNORMAL
HYALINE CASTS URNS QL MICRO: ABNORMAL /LPF (ref 0–5)
INR PPP: 1 (ref 0.9–1.1)
KETONES UR QL STRIP.AUTO: NEGATIVE MG/DL
LEUKOCYTE ESTERASE UR QL STRIP.AUTO: ABNORMAL
METHADONE UR QL: NEGATIVE
NITRITE UR QL STRIP.AUTO: POSITIVE
OPIATES UR QL: NEGATIVE
PCP UR QL: NEGATIVE
PH UR STRIP: 5.5 [PH] (ref 5–8)
PROT UR STRIP-MCNC: NEGATIVE MG/DL
PROTHROMBIN TIME: 10.4 SEC (ref 9–11.1)
RBC #/AREA URNS HPF: ABNORMAL /HPF (ref 0–5)
SP GR UR REFRACTOMETRY: 1.02 (ref 1–1.03)
SPECIMEN EXP DATE BLD: NORMAL
UA: UC IF INDICATED,UAUC: ABNORMAL
UROBILINOGEN UR QL STRIP.AUTO: 1 EU/DL (ref 0.2–1)
WBC URNS QL MICRO: >100 /HPF (ref 0–4)

## 2021-04-21 PROCEDURE — 36415 COLL VENOUS BLD VENIPUNCTURE: CPT

## 2021-04-21 PROCEDURE — 86901 BLOOD TYPING SEROLOGIC RH(D): CPT

## 2021-04-21 PROCEDURE — 87077 CULTURE AEROBIC IDENTIFY: CPT

## 2021-04-21 PROCEDURE — 97161 PT EVAL LOW COMPLEX 20 MIN: CPT

## 2021-04-21 PROCEDURE — 80307 DRUG TEST PRSMV CHEM ANLYZR: CPT

## 2021-04-21 PROCEDURE — 97116 GAIT TRAINING THERAPY: CPT

## 2021-04-21 PROCEDURE — 81001 URINALYSIS AUTO W/SCOPE: CPT

## 2021-04-21 PROCEDURE — 87086 URINE CULTURE/COLONY COUNT: CPT

## 2021-04-21 PROCEDURE — 85610 PROTHROMBIN TIME: CPT

## 2021-04-21 PROCEDURE — 82306 VITAMIN D 25 HYDROXY: CPT

## 2021-04-21 PROCEDURE — 71046 X-RAY EXAM CHEST 2 VIEWS: CPT

## 2021-04-21 PROCEDURE — 84134 ASSAY OF PREALBUMIN: CPT

## 2021-04-21 PROCEDURE — 87186 SC STD MICRODIL/AGAR DIL: CPT

## 2021-04-21 RX ORDER — PREGABALIN 150 MG/1
150 CAPSULE ORAL ONCE
Status: CANCELLED | OUTPATIENT
Start: 2021-05-03 | End: 2021-05-03

## 2021-04-21 RX ORDER — ACETAMINOPHEN 500 MG
1000 TABLET ORAL ONCE
Status: CANCELLED | OUTPATIENT
Start: 2021-05-03 | End: 2021-05-03

## 2021-04-21 RX ORDER — CEFAZOLIN SODIUM/WATER 2 G/20 ML
2 SYRINGE (ML) INTRAVENOUS ONCE
Status: CANCELLED | OUTPATIENT
Start: 2021-05-03 | End: 2021-05-03

## 2021-04-21 RX ORDER — SODIUM CHLORIDE, SODIUM LACTATE, POTASSIUM CHLORIDE, CALCIUM CHLORIDE 600; 310; 30; 20 MG/100ML; MG/100ML; MG/100ML; MG/100ML
25 INJECTION, SOLUTION INTRAVENOUS CONTINUOUS
Status: CANCELLED | OUTPATIENT
Start: 2021-05-03

## 2021-04-21 NOTE — PERIOP NOTES
Hibiclens/Chlorhexidine    Preventing Infections Before and After - Your Surgery    IMPORTANT INSTRUCTIONS    Please read and follow these instructions carefully. If you are unable to comply with the below instructions your procedure will be cancelled. Every Night for Three (3) nights before your surgery:  1. Shower with an antibacterial soap, such as Dial, or the soap provided at your preassessment appointment. A shower is better than a bath for cleaning your skin. 2. If needed, ask someone to help you reach all areas of your body. Dont forget to clean your belly button with every shower. The night before your surgery: If you lose your Hibiclens/chlorhexidine please contact surgery center or you can purchase it at a local pharmacy  1. On the night before your surgery, shower with an antibacterial soap, such as Dial, or the soap provided at your preassessment appointment. 2. With one packet of Hibiclens/Chlorhexidine in hand, turn water off.  3. Apply Hibiclens antiseptic skin cleanser with a clean, freshly washed washcloth. ? Gently apply to your body from chin to toes (except the genital area) and especially the area(s) where your incision(s) will be. ? Leave Hibiclens/Chlorhexidine on your skin for at least 20 seconds. CAUTION: If needed, Hibiclens/chlorhexidine may be used to clean the folds of skin of the legs (such as in the area of the groin) and on your buttocks and hips. However, do not use Hibiclens/Chlorhexidine above the neck or in the genital area (your bottom) or put inside any area of your body. 4. Turn the water back on and rinse. 5. Dry gently with a clean, freshly washed towel. 6. After your shower, do not use any powder, deodorant, perfumes or lotion. 7. Use clean, freshly washed towels and washcloths every time you shower. 8. Wear clean, freshly washed pajamas to bed the night before surgery. 9. Sleep on clean, freshly washed sheets.   10. Do not allow pets to sleep in your bed with you. The Morning of your surgery:  1. Shower again thoroughly with an antibacterial soap, such as Dial or the soap provided at your preassessment appointment. If needed, ask someone for help to reach all areas of your body. Dont forget to clean your belly button! Rinse. 2. Dry gently with a clean, freshly washed towel. 3. After your shower, do not use any powder, deodorant, perfumes or lotion prior to surgery. 4. Put on clean, freshly washed clothing. Tips to help prevent infections after your surgery:  1. Protect your surgical wound from germs:  ? Hand washing is the most important thing you and your caregivers can do to prevent infections. ? Keep your bandage clean and dry! ? Do not touch your surgical wound. 2. Use clean, freshly washed towels and washcloths every time you shower; do not share bath linens with others. 3. Until your surgical wound is healed, wear clothing and sleep on bed linens each day that are clean and freshly washed. 4. Do not allow pets to sleep in your bed with you or touch your surgical wound. 5. Do not smoke - smoking delays wound healing. This may be a good time to stop smoking. 6. If you have diabetes, it is important for you to manage your blood sugar levels properly before your surgery as well as after your surgery. Poorly managed blood sugar levels slow down wound healing and prevent you from healing completely. If you lose your Hibiclens/chlorhexidine, please call the [facility], or it is available for purchase at your pharmacy.                ___________________      ___________________      ________________  (Signature of Patient)          (Witness)                   (Date and Time)

## 2021-04-21 NOTE — PROGRESS NOTES
[facility]  Physical Therapy Pre-surgery evaluation  [address], [address]    PHYSICAL THERAPY PRE SPINE SURGERY EVALUATION  Silvia Yousif (65 y.o. female)  Date: 4/21/2021    PAT  Procedure(s) (LRB):  L4-5 LATERAL FUSION (CHOICE) (N/A)     Precautions:          ASSESSMENT :  Based on the objective data described below, the patient presents with altered gait pattern, impaired balance, and impaired functional mobility due to end stage degenerative joint disease in the spinal level: lumbar spine. Discussed anticipated disposition to home with possible discharge within a 1 to 2 day time frame post-surgery. Patient and  in agreement.  present today and states he will be assisting at discharge. Anticipate patient will need acute PT and OT orders based on anticipated deficits post surgery in addition to hx of multiple falls. GOALS: (Goals have been discussed and agreed upon with patient.)  DISCHARGE GOALS: Time Frame: 1 DAY  1. Patient will demonstrate increased strength, range of motion, and pain control via a home exercise program in order to minimize functional deficits in preparation for their upcoming surgery. This will be achieved by using education, demonstration and through the use of an informational handout including a home exercise program.  REHABILITATION POTENTIAL FOR STATED GOALS: Good     RECOMMENDATIONS AND PLANNED INTERVENTIONS: (Benefits and precautions of physical therapy have been discussed with the patient.)  · Home Exercise Program  TREATMENT PLAN EFFECTIVE DATES: 4/21/2021 to 4/21/2021   FREQUENCY/DURATION: Patient to continue to perform home exercise program at least twice daily until surgery. SUBJECTIVE:   Patient stated I fell and landed on my wood burning stove and used my dog's fur to help me off the ground.     OBJECTIVE DATA SUMMARY:   HISTORY:      Past Medical History:   Diagnosis Date    Arthritis     Cancer (HCC)     skin cancer    Chronic pain     CHRONIC BOWEL PAIN    Depression     Diverticulitis     Fibromyalgia     GERD (gastroesophageal reflux disease)     Goiter     Hiatal hernia     Hypercholesterolemia     Hypertension     IBS (irritable bowel syndrome)     CONSTIPATION, CHRONIC SINCE HER 20s    Insomnia     TAKES TRAZODONE NIGHTLY    Migraine     REDUCED IN FREQUENCY AND SEVERITY SINCE MENOPAUSE    Rectocele      Past Surgical History:   Procedure Laterality Date    COLONOSCOPY N/A 6/21/2016    COL[facility] Doug El [facility] at [facility] ENDOSCOPY    COLONOSCOPY N/A 6/27/2018    COLONOSCOPY performed by [facility] at [facility] ENDOSCOPY    ENDOSCOPY, COLON, DIAGNOSTIC  11/2010    Dr. Americo Dallas  2011    HX CHOLECYSTECTOMY  2006    HX ENDOSCOPY      HX GI  X3  LAST ONE 12/10    COLONOSCOPY    HX GYN  1982    D&C WITH SUCTION    HX HYSTERECTOMY      HX LUMBAR FUSION  2017    HX ORTHOPAEDIC      right foot surgery    HX TONSILLECTOMY      HX TOTAL COLECTOMY  2007    Dr. Pond/ diverticulitis    IR INJ SPINE FLUORO GUIDED  8/28/2020    IR INJ SPINE FLUORO GUIDED  1/8/2021       Prior Level of Function/Home Situation: Mod I with use of SPC. Reports history of 5 falls since beginning of 2021. Independent w/ ADLs. Lives with  who will assist at discharge. Does not drive. Does cooking and cleaning at home. Reports pain that starts in low back and radiates down LLE. Reports LLE numbness as well.    Personal factors and/or comorbidities impacting plan of care:       Home Situation  Home Environment: Private residence  # Steps to Enter: 2  Rails to Enter: Yes  Hand Rails : Right  One/Two Story Residence: One story  Living Alone: No  Support Systems: Spouse/Significant Other/Partner  Patient Expects to be Discharged to[de-identified] Private residence  Current DME Used/Available at Home: [address] [facility], [place], straight, Walker, rolling  Tub or Shower Type: Shower          EXAMINATION/PRESENTATION/DECISION MAKING:     ADLs (Current Functional Status):    Bathing/Showering:   [x] Independent  [] Requires Assistance from Someone  [] Sponge Bath Only   Ambulation:  [x] Independent  [] Walk Indoors Only  [] Walk Outdoors  [] Use Assistive Device  [] Use Wheelchair Only     Dressing:  [x] [address] from Someone for:  [] Sock/Shoes  [] Pants  [] Everything   Household Activities:  [x] Routine house and yard work  [] Light Housework Only  [] None       Critical Behavior:                Strength:     Strength: Generally decreased, functional                       Tone & Sensation:   Tone: Normal              Sensation: Impaired(numbness in LLE)                  Range Of Motion:     AROM: Within functional limits                            Coordination:   Coordination: Within functional limits    Functional Mobility:  Transfers:  Sit to Stand: Modified independent  Stand to Sit: Modified independent                       Balance:   Sitting: Intact  Standing: Intact, With support(cane)  Ambulation/Gait Training:  Distance (ft): 150 Feet (ft)  Assistive Device: Cane, straight  Ambulation - Level of Assistance: Modified independent        Gait Abnormalities: Decreased step clearance, Shuffling gait        Base of Support: Narrowed     Speed/Xiomara: Pace decreased (<100 feet/min), Slow  Step Length: Left shortened, Right shortened                      Functional Measure:  10 Meter walk test:  (Specify if any supplemental oxygen is used, the type, pre, during and post sats.)    Self-Selected Or Fast-Velocity: Self Selected Velocity  Trial 1 (Time to Walk 10 Meters): 23.59 Seconds  Trial 2 (Time to Walk 10 Meters): 23.16 Seconds  Trial 3 (Time to Walk 10 Meters): 27.91 Seconds  Average : 24.9 Seconds  Score (Meters/Second): 0.4 Meters/Second             Walking Speed (m/s)  Modifier Scale Age 52-63 Age 61-76 Age 66-77 Age 80-80    Male Female Male Female Male Female Male Female   CH   0% Impaired ? 1.39 ? 1.40 ? 1.36 ? 1.30 ? 1.33 ? 1.27 ? 1.21 ? 1.15   CI   1-19% Impaired 1.11-1.38 1.12-1.39 1.09-1.35 1.04-1.29 1.06-1.32 1.01-1.26 0.96-1.20 0.92-1.14   CJ   20-39% Impaired 0.83-1.10 0.84-1.11 0.82-1.08 0.78-1.03 0.80-1.05 0.76-1.00 0.72-0.95 0.69-0.91   CK   40-59% Impaired 0.56-0.82 0.57-0.83 0.54-0.81 0.52-0.77 0.53-0.79 0.51-0.75 0.48-0.71 0.46-0.68   CL   60-79% Impaired 0.28-0.55 0.28-0.56 0.27-0.53 0.26-0.51 0.27-0.52 0.25-0.50 0.24-0.49 0.23-0.45   CM   80-99% Impaired 0.01-0.28 < 0.01-0.28 < 0.01-0.27 < 0.01-0.26 0.01-0.27 0.01-0.24 0.01-0.23 0.01-0.22   CN   100% Impaired Cannot Perform   Minimal Detectable Change (MDC-90) = 0.1 m/s  Berna R. \"Comfortable and maximum walking speed of adults aged 20-79 years: reference values and determinants. \" Age and Agin Volume 26(1):15-9. Gal Lopez. \"Age- and gender-related test performance in community-dwelling elderly people: Six-Minute Walk Test, Magallanes Balance Scale, Timed Up & Go Test, and gait speeds. \" Physical Therapy: 2002 Volume 82(2):128-37. Toni COBIAN, Elza RODRIGUEZ, Jordon Laughlin JD, [facility] D. \"Assessing stability and change of four performance measures: a longitudinal study evaluating outcome following total hip and knee arthroplasty. \" [facility] Musculoskeletal Disorders: 2005 Volume 6(3). Shalonda Valencia, PhD; Basilia Hawkins, PhD. Quincy Ormond Paper: \"Walking Speed: the Sixth Vital Sign\" Journal of Geriatric Physical Therapy: 2009 - Volume 32 - Issue 2 - p 2-5 . Pain:  Pain Scale 1: Numeric (0 - 10)  Pain Intensity 1: 0                Radicular pain:   Reports pain that starts in low back and radiates down LLE    Activity Tolerance:   good  Patient []   does  [x]   does not demonstrate signs/symptoms of shortness of breath/dyspnea on exertion/respiratory distress.   COMMUNICATION/EDUCATION:   The patient was educated on:  [x]         Early post operative mobility is imperative to achieve a patient's desired outcomes and to restore biological function. [x]         Post operative spinal precautions may/may not be applicable. These precautions are based on the patient's physician and the procedure(s) performed. [x]         Spinal precautions including:  ·   No bending forward, sideways, or backwards  ·   No twisting   ·   No lifting more than 5-10 pounds  ·   No sitting longer than 30-60 minutes at a time  ·   Robert brace when out of bed and mobilizing    The patients plan of care was discussed as follows:   [x]         The patient verbalized understanding of his/her plan in preparation for their upcoming surgery  [x]         The patient's  was present for this session  []        The patient reports that he/she does not have a  identified at this time  [x]         The  verbalized understanding of the education regarding the patient's upcoming surgery  [x]         Patient/family agree to work toward stated goals and plan of care. []         Patient understands intent and goals of therapy, but is neutral about his/her participation. []         Patient is unable to participate in goal setting and plan of care.       Thank you for this referral.  Sangeetha Moscoso, PT, DPT   Time Calculation: 22 mins

## 2021-04-21 NOTE — PERIOP NOTES
Orthopedic and Spine Patients: Instructions on When You Can   Eat or Drink Before Surgery      You have been provided 2 pre-surgery drinks received at your pre-admission testing appointment.  Night before surgery:  o You should drink one bottle of the  pre-surgery drink at bedtime. No food after midnight!  Day of Surgery:  o Complete 2nd bottle of the pre-surgery drink 1 hour prior to arrival at hospital.  For questions call Pre-Admission Testing at [phone]. They are available from 8:00am-5:00pm, Monday through Friday.

## 2021-04-21 NOTE — PERIOP NOTES
Incentive Spirometer        Using the incentive spirometer helps expand the small air sacs of your lungs, helps you breathe deeply, and helps improve your lung function. Use your incentive spirometer twice a day (10 breaths each time) prior to surgery. How to Use Your Incentive Spirometer:  1. Hold the incentive spirometer in an upright position. 2. Breathe out as usual.   3. Place the mouthpiece in your mouth and seal your lips tightly around it. 4. Take a deep breath. Breathe in slowly and as deeply as possible. Keep the blue flow rate guide between the arrows. 5. Hold your breath as long as possible. Then exhale slowly and allow the piston to fall to the bottom of the column. 6. Rest for a few seconds and repeat steps one through five at least 10 times. PAT Tidal Volume____1750______________  x_____2___________  Date_____04/21/21__________________    Felicia Marie THE INCENTIVE SPIROMETER WITH YOU TO THE HOSPITAL ON THE DAY OF YOUR SURGERY. Opportunity given to ask and answer questions as well as to observe return demonstration.     Patient signature_____________________________    Witness____________________________

## 2021-04-21 NOTE — PERIOP NOTES
[facility]  Joint/Spine Preoperative Instructions      COVID TESTING/April 29 th Thursday  MOB1 [place] side  Anytime between 7 am and [de-identified]    Surgery Date 05/03/21          Time of Arrival 1230  Contact # [phone]  Zuleyka Sheridan    1. On the day of your surgery, please report to the Surgical Services Registration Desk and sign in at your designated time. The Surgery Center is located to the right of the Emergency Room. 2. You must have someone with you to drive you home. You should not drive a car for 24 hours following surgery. Please make arrangements for a friend or family member to stay with you for the first 24 hours after your surgery. 3. No food after midnight 05/02/21. Medications morning of surgery should be taken with a sip of water. Please follow pre-surgery drink instructions that were given at your Pre Admission Testing appointment. 4. We recommend you do not drink any alcoholic beverages for 24 hours before and after your surgery. 5. Contact your surgeons office for instructions on the following medications: non-steroidal anti-inflammatory drugs (i.e. Advil, Aleve), vitamins, and supplements. (Some surgeons will want you to stop these medications prior to surgery and others may allow you to take them)  **If you are currently taking Plavix, Coumadin, Aspirin and/or other blood-thinning agents, contact your surgeon for instructions. ** Your surgeon will partner with the physician prescribing these medications to determine if it is safe to stop or if you need to continue taking. Please do not stop taking these medications without instructions from your surgeon    6. Wear comfortable clothes. Wear glasses instead of contacts. Do not bring any money or jewelry. Please bring picture ID, insurance card, and any prearranged co-payment or hospital payment. Do not wear make-up, particularly mascara the morning of your surgery.   Do not wear nail polish, particularly if you are having foot /hand surgery. Wear your hair loose or down, no ponytails, buns, stalin pins or clips. All body piercings must be removed. Please shower with antibacterial soap for three consecutive days before and on the morning of surgery, but do not apply any lotions, powders or deodorants after the shower on the day of surgery. Please use a fresh towels after each shower. Please sleep in clean clothes and change bed linens the night before surgery. Please do not shave for 48 hours prior to surgery. Shaving of the face is acceptable. 7. You should understand that if you do not follow these instructions your surgery may be cancelled. If your physical condition changes (I.e. fever, cold or flu) please contact your surgeon as soon as possible. 8. It is important that you be on time. If a situation occurs where you may be late, please call [phone] (OR Holding Area). 9. If you have any questions and or problems, please call [phone] (Pre-admission Testing). 10. Your surgery time may be subject to change. You will receive a phone call the evening prior if your time changes. 11.  If having outpatient surgery, you must have someone to drive you here, stay with you during the duration of your stay, and to drive you home at time of discharge. 12. The following link is for the educational video for patients and/or families. [email or web address]/[facility]/educational-materials    Special Instructions:   Bring Spine /Back back to the hospital for surgery    TAKE ALL MEDICATIONS THE DAY OF SURGERY EXCEPT:lisinopril      I understand a pre-operative phone call will be made to verify my surgery time. In the event that I am not available, I give permission for a message to be left on my answering service and/or with another person?   yes          ___________________      __________   _________ (Signature of Patient)             (Witness)                (Date and Time)

## 2021-04-21 NOTE — ADVANCED PRACTICE NURSE
PAT Nurse Practitioner   Pre-Operative Chart Review/Assessment:-ORTHOPEDIC/NEUROSURGICAL SPINE                Patient Name:  Elena Lorenzana                                                         Age:   65 y.o.    :  1950     Today's Date:  2021     Date of PAT:   21      Date of Surgery:    5/3/21 and 21      Procedure(s):    L4-5 lateral fusion (5/3/21) and L4-S1 posterior fusion (21)     Surgeon:   Vi Gonzalez Clearance:  Dr. Yuliya Mary:      1)  Cardiac Clearance:  Nuclear stress test 21       2)  Diabetic Treatment Consult:  Not indicated-A1C 5.2      3)  Sleep Apnea evaluation:   Not indicated-JOHN 2      4) Treatment for MRSA/Staph Aureus:  Negative       5) Additional Concerns:  Impaired glucose tolerance, MDD, chronic pain, fibromyalgia, CKD 3, former smoker                 Vital Signs:         Vitals:    21 1350   BP: (!) 142/74   Pulse: 77   Resp: 20   Temp: 98.6 °F (37 °C)   SpO2: 97%   Weight: 81.8 kg (180 lb 5.4 oz)   Height: 5' 4\" (1.626 m)            ____________________________________________  PAST MEDICAL HISTORY  Past Medical History:   Diagnosis Date    Arthritis     Chronic kidney disease     CKD 3    Chronic pain     CHRONIC BOWEL PAIN    Depression     Diverticulitis     Fibromyalgia     GERD (gastroesophageal reflux disease)     Goiter     Hiatal hernia     Hypercholesterolemia     Hypertension     IBS (irritable bowel syndrome)     CONSTIPATION, CHRONIC SINCE HER 20s    Insomnia     TAKES TRAZODONE NIGHTLY    Migraine     REDUCED IN FREQUENCY AND SEVERITY SINCE MENOPAUSE    Rectocele       ____________________________________________  PAST SURGICAL HISTORY  Past Surgical History:   Procedure Laterality Date    COLONOSCOPY N/A 2016    COL[facility]    COLONOSCOPY N/A 2018    COLONOSCOPY [facility]    ENDOSCOPY, COLON, DIAGNOSTIC  11/2010    Dr. Americo Dallas  2011    HX CHOLECYSTECTOMY  2006    HX ENDOSCOPY      HX GI  X3  LAST ONE 12/10    COLONOSCOPY    HX GYN  1982    D&C WITH SUCTION    HX HYSTERECTOMY      HX LUMBAR FUSION  2017    HX ORTHOPAEDIC      right foot surgery    HX TONSILLECTOMY      HX TOTAL COLECTOMY  2007    Dr. Pond/ diverticulitis    IR INJ SPINE FLUORO GUIDED  8/28/2020    IR INJ SPINE FLUORO GUIDED  1/8/2021      ____________________________________________  HOME MEDICATIONS    Current Outpatient Medications   Medication Sig    nitrofurantoin, macrocrystal-monohydrate, (Macrobid) 100 mg capsule Take 1 Cap by mouth two (2) times a day for 7 days. Indications: urinary tract infection due to E. coli bacteria    cyclobenzaprine (FLEXERIL) 5 mg tablet TAKE 1 TABLET NIGHTLY    buPROPion SR (WELLBUTRIN, ZYBAN) 200 mg SR tablet TAKE 1 TABLET TWICE A DAY (REPLACES PREVIOUS DOSE)    diclofenac EC (VOLTAREN) 75 mg EC tablet Take 1 Tab by mouth two (2) times daily as needed for Pain. Avoid daily use if possible due to increased risk of kidney problems and heart problems.  dicyclomine (BENTYL) 10 mg capsule TAKE 2 CAPSULES FOUR TIMES A DAY AS NEEDED    estradioL (Estrace) 0.01 % (0.1 mg/gram) vaginal cream APPLY TO VAGINAL 2 TIMES PER WEEK WITH FINGERTIP    lisinopriL (PRINIVIL, ZESTRIL) 40 mg tablet TAKE 1 TABLET DAILY    oxybutynin (DITROPAN) 5 mg tablet TAKE 1 TABLET TWICE A DAY    pantoprazole (PROTONIX) 40 mg tablet TAKE 1 TABLET DAILY    rosuvastatin (CRESTOR) 20 mg tablet TAKE 1 TABLET NIGHTLY    pregabalin (LYRICA) 150 mg capsule Take 150 mg by mouth two (2) times a day.  fexofenadine (ALLEGRA) 180 mg tablet Take 180 mg by mouth daily as needed for Allergies.  polyethylene glycol (MIRALAX) 17 gram packet Take 17 g by mouth daily.  cholecalciferol, vitamin D3, (Vitamin D3) 50 mcg (2,000 unit) tab Take 1 Tab by mouth daily.     DULoxetine (CYMBALTA) 60 mg capsule TAKE 1 CAPSULE TWICE A DAY    hydroCHLOROthiazide (HYDRODIURIL) 25 mg tablet TAKE 1 TABLET DAILY    sucralfate (CARAFATE) 1 gram tablet Take 1 g by mouth three (3) times daily as needed.  diclofenac (VOLTAREN) 1 % gel Apply 2 g to affected area four (4) times daily as needed.  aspirin delayed-release 81 mg tablet Take 81 mg by mouth two (2) times a week. Indications: \"good health\"     No current facility-administered medications for this encounter.       ____________________________________________  ALLERGIES  Allergies   Allergen Reactions    Latex Hives    Codeine Other (comments)     Severe Headache    Morphine Hives    Ambien [Zolpidem] Other (comments)     Severe behavior changes    Dilaudid [Hydromorphone] Other (comments)     Memory loss    Other Medication Rash     BANDAIDS    Shellfish Containing Products Hives      ____________________________________________  SOCIAL HISTORY  Social History     Tobacco Use    Smoking status: Former Smoker     Packs/day: 1.00     Years: 45.00     Pack years: 45.00     Quit date: 2007     Years since quittin.3    Smokeless tobacco: Never Used   Substance Use Topics    Alcohol use: Yes     Alcohol/week: 0.0 standard drinks     Comment: holidays      ____________________________________________        Labs:     Hospital Outpatient Visit on 2021   Component Date Value Ref Range Status    Special Requests: 2021 NO SPECIAL REQUESTS    Final    Culture result: 2021 MRSA NOT PRESENT    Final    Culture result: 2021 Screening of patient nares for MRSA is for surveillance purposes and, if positive, to facilitate isolation considerations in high risk settings. It is not intended for automatic decolonization interventions per se as regimens are not sufficiently effective to warrant routine use.     Final    INR 2021 1.0  0.9 - 1.1   Final    A single therapeutic range for Vit K antagonists may not be optimal for all indications - see June, 2008 issue of Chest, American College of Chest Physicians Evidence-Based Clinical Practice Guidelines, 8th Edition.  Prothrombin time 04/21/2021 10.4  9.0 - 11.1 sec Final    Color 04/21/2021 YELLOW/STRAW    Final    Color Reference Range: Straw, Yellow or Dark Yellow    Appearance 04/21/2021 CLOUDY* CLEAR   Final    Specific gravity 04/21/2021 1.020  1.003 - 1.030   Final    pH (UA) 04/21/2021 5.5  5.0 - 8.0   Final    Protein 04/21/2021 Negative  NEG mg/dL Final    Glucose 04/21/2021 Negative  NEG mg/dL Final    Ketone 04/21/2021 Negative  NEG mg/dL Final    Bilirubin 04/21/2021 Negative  NEG   Final    Blood 04/21/2021 TRACE* NEG   Final    Urobilinogen 04/21/2021 1.0  0.2 - 1.0 EU/dL Final    Nitrites 04/21/2021 Positive* NEG   Final    Leukocyte Esterase 04/21/2021 LARGE* NEG   Final    WBC 04/21/2021 >100* 0 - 4 /hpf Final    RBC 04/21/2021 0-5  0 - 5 /hpf Final    Epithelial cells 04/21/2021 FEW  FEW /lpf Final    Epithelial cell category consists of squamous cells and /or transitional urothelial cells. Renal tubular cells, if present, are separately identified as such.  Bacteria 04/21/2021 3+* NEG /hpf Final    UA:UC IF INDICATED 04/21/2021 URINE CULTURE ORDERED* CNI   Final    Hyaline cast 04/21/2021 2-5  0 - 5 /lpf Final    AMPHETAMINES 04/21/2021 Negative  NEG   Final    BARBITURATES 04/21/2021 Negative  NEG   Final    BENZODIAZEPINES 04/21/2021 Negative  NEG   Final    COCAINE 04/21/2021 Negative  NEG   Final    METHADONE 04/21/2021 Negative  NEG   Final    OPIATES 04/21/2021 Negative  NEG   Final    PCP(PHENCYCLIDINE) 04/21/2021 Negative  NEG   Final    THC (TH-CANNABINOL) 04/21/2021 Positive* NEG   Final    Drug screen comment 04/21/2021 (NOTE)   Final    Comment:  This test is a screen for drugs of abuse in a medical setting only   (i.e., they are unconfirmed results and as such must not be used for   non-medical purposes e.g., employment testing, legal testing). Due to   its inherent nature, false positive (FP) and false negative (FN)   results may be obtained. Therefore, if necessary for medical care,   recommend confirmation of positive findings by GC/MS. The cutoff   values (i.e., the level at which this screening test becomes positive   for a given drug group) are:    Amphetamine/Methamphetamine: 300 ng/mL  Barbiturates:                200 ng/mL  Benzodiazepines:             200 ng/mL  Cocaine:                     150 ng/mL  Methadone:                   300 ng/mL  Opiates:                     300 ng/mL   Phencyclidine, PCP:           25 ng/mL  Marijuana, THC:               50 ng/mL    This screening test can identify the presence of the following drugs   when above the cutoff value; see list posted on the intranet. It can   be viewed by mayra                           ecting in sequence the following from the [address] home   page: [place]; [address] [place] , Resources; [facility], Physician Resources Q to Z; \"UDS (Urine Drug Screen   Automated) List of Detectable Drugs. \"     Or use web address:   http://[email or web address]/[place]/[facility]/Physician%20Resources/  UDS%20List%20of%20Detectable%20Drugs. pdf      Vitamin D 25-Hydroxy 04/21/2021 38.8  30 - 100 ng/mL Final    Comment: (NOTE)  Deficiency               <20 ng/mL  Insufficiency          20-30 ng/mL  Sufficient             ng/mL  Possible toxicity       >100 ng/mL    The Method used is Siemens Advia Centaur currently standardized to a   Center of Disease Control and Prevention (CDC) certified reference   [facility]. Samples containing fluorescein dye can produce falsely   elevated values when tested with the ADVIA Centaur Vitamin D Assay. It is recommended that results in the toxic range, >100 ng/mL, be   retested 72 hours post fluorescein exposure.       Crossmatch Expiration 04/21/2021 05/05/2021,4993   Final    ABO/Rh(D) 04/21/2021 O POSITIVE   Final    Antibody screen 04/21/2021 NEG   Final    Prealbumin 04/21/2021 30.0  20.0 - 40.0 mg/dL Final    Special Requests: 04/21/2021     Final                    Value:NO SPECIAL REQUESTS  Reflexed from [id number]      [place] Count 04/21/2021     Final                    Value:>100,000  COLONIES/mL      Culture result: 04/21/2021 ESCHERICHIA COLI*   Final   Office Visit on 02/03/2021   Component Date Value Ref Range Status    Hemoglobin A1c 04/19/2021 5.2  4.8 - 5.6 % Final    Comment:          Prediabetes: 5.7 - 6.4           Diabetes: >6.4           Glycemic control for adults with diabetes: <7.0      Estimated average glucose 04/19/2021 103  mg/dL Final    Cholesterol, total 04/19/2021 154  100 - 199 mg/dL Final    Triglyceride 04/19/2021 103  0 - 149 mg/dL Final    HDL Cholesterol 04/19/2021 43  >39 mg/dL Final    VLDL, calculated 04/19/2021 19  5 - 40 mg/dL Final    LDL, calculated 04/19/2021 92  0 - 99 mg/dL Final    Glucose 04/19/2021 90  65 - 99 mg/dL Final    BUN 04/19/2021 22  8 - 27 mg/dL Final    Creatinine 04/19/2021 1.30* 0.57 - 1.00 mg/dL Final    GFR est non-AA 04/19/2021 41* >59 mL/min/1.73 Final    GFR est AA 04/19/2021 47* >59 mL/min/1.73 Final    BUN/Creatinine ratio 04/19/2021 17  12 - 28 Final    Sodium 04/19/2021 143  134 - 144 mmol/L Final    Potassium 04/19/2021 4.7  3.5 - 5.2 mmol/L Final    Chloride 04/19/2021 105  96 - 106 mmol/L Final    CO2 04/19/2021 22  20 - 29 mmol/L Final    Calcium 04/19/2021 9.9  8.7 - 10.3 mg/dL Final    Protein, total 04/19/2021 6.7  6.0 - 8.5 g/dL Final    Albumin 04/19/2021 4.4  3.7 - 4.7 g/dL Final    GLOBULIN, TOTAL 04/19/2021 2.3  1.5 - 4.5 g/dL Final    A-G Ratio 04/19/2021 1.9  1.2 - 2.2 Final    Bilirubin, total 04/19/2021 0.4  0.0 - 1.2 mg/dL Final    Alk.  phosphatase 04/19/2021 104  39 - 117 IU/L Final    AST (SGOT) 04/19/2021 19  0 - 40 IU/L Final    ALT (SGPT) 04/19/2021 15  0 - 32 IU/L Final    TSH 04/19/2021 2.460  0.450 - 4.500 uIU/mL Final    WBC 04/19/2021 7.5  3.4 - 10.8 x10E3/uL Final    RBC 04/19/2021 3.89  3.77 - 5.28 x10E6/uL Final    HGB 04/19/2021 12.4  11.1 - 15.9 g/dL Final    HCT 04/19/2021 36.7  34.0 - 46.6 % Final    MCV 04/19/2021 94  79 - 97 fL Final    MCH 04/19/2021 31.9  26.6 - 33.0 pg Final    MCHC 04/19/2021 33.8  31.5 - 35.7 g/dL Final    RDW 04/19/2021 11.9  11.7 - 15.4 % Final    PLATELET 67/47/7639 972  150 - 450 x10E3/uL Final    Interpretation 04/19/2021 Note   Final    Supplemental report is available. Skin:   Denies open wounds, cuts, sores, rashes or other areas of concern in PAT assessment. Jennifer Rashid NP     Urine cx results reviewed. Allergies reviewed Rx escribed to pt's pharmacy on file for Macrobid 100 mg BID x 7 days.

## 2021-04-22 LAB
BACTERIA SPEC CULT: NORMAL
BACTERIA SPEC CULT: NORMAL
PREALB SERPL-MCNC: 30 MG/DL (ref 20–40)
SERVICE CMNT-IMP: NORMAL

## 2021-04-22 NOTE — PROGRESS NOTES
Please call patientI ordered a nuclear stress test at her last appointment back in 2/2021 but she never had this. Since she is planning for surgery soon, I would recommend that she have this done first to ensure there is no major cardiac risk during surgery.   Thanks    [employer] Mobile message sent for labs

## 2021-04-22 NOTE — PROGRESS NOTES
Patient advised per Dr Shavonne Alvares he ordered a nuclear stress test at her last appointment back in 2/2021 but she never had this. Sunil Macias she is planning for surgery soon, he would recommend that she have this done first to ensure there is no major cardiac risk during surgery.  Patient will call to schedule stress test.

## 2021-04-23 LAB
BACTERIA SPEC CULT: ABNORMAL
CC UR VC: ABNORMAL
SERVICE CMNT-IMP: ABNORMAL

## 2021-04-26 RX ORDER — NITROFURANTOIN 25; 75 MG/1; MG/1
100 CAPSULE ORAL 2 TIMES DAILY
Qty: 14 CAP | Refills: 0 | Status: SHIPPED | OUTPATIENT
Start: 2021-04-26 | End: 2021-05-08

## 2021-04-26 NOTE — PERIOP NOTES
Spoke to patient with instructions for macrobid twice daily times 7 days per K. Pepe Mccarty N.P.  Verbalized understanding.

## 2021-04-29 ENCOUNTER — HOSPITAL ENCOUNTER (OUTPATIENT)
Dept: PREADMISSION TESTING | Age: 71
Discharge: HOME | End: 2021-04-29
Payer: MEDICARE

## 2021-04-29 LAB — SARS-COV-2, COV2: NORMAL

## 2021-04-29 PROCEDURE — U0003 INFECTIOUS AGENT DETECTION BY NUCLEIC ACID (DNA OR RNA); SEVERE ACUTE RESPIRATORY SYNDROME CORONAVIRUS 2 (SARS-COV-2) (CORONAVIRUS DISEASE [COVID-19]), AMPLIFIED PROBE TECHNIQUE, MAKING USE OF HIGH THROUGHPUT TECHNOLOGIES AS DESCRIBED BY CMS-2020-01-R: HCPCS

## 2021-04-30 ENCOUNTER — OFFICE VISIT (OUTPATIENT)
Dept: FAMILY MEDICINE CLINIC | Age: 71
DRG: 454 | End: 2021-04-30
Payer: MEDICARE

## 2021-04-30 ENCOUNTER — HOSPITAL ENCOUNTER (OUTPATIENT)
Dept: NON INVASIVE DIAGNOSTICS | Age: 71
Discharge: HOME | End: 2021-04-30
Attending: FAMILY MEDICINE
Payer: MEDICARE

## 2021-04-30 ENCOUNTER — HOSPITAL ENCOUNTER (OUTPATIENT)
Dept: NUCLEAR MEDICINE | Age: 71
Discharge: HOME | End: 2021-04-30
Attending: FAMILY MEDICINE
Payer: MEDICARE

## 2021-04-30 VITALS
BODY MASS INDEX: 31.07 KG/M2 | SYSTOLIC BLOOD PRESSURE: 177 MMHG | HEIGHT: 64 IN | DIASTOLIC BLOOD PRESSURE: 101 MMHG | WEIGHT: 182 LBS

## 2021-04-30 VITALS
HEIGHT: 64 IN | WEIGHT: 181.8 LBS | TEMPERATURE: 98.5 F | SYSTOLIC BLOOD PRESSURE: 140 MMHG | DIASTOLIC BLOOD PRESSURE: 73 MMHG | OXYGEN SATURATION: 99 % | BODY MASS INDEX: 31.04 KG/M2 | RESPIRATION RATE: 18 BRPM | HEART RATE: 99 BPM

## 2021-04-30 DIAGNOSIS — M48.061 FORAMINAL STENOSIS OF LUMBAR REGION: ICD-10-CM

## 2021-04-30 DIAGNOSIS — R00.2 PALPITATIONS: ICD-10-CM

## 2021-04-30 DIAGNOSIS — Z98.890 STATUS POST LUMBAR SPINE SURGERY FOR DECOMPRESSION OF SPINAL CORD: ICD-10-CM

## 2021-04-30 DIAGNOSIS — R07.89 ATYPICAL CHEST PAIN: ICD-10-CM

## 2021-04-30 DIAGNOSIS — M96.1 POSTLAMINECTOMY SYNDROME OF LUMBAR REGION: ICD-10-CM

## 2021-04-30 DIAGNOSIS — Z01.818 PREOP EXAMINATION: Primary | ICD-10-CM

## 2021-04-30 DIAGNOSIS — R82.71 ASYMPTOMATIC BACTERIURIA: ICD-10-CM

## 2021-04-30 DIAGNOSIS — I10 ESSENTIAL HYPERTENSION WITH GOAL BLOOD PRESSURE LESS THAN 140/90: ICD-10-CM

## 2021-04-30 DIAGNOSIS — N18.31 CHRONIC RENAL FAILURE, STAGE 3A (HCC): ICD-10-CM

## 2021-04-30 DIAGNOSIS — F33.2 SEVERE EPISODE OF RECURRENT MAJOR DEPRESSIVE DISORDER, WITHOUT PSYCHOTIC FEATURES (HCC): ICD-10-CM

## 2021-04-30 LAB
SARS-COV-2, COV2NT: NOT DETECTED
STRESS BASELINE DIAS BP: 92 MMHG
STRESS BASELINE HR: 75 BPM
STRESS BASELINE SYS BP: 197 MMHG
STRESS ESTIMATED WORKLOAD: 1 METS
STRESS EXERCISE DUR MIN: NORMAL
STRESS PEAK DIAS BP: 92 MMHG
STRESS PEAK SYS BP: 197 MMHG
STRESS PERCENT HR ACHIEVED: 72 %
STRESS POST PEAK HR: 106 BPM
STRESS RATE PRESSURE PRODUCT: NORMAL BPM*MMHG
STRESS ST DEPRESSION: 0 MM
STRESS ST ELEVATION: 0 MM
STRESS STAGE 1 BP: NORMAL MMHG
STRESS STAGE 1 HR: 88 BPM
STRESS STAGE RECOVERY 1 BP: NORMAL MMHG
STRESS STAGE RECOVERY 1 HR: 88 BPM
STRESS TARGET HR: 148 BPM

## 2021-04-30 PROCEDURE — 74011250636 HC RX REV CODE- 250/636: Performed by: FAMILY MEDICINE

## 2021-04-30 PROCEDURE — 99214 OFFICE O/P EST MOD 30 MIN: CPT | Performed by: FAMILY MEDICINE

## 2021-04-30 PROCEDURE — G8536 NO DOC ELDER MAL SCRN: HCPCS | Performed by: FAMILY MEDICINE

## 2021-04-30 PROCEDURE — G8399 PT W/DXA RESULTS DOCUMENT: HCPCS | Performed by: FAMILY MEDICINE

## 2021-04-30 PROCEDURE — 1100F PTFALLS ASSESS-DOCD GE2>/YR: CPT | Performed by: FAMILY MEDICINE

## 2021-04-30 PROCEDURE — G0463 HOSPITAL OUTPT CLINIC VISIT: HCPCS | Performed by: FAMILY MEDICINE

## 2021-04-30 PROCEDURE — G9899 SCRN MAM PERF RSLTS DOC: HCPCS | Performed by: FAMILY MEDICINE

## 2021-04-30 PROCEDURE — G9717 DOC PT DX DEP/BP F/U NT REQ: HCPCS | Performed by: FAMILY MEDICINE

## 2021-04-30 PROCEDURE — G9711 PT HX TOT COL OR COLON CA: HCPCS | Performed by: FAMILY MEDICINE

## 2021-04-30 PROCEDURE — 78452 HT MUSCLE IMAGE SPECT MULT: CPT

## 2021-04-30 PROCEDURE — G8417 CALC BMI ABV UP PARAM F/U: HCPCS | Performed by: FAMILY MEDICINE

## 2021-04-30 PROCEDURE — G8753 SYS BP > OR = 140: HCPCS | Performed by: FAMILY MEDICINE

## 2021-04-30 PROCEDURE — G8427 DOCREV CUR MEDS BY ELIG CLIN: HCPCS | Performed by: FAMILY MEDICINE

## 2021-04-30 PROCEDURE — 1090F PRES/ABSN URINE INCON ASSESS: CPT | Performed by: FAMILY MEDICINE

## 2021-04-30 PROCEDURE — G8754 DIAS BP LESS 90: HCPCS | Performed by: FAMILY MEDICINE

## 2021-04-30 PROCEDURE — 3288F FALL RISK ASSESSMENT DOCD: CPT | Performed by: FAMILY MEDICINE

## 2021-04-30 PROCEDURE — A9500 TC99M SESTAMIBI: HCPCS

## 2021-04-30 RX ORDER — LUBIPROSTONE 24 UG/1
24 CAPSULE, GELATIN COATED ORAL
COMMUNITY
End: 2021-04-30 | Stop reason: ALTCHOICE

## 2021-04-30 RX ORDER — POLYETHYLENE GLYCOL 3350, SODIUM SULFATE ANHYDROUS, SODIUM BICARBONATE, SODIUM CHLORIDE, POTASSIUM CHLORIDE 236; 22.74; 6.74; 5.86; 2.97 G/4L; G/4L; G/4L; G/4L; G/4L
POWDER, FOR SOLUTION ORAL
COMMUNITY
Start: 2020-06-25 | End: 2021-04-30 | Stop reason: ALTCHOICE

## 2021-04-30 RX ORDER — IBUPROFEN 800 MG/1
TABLET ORAL
COMMUNITY
Start: 2020-09-14 | End: 2021-05-08

## 2021-04-30 RX ORDER — TRAMADOL HYDROCHLORIDE 50 MG/1
50 TABLET ORAL
COMMUNITY
End: 2021-04-30 | Stop reason: ALTCHOICE

## 2021-04-30 RX ORDER — SODIUM CHLORIDE 0.9 % (FLUSH) 0.9 %
20 SYRINGE (ML) INJECTION
Status: COMPLETED | OUTPATIENT
Start: 2021-04-30 | End: 2021-04-30

## 2021-04-30 RX ORDER — CITALOPRAM 10 MG/1
10 TABLET ORAL DAILY
Status: ON HOLD | COMMUNITY
End: 2021-05-04

## 2021-04-30 RX ORDER — FERROUS SULFATE, DRIED 160(50) MG
1 TABLET, EXTENDED RELEASE ORAL
COMMUNITY

## 2021-04-30 RX ORDER — TRAZODONE HYDROCHLORIDE 50 MG/1
50 TABLET ORAL
COMMUNITY
End: 2022-06-09 | Stop reason: ALTCHOICE

## 2021-04-30 RX ORDER — ALPRAZOLAM 1 MG/1
1 TABLET ORAL
COMMUNITY
End: 2021-04-30 | Stop reason: ALTCHOICE

## 2021-04-30 RX ORDER — GABAPENTIN 300 MG/1
300 CAPSULE ORAL 2 TIMES DAILY
Status: ON HOLD | COMMUNITY
Start: 2020-07-17 | End: 2021-05-04

## 2021-04-30 RX ADMIN — Medication 20 ML: at 10:04

## 2021-04-30 RX ADMIN — REGADENOSON 0.4 MG: 0.08 INJECTION, SOLUTION INTRAVENOUS at 10:04

## 2021-04-30 NOTE — PROGRESS NOTES
Chief Complaint   Patient presents with    Pre-op Exam     Surgery 5/4/21 Dr Jeanie Goodell   1. Have you been to the ER, urgent care clinic since your last visit? Hospitalized since your last visit? No    2. Have you seen or consulted any other health care providers outside of the [address] [place] [address] since your last visit? Include any pap smears or colon screening.  Yes Where: Orth[facility]     Has not taken BP medication today

## 2021-04-30 NOTE — PROGRESS NOTES
Eleni Gillette (: 1950) is a 65 y.o. female, established patient, here for evaluation of the following chief complaint(s):  Pre-op Exam (Surgery 21 Dr Haim Gomes)       ASSESSMENT/PLAN:  Below is the assessment and plan developed based on review of pertinent history, physical exam, labs, studies, and medications. Diagnoses and all orders for this visit:    1. Preop examination  2. Foraminal stenosis of lumbar region  3. Postlaminectomy syndrome of lumbar region  4. Status post lumbar spine surgery for decompression of spinal cord  - Cleared for surgery. Based on current sx and review of testing/labs, pt is a moderate risk patient. The details of her nuclear stress test do not support significant cardiac concerns. Pt has good functional capacity based on daily exercise and is limited mostly by her back pain. 5. Essential hypertension with goal blood pressure less than 140/90 - slightly high today but in pain. 6. Chronic renal failure, stage 3a ([address] - stable, needs to avoid NSAIDs- currently using ibuprofen or diclofenac PRN. 7. Severe episode of recurrent major depressive disorder, without psychotic features ([address] - much improved with current meds and therapy    Return in about 6 weeks (around 2021), or if symptoms worsen or fail to improve. SUBJECTIVE/OBJECTIVE:  Eleni Gillette is a 65 y.o. female (:1950) who presents for preoperative evaluation. She will have a 2-day L4-S1 lateral fusion and posterior fusion done by Dr. Haim Gomes on 5/3/2021 and 2021. She is doing well today and feels ready for surgery. Denies any latex allergy. Denies any history of anesthesia complications. Denies any abnormal bleeding. Able to perform 4 METs. MRI lumbar spine from 2021:  \"L4-L5: Severe facet arthropathy with thickening of the residual ligamentum flavum. There is a diffuse disc bulge. There is disc height loss with desiccation.  Canal stenosis is moderate to severe with narrowing of the subarticular zones. Mild to moderate right and severe left foraminal narrowing. No change     L5-S1: Severe left and moderate right facet arthropathy with thickening of the ligamentum flavum and a disc bulge. There is disc height loss. Canal stenosis is mild to moderate with narrowing of the bilateral subarticular zones. There is mild narrowing of the foramen. This has not progressed\"    Today she reports she and her  have been walking 1/2 mile per day with no dyspnea, chest pain, or palpitations. She routinely plays with her dogs and is only limited by her back pain. Sent for nuclear stress test back in 2/2021 for concerns at the time with palpshayyosn. She had this done today and showed:  \"FINDINGS:  The rest and stress perfusion images demonstrate no significant perfusion defect or evidence of myocardial reversibility.      The gated images demonstrate normal global and regional wall motion and thickening. Left ventricular ejection fraction is 73 %.     Impression:   Normal gated rest/stress myocardial perfusion imaging study. No evidence of myocardial ischemia or infarction. Left ventricular ejection fraction is 73 %. \"    JOHN risk   1. Snoring - Do you snore loudly (louder than talking or loud enough to be heard through closed doors)? N  2. Tired - Do you often feel tired, fatigued, or sleepy during daytime? N  3. Observed - Has anyone observed you stop breathing during your sleep? N  4. Blood pressure - Do you have or are you being treated for high blood pressure? Y  5. BMI - BMI more than 35 kg/m2? N  10. Age - Age over 48 yr old? Y  7. Neck circumference - Neck circumference greater than 40 cm? N  8. Gender - Gender male?  N    High risk of JOHN: >5  Mod risk of JOHN: 3-5  Low risk of JOHN: <3    ROS  Gen - no fever/chills  Resp - no dyspnea or cough  CV - no chest pain or KASPER  Psych - depression - much improved with current meds and therapy  Rest per HPI    Problem List:     Patient Active Problem List    Diagnosis Date Noted    Depression, major, severe recurrence ([facility] 12/06/2019    Non morbid obesity 12/20/2017    Spinal stenosis of lumbar region with neurogenic claudication 08/29/2017    DDD (degenerative disc disease), cervical 11/21/2016    Encounter for medication monitoring 10/14/2015    Essential hypertension 05/18/2015    Depression 08/13/2012    Diverticulitis     IBS (irritable bowel syndrome)     Fibromyalgia     Hiatal hernia     GERD (gastroesophageal reflux disease)     Hypercholesterolemia      Medical History:     Past Medical History:   Diagnosis Date    Arthritis     Chronic kidney disease     CKD 3    Chronic pain     CHRONIC BOWEL PAIN    Depression     Diverticulitis     Fibromyalgia     GERD (gastroesophageal reflux disease)     Goiter     Hiatal hernia     Hypercholesterolemia     Hypertension     IBS (irritable bowel syndrome)     CONSTIPATION, CHRONIC SINCE HER 20s    Insomnia     TAKES TRAZODONE NIGHTLY    Migraine     REDUCED IN FREQUENCY AND SEVERITY SINCE MENOPAUSE    Rectocele      Allergies: Allergies   Allergen Reactions    Latex Hives    Codeine Other (comments)     Severe Headache    Morphine Hives    Ambien [Zolpidem] Other (comments)     Severe behavior changes    Dilaudid [Hydromorphone] Other (comments)     Memory loss    Other Medication Rash     BANDAIDS    Shellfish Containing Products Hives      Medications:     Current Outpatient Medications   Medication Sig    citalopram (CELEXA) 10 mg tablet Take 10 mg by mouth daily.  calcium-vitamin D (OS-TRUE +D3) 500 mg-200 unit per tablet 1 Tab.  gabapentin (NEURONTIN) 300 mg capsule Take 300 mg by mouth two (2) times a day.  ibuprofen (MOTRIN) 800 mg tablet     nitrofurantoin, macrocrystal-monohydrate, (Macrobid) 100 mg capsule Take 1 Cap by mouth two (2) times a day for 7 days.  Indications: urinary tract infection due to E. coli bacteria    cyclobenzaprine (FLEXERIL) 5 mg tablet TAKE 1 TABLET NIGHTLY    buPROPion SR (WELLBUTRIN, ZYBAN) 200 mg SR tablet TAKE 1 TABLET TWICE A DAY (REPLACES PREVIOUS DOSE)    diclofenac EC (VOLTAREN) 75 mg EC tablet Take 1 Tab by mouth two (2) times daily as needed for Pain. Avoid daily use if possible due to increased risk of kidney problems and heart problems.  dicyclomine (BENTYL) 10 mg capsule TAKE 2 CAPSULES FOUR TIMES A DAY AS NEEDED    estradioL (Estrace) 0.01 % (0.1 mg/gram) vaginal cream APPLY TO VAGINAL 2 TIMES PER WEEK WITH FINGERTIP    lisinopriL (PRINIVIL, ZESTRIL) 40 mg tablet TAKE 1 TABLET DAILY    oxybutynin (DITROPAN) 5 mg tablet TAKE 1 TABLET TWICE A DAY    pantoprazole (PROTONIX) 40 mg tablet TAKE 1 TABLET DAILY    fexofenadine (ALLEGRA) 180 mg tablet Take 180 mg by mouth daily as needed for Allergies.  polyethylene glycol (MIRALAX) 17 gram packet Take 17 g by mouth daily.  cholecalciferol, vitamin D3, (Vitamin D3) 50 mcg (2,000 unit) tab Take 1 Tab by mouth daily.  DULoxetine (CYMBALTA) 60 mg capsule TAKE 1 CAPSULE TWICE A DAY    hydroCHLOROthiazide (HYDRODIURIL) 25 mg tablet TAKE 1 TABLET DAILY    sucralfate (CARAFATE) 1 gram tablet Take 1 g by mouth three (3) times daily as needed.  diclofenac (VOLTAREN) 1 % gel Apply 2 g to affected area four (4) times daily as needed.  aspirin delayed-release 81 mg tablet Take 81 mg by mouth two (2) times a week. Indications: \"good health\"    PEG 3350-Electrolytes (GaviLyte-G) 236-22.74-6.74 -5.86 gram suspension     traMADoL (ULTRAM) 50 mg tablet Take 50 mg by mouth every six (6) hours as needed.  traZODone (DESYREL) 50 mg tablet Take 50 mg by mouth nightly as needed.  lubiPROStone (AMITIZA) 24 mcg capsule Take 24 mcg by mouth daily (with breakfast). No current facility-administered medications for this visit.       Surgical History:     Past Surgical History:   Procedure Laterality Date    COLONOSCOPY N/A 6/21/2016    COLONOSCOPY performed by Stefan Mclean MD at [facility] ENDOSCOPY    COLONOSCOPY N/A 2018    COLONOSCOPY performed by [facility]clean MD at [facility] ENDOSCOPY    ENDOSCOPY, COLON, DIAGNOSTIC  2010    Dr. Jordana Cam      HX CHOLECYSTECTOMY  2006    HX ENDOSCOPY      HX GI  X3  LAST ONE 12/10    COLONOSCOPY    HX GYN  1982    D&C WITH SUCTION    HX HYSTERECTOMY      HX LUMBAR FUSION      HX ORTHOPAEDIC      right foot surgery    HX TONSILLECTOMY      Inge Beck      Dr. Pond/ diverticulitis    IR INJ SPINE FLUORO GUIDED  2020    IR INJ SPINE FLUORO GUIDED  2021     Social History:     Social History     Socioeconomic History    Marital status:      Spouse name: Not on file    Number of children: Not on file    Years of education: Not on file    Highest education level: Not on file   Tobacco Use    Smoking status: Former Smoker     Packs/day: 1.00     Years: 45.00     Pack years: 45.00     Quit date: 2007     Years since quittin.3    Smokeless tobacco: Never Used   Substance and Sexual Activity    Alcohol use: Yes     Alcohol/week: 0.0 standard drinks     Comment: holidays    Drug use: No    Sexual activity: Yes     Partners: Male     Comment:    Other Topics Concern       Objective:  Blood pressure (!) 140/73, pulse 99, temperature 98.5 °F (36.9 °C), temperature source Oral, resp. rate 18, height 5' 4\" (1.626 m), weight 181 lb 12.8 oz (82.5 kg), SpO2 99 %.     PE  General appearance - alert, well appearing, and in no distress, using cane  Eyes -sclera anicteric  Neck - supple, no significant adenopathy, no thyromegaly  Chest - clear to auscultation, no wheezes, rales or rhonchi, symmetric air entry  Heart - normal rate, regular rhythm, normal S1, S2, no murmurs, rubs, clicks or gallops  Neurological - alert, oriented, normal speech, no focal findings or movement disorder noted  Msk - abn gait with cane  Extr - trace bilat LE edema  Psych - normal mood and affect    On this date 04/30/2021 I have spent 30 minutes reviewing previous notes, test results and face to face with the patient discussing the diagnosis and importance of compliance with the treatment plan as well as documenting on the day of the visit. An electronic signature was used to authenticate this note.   -- García Kahn MD

## 2021-05-03 ENCOUNTER — ANESTHESIA EVENT (OUTPATIENT)
Dept: SURGERY | Age: 71
DRG: 454 | End: 2021-05-03
Payer: MEDICARE

## 2021-05-03 ENCOUNTER — ANESTHESIA (OUTPATIENT)
Dept: SURGERY | Age: 71
DRG: 454 | End: 2021-05-03
Payer: MEDICARE

## 2021-05-03 ENCOUNTER — APPOINTMENT (OUTPATIENT)
Dept: CT IMAGING | Age: 71
DRG: 454 | End: 2021-05-03
Attending: ORTHOPAEDIC SURGERY
Payer: MEDICARE

## 2021-05-03 ENCOUNTER — HOSPITAL ENCOUNTER (INPATIENT)
Age: 71
LOS: 5 days | Discharge: HOME HEALTH | DRG: 454 | End: 2021-05-08
Attending: ORTHOPAEDIC SURGERY | Admitting: ORTHOPAEDIC SURGERY
Payer: MEDICARE

## 2021-05-03 ENCOUNTER — APPOINTMENT (OUTPATIENT)
Dept: GENERAL RADIOLOGY | Age: 71
DRG: 454 | End: 2021-05-03
Attending: ORTHOPAEDIC SURGERY
Payer: MEDICARE

## 2021-05-03 DIAGNOSIS — R41.82 ACUTE ALTERATION IN MENTAL STATUS: ICD-10-CM

## 2021-05-03 DIAGNOSIS — I67.89 CEREBRAL MICROVASCULAR DISEASE: ICD-10-CM

## 2021-05-03 DIAGNOSIS — M79.7 FIBROMYALGIA: ICD-10-CM

## 2021-05-03 DIAGNOSIS — R56.9 CONVULSIONS, UNSPECIFIED CONVULSION TYPE (HCC): ICD-10-CM

## 2021-05-03 DIAGNOSIS — M48.062 SPINAL STENOSIS OF LUMBAR REGION WITH NEUROGENIC CLAUDICATION: ICD-10-CM

## 2021-05-03 DIAGNOSIS — I65.23 BILATERAL CAROTID ARTERY STENOSIS: ICD-10-CM

## 2021-05-03 PROBLEM — M48.061 SPINAL STENOSIS, LUMBAR: Status: ACTIVE | Noted: 2021-05-03

## 2021-05-03 LAB
APPEARANCE UR: ABNORMAL
BACTERIA URNS QL MICRO: NEGATIVE /HPF
BILIRUB UR QL: NEGATIVE
COLOR UR: ABNORMAL
EPITH CASTS URNS QL MICRO: ABNORMAL /LPF
GLUCOSE UR STRIP.AUTO-MCNC: NEGATIVE MG/DL
HGB UR QL STRIP: NEGATIVE
HYALINE CASTS URNS QL MICRO: ABNORMAL /LPF (ref 0–5)
KETONES UR QL STRIP.AUTO: NEGATIVE MG/DL
LEUKOCYTE ESTERASE UR QL STRIP.AUTO: ABNORMAL
NITRITE UR QL STRIP.AUTO: NEGATIVE
PH UR STRIP: 5.5 [PH] (ref 5–8)
PROT UR STRIP-MCNC: NEGATIVE MG/DL
RBC #/AREA URNS HPF: ABNORMAL /HPF (ref 0–5)
SP GR UR REFRACTOMETRY: 1.02 (ref 1–1.03)
UA: UC IF INDICATED,UAUC: ABNORMAL
UROBILINOGEN UR QL STRIP.AUTO: 0.2 EU/DL (ref 0.2–1)
WBC URNS QL MICRO: ABNORMAL /HPF (ref 0–4)

## 2021-05-03 PROCEDURE — 77030029099 HC BN WAX SSPC -A: Performed by: ORTHOPAEDIC SURGERY

## 2021-05-03 PROCEDURE — 76000 FLUOROSCOPY <1 HR PHYS/QHP: CPT

## 2021-05-03 PROCEDURE — 74011250636 HC RX REV CODE- 250/636: Performed by: ORTHOPAEDIC SURGERY

## 2021-05-03 PROCEDURE — 77030033138 HC SUT PGA STRATFX J&J -B: Performed by: ORTHOPAEDIC SURGERY

## 2021-05-03 PROCEDURE — 81001 URINALYSIS AUTO W/SCOPE: CPT

## 2021-05-03 PROCEDURE — 74011000250 HC RX REV CODE- 250: Performed by: NURSE ANESTHETIST, CERTIFIED REGISTERED

## 2021-05-03 PROCEDURE — 74011250636 HC RX REV CODE- 250/636: Performed by: ANESTHESIOLOGY

## 2021-05-03 PROCEDURE — 74011250636 HC RX REV CODE- 250/636: Performed by: NURSE ANESTHETIST, CERTIFIED REGISTERED

## 2021-05-03 PROCEDURE — 76010000162 HC OR TIME 1.5 TO 2 HR INTENSV-TIER 1: Performed by: ORTHOPAEDIC SURGERY

## 2021-05-03 PROCEDURE — C1821 INTERSPINOUS IMPLANT: HCPCS | Performed by: ORTHOPAEDIC SURGERY

## 2021-05-03 PROCEDURE — 77030003445 HC NDL BIOP BN BD -B: Performed by: ORTHOPAEDIC SURGERY

## 2021-05-03 PROCEDURE — 65270000029 HC RM PRIVATE

## 2021-05-03 PROCEDURE — 76210000016 HC OR PH I REC 1 TO 1.5 HR: Performed by: ORTHOPAEDIC SURGERY

## 2021-05-03 PROCEDURE — 77030040356 HC CORD BPLR FRCP COVD -A: Performed by: ORTHOPAEDIC SURGERY

## 2021-05-03 PROCEDURE — 77030003028 HC SUT VCRL J&J -A: Performed by: ORTHOPAEDIC SURGERY

## 2021-05-03 PROCEDURE — 77030008462 HC STPLR SKN PROX J&J -A: Performed by: ORTHOPAEDIC SURGERY

## 2021-05-03 PROCEDURE — 77030019908 HC STETH ESOPH SIMS -A: Performed by: ANESTHESIOLOGY

## 2021-05-03 PROCEDURE — 77030018723 HC ELCTRD BLD COVD -A: Performed by: ORTHOPAEDIC SURGERY

## 2021-05-03 PROCEDURE — 72100 X-RAY EXAM L-S SPINE 2/3 VWS: CPT

## 2021-05-03 PROCEDURE — 0SB20ZZ EXCISION OF LUMBAR VERTEBRAL DISC, OPEN APPROACH: ICD-10-PCS | Performed by: ORTHOPAEDIC SURGERY

## 2021-05-03 PROCEDURE — 74011000272 HC RX REV CODE- 272: Performed by: ORTHOPAEDIC SURGERY

## 2021-05-03 PROCEDURE — 2709999900 HC NON-CHARGEABLE SUPPLY: Performed by: ORTHOPAEDIC SURGERY

## 2021-05-03 PROCEDURE — 77030008684 HC TU ET CUF COVD -B: Performed by: ANESTHESIOLOGY

## 2021-05-03 PROCEDURE — 77030040950 HC GRFT BN OSTEOAMP SELCT BTUS -H: Performed by: ORTHOPAEDIC SURGERY

## 2021-05-03 PROCEDURE — 77030034479 HC ADH SKN CLSR PRINEO J&J -B: Performed by: ORTHOPAEDIC SURGERY

## 2021-05-03 PROCEDURE — 77030014647 HC SEAL FBRN TISSL BAXT -D: Performed by: ORTHOPAEDIC SURGERY

## 2021-05-03 PROCEDURE — 0SG00A0 FUSION OF LUMBAR VERTEBRAL JOINT WITH INTERBODY FUSION DEVICE, ANTERIOR APPROACH, ANTERIOR COLUMN, OPEN APPROACH: ICD-10-PCS | Performed by: ORTHOPAEDIC SURGERY

## 2021-05-03 PROCEDURE — C1713 ANCHOR/SCREW BN/BN,TIS/BN: HCPCS | Performed by: ORTHOPAEDIC SURGERY

## 2021-05-03 PROCEDURE — 77030005513 HC CATH URETH FOL11 MDII -B: Performed by: ORTHOPAEDIC SURGERY

## 2021-05-03 PROCEDURE — 77030040361 HC SLV COMPR DVT MDII -B: Performed by: ORTHOPAEDIC SURGERY

## 2021-05-03 PROCEDURE — 77030013079 HC BLNKT BAIR HGGR 3M -A: Performed by: ANESTHESIOLOGY

## 2021-05-03 PROCEDURE — 76060000034 HC ANESTHESIA 1.5 TO 2 HR: Performed by: ORTHOPAEDIC SURGERY

## 2021-05-03 PROCEDURE — 77030003029 HC SUT VCRL J&J -B: Performed by: ORTHOPAEDIC SURGERY

## 2021-05-03 PROCEDURE — 77030020704 HC DISECT ENDOSC BLNT J&J -B: Performed by: ORTHOPAEDIC SURGERY

## 2021-05-03 PROCEDURE — 72131 CT LUMBAR SPINE W/O DYE: CPT

## 2021-05-03 PROCEDURE — 74011250637 HC RX REV CODE- 250/637: Performed by: ORTHOPAEDIC SURGERY

## 2021-05-03 PROCEDURE — 74011000250 HC RX REV CODE- 250: Performed by: ORTHOPAEDIC SURGERY

## 2021-05-03 PROCEDURE — 77030026438 HC STYL ET INTUB CARD -A: Performed by: ANESTHESIOLOGY

## 2021-05-03 DEVICE — RISE-L SPACER 22 X 50MM, 7-14MM, 3-15&DEG;
Type: IMPLANTABLE DEVICE | Site: SPINE LUMBAR | Status: FUNCTIONAL
Brand: RISE-L

## 2021-05-03 DEVICE — PLYMOUTH THORACOLUMBAR PLATE, L4-L5, 21MM
Type: IMPLANTABLE DEVICE | Site: SPINE LUMBAR | Status: FUNCTIONAL
Brand: PLYMOUTH

## 2021-05-03 DEVICE — GRAFT BONE 5 CC: Type: IMPLANTABLE DEVICE | Site: SPINE LUMBAR | Status: FUNCTIONAL

## 2021-05-03 DEVICE — 5.5MM VARIABLE ANGLE SCREW, 28MM
Type: IMPLANTABLE DEVICE | Site: SPINE LUMBAR | Status: FUNCTIONAL
Brand: TRUSS

## 2021-05-03 RX ORDER — GABAPENTIN 100 MG/1
100 CAPSULE ORAL 3 TIMES DAILY
Status: DISCONTINUED | OUTPATIENT
Start: 2021-05-03 | End: 2021-05-03 | Stop reason: SDUPTHER

## 2021-05-03 RX ORDER — BUPROPION HYDROCHLORIDE 100 MG/1
200 TABLET, EXTENDED RELEASE ORAL 2 TIMES DAILY
Status: DISCONTINUED | OUTPATIENT
Start: 2021-05-03 | End: 2021-05-08 | Stop reason: HOSPADM

## 2021-05-03 RX ORDER — SODIUM CHLORIDE 9 MG/ML
25 INJECTION, SOLUTION INTRAVENOUS CONTINUOUS
Status: DISCONTINUED | OUTPATIENT
Start: 2021-05-03 | End: 2021-05-03 | Stop reason: HOSPADM

## 2021-05-03 RX ORDER — NALOXONE HYDROCHLORIDE 0.4 MG/ML
0.4 INJECTION, SOLUTION INTRAMUSCULAR; INTRAVENOUS; SUBCUTANEOUS AS NEEDED
Status: DISCONTINUED | OUTPATIENT
Start: 2021-05-03 | End: 2021-05-08 | Stop reason: HOSPADM

## 2021-05-03 RX ORDER — GABAPENTIN 300 MG/1
300 CAPSULE ORAL 2 TIMES DAILY
Status: DISCONTINUED | OUTPATIENT
Start: 2021-05-03 | End: 2021-05-04

## 2021-05-03 RX ORDER — PREGABALIN 75 MG/1
150 CAPSULE ORAL 2 TIMES DAILY
Status: DISCONTINUED | OUTPATIENT
Start: 2021-05-03 | End: 2021-05-08 | Stop reason: HOSPADM

## 2021-05-03 RX ORDER — CYCLOBENZAPRINE HCL 10 MG
10 TABLET ORAL
Status: DISCONTINUED | OUTPATIENT
Start: 2021-05-03 | End: 2021-05-06

## 2021-05-03 RX ORDER — SODIUM CHLORIDE, SODIUM LACTATE, POTASSIUM CHLORIDE, CALCIUM CHLORIDE 600; 310; 30; 20 MG/100ML; MG/100ML; MG/100ML; MG/100ML
25 INJECTION, SOLUTION INTRAVENOUS CONTINUOUS
Status: DISCONTINUED | OUTPATIENT
Start: 2021-05-03 | End: 2021-05-03 | Stop reason: HOSPADM

## 2021-05-03 RX ORDER — DULOXETIN HYDROCHLORIDE 30 MG/1
60 CAPSULE, DELAYED RELEASE ORAL DAILY
Status: DISCONTINUED | OUTPATIENT
Start: 2021-05-04 | End: 2021-05-04

## 2021-05-03 RX ORDER — DIAZEPAM 5 MG/1
5 TABLET ORAL
Status: DISCONTINUED | OUTPATIENT
Start: 2021-05-03 | End: 2021-05-06

## 2021-05-03 RX ORDER — FENTANYL CITRATE 50 UG/ML
50 INJECTION, SOLUTION INTRAMUSCULAR; INTRAVENOUS AS NEEDED
Status: DISCONTINUED | OUTPATIENT
Start: 2021-05-03 | End: 2021-05-03 | Stop reason: HOSPADM

## 2021-05-03 RX ORDER — SODIUM CHLORIDE 9 MG/ML
125 INJECTION, SOLUTION INTRAVENOUS CONTINUOUS
Status: DISPENSED | OUTPATIENT
Start: 2021-05-03 | End: 2021-05-04

## 2021-05-03 RX ORDER — LIDOCAINE HYDROCHLORIDE 20 MG/ML
INJECTION, SOLUTION EPIDURAL; INFILTRATION; INTRACAUDAL; PERINEURAL AS NEEDED
Status: DISCONTINUED | OUTPATIENT
Start: 2021-05-03 | End: 2021-05-03 | Stop reason: HOSPADM

## 2021-05-03 RX ORDER — DEXMEDETOMIDINE HYDROCHLORIDE 100 UG/ML
INJECTION, SOLUTION INTRAVENOUS AS NEEDED
Status: DISCONTINUED | OUTPATIENT
Start: 2021-05-03 | End: 2021-05-03 | Stop reason: HOSPADM

## 2021-05-03 RX ORDER — DIAZEPAM 5 MG/1
TABLET ORAL
Status: DISPENSED
Start: 2021-05-03 | End: 2021-05-04

## 2021-05-03 RX ORDER — DIPHENHYDRAMINE HYDROCHLORIDE 50 MG/ML
12.5 INJECTION, SOLUTION INTRAMUSCULAR; INTRAVENOUS AS NEEDED
Status: DISCONTINUED | OUTPATIENT
Start: 2021-05-03 | End: 2021-05-03 | Stop reason: HOSPADM

## 2021-05-03 RX ORDER — MIDAZOLAM HYDROCHLORIDE 1 MG/ML
1 INJECTION, SOLUTION INTRAMUSCULAR; INTRAVENOUS AS NEEDED
Status: DISCONTINUED | OUTPATIENT
Start: 2021-05-03 | End: 2021-05-03 | Stop reason: HOSPADM

## 2021-05-03 RX ORDER — OXYCODONE HYDROCHLORIDE 5 MG/1
10-15 TABLET ORAL
Status: DISCONTINUED | OUTPATIENT
Start: 2021-05-03 | End: 2021-05-05

## 2021-05-03 RX ORDER — POLYETHYLENE GLYCOL 3350 17 G/17G
17 POWDER, FOR SOLUTION ORAL DAILY
Status: DISCONTINUED | OUTPATIENT
Start: 2021-05-04 | End: 2021-05-08 | Stop reason: HOSPADM

## 2021-05-03 RX ORDER — FENTANYL CITRATE 50 UG/ML
INJECTION, SOLUTION INTRAMUSCULAR; INTRAVENOUS AS NEEDED
Status: DISCONTINUED | OUTPATIENT
Start: 2021-05-03 | End: 2021-05-03 | Stop reason: HOSPADM

## 2021-05-03 RX ORDER — OXYBUTYNIN CHLORIDE 5 MG/1
5 TABLET ORAL 2 TIMES DAILY
Status: DISCONTINUED | OUTPATIENT
Start: 2021-05-03 | End: 2021-05-08 | Stop reason: HOSPADM

## 2021-05-03 RX ORDER — SODIUM CHLORIDE 0.9 % (FLUSH) 0.9 %
5-40 SYRINGE (ML) INJECTION AS NEEDED
Status: DISCONTINUED | OUTPATIENT
Start: 2021-05-03 | End: 2021-05-03 | Stop reason: HOSPADM

## 2021-05-03 RX ORDER — FERROUS SULFATE, DRIED 160(50) MG
1 TABLET, EXTENDED RELEASE ORAL
Status: DISCONTINUED | OUTPATIENT
Start: 2021-05-04 | End: 2021-05-08 | Stop reason: HOSPADM

## 2021-05-03 RX ORDER — ACETAMINOPHEN 500 MG
1000 TABLET ORAL EVERY 6 HOURS
Status: DISCONTINUED | OUTPATIENT
Start: 2021-05-03 | End: 2021-05-08 | Stop reason: HOSPADM

## 2021-05-03 RX ORDER — ROCURONIUM BROMIDE 10 MG/ML
INJECTION, SOLUTION INTRAVENOUS AS NEEDED
Status: DISCONTINUED | OUTPATIENT
Start: 2021-05-03 | End: 2021-05-03 | Stop reason: HOSPADM

## 2021-05-03 RX ORDER — SODIUM CHLORIDE 0.9 % (FLUSH) 0.9 %
5-40 SYRINGE (ML) INJECTION EVERY 8 HOURS
Status: DISCONTINUED | OUTPATIENT
Start: 2021-05-03 | End: 2021-05-03 | Stop reason: HOSPADM

## 2021-05-03 RX ORDER — PROPOFOL 10 MG/ML
INJECTION, EMULSION INTRAVENOUS AS NEEDED
Status: DISCONTINUED | OUTPATIENT
Start: 2021-05-03 | End: 2021-05-03 | Stop reason: HOSPADM

## 2021-05-03 RX ORDER — SODIUM CHLORIDE 0.9 % (FLUSH) 0.9 %
5-40 SYRINGE (ML) INJECTION EVERY 8 HOURS
Status: DISCONTINUED | OUTPATIENT
Start: 2021-05-03 | End: 2021-05-08 | Stop reason: HOSPADM

## 2021-05-03 RX ORDER — PREGABALIN 75 MG/1
150 CAPSULE ORAL ONCE
Status: COMPLETED | OUTPATIENT
Start: 2021-05-03 | End: 2021-05-03

## 2021-05-03 RX ORDER — HYDROMORPHONE HYDROCHLORIDE 1 MG/ML
1 INJECTION, SOLUTION INTRAMUSCULAR; INTRAVENOUS; SUBCUTANEOUS
Status: ACTIVE | OUTPATIENT
Start: 2021-05-03 | End: 2021-05-04

## 2021-05-03 RX ORDER — LORATADINE 10 MG/1
10 TABLET ORAL DAILY
Status: DISCONTINUED | OUTPATIENT
Start: 2021-05-04 | End: 2021-05-08 | Stop reason: HOSPADM

## 2021-05-03 RX ORDER — AMOXICILLIN 250 MG
1 CAPSULE ORAL 2 TIMES DAILY
Status: DISCONTINUED | OUTPATIENT
Start: 2021-05-03 | End: 2021-05-08 | Stop reason: HOSPADM

## 2021-05-03 RX ORDER — POLYETHYLENE GLYCOL 3350 17 G/17G
17 POWDER, FOR SOLUTION ORAL DAILY
Status: DISCONTINUED | OUTPATIENT
Start: 2021-05-04 | End: 2021-05-04

## 2021-05-03 RX ORDER — CITALOPRAM 20 MG/1
10 TABLET, FILM COATED ORAL DAILY
Status: DISCONTINUED | OUTPATIENT
Start: 2021-05-04 | End: 2021-05-04

## 2021-05-03 RX ORDER — SODIUM CHLORIDE, SODIUM LACTATE, POTASSIUM CHLORIDE, CALCIUM CHLORIDE 600; 310; 30; 20 MG/100ML; MG/100ML; MG/100ML; MG/100ML
125 INJECTION, SOLUTION INTRAVENOUS CONTINUOUS
Status: DISCONTINUED | OUTPATIENT
Start: 2021-05-03 | End: 2021-05-03 | Stop reason: HOSPADM

## 2021-05-03 RX ORDER — MELATONIN
2000 DAILY
Status: DISCONTINUED | OUTPATIENT
Start: 2021-05-04 | End: 2021-05-08 | Stop reason: HOSPADM

## 2021-05-03 RX ORDER — FENTANYL CITRATE 50 UG/ML
25 INJECTION, SOLUTION INTRAMUSCULAR; INTRAVENOUS
Status: COMPLETED | OUTPATIENT
Start: 2021-05-03 | End: 2021-05-03

## 2021-05-03 RX ORDER — ONDANSETRON 2 MG/ML
4 INJECTION INTRAMUSCULAR; INTRAVENOUS AS NEEDED
Status: DISCONTINUED | OUTPATIENT
Start: 2021-05-03 | End: 2021-05-03 | Stop reason: HOSPADM

## 2021-05-03 RX ORDER — LISINOPRIL 20 MG/1
40 TABLET ORAL DAILY
Status: DISCONTINUED | OUTPATIENT
Start: 2021-05-04 | End: 2021-05-08 | Stop reason: HOSPADM

## 2021-05-03 RX ORDER — ONDANSETRON 2 MG/ML
INJECTION INTRAMUSCULAR; INTRAVENOUS AS NEEDED
Status: DISCONTINUED | OUTPATIENT
Start: 2021-05-03 | End: 2021-05-03 | Stop reason: HOSPADM

## 2021-05-03 RX ORDER — FACIAL-BODY WIPES
10 EACH TOPICAL DAILY PRN
Status: DISCONTINUED | OUTPATIENT
Start: 2021-05-05 | End: 2021-05-08 | Stop reason: HOSPADM

## 2021-05-03 RX ORDER — MIDAZOLAM HYDROCHLORIDE 1 MG/ML
0.5 INJECTION, SOLUTION INTRAMUSCULAR; INTRAVENOUS
Status: DISCONTINUED | OUTPATIENT
Start: 2021-05-03 | End: 2021-05-03 | Stop reason: HOSPADM

## 2021-05-03 RX ORDER — SUCRALFATE 1 G/1
1 TABLET ORAL
Status: DISCONTINUED | OUTPATIENT
Start: 2021-05-03 | End: 2021-05-08 | Stop reason: HOSPADM

## 2021-05-03 RX ORDER — DEXAMETHASONE SODIUM PHOSPHATE 4 MG/ML
INJECTION, SOLUTION INTRA-ARTICULAR; INTRALESIONAL; INTRAMUSCULAR; INTRAVENOUS; SOFT TISSUE AS NEEDED
Status: DISCONTINUED | OUTPATIENT
Start: 2021-05-03 | End: 2021-05-03 | Stop reason: HOSPADM

## 2021-05-03 RX ORDER — TRAZODONE HYDROCHLORIDE 50 MG/1
50 TABLET ORAL
Status: DISCONTINUED | OUTPATIENT
Start: 2021-05-03 | End: 2021-05-06

## 2021-05-03 RX ORDER — FAMOTIDINE 20 MG/1
20 TABLET, FILM COATED ORAL 2 TIMES DAILY
Status: DISCONTINUED | OUTPATIENT
Start: 2021-05-03 | End: 2021-05-04

## 2021-05-03 RX ORDER — ACETAMINOPHEN 325 MG/1
650 TABLET ORAL ONCE
Status: DISCONTINUED | OUTPATIENT
Start: 2021-05-03 | End: 2021-05-03 | Stop reason: HOSPADM

## 2021-05-03 RX ORDER — SUCCINYLCHOLINE CHLORIDE 20 MG/ML
INJECTION INTRAMUSCULAR; INTRAVENOUS AS NEEDED
Status: DISCONTINUED | OUTPATIENT
Start: 2021-05-03 | End: 2021-05-03 | Stop reason: HOSPADM

## 2021-05-03 RX ORDER — PANTOPRAZOLE SODIUM 40 MG/1
40 TABLET, DELAYED RELEASE ORAL
Status: DISCONTINUED | OUTPATIENT
Start: 2021-05-04 | End: 2021-05-08 | Stop reason: HOSPADM

## 2021-05-03 RX ORDER — ACETAMINOPHEN 500 MG
1000 TABLET ORAL ONCE
Status: COMPLETED | OUTPATIENT
Start: 2021-05-03 | End: 2021-05-03

## 2021-05-03 RX ORDER — DICYCLOMINE HYDROCHLORIDE 10 MG/1
10 CAPSULE ORAL 4 TIMES DAILY
Status: DISCONTINUED | OUTPATIENT
Start: 2021-05-03 | End: 2021-05-08 | Stop reason: HOSPADM

## 2021-05-03 RX ORDER — HYDROCHLOROTHIAZIDE 25 MG/1
25 TABLET ORAL DAILY
Status: DISCONTINUED | OUTPATIENT
Start: 2021-05-04 | End: 2021-05-08 | Stop reason: HOSPADM

## 2021-05-03 RX ORDER — ONDANSETRON 2 MG/ML
4 INJECTION INTRAMUSCULAR; INTRAVENOUS
Status: ACTIVE | OUTPATIENT
Start: 2021-05-03 | End: 2021-05-04

## 2021-05-03 RX ORDER — SODIUM CHLORIDE 0.9 % (FLUSH) 0.9 %
5-40 SYRINGE (ML) INJECTION AS NEEDED
Status: DISCONTINUED | OUTPATIENT
Start: 2021-05-03 | End: 2021-05-08 | Stop reason: HOSPADM

## 2021-05-03 RX ORDER — HYDROXYZINE HYDROCHLORIDE 10 MG/1
10 TABLET, FILM COATED ORAL
Status: DISCONTINUED | OUTPATIENT
Start: 2021-05-03 | End: 2021-05-06

## 2021-05-03 RX ORDER — OXYCODONE HYDROCHLORIDE 5 MG/1
5 TABLET ORAL
Status: DISCONTINUED | OUTPATIENT
Start: 2021-05-03 | End: 2021-05-05

## 2021-05-03 RX ORDER — DICLOFENAC SODIUM 10 MG/G
2 GEL TOPICAL
Status: DISCONTINUED | OUTPATIENT
Start: 2021-05-03 | End: 2021-05-08 | Stop reason: HOSPADM

## 2021-05-03 RX ORDER — LIDOCAINE HYDROCHLORIDE 10 MG/ML
0.1 INJECTION, SOLUTION EPIDURAL; INFILTRATION; INTRACAUDAL; PERINEURAL AS NEEDED
Status: DISCONTINUED | OUTPATIENT
Start: 2021-05-03 | End: 2021-05-03 | Stop reason: HOSPADM

## 2021-05-03 RX ORDER — ASPIRIN 81 MG/1
81 TABLET ORAL 2 TIMES WEEKLY
Status: DISCONTINUED | OUTPATIENT
Start: 2021-05-07 | End: 2021-05-08 | Stop reason: HOSPADM

## 2021-05-03 RX ORDER — PREGABALIN 150 MG/1
150 CAPSULE ORAL 2 TIMES DAILY
COMMUNITY
End: 2021-08-03 | Stop reason: ALTCHOICE

## 2021-05-03 RX ADMIN — DEXMEDETOMIDINE HYDROCHLORIDE 4 MCG: 100 INJECTION, SOLUTION, CONCENTRATE INTRAVENOUS at 16:17

## 2021-05-03 RX ADMIN — FENTANYL CITRATE 25 MCG: 50 INJECTION, SOLUTION INTRAMUSCULAR; INTRAVENOUS at 18:00

## 2021-05-03 RX ADMIN — DIAZEPAM 5 MG: 5 TABLET ORAL at 18:11

## 2021-05-03 RX ADMIN — ROCURONIUM BROMIDE 5 MG: 10 INJECTION INTRAVENOUS at 15:52

## 2021-05-03 RX ADMIN — WATER 2 G: 1 INJECTION INTRAMUSCULAR; INTRAVENOUS; SUBCUTANEOUS at 15:59

## 2021-05-03 RX ADMIN — OXYCODONE 15 MG: 5 TABLET ORAL at 21:14

## 2021-05-03 RX ADMIN — SUCCINYLCHOLINE CHLORIDE 160 MG: 20 INJECTION, SOLUTION INTRAMUSCULAR; INTRAVENOUS at 15:52

## 2021-05-03 RX ADMIN — DEXMEDETOMIDINE HYDROCHLORIDE 4 MCG: 100 INJECTION, SOLUTION, CONCENTRATE INTRAVENOUS at 16:19

## 2021-05-03 RX ADMIN — OXYBUTYNIN CHLORIDE 5 MG: 5 TABLET ORAL at 21:15

## 2021-05-03 RX ADMIN — LIDOCAINE HYDROCHLORIDE 60 MG: 20 INJECTION, SOLUTION EPIDURAL; INFILTRATION; INTRACAUDAL; PERINEURAL at 15:52

## 2021-05-03 RX ADMIN — FAMOTIDINE 20 MG: 20 TABLET ORAL at 21:14

## 2021-05-03 RX ADMIN — FENTANYL CITRATE 25 MCG: 50 INJECTION, SOLUTION INTRAMUSCULAR; INTRAVENOUS at 17:50

## 2021-05-03 RX ADMIN — SODIUM CHLORIDE 80 MCG: 900 INJECTION, SOLUTION INTRAVENOUS at 16:29

## 2021-05-03 RX ADMIN — ONDANSETRON HYDROCHLORIDE 4 MG: 2 INJECTION, SOLUTION INTRAMUSCULAR; INTRAVENOUS at 17:05

## 2021-05-03 RX ADMIN — FENTANYL CITRATE 100 MCG: 50 INJECTION, SOLUTION INTRAMUSCULAR; INTRAVENOUS at 15:52

## 2021-05-03 RX ADMIN — DEXMEDETOMIDINE HYDROCHLORIDE 4 MCG: 100 INJECTION, SOLUTION, CONCENTRATE INTRAVENOUS at 17:08

## 2021-05-03 RX ADMIN — Medication 1000 MG: at 21:13

## 2021-05-03 RX ADMIN — SODIUM CHLORIDE 40 MCG: 900 INJECTION, SOLUTION INTRAVENOUS at 16:02

## 2021-05-03 RX ADMIN — BUPROPION HYDROCHLORIDE 200 MG: 100 TABLET, EXTENDED RELEASE ORAL at 21:14

## 2021-05-03 RX ADMIN — FENTANYL CITRATE 25 MCG: 50 INJECTION, SOLUTION INTRAMUSCULAR; INTRAVENOUS at 17:55

## 2021-05-03 RX ADMIN — FENTANYL CITRATE 25 MCG: 50 INJECTION, SOLUTION INTRAMUSCULAR; INTRAVENOUS at 17:45

## 2021-05-03 RX ADMIN — Medication 1000 MG: at 14:20

## 2021-05-03 RX ADMIN — Medication 3 AMPULE: at 14:20

## 2021-05-03 RX ADMIN — PREGABALIN 150 MG: 75 CAPSULE ORAL at 14:20

## 2021-05-03 RX ADMIN — PREGABALIN 150 MG: 75 CAPSULE ORAL at 21:13

## 2021-05-03 RX ADMIN — DEXMEDETOMIDINE HYDROCHLORIDE 4 MCG: 100 INJECTION, SOLUTION, CONCENTRATE INTRAVENOUS at 17:15

## 2021-05-03 RX ADMIN — SODIUM CHLORIDE, POTASSIUM CHLORIDE, SODIUM LACTATE AND CALCIUM CHLORIDE 25 ML/HR: 600; 310; 30; 20 INJECTION, SOLUTION INTRAVENOUS at 14:20

## 2021-05-03 RX ADMIN — DICYCLOMINE HYDROCHLORIDE 10 MG: 10 CAPSULE ORAL at 21:13

## 2021-05-03 RX ADMIN — DEXAMETHASONE SODIUM PHOSPHATE 8 MG: 4 INJECTION, SOLUTION INTRAMUSCULAR; INTRAVENOUS at 16:04

## 2021-05-03 RX ADMIN — PROPOFOL 150 MG: 10 INJECTION, EMULSION INTRAVENOUS at 15:52

## 2021-05-03 RX ADMIN — DEXMEDETOMIDINE HYDROCHLORIDE 4 MCG: 100 INJECTION, SOLUTION, CONCENTRATE INTRAVENOUS at 16:00

## 2021-05-03 RX ADMIN — Medication 20 ML: at 22:00

## 2021-05-03 RX ADMIN — SODIUM CHLORIDE 125 ML/HR: 900 INJECTION, SOLUTION INTRAVENOUS at 19:00

## 2021-05-03 NOTE — ANESTHESIA POSTPROCEDURE EVALUATION
Procedure(s):  L4-5 LATERAL FUSION . general    Anesthesia Post Evaluation        Patient location during evaluation: PACU  Note status: Adequate. Level of consciousness: responsive to verbal stimuli and sleepy but conscious  Pain management: satisfactory to patient  Airway patency: patent  Anesthetic complications: no  Cardiovascular status: acceptable  Respiratory status: acceptable  Hydration status: acceptable  Comments: +Post-Anesthesia Evaluation and Assessment    Patient: Kyree Arroyo MRN: [id number]  SSN: [id number]   YOB: 1950  Age: 65 y.o. Sex: female      Cardiovascular Function/Vital Signs    BP (!) 165/75   Pulse 86   Temp 36.7 °C (98 °F)   Resp 20   Ht 5' 2\" (1.575 m)   Wt 81.3 kg (179 lb 3.7 oz)   SpO2 96%   BMI 32.78 kg/m²     Patient is status post Procedure(s):  L4-5 LATERAL FUSION . Nausea/Vomiting: Controlled. Postoperative hydration reviewed and adequate. Pain:  Pain Scale 1: Numeric (0 - 10) (05/03/21 1845)  Pain Intensity 1: 3 (05/03/21 1845)   Managed. Neurological Status:   Neuro (WDL): Exceptions to WDL (05/03/21 1729)   At baseline. Mental Status and Level of Consciousness: Arousable. Pulmonary Status:   O2 Device: Nasal cannula (05/03/21 1845)   Adequate oxygenation and airway patent. Complications related to anesthesia: None    Post-anesthesia assessment completed. No concerns. Signed By: Eleanor Canavan, MD    5/3/2021  Post anesthesia nausea and vomiting:  controlled      INITIAL Post-op Vital signs:   Vitals Value Taken Time   /75 05/03/21 1845   Temp 36.7 °C (98 °F) 05/03/21 1845   Pulse 83 05/03/21 1848   Resp 18 05/03/21 1848   SpO2 98 % 05/03/21 1848   Vitals shown include unvalidated device data.

## 2021-05-03 NOTE — PROGRESS NOTES
TRANSFER - IN REPORT:    Verbal report received from Neela(name) on Gita Kilgore  being received from [facility]) for routine post - op      Report consisted of patients Situation, Background, Assessment and   Recommendations(SBAR). Information from the following report(s) SBAR, Kardex, Intake/Output, MAR and Recent Results was reviewed with the receiving nurse. Opportunity for questions and clarification was provided. Assessment completed upon patients arrival to unit and care assumed. CHG done in preop    End of Shift Note    Bedside shift change report given to Cara Harman (oncoming nurse) by Layo Lopez RN (offgoing nurse). Report included the following information SBAR, Kardex, Intake/Output, MAR and Recent Results    Shift worked:  Day     Shift summary and any significant changes:    Patient arrived to unit. Concerns for physician to address:  n/a     Zone phone for oncoming shift:  [phone]     Activity:     Number times ambulated in hallways past shift: 0  Number of times OOB to chair past shift: 0    Cardiac:   Cardiac Monitoring: Yes      Cardiac Rhythm: Sinus Rhythm    Access:   Current line(s): PIV     Genitourinary:   Urinary status: voiding    Respiratory:   O2 Device: Nasal cannula  Chronic home O2 use?: NO  Incentive spirometer at bedside: YES     GI:     Current diet:  DIET NPO  Passing flatus: YES  Tolerating current diet: YES       Pain Management:   Patient states pain is manageable on current regimen: YES    Skin:  Desmond Score: 19  Interventions: float heels and increase time out of bed    Patient Safety:  Fall Score:  Total Score: 4  Interventions: gripper socks and pt to call before getting OOB  High Fall Risk: Yes    Length of Stay:  Expected LOS: 2d 21h  Actual LOS: 0      Layo Lopez RN

## 2021-05-03 NOTE — BRIEF OP NOTE
Brief Postoperative Note    Patient: Gita Kilgore  YOB: 1950  MRN: [id number]    Date of Procedure: 5/3/2021     Pre-Op Diagnosis: LOW BACK PAIN/SPONDYLOLISTHESIS/ SPONDYLOSIS/DEGENERATIVE SCOLIOSIS/ SCIATICA/STENOSIS/POSTLAMINECTOMY SYNDROME    Post-Op Diagnosis: Same as preoperative diagnosis. Procedure(s):  L4-5 LATERAL FUSION     Surgeon(s):  Jayson Mckeon MD    Surgical Assistant: Physician Assistant: CORNELL Elam    Anesthesia: Other     Estimated Blood Loss (mL): less than 50     Complications: None    Specimens: * No specimens in log *     Implants:   Implant Name Type Inv.  Item Serial No.  Lot No. LRB No. Used Action   SPACER SPNL [id number] 3-15DEG LAT LUM INTBDY FUS TI - SNA  SPACER SPNL [id number] 3-15DEG LAT LUM INTBDY FUS TI NA [employer]WD NA N/A 1 Implanted   PLATE SPNL [id number] THORLUM L4-L5 LAT [place] - SNA  PLATE SPNL [id number] THORLUM L4-L5 LAT [place] NA [employer] NA N/A 1 Implanted   SCREW SPNL L28MM OD5.5MM THORACOLUMBARVARIABLE ANG [place] - SNA  SCREW SPNL L28MM OD5.5MM THORACOLUMBARVARIABLE ANG [place] NA [employer] NA N/A 2 Implanted       Drains: * No LDAs found *    Findings: Stenosis    Electronically Signed by Edmundo Zhao MD on 5/3/2021 at 5:15 PM

## 2021-05-03 NOTE — H&P
Subjective:     Patient ID: Estuardo Joe is a 65 y.o. female. LBP      Patient Active Problem List    Diagnosis Date Noted    Spinal stenosis, lumbar 2021    Depression, [facility] recurrence ([facility] 2019    Non morbid obesity 2017    Spinal stenosis of lumbar region with neurogenic claudication 2017    DDD (degenerative disc disease), cervical 2016    Encounter for medication monitoring 10/14/2015    Essential hypertension 2015    Depression 2012    Diverticulitis     IBS (irritable bowel syndrome)     Fibromyalgia     Hiatal hernia     GERD (gastroesophageal reflux disease)     Hypercholesterolemia        Family History   Problem Relation Age of Onset    Cancer Father         lung and bone    Stroke Sister     Cancer Sister 76        PANCREATIC    Hypertension Mother     High Cholesterol Mother     Alzheimer Mother         late 62s early 76s    Cancer Other         COLON    Cancer Other         breast    Diabetes Maternal Aunt     Cancer Maternal Uncle         COLON    Cancer Maternal Grandfather         COLON    Ovarian Cancer Daughter 28        Social History     Tobacco Use    Smoking status: Former Smoker     Packs/day: 1.00     Years: 45.00     Pack years: 45.00     Quit date: 2007     Years since quittin.3    Smokeless tobacco: Never Used   Substance Use Topics    Alcohol use:  Yes     Alcohol/week: 0.0 standard drinks     Comment: holidays       Past Medical History:   Diagnosis Date    Arthritis     Chronic kidney disease     CKD 3    Chronic pain     CHRONIC BOWEL PAIN    Depression     Diverticulitis     Fibromyalgia     GERD (gastroesophageal reflux disease)     Goiter     Hiatal hernia     Hypercholesterolemia     Hypertension     IBS (irritable bowel syndrome)     CONSTIPATION, CHRONIC SINCE HER 20s    Insomnia     TAKES TRAZODONE NIGHTLY    Migraine     REDUCED IN FREQUENCY AND SEVERITY SINCE MENOPAUSE    Rectocele         Past Surgical History:   Procedure Laterality Date    COLONOSCOPY N/A 6/21/2016    COL[facility]    COLONOSCOPY N/A 6/27/2018    COLONOSCOPY performed by Kelli Graff MD at [facility] , COLON, DIAGNOSTIC  11/2010    Dr. Antonio Hussein  2011    HX CHOLECYSTECTOMY  2006    HX ENDOSCOPY      HX GI  X3  LAST ONE 12/10    COLONOSCOPY    HX GYN  1982    D&C WITH SUCTION    HX HYSTERECTOMY      HX LUMBAR FUSION  2017    HX ORTHOPAEDIC      right foot surgery    HX TONSILLECTOMY      HX TOTAL COLECTOMY  2007    Dr. Pond/ diverticulitis    IR INJ SPINE FLUORO GUIDED  8/28/2020    IR INJ SPINE FLUORO GUIDED  1/8/2021          Current Facility-Administered Medications:     ceFAZolin (ANCEF) 2 g in sterile water (preservative free) 20 mL IV syringe, 2 g, IntraVENous, ONCE, Karin Carver MD    lactated Ringers infusion, 25 mL/hr, IntraVENous, CONTINUOUS, Dk Hall MD, Last Rate: 25 mL/hr at 05/03/21 1420, 25 mL/hr at 05/03/21 1420    lactated Ringers infusion, 25 mL/hr, IntraVENous, CONTINUOUS, Real Devine MD    0.9% sodium chloride infusion, 25 mL/hr, IntraVENous, CONTINUOUS, Real Devine MD    sodium chloride (NS) flush 5-40 mL, 5-40 mL, IntraVENous, Q8H, Real Devine MD    sodium chloride (NS) flush 5-40 mL, 5-40 mL, IntraVENous, PRN, Real Devine MD    lidocaine (PF) (XYLOCAINE) 10 mg/mL (1 %) injection 0.1 mL, 0.1 mL, SubCUTAneous, PRN, Real Devine MD    fentaNYL citrate (PF) injection 50 mcg, 50 mcg, IntraVENous, PRN, Real Devine MD    midazolam (VERSED) injection 1 mg, 1 mg, IntraVENous, PRN, Real Devine MD    midazolam (VERSED) injection 1 mg, 1 mg, IntraVENous, PRN, Real Devine MD    acetaminophen (TYLENOL) tablet 650 mg, 650 mg, Oral, ONCE, Real eDvine MD    Allergies   Allergen Reactions    Latex Hives    Codeine Other (comments)     Severe Headache    Morphine Hives    Ambien [Zolpidem] Other (comments)     Severe behavior changes    Dilaudid [Hydromorphone] Other (comments)     Memory loss    Other Medication Rash     BANDAIDS    Shellfish Containing Products Hives        ROS:   No new bowel or bladder incontinence. No fevers or chills. No saddle anesthesia. Objective:     Visit Vitals  BP (!) 168/89 (BP 1 Location: Right upper arm, BP Patient Position: At rest)   Pulse 71   Temp 98.1 °F (36.7 °C)   Resp 14   Ht 5' 2\" (1.575 m)   Wt 81.3 kg (179 lb 3.7 oz)   SpO2 98%   BMI 32.78 kg/m²       Body mass index is 32.78 kg/m². , a BMI over 30 is considered obese and a BMI over 40 has been associated with a higher risk of surgical complications. Constitutional: No acute distress. Well nourished. HEENT: Normocephalic. Respiratory:  No labored breathing. Cardiovascular:  No marked cyanosis. Skin:  No marked skin ulcers/lesions on bilateral upper or lower extremities. Psychiatric: Alert and oriented x3. Inspection: No gross deformity of bilateral upper or lower extremities. Musculoskeletal/Neurological:         Radiographs:       Xr Chest Pa Lat    Result Date: 4/21/2021  EXAM:  XR CHEST PA LAT INDICATION: preop COMPARISON: 7/13/2017. TECHNIQUE: Frontal and lateral views of the chest FINDINGS: No lobar consolidation, pleural effusion, or pneumothorax. The cardiomediastinal silhouette is not enlarged. No acute or aggressive osseous lesion. 1.  No evidence of an acute cardiopulmonary process. Nuclear Cardiac Stress Test    Result Date: 4/30/2021  · Baseline ECG: Normal EKG. · Stress test: Inconclusive stress test.  INDICATION: Chest pain, palpitations. COMPARISON:  None. CORRELATIVE IMAGING STUDIES:  None TRACER: Tc 99m Sestamibi TECHNIQUE:  Resting SPECT images of the heart were obtained following the uneventful intravenous administration of 11.0 mCi of Tc 99m Sestamibi.  Gated stress SPECT images of the heart were obtained following Lexiscan protocol and the uneventful intravenous administration of 33.0 mCi of Tc 99m Sestamibi. FINDINGS:  The rest and stress perfusion images demonstrate no significant perfusion defect or evidence of myocardial reversibility. The gated images demonstrate normal global and regional wall motion and thickening. Left ventricular ejection fraction is 73 %. Impression: Normal gated rest/stress myocardial perfusion imaging study. No evidence of myocardial ischemia or infarction. Left ventricular ejection fraction is 73 %. Assessment:     No diagnosis found. Plan:     I have discussed the L4-S1 fusion in detail with Kyree Arroyo and mentioned complications, including but not limited to: death, permanent disability, heart attack, stroke, lung injury or infection, blindness, ileus, bladder or bowel problems, ureter injury, bleeding, nerve injury (including numbness, pain and weakness), paralysis (which may be permanent), failure to heal, failure to fuse bone together in fusion procedures, failure to relief symptoms, failure to relief pain, increased pain, need for further surgeries, failure or breakage or hardware, malpositioning of hardware, need to fuse or operate on additional levels determined either during or after surgery, destabilization of the spine (which may require fusion or later surgery), infections (which may or may not require additional surgery), dural tears (tears of the sac holding in nerves and spinal fluid), meningitis, voice changes, vocal cord injury, hoarseness, blood clots, pulmonary embolus, Nu syndrome, recurrent disc herniation, diaphragm paralysis, and anesthetic complications. Comorbidities such as obesity, smoking, rheumatoid arthritis, chronic steroid use and diabetes increase these risks. Kyree Arroyo understands and wants to proceed.              Arlet Macedo MD

## 2021-05-03 NOTE — PERIOP NOTES
Handoff Report from Operating Room to PACU    Report received from [address] and [place] [id number] regarding Brianda Canela. Surgeon(s):  Marine Cardenas MD  And Procedure(s) (LRB):  L4-5 LATERAL FUSION  (Right)  confirmed   with allergies and dressings discussed. Anesthesia type, drugs, patient history, complications, estimated blood loss, vital signs, intake and output, and last pain medication, lines, reversal medications and temperature were reviewed. 6:30 PM  TRANSFER - OUT REPORT:    Verbal report given to Roger Read RN(name) on Brianda Canela  being transferred to Ortho (unit) for routine progression of care       Report consisted of patients Situation, Background, Assessment and   Recommendations(SBAR). Information from the following report(s) SBAR, Kardex, MAR and Accordion was reviewed with the receiving nurse. Lines:   Peripheral IV 04/30/21 Right Antecubital (Active)       Peripheral IV 05/03/21 Left Hand (Active)   Site Assessment Clean, dry, & intact 05/03/21 1729   Phlebitis Assessment 0 05/03/21 1729   Infiltration Assessment 0 05/03/21 1729   Dressing Status Clean, dry, & intact 05/03/21 1729   Dressing Type Transparent;Tape 05/03/21 1729   Hub Color/Line Status Pink; Infusing 05/03/21 1729        Opportunity for questions and clarification was provided.       Patient transported with:   O2 @ 2 liters  Registered Nurse         updated on transfer

## 2021-05-03 NOTE — ANESTHESIA PREPROCEDURE EVALUATION
Anesthetic History   No history of anesthetic complications            Review of Systems / Medical History  Patient summary reviewed, nursing notes reviewed and pertinent labs reviewed    Pulmonary  Within defined limits                 Neuro/Psych         Headaches and psychiatric history     Cardiovascular    Hypertension: well controlled          Hyperlipidemia    Exercise tolerance: >4 METS     GI/Hepatic/Renal     GERD: well controlled      Hiatal hernia    Comments: IBS Endo/Other      Hypothyroidism  Obesity and arthritis     Other Findings   Comments: Left carpal tunnel syndrome    Fibromyalgia  Chronic pain  Thyroid Goiter           Physical Exam    Airway  Mallampati: II  TM Distance: 4 - 6 cm  Neck ROM: normal range of motion   Mouth opening: Normal     Cardiovascular    Rhythm: regular  Rate: normal         Dental    Dentition: Implants     Pulmonary  Breath sounds clear to auscultation               Abdominal  GI exam deferred       Other Findings            Anesthetic Plan    ASA: 2  Anesthesia type: MAC          Induction: Intravenous  Anesthetic plan and risks discussed with: Patient

## 2021-05-04 ENCOUNTER — APPOINTMENT (OUTPATIENT)
Dept: GENERAL RADIOLOGY | Age: 71
DRG: 454 | End: 2021-05-04
Attending: ORTHOPAEDIC SURGERY
Payer: MEDICARE

## 2021-05-04 ENCOUNTER — ANESTHESIA EVENT (OUTPATIENT)
Dept: SURGERY | Age: 71
DRG: 454 | End: 2021-05-04
Payer: MEDICARE

## 2021-05-04 ENCOUNTER — ANESTHESIA (OUTPATIENT)
Dept: SURGERY | Age: 71
DRG: 454 | End: 2021-05-04
Payer: MEDICARE

## 2021-05-04 LAB — HGB BLD-MCNC: 11.1 G/DL (ref 11.5–16)

## 2021-05-04 PROCEDURE — 0SG3071 FUSION OF LUMBOSACRAL JOINT WITH AUTOLOGOUS TISSUE SUBSTITUTE, POSTERIOR APPROACH, POSTERIOR COLUMN, OPEN APPROACH: ICD-10-PCS | Performed by: ORTHOPAEDIC SURGERY

## 2021-05-04 PROCEDURE — 74011250636 HC RX REV CODE- 250/636: Performed by: ORTHOPAEDIC SURGERY

## 2021-05-04 PROCEDURE — 0SG0071 FUSION OF LUMBAR VERTEBRAL JOINT WITH AUTOLOGOUS TISSUE SUBSTITUTE, POSTERIOR APPROACH, POSTERIOR COLUMN, OPEN APPROACH: ICD-10-PCS | Performed by: ORTHOPAEDIC SURGERY

## 2021-05-04 PROCEDURE — 77030040361 HC SLV COMPR DVT MDII -B: Performed by: ORTHOPAEDIC SURGERY

## 2021-05-04 PROCEDURE — 77030002933 HC SUT MCRYL J&J -A: Performed by: ORTHOPAEDIC SURGERY

## 2021-05-04 PROCEDURE — 85018 HEMOGLOBIN: CPT

## 2021-05-04 PROCEDURE — 77030034479 HC ADH SKN CLSR PRINEO J&J -B: Performed by: ORTHOPAEDIC SURGERY

## 2021-05-04 PROCEDURE — 74011250636 HC RX REV CODE- 250/636: Performed by: NURSE ANESTHETIST, CERTIFIED REGISTERED

## 2021-05-04 PROCEDURE — 65270000029 HC RM PRIVATE

## 2021-05-04 PROCEDURE — 77030040951 HC GRFT BN OSTEOAMP SELCT BTUS -I: Performed by: ORTHOPAEDIC SURGERY

## 2021-05-04 PROCEDURE — 77030026438 HC STYL ET INTUB CARD -A: Performed by: NURSE ANESTHETIST, CERTIFIED REGISTERED

## 2021-05-04 PROCEDURE — 76000 FLUOROSCOPY <1 HR PHYS/QHP: CPT

## 2021-05-04 PROCEDURE — 77030003445 HC NDL BIOP BN BD -B: Performed by: ORTHOPAEDIC SURGERY

## 2021-05-04 PROCEDURE — 77030029099 HC BN WAX SSPC -A: Performed by: ORTHOPAEDIC SURGERY

## 2021-05-04 PROCEDURE — 77030033138 HC SUT PGA STRATFX J&J -B: Performed by: ORTHOPAEDIC SURGERY

## 2021-05-04 PROCEDURE — 76010000162 HC OR TIME 1.5 TO 2 HR INTENSV-TIER 1: Performed by: ORTHOPAEDIC SURGERY

## 2021-05-04 PROCEDURE — 77030038692 HC WND DEB SYS IRMX -B: Performed by: ORTHOPAEDIC SURGERY

## 2021-05-04 PROCEDURE — 94760 N-INVAS EAR/PLS OXIMETRY 1: CPT

## 2021-05-04 PROCEDURE — 77030019908 HC STETH ESOPH SIMS -A: Performed by: NURSE ANESTHETIST, CERTIFIED REGISTERED

## 2021-05-04 PROCEDURE — 77030020268 HC MISC GENERAL SUPPLY: Performed by: ORTHOPAEDIC SURGERY

## 2021-05-04 PROCEDURE — 76210000016 HC OR PH I REC 1 TO 1.5 HR: Performed by: ORTHOPAEDIC SURGERY

## 2021-05-04 PROCEDURE — 77030008462 HC STPLR SKN PROX J&J -A: Performed by: ORTHOPAEDIC SURGERY

## 2021-05-04 PROCEDURE — 77030004402 HC BUR NEUR STRY -C: Performed by: ORTHOPAEDIC SURGERY

## 2021-05-04 PROCEDURE — 97116 GAIT TRAINING THERAPY: CPT | Performed by: PHYSICAL THERAPIST

## 2021-05-04 PROCEDURE — 97165 OT EVAL LOW COMPLEX 30 MIN: CPT | Performed by: OCCUPATIONAL THERAPIST

## 2021-05-04 PROCEDURE — 97530 THERAPEUTIC ACTIVITIES: CPT | Performed by: OCCUPATIONAL THERAPIST

## 2021-05-04 PROCEDURE — 74011250636 HC RX REV CODE- 250/636: Performed by: ANESTHESIOLOGY

## 2021-05-04 PROCEDURE — 77030014007 HC SPNG HEMSTAT J&J -B: Performed by: ORTHOPAEDIC SURGERY

## 2021-05-04 PROCEDURE — 74011000250 HC RX REV CODE- 250: Performed by: NURSE ANESTHETIST, CERTIFIED REGISTERED

## 2021-05-04 PROCEDURE — 77030022704 HC SUT VLOC COVD -B: Performed by: ORTHOPAEDIC SURGERY

## 2021-05-04 PROCEDURE — 77030040356 HC CORD BPLR FRCP COVD -A: Performed by: ORTHOPAEDIC SURGERY

## 2021-05-04 PROCEDURE — 72100 X-RAY EXAM L-S SPINE 2/3 VWS: CPT

## 2021-05-04 PROCEDURE — 77030032806 HC CAP SPN LOK CREO GLBM -B: Performed by: ORTHOPAEDIC SURGERY

## 2021-05-04 PROCEDURE — 2709999900 HC NON-CHARGEABLE SUPPLY: Performed by: ORTHOPAEDIC SURGERY

## 2021-05-04 PROCEDURE — C1713 ANCHOR/SCREW BN/BN,TIS/BN: HCPCS | Performed by: ORTHOPAEDIC SURGERY

## 2021-05-04 PROCEDURE — 77030013079 HC BLNKT BAIR HGGR 3M -A: Performed by: NURSE ANESTHETIST, CERTIFIED REGISTERED

## 2021-05-04 PROCEDURE — 74011000250 HC RX REV CODE- 250: Performed by: ORTHOPAEDIC SURGERY

## 2021-05-04 PROCEDURE — 76060000034 HC ANESTHESIA 1.5 TO 2 HR: Performed by: ORTHOPAEDIC SURGERY

## 2021-05-04 PROCEDURE — 97161 PT EVAL LOW COMPLEX 20 MIN: CPT | Performed by: PHYSICAL THERAPIST

## 2021-05-04 PROCEDURE — 77030003666 HC NDL SPINAL BD -A: Performed by: ORTHOPAEDIC SURGERY

## 2021-05-04 PROCEDURE — 36415 COLL VENOUS BLD VENIPUNCTURE: CPT

## 2021-05-04 PROCEDURE — 74011250637 HC RX REV CODE- 250/637: Performed by: ORTHOPAEDIC SURGERY

## 2021-05-04 PROCEDURE — 77030014647 HC SEAL FBRN TISSL BAXT -D: Performed by: ORTHOPAEDIC SURGERY

## 2021-05-04 PROCEDURE — 97535 SELF CARE MNGMENT TRAINING: CPT | Performed by: OCCUPATIONAL THERAPIST

## 2021-05-04 PROCEDURE — 77010033678 HC OXYGEN DAILY

## 2021-05-04 PROCEDURE — 77030003028 HC SUT VCRL J&J -A: Performed by: ORTHOPAEDIC SURGERY

## 2021-05-04 PROCEDURE — 77030018723 HC ELCTRD BLD COVD -A: Performed by: ORTHOPAEDIC SURGERY

## 2021-05-04 DEVICE — CREO MIS 5.5MM CURVED ROD, TITANIUM ALLOY, 80MM LENGTH
Type: IMPLANTABLE DEVICE | Site: SPINE LUMBAR | Status: FUNCTIONAL
Brand: CREO

## 2021-05-04 DEVICE — 6.5 X 45MM CANNULATED SCREW, MODULAR, CREO AMP
Type: IMPLANTABLE DEVICE | Site: SPINE LUMBAR | Status: FUNCTIONAL
Brand: CREO

## 2021-05-04 DEVICE — 7.5 X 45MM CANNULATED SCREW, MODULAR, CREO AMP
Type: IMPLANTABLE DEVICE | Site: SPINE LUMBAR | Status: FUNCTIONAL
Brand: CREO

## 2021-05-04 DEVICE — CREO MIS MODULAR POLYAXIAL TULIP, 30MM REDUCTION
Type: IMPLANTABLE DEVICE | Site: SPINE LUMBAR | Status: FUNCTIONAL
Brand: CREO

## 2021-05-04 DEVICE — 6.5 X 40MM CANNULATED SCREW, MODULAR, CREO AMP
Type: IMPLANTABLE DEVICE | Site: SPINE LUMBAR | Status: FUNCTIONAL
Brand: CREO

## 2021-05-04 DEVICE — CREO MIS LOCKING CAP
Type: IMPLANTABLE DEVICE | Site: SPINE LUMBAR | Status: FUNCTIONAL
Brand: CREO

## 2021-05-04 RX ORDER — DEXMEDETOMIDINE HYDROCHLORIDE 100 UG/ML
INJECTION, SOLUTION INTRAVENOUS AS NEEDED
Status: DISCONTINUED | OUTPATIENT
Start: 2021-05-04 | End: 2021-05-04 | Stop reason: HOSPADM

## 2021-05-04 RX ORDER — MIDAZOLAM HYDROCHLORIDE 1 MG/ML
0.5 INJECTION, SOLUTION INTRAMUSCULAR; INTRAVENOUS
Status: DISCONTINUED | OUTPATIENT
Start: 2021-05-04 | End: 2021-05-04 | Stop reason: HOSPADM

## 2021-05-04 RX ORDER — ROCURONIUM BROMIDE 10 MG/ML
INJECTION, SOLUTION INTRAVENOUS AS NEEDED
Status: DISCONTINUED | OUTPATIENT
Start: 2021-05-04 | End: 2021-05-04 | Stop reason: HOSPADM

## 2021-05-04 RX ORDER — SODIUM CHLORIDE 9 MG/ML
25 INJECTION, SOLUTION INTRAVENOUS CONTINUOUS
Status: DISPENSED | OUTPATIENT
Start: 2021-05-04 | End: 2021-05-05

## 2021-05-04 RX ORDER — SODIUM CHLORIDE, SODIUM LACTATE, POTASSIUM CHLORIDE, CALCIUM CHLORIDE 600; 310; 30; 20 MG/100ML; MG/100ML; MG/100ML; MG/100ML
25 INJECTION, SOLUTION INTRAVENOUS CONTINUOUS
Status: DISCONTINUED | OUTPATIENT
Start: 2021-05-04 | End: 2021-05-08 | Stop reason: HOSPADM

## 2021-05-04 RX ORDER — CITALOPRAM 20 MG/1
10 TABLET, FILM COATED ORAL DAILY
Status: DISCONTINUED | OUTPATIENT
Start: 2021-05-04 | End: 2021-05-04

## 2021-05-04 RX ORDER — ROPIVACAINE HYDROCHLORIDE 5 MG/ML
INJECTION, SOLUTION EPIDURAL; INFILTRATION; PERINEURAL AS NEEDED
Status: DISCONTINUED | OUTPATIENT
Start: 2021-05-04 | End: 2021-05-04 | Stop reason: HOSPADM

## 2021-05-04 RX ORDER — FENTANYL CITRATE 50 UG/ML
25 INJECTION, SOLUTION INTRAMUSCULAR; INTRAVENOUS
Status: DISCONTINUED | OUTPATIENT
Start: 2021-05-04 | End: 2021-05-04 | Stop reason: HOSPADM

## 2021-05-04 RX ORDER — SUCCINYLCHOLINE CHLORIDE 20 MG/ML
INJECTION INTRAMUSCULAR; INTRAVENOUS AS NEEDED
Status: DISCONTINUED | OUTPATIENT
Start: 2021-05-04 | End: 2021-05-04 | Stop reason: HOSPADM

## 2021-05-04 RX ORDER — LIDOCAINE HYDROCHLORIDE AND EPINEPHRINE 10; 10 MG/ML; UG/ML
INJECTION, SOLUTION INFILTRATION; PERINEURAL AS NEEDED
Status: DISCONTINUED | OUTPATIENT
Start: 2021-05-04 | End: 2021-05-04 | Stop reason: HOSPADM

## 2021-05-04 RX ORDER — LIDOCAINE HYDROCHLORIDE 20 MG/ML
INJECTION, SOLUTION EPIDURAL; INFILTRATION; INTRACAUDAL; PERINEURAL AS NEEDED
Status: DISCONTINUED | OUTPATIENT
Start: 2021-05-04 | End: 2021-05-04 | Stop reason: HOSPADM

## 2021-05-04 RX ORDER — ONDANSETRON 2 MG/ML
INJECTION INTRAMUSCULAR; INTRAVENOUS AS NEEDED
Status: DISCONTINUED | OUTPATIENT
Start: 2021-05-04 | End: 2021-05-04 | Stop reason: HOSPADM

## 2021-05-04 RX ORDER — LIDOCAINE HYDROCHLORIDE 10 MG/ML
0.1 INJECTION, SOLUTION EPIDURAL; INFILTRATION; INTRACAUDAL; PERINEURAL AS NEEDED
Status: DISCONTINUED | OUTPATIENT
Start: 2021-05-04 | End: 2021-05-06

## 2021-05-04 RX ORDER — ACETAMINOPHEN 325 MG/1
650 TABLET ORAL ONCE
Status: ACTIVE | OUTPATIENT
Start: 2021-05-04 | End: 2021-05-05

## 2021-05-04 RX ORDER — SODIUM CHLORIDE 0.9 % (FLUSH) 0.9 %
5-40 SYRINGE (ML) INJECTION AS NEEDED
Status: DISCONTINUED | OUTPATIENT
Start: 2021-05-04 | End: 2021-05-04 | Stop reason: HOSPADM

## 2021-05-04 RX ORDER — SODIUM CHLORIDE 0.9 % (FLUSH) 0.9 %
5-40 SYRINGE (ML) INJECTION EVERY 8 HOURS
Status: DISCONTINUED | OUTPATIENT
Start: 2021-05-04 | End: 2021-05-08 | Stop reason: HOSPADM

## 2021-05-04 RX ORDER — MIDAZOLAM HYDROCHLORIDE 1 MG/ML
1 INJECTION, SOLUTION INTRAMUSCULAR; INTRAVENOUS AS NEEDED
Status: DISCONTINUED | OUTPATIENT
Start: 2021-05-04 | End: 2021-05-05

## 2021-05-04 RX ORDER — SODIUM CHLORIDE 0.9 % (FLUSH) 0.9 %
5-40 SYRINGE (ML) INJECTION AS NEEDED
Status: DISCONTINUED | OUTPATIENT
Start: 2021-05-04 | End: 2021-05-08 | Stop reason: HOSPADM

## 2021-05-04 RX ORDER — ONDANSETRON 2 MG/ML
4 INJECTION INTRAMUSCULAR; INTRAVENOUS AS NEEDED
Status: DISCONTINUED | OUTPATIENT
Start: 2021-05-04 | End: 2021-05-04 | Stop reason: HOSPADM

## 2021-05-04 RX ORDER — NEOSTIGMINE METHYLSULFATE 1 MG/ML
INJECTION, SOLUTION INTRAVENOUS AS NEEDED
Status: DISCONTINUED | OUTPATIENT
Start: 2021-05-04 | End: 2021-05-04 | Stop reason: HOSPADM

## 2021-05-04 RX ORDER — SODIUM CHLORIDE, SODIUM LACTATE, POTASSIUM CHLORIDE, CALCIUM CHLORIDE 600; 310; 30; 20 MG/100ML; MG/100ML; MG/100ML; MG/100ML
125 INJECTION, SOLUTION INTRAVENOUS CONTINUOUS
Status: DISCONTINUED | OUTPATIENT
Start: 2021-05-04 | End: 2021-05-04 | Stop reason: HOSPADM

## 2021-05-04 RX ORDER — SODIUM CHLORIDE 0.9 % (FLUSH) 0.9 %
5-40 SYRINGE (ML) INJECTION EVERY 8 HOURS
Status: DISCONTINUED | OUTPATIENT
Start: 2021-05-04 | End: 2021-05-04 | Stop reason: HOSPADM

## 2021-05-04 RX ORDER — FENTANYL CITRATE 50 UG/ML
50 INJECTION, SOLUTION INTRAMUSCULAR; INTRAVENOUS AS NEEDED
Status: DISCONTINUED | OUTPATIENT
Start: 2021-05-04 | End: 2021-05-06

## 2021-05-04 RX ORDER — EPHEDRINE SULFATE/0.9% NACL/PF 50 MG/5 ML
SYRINGE (ML) INTRAVENOUS AS NEEDED
Status: DISCONTINUED | OUTPATIENT
Start: 2021-05-04 | End: 2021-05-04 | Stop reason: HOSPADM

## 2021-05-04 RX ORDER — PROPOFOL 10 MG/ML
INJECTION, EMULSION INTRAVENOUS AS NEEDED
Status: DISCONTINUED | OUTPATIENT
Start: 2021-05-04 | End: 2021-05-04 | Stop reason: HOSPADM

## 2021-05-04 RX ORDER — DEXAMETHASONE SODIUM PHOSPHATE 4 MG/ML
INJECTION, SOLUTION INTRA-ARTICULAR; INTRALESIONAL; INTRAMUSCULAR; INTRAVENOUS; SOFT TISSUE AS NEEDED
Status: DISCONTINUED | OUTPATIENT
Start: 2021-05-04 | End: 2021-05-04 | Stop reason: HOSPADM

## 2021-05-04 RX ORDER — DIPHENHYDRAMINE HYDROCHLORIDE 50 MG/ML
12.5 INJECTION, SOLUTION INTRAMUSCULAR; INTRAVENOUS AS NEEDED
Status: DISCONTINUED | OUTPATIENT
Start: 2021-05-04 | End: 2021-05-04 | Stop reason: HOSPADM

## 2021-05-04 RX ORDER — SODIUM CHLORIDE, SODIUM LACTATE, POTASSIUM CHLORIDE, CALCIUM CHLORIDE 600; 310; 30; 20 MG/100ML; MG/100ML; MG/100ML; MG/100ML
25 INJECTION, SOLUTION INTRAVENOUS CONTINUOUS
Status: DISPENSED | OUTPATIENT
Start: 2021-05-04 | End: 2021-05-05

## 2021-05-04 RX ORDER — FENTANYL CITRATE 50 UG/ML
INJECTION, SOLUTION INTRAMUSCULAR; INTRAVENOUS AS NEEDED
Status: DISCONTINUED | OUTPATIENT
Start: 2021-05-04 | End: 2021-05-04 | Stop reason: HOSPADM

## 2021-05-04 RX ORDER — PHENYLEPHRINE HCL IN 0.9% NACL 0.4MG/10ML
SYRINGE (ML) INTRAVENOUS AS NEEDED
Status: DISCONTINUED | OUTPATIENT
Start: 2021-05-04 | End: 2021-05-04 | Stop reason: HOSPADM

## 2021-05-04 RX ORDER — GLYCOPYRROLATE 0.2 MG/ML
INJECTION INTRAMUSCULAR; INTRAVENOUS AS NEEDED
Status: DISCONTINUED | OUTPATIENT
Start: 2021-05-04 | End: 2021-05-04 | Stop reason: HOSPADM

## 2021-05-04 RX ADMIN — Medication 1000 MG: at 16:50

## 2021-05-04 RX ADMIN — Medication 3 AMPULE: at 13:00

## 2021-05-04 RX ADMIN — LORATADINE 10 MG: 10 TABLET ORAL at 09:57

## 2021-05-04 RX ADMIN — FENTANYL CITRATE 100 MCG: 50 INJECTION, SOLUTION INTRAMUSCULAR; INTRAVENOUS at 14:19

## 2021-05-04 RX ADMIN — Medication 40 MCG: at 15:10

## 2021-05-04 RX ADMIN — FENTANYL CITRATE 25 MCG: 50 INJECTION, SOLUTION INTRAMUSCULAR; INTRAVENOUS at 16:45

## 2021-05-04 RX ADMIN — ONDANSETRON HYDROCHLORIDE 4 MG: 2 INJECTION, SOLUTION INTRAMUSCULAR; INTRAVENOUS at 15:27

## 2021-05-04 RX ADMIN — Medication 10 MG: at 15:12

## 2021-05-04 RX ADMIN — Medication 10 ML: at 21:11

## 2021-05-04 RX ADMIN — SODIUM CHLORIDE, POTASSIUM CHLORIDE, SODIUM LACTATE AND CALCIUM CHLORIDE 25 ML/HR: 600; 310; 30; 20 INJECTION, SOLUTION INTRAVENOUS at 13:00

## 2021-05-04 RX ADMIN — OXYBUTYNIN CHLORIDE 5 MG: 5 TABLET ORAL at 17:25

## 2021-05-04 RX ADMIN — GLYCOPYRROLATE 0.4 MG: 0.2 INJECTION, SOLUTION INTRAMUSCULAR; INTRAVENOUS at 15:48

## 2021-05-04 RX ADMIN — NEOSTIGMINE METHYLSULFATE 3 MG: 1 INJECTION, SOLUTION INTRAVENOUS at 15:48

## 2021-05-04 RX ADMIN — DICYCLOMINE HYDROCHLORIDE 10 MG: 10 CAPSULE ORAL at 21:10

## 2021-05-04 RX ADMIN — Medication 40 MCG: at 14:38

## 2021-05-04 RX ADMIN — PREGABALIN 150 MG: 75 CAPSULE ORAL at 17:24

## 2021-05-04 RX ADMIN — ROCURONIUM BROMIDE 10 MG: 10 INJECTION INTRAVENOUS at 14:19

## 2021-05-04 RX ADMIN — WATER 2 G: 1 INJECTION INTRAMUSCULAR; INTRAVENOUS; SUBCUTANEOUS at 14:29

## 2021-05-04 RX ADMIN — WATER 2 G: 1 INJECTION INTRAMUSCULAR; INTRAVENOUS; SUBCUTANEOUS at 09:50

## 2021-05-04 RX ADMIN — Medication 1000 MG: at 02:02

## 2021-05-04 RX ADMIN — ROCURONIUM BROMIDE 20 MG: 10 INJECTION INTRAVENOUS at 14:25

## 2021-05-04 RX ADMIN — FENTANYL CITRATE 100 MCG: 50 INJECTION, SOLUTION INTRAMUSCULAR; INTRAVENOUS at 14:43

## 2021-05-04 RX ADMIN — PANTOPRAZOLE SODIUM 40 MG: 40 TABLET, DELAYED RELEASE ORAL at 09:43

## 2021-05-04 RX ADMIN — DOCUSATE SODIUM 50MG AND SENNOSIDES 8.6MG 1 TABLET: 8.6; 5 TABLET, FILM COATED ORAL at 17:25

## 2021-05-04 RX ADMIN — BUPROPION HYDROCHLORIDE 200 MG: 100 TABLET, EXTENDED RELEASE ORAL at 17:25

## 2021-05-04 RX ADMIN — Medication 80 MCG: at 14:41

## 2021-05-04 RX ADMIN — Medication 80 MCG: at 15:36

## 2021-05-04 RX ADMIN — Medication 1000 MG: at 09:42

## 2021-05-04 RX ADMIN — Medication 10 MG: at 14:52

## 2021-05-04 RX ADMIN — Medication 120 MCG: at 14:54

## 2021-05-04 RX ADMIN — BUPROPION HYDROCHLORIDE 200 MG: 100 TABLET, EXTENDED RELEASE ORAL at 09:42

## 2021-05-04 RX ADMIN — DULOXETINE HYDROCHLORIDE 60 MG: 30 CAPSULE, DELAYED RELEASE ORAL at 09:42

## 2021-05-04 RX ADMIN — PROPOFOL 150 MG: 10 INJECTION, EMULSION INTRAVENOUS at 14:19

## 2021-05-04 RX ADMIN — SUCCINYLCHOLINE CHLORIDE 120 MG: 20 INJECTION, SOLUTION INTRAMUSCULAR; INTRAVENOUS at 14:19

## 2021-05-04 RX ADMIN — LIDOCAINE HYDROCHLORIDE 100 MG: 20 INJECTION, SOLUTION INTRAVENOUS at 14:19

## 2021-05-04 RX ADMIN — SODIUM CHLORIDE 125 ML/HR: 900 INJECTION, SOLUTION INTRAVENOUS at 02:02

## 2021-05-04 RX ADMIN — PREGABALIN 150 MG: 75 CAPSULE ORAL at 09:42

## 2021-05-04 RX ADMIN — SODIUM CHLORIDE 25 MCG/MIN: 900 INJECTION, SOLUTION INTRAVENOUS at 14:58

## 2021-05-04 RX ADMIN — DIAZEPAM 5 MG: 5 TABLET ORAL at 16:50

## 2021-05-04 RX ADMIN — SODIUM CHLORIDE 25 ML/HR: 9 INJECTION, SOLUTION INTRAVENOUS at 21:16

## 2021-05-04 RX ADMIN — DEXMEDETOMIDINE HYDROCHLORIDE 6 MCG: 100 INJECTION, SOLUTION, CONCENTRATE INTRAVENOUS at 15:15

## 2021-05-04 RX ADMIN — Medication 120 MCG: at 14:47

## 2021-05-04 RX ADMIN — DEXAMETHASONE SODIUM PHOSPHATE 4 MG: 4 INJECTION, SOLUTION INTRAMUSCULAR; INTRAVENOUS at 14:50

## 2021-05-04 RX ADMIN — DICYCLOMINE HYDROCHLORIDE 10 MG: 10 CAPSULE ORAL at 17:25

## 2021-05-04 RX ADMIN — Medication 10 ML: at 05:30

## 2021-05-04 RX ADMIN — OXYCODONE 10 MG: 5 TABLET ORAL at 09:42

## 2021-05-04 RX ADMIN — WATER 2 G: 1 INJECTION INTRAMUSCULAR; INTRAVENOUS; SUBCUTANEOUS at 00:00

## 2021-05-04 NOTE — PERIOP NOTES
Patient: Sherryle Lay MRN: [id number]  SSN: [id number]   YOB: 1950  Age: 65 y.o. Sex: female     Patient is status post Procedure(s):  ROBOTIC ASSISTED L4-S1 POSTERIOR FUSION WITH BONE MARROW ASPIRATION FROM ILIAC CREST. Surgeon(s) and Role:     * Adolfo Day MD - Primary    Local/Dose/Irrigation:  60ml 0.5% ropivicaine  20ml lidocaine 1% with epi  450ml Irrisept used prn for irrigation from sterile field  10ml Floseal used prn                Peripheral IV 05/04/21 Right Antecubital (Active)   Site Assessment Clean, dry, & intact 05/04/21 1300   Phlebitis Assessment 0 05/04/21 1300   Infiltration Assessment 0 05/04/21 1300   Dressing Status Clean, dry, & intact 05/04/21 1300   Dressing Type Transparent;Tape 05/04/21 1300   Hub Color/Line Status Pink; Infusing 05/04/21 1300            Airway - Endotracheal Tube 05/04/21 Oral (Active)   Line Jorge Lips 05/04/21 0000                   Dressing/Packing:  Incision 05/04/21 Back-Dressing/Treatment: Skin glue (05/04/21 1502)  Incision 05/04/21 Hip-Dressing/Treatment: Band-Aid/Adhesive bandage (05/04/21 1446)    Splint/Cast:  ]

## 2021-05-04 NOTE — PROGRESS NOTES
Problem: Mobility Impaired (Adult and Pediatric)  Goal: *Acute Goals and Plan of Care (Insert Text)  Description: FUNCTIONAL STATUS PRIOR TO ADMISSION: Patient was independent and active without use of straight cane. Patient with several recent falls. HOME SUPPORT PRIOR TO ADMISSION: The patient lived with  but did not require assist.    Physical Therapy Goals  Initiated 5/4/2021    1. Patient will move from supine to sit and sit to supine  in bed with modified independence within 4 days. 2. Patient will perform sit to stand with independence within 4 days. 3. Patient will ambulate with supervision/set-up for 150 feet with the least restrictive device within 4 days. 4. Patient will ascend/descend 4 stairs with 1 handrail(s) with minimal assistance/contact guard assist within 4 days. 5. Patient will verbalize and demonstrate understanding of spinal precautions (No bending, lifting greater than 5 lbs, or twisting; log-roll technique; frequent repositioning as instructed) within 4 days. Note:   PHYSICAL THERAPY EVALUATION  Patient: Gila Huerta (64 y.o. female)  Date: 5/4/2021  Primary Diagnosis: Spinal stenosis, lumbar [M48.061]  Procedure(s) (LRB):  ROBOTIC ASSISTED L4-S1 POSTERIOR FUSION (N/A) Day of Surgery   Precautions:   Spinal(quick draw brace)    ASSESSMENT  Based on the objective data described below, the patient presents with generalized weakness, impaired balance, decreased activity tolerance, and decreased functional mobility with several recent falls. Patient supine upon arrival; agreeable to mobility. Reviewed back precauions. Log roll to come to sitting with min assist x 1. Good sitting balance EOB. Able to elizabeth brace with CGA/min assist.  Patient ambulated into bathroom then additional 60' with rolling walker and CGA. Patient with slow pace and decreased step clearance, but no LOB. Up in chair at end of session with chair alarm and  present.   Patient will likely discharge with HHPT and rolling walker. Current Level of Function Impacting Discharge (mobility/balance): min assist    Functional Outcome Measure: The patient scored 55/100 on the Barthel Index outcome measure which is indicative of 455 decline in mobility/ADL's. Other factors to consider for discharge: good support at home     Patient will benefit from skilled therapy intervention to address the above noted impairments. PLAN :  Recommendations and Planned Interventions: bed mobility training, transfer training, gait training, therapeutic exercises, patient and family training/education, and therapeutic activities      Frequency/Duration: Patient will be followed by physical therapy:  twice daily to address goals. Recommendation for discharge: (in order for the patient to meet his/her long term goals)  Physical therapy at least 2 days/week in the home AND ensure assist and/or supervision for safety with mobility    This discharge recommendation:  Has not yet been discussed the attending provider and/or case management    IF patient discharges home will need the following DME: rolling walker         SUBJECTIVE:   Patient stated I'm a little confused.     OBJECTIVE DATA SUMMARY:   HISTORY:    Past Medical History:   Diagnosis Date    Arthritis     Chronic kidney disease     CKD 3    Chronic pain     CHRONIC BOWEL PAIN    Depression     Diverticulitis     Fibromyalgia     GERD (gastroesophageal reflux disease)     Goiter     Hiatal hernia     Hypercholesterolemia     Hypertension     IBS (irritable bowel syndrome)     CONSTIPATION, CHRONIC SINCE HER 20s    Insomnia     TAKES TRAZODONE NIGHTLY    Migraine     REDUCED IN FREQUENCY AND SEVERITY SINCE MENOPAUSE    Rectocele      Past Surgical History:   Procedure Laterality Date    COLONOSCOPY N/A 6/21/2016    COLONOSCOPY performed by Faith Avila MD at [facility] ENDOSCOPY    COLONOSCOPY N/A 6/27/2018    COLONOSCOPY performed by Luna Vázquez MD Fiona at [facility] ENDOSCOPY    ENDOSCOPY, COLON, DIAGNOSTIC  11/2010    Dr. Padmini Woo    HX CHOLECYSTECTOMY  2006    HX ENDOSCOPY      HX GI  X3  LAST ONE 12/10    COLONOSCOPY    HX GYN  1982    D&C WITH SUCTION    HX HYSTERECTOMY      HX LUMBAR FUSION  2017    HX ORTHOPAEDIC      right foot surgery    HX TONSILLECTOMY      HX TOTAL COLECTOMY  2007    Dr. Pond/ diverticulitis    IR INJ SPINE FLUORO GUIDED  8/28/2020    IR INJ SPINE FLUORO GUIDED  1/8/2021       Personal factors and/or comorbidities impacting plan of care: recent falls    Home Situation  Home Environment: Private residence  # Steps to Enter: 2  Rails to Enter: Yes  Hand Rails : Right  One/Two Story Residence: One story  Living Alone: No  Support Systems: Spouse/Significant Other/Partner, Child(arturo)(adult son)  Current DME Used/Available at Home: Fleurette Amble, rolling, [place], straight, Lift chair(elevated toilet, adjustible bed)  Tub or Shower Type: Shower    EXAMINATION/PRESENTATION/DECISION MAKING:   Critical Behavior:  Neurologic State: Alert  Orientation Level: Oriented to person, Oriented to place, Disoriented to situation, Disoriented to time  Cognition: Decreased attention/concentration, Decreased command following, Impulsive, Impaired decision making, Memory loss(decreased safety awareness)  Safety/Judgement: Fall prevention, Decreased awareness of need for safety  Hearing:   Auditory  Auditory Impairment: None  Hearing Aids/Status: Does not own  Skin:  extremities intact  Edema: none noted  Range Of Motion:  AROM: Generally decreased, functional                       Strength:    Strength: Generally decreased, functional                    Tone & Sensation:   Tone: Normal              Sensation: Impaired(bilateral hand tingling (baseline)               Coordination:  Coordination: Within functional limits  Vision:   Acuity: (corrected with glasses)  Corrective Lenses: Glasses  Functional Mobility:  Bed Mobility:  Rolling: Minimum assistance  Supine to Sit: Minimum assistance; Additional time     Scooting: Minimum assistance;Contact guard assistance; Additional time  Transfers:  Sit to Stand: Contact guard assistance(strong cues to push from surface and not pull from walker)  Stand to Sit: Contact guard assistance        Bed to Chair: Contact guard assistance; Additional time(with RW)              Balance:   Sitting: Impaired  Sitting - Static: Fair (occasional)  Sitting - Dynamic: Fair (occasional)  Standing: Impaired  Standing - Static: Constant support; Fair  Standing - Dynamic : Fair  Ambulation/Gait Training:  Distance (ft): 60 Feet (ft)  Assistive Device: Walker, rolling  Ambulation - Level of Assistance: Contact guard assistance        Gait Abnormalities: Decreased step clearance  Right Side Weight Bearing: Full  Left Side Weight Bearing: Full        Speed/Xiomara: Pace decreased (<100 feet/min)  Step Length: Left shortened;Right shortened                        Functional Measure:  Barthel Index:    Bathin  Bladder: 0  Bowels: 10  Groomin  Dressin  Feeding: 10  Mobility: 10  Stairs: 0  Toilet Use: 5  Transfer (Bed to Chair and Back): 10  Total: 55/100       The Barthel ADL Index: Guidelines  1. The index should be used as a record of what a patient does, not as a record of what a patient could do. 2. The main aim is to establish degree of independence from any help, physical or verbal, however minor and for whatever reason. 3. The need for supervision renders the patient not independent. 4. A patient's performance should be established using the best available evidence. Asking the patient, friends/relatives and nurses are the usual sources, but direct observation and common sense are also important. However direct testing is not needed. 5. Usually the patient's performance over the preceding 24-48 hours is important, but occasionally longer periods will be relevant.   6. Middle categories imply that the patient supplies over 50 per cent of the effort. 7. Use of aids to be independent is allowed. Freddy Díaz., Barthel, D.W. (7819). Functional evaluation: the Barthel Index. [address] (14)2. FREDDY Ugarte, Kassidy Red., Amilcar Cortez., Georgina, [address] (1999). Measuring the change indisability after inpatient rehabilitation; comparison of the responsiveness of the Barthel Index and Functional [place] Measure. Journal of Neurology, Neurosurgery, and Psychiatry, 66(4), 214-011. RASHAAD Pastor, CASSY Moran, & Cj Hanks M.A. (2004.) Assessment of post-stroke quality of life in cost-effectiveness studies: The usefulness of the Barthel Index and the EuroQoL-5D. Quality of Life Research, 15, 840-96            Physical Therapy Evaluation Charge Determination   History Examination Presentation Decision-Making   MEDIUM  Complexity : 1-2 comorbidities / personal factors will impact the outcome/ POC  MEDIUM Complexity : 3 Standardized tests and measures addressing body structure, function, activity limitation and / or participation in recreation  LOW Complexity : Stable, uncomplicated  LOW Complexity : FOTO score of       Based on the above components, the patient evaluation is determined to be of the following complexity level: LOW     Pain Rating:  None at rest    Activity Tolerance:   Good    After treatment patient left in no apparent distress:   Sitting in chair, Call bell within reach, and Caregiver / family present    COMMUNICATION/EDUCATION:   The patients plan of care was discussed with: Occupational therapist and Registered nurse. Fall prevention education was provided and the patient/caregiver indicated understanding. and Patient/family agree to work toward stated goals and plan of care.     Thank you for this referral.  Harsha Mathews, PT   Time Calculation: 38 mins

## 2021-05-04 NOTE — OP NOTES
[facility]  OPERATIVE REPORT    Name:  Mak Roland  MR#:  [id number]  :  1950  ACCOUNT #:  [de-identified]  DATE OF SERVICE:  2021      PREOPERATIVE DIAGNOSES:  1. Spinal stenosis with claudication. 2.  Lumbago. 3.  Sciatica. 4.  Lumbar spondylosis with radiculopathy. 5.  Lumbar foraminal stenosis. POSTOPERATIVE DIAGNOSES:  1. Spinal stenosis with claudication. 2.  Lumbago. 3.  Sciatica. 4.  Lumbar spondylosis with radiculopathy. 5.  Lumbar foraminal stenosis. PROCEDURE PERFORMED:  1. L4-5 anterior fusion. 2.  L4-5 anterior instrumentation. 3. D9-0 application of interbody biomechanical device. 4. L4-5 insertion of interbody biomechanical device. 5.  Use of allograft bone for spine fusion. 6.  Bone marrow aspirate from the left anterior superior iliac spine for spinal fusion augmentation. SURGEON:  Kanu White MD    ASSISTANT:  Virgie Valdez. CORNELL Key  There was the need for a surgical assistant on this case to assist with:  1) Safe and proper retraction. 2) Maintenance of visualization during surgery by helping with suctioning. 3) Multiple tasks throughout the decompression and instrumentation to allow for timely and safe completion of the procedure. 4) Overall assistance helped decrease the risk of complications and infection. ANESTHESIA:  General.    COMPLICATIONS:  None. SPECIMENS REMOVED:  None. DRAINS:  None. IMPLANTS:  Globus RISE-L interbody biomechanical device and the [address] plate. ESTIMATED BLOOD LOSS:  50 mL. INDICATIONS FOR THE PROCEDURE:  The patient is a pleasant 78-year-old lady with lumbar spinal stenosis, lumbar foraminal stenosis, lumbago and sciatica. She has failed to improve with nonoperative treatment and at this point would like to proceed with surgical intervention.   I have given her warnings about the possible complications including, but not limited to, pain, scar, bleeding, infection, nonunion, damage to surrounding structures, death, paralysis, blindness, and stroke. She understands and wants to proceed. NARRATIVE OF THE PROCEDURE:  After informed consent was obtained and the operative site was properly marked, the patient was moved back to the operating room and underwent general endotracheal anesthesia. She was positioned on the lateral decubitus with the left side up. All the bony extremities were well padded and the knees were gently bent with pillows. Fluoroscopy was used to peg the level of the incision. We then proceeded to prep and drape in the usual manner. A time-out was obtained verifying that this was the correct patient, the correct surgery, the correct site as well as that she had received IV antibiotics within 30 minutes of the incision. We then proceeded to perform a standard anterior approach for an OLIF at L4-L5. I was able to split the abdominal musculature in layers and entered the retroperitoneal space. I was able then to retract the peritoneal contents anteriorly, the psoas muscle posteriorly and find the L4-5 disk space. Once the disk space was identified, we were able then to use fluoroscopy to verify they were in the correct level. I proceeded then to place a self-retaining retractor and then used the 15-blade to perform a box annulotomy. The annulus was removed with a pituitary and then I then proceeded to remove it with a pituitary. Fonnie Beans was used to detach the superior and inferior cartilaginous endplates and then a box shaver was used to complete the diskectomy. I then used a trial to determine the size for the implant and as the implant was being packed with allograft bone, I proceeded to insert a Jamshidi needle through a separate fascial incision on the right anterior superior iliac spine and aspirated 20 mL of bone marrow.   The bone marrow was then used to augment the bone graft used for the anterior fusion as the bone graft was augmented, I proceeded to insert the interbody biomechanical device in the L4-5 disk space, I deployed to its final height. I then used a funnel to backfill the implant with allograft bone soaked in bone marrow aspirate. Once that was completed, I proceeded to select a plate and I proceeded then to use an awl to create a path for the disk between L4 and another one at L5. The screws were placed at L4-L5 and final tightened with the final tightening device. Once that was completed, final AP and lateral x-rays were taken and saved to PACS. I proceeded then to reapproximate the abdominal musculature with #1 Vicryl, followed by irrigating the subcu, closed the subcu with 2-0 Vicryl, the skin with a 3-0 running Monocryl and Dermabond. Sterile dressing was applied. The patient was then awakened and transferred to PACU in stable condition. POSTOPERATIVE PLAN:  The patient is going to remain here overnight. We are going to give her SCDs and HERNAN hoses for DVT prophylaxis and Ancef for infection prophylaxis. Our plan is to return tomorrow for the second part of the procedure which will be done from the posterior approach robotically. MD FLORENCE Castillo/[email or web address]  D:  05/03/2021 17:26  T:  05/04/2021 2:18  JOB #:  [id number]  CC:  Vanessa Couch 20.  Dm Buckley MD

## 2021-05-04 NOTE — OP NOTES
[address]  OPERATIVE REPORT    Name:  Krystyna Lees  MR#:  [id number]  :  1950  ACCOUNT #:  [de-identified]  DATE OF SERVICE:  2021    PREOPERATIVE DIAGNOSES:  1. Low back pain. 2.  Sciatica. 3.  Lumbar spondylosis with radiculopathy. 4.  Lumbar spinal stenosis. 5.  Scoliosis. POSTOPERATIVE DIAGNOSES:  1. Low back pain. 2.  Sciatica. 3.  Lumbar spondylosis with radiculopathy. 4.  Lumbar spinal stenosis. 5.  Scoliosis. PROCEDURE PERFORMED:  1. L4 through S1 posterior fusion. 2.  L4 through S1 posterior instrumentation. 3.  Use of allograft bone for spine fusion. 4.  Bone marrow aspirate from the left posterior superior iliac spine for spinal fusion augmentation. 5.  Use of Globus robotic system for placement of pedicle screws. SURGEON:  Moriah Rod MD    ASSISTANT:  CORNELL Gonzalez  There was the need for a surgical assistant on this case to assist with:  1) Safe and proper retraction. 2) Maintenance of visualization during surgery by helping with suctioning. 3) Multiple tasks throughout the decompression and instrumentation to allow for timely and safe completion of the procedure. 4) Overall assistance helped decrease the risk of complications and infection. ANESTHESIA:  General.    COMPLICATIONS:  None. SPECIMENS REMOVED:  None. IMPLANTS:  Globus Creo pedicle screw system. ESTIMATED BLOOD LOSS:  20 mL. DRAINS:  None. INDICATIONS FOR PROCEDURE:  The patient is a pleasant 55-year-old lady with lumbar spinal stenosis, lumbago, and sciatica. She has a scoliosis and underwent an anterior procedure yesterday. Today, she returns for the posterior component. PROCEDURE:  After Informed consent was obtained and the operative site was properly marked, the patient was moved back to operating room and underwent general endotracheal anesthesia.   She was positioned prone on the operating room table [facility].  Her arms were placed in the 90:90 position and knees were gently bent with pillows. Fluoroscopy was used to peg the level of the incision. We then proceeded to prep and drape in the usual manner. Time-out was obtained verifying that this was the correct patient, the correct surgery, the correct site as well as that she had received IV antibiotics within 30 minutes of the incision. I then proceeded to perform my standard posterior approach with a stab incision over the left posterior superior iliac spine, aspirated 20 mL of bone marrow to use it to augment the bone graft used in this procedure. A [employer] robotic navigation marker was then placed on the left iliac crest and another one on the right, and the fluoroscopy was brought into the field. We registered L4, L5 and S1 levels on both AP and lateral views and then the [employer] robotic system guided us to perform bilateral David approaches. As the approaches were performed, we then began placing pedicle screws with the following technique. A dilator was used to clear the soft tissue path to the entry hole for the pedicle. High-speed drill was used to create the entry hole and then finally a tap was used finishing off by placing pedicle screw that was had been premeasured. This was done bilaterally at L4, L5, and S1. Once all the screws were in place, fluoroscopy was brought into the field, and we used fluoroscopy to verify that the screws were in the proper position. Once we were satisfied with the screw placement, I proceeded then to decorticate posterior elements bilaterally, placed the local autograft bone soaked in bone marrow aspirate for the posterior fusion and then finished off by placing the rods and locking caps and final tightened the construct in place from L4 through S1. The bone graft and the decortication was done from L4 through S1 and the rods were placed from L4 through S1.   Once the rods were in place, I proceeded to obtain final AP and lateral x-rays, saved them to PACS. I then proceeded to close the fascia with a zero V-Loc followed by irrigating subcutaneous, closed subcutaneous with 2-0 Vicryl and the skin with a 3-0 running Monocryl and Dermabond. Sterile dressing was applied. The patient was then awakened and transferred to PACU in stable condition. POSTOPERATIVE PLAN:  The patient is going to remain here overnight. We are going to give her SCDs and HERNAN hose for DVT prophylaxis and Ancef for infection prophylaxis.         MD FLORENCE [email or web address]  D:  05/04/2021 15:36  T:  05/04/2021 16:22  JOB #:  [id number]  CC:  [facility]

## 2021-05-04 NOTE — ANESTHESIA POSTPROCEDURE EVALUATION
Procedure(s):  ROBOTIC ASSISTED L4-S1 POSTERIOR FUSION WITH BONE MARROW ASPIRATION FROM ILIAC CREST. general    Anesthesia Post Evaluation        Patient location during evaluation: PACU  Note status: Adequate. Level of consciousness: responsive to verbal stimuli and sleepy but conscious  Pain management: satisfactory to patient  Airway patency: patent  Anesthetic complications: no  Cardiovascular status: acceptable  Respiratory status: acceptable  Hydration status: acceptable  Comments: +Post-Anesthesia Evaluation and Assessment    Patient: Garland Estrada MRN: [id number]  SSN: [id number]   YOB: 1950  Age: 65 y.o. Sex: female      Cardiovascular Function/Vital Signs    BP (!) 161/74 (BP 1 Location: Left upper arm, BP Patient Position: At rest)   Pulse (!) 102   Temp 37.1 °C (98.7 °F)   Resp 18   Ht 5' 2\" (1.575 m)   Wt 81.3 kg (179 lb 3.7 oz)   SpO2 94%   BMI 32.78 kg/m²     Patient is status post Procedure(s):  ROBOTIC ASSISTED L4-S1 POSTERIOR FUSION WITH BONE MARROW ASPIRATION FROM ILIAC CREST. Nausea/Vomiting: Controlled. Postoperative hydration reviewed and adequate. Pain:  Pain Scale 1: Numeric (0 - 10) (05/04/21 1630)  Pain Intensity 1: 3 (05/04/21 1630)   Managed. Neurological Status:   Neuro (WDL): Exceptions to WDL (05/04/21 1555)   At baseline. Mental Status and Level of Consciousness: Arousable. Pulmonary Status:   O2 Device: Nasal cannula (05/04/21 1630)   Adequate oxygenation and airway patent. Complications related to anesthesia: None    Post-anesthesia assessment completed. No concerns. Signed By: Chastity Romano MD    5/4/2021  Post anesthesia nausea and vomiting:  controlled      INITIAL Post-op Vital signs:   Vitals Value Taken Time   /74 05/04/21 1630   Temp 37.1 °C (98.7 °F) 05/04/21 1555   Pulse 98 05/04/21 1638   Resp 19 05/04/21 1638   SpO2 93 % 05/04/21 1638   Vitals shown include unvalidated device data.

## 2021-05-04 NOTE — PERIOP NOTES
TRANSFER - OUT REPORT:    Verbal report given to Stella Ernandez RN on Mary Jane Kellogg  being transferred to [facility], [address] [place] [address] for routine post - op       Report consisted of patients Situation, Background, Assessment and   Recommendations(SBAR). Information from the following report(s) SBAR, Kardex, OR Summary, Intake/Output, MAR and Cardiac Rhythm NSR was reviewed with the receiving nurse. Lines:   Peripheral IV 05/04/21 Right Antecubital (Active)   Site Assessment Clean, dry, & intact 05/04/21 1700   Phlebitis Assessment 0 05/04/21 1700   Infiltration Assessment 0 05/04/21 1700   Dressing Status Clean, dry, & intact 05/04/21 1700   Dressing Type Tape;Transparent 05/04/21 1700   Hub Color/Line Status Pink; Infusing 05/04/21 1700        Opportunity for questions and clarification was provided. Patient transported with:   O2 @ 3 liters  Tech     1700  Provided Leata Leisure with updates on patient's MAR and meds administered.

## 2021-05-04 NOTE — ANESTHESIA PREPROCEDURE EVALUATION
Anesthetic History   No history of anesthetic complications            Review of Systems / Medical History  Patient summary reviewed, nursing notes reviewed and pertinent labs reviewed    Pulmonary  Within defined limits                 Neuro/Psych         Headaches and psychiatric history     Cardiovascular    Hypertension: well controlled          Hyperlipidemia    Exercise tolerance: >4 METS     GI/Hepatic/Renal     GERD: well controlled      Hiatal hernia    Comments: IBS Endo/Other      Hypothyroidism  Obesity and arthritis     Other Findings   Comments: Left carpal tunnel syndrome    Fibromyalgia  Chronic pain  Thyroid Goiter           Physical Exam    Airway  Mallampati: II  TM Distance: 4 - 6 cm  Neck ROM: normal range of motion   Mouth opening: Normal     Cardiovascular    Rhythm: regular  Rate: normal         Dental    Dentition: Implants     Pulmonary  Breath sounds clear to auscultation               Abdominal  GI exam deferred       Other Findings            Anesthetic Plan    ASA: 2  Anesthesia type: general          Induction: Intravenous  Anesthetic plan and risks discussed with: Patient

## 2021-05-04 NOTE — BRIEF OP NOTE
Brief Postoperative Note    Patient: Mary Jane Kellogg  YOB: 1950  MRN: [id number]    Date of Procedure: 5/4/2021     Pre-Op Diagnosis: LOW BACK PAIN/SPONDYLOLISTHESIS/ SPONDYLOSIS/DEGENERATIVE SCOLIOSIS/ SCIATICA/STENOSIS/POSTLAMINECTOMY SYNDROME    Post-Op Diagnosis: Same as preoperative diagnosis. Procedure(s):  ROBOTIC ASSISTED L4-S1 POSTERIOR FUSION WITH BONE MARROW ASPIRATION FROM ILIAC CREST    Surgeon(s):  Baudilio Easley MD    Surgical Assistant: Physician Assistant: CORNELL Wharton    Anesthesia: General     Estimated Blood Loss (mL): Minimal    Complications: None    Specimens: * No specimens in log *     Implants:   Implant Name Type Inv. Item Serial No.  Lot No. LRB No. Used Action   CAP SPNL POST FACET JT ULICES FOR INTEGR ROMELIA REDUC CREO MIS - SN/A  CAP SPNL POST FACET JT ULICES FOR INTEGR ROMELIA [address] CREO MIS N/A [employer] N/A N/A 6 Implanted   CREO MIS MOD POLYAX TULIP 30MM - SN/A  CREO MIS MOD POLYAX TULIP 30MM N/A [employer] N/A N/A 6 Implanted   OSTEOAMP FIBERS 10CC   [id number] [facility] N/A N/A 1 Implanted   SCREW SPNL L45MM DIA6.5MM THORLUM BYRON ADV MOD PLATFRM CREO - SN/A  SCREW SPNL L45MM DIA6.5MM THORLUM BYRON ADV MOD PLATFRM CREO N/A [employer] N/A N/A 2 Implanted   SCREW SPNL L40MM DIA6.5MM THORLUM BYRON ADV MOD PLATFRM CREO - SN/A  SCREW SPNL L40MM DIA6.5MM THORLUM BYRON ADV MOD PLATFRM CREO N/A [employer] N/A N/A 1 Implanted   SCREW SPNL L45MM DIA7. 5MM BYRON MOD CREO - SN/A  SCREW SPNL L45MM DIA7. 5MM BYRON MOD CREO N/A [employer] N/A N/A 3 Implanted   ROMELIA SPNL 5.5X80MM CVD TI CREO MIS - SN/A  ROMELIA SPNL 5.5X80MM CVD TI CREO MIS N/A [employer] N/A N/A 2 Implanted       Drains: * No LDAs found *    Findings: Stenosis    Electronically Signed by Tres Huerta MD on 5/4/2021 at 3:31 PM

## 2021-05-04 NOTE — BRIEF OP NOTE
Brief Postoperative Note    Patient: Danny Lovell  YOB: 1950  MRN: [id number]    Date of Procedure: 5/4/2021     Pre-Op Diagnosis: LOW BACK PAIN/SPONDYLOLISTHESIS/ SPONDYLOSIS/DEGENERATIVE SCOLIOSIS/ SCIATICA/STENOSIS/POSTLAMINECTOMY SYNDROME    Post-Op Diagnosis: Same as preoperative diagnosis. Procedure(s):  ROBOTIC ASSISTED L4-S1 POSTERIOR FUSION WITH BONE MARROW ASPIRATION FROM ILIAC CREST    Surgeon(s):  Wade Jones MD    Surgical Assistant: Physician Assistant: CORNELL Porter    Anesthesia: General     Estimated Blood Loss (mL): less than 50     Complications: None    Specimens: * No specimens in log *     Implants:   Implant Name Type Inv. Item Serial No.  Lot No. LRB No. Used Action   CAP SPNL POST FACET JT ULICES FOR INTEGR ROMELIA REDUC CREO MIS - SN/A  CAP SPNL POST FACET JT ULICES FOR INTEGR ROMELIA [address] CREO MIS N/A [employer] N/A N/A 6 Implanted   CREO MIS MOD POLYAX TULIP 30MM - SN/A  CREO MIS MOD POLYAX TULIP 30MM N/A [employer] N/A N/A 6 Implanted   OSTEOAMP FIBERS 10CC   [id number] [facility] N/A N/A 1 Implanted   SCREW SPNL L45MM DIA6.5MM THORLUM BYRON ADV MOD PLATFRM CREO - SN/A  SCREW SPNL L45MM DIA6.5MM THORLUM BYRON ADV MOD PLATFRM CREO N/A [employer] N/A N/A 2 Implanted   SCREW SPNL L40MM DIA6.5MM THORLUM BYRON ADV MOD PLATFRM CREO - SN/A  SCREW SPNL L40MM DIA6.5MM THORLUM BYRON ADV MOD PLATFRM CREO N/A [employer] N/A N/A 1 Implanted   SCREW SPNL L45MM DIA7. 5MM BYRON MOD CREO - SN/A  SCREW SPNL L45MM DIA7. 5MM BYRON MOD CREO N/A [employer] N/A N/A 3 Implanted   ROMELIA SPNL 5.5X80MM CVD TI CREO MIS - SN/A  ROMELIA SPNL 5.5X80MM CVD TI CREO MIS N/A [employer] N/A N/A 2 Implanted       Drains: * No LDAs found *    Findings: Stenosis    Electronically Signed by Navi Joel MD on 5/4/2021 at 3:29 PM

## 2021-05-04 NOTE — PROGRESS NOTES
FUNCTIONAL STATUS PRIOR TO ADMISSION: multiple recent falls, was recently using SPC, performed ADLs and IADLS on her own    [facility]: The patient lived with  but did not require assist.    Occupational Therapy Goals:  Initiated 5/4/2021  1. Patient will perform grooming standing with item retrieval with modified independence within 7 days. 2. Patient will perform upper body dressing and lower body dressing with modified independence with AE within 7 days. 3. Patient will perform toileting with modified independence within 7 days. 4. Patient will transfer from toilet with modified independence using the least restrictive device and appropriate durable medical equipment within 7 days. 5. Patient will adhere to 3:3 LS precautions during functional tasks within 7 days. OCCUPATIONAL THERAPY EVALUATION  Patient: Kiki Holland (16 y.o. female)  Date: 5/4/2021  Primary Diagnosis: Spinal stenosis, lumbar [M48.061]  Procedure(s) (LRB):  L4-5 LATERAL FUSION  (Right) 1 Day Post-Op   Precautions:   Spinal(quick draw brace)    ASSESSMENT  Based on the objective data described below, the patient presents with STM, pleasant confusion and was distractible this session. Pt had LS surgery yesterday and is pending second stage of LS surgery this afternoon. Pt was only able to recall one LS precaution at beginning of session but after reinforcement and eduation pt was able to remember 2:3 precautions. She was unable to perform crossed leg technique for LB dressing this session and needed total assist to don socks. Pt will need hip kit for LB dressing and this will be performed on next session. CGA overall for mobility with RW and verbal and physical cues needed for hand placement it to stand and stand to sit. Pt and pt  were educated on LS precautions, fall prevention, home safety and LS brace wear. Pts  voiced understanding but pt needs reinforcement.  Pt is expected to make good progress and  is able to assist at discharge. Recommend home health with assist at discharge. Current Level of Function Impacting Discharge (ADLs/self-care): min assist log roll  Transfers:  Sit to Stand: Contact guard assistance(strong cues to push from surface and not pull from walker)  Stand to Sit: Contact guard assistance  Bed to Chair: Contact guard assistance; Additional time(with RW)  Bathroom Mobility: Contact guard assistance(with RW)  Toilet Transfer : Contact guard assistance    ADL Assessment:  Feeding: Independent    Oral Facial Hygiene/Grooming: Contact guard assistance(standing)    Bathing: Moderate assistance(LB )    Upper Body Dressing: Stand-by assistance    Lower Body Dressing: Moderate assistance    Toileting: Moderate assistanc    Functional Outcome Measure: The patient scored 55/100 on the barthel outcome measure which is indicative of moderate decline in mobility and ADLS. Other factors to consider for discharge: confused but  is supportive and can assist     Patient will benefit from skilled therapy intervention to address the above noted impairments. PLAN :  Recommendations and Planned Interventions: self care training, functional mobility training, therapeutic exercise, balance training, therapeutic activities, patient education, home safety training and family training/education    Frequency/Duration: Patient will be followed by occupational therapy 5 times a week to address goals.     Recommendation for discharge: (in order for the patient to meet his/her long term goals)  Occupational therapy at least 2 days/week in the home AND ensure assist and/or supervision for safety with ADLS and mobility as needed    This discharge recommendation:  Has been made in collaboration with the attending provider and/or case management    IF patient discharges home will need the following DME: will issue hip kit, may need shower chair but this can be assessed by home health       SUBJECTIVE:   Patient stated I was not here yesterday was I?    OBJECTIVE DATA SUMMARY:   HISTORY:   Past Medical History:   Diagnosis Date    Arthritis     Chronic kidney disease     CKD 3    Chronic pain     CHRONIC BOWEL PAIN    Depression     Diverticulitis     Fibromyalgia     GERD (gastroesophageal reflux disease)     Goiter     Hiatal hernia     Hypercholesterolemia     Hypertension     IBS (irritable bowel syndrome)     CONSTIPATION, CHRONIC SINCE HER 20s    Insomnia     TAKES TRAZODONE NIGHTLY    Migraine     REDUCED IN FREQUENCY AND SEVERITY SINCE MENOPAUSE    Rectocele      Past Surgical History:   Procedure Laterality Date    COLONOSCOPY N/A 6/21/2016    COLON[facility]COPY    COLONOSCOPY N/A 6/27/2018    COLONOSCOPY [facility] at [facility] ENDOSCOPY    ENDOSCOPY, COLON, DIAGNOSTIC  11/2010    Dr. Rabia Vergara  2011    HX CHOLECYSTECTOMY  2006    HX ENDOSCOPY      HX GI  X3  LAST ONE 12/10    COLONOSCOPY    HX GYN  1982    D&C WITH SUCTION    HX HYSTERECTOMY      HX LUMBAR FUSION  2017    HX ORTHOPAEDIC      right foot surgery    HX TONSILLECTOMY      Carolina Marker  2007    Dr. Pond/ diverticulitis    IR INJ SPINE FLUORO GUIDED  8/28/2020    IR [address]  1/8/2021       Expanded or extensive additional review of patient history:     Home Situation  Home Environment: Private residence  # Steps to Enter: 2  Rails to Enter: Yes  Hand Rails : Right  One/Two Story Residence: One story  Living Alone: No  Support Systems: Spouse/Significant Other/Partner, Child(arturo)(adult son)  Current DME Used/Available at Home: Sulema Fallow, rolling, Cane, straight, Lift chair(elevated toilet, adjustible bed)  Tub or Shower Type: Shower    Hand dominance: Right    EXAMINATION OF PERFORMANCE DEFICITS:  Cognitive/Behavioral Status:  Neurologic State: Alert  Orientation Level: Oriented to person;Oriented to place; Disoriented to situation;Disoriented to time  Cognition: Decreased attention/concentration;Decreased command following; Impulsive; Impaired decision making;Memory loss(decreased safety awareness)  Perception: Appears intact  Perseveration: Perseverates during mobility(attempting to stand up pulling on walker)  Safety/Judgement: Fall prevention;Decreased awareness of need for safety      Hearing: Auditory  Auditory Impairment: None  Hearing Aids/Status: Does not own    Vision/Perceptual:                           Acuity: (corrected with glasses)    Corrective Lenses: Glasses    Range of Motion:    AROM: Generally decreased, functional                         Strength:    Strength: Generally decreased, functional                Coordination:  Coordination: Within functional limits  Fine Motor Skills-Upper: Left Intact; Right Intact    Gross Motor Skills-Upper: Left Intact; Right Intact    Tone & Sensation:    Tone: Normal  Sensation: Impaired(bilateral hand tingling (baseline)                      Balance:  Sitting: Impaired  Sitting - Static: Fair (occasional)  Sitting - Dynamic: Fair (occasional)  Standing: Impaired  Standing - Static: Constant support; Fair  Standing - Dynamic : Fair    Functional Mobility and Transfers for ADLs:  Bed Mobility:  Rolling: Minimum assistance  Supine to Sit: Minimum assistance; Additional time  Scooting: Minimum assistance;Contact guard assistance; Additional time    Transfers:  Sit to Stand: Contact guard assistance(strong cues to push from surface and not pull from walker)  Stand to Sit: Contact guard assistance  Bed to Chair: Contact guard assistance; Additional time(with RW)  Bathroom Mobility: Contact guard assistance(with RW)  Toilet Transfer : Contact guard assistance    ADL Assessment:  Feeding: Independent    Oral Facial Hygiene/Grooming: Contact guard assistance(standing)    Bathing:  Moderate assistance(LB )    Upper Body Dressing: Stand-by assistance    Lower Body Dressing: Moderate assistance    Toileting: Moderate assistance                ADL Intervention and task modifications:  Patient instructed on 3:3 and demonstrated 2/3 back precautions with verbal and physical cues. Cognitive Retraining  Safety/Judgement: Fall prevention;Decreased awareness of need for safety    Patient instructed and indicated understanding the benefits of maintaining activity tolerance, functional mobility, and independence with self care tasks during acute stay  to ensure safe return home and to baseline. Encouraged patient to increase frequency and duration OOB, not sitting longer than 30 mins without marching/walking with staff, be out of bed for all meals, perform daily ADLs (as approved by RN/MD regarding bathing etc), and performing functional mobility to/from bathroom. Patient instruction and indicated understanding on body mechanics, ergonomics and gravitational force on the spine during different body positions to plan activities in prep for return home to complete basic ADLs, instrumental ADLs safely. Dressing brace: Patient instructed and demonstrated to don/doff velcro quick draw brace seated with SBA. Pt reports that she was wearing her brace prior to coming in the hospital.  Pt was informed that she needs her brace on when she is OOB but does not need to put it on if she is going to get OOB and then lay back down. Dressing lower body: Patient instructed to don brace first and on the benefits to remain seated to don all clothing to increase independence with precautions and pain management. Patient instructed and demonstrated tailor sitting for lower body dressing with total assist for socks. Needs hip kit. Patient instruction and indicated understanding to not strain i.e. holding breath to bear down during a bowel movement, lifting/activity.      Home safety: Patient instructed and indicated understanding on home modifications and safety Alden Flores height of ADL objects (i.e. clothing, sink items, fridge items, items to mouth when grooming), appropriate height of chair surfaces, recliner safety, change of floor surfaces, clear pathways] to increase independence and fall prevention. Standing: Patient instructed and indicated understanding to walk up to sink/counter top/surfaces, step into walker, square off while using objects, slide objects along surfaces, to increase adherence to back precautions and fall prevention. Patient instructed to increase amount of time standing in order to increase independence and tolerance with ADLs. During prolonged standing, can open cabinet door or place foot on stool to decrease spinal pressure/increase pain. Tub transfer: Patient instructed and indicated understanding regarding when it is safe to begin transfer into tub (complete stairs with PT, advance exercises with PT high enough to clear tub height, and while clothes donned practice with another person present). Functional Measure:  Barthel Index:    Bathin  Bladder: 0  Bowels: 10  Groomin  Dressin  Feeding: 10  Mobility: 10  Stairs: 0  Toilet Use: 5  Transfer (Bed to Chair and Back): 10  Total: 55/100        The Barthel ADL Index: Guidelines  1. The index should be used as a record of what a patient does, not as a record of what a patient could do. 2. The main aim is to establish degree of independence from any help, physical or verbal, however minor and for whatever reason. 3. The need for supervision renders the patient not independent. 4. A patient's performance should be established using the best available evidence. Asking the patient, friends/relatives and nurses are the usual sources, but direct observation and common sense are also important. However direct testing is not needed. 5. Usually the patient's performance over the preceding 24-48 hours is important, but occasionally longer periods will be relevant.   6. Middle categories imply that the patient supplies over 50 per cent of the effort. 7. Use of aids to be independent is allowed. Trev Dukes., Barthel, D.W. ([id number]). Functional evaluation: the Barthel Index. [address] (14)2. FREDDY Waller, Tri Horowitz., Bhupinder Hartley., [place], [facility] (). Measuring the change indisability after inpatient rehabilitation; comparison of the responsiveness of the Barthel Index and Functional [place] Measure. Journal of Neurology, Neurosurgery, and Psychiatry, 66(4), 581-029. RASHAAD Elias, CASSY Moran, & Yovani Isabel M.A. (2004.) Assessment of post-stroke quality of life in cost-effectiveness studies: The usefulness of the Barthel Index and the EuroQoL-5D. Quality of Life Research, 15, 178-29         Occupational Therapy Evaluation Charge Determination   History Examination Decision-Making   MEDIUM Complexity : Expanded review of history including physical, cognitive and psychosocial  history  MEDIUM Complexity : 3-5 performance deficits relating to physical, cognitive , or psychosocial skils that result in activity limitations and / or participation restrictions MEDIUM Complexity : Patient may present with comorbidities that affect occupational performnce. Miniml to moderate modification of tasks or assistance (eg, physical or verbal ) with assesment(s) is necessary to enable patient to complete evaluation       Based on the above components, the patient evaluation is determined to be of the following complexity level: MEDIUM  Pain Ratin/10 incisional pain    Activity Tolerance:   Fair and requires rest breaks    After treatment patient left in no apparent distress:    Sitting in chair, Call bell within reach, Bed / chair alarm activated and Caregiver / family present    COMMUNICATION/EDUCATION:   The patients plan of care was discussed with: Physical therapist, Registered nurse and patien.      Home safety education was provided and the patient/caregiver indicated understanding., Patient/family have participated as able in goal setting and plan of care. and Patient/family agree to work toward stated goals and plan of care. This patients plan of care is appropriate for delegation to [facility].     Thank you for this referral.  Alivia Ruelas, OTR/L  Time Calculation: 39 mins

## 2021-05-04 NOTE — PROGRESS NOTES
TRANSFER - IN REPORT:    Verbal report received from BAYLEE JC, RN(name) on Saman Phillip  being received from 3243 (unit) for ordered procedure      Report consisted of patients Situation, Background, Assessment and   Recommendations(SBAR). Information from the following report(s) SBAR, MAR and Recent Results was reviewed with the receiving nurse. Opportunity for questions and clarification was provided. Assessment completed upon patients arrival to unit and care assumed.

## 2021-05-04 NOTE — PERIOP NOTES
Handoff Report from Operating Room to PACU    Report received from Mariangel Calvo RN and Meera Buck CRNA regarding Macy Chacon. Surgeon(s):  Shila Weinberg MD  And Procedure(s) (LRB):  ROBOTIC ASSISTED L4-S1 POSTERIOR FUSION WITH BONE MARROW ASPIRATION FROM ILIAC CREST (N/A)  confirmed   with dressings discussed. Anesthesia type, drugs, patient history, complications, estimated blood loss, vital signs, intake and output, and last pain medication, lines, reversal medications and temperature were reviewed.

## 2021-05-04 NOTE — PROGRESS NOTES
End of Shift Note    Bedside shift change report given to  (oncoming nurse) by Jackelyn Silva (offgoing nurse). Report included the following information SBAR, Kardex, Intake/Output, MAR, Recent Results and Med Rec Status    Shift worked:  0700 - 1930     Shift summary and any significant changes:     post op vitals are being done still, pt has to void     Concerns for physician to address:  none     Zone phone for oncoming shift:   [phone]       Activity:  Activity Level: Up with Assistance  Number times ambulated in hallways past shift: 2  Number of times OOB to chair past shift: 1    Cardiac:   Cardiac Monitoring: No      Cardiac Rhythm: Sinus Rhythm    Access:   Current line(s): PIV     Genitourinary:   Urinary status: needs to void    Respiratory:   O2 Device: Nasal cannula  Chronic home O2 use?: NO  Incentive spirometer at bedside: NO     GI:     Current diet:  DIET REGULAR  Passing flatus: YES  Tolerating current diet: YES       Pain Management:   Patient states pain is manageable on current regimen: YES    Skin:  Desmond Score: 19  Interventions: increase time out of bed    Patient Safety:  Fall Score:  Total Score: 4  Interventions: pt to call before getting OOB  High Fall Risk: Yes    Length of Stay:  Expected LOS: 2d 21h  Actual LOS: 1      Jackelyn Pall

## 2021-05-05 ENCOUNTER — APPOINTMENT (OUTPATIENT)
Dept: GENERAL RADIOLOGY | Age: 71
DRG: 454 | End: 2021-05-05
Attending: ORTHOPAEDIC SURGERY
Payer: MEDICARE

## 2021-05-05 LAB — HGB BLD-MCNC: 10.2 G/DL (ref 11.5–16)

## 2021-05-05 PROCEDURE — 65270000029 HC RM PRIVATE

## 2021-05-05 PROCEDURE — 74011250637 HC RX REV CODE- 250/637: Performed by: ORTHOPAEDIC SURGERY

## 2021-05-05 PROCEDURE — 97530 THERAPEUTIC ACTIVITIES: CPT

## 2021-05-05 PROCEDURE — 36415 COLL VENOUS BLD VENIPUNCTURE: CPT

## 2021-05-05 PROCEDURE — 77030041034 HC KT HIP PATT -B

## 2021-05-05 PROCEDURE — 97535 SELF CARE MNGMENT TRAINING: CPT | Performed by: OCCUPATIONAL THERAPIST

## 2021-05-05 PROCEDURE — 97116 GAIT TRAINING THERAPY: CPT

## 2021-05-05 PROCEDURE — 72100 X-RAY EXAM L-S SPINE 2/3 VWS: CPT

## 2021-05-05 PROCEDURE — 77010033678 HC OXYGEN DAILY

## 2021-05-05 PROCEDURE — 94760 N-INVAS EAR/PLS OXIMETRY 1: CPT

## 2021-05-05 PROCEDURE — 85018 HEMOGLOBIN: CPT

## 2021-05-05 PROCEDURE — 74011250637 HC RX REV CODE- 250/637: Performed by: NURSE PRACTITIONER

## 2021-05-05 RX ORDER — GUAIFENESIN 600 MG/1
600 TABLET, EXTENDED RELEASE ORAL EVERY 12 HOURS
Status: DISCONTINUED | OUTPATIENT
Start: 2021-05-05 | End: 2021-05-06

## 2021-05-05 RX ORDER — TRAMADOL HYDROCHLORIDE 50 MG/1
50 TABLET ORAL
Status: DISCONTINUED | OUTPATIENT
Start: 2021-05-05 | End: 2021-05-06

## 2021-05-05 RX ADMIN — LISINOPRIL 40 MG: 20 TABLET ORAL at 08:01

## 2021-05-05 RX ADMIN — DICYCLOMINE HYDROCHLORIDE 10 MG: 10 CAPSULE ORAL at 08:01

## 2021-05-05 RX ADMIN — GUAIFENESIN 600 MG: 600 TABLET, EXTENDED RELEASE ORAL at 21:39

## 2021-05-05 RX ADMIN — Medication 10 ML: at 04:15

## 2021-05-05 RX ADMIN — TRAZODONE HYDROCHLORIDE 50 MG: 50 TABLET ORAL at 21:39

## 2021-05-05 RX ADMIN — Medication 2000 UNITS: at 08:01

## 2021-05-05 RX ADMIN — Medication 1000 MG: at 21:38

## 2021-05-05 RX ADMIN — PREGABALIN 150 MG: 75 CAPSULE ORAL at 08:01

## 2021-05-05 RX ADMIN — Medication 1000 MG: at 04:15

## 2021-05-05 RX ADMIN — OXYBUTYNIN CHLORIDE 5 MG: 5 TABLET ORAL at 17:20

## 2021-05-05 RX ADMIN — DOCUSATE SODIUM 50MG AND SENNOSIDES 8.6MG 1 TABLET: 8.6; 5 TABLET, FILM COATED ORAL at 08:02

## 2021-05-05 RX ADMIN — POLYETHYLENE GLYCOL 3350 17 G: 17 POWDER, FOR SOLUTION ORAL at 08:02

## 2021-05-05 RX ADMIN — PANTOPRAZOLE SODIUM 40 MG: 40 TABLET, DELAYED RELEASE ORAL at 08:01

## 2021-05-05 RX ADMIN — Medication 10 ML: at 21:39

## 2021-05-05 RX ADMIN — Medication 1 TABLET: at 08:01

## 2021-05-05 RX ADMIN — Medication 10 ML: at 17:20

## 2021-05-05 RX ADMIN — GUAIFENESIN 600 MG: 600 TABLET, EXTENDED RELEASE ORAL at 10:45

## 2021-05-05 RX ADMIN — DICYCLOMINE HYDROCHLORIDE 10 MG: 10 CAPSULE ORAL at 21:38

## 2021-05-05 RX ADMIN — DICYCLOMINE HYDROCHLORIDE 10 MG: 10 CAPSULE ORAL at 17:20

## 2021-05-05 RX ADMIN — DICYCLOMINE HYDROCHLORIDE 10 MG: 10 CAPSULE ORAL at 12:50

## 2021-05-05 RX ADMIN — DOCUSATE SODIUM 50MG AND SENNOSIDES 8.6MG 1 TABLET: 8.6; 5 TABLET, FILM COATED ORAL at 17:20

## 2021-05-05 RX ADMIN — PREGABALIN 150 MG: 75 CAPSULE ORAL at 17:20

## 2021-05-05 RX ADMIN — BUPROPION HYDROCHLORIDE 200 MG: 100 TABLET, EXTENDED RELEASE ORAL at 08:02

## 2021-05-05 RX ADMIN — Medication 1000 MG: at 08:02

## 2021-05-05 RX ADMIN — BUPROPION HYDROCHLORIDE 200 MG: 100 TABLET, EXTENDED RELEASE ORAL at 17:20

## 2021-05-05 RX ADMIN — HYDROCHLOROTHIAZIDE 25 MG: 25 TABLET ORAL at 08:01

## 2021-05-05 RX ADMIN — OXYBUTYNIN CHLORIDE 5 MG: 5 TABLET ORAL at 08:02

## 2021-05-05 RX ADMIN — LORATADINE 10 MG: 10 TABLET ORAL at 08:02

## 2021-05-05 RX ADMIN — Medication 1000 MG: at 15:43

## 2021-05-05 NOTE — PROGRESS NOTES
Transition of Care Plan:    RUR:9%  Disposition:Home with spouse and home health  Follow up appointments:PCP and specialist  DME needed:TBD  [address] or means to access home:      Spouse  IM Medicare letter:2nd IMM Letter  Caregiver Contact: Zuleyka Sheridan GIRFCP-[id number]-[phone]  Discharge Caregiver contacted prior to discharge? Reason for Admission:  L4-S1 fusion                     RUR Score:          9%           Plan for utilizing home health:      Home health is recommend     PCP: First and Last name:  Christopher Ramirez MD     Name of Practice:    Are you a current patient: Yes/No: Yes   Approximate date of last visit: 2021 Can you participate in a virtual visit with your PCP: Yes                    Current Advanced Directive/Advance Care Plan: No Order      Healthcare Decision Maker:   Click here to complete Drive YOYO including selection of the Healthcare Decision Maker Relationship (ie \"Primary\")             Primary Decision MakerPatricia Art - Spouse - [address] [employer] (ACP) Conversation      Date of Conversation: 5/4/2021  Conducted with: Patient with [address]:     Primary Decision Maker: Reyes Osullivan - Spouse - [phone]  Click here to complete Drive YOYO including selection of the Healthcare Decision Maker Relationship (ie \"Primary\")  Today we documented Decision Maker(s). The patient will provide ACP documents. Content/Action Overview: Has ACP document(s) NOT on file - requested patient to provide  Reviewed DNR/DNI and patient elects Full Code (Attempt Resuscitation)         Length of Voluntary ACP Conversation in minutes:  <16 minutes (Non-Billable)    Gloria Terrazas                       Transition of Care Plan:                      Pt is a 66 yo female who was admitted to [facility] with a dx of L4-S1 fusion.  CM confirmed demographics with pt. Pt lives with her spouse in a 2 story home with about 2 steps to enter using the side door and 5 steps to enter using the back door. No hx of using home health services or outpatient rehab or being admitted to a SNF or inpatient rehab. Pt has a cane, rolling walker and home O2. Pt is independent in her ADLs and IADLs. Pt uses the [employer] on [address]. Pt's spouse transport pt to her medical appointments. At the time of d/c pt's spouse will transport. CM will continue to follow and assist with d/c planning. Care Management Interventions  PCP Verified by CM: Eyal Bailey)  Mode of Transport at Discharge: Other (see comment)(Spouse to transport at the time of d/c)  Transition of Care Consult (CM Consult): Discharge Planning, Home Health(Home with spouse and home health)  Discharge Durable Medical Equipment: No(Rolling walker, cane and O2)  Physical Therapy Consult: No  Occupational Therapy Consult: No  Speech Therapy Consult: No  Current Support Network: Lives with Spouse, Own Home(Spouse is supportive and involved)  Confirm Follow Up Transport: Family(Spouse transport pt to her medical appointments)  Discharge Location  Discharge Placement: Home with home health    [place], [place].   Care Manager [facility]  [phone]

## 2021-05-05 NOTE — PROGRESS NOTES
ORTHO POST OP SPINE PROGRESS NOTE    May 5, 2021  Admit Date: 5/3/2021  Admit Diagnosis: Spinal stenosis, lumbar [M48.061]  Procedure: Procedure(s):  ROBOTIC ASSISTED L4-S1 POSTERIOR FUSION WITH BONE MARROW ASPIRATION FROM ILIAC CREST  Post Op day: 1 Day Post-Op    Subjective:     Garland Estrada is a patient who has complaints of pain in the low back and R hip s/p L4-S1 lat/post fusion with R side lateral approach. tolerating po and able to void. Review of Systems: Pertinent items are noted in HPI. Objective:     PT/OT:   Distance Ambulated:           Time Ambulated (min):        Ambulation Response: Activity Response: Fairly tolerated  Assistive Device:              Assistive Device: Fall prevention device    Vital Signs:    Blood pressure (!) 156/75, pulse 76, temperature 97.4 °F (36.3 °C), resp. rate 18, height 5' 2\" (1.575 m), weight 81.3 kg (179 lb 3.7 oz), SpO2 99 %. Temp (24hrs), Av.1 °F (36.7 °C), Min:97.4 °F (36.3 °C), Max:98.7 °F (37.1 °C)      No intake/output data recorded.  1901 -  0700  In: 1442.5 [P.O.:700;  I.V.:742.5]  Out: 1500 [Urine:1500]    LAB:    Recent Labs     21  0124   HGB 10.2*       Wound/Drain Assessment:  Drain:      Dressing:     Physical Exam:  Neurological: no deficit  Incision clean, dry, and intact  5/5 BLE except R HF SLR 4/5 pain    Assessment:      Patient Active Problem List   Diagnosis Code    IBS (irritable bowel syndrome) K58.9    Fibromyalgia M79.7    Hiatal hernia K44.9    GERD (gastroesophageal reflux disease) K21.9    Hypercholesterolemia E78.00    Diverticulitis K57.92    Depression F32.9    Essential hypertension I10    Encounter for medication monitoring Z51.81    DDD (degenerative disc disease), cervical M50.30    Spinal stenosis of lumbar region with neurogenic claudication M48.062    Non morbid obesity E66.9    Depression, major, severe recurrence ([address] F33.2    Spinal stenosis, lumbar M48.061       Plan: Continue PT/OT/Rehab  Discontinue: IV  Consult: PT  and OT    Discharge To: home.  today v tomorrow pending progress   expected pain due to surgical approach

## 2021-05-05 NOTE — PROGRESS NOTES
Ortho / Neurosurgery NP Note    POD# 1  s/p ROBOTIC ASSISTED L4-S1 POSTERIOR FUSION WITH BONE MARROW ASPIRATION FROM ILIAC CREST   POD#2 s/p L4-5 LATERAL FUSION     Pt resting in bed, eating breakfast. Appears groggy. Nursing reports confusion on arrival to unit yesterday which has improved overnight  Able to answer orientation questions appropriately but still intermittently confused today   She reports expected right hip/groin and upper thigh pain, and incisional pain - controlled with tylenol alone   Discussed with nursing limiting narcotics to improve mentation  Has dry, non-productive cough. Denies SOB. Tolerating regular diet. Denies nausea. VSS Afebrile. Room air. Visit Vitals  BP (!) 119/59 (BP 1 Location: Left upper arm, BP Patient Position: Sitting)   Pulse 82   Temp 97.9 °F (36.6 °C)   Resp 18   Ht 5' 2\" (1.575 m)   Wt 81.3 kg (179 lb 3.7 oz)   SpO2 96%   BMI 32.78 kg/m²       Voiding status: voiding   Output (mL)  Urine Voided: 150 ml (05/05/21 0305)  Unmeasurable Output  Urine Occurrence(s): 1 (05/03/21 1409)      Labs    Lab Results   Component Value Date/Time    HGB 10.2 (L) 05/05/2021 01:24 AM      Lab Results   Component Value Date/Time    INR 1.0 04/21/2021 01:43 PM      Lab Results   Component Value Date/Time    Sodium 143 04/19/2021 12:34 PM    Potassium 4.7 04/19/2021 12:34 PM    Chloride 105 04/19/2021 12:34 PM    CO2 22 04/19/2021 12:34 PM    Glucose 90 04/19/2021 12:34 PM    BUN 22 04/19/2021 12:34 PM    Creatinine 1.30 (H) 04/19/2021 12:34 PM    Calcium 9.9 04/19/2021 12:34 PM     Recent Glucose Results: No results found for: GLU, GLUPOC, GLUCPOC        Body mass index is 32.78 kg/m². : A BMI > 30 is classified as obesity and > 40 is classified as morbid obesity. Rhonic anterior upper lungs bilaterally   Lateral prineo and JOSEP dressing c.d.i - will change JOSEP to optifoam prior to discharge  Calves soft and supple;  No pain with passive stretch  Sensation and motor intact - PF/DF 5/5, weakness right hip flexor  SCDs for mechanical DVT proph while in bed     PLAN:  1) Neurovascular assessment q4 hours   2) PT/OT - LSO when oob   3) Pain control - scheduled tylenol, prn oxycodone   4) Dry/non-productive cough - upper airway congestion. Denies SOB/dsypnea on exertion. Start guaifenesin, continue claritin. Encouraged hourly IS   5) HTN - BP stable. Resume lisinopril and htz. 6) Readniess for discharge:     [x] Vital Signs stable    [] Hgb stable    [x] + Voiding    [x] Wound intact, drainage minimal    [x] Tolerating PO intake     [] Cleared by PT (OT if applicable)     [] Stair training completed (if applicable)    [] Independent / Contact Guard Assist (household distance)     [] Bed mobility     [] Car transfers     [] ADLs    [x] Adequate pain control on oral medication alone     Mobilize with therapy. Discharge pending improvement in mentation, resp. Status & PT/OT clearance. Plans to return home with support of .      Harshad Miller NP

## 2021-05-05 NOTE — PROGRESS NOTES
Problem: Falls - Risk of  Goal: *Absence of Falls  Description: Document Porsha Emanuel Fall Risk and appropriate interventions in the flowsheet.   Outcome: Progressing Towards Goal  Note: Fall Risk Interventions:  Mobility Interventions: Bed/chair exit alarm, Patient to call before getting OOB         Medication Interventions: Bed/chair exit alarm, Patient to call before getting OOB, Teach patient to arise slowly    Elimination Interventions: Bed/chair exit alarm, Call light in reach, Toileting schedule/hourly rounds    History of Falls Interventions: Bed/chair exit alarm

## 2021-05-05 NOTE — PROGRESS NOTES
Problem: Self Care Deficits Care Plan (Adult)  Goal: *Acute Goals and Plan of Care (Insert Text)  Description: FUNCTIONAL STATUS PRIOR TO ADMISSION: multiple recent falls, was recently using SPC, performed ADLs and IADLS on her own    [address]: The patient lived with  but did not require assist.    Occupational Therapy Goals:  Initiated 5/4/2021  1. Patient will perform grooming standing with item retrieval with modified independence within 7 days. 2. Patient will perform upper body dressing and lower body dressing with modified independence with AE within 7 days. 3. Patient will perform toileting with modified independence within 7 days. 4. Patient will transfer from toilet with modified independence using the least restrictive device and appropriate durable medical equipment within 7 days. 5. Patient will adhere to 3:3 LS precautions during functional tasks within 7 days. Outcome: Not Progressing Towards Goal   OCCUPATIONAL THERAPY TREATMENT  Patient: Kiki Holland (56 y.o. female)  Date: 5/5/2021  Diagnosis: Spinal stenosis, lumbar [M48.061] <principal problem not specified>  Procedure(s) (LRB):  ROBOTIC ASSISTED L4-S1 POSTERIOR FUSION WITH BONE MARROW ASPIRATION FROM ILIAC CREST (N/A) 1 Day Post-Op  Precautions: Spinal(quick draw brace)  Chart, occupational therapy assessment, plan of care, and goals were reviewed. ASSESSMENT  Patient continues with skilled OT services and is progressing towards goals. Pt was supine upon arrival and pt was more confused than previous session. Pt had second stage LS surgery yesterday. She could not remember LS precautions and needed significant cues for maintaining precautions during session. Issued hip kit as pt is unable to perform crossed leg technique. Moderate assist needed to don socks with sock aid today. Manual assist needed for log roll in and out of bed and pt was attempting to twist if not cued.    Constant assist needed for balance seated EOB but pt was able to don brace. Mobilized to chair with CGA to min assist without walker and then back to bed with walker with CGA. Pt was being transferred to a different room and was assisted back to bed. Pt is expected to make better progress when she is not so confused putting her at risk for falls and not adhering to precautions. Current Level of Function Impacting Discharge (ADLs):   Bed Mobility:  Rolling: Contact guard assistance  Supine to Sit: Minimum assistance(several lateral LOB to the left while sitting)  Sit to Supine: Moderate assistance(attempted to twist and not use log roll)    Transfers:  Sit to Stand: Contact guard assistance     Bed to Chair: Minimum assistance;Contact guard assistance; Additional time    Lower Body Dressing Assistance  Socks: Moderate assistance(with sock aid)     Donned quick draw brace with min assist for seated balance and SBA to don brace    Other factors to consider for discharge: not adhering precautions, confused, fall risk         PLAN :  Patient continues to benefit from skilled intervention to address the above impairments. Continue treatment per established plan of care to address goals. Recommend with staff: reinforce precautions, mobility with assist    Recommend next OT session: ADLS, education precautions and safety    Recommendation for discharge: (in order for the patient to meet his/her long term goals)  Home health with assist vs possible Therapy up to 5 days/week in SNF setting pending progress    This discharge recommendation:  Has been made in collaboration with the attending provider and/or case management    IF patient discharges home will need the following DME: shower chair, issued hip kit       SUBJECTIVE:   Patient stated Bindu Arguelles had surgery on my leg.   pt was re-educated that she had surgery on her back    OBJECTIVE DATA SUMMARY:   Cognitive/Behavioral Status:  Neurologic State: Alert;Confused  Orientation Level: Oriented to person;Oriented to place;Oriented to situation;Disoriented to situation  Cognition: Decreased attention/concentration;Decreased command following; Impulsive;Memory loss;Poor safety awareness  Perception: Cues to maintain midline in standing  Perseveration: Perseverates during mobility  Safety/Judgement: Decreased awareness of need for safety;Decreased awareness of need for assistance; Fall prevention    Functional Mobility and Transfers for ADLs:  Bed Mobility:  Rolling: Contact guard assistance  Supine to Sit: Minimum assistance(several lateral LOB to the left while sitting)  Sit to Supine: Moderate assistance(attempted to twist and not use log roll)    Transfers:  Sit to Stand: Contact guard assistance     Bed to Chair: Minimum assistance;Contact guard assistance; Additional time    Balance:  Sitting: Impaired  Sitting - Static: Fair (occasional)  Sitting - Dynamic: Poor (constant support)  Standing: Impaired  Standing - Static: Good;Constant support  Standing - Dynamic : Occasional;Fair    ADL Intervention:      Lower Body Dressing Assistance  Socks: Moderate assistance(with sock aid)     Donned quick draw brace with min assist for seated balance and SBA to don brace    Cognitive Retraining  Orientation Retraining: Situation  Problem Solving: General alternative solution; Identifying the problem  Safety/Judgement: Decreased awareness of need for safety;Decreased awareness of need for assistance; Fall prevention        Pain:  Minimal report of pain    Activity Tolerance:   Fair and requires rest breaks    After treatment patient left in no apparent distress:   Supine in bed, Call bell within reach, and Bed / chair alarm activated    COMMUNICATION/COLLABORATION:   The patients plan of care was discussed with: Physical therapist, Registered nurse, and patient .      BRITTNEY Vieyra/L  Time Calculation: 15 mins

## 2021-05-05 NOTE — PROGRESS NOTES
Problem: Mobility Impaired (Adult and Pediatric)  Goal: *Acute Goals and Plan of Care (Insert Text)  Description: FUNCTIONAL STATUS PRIOR TO ADMISSION: Patient was independent and active without use of straight cane. Patient with several recent falls. HOME SUPPORT PRIOR TO ADMISSION: The patient lived with  but did not require assist.    Physical Therapy Goals  Initiated 5/4/2021    1. Patient will move from supine to sit and sit to supine  in bed with modified independence within 4 days. 2. Patient will perform sit to stand with independence within 4 days. 3. Patient will ambulate with supervision/set-up for 150 feet with the least restrictive device within 4 days. 4. Patient will ascend/descend 4 stairs with 1 handrail(s) with minimal assistance/contact guard assist within 4 days. 5. Patient will verbalize and demonstrate understanding of spinal precautions (No bending, lifting greater than 5 lbs, or twisting; log-roll technique; frequent repositioning as instructed) within 4 days. 5/5/2021 1631 by Sarita Moser, PT  Outcome: Progressing Towards Goal   PHYSICAL THERAPY TREATMENT  Patient: Mary Jane Kellogg (84 y.o. female)  Date: 5/5/2021  Diagnosis: Spinal stenosis, lumbar [M48.061] <principal problem not specified>  Procedure(s) (LRB):  ROBOTIC ASSISTED L4-S1 POSTERIOR FUSION WITH BONE MARROW ASPIRATION FROM ILIAC CREST (N/A) 1 Day Post-Op  Precautions: Spinal(quick draw brace) No bending, no lifting greater than 5 lbs, no twisting, log-roll technique, repositioning every 20-30 min except when sleeping, brace when OOB (if ordered)  Chart, physical therapy assessment, plan of care and goals were reviewed. ASSESSMENT  Patient continues with skilled PT services and is progressing towards goals. Pt received supine in bed after returning from x-ray. Pt does not recall having x-ray done though oriented x 3 at this time.  Continues to require verbal cueing to avoid twisting and bending during transfers and mobility. Ambulated into hallway with A x 2 for safety d/t ongoing confusion and RN reporting impaired balance with short gait distance in room. Pt steady with RW. Will complete stair training in the AM.  present for family education and states pt with mentation not yet at baseline. Current Level of Function Impacting Discharge (mobility/balance): Ben bed mobility, decreased adherence to precautions    Other factors to consider for discharge: confusion         PLAN :  Patient continues to benefit from skilled intervention to address the above impairments. Continue treatment per established plan of care. to address goals. Recommendation for discharge: (in order for the patient to meet his/her long term goals)  Physical therapy at least 2 days/week in the home     This discharge recommendation:  Has been made in collaboration with the attending provider and/or case management    IF patient discharges home will need the following DME: patient owns DME required for discharge       SUBJECTIVE:   Patient stated Angajoseph Rose took me down but they didn't do a test.    OBJECTIVE DATA SUMMARY:   Critical Behavior:  Neurologic State: Alert, Confused  Orientation Level: Oriented to person, Oriented to place, Oriented to situation, Disoriented to situation  Cognition: Decreased attention/concentration, Decreased command following, Impulsive, Memory loss, Poor safety awareness  Safety/Judgement: Decreased awareness of need for safety, Decreased awareness of need for assistance, Fall prevention    Spinal diagnosis intervention:  The patient required verbal cues to maintain back precautions during functional activity. Reviewed back brace application and wear schedule.  Brace donned with minimal assistance/contact guard assist      Functional Mobility Training:    Bed Mobility:  Log Rolling: Contact guard assistance  Supine to Sit: Minimum assistance  Sit to Supine: Minimum assistance Transfers:  Sit to Stand: Minimum assistance  Stand to Sit: Minimum assistance        Bed to Chair: Minimum assistance;Contact guard assistance; Additional time                    Balance:  Sitting: Impaired  Sitting - Static: Fair (occasional)  Sitting - Dynamic: Poor (constant support)  Standing: Impaired  Standing - Static: Good  Standing - Dynamic : Fair;Constant support  Ambulation/Gait Training:  Distance (ft): 165 Feet (ft)  Assistive Device: Brace/Splint; Walker, rolling;Gait belt  Ambulation - Level of Assistance: Stand-by assistance        Gait Abnormalities: Decreased step clearance        Base of Support: Widened     Speed/Xiomara: Pace decreased (<100 feet/min); Slow  Step Length: Right shortened;Left shortened               Activity Tolerance:   Good    After treatment patient left in no apparent distress:   Supine in bed, Call bell within reach, Bed / chair alarm activated, Caregiver / family present, and Side rails x 3    COMMUNICATION/COLLABORATION:   The patients plan of care was discussed with: Registered nurse.      Stephanie Cortes, PT   Time Calculation: 30 mins

## 2021-05-05 NOTE — PROGRESS NOTES
Problem: Mobility Impaired (Adult and Pediatric)  Goal: *Acute Goals and Plan of Care (Insert Text)  Description: FUNCTIONAL STATUS PRIOR TO ADMISSION: Patient was independent and active without use of straight cane. Patient with several recent falls. HOME SUPPORT PRIOR TO ADMISSION: The patient lived with  but did not require assist.    Physical Therapy Goals  Initiated 5/4/2021    1. Patient will move from supine to sit and sit to supine  in bed with modified independence within 4 days. 2. Patient will perform sit to stand with independence within 4 days. 3. Patient will ambulate with supervision/set-up for 150 feet with the least restrictive device within 4 days. 4. Patient will ascend/descend 4 stairs with 1 handrail(s) with minimal assistance/contact guard assist within 4 days. 5. Patient will verbalize and demonstrate understanding of spinal precautions (No bending, lifting greater than 5 lbs, or twisting; log-roll technique; frequent repositioning as instructed) within 4 days. Outcome: Progressing Towards Goal   PHYSICAL THERAPY TREATMENT  Patient: Dory Barnes (89 y.o. female)  Date: 5/5/2021  Diagnosis: Spinal stenosis, lumbar [M48.061] <principal problem not specified>  Procedure(s) (LRB):  ROBOTIC ASSISTED L4-S1 POSTERIOR FUSION WITH BONE MARROW ASPIRATION FROM ILIAC CREST (N/A) 1 Day Post-Op  Precautions: Spinal(quick draw brace) No bending, no lifting greater than 5 lbs, no twisting, log-roll technique, repositioning every 20-30 min except when sleeping, brace when OOB (if ordered)  Chart, physical therapy assessment, plan of care and goals were reviewed. ASSESSMENT  Patient continues with skilled PT services and is progressing towards goals. Pt remains most limited by decreased insight and impaired safety awareness. Required several attempts to achieve sitting EOB as pt displays L lateral LOB x 4 while sitting and this PT donning shoes.  Once standing pt with improved balance and tolerated gait distance of 200ft with slowed gait speed and continuous verbal cueing for upright posture and increased step clearance. Pt's  not present during AM session. Plan to see pt this PM with  present for caregiver education as pt demonstrates decreased carry over. Current Level of Function Impacting Discharge (mobility/balance): Min A to achieve sitting d/t impaired balance    Other factors to consider for discharge: safety awareness/insight         PLAN :  Patient continues to benefit from skilled intervention to address the above impairments. Continue treatment per established plan of care. to address goals. Recommendation for discharge: (in order for the patient to meet his/her long term goals)  Physical therapy at least 2 days/week in the home     This discharge recommendation:  Has been made in collaboration with the attending provider and/or case management    IF patient discharges home will need the following DME: rolling walker       SUBJECTIVE:   Patient stated My  will be back later.     OBJECTIVE DATA SUMMARY:   Critical Behavior:  Neurologic State: Alert  Orientation Level: Appropriate for age  Cognition: Appropriate decision making  Safety/Judgement: Fall prevention, Decreased awareness of need for safety    Spinal diagnosis intervention:  The patient stated 2/3 back precautions when prompted. Reviewed all 3 back precautions, log roll technique, and sitting for 30 minutes at a time. The patient required verbal cues to maintain back precautions during functional activity. Reviewed back brace application and wear schedule.  Brace donned with minimal assistance/contact guard assist      Functional Mobility Training:    Bed Mobility:  Log Rolling: Contact guard assistance  Supine to Sit: Minimum assistance(several lateral LOB to the left while sitting)              Transfers:  Sit to Stand: Contact guard assistance  Stand to Sit: Contact guard assistance Bed to Chair: Contact guard assistance                    Balance:  Sitting: Impaired  Sitting - Static: Fair (occasional)  Sitting - Dynamic: Poor (constant support)  Standing: Impaired  Standing - Static: Good;Constant support  Standing - Dynamic : Occasional;Fair  Ambulation/Gait Training:  Distance (ft): 200 Feet (ft)  Assistive Device: Brace/Splint; Walker, rolling;Gait belt  Ambulation - Level of Assistance: Stand-by assistance;Contact guard assistance        Gait Abnormalities: Decreased step clearance              Speed/Xiomara: Pace decreased (<100 feet/min); Slow  Step Length: Right shortened;Left shortened                    Stairs: Therapeutic Exercises:     Pain Rating:      Activity Tolerance:   Good    After treatment patient left in no apparent distress:   Sitting in chair, Call bell within reach, and Bed / chair alarm activated    COMMUNICATION/COLLABORATION:   The patients plan of care was discussed with: Registered nurse.      Erin Tracy, PT   Time Calculation: 27 mins

## 2021-05-05 NOTE — PROGRESS NOTES
End of Shift Note    Bedside shift change report given to *** (oncoming nurse) by Maynor Carrillo RN (offgoing nurse). Report included the following information {SBAR REPORTS RVUC:04259}    Shift worked:  ***     Shift summary and any significant changes:     ***     Concerns for physician to address:  ***     Zone phone for oncoming shift:   ***       Activity:  Activity Level: Up with Assistance  Number times ambulated in hallways past shift: {Numbers; 0-5:812674}  Number of times OOB to chair past shift: {Numbers; 0-5:039514}    Cardiac:   Cardiac Monitoring: {YES/NO:59488}      Cardiac Rhythm: Sinus Rhythm    Access:   Current line(s): {access:85962}     Genitourinary:   Urinary status: {:31650}    Respiratory:   O2 Device: Nasal cannula  Chronic home O2 use?: {YES/NO/NA:51112}  Incentive spirometer at bedside: {YES/NO/NA:82702}     GI:     Current diet:  DIET REGULAR  Passing flatus: {YES/NO:39287}  Tolerating current diet: {YES/NO:43267}       Pain Management:   Patient states pain is manageable on current regimen: {YES/NO/NA:99140}    Skin:  Desmond Score: 18  Interventions: {desmond interventions:24186}    Patient Safety:  Fall Score:  Total Score: 4  Interventions: {fall interventions:23234}  High Fall Risk: Yes    Length of Stay:  Expected LOS: 2d 21h  Actual LOS: [address], RN

## 2021-05-06 ENCOUNTER — APPOINTMENT (OUTPATIENT)
Dept: GENERAL RADIOLOGY | Age: 71
DRG: 454 | End: 2021-05-06
Attending: PHYSICIAN ASSISTANT
Payer: MEDICARE

## 2021-05-06 ENCOUNTER — APPOINTMENT (OUTPATIENT)
Dept: CT IMAGING | Age: 71
DRG: 454 | End: 2021-05-06
Attending: NURSE PRACTITIONER
Payer: MEDICARE

## 2021-05-06 ENCOUNTER — APPOINTMENT (OUTPATIENT)
Dept: MRI IMAGING | Age: 71
DRG: 454 | End: 2021-05-06
Attending: NURSE PRACTITIONER
Payer: MEDICARE

## 2021-05-06 LAB
ALBUMIN SERPL-MCNC: 3 G/DL (ref 3.5–5)
ALBUMIN/GLOB SERPL: 0.8 {RATIO} (ref 1.1–2.2)
ALP SERPL-CCNC: 110 U/L (ref 45–117)
ALT SERPL-CCNC: 26 U/L (ref 12–78)
ANION GAP SERPL CALC-SCNC: 3 MMOL/L (ref 5–15)
APPEARANCE UR: CLEAR
AST SERPL-CCNC: 45 U/L (ref 15–37)
BACTERIA URNS QL MICRO: NEGATIVE /HPF
BASOPHILS # BLD: 0.1 K/UL (ref 0–0.1)
BASOPHILS NFR BLD: 1 % (ref 0–1)
BILIRUB SERPL-MCNC: 0.8 MG/DL (ref 0.2–1)
BILIRUB UR QL: NEGATIVE
BUN SERPL-MCNC: 13 MG/DL (ref 6–20)
BUN/CREAT SERPL: 13 (ref 12–20)
CALCIUM SERPL-MCNC: 9.3 MG/DL (ref 8.5–10.1)
CHLORIDE SERPL-SCNC: 106 MMOL/L (ref 97–108)
CO2 SERPL-SCNC: 30 MMOL/L (ref 21–32)
COLOR UR: NORMAL
CREAT SERPL-MCNC: 0.99 MG/DL (ref 0.55–1.02)
DIFFERENTIAL METHOD BLD: ABNORMAL
EOSINOPHIL # BLD: 0.3 K/UL (ref 0–0.4)
EOSINOPHIL NFR BLD: 3 % (ref 0–7)
EPITH CASTS URNS QL MICRO: NORMAL /LPF
ERYTHROCYTE [DISTWIDTH] IN BLOOD BY AUTOMATED COUNT: 12.7 % (ref 11.5–14.5)
GLOBULIN SER CALC-MCNC: 3.7 G/DL (ref 2–4)
GLUCOSE SERPL-MCNC: 84 MG/DL (ref 65–100)
GLUCOSE UR STRIP.AUTO-MCNC: NEGATIVE MG/DL
HCT VFR BLD AUTO: 32.4 % (ref 35–47)
HGB BLD-MCNC: 10.3 G/DL (ref 11.5–16)
HGB UR QL STRIP: NEGATIVE
HYALINE CASTS URNS QL MICRO: NORMAL /LPF (ref 0–5)
IMM GRANULOCYTES # BLD AUTO: 0 K/UL (ref 0–0.04)
IMM GRANULOCYTES NFR BLD AUTO: 1 % (ref 0–0.5)
KETONES UR QL STRIP.AUTO: NEGATIVE MG/DL
LEUKOCYTE ESTERASE UR QL STRIP.AUTO: NEGATIVE
LYMPHOCYTES # BLD: 1.3 K/UL (ref 0.8–3.5)
LYMPHOCYTES NFR BLD: 16 % (ref 12–49)
MCH RBC QN AUTO: 31 PG (ref 26–34)
MCHC RBC AUTO-ENTMCNC: 31.8 G/DL (ref 30–36.5)
MCV RBC AUTO: 97.6 FL (ref 80–99)
MONOCYTES # BLD: 0.9 K/UL (ref 0–1)
MONOCYTES NFR BLD: 11 % (ref 5–13)
NEUTS SEG # BLD: 5.9 K/UL (ref 1.8–8)
NEUTS SEG NFR BLD: 68 % (ref 32–75)
NITRITE UR QL STRIP.AUTO: NEGATIVE
NRBC # BLD: 0 K/UL (ref 0–0.01)
NRBC BLD-RTO: 0 PER 100 WBC
PH UR STRIP: 6.5 [PH] (ref 5–8)
PLATELET # BLD AUTO: 225 K/UL (ref 150–400)
PMV BLD AUTO: 10.6 FL (ref 8.9–12.9)
POTASSIUM SERPL-SCNC: 3.6 MMOL/L (ref 3.5–5.1)
PROT SERPL-MCNC: 6.7 G/DL (ref 6.4–8.2)
PROT UR STRIP-MCNC: NEGATIVE MG/DL
RBC # BLD AUTO: 3.32 M/UL (ref 3.8–5.2)
RBC #/AREA URNS HPF: NORMAL /HPF (ref 0–5)
SODIUM SERPL-SCNC: 139 MMOL/L (ref 136–145)
SP GR UR REFRACTOMETRY: 1.01 (ref 1–1.03)
UROBILINOGEN UR QL STRIP.AUTO: 0.2 EU/DL (ref 0.2–1)
WBC # BLD AUTO: 8.5 K/UL (ref 3.6–11)
WBC URNS QL MICRO: NORMAL /HPF (ref 0–4)

## 2021-05-06 PROCEDURE — 97535 SELF CARE MNGMENT TRAINING: CPT | Performed by: OCCUPATIONAL THERAPIST

## 2021-05-06 PROCEDURE — 81001 URINALYSIS AUTO W/SCOPE: CPT

## 2021-05-06 PROCEDURE — A9575 INJ GADOTERATE MEGLUMI 0.1ML: HCPCS | Performed by: ORTHOPAEDIC SURGERY

## 2021-05-06 PROCEDURE — 74011250636 HC RX REV CODE- 250/636: Performed by: ORTHOPAEDIC SURGERY

## 2021-05-06 PROCEDURE — 74011250637 HC RX REV CODE- 250/637: Performed by: ORTHOPAEDIC SURGERY

## 2021-05-06 PROCEDURE — 65270000029 HC RM PRIVATE

## 2021-05-06 PROCEDURE — 71045 X-RAY EXAM CHEST 1 VIEW: CPT

## 2021-05-06 PROCEDURE — 97116 GAIT TRAINING THERAPY: CPT

## 2021-05-06 PROCEDURE — 80053 COMPREHEN METABOLIC PANEL: CPT

## 2021-05-06 PROCEDURE — 70553 MRI BRAIN STEM W/O & W/DYE: CPT

## 2021-05-06 PROCEDURE — 70450 CT HEAD/BRAIN W/O DYE: CPT

## 2021-05-06 PROCEDURE — 74011250637 HC RX REV CODE- 250/637: Performed by: NURSE PRACTITIONER

## 2021-05-06 PROCEDURE — 77030038269 HC DRN EXT URIN PURWCK BARD -A

## 2021-05-06 PROCEDURE — 77010033678 HC OXYGEN DAILY

## 2021-05-06 PROCEDURE — 36415 COLL VENOUS BLD VENIPUNCTURE: CPT

## 2021-05-06 PROCEDURE — 97530 THERAPEUTIC ACTIVITIES: CPT

## 2021-05-06 PROCEDURE — 94760 N-INVAS EAR/PLS OXIMETRY 1: CPT

## 2021-05-06 PROCEDURE — 85025 COMPLETE CBC W/AUTO DIFF WBC: CPT

## 2021-05-06 RX ORDER — GADOTERATE MEGLUMINE 376.9 MG/ML
15 INJECTION INTRAVENOUS
Status: COMPLETED | OUTPATIENT
Start: 2021-05-06 | End: 2021-05-06

## 2021-05-06 RX ORDER — LANOLIN ALCOHOL/MO/W.PET/CERES
3 CREAM (GRAM) TOPICAL
Status: DISCONTINUED | OUTPATIENT
Start: 2021-05-06 | End: 2021-05-08 | Stop reason: HOSPADM

## 2021-05-06 RX ADMIN — LISINOPRIL 40 MG: 20 TABLET ORAL at 09:03

## 2021-05-06 RX ADMIN — Medication 1000 MG: at 09:04

## 2021-05-06 RX ADMIN — Medication 3 MG: at 21:13

## 2021-05-06 RX ADMIN — LORATADINE 10 MG: 10 TABLET ORAL at 09:04

## 2021-05-06 RX ADMIN — BUPROPION HYDROCHLORIDE 200 MG: 100 TABLET, EXTENDED RELEASE ORAL at 09:05

## 2021-05-06 RX ADMIN — GUAIFENESIN 600 MG: 600 TABLET, EXTENDED RELEASE ORAL at 09:04

## 2021-05-06 RX ADMIN — Medication 10 ML: at 21:19

## 2021-05-06 RX ADMIN — GADOTERATE MEGLUMINE 15 ML: 376.9 INJECTION INTRAVENOUS at 22:00

## 2021-05-06 RX ADMIN — DICYCLOMINE HYDROCHLORIDE 10 MG: 10 CAPSULE ORAL at 09:04

## 2021-05-06 RX ADMIN — Medication 10 ML: at 21:20

## 2021-05-06 RX ADMIN — Medication 1 TABLET: at 09:03

## 2021-05-06 RX ADMIN — Medication 1000 MG: at 21:13

## 2021-05-06 RX ADMIN — Medication 1000 MG: at 03:58

## 2021-05-06 RX ADMIN — HYDROCHLOROTHIAZIDE 25 MG: 25 TABLET ORAL at 09:04

## 2021-05-06 RX ADMIN — PREGABALIN 150 MG: 75 CAPSULE ORAL at 17:03

## 2021-05-06 RX ADMIN — DOCUSATE SODIUM 50MG AND SENNOSIDES 8.6MG 1 TABLET: 8.6; 5 TABLET, FILM COATED ORAL at 09:04

## 2021-05-06 RX ADMIN — OXYBUTYNIN CHLORIDE 5 MG: 5 TABLET ORAL at 17:03

## 2021-05-06 RX ADMIN — Medication 2000 UNITS: at 09:04

## 2021-05-06 RX ADMIN — DOCUSATE SODIUM 50MG AND SENNOSIDES 8.6MG 1 TABLET: 8.6; 5 TABLET, FILM COATED ORAL at 17:03

## 2021-05-06 RX ADMIN — Medication 10 ML: at 13:32

## 2021-05-06 RX ADMIN — Medication 1000 MG: at 16:51

## 2021-05-06 RX ADMIN — POLYETHYLENE GLYCOL 3350 17 G: 17 POWDER, FOR SOLUTION ORAL at 09:04

## 2021-05-06 RX ADMIN — DICYCLOMINE HYDROCHLORIDE 10 MG: 10 CAPSULE ORAL at 13:40

## 2021-05-06 RX ADMIN — BUPROPION HYDROCHLORIDE 200 MG: 100 TABLET, EXTENDED RELEASE ORAL at 17:03

## 2021-05-06 RX ADMIN — DIAZEPAM 5 MG: 5 TABLET ORAL at 03:58

## 2021-05-06 RX ADMIN — PREGABALIN 150 MG: 75 CAPSULE ORAL at 09:04

## 2021-05-06 RX ADMIN — DICYCLOMINE HYDROCHLORIDE 10 MG: 10 CAPSULE ORAL at 17:03

## 2021-05-06 RX ADMIN — OXYBUTYNIN CHLORIDE 5 MG: 5 TABLET ORAL at 09:04

## 2021-05-06 RX ADMIN — PANTOPRAZOLE SODIUM 40 MG: 40 TABLET, DELAYED RELEASE ORAL at 09:04

## 2021-05-06 RX ADMIN — DICYCLOMINE HYDROCHLORIDE 10 MG: 10 CAPSULE ORAL at 21:13

## 2021-05-06 NOTE — PROGRESS NOTES
ORTHO POST OP SPINE PROGRESS NOTE    May 6, 2021  Admit Date: 5/3/2021  Admit Diagnosis: Spinal stenosis, lumbar [M48.061]  Procedure: Procedure(s):  ROBOTIC ASSISTED L4-S1 POSTERIOR FUSION WITH BONE MARROW ASPIRATION FROM ILIAC CREST  Post Op day: 2 Days Post-Op    Subjective:     Kiki Holland is a patient who has complaints of pain in the low back and bilat hips R>L s/p L4-5 lateral and L4-S1 posterior decompression and fusion done staged. d/w nursing spoke with patients  yesterday who stated that pt has history of confusion postop. per nursing  also stated he is not comfortable caring for her. pt pulled iv last night. requesting to use BSC. Review of Systems: Pertinent items are noted in HPI. Objective:     PT/OT:   Distance Ambulated:           Time Ambulated (min):        Ambulation Response: Activity Response: Fairly tolerated  Assistive Device:              Assistive Device: Fall prevention device    Vital Signs:    Blood pressure (!) 176/81, pulse 86, temperature 98.7 °F (37.1 °C), resp. rate 20, height 5' 2\" (1.575 m), weight 81.3 kg (179 lb 3.7 oz), SpO2 96 %. Temp (24hrs), Av.1 °F (36.7 °C), Min:97.4 °F (36.3 °C), Max:98.7 °F (37.1 °C)      No intake/output data recorded.  1901 -  0700  In: 742.5 [P.O.:700;  I.V.:42.5]  Out: 850 [Urine:850]    LAB:    Recent Labs     21  0124   HGB 10.2*       Wound/Drain Assessment:  Drain:      Dressing:     Physical Exam:  Incision clean, dry, and intact  nvi    Assessment:      Patient Active Problem List   Diagnosis Code    IBS (irritable bowel syndrome) K58.9    Fibromyalgia M79.7    Hiatal hernia K44.9    GERD (gastroesophageal reflux disease) K21.9    Hypercholesterolemia E78.00    Diverticulitis K57.92    Depression F32.9    Essential hypertension I10    Encounter for medication monitoring Z51.81    DDD (degenerative disc disease), cervical M50.30    Spinal stenosis of lumbar region with neurogenic claudication M48.062    Non morbid obesity E66.9    Depression, major, severe recurrence ([facility] [address] F33.2    Spinal stenosis, lumbar M48.061       Plan:     Continue PT/OT/Rehab  Discontinue:  Consult: PT  and OT    Discharge To: home v rehab   CBC, CMP, UA, CXR   plan initially had been home with  however  not comfortable given pts mentation. May need to look at other options.  Will see how she progresses today

## 2021-05-06 NOTE — PROGRESS NOTES
Responded to Patient Fall. Upon entering room patient was seen in bathroom with 2 RNs. RN and  state they were assisting her to bathroom with RW, her right leg buckled and she was very slowly lowered with two-assist to floor. She did not hit or head or have any obvious injury. VSS. Still with AMS, unchanged from earlier. No focal deficits. Pt assisted back to bed with gait belt, RW and multiple RNs at bedside to assist.   Bed alarm in place. Call bell in reach, and  at bedside. Discussed with  negative findings from CXR, labs, and UA. Plans for Head CT and MRI this evening.      Beck Conley, KEVIN

## 2021-05-06 NOTE — PROGRESS NOTES
End of Shift Note    Bedside shift change report given to  (oncoming nurse) by Hans Dillon (offgoing nurse). Report included the following information SBAR, Kardex, Intake/Output, MAR, Recent Results and Med Rec Status    Shift worked:  0700 - 1930     Shift summary and any significant changes:     pt will not stop trying to get out of the bed, bed alarm placed   Concerns for physician to address:  none     Zone phone for oncoming shift:   [phone]       Activity:  Activity Level: Up with Assistance  Number times ambulated in hallways past shift: 2  Number of times OOB to chair past shift: 1    Cardiac:   Cardiac Monitoring: No      Cardiac Rhythm: Sinus Rhythm    Access:   Current line(s): PIV     Genitourinary:   Urinary status: needs to void    Respiratory:   O2 Device: None (Room air)  Chronic home O2 use?: NO  Incentive spirometer at bedside: NO     GI:     Current diet:  DIET REGULAR  Passing flatus: YES  Tolerating current diet: YES       Pain Management:   Patient states pain is manageable on current regimen: YES    Skin:  Desmond Score: 18  Interventions: increase time out of bed    Patient Safety:  Fall Score:  Total Score: 4  Interventions: pt to call before getting OOB  High Fall Risk: Yes    Length of Stay:  Expected LOS: 2d 9h  Actual LOS: 2      Hans Dillon

## 2021-05-06 NOTE — PROGRESS NOTES
Problem: Falls - Risk of  Goal: *Absence of Falls  Description: Document Taylor Aceves Fall Risk and appropriate interventions in the flowsheet. Outcome: Progressing Towards Goal  Note: Fall Risk Interventions:  Mobility Interventions: Bed/chair exit alarm         Medication Interventions: Bed/chair exit alarm    Elimination Interventions: Bed/chair exit alarm    History of Falls Interventions: Bed/chair exit alarm         Problem: Patient Education: Go to Patient Education Activity  Goal: Patient/Family Education  Outcome: Progressing Towards Goal     Problem: Patient Education: Go to Patient Education Activity  Goal: Patient/Family Education  Outcome: Progressing Towards Goal     Problem: Patient Education: Go to Patient Education Activity  Goal: Patient/Family Education  Outcome: Progressing Towards Goal     Problem: Pressure Injury - Risk of  Goal: *Prevention of pressure injury  Description: Document Desmond Scale and appropriate interventions in the flowsheet.   Outcome: Progressing Towards Goal  Note: Pressure Injury Interventions:  Sensory Interventions: Assess changes in LOC         Activity Interventions: Assess need for specialty bed    Mobility Interventions: Assess need for specialty bed    Nutrition Interventions: Document food/fluid/supplement intake    Friction and Shear Interventions: Apply protective barrier, creams and emollients                Problem: Patient Education: Go to Patient Education Activity  Goal: Patient/Family Education  Outcome: Progressing Towards Goal

## 2021-05-06 NOTE — PROGRESS NOTES
Problem: Mobility Impaired (Adult and Pediatric)  Goal: *Acute Goals and Plan of Care (Insert Text)  Description: FUNCTIONAL STATUS PRIOR TO ADMISSION: Patient was independent and active without use of straight cane. Patient with several recent falls. HOME SUPPORT PRIOR TO ADMISSION: The patient lived with  but did not require assist.    Physical Therapy Goals  Initiated 5/4/2021    1. Patient will move from supine to sit and sit to supine  in bed with modified independence within 4 days. 2. Patient will perform sit to stand with independence within 4 days. 3. Patient will ambulate with supervision/set-up for 150 feet with the least restrictive device within 4 days. 4. Patient will ascend/descend 4 stairs with 1 handrail(s) with minimal assistance/contact guard assist within 4 days. 5. Patient will verbalize and demonstrate understanding of spinal precautions (No bending, lifting greater than 5 lbs, or twisting; log-roll technique; frequent repositioning as instructed) within 4 days. 5/6/2021 1552 by Em Fields, PTA  Outcome: Progressing Towards Goal  5/6/2021 1330 by Em Fields, PTA  Outcome: Not Progressing Towards Goal   PHYSICAL THERAPY TREATMENT  Patient: Elena Lorenzana (15 y.o. female)  Date: 5/6/2021  Diagnosis: Spinal stenosis, lumbar [M48.061] <principal problem not specified>  Procedure(s) (LRB):  ROBOTIC ASSISTED L4-S1 POSTERIOR FUSION WITH BONE MARROW ASPIRATION FROM ILIAC CREST (N/A) 2 Days Post-Op  Precautions: Spinal(quick draw brace) No bending, no lifting greater than 5 lbs, no twisting, log-roll technique, repositioning every 20-30 min except when sleeping, brace when OOB (if ordered)  Chart, physical therapy assessment, plan of care and goals were reviewed. ASSESSMENT  Patient continues with skilled PT services and is progressing towards goals. Pt presents with decreased strength and still increased confusion. Pt more alert this afternoon.  Pt performed bed mobility at min A with cueing for sequencing. Pt performed sit to stand transfer at min A/mod A x2. Pt requiring cueing for all sequencing to stand. Pt ambulated 15ft and 60ft with RW at min A/CGA x2. Pt requiring cueing to stand upright and improve foot clearance. Pt able to correct for short periods but improved with cueing. Pt ascended/descended 8 steps with right railings at [facility] x1 at mod A/min A. Pt able to perform better with constant cueing. Pts  reported he's able to have 3 people to assistance get pt into home and was given a gait belt. Pts mobility is inconsistent throughout session requiring different levels of assistance with transfers. If pt goes home will need 24/7 supervision for safety. Current Level of Function Impacting Discharge (mobility/balance): bed mobility at min A with cueing for sequencing, sit to stand transfer at mod A/min  A x2, ambulation at min A/CGA x2     Other factors to consider for discharge: confusion, drowsy, decreased strength          PLAN :  Patient continues to benefit from skilled intervention to address the above impairments. Continue treatment per established plan of care. to address goals. Recommendation for discharge: (in order for the patient to meet his/her long term goals)  Therapy up to 5 days/week in SNF setting but if family takes pt home will need HHPT with 24/7 supervision     This discharge recommendation:  Has been made in collaboration with the attending provider and/or case management    IF patient discharges home will need the following DME: patient owns DME required for discharge       SUBJECTIVE:   Patient stated  I still feel sleepy.     OBJECTIVE DATA SUMMARY:   Critical Behavior:  Neurologic State: Alert, Confused  Orientation Level: Oriented X4  Cognition: Follows commands  Safety/Judgement: Decreased awareness of need for safety, Decreased awareness of need for assistance, Fall prevention    Spinal diagnosis intervention:  The patient required max cues to maintain back precautions during functional activity. Functional Mobility Training:    Bed Mobility:  Log Rolling: Minimum assistance  Supine to Sit: Minimum assistance; Additional time  Sit to Supine: Moderate assistance;Assist x2; Additional time  Scooting: Contact guard assistance; Additional time        Transfers:  Sit to Stand: Minimum assistance; Moderate assistance; Additional time  Stand to Sit: Contact guard assistance                             Balance:  Sitting: Impaired  Sitting - Static: Fair (occasional)  Standing: Impaired; With support  Standing - Static: Fair;Constant support  Standing - Dynamic : Poor;Fair;Constant support  Ambulation/Gait Training:  Distance (ft): 60 Feet (ft)  Assistive Device: Brace/Splint;Gait belt;Walker, rolling  Ambulation - Level of Assistance: Minimal assistance;Assist x2        Gait Abnormalities: Decreased step clearance;Shuffling gait        Base of Support: Widened     Speed/Xiomara: Slow;Shuffled  Step Length: Right shortened;Left shortened            Stairs:  Number of Stairs Trained: 8  Stairs - Level of Assistance: Moderate assistance;Minimum assistance   Rail Use: Right (HHA x1)    Pain Rating:  Pt reported increased pain with  mobility. Activity Tolerance:   Fair and requires rest breaks    After treatment patient left in no apparent distress:   Sitting in chair, Call bell within reach, Bed / chair alarm activated, and Caregiver / family present    COMMUNICATION/COLLABORATION:   The patients plan of care was discussed with: Occupational therapist and Registered nurse.      Ashely Cancino PTA   Time Calculation: 45 mins

## 2021-05-06 NOTE — PROGRESS NOTES
Spoke with PTA whom saw pt this morning. Pt did not sleep last night and was extremely drowsy.   Will follow up in the PM.

## 2021-05-06 NOTE — PROGRESS NOTES
Transition of Care Plan:     RUR:9%  Disposition:Home with spouse and home health  Follow up appointments:PCP and specialist  DME needed:TBD  [address] or means to access home:      Spouse  IM Medicare letter:2nd IMM Letter  Caregiver Contact: Kaleb Garza [phone]  Discharge Caregiver contacted prior to discharge? CM was notified of recommendation for home health. CM spoke with Pt's . Pt's  stated that [facility] preferred [facility] provider. CM sent a referral via [employer].              Herve Anderson, JESSE  Bear Bandar

## 2021-05-06 NOTE — PROGRESS NOTES
Bedside and Verbal shift change report given to Elsa Padron (oncoming nurse) by Jerry Mendez (offgoing nurse). Report included the following information SBAR.

## 2021-05-06 NOTE — PROGRESS NOTES
Problem: Self Care Deficits Care Plan (Adult)  Goal: *Acute Goals and Plan of Care (Insert Text)  Description: FUNCTIONAL STATUS PRIOR TO ADMISSION: multiple recent falls, was recently using SPC, performed ADLs and IADLS on her own    [address]: The patient lived with  but did not require assist.    Occupational Therapy Goals:  Initiated 5/4/2021  1. Patient will perform grooming standing with item retrieval with modified independence within 7 days. 2. Patient will perform upper body dressing and lower body dressing with modified independence with AE within 7 days. 3. Patient will perform toileting with modified independence within 7 days. 4. Patient will transfer from toilet with modified independence using the least restrictive device and appropriate durable medical equipment within 7 days. 5. Patient will adhere to 3:3 LS precautions during functional tasks within 7 days. Outcome: Not Met   OCCUPATIONAL THERAPY TREATMENT  Patient: Macy Chacon (74 y.o. female)  Date: 5/6/2021  Diagnosis: Spinal stenosis, lumbar [M48.061] <principal problem not specified>  Procedure(s) (LRB):  ROBOTIC ASSISTED L4-S1 POSTERIOR FUSION WITH BONE MARROW ASPIRATION FROM ILIAC CREST (N/A) 2 Days Post-Op  Precautions: Spinal(quick draw brace)  Chart, occupational therapy assessment, plan of care, and goals were reviewed. ASSESSMENT  Patient continues with skilled OT services and is not progressing towards goals. Pt was very confused this session and pt did not sleep last night and has been confused since first surgery. Confusion and drowsiness was more pronounced this session. Pts  is insistent that pt will return to home and reports that he will have three people assist.  Pts  was extensively educated on home safety, fall prevention, adaptive strategies for ADLS and LS precautions. He voiced understanding with all education.   Reinforce that pt needs significant physical assist 24/7 with all mobility and ADLS and he is aware of this. Due to families preference recommend home health with 24/7 physical assist.  Otherwise therapist would recommend SNF for rehab. Pt does have a history of confusion with anesthesia and pts  reports that pt would do better in her home environment. Current Level of Function Impacting Discharge (ADLs): see below for education     Other factors to consider for discharge: high fall risk         PLAN :  Patient continues to benefit from skilled intervention to address the above impairments. Continue treatment per established plan of care to address goals. Recommend with staff: two assist for mobility    Recommend next OT session: ADLS, mobility    Recommendation for discharge: (in order for the patient to meet his/her long term goals)  Therapy up to 5 days/week in SNF setting family only wants home health and will provide 24/7 physical assist    This discharge recommendation:  Has been made in collaboration with the attending provider and/or case management    IF patient discharges home will need the following DME: rolling walker       SUBJECTIVE:   Patient stated Do you see that dog?     OBJECTIVE DATA SUMMARY:   Cognitive/Behavioral Status:  Neurologic State: Alert;Confused  Orientation Level: Oriented X4  Cognition: Follows commands             Functional Mobility and Transfers for ADLs:      Transfers:  Sit to Stand: Minimum assistance; Moderate assistance; Additional time          Balance:  Sitting: Impaired  Sitting - Static: Fair (occasional)  Standing: Impaired; With support  Standing - Static: Fair;Constant support  Standing - Dynamic : Poor;Fair;Constant support    ADL Intervention:      The patient was confused and unable to be educated on precautions, ADLS or precautions. Pts  was present and was educated on the following. 3/3 back precautions.  Pts  was able to recite without cues    Bathing: Pts  was instructed to have pt sponge bathe at this time and that she needs assist for all standing attempts. Dressing brace: Patient's  understands that pt should have quick draw brace on when OOB and knows how to don and doff this off of pt. Dressing lower body: Pt was issued hip kit but was unable to be fully trained on devices due to confusion. Informed pt  to take items with them and [facility] once pts mentation improves. Toileting: informed pts  that pt should not twist her back to reach to wipe for BM and pt will need    Home safety: Pts  was educated on home modifications and safety (raise height of ADL objects, appropriate height of chair surfaces, recliner safety, change of floor surfaces, clear pathways) to increase independence and fall prevention. Recommended removing scatter rugs   Standing: Patients  was instructed and indicated understanding to have walk up to sink/counter top/surfaces, step into walker, square off while using objects, slide objects along surfaces, to increase adherence to back precautions and fall prevention. Tub transfer: educated pts  to have pt only sponge bathe at this time      Activity Tolerance:   Fair, Poor, and requires frequent rest breaks    After treatment patient left in no apparent distress:   Sitting in chair, Call bell within reach, and Caregiver / family present    COMMUNICATION/COLLABORATION:   The patients plan of care was discussed with: Physical therapy assistant, Registered nurse, and patient .      BRITTNEY Cain/L  Time Calculation: 9 mins

## 2021-05-06 NOTE — PROGRESS NOTES
Ortho / Neurosurgery NP Note    POD# 2  s/p ROBOTIC ASSISTED L4-S1 POSTERIOR FUSION WITH BONE MARROW ASPIRATION FROM ILIAC CREST   POD# 3 s/p L4-5 LATERAL FUSION     Pt resting in bed, eyes shut, feels tired and sleepy. Per nursing, up most of the night, trying to get out of bed  Confusion began following second surgery, likely worsened by having to switch rooms yesterday. Currently disoriented to place and time (but knows it is day time), oriented to self and situation.  at bedside, states this has happened in past after anesthesia, also reports some memory issues PTA as well  Today she denies right hip/groin and upper thigh pain. Incisional pain controlled with tylenol alone   CONTINUE TO AVOID NARCOTICS   Dry, non-productive cough has resolved since starting guaifenesin yesterday. Denies SOB. Tolerating regular diet. Denies nausea. VSS Afebrile. Room air.      Visit Vitals  BP (!) 166/79 (BP 1 Location: Left upper arm, BP Patient Position: At rest)   Pulse 91   Temp 99 °F (37.2 °C)   Resp 18   Ht 5' 2\" (1.575 m)   Wt 81.3 kg (179 lb 3.7 oz)   SpO2 95%   BMI 32.78 kg/m²       Voiding status: voiding   Output (mL)  Urine Voided: 150 ml (05/05/21 0305)  Last Bowel Movement Date: 05/02/21 (05/05/21 2138)  Unmeasurable Output  Urine Occurrence(s): 1 (05/06/21 0910)      Labs    Lab Results   Component Value Date/Time    HGB 10.3 (L) 05/06/2021 11:31 AM      Lab Results   Component Value Date/Time    INR 1.0 04/21/2021 01:43 PM      Lab Results   Component Value Date/Time    Sodium 139 05/06/2021 11:31 AM    Potassium 3.6 05/06/2021 11:31 AM    Chloride 106 05/06/2021 11:31 AM    CO2 30 05/06/2021 11:31 AM    Glucose 84 05/06/2021 11:31 AM    BUN 13 05/06/2021 11:31 AM    Creatinine 0.99 05/06/2021 11:31 AM    Calcium 9.3 05/06/2021 11:31 AM     Recent Glucose Results:   Lab Results   Component Value Date/Time    GLU 84 05/06/2021 11:31 AM           Body mass index is 32.78 kg/m². : A BMI > 30 is classified as obesity and > 40 is classified as morbid obesity. No focal deficits on exam   Lungs clear bilaterally today. Lateral prineo and posterior prineo dressings c.d.i   Calves soft and supple; No pain with passive stretch  Sensation and motor intact - PF/DF 5/5, expected weakness right hip flexor RLE -SLR  SCDs for mechanical DVT proph while in bed     PLAN:  1) Neurovascular assessment q4 hours   2) PT/OT - LSO when oob. Yesterday she was moving well with thearpy but had poor safety awareness. 3) Delirium/AMS - borderline memory issues likely worsened by receiving anesthesia x2days. Continue tylenol, avoid narcotics. Rarely take prn trazodone at home, will d/c trazodone start melatonin to help promote normal circadian cycle. Hx recurrent UTIs, will check UA given guardado was in place. Also check labs to r/o other causes of AMS. 4) Dry/non-productive cough and upper airway congestion have resolved. Denies SOB/dsypnea on exertion. Started guaifenesin yesterday, continue claritin. Encouraged hourly IS. CXR negative. 5) HTN - BP stable. Resume lisinopril and htz. 6) Readniess for discharge:     [x] Vital Signs stable    [x] Hgb stable    [x] + Voiding    [x] Wound intact, drainage minimal    [x] Tolerating PO intake     [] Cleared by PT (OT if applicable)     [] Stair training completed (if applicable)    [] Independent / Contact Guard Assist (household distance)     [] Bed mobility     [] Car transfers     [] ADLs    [x] Adequate pain control on oral medication alone      would like to take patient home today, states he will have 24/7 supervision and support for her (between him, daughter and son-in-law). I agree with him, being in home environment would help improve mentation. However, discussed ensuring there are no other underlying medical issues contributing to her confusion. Also, want to ensure she is ambulating safely. He will be present for am therapy to see her current needs. Labs and UA pending. Will reassess later this afternoon.           Phuc Barron NP

## 2021-05-06 NOTE — PROGRESS NOTES
Problem: Mobility Impaired (Adult and Pediatric)  Goal: *Acute Goals and Plan of Care (Insert Text)  Description: FUNCTIONAL STATUS PRIOR TO ADMISSION: Patient was independent and active without use of straight cane. Patient with several recent falls. HOME SUPPORT PRIOR TO ADMISSION: The patient lived with  but did not require assist.    Physical Therapy Goals  Initiated 5/4/2021    1. Patient will move from supine to sit and sit to supine  in bed with modified independence within 4 days. 2. Patient will perform sit to stand with independence within 4 days. 3. Patient will ambulate with supervision/set-up for 150 feet with the least restrictive device within 4 days. 4. Patient will ascend/descend 4 stairs with 1 handrail(s) with minimal assistance/contact guard assist within 4 days. 5. Patient will verbalize and demonstrate understanding of spinal precautions (No bending, lifting greater than 5 lbs, or twisting; log-roll technique; frequent repositioning as instructed) within 4 days. Outcome: Not Progressing Towards Goal   PHYSICAL THERAPY TREATMENT  Patient: Eleni Gillette (82 y.o. female)  Date: 5/6/2021  Diagnosis: Spinal stenosis, lumbar [M48.061] <principal problem not specified>  Procedure(s) (LRB):  ROBOTIC ASSISTED L4-S1 POSTERIOR FUSION WITH BONE MARROW ASPIRATION FROM ILIAC CREST (N/A) 2 Days Post-Op  Precautions: Spinal(quick draw brace) No bending, no lifting greater than 5 lbs, no twisting, log-roll technique, repositioning every 20-30 min except when sleeping, brace when OOB (if ordered)  Chart, physical therapy assessment, plan of care and goals were reviewed. ASSESSMENT  Patient continues with skilled PT services and is progressing towards goals. Pt presents with decreased strength and increased drowsiness. Pt reported feeling lightheaded but BP stable with all mobility. Pt performed bed mobility at mod A x2 with additional time and cueing for sequencing.  Pt with difficulty sitting upright requiring assistance to maintain sitting balance. Pt performed sit to stand at mod A x2 initially but improved to min A with more reps. Pt ambulated 30ft with RW at min A x2. Pt unsteady a leaning onto walker. Pt able to correct with cueing. Pt ascended/descended 4 steps with right raling and HHA on left. Pt very drowsy and buckling while ascending requiring mod A x2. Pt descended with min A with better control. Pt still very drowsy and reported feeling tired and sleepy. Will attempted stair training in PM session if pt is more alert. Current Level of Function Impacting Discharge (mobility/balance): mobility at mod A x2, min A x2    Other factors to consider for discharge: drowsy, confused          PLAN :  Patient continues to benefit from skilled intervention to address the above impairments. Continue treatment per established plan of care. to address goals. Recommendation for discharge: (in order for the patient to meet his/her long term goals)  SNF unless family wants to take her home then pt will need HHPT with 24/7 supervision      This discharge recommendation:  Has been made in collaboration with the attending provider and/or case management    IF patient discharges home will need the following DME: rolling walker       SUBJECTIVE:   Patient stated  I'm just so sleepy.     OBJECTIVE DATA SUMMARY:   Critical Behavior:  Neurologic State: Alert, Confused  Orientation Level: Oriented X4  Cognition: Follows commands  Safety/Judgement: Decreased awareness of need for safety, Decreased awareness of need for assistance, Fall prevention    Spinal diagnosis intervention:  The patient required mod cues to maintain back precautions during functional activity. Functional Mobility Training:    Bed Mobility:  Log Rolling: Minimum assistance  Supine to Sit: Moderate assistance;Assist x2; Additional time  Sit to Supine: Moderate assistance;Assist x2; Additional time  Scooting: Minimum assistance; Additional time        Transfers:  Sit to Stand: Minimum assistance; Moderate assistance; Additional time  Stand to Sit: Contact guard assistance                             Balance:  Sitting: Impaired  Sitting - Static: Fair (occasional)  Standing: Impaired; With support  Standing - Static: Fair;Constant support  Standing - Dynamic : Poor;Fair;Constant support  Ambulation/Gait Training:  Distance (ft): 30 Feet (ft)  Assistive Device: Brace/Splint;Gait belt;Walker, rolling  Ambulation - Level of Assistance: Minimal assistance;Assist x2        Gait Abnormalities: Decreased step clearance;Shuffling gait        Base of Support: Widened     Speed/Xiomara: Slow;Shuffled  Step Length: Right shortened;Left shortened                    Stairs:  Number of Stairs Trained: 4  Stairs - Level of Assistance: Moderate assistance;Minimum assistance;Assist X2;Additional time   Rail Use: Right (HHA x1)    Pain Rating:  Pt reported pain with mobility. Activity Tolerance:   Poor and requires rest breaks    After treatment patient left in no apparent distress:   Supine in bed, Call bell within reach, Bed / chair alarm activated, and Caregiver / family present    COMMUNICATION/COLLABORATION:   The patients plan of care was discussed with: Registered nurse.      Colon Conception, PTA   Time Calculation: 38 mins

## 2021-05-07 ENCOUNTER — APPOINTMENT (OUTPATIENT)
Dept: VASCULAR SURGERY | Age: 71
DRG: 454 | End: 2021-05-07
Attending: PSYCHIATRY & NEUROLOGY
Payer: MEDICARE

## 2021-05-07 LAB
25(OH)D3 SERPL-MCNC: 26.1 NG/ML (ref 30–100)
FOLATE SERPL-MCNC: 16.9 NG/ML (ref 5–21)
LEFT CCA DIST DIAS: 16 CM/S
LEFT CCA DIST SYS: 54.8 CM/S
LEFT CCA PROX DIAS: 12.9 CM/S
LEFT CCA PROX SYS: 57.3 CM/S
LEFT ECA DIAS: 7.51 CM/S
LEFT ECA SYS: 61.2 CM/S
LEFT ICA DIST DIAS: 19.1 CM/S
LEFT ICA DIST SYS: 55.2 CM/S
LEFT ICA MID DIAS: 16.1 CM/S
LEFT ICA MID SYS: 52.2 CM/S
LEFT ICA PROX DIAS: 17.8 CM/S
LEFT ICA PROX SYS: 52.6 CM/S
LEFT ICA/CCA SYS: 1.01
LEFT SUBCLAVIAN DIAS: 0 CM/S
LEFT SUBCLAVIAN SYS: 94.4 CM/S
LEFT VERTEBRAL DIAS: 14.23 CM/S
LEFT VERTEBRAL SYS: 45.6 CM/S
RIGHT CCA DIST DIAS: 16 CM/S
RIGHT CCA DIST SYS: 54.8 CM/S
RIGHT CCA PROX DIAS: 12.8 CM/S
RIGHT CCA PROX SYS: 48.3 CM/S
RIGHT ECA DIAS: 5.56 CM/S
RIGHT ECA SYS: 54.6 CM/S
RIGHT ICA DIST DIAS: 18.6 CM/S
RIGHT ICA DIST SYS: 56 CM/S
RIGHT ICA MID DIAS: 14.5 CM/S
RIGHT ICA MID SYS: 44.8 CM/S
RIGHT ICA PROX DIAS: 8.8 CM/S
RIGHT ICA PROX SYS: 36.4 CM/S
RIGHT ICA/CCA SYS: 1
RIGHT SUBCLAVIAN DIAS: 0 CM/S
RIGHT SUBCLAVIAN SYS: 88.5 CM/S
RIGHT VERTEBRAL DIAS: 19.22 CM/S
RIGHT VERTEBRAL SYS: 51.5 CM/S
VIT B12 SERPL-MCNC: 231 PG/ML (ref 193–986)

## 2021-05-07 PROCEDURE — 95816 EEG AWAKE AND DROWSY: CPT | Performed by: PSYCHIATRY & NEUROLOGY

## 2021-05-07 PROCEDURE — 97530 THERAPEUTIC ACTIVITIES: CPT

## 2021-05-07 PROCEDURE — 97116 GAIT TRAINING THERAPY: CPT

## 2021-05-07 PROCEDURE — 82746 ASSAY OF FOLIC ACID SERUM: CPT

## 2021-05-07 PROCEDURE — 95819 EEG AWAKE AND ASLEEP: CPT | Performed by: PSYCHIATRY & NEUROLOGY

## 2021-05-07 PROCEDURE — 93880 EXTRACRANIAL BILAT STUDY: CPT | Performed by: PSYCHIATRY & NEUROLOGY

## 2021-05-07 PROCEDURE — 94760 N-INVAS EAR/PLS OXIMETRY 1: CPT

## 2021-05-07 PROCEDURE — 82306 VITAMIN D 25 HYDROXY: CPT

## 2021-05-07 PROCEDURE — 99223 1ST HOSP IP/OBS HIGH 75: CPT | Performed by: PSYCHIATRY & NEUROLOGY

## 2021-05-07 PROCEDURE — 74011250637 HC RX REV CODE- 250/637: Performed by: NURSE PRACTITIONER

## 2021-05-07 PROCEDURE — 93880 EXTRACRANIAL BILAT STUDY: CPT

## 2021-05-07 PROCEDURE — 82607 VITAMIN B-12: CPT

## 2021-05-07 PROCEDURE — 36415 COLL VENOUS BLD VENIPUNCTURE: CPT

## 2021-05-07 PROCEDURE — 65270000029 HC RM PRIVATE

## 2021-05-07 PROCEDURE — 74011250637 HC RX REV CODE- 250/637: Performed by: ORTHOPAEDIC SURGERY

## 2021-05-07 RX ADMIN — BUPROPION HYDROCHLORIDE 200 MG: 100 TABLET, EXTENDED RELEASE ORAL at 17:43

## 2021-05-07 RX ADMIN — PREGABALIN 150 MG: 75 CAPSULE ORAL at 09:16

## 2021-05-07 RX ADMIN — DICYCLOMINE HYDROCHLORIDE 10 MG: 10 CAPSULE ORAL at 21:01

## 2021-05-07 RX ADMIN — Medication 2000 UNITS: at 09:16

## 2021-05-07 RX ADMIN — Medication 1 TABLET: at 09:16

## 2021-05-07 RX ADMIN — Medication 1000 MG: at 21:01

## 2021-05-07 RX ADMIN — DOCUSATE SODIUM 50MG AND SENNOSIDES 8.6MG 1 TABLET: 8.6; 5 TABLET, FILM COATED ORAL at 09:16

## 2021-05-07 RX ADMIN — Medication 3 MG: at 21:01

## 2021-05-07 RX ADMIN — LORATADINE 10 MG: 10 TABLET ORAL at 09:15

## 2021-05-07 RX ADMIN — OXYBUTYNIN CHLORIDE 5 MG: 5 TABLET ORAL at 09:16

## 2021-05-07 RX ADMIN — OXYBUTYNIN CHLORIDE 5 MG: 5 TABLET ORAL at 17:44

## 2021-05-07 RX ADMIN — Medication 10 ML: at 17:56

## 2021-05-07 RX ADMIN — Medication 10 ML: at 06:17

## 2021-05-07 RX ADMIN — Medication 1000 MG: at 14:01

## 2021-05-07 RX ADMIN — PREGABALIN 150 MG: 75 CAPSULE ORAL at 17:44

## 2021-05-07 RX ADMIN — POLYETHYLENE GLYCOL 3350 17 G: 17 POWDER, FOR SOLUTION ORAL at 09:15

## 2021-05-07 RX ADMIN — DICYCLOMINE HYDROCHLORIDE 10 MG: 10 CAPSULE ORAL at 09:15

## 2021-05-07 RX ADMIN — BUPROPION HYDROCHLORIDE 200 MG: 100 TABLET, EXTENDED RELEASE ORAL at 09:16

## 2021-05-07 RX ADMIN — HYDROCHLOROTHIAZIDE 25 MG: 25 TABLET ORAL at 09:16

## 2021-05-07 RX ADMIN — PANTOPRAZOLE SODIUM 40 MG: 40 TABLET, DELAYED RELEASE ORAL at 09:16

## 2021-05-07 RX ADMIN — ASPIRIN 81 MG: 81 TABLET, COATED ORAL at 17:53

## 2021-05-07 RX ADMIN — DOCUSATE SODIUM 50MG AND SENNOSIDES 8.6MG 1 TABLET: 8.6; 5 TABLET, FILM COATED ORAL at 17:43

## 2021-05-07 RX ADMIN — Medication 10 ML: at 21:02

## 2021-05-07 RX ADMIN — LISINOPRIL 40 MG: 20 TABLET ORAL at 09:16

## 2021-05-07 RX ADMIN — DICYCLOMINE HYDROCHLORIDE 10 MG: 10 CAPSULE ORAL at 17:44

## 2021-05-07 RX ADMIN — DICYCLOMINE HYDROCHLORIDE 10 MG: 10 CAPSULE ORAL at 14:01

## 2021-05-07 RX ADMIN — Medication 1000 MG: at 09:16

## 2021-05-07 NOTE — PROGRESS NOTES
ORTHO POST OP SPINE PROGRESS NOTE    May 7, 2021  Admit Date: 5/3/2021  Admit Diagnosis: Spinal stenosis, lumbar [M48.061]  Procedure: Procedure(s):  ROBOTIC ASSISTED L4-S1 POSTERIOR FUSION WITH BONE MARROW ASPIRATION FROM ILIAC CREST  Post Op day: 3 Days Post-Op    Subjective:     Addison Garcia is a patient who has complaints of pain in the low back and R hip s/p L4-5 lateral (R side approach) and L4-S1 posterior fusion POD 4 and 3 respectively. progresing confusion over the last couple of days. labs, ct and mri done yesterday. pt with witnessed fall yesterday. was walking to bathroom with assistance and R leg gave way. she was guided to floor. no new injury. pt continues with confusion but somewhat improved. she knows today is friday and that she is in the hospital but unable to tell which hospital.     Review of Systems: Pertinent items are noted in HPI. Objective:     PT/OT:   Distance Ambulated:           Time Ambulated (min):        Ambulation Response: Activity Response: Fairly tolerated  Assistive Device:              Assistive Device: Fall prevention device    Vital Signs:    Blood pressure (!) 149/74, pulse 84, temperature 97.5 °F (36.4 °C), resp. rate 18, height 5' 2\" (1.575 m), weight 81.3 kg (179 lb 3.7 oz), SpO2 96 %. Temp (24hrs), Av.3 °F (36.8 °C), Min:97.5 °F (36.4 °C), Max:99 °F (37.2 °C)      No intake/output data recorded.  1901 -  0700  In: -   Out: 1125 [Urine:1125]    LAB:    Recent Labs     21  1131   HGB 10.3*   WBC 8.5          Wound/Drain Assessment:  Drain:      Dressing:     Physical Exam:  Neurological: no deficit  Incision clean, dry, and intact and erythema low back with some to R flank.    5/5 BLE except R SLR limited by pain    Assessment:      Patient Active Problem List   Diagnosis Code    IBS (irritable bowel syndrome) K58.9    Fibromyalgia M79.7    Hiatal hernia K44.9    GERD (gastroesophageal reflux disease) K21.9    Hypercholesterolemia E78.00    Diverticulitis K57.92    Depression F32.9    Essential hypertension I10    Encounter for medication monitoring Z51.81    DDD (degenerative disc disease), cervical M50.30    Spinal stenosis of lumbar region with neurogenic claudication M48.062    Non morbid obesity E66.9    Depression, major, severe recurrence ([facility] [address] F33.2    Spinal stenosis, lumbar M48.061       Plan:     Continue PT/OT/Rehab  Discontinue:   Consult: PT  and OT    Discharge To: home when stable   Labs and UA neg  CT and MRI show no acute process  Has had sedatives limited and slow improvement   Neurology consulted

## 2021-05-07 NOTE — PROGRESS NOTES
Patient is off the floor for a test. Attempted to see for OT treatment. Will retry another time.     Brennan Cabral MS, OTR/L, CSRS

## 2021-05-07 NOTE — PROGRESS NOTES
End of Shift Note    Bedside shift change report given to  (oncoming nurse) by Jez Dillon RN (offgoing nurse). Report included the following information SBAR, Kardex, OR Summary, Intake/Output, MAR and Recent Results    Shift worked:  7p -7a     Shift summary and any significant changes:     MRI and CT head completed. Pt confused but able to sleep most of the night. Incont. of urine. Right back and flank reddened     Concerns for physician to address:  discharge plan       Zone phone for oncoming shift:          Activity:  Activity Level: Up with Assistance  Number times ambulated in hallways past shift: 0  Number of times OOB to chair past shift: 0    Cardiac:   Cardiac Monitoring: No      Cardiac Rhythm: Sinus Rhythm    Access:   Current line(s): PIV     Genitourinary:   Urinary status: voiding and incontinent    Respiratory:   O2 Device: None (Room air)  Chronic home O2 use?: NO  Incentive spirometer at bedside: YES     GI:  Last Bowel Movement Date: 05/02/21  Current diet:  DIET REGULAR  Passing flatus: YES  Tolerating current diet: YES       Pain Management:   Patient states pain is manageable on current regimen: YES    Skin:  Desmond Score: 18  Interventions: increase time out of bed, PT/OT consult, limit briefs, internal/external urinary devices and nutritional support     Patient Safety:  Fall Score:  Total Score: 7  Interventions: bed/chair alarm, assistive device (walker, cane, etc), gripper socks, pt to call before getting OOB, stay with me (per policy) and gait belt  High Fall Risk: Yes    Length of Stay:  Expected LOS: 2d 9h  Actual LOS: [address], [place]

## 2021-05-07 NOTE — PROGRESS NOTES
Son Aislinn Washington was here to check on pt. Has now left saying \" I think she is settled and will rest now\"  Left phone number with nurse.   [phone]

## 2021-05-07 NOTE — PROGRESS NOTES
Second attempt to see pt for OT services. Pt is now asleep and nursing is requesting for pt to rest.  Will defer but continue to follow.

## 2021-05-07 NOTE — PROGRESS NOTES
Problem: Mobility Impaired (Adult and Pediatric)  Goal: *Acute Goals and Plan of Care (Insert Text)  Description: FUNCTIONAL STATUS PRIOR TO ADMISSION: Patient was independent and active without use of straight cane. Patient with several recent falls. HOME SUPPORT PRIOR TO ADMISSION: The patient lived with  but did not require assist.    Physical Therapy Goals  Initiated 5/4/2021    1. Patient will move from supine to sit and sit to supine  in bed with modified independence within 4 days. 2. Patient will perform sit to stand with independence within 4 days. 3. Patient will ambulate with supervision/set-up for 150 feet with the least restrictive device within 4 days. 4. Patient will ascend/descend 4 stairs with 1 handrail(s) with minimal assistance/contact guard assist within 4 days. 5. Patient will verbalize and demonstrate understanding of spinal precautions (No bending, lifting greater than 5 lbs, or twisting; log-roll technique; frequent repositioning as instructed) within 4 days. Outcome: Progressing Towards Goal   PHYSICAL THERAPY TREATMENT  Patient: Saman Phillip (68 y.o. female)  Date: 5/7/2021  Diagnosis: Spinal stenosis, lumbar [M48.061] <principal problem not specified>  Procedure(s) (LRB):  ROBOTIC ASSISTED L4-S1 POSTERIOR FUSION WITH BONE MARROW ASPIRATION FROM ILIAC CREST (N/A) 3 Days Post-Op  Precautions: Spinal(quick draw brace) No bending, no lifting greater than 5 lbs, no twisting, log-roll technique, repositioning every 20-30 min except when sleeping, brace when OOB (if ordered)  Chart, physical therapy assessment, plan of care and goals were reviewed. ASSESSMENT  Patient continues with skilled PT services and is progressing towards goals. Pt presents with decreased strength and endurance. Pt more alert today and able to answer all questions appropriately. Pt performed bed mobility min A/CGA with cueing for sequencing.  Pt performed sit to stand transfer at [facility] with cueing for sequencing. Pt ambulated 80ft with RW at [place] [address]. Pt requiring cueing to increase step length and stay close to walker. Pt able to correct to for short periods but much better than yesterdays session. Pt ascended/descended 4 steps with the right railing and HHA x1 on left at [place] [address]. Pt performed a car transfer at [place] [address] with cueing for sequencing. Pt with much better ability for follow commands. Pt ambulated into restroom at and was able to mobilize at [place] [address] with cueing for sequencing. Pt moving much better than yesterday and able to perform all task asked of her. Current Level of Function Impacting Discharge (mobility/balance): bed mobility at min A/CGA, sit to stand transfer at     Other factors to consider for discharge: confusion at times, good family support         PLAN :  Patient continues to benefit from skilled intervention to address the above impairments. Continue treatment per established plan of care. to address goals. Recommendation for discharge: (in order for the patient to meet his/her long term goals)  Physical therapy at least 2 days/week in the home AND ensure assist and/or supervision for safety with mobility 24/7     This discharge recommendation:  Has been made in collaboration with the attending provider and/or case management    IF patient discharges home will need the following DME: patient owns DME required for discharge       SUBJECTIVE:   Patient stated  I didn't even know I was out of it.     OBJECTIVE DATA SUMMARY:   Critical Behavior:  Neurologic State: Alert, Appropriate for age  Orientation Level: Oriented X4(Answered all questions correctly)  Cognition: Follows commands  Safety/Judgement: Decreased awareness of need for safety, Decreased awareness of need for assistance, Fall prevention    Spinal diagnosis intervention:  The patient required mod cues to maintain back precautions during functional activity.      Functional Mobility Training:    Bed Mobility:  Log Rolling: Minimum assistance;Contact guard assistance  Supine to Sit: Minimum assistance;Contact guard assistance     Scooting: Stand-by assistance        Transfers:  Sit to Stand: Contact guard assistance; Additional time  Stand to Sit: Contact guard assistance                             Balance:  Sitting: Impaired  Sitting - Static: Good (unsupported); Fair (occasional)  Sitting - Dynamic: Good (unsupported); Fair (occasional)  Standing: Impaired; With support  Standing - Static: Fair;Constant support  Standing - Dynamic : Fair;Constant support  Ambulation/Gait Training:  Distance (ft): 80 Feet (ft)  Assistive Device: Gait belt;Walker, rolling  Ambulation - Level of Assistance: Contact guard assistance        Gait Abnormalities: Decreased step clearance;Shuffling gait        Base of Support: Widened     Speed/Xiomara: Shuffled; Slow  Step Length: Right shortened;Left shortened            Stairs:  Number of Stairs Trained: 4  Stairs - Level of Assistance: Contact guard assistance   Rail Use: Right (and HHA )  Pain Rating:  Pt reported pain in RLE for back      Activity Tolerance:   Good, Fair, and requires rest breaks    After treatment patient left in no apparent distress:   Sitting in chair, Call bell within reach, Bed / chair alarm activated, and Caregiver / family present    COMMUNICATION/COLLABORATION:   The patients plan of care was discussed with: Registered nurse.      Dennis Chang PTA   Time Calculation: 54 mins

## 2021-05-07 NOTE — CONSULTS
Consult  REFERRED BY:  Chalino Marin MD    CHIEF COMPLAINT: Acute confusional state      Subjective:     Eleni Gillette is a 65 y.o. right-handed  female we are asked to evaluate for acute confusion after her second major lumbar back operation several days ago, but patient is definitely getting better now. Patient's last surgery was May 4, 2021. That was a posterior approach to her severe spinal stenosis in the anterior portion was done several days before that. Patient has had confusion after back surgery in the past but was more confused after this time but is much better today and feels like she is almost back to normal.  She has MRI scan of the brain that showed some prominent microvascular disease and atrophic changes consistent with age and memory loss, and her family does give a history that she has had trouble with short-term memory at home for some time being able to related to age. Patient has depression also, and was on numerous medications prior to admission. Patient has no headache, no fever, no meningismus, no focal weakness no sensory loss and is oriented x4 except misses The day of the month slightly. Patient denies any past history of stroke or TIAs in the past.  Her EEG done today is relatively unremarkable. Carotid Dopplers are pending. They are regulating her pain medications.     Past Medical History:   Diagnosis Date    Acute alteration in mental status     Arthritis     Bilateral carotid artery stenosis     Cerebral microvascular disease     Chronic kidney disease     CKD 3    Chronic pain     CHRONIC BOWEL PAIN    Convulsions (HCC)     Depression     Disturbance of memory     Diverticulitis     Fibromyalgia     GERD (gastroesophageal reflux disease)     Goiter     Hiatal hernia     Hypercholesterolemia     Hypertension     IBS (irritable bowel syndrome)     CONSTIPATION, CHRONIC SINCE HER 20s    Insomnia     TAKES TRAZODONE NIGHTLY    Migraine REDUCED IN FREQUENCY AND SEVERITY SINCE MENOPAUSE    Rectocele       Past Surgical History:   Procedure Laterality Date    COLONOSCOPY N/A 2016    COLONOSCOPY performed by Alok Peralta MD at [facility] ENDOSCOPY    COLONOSCOPY N/A 2018    COLONOSCOPY performed by Alok Peralta MD at [facility] ENDOSCOPY    ENDOSCOPY, COLON, DIAGNOSTIC  2010    Dr. Dru Cardona      HX CHOLECYSTECTOMY  2006    HX ENDOSCOPY      HX GI  X3  LAST ONE 12/10    COLONOSCOPY    HX GYN  1982    D&C WITH SUCTION    HX HYSTERECTOMY      HX LUMBAR FUSION      HX ORTHOPAEDIC      right foot surgery    HX TONSILLECTOMY      Guadalupe Lemon      Dr. Pond/ diverticulitis    IR INJ SPINE FLUORO GUIDED  2020    IR INJ SPINE FLUORO GUIDED  2021     Family History   Problem Relation Age of Onset    Cancer Father         lung and bone    Stroke Sister     Cancer Sister 76        PANCREATIC    Hypertension Mother     High Cholesterol Mother     Alzheimer Mother         late 62s early 76s    Cancer Other         COLON    Cancer Other         breast    Diabetes Maternal Aunt     Cancer Maternal Uncle         COLON    Cancer Maternal Grandfather         COLON    Ovarian Cancer Daughter 28      Social History     Tobacco Use    Smoking status: Former Smoker     Packs/day: 1.00     Years: 45.00     Pack years: 45.00     Quit date: 2007     Years since quittin.3    Smokeless tobacco: Never Used   Substance Use Topics    Alcohol use:  Yes     Alcohol/week: 0.0 standard drinks     Comment: holidays         Current Facility-Administered Medications:     melatonin tablet 3 mg, 3 mg, Oral, QHS, [place], KEVIN, 3 mg at 21    lactated Ringers infusion, 25 mL/hr, IntraVENous, CONTINUOUS, Paco VIRGEN MD, Last Rate: 25 mL/hr at 21 1300, 25 mL/hr at 21 1300    sodium chloride (NS) flush 5-40 mL, 5-40 mL, IntraVENous, Q8H, Carlos A Recinos MD, 10 mL at 05/06/21 2120    sodium chloride (NS) flush 5-40 mL, 5-40 mL, IntraVENous, PRN, Yoli Pratt MD    aspirin delayed-release tablet 81 mg, 81 mg, Oral, 2 TIMES WEEKLY, Reba Gan MD    buPROPion SR [facility] SR) tablet 200 mg, 200 mg, Oral, BID, Bud VIRGEN MD, 200 mg at 05/07/21 0916    calcium-vitamin D (OS-TRUE +D3) 500 mg-200 unit per tablet 1 Tab, 1 Tab, Oral, DAILY WITH BREAKFAST, Yoli Pratt MD, 1 Tab at 05/07/21 4537    cholecalciferol (VITAMIN D3) (1000 Units /25 mcg) tablet 2,000 Units, 2,000 Units, Oral, DAILY, Yoli Pratt MD, 2,000 Units at 05/07/21 8414    diclofenac (VOLTAREN) 1 % topical gel 2 g, 2 g, Topical, QID PRN, Yoli Pratt MD    dicyclomine (BENTYL) capsule 10 mg, 10 mg, Oral, QID, Bud VIRGEN MD, 10 mg at 05/07/21 0915    loratadine (CLARITIN) tablet 10 mg, 10 mg, Oral, DAILY, Bud VIRGEN MD, 10 mg at 05/07/21 0915    hydroCHLOROthiazide (HYDRODIURIL) tablet 25 mg, 25 mg, Oral, DAILY, Bud VIRGEN MD, 25 mg at 05/07/21 0916    lisinopriL (PRINIVIL, ZESTRIL) tablet 40 mg, 40 mg, Oral, DAILY, Bud VIRGEN MD, 40 mg at 05/07/21 0916    oxybutynin (DITROPAN) tablet 5 mg, 5 mg, Oral, BID, Bud VIRGEN MD, 5 mg at 05/07/21 0916    pantoprazole (PROTONIX) tablet 40 mg, 40 mg, Oral, ACB, Bud VIRGEN MD, 40 mg at 05/07/21 0916    pregabalin (LYRICA) capsule 150 mg, 150 mg, Oral, BID, Bud VIRGEN MD, 150 mg at 05/07/21 0916    sucralfate (CARAFATE) tablet 1 g, 1 g, Oral, TID PRN, Yoli Pratt MD    sodium chloride (NS) flush 5-40 mL, 5-40 mL, IntraVENous, Q8H, Shiva Carver MD, 10 mL at 05/07/21 0617    sodium chloride (NS) flush 5-40 mL, 5-40 mL, IntraVENous, PRN, Yoli Pratt MD    acetaminophen (TYLENOL) tablet 1,000 mg, 1,000 mg, Oral, Q6H, Bud VIRGEN MD, 1,000 mg at 05/07/21 0916    naloxone (NARCAN) injection 0.4 mg, 0.4 mg, IntraVENous, PRN, Yoli Pratt MD    senna-docusate (PERICOLACE) 8.6-50 mg per tablet 1 Tab, 1 Tab, Oral, BID, Yoli Pratt MD, 1 Tab at 05/07/21 0916    polyethylene glycol (MIRALAX) packet 17 g, 17 g, Oral, DAILY, Bud VIRGEN MD, 17 g at 05/07/21 0915    bisacodyL (DULCOLAX) suppository 10 mg, 10 mg, Rectal, DAILY PRN, Yoli Pratt MD    benzocaine-menthoL (CHLORASEPTIC MAX) lozenge 1 Lozenge, 1 Lozenge, Oral, PRN, Yoli Pratt MD        Allergies   Allergen Reactions    Latex Hives    Codeine Other (comments)     Severe Headache    Morphine Hives    Ambien [Zolpidem] Other (comments)     Severe behavior changes    Dilaudid [Hydromorphone] Other (comments)     Memory loss    Other Medication Rash     BANDAIDS    Shellfish Containing Products Hives      MRI Results (most recent):  Results from Hospital Encounter encounter on 05/03/21   MRI BRAIN W WO CONT    Narrative EXAM:  MRI BRAIN W WO CONT    INDICATION:    AMS    COMPARISON:  CT head 5/6/2021. CONTRAST: 15 ml Dotarem. TECHNIQUE:    Multiplanar multisequence acquisition without and with contrast of the brain. FINDINGS:  Evaluation is significantly limited by motion. Generalized parenchymal volume loss with commensurate dilation of the sulci and  ventricular system. Periventricular and deep white matter T2/FLAIR  hyperintensities, confluent in regions, consistent with moderate chronic  microvascular ischemic disease. 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 There is no acute infarct, hemorrhage, extra-axial fluid collection, or mass  effect. There is no cerebellar tonsillar herniation. Expected arterial  flow-voids are present. No evidence of abnormal enhancement. The paranasal sinuses, mastoid air cells, and middle ears are clear. The orbital  contents are within normal limits. No significant osseous or scalp lesions are  identified. Cervical spine ACDF partially visualized extending superiorly to C4. Degenerative disc disease and facet arthropathy in the upper cervical spine. Impression Evaluation is significantly limited by motion. 1. No acute intracranial abnormality. 2. Generalized parenchymal volume loss and moderate chronic microvascular  ischemic disease. 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 Results from [place] encounter on 05/03/21   MRI BRAIN W WO CONT    Narrative EXAM:  MRI BRAIN W WO CONT    INDICATION:    AMS    COMPARISON:  CT head 5/6/2021. CONTRAST: 15 ml Dotarem. TECHNIQUE:    Multiplanar multisequence acquisition without and with contrast of the brain. FINDINGS:  Evaluation is significantly limited by motion. Generalized parenchymal volume loss with commensurate dilation of the sulci and  ventricular system. Periventricular and deep white matter T2/FLAIR  hyperintensities, confluent in regions, consistent with moderate chronic  microvascular ischemic disease. 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 There is no acute infarct, hemorrhage, extra-axial fluid collection, or mass  effect. There is no cerebellar tonsillar herniation. Expected arterial  flow-voids are present. No evidence of abnormal enhancement. The paranasal sinuses, mastoid air cells, and middle ears are clear. The orbital  contents are within normal limits. No significant osseous or scalp lesions are  identified. Cervical spine ACDF partially visualized extending superiorly to C4. Degenerative disc disease and facet arthropathy in the upper cervical spine. Impression Evaluation is significantly limited by motion. 1. No acute intracranial abnormality. 2. Generalized parenchymal volume loss and moderate chronic microvascular  ischemic disease. 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        Review of Systems:  A comprehensive review of systems was negative except for: Constitutional: positive for fatigue and malaise  Musculoskeletal: positive for myalgias, arthralgias, stiff joints, back pain and muscle weakness  Neurological: positive for memory problems and Postsurgical changes  Behvioral/Psych: positive for History of memory loss of recent memory, anxiety and depression   Vitals:    05/06/21 2031 05/06/21 2359 05/07/21 0403 05/07/21 0755   BP: (!) 158/69 (!) 179/79 (!) 149/74 (!) 153/82   Pulse: 96 89 84 82   Resp: 20 20 18 18   Temp: 98.4 °F (36.9 °C) 98.3 °F (36.8 °C) 97.5 °F (36.4 °C) 98.5 °F (36.9 °C)   SpO2: 94% 95% 96% 97%   Weight:       Height:         Objective:     I      NEUROLOGICAL EXAM:    Appearance: The patient is well developed, well nourished, provides a fair history and is in no acute distress. Mental Status: Oriented to time except the day of the month, place and person, and the president, cognitive function is slow and questionably normal and speech is fluent and no aphasia or dysarthria. Appropriate affect except for bilirubin anxious. Cranial Nerves:   Intact visual fields. Fundi are benign, disc are flat, no lesions seen on funduscopy. SAMANTHA, EOM's full, no nystagmus, no ptosis. Facial sensation is normal. Corneal reflexes are not tested. Facial movement is symmetric. Hearing is normal bilaterally. Palate is midline with normal sternocleidomastoid and trapezius muscles are normal. Tongue is midline. Neck without meningismus or bruits  Temporal arteries are not tender or enlarged  TMJ areas are not tender on palpation   Motor:  4/5 strength in upper and lower proximal and distal muscles. Normal bulk and tone. No fasciculations. Rapid alternating movement is symmetric and intact bilaterally   Reflexes:   Deep tendon reflexes 1+/4 and symmetrical.  No babinski or clonus present   Sensory:   Normal to touch, pinprick and vibration and temperature. DSS is intact   Gait:  Not tested. Tremor:   No tremor noted.    Cerebellar:  Not tested cerebellar signs present on Romberg and tandem testing and finger-nose-finger exam.   Neurovascular:  Normal heart sounds and regular rhythm, peripheral pulses decreased, and no carotid bruits. Assessment:   Active Problems:    Spinal stenosis, lumbar (5/3/2021)      Acute alteration in mental status ()      Convulsions (HCC) ()      Cerebral microvascular disease ()        Plan:     Patient with altered mental status most likely related to 2 major back operations in a row, and the patient he has some history of memory loss most suggestive of early dementia versus mild cognitive impairment versus normal memory loss of aging, but she is already almost back to normal now, will change any treatment, but to check B12 and thyroid check and follow-up in the office if her memory problem persist  I discussed with the patient and son and  in detail the different types of memory loss, and what to look for, and that the best treatment for this is good exercise stay mentally and physically active and try to exercise about 30 minutes a day and eat a vegetarian type diet. We also recommended only a vitamin every day in addition. We will follow as needed for now, she is referred her back to normal now, but she probably does have some baseline  Memory problems probably would benefit from neurocognitive testing after she recovers from this. .     Memory loss      Signed By: Paul Rose MD     May 7, 2021       CC: Laxmi Call MD  FAX: [phone]

## 2021-05-08 VITALS
HEIGHT: 62 IN | HEART RATE: 86 BPM | BODY MASS INDEX: 32.98 KG/M2 | OXYGEN SATURATION: 94 % | TEMPERATURE: 97.3 F | DIASTOLIC BLOOD PRESSURE: 60 MMHG | WEIGHT: 179.23 LBS | RESPIRATION RATE: 16 BRPM | SYSTOLIC BLOOD PRESSURE: 121 MMHG

## 2021-05-08 PROCEDURE — 74011250637 HC RX REV CODE- 250/637: Performed by: ORTHOPAEDIC SURGERY

## 2021-05-08 PROCEDURE — 97530 THERAPEUTIC ACTIVITIES: CPT

## 2021-05-08 PROCEDURE — 97116 GAIT TRAINING THERAPY: CPT

## 2021-05-08 PROCEDURE — 94760 N-INVAS EAR/PLS OXIMETRY 1: CPT

## 2021-05-08 RX ORDER — ACETAMINOPHEN 500 MG
1000 TABLET ORAL EVERY 6 HOURS
Qty: 90 TAB | Refills: 0 | Status: SHIPPED | OUTPATIENT
Start: 2021-05-08

## 2021-05-08 RX ADMIN — HYDROCHLOROTHIAZIDE 25 MG: 25 TABLET ORAL at 10:17

## 2021-05-08 RX ADMIN — Medication 1 TABLET: at 10:16

## 2021-05-08 RX ADMIN — BUPROPION HYDROCHLORIDE 200 MG: 100 TABLET, EXTENDED RELEASE ORAL at 10:16

## 2021-05-08 RX ADMIN — OXYBUTYNIN CHLORIDE 5 MG: 5 TABLET ORAL at 10:17

## 2021-05-08 RX ADMIN — PREGABALIN 150 MG: 75 CAPSULE ORAL at 10:16

## 2021-05-08 RX ADMIN — LISINOPRIL 40 MG: 20 TABLET ORAL at 10:17

## 2021-05-08 RX ADMIN — DICYCLOMINE HYDROCHLORIDE 10 MG: 10 CAPSULE ORAL at 10:17

## 2021-05-08 RX ADMIN — POLYETHYLENE GLYCOL 3350 17 G: 17 POWDER, FOR SOLUTION ORAL at 10:16

## 2021-05-08 RX ADMIN — Medication 2000 UNITS: at 10:17

## 2021-05-08 RX ADMIN — LORATADINE 10 MG: 10 TABLET ORAL at 10:17

## 2021-05-08 RX ADMIN — DOCUSATE SODIUM 50MG AND SENNOSIDES 8.6MG 1 TABLET: 8.6; 5 TABLET, FILM COATED ORAL at 10:16

## 2021-05-08 RX ADMIN — Medication 1000 MG: at 10:17

## 2021-05-08 RX ADMIN — PANTOPRAZOLE SODIUM 40 MG: 40 TABLET, DELAYED RELEASE ORAL at 06:41

## 2021-05-08 RX ADMIN — Medication 1000 MG: at 04:15

## 2021-05-08 RX ADMIN — Medication 10 ML: at 06:40

## 2021-05-08 NOTE — PROCEDURES
[place] [address]  EEG    Name:  Vivek aJckman  MR#:  [id number]  :  1950  ACCOUNT #:  [de-identified]  DATE OF SERVICE:  2021    CLINICAL INDICATION:  The patient is a 49-year-old female with a history of altered mental status after prolonged surgeries. EEG to rule out encephalopathy, rule out cortical abnormality. EEG CLASSIFICATION:  Dysrhythmia grade I, generalized. DESCRIPTION OF THE RECORD:  This is a 16-channel prolonged EEG recording on the patient with the patient awake and sleep. The patient did have a posteriorly located occipital alpha rhythm of 8-9 Hz that did attenuate with eye opening. During the recording, there was a mild increase in generalized 2-6 Hz activity seen in generalized fashion, but no clear areas of focal slowing, no spike or spike-and-wave discharges seen. The patient did have brief stages of sleep with K complexes and sleep spindles seen in central head regions during sleep. INTERPRETATION:  This is a mildly abnormal electroencephalogram due to the generalized slowing seen most consistent with diffuse encephalopathy of toxic, metabolic, or degenerative type. Clinical correlation recommended.       Denise Mejia MD      [id number]  D:  2021 21:58  T:  2021 4:37  JOB #:  [id number]  CC:  Ethel Andersen MD

## 2021-05-08 NOTE — PROGRESS NOTES
Problem: Mobility Impaired (Adult and Pediatric)  Goal: *Acute Goals and Plan of Care (Insert Text)  Description: FUNCTIONAL STATUS PRIOR TO ADMISSION: Patient was independent and active without use of straight cane. Patient with several recent falls. HOME SUPPORT PRIOR TO ADMISSION: The patient lived with  but did not require assist.    Physical Therapy Goals  Initiated 5/4/2021    1. Patient will move from supine to sit and sit to supine  in bed with modified independence within 4 days. 2. Patient will perform sit to stand with independence within 4 days. 3. Patient will ambulate with supervision/set-up for 150 feet with the least restrictive device within 4 days. 4. Patient will ascend/descend 4 stairs with 1 handrail(s) with minimal assistance/contact guard assist within 4 days. 5. Patient will verbalize and demonstrate understanding of spinal precautions (No bending, lifting greater than 5 lbs, or twisting; log-roll technique; frequent repositioning as instructed) within 4 days. Outcome: Progressing Towards Goal   PHYSICAL THERAPY TREATMENT  Patient: David Azar (59 y.o. female)  Date: 5/8/2021  Diagnosis: Spinal stenosis, lumbar [M48.061] <principal problem not specified>  Procedure(s) (LRB):  ROBOTIC ASSISTED L4-S1 POSTERIOR FUSION WITH BONE MARROW ASPIRATION FROM ILIAC CREST (N/A) 4 Days Post-Op  Precautions: Spinal(quick draw brace) No bending, no lifting greater than 5 lbs, no twisting, log-roll technique, repositioning every 20-30 min except when sleeping, brace when OOB (if ordered)  Chart, physical therapy assessment, plan of care and goals were reviewed. ASSESSMENT  Patient continues with skilled PT services and is progressing towards goals. Pt received in bed,  and daughter present. Pt able to recall her back precautions with prompting. Pt demonstrates all mobility at cga to S. Gait with short, shuffled steps initially, but improved over time.  Pt able to don her brace with set up. Verbal cues for pt to adhere to no twisting precaution. Gait distance tolerance improved and pt reports she is feeling better. Pt left sitting in chair, set up for lunch. .     Current Level of Function Impacting Discharge (mobility/balance):   Bed Mobility:  Log Rolling: Contact guard assistance(verbal cues for technique)  Supine to Sit: Contact guard assistance; Additional time  Scooting: Supervision  Transfers:  Sit to Stand: Stand-by assistance  Stand to Sit: Supervision  Ambulation/Gait Training:  Distance (ft): 150 Feet (ft)  Assistive Device: Gait belt;Walker, rolling  Ambulation - Level of Assistance: Stand-by assistance;Contact guard assistance     Other factors to consider for discharge: intermittent confusion, good family support         PLAN :  Patient continues to benefit from skilled intervention to address the above impairments. Continue treatment per established plan of care. to address goals. Recommendation for discharge: (in order for the patient to meet his/her long term goals)  Physical therapy at least 2 days/week in the home     This discharge recommendation:  Has been made in collaboration with the attending provider and/or case management    IF patient discharges home will need the following DME: patient owns DME required for discharge       SUBJECTIVE:   Patient stated I was running down this curry last night.     OBJECTIVE DATA SUMMARY:   Critical Behavior:  Neurologic State: Alert, Appropriate for age, Eyes open to stimulus, Eyes open to pain, Eyes open spontaneously, Eyes open to voice  Orientation Level: Oriented X4  Cognition: Appropriate decision making, Appropriate for age attention/concentration, Appropriate safety awareness, Follows commands  Safety/Judgement: Decreased awareness of need for safety, Decreased awareness of need for assistance, Fall prevention    Spinal diagnosis intervention:  The patient stated 2/3 back precautions when prompted. Reviewed all 3 back precautions, log roll technique, and sitting for 30 minutes at a time. Functional Mobility Training:    Bed Mobility:  Log Rolling: Contact guard assistance(verbal cues for technique)  Supine to Sit: Contact guard assistance; Additional time     Scooting: Supervision        Transfers:  Sit to Stand: Stand-by assistance  Stand to Sit: Supervision               Balance:  Sitting: Intact  Sitting - Static: Good (unsupported)  Sitting - Dynamic: Good (unsupported)  Standing: Intact; With support  Standing - Static: Constant support;Good  Standing - Dynamic : Constant support;Good  Ambulation/Gait Training:  Distance (ft): 150 Feet (ft)  Assistive Device: Gait belt;Walker, rolling  Ambulation - Level of Assistance: Stand-by assistance;Contact guard assistance        Gait Abnormalities: Decreased step clearance;Shuffling gait        Base of Support: Widened     Speed/Xiomara: Pace decreased (<100 feet/min); Shuffled  Step Length: Left shortened;Right shortened         Pain Rating:  Reported back pain, but didn't quantify    Activity Tolerance:   Good    After treatment patient left in no apparent distress:   Sitting in chair, Call bell within reach, Bed / chair alarm activated, and Caregiver / family present    COMMUNICATION/COLLABORATION:   The patients plan of care was discussed with: Registered nurse.      Prisca Pepe, PT   Time Calculation: 23 mins

## 2021-05-08 NOTE — DISCHARGE INSTRUCTIONS
[facility]    Discharge Instruction Sheet: POSTERIOR SPINAL FUSION    DR. Kaelyn Lunsford    Pain control:   Typically, we will prescribe a narcotic usually 1-2 tabs every four hours is    sufficient for the pain. Most patients need this only for the first few weeks. You   should discontinue this as the pain decreases. You should not drive while taking any narcotic pain medications. Constipation  Pain medicines and anesthesia can be constipating-this can be prevented by gentle physical activity and drinking plenty of fluid. It should be treated with over-the-counter medications such as Miralax or suppositories, and/or Fleets enema. You should have a bowel movement at least every other day following surgery. Incision care     Keep this area clean and dry. Your dressing is designed to stay in place for 5-7 days. You will be sent home with one additional dressing to change at that time. Leave this new dressing in place until our follow up visit in the office in about 10-14 days. If staples are in place, they should be removed about 14-20 days after surgery. You may shower with this impermeable dressing in place. DO NOT take a tub bath or go swimming until cleared by your doctor. DO NOT apply lotions, oils, or creams to incision. To increase and promote healing:   Stop Smoking (or at least cut back on smoking).  Eat a well-balanced diet (high in protein and vitamin C)   If your appetite is poor, consider nutritional supplements like Ensure, Glucerna, or [place] Instant Breakfast.   If you are diabetic, controlling you blood sugars is very important to prevent infection and promote wound healing. Nutrition:   If you were on a supplement such as Ensure or Glucerna) while in the hospital, please continue using them with each meal for the next 30 days.    Eat a well-balanced diet - High in protein, high in vitamins and minerals, especially vitamin C and zinc.     Restrictions:   Remember your \"BLT's\"    1. Limited bending at waist    2. Lift no more than 10 pounds    3. No Twisting     If you were given a brace, wear it when out of bed. Warning signs : Please call your physician immediately at [phone] if you have   Bleeding from incision that is constant.  Change in mental status (unusual behavior or confusion)   If your incision develops redness or swelling   Change in wound drainage (increase in amount, color, or foul odor)   [place] over 101.5 degrees Fahrenheit    Headache that is not relieved with pain medication   Tenderness or redness in the calf of your leg    Emergency: CALL 911 if you have   Shortness of breath   Chest pain   Localized chest pain when coughing or taking a deep breath    Follow-up  Please call Dr. Mila Lees office for a follow up appointment in 2-3 weeks at [phone]. You can return to work when cleared by a physician. Call your primary care office to Follow up regarding blood pressure and re-check mental status    Consider FU with Dr Minor(neurologist) if memory issues persist       During normal business hours you may reach Dr. Ethan Vasques' team directly at [phone] if you have concerns or questions.     Sagrario Layne

## 2021-05-08 NOTE — PROGRESS NOTES
Ortho Progress    S: doing well, has been up and about, minimal pain, passing gas, ? BM    O:  Soft BPs this PM, 90s/40s, was ok prior to AM BP meds    Incisions c/d/i  Grossly NVID  -belly soft and nt    A/P: postop s/p A/P spinal fusion  -doing well with pain  -mild metabolic encepaholopathy per EEG  -working with therapy  -will evaluate orthostatic BPs  -dispo pending above, they would like to go home this weekend

## 2021-05-08 NOTE — PROGRESS NOTES
End of Shift Note    Bedside shift change report given to  (oncoming nurse) by Mony Toussaint (offgoing nurse). Report included the following information SBAR, Kardex, Intake/Output, MAR, Recent Results and Med Rec Status    Shift worked:  0700 - 2300     Shift summary and any significant changes:     pt still had periodic confusion throughout the day      Concerns for physician to address:  none     Zone phone for oncoming shift:          Activity:  Activity Level: Up with Assistance  Number times ambulated in hallways past shift: 1  Number of times OOB to chair past shift: 1    Cardiac:   Cardiac Monitoring: No      Cardiac Rhythm: Sinus Rhythm    Access:   Current line(s): PIV     Genitourinary:   Urinary status: voiding    Respiratory:   O2 Device: None (Room air)  Chronic home O2 use?: NO  Incentive spirometer at bedside: NO     GI:  Last Bowel Movement Date: 05/02/21  Current diet:  DIET REGULAR  Passing flatus: YES  Tolerating current diet: YES       Pain Management:   Patient states pain is manageable on current regimen: YES    Skin:  Desmond Score: 18  Interventions: increase time out of bed    Patient Safety:  Fall Score:  Total Score: 7  Interventions: pt to call before getting OOB  High Fall Risk: Yes    Length of Stay:  Expected LOS: 3d 19h  Actual LOS: [address]

## 2021-05-08 NOTE — PROGRESS NOTES
Discharge instructions reviewed with the patient, daughter, and the . All able to verbalize an understanding. All personal belongings with the patient. The patient left via wheelchair.

## 2021-05-17 NOTE — DISCHARGE SUMMARY
Discharge Summary      Patient ID:  Velma Ormond  [id number]  83 y.o.  1950    Allergies: Latex, Codeine, Morphine, Ambien [zolpidem], Dilaudid [hydromorphone], Other medication, and Shellfish containing products    Admit date: 5/3/2021    Discharge date and time: 5/8/2021  6:59 PM     Admitting Physician: Mary Mendez MD     Discharge Physician: On Call    * Admission Diagnoses: Spinal stenosis, lumbar [M48.061]    * Discharge Diagnoses:   Hospital Problems as of 5/8/2021 Date Reviewed: 5/4/2021          Codes Class Noted - Resolved POA    Acute alteration in mental status ICD-10-CM: R41.82  ICD-9-CM: 780.97  Unknown - Present Yes        Convulsions ([facility] [address] ICD-10-CM: R56.9  ICD-9-CM: 780.39  Unknown - Present Yes        Cerebral microvascular disease ICD-10-CM: I67.89  ICD-9-CM: 437.8  Unknown - Present Yes        Spinal stenosis, lumbar ICD-10-CM: M48.061  ICD-9-CM: 724.02  5/3/2021 - Present Unknown              Surgeon: Odessa Rosado    Preoperative Medical Clearance: PCP    * Procedure: Procedure(s):  ROBOTIC ASSISTED L4-S1 POSTERIOR FUSION WITH BONE MARROW ASPIRATION FROM ILIAC CREST           Perioperative Antibiotics: Ancef  ___                                                Vancomycin  ___      Postoperative Pain Management:  Oxycodone    DVT Prophylaxis:  HERNAN Hose                                  Plexi-Pulse    Postoperative transfusions:     none Banked PRBCs     Post Op complications: none    Hemoglobin at discharge: ___    * Discharge Condition: good  Wound appears to be healing without any evidence of infection. Physical Therapy started on the day following surgery and progressed to independent ambulation with the aid of a walker. At the time of discharge, able to go up and down stairs and had understanding of precautions needed following surgery.       PT at time of DC: ___degrees    * Discharged to: Home    * Follow-up Care/Discharge instructions:  - Anticoagulate with:   - Resume pre hospital diet            - Resume home medications per medical continuation form    - Follow up in office as scheduled       Signed:  Moriah Rod MD  5/17/2021  4:29 PM

## 2021-05-17 NOTE — PROGRESS NOTES
Physician Progress Note      PATIENT:               Fazal Trevino  CSN #:                  [id number]  :                       1950  ADMIT DATE:       5/3/2021 9:43 AM  DISCH DATE:        2021 6:59 PM  RESPONDING  PROVIDER #:        Tessie Guillen MD          QUERY TEXT:    Dr. Soni Earl:    Pt admitted S/p L4-5 LATERAL FUSION. Noted documentation of Mild metabolic encephalopathy per EEG in Hand Surgery consult note dated . Patient was noted to have confusion following surgery and a Neuro consult was called to see the patient. Neuro consult noted: \" acute confusion/AMS most likely related to 2 major back operations in a row. .. back to normal now. \" After  further study, can the mild metabolic encephalopathy be clarified within the progress notes and discharge summary if you are treating and/or evaluating any of the following: The medical record reflects the following:  Risk Factors: Hx:  S/p  L4-5 lateral fusion (5/3/21) and L4-S1 posterior fusion (21)  Clinical Indicators: CT HEAD: neg. .. MRI BRAIN: Neg. ..NP noted: Nursing reports confusion on arrival to unit yesterday which has improved overnight Able to answer orientation questions appropriately but still intermittently confused today . ......d/w nursing spoke with patients  yesterday who stated that pt has history of confusion postop. per nursing  also stated he is not comfortable caring for her. pt pulled iv last night. requesting to use BSC. ...... altered mental status most likely related to 2 major back operations in a row, and the patient he has some history of memory loss most suggestive of early dementia versus mild cognitive impairment versus normal memory loss of aging, but she is already almost back to normal now  Treatment: Neuro  consult; Vitamin daily; CT HEAD; MRI BRAIN;      Thank you,    Kiki Dias  [facility]    Options provided:  -- Toxic encephalopathy 2/2 narcotic and/or anesthesia  -- Metabolic encephalopathy, please list cause  -- Encephalopathy unspecified post-op delirium due to surgery and anesthesia  -- Other - I will add my own diagnosis  -- Disagree - Not applicable / Not valid  -- Disagree - Clinically unable to determine / Unknown  -- Refer to Clinical Documentation Reviewer    PROVIDER RESPONSE TEXT:    The patient has encephalopathy unspecified post-op delirium due to surgery and anesthesia.     Query created by: King Pickett on 5/13/2021 1:19 PM      Electronically signed by:  Anne-Marie Gu MD 5/17/2021 4:36 PM

## 2021-05-25 ENCOUNTER — VIRTUAL VISIT (OUTPATIENT)
Dept: FAMILY MEDICINE CLINIC | Age: 71
End: 2021-05-25
Payer: MEDICARE

## 2021-05-25 DIAGNOSIS — F05 POSTOPERATIVE DELIRIUM: ICD-10-CM

## 2021-05-25 DIAGNOSIS — Z09 HOSPITAL DISCHARGE FOLLOW-UP: Primary | ICD-10-CM

## 2021-05-25 DIAGNOSIS — Z98.890 STATUS POST LUMBAR SPINE SURGERY FOR DECOMPRESSION OF SPINAL CORD: ICD-10-CM

## 2021-05-25 DIAGNOSIS — F33.2 SEVERE EPISODE OF RECURRENT MAJOR DEPRESSIVE DISORDER, WITHOUT PSYCHOTIC FEATURES (HCC): ICD-10-CM

## 2021-05-25 PROCEDURE — 99495 TRANSJ CARE MGMT MOD F2F 14D: CPT | Performed by: FAMILY MEDICINE

## 2021-05-25 PROCEDURE — G8427 DOCREV CUR MEDS BY ELIG CLIN: HCPCS | Performed by: FAMILY MEDICINE

## 2021-05-25 PROCEDURE — 1111F DSCHRG MED/CURRENT MED MERGE: CPT | Performed by: FAMILY MEDICINE

## 2021-05-25 PROCEDURE — 99213 OFFICE O/P EST LOW 20 MIN: CPT | Performed by: FAMILY MEDICINE

## 2021-05-25 RX ORDER — TRAMADOL HYDROCHLORIDE 50 MG/1
50 TABLET ORAL
Qty: 28 TABLET | Refills: 0 | Status: SHIPPED | OUTPATIENT
Start: 2021-05-25 | End: 2021-06-01

## 2021-05-25 NOTE — PROGRESS NOTES
Transitional Care Management Progress Note    Patient: Holly Mayes  : 1950  PCP: Bruce Lutz MD    Date of office visit: 2021   Date of admission: 5/3/21  Date of discharge: 21  Hospital: [facility]    Call initiated w/i 2 business dates of discharge: *No response documented in the last 14 days   Date of the most recent call to the patient: *No documented post hospital discharge outreach found in the last 14 days      Assessment/Plan:     Diagnoses and all orders for this visit:    1. Hospital discharge follow-up  -     DE DISCHARGE MEDS RECONCILED W/ CURRENT OUTPATIENT MED LIST    2. Status post lumbar spine surgery for decompression of spinal cord  -     traMADoL (ULTRAM) 50 mg tablet; Take 1 Tablet by mouth every six (6) hours as needed for Pain for up to 7 days. Max Daily Amount: 200 mg.    3. Postoperative delirium    4. Severe episode of recurrent major depressive disorder, without psychotic features ([facility] [address]      The complexity of medical decision making for this patient's transitional care is moderate   Follow-up and Dispositions    · Return in 1 month (on 2021), or if symptoms worsen or fail to improve. Subjective:   Holly Mayes is a 65 y.o. female presenting today for follow-up after hospital discharge. This encounter and supporting documentation was reviewed if available. Medication reconciliation was performed today. The main problem requiring admission was Robotic assisted L4-S1 posterior fusion with bone marrow aspiration from iliac crest with Dr. Burt Rose. Complications during admission: Delirium. Patient was seen by Dr. Ania Monet in hospital given concerns about altered mental status and was thought to be related to 2 major back operations and well but also noted some mild ongoing memory loss suggestive of early dementia versus mild cognitive impairment versus normal aging. Did had EEG which showed mild metabolic encephalopathy. Did have some issues with orthostatic hypotension. On oxycodone for pain management    Currently having a lot of pain. Not sleeping well. Has tried Naproxen and APAP. Interval history/Current status: stable    Medications marked \"taking\" at this time:  Prior to Admission medications    Medication Sig Start Date End Date Taking? Authorizing Provider   traMADoL (ULTRAM) 50 mg tablet Take 1 Tablet by mouth every six (6) hours as needed for Pain for up to 7 days. Max Daily Amount: 200 mg. 5/25/21 6/1/21 Yes Francy Meyer MD   acetaminophen (TYLENOL) 500 mg tablet Take 2 Tabs by mouth every six (6) hours. 5/8/21  Yes Jose Johnson PA-C   calcium-vitamin D (OS-TRUE +D3) 500 mg-200 unit per tablet 1 Tab. Yes Provider, Historical   traZODone (DESYREL) 50 mg tablet Take 50 mg by mouth nightly as needed. Yes Provider, Historical   buPROPion SR (WELLBUTRIN, ZYBAN) 200 mg SR tablet TAKE 1 TABLET TWICE A DAY (REPLACES PREVIOUS DOSE) 2/27/21  Yes Francy Meyer MD   diclofenac EC (VOLTAREN) 75 mg EC tablet Take 1 Tab by mouth two (2) times daily as needed for Pain. Avoid daily use if possible due to increased risk of kidney problems and heart problems. 2/27/21  Yes Francy Meyer MD   dicyclomine (BENTYL) 10 mg capsule TAKE 2 CAPSULES FOUR TIMES A DAY AS NEEDED 2/27/21  Yes Francy Meyer MD   estradioL (Estrace) 0.01 % (0.1 mg/gram) vaginal cream APPLY TO VAGINAL 2 TIMES PER WEEK WITH FINGERTIP 2/27/21  Yes Francy Meyer MD   lisinopriL (PRINIVIL, ZESTRIL) 40 mg tablet TAKE 1 TABLET DAILY 2/27/21  Yes Francy Meyer MD   oxybutynin (DITROPAN) 5 mg tablet TAKE 1 TABLET TWICE A DAY 2/27/21  Yes Francy Meyer MD   pantoprazole (PROTONIX) 40 mg tablet TAKE 1 TABLET DAILY 2/27/21  Yes Francy Meyer MD   fexofenadine (ALLEGRA) 180 mg tablet Take 180 mg by mouth daily as needed for Allergies.    Yes Provider, Historical   polyethylene glycol (MIRALAX) 17 gram packet Take 17 g by mouth daily. 6/25/20  Yes Provider, Historical   cholecalciferol, vitamin D3, (Vitamin D3) 50 mcg (2,000 unit) tab Take 1 Tab by mouth daily. Yes Provider, Historical   hydroCHLOROthiazide (HYDRODIURIL) 25 mg tablet TAKE 1 TABLET DAILY 6/21/18  Yes Lucy Rocha MD   sucralfate (CARAFATE) 1 gram tablet Take 1 g by mouth three (3) times daily as needed. Yes Provider, Historical   diclofenac (VOLTAREN) 1 % gel Apply 2 g to affected area four (4) times daily as needed. 4/18/18  Yes Hoda Romero MD   aspirin delayed-release 81 mg tablet Take 81 mg by mouth two (2) times a week. Indications: \"good health\"   Yes Provider, Historical   pregabalin (Lyrica) 150 mg capsule Take 150 mg by mouth two (2) times a day. Provider, Historical   cyclobenzaprine (FLEXERIL) 5 mg tablet TAKE 1 TABLET NIGHTLY  Patient not taking: Reported on 5/25/2021 4/16/21   Christopher Ramirez MD      ROS  Gen - no fever/chills  Resp - no dyspnea or cough  CV - no chest pain or KASPER  Rest per HPI    Patient Active Problem List   Diagnosis Code    IBS (irritable bowel syndrome) K58.9    Fibromyalgia M79.7    Hiatal hernia K44.9    GERD (gastroesophageal reflux disease) K21.9    Hypercholesterolemia E78.00    Diverticulitis K57.92    Depression F32.9    Essential hypertension I10    Encounter for medication monitoring Z51.81    DDD (degenerative disc disease), cervical M50.30    Spinal stenosis of lumbar region with neurogenic claudication M48.062    Non morbid obesity E66.9    Depression, major, severe recurrence ([address] F33.2    Spinal stenosis, lumbar M48.061    Acute alteration in mental status R41.82    Convulsions ([address] R56.9    Cerebral microvascular disease I67.89     Current Outpatient Medications   Medication Sig Dispense Refill    traMADoL (ULTRAM) 50 mg tablet Take 1 Tablet by mouth every six (6) hours as needed for Pain for up to 7 days.  Max Daily Amount: 200 mg. 28 Tablet 0    acetaminophen (TYLENOL) 500 mg tablet Take 2 Tabs by mouth every six (6) hours. 90 Tab 0    calcium-vitamin D (OS-TRUE +D3) 500 mg-200 unit per tablet 1 Tab.  traZODone (DESYREL) 50 mg tablet Take 50 mg by mouth nightly as needed.  buPROPion SR (WELLBUTRIN, ZYBAN) 200 mg SR tablet TAKE 1 TABLET TWICE A DAY (REPLACES PREVIOUS DOSE) 180 Tab 3    diclofenac EC (VOLTAREN) 75 mg EC tablet Take 1 Tab by mouth two (2) times daily as needed for Pain. Avoid daily use if possible due to increased risk of kidney problems and heart problems. 60 Tab 2    dicyclomine (BENTYL) 10 mg capsule TAKE 2 CAPSULES FOUR TIMES A DAY AS NEEDED 360 Cap 7    estradioL (Estrace) 0.01 % (0.1 mg/gram) vaginal cream APPLY TO VAGINAL 2 TIMES PER WEEK WITH FINGERTIP 42.5 g 2    lisinopriL (PRINIVIL, ZESTRIL) 40 mg tablet TAKE 1 TABLET DAILY 90 Tab 3    oxybutynin (DITROPAN) 5 mg tablet TAKE 1 TABLET TWICE A  Tab 1    pantoprazole (PROTONIX) 40 mg tablet TAKE 1 TABLET DAILY 90 Tab 3    fexofenadine (ALLEGRA) 180 mg tablet Take 180 mg by mouth daily as needed for Allergies.  polyethylene glycol (MIRALAX) 17 gram packet Take 17 g by mouth daily.  cholecalciferol, vitamin D3, (Vitamin D3) 50 mcg (2,000 unit) tab Take 1 Tab by mouth daily.  hydroCHLOROthiazide (HYDRODIURIL) 25 mg tablet TAKE 1 TABLET DAILY 90 Tab 2    sucralfate (CARAFATE) 1 gram tablet Take 1 g by mouth three (3) times daily as needed.  diclofenac (VOLTAREN) 1 % gel Apply 2 g to affected area four (4) times daily as needed. 100 g 3    aspirin delayed-release 81 mg tablet Take 81 mg by mouth two (2) times a week. Indications: \"good health\"      pregabalin (Lyrica) 150 mg capsule Take 150 mg by mouth two (2) times a day.       cyclobenzaprine (FLEXERIL) 5 mg tablet TAKE 1 TABLET NIGHTLY (Patient not taking: Reported on 5/25/2021) 90 Tab 0     Past Medical History:   Diagnosis Date    Acute alteration in mental status     Arthritis     Bilateral carotid artery stenosis     Cerebral microvascular disease     Chronic kidney disease     CKD 3    Chronic pain     CHRONIC BOWEL PAIN    Convulsions (HCC)     Depression     Disturbance of memory     Diverticulitis     Fibromyalgia     GERD (gastroesophageal reflux disease)     Goiter     Hiatal hernia     Hypercholesterolemia     Hypertension     IBS (irritable bowel syndrome)     CONSTIPATION, CHRONIC SINCE HER 20s    Insomnia     TAKES TRAZODONE NIGHTLY    Migraine     REDUCED IN FREQUENCY AND SEVERITY SINCE MENOPAUSE    Rectocele           Objective: There were no vitals taken for this visit. Physical exam:  General appearance - alert, well appearing, and in no distress  Eyes -sclera anicteric, no discharge  HEENT normocephalic, atraumatic, moist mucous membranes, no visualized neck mass  Chest -normal respiratory effort, no visualized signs of respiratory distress  Neurological - alert, awake, normal speech, no focal findings or movement disorder noted  Psych - normal mood and affect  Skin no apparent lesions     We discussed the expected course, resolution and complications of the diagnosis(es) in detail. Medication risks, benefits, costs, interactions, and alternatives were discussed as indicated. I advised her to contact the office if her condition worsens, changes or fails to improve as anticipated. She expressed understanding with the diagnosis(es) and plan.      Geneva Weber MD

## 2021-05-25 NOTE — PROGRESS NOTES
Chief Complaint   Patient presents with   [facility] Follow Up     L4-5  Fusion,Discharged 5/8/21 [facility]   1. Have you been to the ER, urgent care clinic since your last visit? Hospitalized since your last visit? Yes Where: [address]    2. Have you seen or consulted any other health care providers outside of the [address] [place] [address] since your last visit? Include any pap smears or colon screening.  Yes Where: [facility]

## 2021-05-28 DIAGNOSIS — M79.7 FIBROMYALGIA: ICD-10-CM

## 2021-05-30 RX ORDER — DULOXETIN HYDROCHLORIDE 60 MG/1
CAPSULE, DELAYED RELEASE ORAL
Qty: 180 CAPSULE | Refills: 3 | Status: SHIPPED | OUTPATIENT
Start: 2021-05-30 | End: 2022-08-17

## 2021-07-28 ENCOUNTER — DOCUMENTATION ONLY (OUTPATIENT)
Dept: FAMILY MEDICINE CLINIC | Age: 71
End: 2021-07-28

## 2021-08-03 ENCOUNTER — VIRTUAL VISIT (OUTPATIENT)
Dept: FAMILY MEDICINE CLINIC | Age: 71
End: 2021-08-03
Payer: MEDICARE

## 2021-08-03 DIAGNOSIS — R41.3 MEMORY DEFICIT: Primary | ICD-10-CM

## 2021-08-03 DIAGNOSIS — F33.1 MODERATE EPISODE OF RECURRENT MAJOR DEPRESSIVE DISORDER (HCC): ICD-10-CM

## 2021-08-03 DIAGNOSIS — I67.89 CEREBRAL MICROVASCULAR DISEASE: ICD-10-CM

## 2021-08-03 PROCEDURE — G9717 DOC PT DX DEP/BP F/U NT REQ: HCPCS | Performed by: FAMILY MEDICINE

## 2021-08-03 PROCEDURE — 1100F PTFALLS ASSESS-DOCD GE2>/YR: CPT | Performed by: FAMILY MEDICINE

## 2021-08-03 PROCEDURE — G8756 NO BP MEASURE DOC: HCPCS | Performed by: FAMILY MEDICINE

## 2021-08-03 PROCEDURE — G8399 PT W/DXA RESULTS DOCUMENT: HCPCS | Performed by: FAMILY MEDICINE

## 2021-08-03 PROCEDURE — 1090F PRES/ABSN URINE INCON ASSESS: CPT | Performed by: FAMILY MEDICINE

## 2021-08-03 PROCEDURE — 3288F FALL RISK ASSESSMENT DOCD: CPT | Performed by: FAMILY MEDICINE

## 2021-08-03 PROCEDURE — G8427 DOCREV CUR MEDS BY ELIG CLIN: HCPCS | Performed by: FAMILY MEDICINE

## 2021-08-03 PROCEDURE — 99214 OFFICE O/P EST MOD 30 MIN: CPT | Performed by: FAMILY MEDICINE

## 2021-08-03 PROCEDURE — G9899 SCRN MAM PERF RSLTS DOC: HCPCS | Performed by: FAMILY MEDICINE

## 2021-08-03 PROCEDURE — G9711 PT HX TOT COL OR COLON CA: HCPCS | Performed by: FAMILY MEDICINE

## 2021-08-03 RX ORDER — ROSUVASTATIN CALCIUM 20 MG/1
20 TABLET, COATED ORAL
COMMUNITY
Start: 2021-05-28 | End: 2022-07-12

## 2021-08-03 NOTE — PROGRESS NOTES
Lida Mas (: 1950) is a 65 y.o. female, established patient, here for evaluation of the following chief complaint(s):   Memory Loss (Worse after surgery)       ASSESSMENT/PLAN: Memory deficits seemingly worse after second lumbar spine surgery in 2021. Suspicious for dementia. Discussed with patient and family and reviewed neuro notes again. Sending patient to check back in with Dr. Mya Neumann and sending to Dr. Michael Yanez for neuropsych testing. Depression symptoms for over 10 years and did not appear worse than at baseline but may be contributing. Discussed potentially adjusting meds further but will continue Cymbalta and Wellbutrin at this point. Encouraged decreasing dose of gabapentin down to 300 mg daily as this may also be contributing. Below is the assessment and plan developed based on review of pertinent labs, studies, and medications. 1. Memory deficit  -     REFERRAL TO NEUROLOGY  -     REFERRAL TO NEUROPSYCHOLOGY  2. Moderate episode of recurrent major depressive disorder ([facility] [address]  -     REFERRAL TO NEUROPSYCHOLOGY  3. Cerebral microvascular disease  -     REFERRAL TO NEUROLOGY      Return in about 3 months (around 11/3/2021), or if symptoms worsen or fail to improve. SUBJECTIVE/OBJECTIVE:    Patient with concerns of trouble with memory that has been worse since having her second spinal surgery. We reviewed her hospitalization back in 2021 with her postop delirium. She had neuro evaluation at that time and had an EEG which showed mild metabolic encephalopathy. There were concerns about mild ongoing memory loss that was either related to early dementia versus MCI versus normal aging. Seeing some increased trouble with memory now. Ex asking questions that she prev knew the answers or forgetting places that she should know. Still dealing with depression. Largely feels related to pain. Very little energy. Still trouble with concentration and attention.  Not sleeping well - gets about 6 hrs per night and takes naps occ. No SI/HI. Still taking Cymbalta 60 mg twice daily and Wellbutrin 200 mg twice daily. Has not been on atypical antipsychotics in the past.  Was previously seeing psychiatry many years ago. ROS per HPI    Patient-Reported Vitals 8/3/2021   Patient-Reported Pulse 109   Patient-Reported Systolic  539   Patient-Reported Diastolic 79     Physical exam:  General appearance - alert, well appearing, and in no distress  Eyes -sclera anicteric, no discharge  HEENT normocephalic, atraumatic, moist mucous membranes, no visualized neck mass  Chest -normal respiratory effort, no visualized signs of respiratory distress  Neurological - alert, awake, normal speech, no focal findings or movement disorder noted  Psych - normal mood and affect  Skin no apparent lesions    On this date 08/03/2021 I have spent 30 minutes reviewing previous notes, test results and face to face (virtual) with the patient discussing the diagnosis and importance of compliance with the treatment plan as well as documenting on the day of the visit. Nelly Reyna, was evaluated through a synchronous (real-time) audio-video encounter. The patient (or guardian if applicable) is aware that this is a billable service. Verbal consent to proceed has been obtained within the past 12 months. The visit was conducted pursuant to the emergency declaration under the [address] [place] [address] and the [employer] Act. Patient identification was verified, and a caregiver was present when appropriate. The patient was located in a state where the provider was credentialed to provide care. An electronic signature was used to authenticate this note.   -- Shelbi Ontiveros MD

## 2021-08-03 NOTE — PROGRESS NOTES
Chief Complaint   Patient presents with    Memory Loss     Worse after surgery   1. Have you been to the ER, urgent care clinic since your last visit? Hospitalized since your last visit? Yes Where: ED [facility] 5/3/21  Back surgery  2. Have you seen or consulted any other health care providers outside of the [address] [place] [address] since your last visit? Include any pap smears or colon screening.  Yes Where: Dr Mosqueda Marking referral to Neurology

## 2021-09-15 ENCOUNTER — OFFICE VISIT (OUTPATIENT)
Dept: FAMILY MEDICINE CLINIC | Age: 71
End: 2021-09-15
Payer: MEDICARE

## 2021-09-15 VITALS
RESPIRATION RATE: 18 BRPM | TEMPERATURE: 97.3 F | WEIGHT: 179 LBS | HEIGHT: 63 IN | SYSTOLIC BLOOD PRESSURE: 133 MMHG | DIASTOLIC BLOOD PRESSURE: 59 MMHG | HEART RATE: 88 BPM | OXYGEN SATURATION: 99 % | BODY MASS INDEX: 31.71 KG/M2

## 2021-09-15 DIAGNOSIS — Z98.890 STATUS POST LUMBAR SPINE SURGERY FOR DECOMPRESSION OF SPINAL CORD: ICD-10-CM

## 2021-09-15 DIAGNOSIS — M79.7 FIBROMYALGIA: ICD-10-CM

## 2021-09-15 DIAGNOSIS — F33.1 MODERATE EPISODE OF RECURRENT MAJOR DEPRESSIVE DISORDER (HCC): ICD-10-CM

## 2021-09-15 DIAGNOSIS — M48.062 SPINAL STENOSIS OF LUMBAR REGION WITH NEUROGENIC CLAUDICATION: ICD-10-CM

## 2021-09-15 DIAGNOSIS — Z00.00 MEDICARE ANNUAL WELLNESS VISIT, SUBSEQUENT: Primary | ICD-10-CM

## 2021-09-15 DIAGNOSIS — R41.3 MEMORY DEFICIT: ICD-10-CM

## 2021-09-15 PROCEDURE — G8417 CALC BMI ABV UP PARAM F/U: HCPCS | Performed by: FAMILY MEDICINE

## 2021-09-15 PROCEDURE — G8754 DIAS BP LESS 90: HCPCS | Performed by: FAMILY MEDICINE

## 2021-09-15 PROCEDURE — G9899 SCRN MAM PERF RSLTS DOC: HCPCS | Performed by: FAMILY MEDICINE

## 2021-09-15 PROCEDURE — 1090F PRES/ABSN URINE INCON ASSESS: CPT | Performed by: FAMILY MEDICINE

## 2021-09-15 PROCEDURE — G9717 DOC PT DX DEP/BP F/U NT REQ: HCPCS | Performed by: FAMILY MEDICINE

## 2021-09-15 PROCEDURE — 99213 OFFICE O/P EST LOW 20 MIN: CPT | Performed by: FAMILY MEDICINE

## 2021-09-15 PROCEDURE — G8399 PT W/DXA RESULTS DOCUMENT: HCPCS | Performed by: FAMILY MEDICINE

## 2021-09-15 PROCEDURE — G8536 NO DOC ELDER MAL SCRN: HCPCS | Performed by: FAMILY MEDICINE

## 2021-09-15 PROCEDURE — G8427 DOCREV CUR MEDS BY ELIG CLIN: HCPCS | Performed by: FAMILY MEDICINE

## 2021-09-15 PROCEDURE — G9711 PT HX TOT COL OR COLON CA: HCPCS | Performed by: FAMILY MEDICINE

## 2021-09-15 PROCEDURE — 1101F PT FALLS ASSESS-DOCD LE1/YR: CPT | Performed by: FAMILY MEDICINE

## 2021-09-15 PROCEDURE — G8752 SYS BP LESS 140: HCPCS | Performed by: FAMILY MEDICINE

## 2021-09-15 PROCEDURE — G0439 PPPS, SUBSEQ VISIT: HCPCS | Performed by: FAMILY MEDICINE

## 2021-09-15 RX ORDER — CITALOPRAM 40 MG/1
40 TABLET, FILM COATED ORAL DAILY
COMMUNITY
End: 2022-01-04 | Stop reason: ALTCHOICE

## 2021-09-15 NOTE — PROGRESS NOTES
Nick Chery (: 1950) is a 65 y.o. female, established patient, here for evaluation of the following chief complaint(s):  Follow-up (6 week)       ASSESSMENT/PLAN:  Doing much better after coming off gabapentin with memory and confusion issues. Depression stable. Ct other meds. Working with Ortho    Below is the assessment and plan developed based on review of pertinent history, physical exam, labs, studies, and medications. 1. Medicare annual wellness visit, subsequent  2. Memory deficit  3. Moderate episode of recurrent major depressive disorder ([facility] [address]  4. Fibromyalgia  5. Spinal stenosis of lumbar region with neurogenic claudication  6. Status post lumbar spine surgery for decompression of spinal cord      Return in about 3 months (around 12/15/2021), or if symptoms worsen or fail to improve. SUBJECTIVE/OBJECTIVE:    Initially, had concerns of trouble with memory that has been worse since having her second spinal surgery. Appeared c/w postop delirium. Neuro evaluation with EEG which showed mild metabolic encephalopathy. There were concerns about mild ongoing memory loss that was either related to early dementia versus MCI versus normal aging. At last appt, we stopped her gabapentin and she and her  feel like her sx are much improved.     Still dealing with depression. Largely feels related to pain. Very little energy. Still trouble with concentration and attention. Not sleeping well - gets about 6 hrs per night and takes naps occ. No SI/HI. Still taking Cymbalta 60 mg twice daily and Wellbutrin 200 mg twice daily. Has not been on atypical antipsychotics in the past.  Was previously seeing psychiatry many years ago. Still dealing with ongoing neuropathy.   Working on this with Ortho.     ROS per HPI    ROS  Gen - no fever/chills  Resp - no dyspnea or cough  CV - no chest pain or KASPER  Rest per HPI    Blood pressure (!) 133/59, pulse 88, temperature 97.3 °F (36.3 °C), temperature source Oral, resp. rate 18, height 5' 3\" (1.6 m), weight 179 lb (81.2 kg), SpO2 99 %. Physical Exam  General appearance - alert, well appearing, and in no distress  Eyes -sclera anicteric  Neck - supple, no significant adenopathy, no thyromegaly  Chest - clear to auscultation, no wheezes, rales or rhonchi, symmetric air entry  Heart - normal rate, regular rhythm, normal S1, S2, no murmurs, rubs, clicks or gallops  Neurological - alert, oriented, normal speech, no focal findings or movement disorder noted  Extr - no edema  Psych - normal mood and affect      On this date 09/15/2021 I have spent 20 minutes reviewing previous notes, test results and face to face with the patient discussing the diagnosis and importance of compliance with the treatment plan as well as documenting on the day of the visit. An electronic signature was used to authenticate this note. -- Lesly Goddard MD       This is the Subsequent Medicare Annual Wellness Exam, performed 12 months or more after the Initial AWV or the last Subsequent AWV    I have reviewed the patient's medical history in detail and updated the computerized patient record. Assessment/Plan   Education and counseling provided:  Are appropriate based on today's review and evaluation    1. Medicare annual wellness visit, subsequent  2. Memory deficit  3. Moderate episode of recurrent major depressive disorder ([facility] [address]  4. Fibromyalgia  5. Spinal stenosis of lumbar region with neurogenic claudication  6.  Status post lumbar spine surgery for decompression of spinal cord       Depression Risk Factor Screening     3 most recent PHQ Screens 3/27/2019   Little interest or pleasure in doing things Nearly every day   Feeling down, depressed, irritable, or hopeless Nearly every day   Total Score PHQ 2 6   Trouble falling or staying asleep, or sleeping too much Nearly every day   Feeling tired or having little energy Nearly every day   Poor appetite, weight loss, or overeating Nearly every day   Feeling bad about yourself - or that you are a failure or have let yourself or your family down Nearly every day   Trouble concentrating on things such as school, work, reading, or watching TV Nearly every day   Moving or speaking so slowly that other people could have noticed; or the opposite being so fidgety that others notice Several days   Thoughts of being better off dead, or hurting yourself in some way Several days   PHQ 9 Score 23       Alcohol Risk Screen    Do you average more than 1 drink per night or more than 7 drinks a week:  No    On any one occasion in the past three months have you have had more than 3 drinks containing alcohol:  No        Functional Ability and Level of Safety    Hearing: Hearing is good. Activities of Daily Living: The home contains: no safety equipment. Patient does total self care      Ambulation: with no difficulty     Fall Risk:  Fall Risk Assessment, last 12 mths 9/15/2021   Able to walk? Yes   Fall in past 12 months? 0   Do you feel unsteady? 0   Are you worried about falling 0   Is TUG test greater than 12 seconds? -   Is the gait abnormal? -   Number of falls in past 12 months -   Fall with injury?  -      Abuse Screen:  Patient is not abused       Cognitive Screening    Has your family/caregiver stated any concerns about your memory: yes, but much better with coming off gabapentin     Cognitive Screening: Normal - MMSE (Mini Mental Status Exam), Verbal Fluency Test    Health Maintenance Due     Health Maintenance Due   Topic Date Due    Shingrix Vaccine Age 49> (1 of 2) Never done    Low dose CT lung screening  Never done    Flu Vaccine (1) 09/01/2021       Patient Care Team   Patient Care Team:  Bernadette Stack MD as PCP - General (Family Medicine)  Bernadette Stack MD as PCP - [facility] Empaneled Provider  Nelli Perez MD (Cardiology)    History     Patient Active Problem List   Diagnosis Code    IBS (irritable bowel syndrome) K58.9    Fibromyalgia M79.7    Hiatal hernia K44.9    GERD (gastroesophageal reflux disease) K21.9    Hypercholesterolemia E78.00    Diverticulitis K57.92    Depression F32.9    Essential hypertension I10    Encounter for medication monitoring Z51.81    DDD (degenerative disc disease), cervical M50.30    Spinal stenosis of lumbar region with neurogenic claudication M48.062    Non morbid obesity E66.9    Depression, major, severe recurrence (HCC) F33.2    Spinal stenosis, lumbar M48.061    Acute alteration in mental status R41.82    Convulsions ([address] R56.9    Cerebral microvascular disease I67.89     Past Medical History:   Diagnosis Date    Acute alteration in mental status     Arthritis     Bilateral carotid artery stenosis     Cerebral microvascular disease     Chronic kidney disease     CKD 3    Chronic pain     CHRONIC BOWEL PAIN    Convulsions (HCC)     Depression     Disturbance of memory     Diverticulitis     Fibromyalgia     GERD (gastroesophageal reflux disease)     Goiter     Hiatal hernia     Hypercholesterolemia     Hypertension     IBS (irritable bowel syndrome)     CONSTIPATION, CHRONIC SINCE HER 20s    Insomnia     TAKES TRAZODONE NIGHTLY    Migraine     REDUCED IN FREQUENCY AND SEVERITY SINCE MENOPAUSE    Rectocele        Past Surgical History:   Procedure Laterality Date    COLONOSCOPY N/A 6/21/2016    COLONOSCOPY performed [facility] Gabe Medina [facility] at [facility] ENDOSCOPY    COLONOSCOPY N/A 6/27/2018    COLONOSCOPY performed by Gabe [facility]ina MD at [facility] ENDOSCOPY    ENDOSCOPY, COLON, DIAGNOSTIC  11/2010    Dr. Hardy Dahl  2011    HX CHOLECYSTECTOMY  2006    HX ENDOSCOPY      HX GI  X3  LAST ONE 12/10    COLONOSCOPY    HX GYN  1982    D&C WITH SUCTION    HX HYSTERECTOMY      HX LUMBAR FUSION  2017    HX ORTHOPAEDIC      right foot surgery    HX TONSILLECTOMY      HX TOTAL COLECTOMY  2007    Dr. Pond/ diverticulitis    IR INJ SPINE FLUORO GUIDED  8/28/2020    IR INJ SPINE FLUORO GUIDED  1/8/2021    IR INJ SPINE FLUORO GUIDED  2/5/2021     Current Outpatient Medications   Medication Sig Dispense Refill    citalopram (CELEXA) 40 mg tablet Take 40 mg by mouth daily.  OTHER Prevagan      rosuvastatin (CRESTOR) 20 mg tablet       cyclobenzaprine (FLEXERIL) 5 mg tablet TAKE 1 TABLET NIGHTLY 90 Tablet 1    diclofenac EC (VOLTAREN) 75 mg EC tablet TAKE 1 TABLET TWICE A DAY AS NEEDED FOR PAIN, AVOID DAILY USE IF POSSIBLE DUE TO INCREASED RISK OF KIDNEY PROBLEMS AND HEART PROBLEMS 60 Tablet 2    DULoxetine (CYMBALTA) 60 mg capsule TAKE 1 CAPSULE TWICE A  Capsule 3    acetaminophen (TYLENOL) 500 mg tablet Take 2 Tabs by mouth every six (6) hours. 90 Tab 0    calcium-vitamin D (OS-TRUE +D3) 500 mg-200 unit per tablet 1 Tab.  traZODone (DESYREL) 50 mg tablet Take 50 mg by mouth nightly as needed.  buPROPion SR (WELLBUTRIN, ZYBAN) 200 mg SR tablet TAKE 1 TABLET TWICE A DAY (REPLACES PREVIOUS DOSE) 180 Tab 3    dicyclomine (BENTYL) 10 mg capsule TAKE 2 CAPSULES FOUR TIMES A DAY AS NEEDED 360 Cap 7    estradioL (Estrace) 0.01 % (0.1 mg/gram) vaginal cream APPLY TO VAGINAL 2 TIMES PER WEEK WITH FINGERTIP 42.5 g 2    lisinopriL (PRINIVIL, ZESTRIL) 40 mg tablet TAKE 1 TABLET DAILY 90 Tab 3    oxybutynin (DITROPAN) 5 mg tablet TAKE 1 TABLET TWICE A  Tab 1    pantoprazole (PROTONIX) 40 mg tablet TAKE 1 TABLET DAILY 90 Tab 3    polyethylene glycol (MIRALAX) 17 gram packet Take 17 g by mouth daily.  cholecalciferol, vitamin D3, (Vitamin D3) 50 mcg (2,000 unit) tab Take 1 Tab by mouth daily.  hydroCHLOROthiazide (HYDRODIURIL) 25 mg tablet TAKE 1 TABLET DAILY 90 Tab 2    sucralfate (CARAFATE) 1 gram tablet Take 1 g by mouth three (3) times daily as needed.  aspirin delayed-release 81 mg tablet Take 81 mg by mouth two (2) times a week.  Indications: \"good health\" (Patient not taking: Reported on 8/3/2021)       Allergies   Allergen Reactions    Latex Hives    Codeine Other (comments)     Severe Headache    Morphine Hives    Ambien [Zolpidem] Other (comments)     Severe behavior changes    Dilaudid [Hydromorphone] Other (comments)     Memory loss    Other Medication Rash     BANDAIDS    Shellfish Containing Products Hives       Family History   Problem Relation Age of Onset    Cancer Father         lung and bone    Stroke Sister     Cancer Sister 76        PANCREATIC    Hypertension Mother     High Cholesterol Mother     Alzheimer Mother         late 62s early 76s    Cancer Other         COLON    Cancer Other         breast    Diabetes Maternal Aunt     Cancer Maternal Uncle         COLON    Cancer Maternal Grandfather         COLON    Ovarian Cancer Daughter 28     Social History     Tobacco Use    Smoking status: Former Smoker     Packs/day: 1.00     Years: 45.00     Pack years: 45.00     Quit date: 2007     Years since quittin.7    Smokeless tobacco: Never Used   Substance Use Topics    Alcohol use:  Yes     Alcohol/week: 0.0 standard drinks     Comment: holidays         Andrés Cowart MD

## 2021-09-15 NOTE — PROGRESS NOTES
Chief Complaint   Patient presents with    Follow-up     6 week   1. Have you been to the ER, urgent care clinic since your last visit? Hospitalized since your last visit? No    2. Have you seen or consulted any other health care providers outside of the [address] [place] [address] since your last visit? Include any pap smears or colon screening.  Yes Where: Ortho

## 2021-09-16 ENCOUNTER — TRANSCRIBE ORDER (OUTPATIENT)
Dept: SCHEDULING | Age: 71
End: 2021-09-16

## 2021-09-16 DIAGNOSIS — M48.00 SPINAL STENOSIS: ICD-10-CM

## 2021-09-16 DIAGNOSIS — M43.10 ACQUIRED SPONDYLOLISTHESIS: ICD-10-CM

## 2021-09-16 DIAGNOSIS — Z98.1 S/P LUMBAR SPINAL FUSION: ICD-10-CM

## 2021-09-16 DIAGNOSIS — M54.50 LOW BACK PAIN: Primary | ICD-10-CM

## 2021-09-26 ENCOUNTER — HOSPITAL ENCOUNTER (OUTPATIENT)
Dept: MRI IMAGING | Age: 71
Discharge: HOME | End: 2021-09-26
Attending: ORTHOPAEDIC SURGERY
Payer: MEDICARE

## 2021-09-26 DIAGNOSIS — M54.50 LOW BACK PAIN: ICD-10-CM

## 2021-09-26 DIAGNOSIS — M48.00 SPINAL STENOSIS: ICD-10-CM

## 2021-09-26 DIAGNOSIS — Z98.1 S/P LUMBAR SPINAL FUSION: ICD-10-CM

## 2021-09-26 DIAGNOSIS — M43.10 ACQUIRED SPONDYLOLISTHESIS: ICD-10-CM

## 2021-09-26 PROCEDURE — 72158 MRI LUMBAR SPINE W/O & W/DYE: CPT

## 2021-09-26 PROCEDURE — 74011250636 HC RX REV CODE- 250/636: Performed by: ORTHOPAEDIC SURGERY

## 2021-09-26 PROCEDURE — A9576 INJ PROHANCE MULTIPACK: HCPCS | Performed by: ORTHOPAEDIC SURGERY

## 2021-09-26 RX ADMIN — GADOTERIDOL 17 ML: 279.3 INJECTION, SOLUTION INTRAVENOUS at 15:10

## 2021-11-11 ENCOUNTER — TRANSCRIBE ORDER (OUTPATIENT)
Dept: SCHEDULING | Age: 71
End: 2021-11-11

## 2021-11-11 DIAGNOSIS — M54.2 NECK PAIN: Primary | ICD-10-CM

## 2021-11-17 ENCOUNTER — HOSPITAL ENCOUNTER (OUTPATIENT)
Dept: MRI IMAGING | Age: 71
Discharge: HOME | End: 2021-11-17
Attending: ORTHOPAEDIC SURGERY
Payer: MEDICARE

## 2021-11-17 DIAGNOSIS — M54.2 NECK PAIN: ICD-10-CM

## 2021-11-17 PROCEDURE — 72141 MRI NECK SPINE W/O DYE: CPT

## 2021-11-18 ENCOUNTER — OFFICE VISIT (OUTPATIENT)
Dept: NEUROLOGY | Age: 71
End: 2021-11-18
Payer: MEDICARE

## 2021-11-18 ENCOUNTER — HOSPITAL ENCOUNTER (OUTPATIENT)
Dept: GENERAL RADIOLOGY | Age: 71
Discharge: HOME | End: 2021-11-18
Payer: MEDICARE

## 2021-11-18 VITALS
SYSTOLIC BLOOD PRESSURE: 136 MMHG | HEIGHT: 63 IN | BODY MASS INDEX: 31.54 KG/M2 | HEART RATE: 70 BPM | DIASTOLIC BLOOD PRESSURE: 87 MMHG | TEMPERATURE: 97.6 F | WEIGHT: 178 LBS | OXYGEN SATURATION: 97 %

## 2021-11-18 DIAGNOSIS — I65.23 CAROTID STENOSIS, BILATERAL: ICD-10-CM

## 2021-11-18 DIAGNOSIS — I67.89 CEREBRAL MICROVASCULAR DISEASE: ICD-10-CM

## 2021-11-18 DIAGNOSIS — E53.8 B12 DEFICIENCY: ICD-10-CM

## 2021-11-18 DIAGNOSIS — E03.4 HYPOTHYROIDISM DUE TO ACQUIRED ATROPHY OF THYROID: ICD-10-CM

## 2021-11-18 DIAGNOSIS — E11.42 DIABETIC PERIPHERAL NEUROPATHY ASSOCIATED WITH TYPE 2 DIABETES MELLITUS (HCC): ICD-10-CM

## 2021-11-18 DIAGNOSIS — R41.3 DISTURBANCE OF MEMORY: ICD-10-CM

## 2021-11-18 DIAGNOSIS — G56.03 BILATERAL CARPAL TUNNEL SYNDROME: ICD-10-CM

## 2021-11-18 DIAGNOSIS — E55.9 VITAMIN D DEFICIENCY: ICD-10-CM

## 2021-11-18 DIAGNOSIS — R41.3 DISTURBANCE OF MEMORY: Primary | ICD-10-CM

## 2021-11-18 DIAGNOSIS — M54.12 CERVICAL MYELOPATHY WITH CERVICAL RADICULOPATHY (HCC): ICD-10-CM

## 2021-11-18 DIAGNOSIS — M47.22 CERVICAL RADICULOPATHY DUE TO DEGENERATIVE JOINT DISEASE OF SPINE: ICD-10-CM

## 2021-11-18 DIAGNOSIS — Q76.49 CONGENITAL CERVICAL SPINE STENOSIS: ICD-10-CM

## 2021-11-18 DIAGNOSIS — G95.9 CERVICAL MYELOPATHY WITH CERVICAL RADICULOPATHY (HCC): ICD-10-CM

## 2021-11-18 PROCEDURE — G8399 PT W/DXA RESULTS DOCUMENT: HCPCS | Performed by: PSYCHIATRY & NEUROLOGY

## 2021-11-18 PROCEDURE — 1090F PRES/ABSN URINE INCON ASSESS: CPT | Performed by: PSYCHIATRY & NEUROLOGY

## 2021-11-18 PROCEDURE — 3044F HG A1C LEVEL LT 7.0%: CPT | Performed by: PSYCHIATRY & NEUROLOGY

## 2021-11-18 PROCEDURE — G8752 SYS BP LESS 140: HCPCS | Performed by: PSYCHIATRY & NEUROLOGY

## 2021-11-18 PROCEDURE — G9717 DOC PT DX DEP/BP F/U NT REQ: HCPCS | Performed by: PSYCHIATRY & NEUROLOGY

## 2021-11-18 PROCEDURE — 2022F DILAT RTA XM EVC RTNOPTHY: CPT | Performed by: PSYCHIATRY & NEUROLOGY

## 2021-11-18 PROCEDURE — G8417 CALC BMI ABV UP PARAM F/U: HCPCS | Performed by: PSYCHIATRY & NEUROLOGY

## 2021-11-18 PROCEDURE — G8536 NO DOC ELDER MAL SCRN: HCPCS | Performed by: PSYCHIATRY & NEUROLOGY

## 2021-11-18 PROCEDURE — G8427 DOCREV CUR MEDS BY ELIG CLIN: HCPCS | Performed by: PSYCHIATRY & NEUROLOGY

## 2021-11-18 PROCEDURE — G8754 DIAS BP LESS 90: HCPCS | Performed by: PSYCHIATRY & NEUROLOGY

## 2021-11-18 PROCEDURE — G9711 PT HX TOT COL OR COLON CA: HCPCS | Performed by: PSYCHIATRY & NEUROLOGY

## 2021-11-18 PROCEDURE — G9899 SCRN MAM PERF RSLTS DOC: HCPCS | Performed by: PSYCHIATRY & NEUROLOGY

## 2021-11-18 PROCEDURE — 72052 X-RAY EXAM NECK SPINE 6/>VWS: CPT

## 2021-11-18 PROCEDURE — 1101F PT FALLS ASSESS-DOCD LE1/YR: CPT | Performed by: PSYCHIATRY & NEUROLOGY

## 2021-11-18 PROCEDURE — 99215 OFFICE O/P EST HI 40 MIN: CPT | Performed by: PSYCHIATRY & NEUROLOGY

## 2021-11-18 RX ORDER — ASPIRIN 81 MG/1
TABLET ORAL DAILY
COMMUNITY

## 2021-11-18 RX ORDER — LANOLIN ALCOHOL/MO/W.PET/CERES
50 CREAM (GRAM) TOPICAL DAILY
COMMUNITY
End: 2022-06-09 | Stop reason: ALTCHOICE

## 2021-11-18 RX ORDER — MULTIVIT WITH MINERALS/HERBS
1 TABLET ORAL DAILY
COMMUNITY

## 2021-11-18 NOTE — LETTER
11/18/2021    Patient: Eva Chery   YOB: 1950   Date of Visit: 11/18/2021     Tessie Gerardo [id number]  Via In Basket    Dear Sarah Boyd MD,      Thank you for referring Ms. Antonia Gilliland to [address] [place] [address] for evaluation. My notes for this consultation are attached. If you have questions, please do not hesitate to call me. I look forward to following your patient along with you.       Sincerely,    Antoinette Martinez MD

## 2021-11-19 NOTE — PROGRESS NOTES
Consult  REFERRED BY:  Elayne Gilford, MD    CHIEF COMPLAINT: Numbness of her hands      Subjective:     Bridget Cunha is a 66 y.o. right-handed  female seen as a new patient to me, at the request of Dr. Elfego Singh and Dr. Radha Nguyen for evaluation of progressive numbness of her hands for the last 6 months that is slowly getting worse. It is not associate with any weakness, but is associated with some neck pain and discomfort. She also complains of some numbness and tingling at times in her feet and may have a neuropathy and is supposedly is not a diabetic. She had surgery about a year and a half ago for left carpal tunnel syndrome, and decided not to operate on the right 1 which got better on his own. She had some improvement in the left carpal tunnel syndrome but her symptoms have come back. Her surgery was done by Dr. Kyle Potts. She had an EMG study just last month, by Dr. Louann Henriquez and looking at the report she had normal motor and sensory conductions in latencies, and I did not see any clear EMG studies, and her studies were significantly improved on the left side from before surgery but she had a previous EMG study. Patient says her numbness is worse in the morning and certainly sounds like her carpal tunnel. She has no weakness in the hands and no difficulty doing her daily activities and remains very active. She has no gait disorder no toxin exposure, no trauma, no fever, no real weakness, and her bowel and bladder function is normal. The patient just had an MRI scan done of her cervical spine done yesterday, that showed a previous C5-C7 fixation from previous cervical laminectomy, and severe C3-4 and C4-5 cervical spinal stenosis, and stable see 7 T1 disease, and new disc herniation at T2-T3. Patient also had an MRI scan in September 2020 one of her back, that showed moderate severe spinal stenosis at L1-L2, and L2-L3, and L3-L4, stable post laminectomy decompressive changes seen from L4-S1.  Patient does not have any difficulty walking per se. Her bowel and bladder function remain stable. She is being seen by Dr. Weston Keyla her spinal surgeon. She does have a Tinel's over both median nerves at the wrist, right side worse than left. She denies any trauma, chemotherapy, vitamin deficiency, or other causes for her symptoms. Patient also seen for other new problem of some questionable memory loss, and she had an encephalopathy in the hospital in May for which she was seen, and had a work-up showing microvascular disease on her MRI scan but normal carotid Dopplers, and an EEG just showed mild generalized slowing. She got much better after her 2 major surgeries. However she still has occasional memory problems and has a neuropsychological evaluation scheduled in a month or 2 with Dr. Siva Chavez and we recommended she continue that, and we rechecked her B12 levels and other treatable causes of memory loss in addition. She may have either mild cognitive impairment, the normal memory loss of aging, or a primary early dementia like Alzheimer's disease.     Past Medical History:   Diagnosis Date    Acute alteration in mental status     Arthritis     Bilateral carotid artery stenosis     Cerebral microvascular disease     Chronic kidney disease     CKD 3    Chronic pain     CHRONIC BOWEL PAIN    Convulsions (HCC)     Depression     Disturbance of memory     Diverticulitis     Fibromyalgia     GERD (gastroesophageal reflux disease)     Goiter     Hiatal hernia     Hypercholesterolemia     Hypertension     IBS (irritable bowel syndrome)     CONSTIPATION, CHRONIC SINCE HER 20s    Insomnia     TAKES TRAZODONE NIGHTLY    Migraine     REDUCED IN FREQUENCY AND SEVERITY SINCE MENOPAUSE    Rectocele       Past Surgical History:   Procedure Laterality Date    COLONOSCOPY N/A 6/21/2016    COLONOSCOPY performed by Lemont Goodpasture, MD at [facility] ENDOSCOPY    COLONOSCOPY N/A 6/27/2018    COLONOSCOPY performed by Marleen Silveira MD [place] at [address], COLON, DIAGNOSTIC  2010    Dr. Daljit Welch      HX CHOLECYSTECTOMY  2006    HX ENDOSCOPY      HX GI  X3  LAST ONE 12/10    COLONOSCOPY    HX GYN  1982    D&C WITH SUCTION    HX HYSTERECTOMY      HX LUMBAR FUSION  2017    HX ORTHOPAEDIC      right foot surgery   Shanice aMriee      Dr. Pond/ diverticulitis    IR INJ SPINE FLUORO GUIDED  2020    IR INJ SPINE FLUORO GUIDED  2021    IR INJ SPINE FLUORO GUIDED  2021     Family History   Problem Relation Age of Onset    Cancer Father         lung and bone    Stroke Sister     Cancer Sister 76        PANCREATIC    Hypertension Mother     High Cholesterol Mother     Alzheimer's Disease Mother         late 62s early 76s    Cancer Other         COLON    Cancer Other         breast    Diabetes Maternal Aunt     Cancer Maternal Uncle         COLON    Cancer Maternal Grandfather         COLON    Ovarian Cancer Daughter 28      Social History     Tobacco Use    Smoking status: Former Smoker     Packs/day: 1.00     Years: 45.00     Pack years: 45.00     Quit date: 2007     Years since quittin.8    Smokeless tobacco: Never Used   Substance Use Topics    Alcohol use: Yes     Alcohol/week: 0.0 standard drinks     Comment: holidays         Current Outpatient Medications:     b complex vitamins tablet, Take 1 Tablet by mouth daily. , Disp: , Rfl:     pyridoxine, vitamin B6, (VITAMIN B-6) 50 mg tablet, Take 50 mg by mouth daily. , Disp: , Rfl:     aspirin delayed-release 81 mg tablet, Take  by mouth daily. , Disp: , Rfl:     citalopram (CELEXA) 40 mg tablet, Take 40 mg by mouth daily. , Disp: , Rfl:     OTHER, Prevagan, Disp: , Rfl:     rosuvastatin (CRESTOR) 20 mg tablet, , Disp: , Rfl:     cyclobenzaprine (FLEXERIL) 5 mg tablet, TAKE 1 TABLET NIGHTLY, Disp: 90 Tablet, Rfl: 1    DULoxetine (CYMBALTA) 60 mg capsule, TAKE 1 CAPSULE TWICE A DAY, Disp: 180 Capsule, Rfl: 3    acetaminophen (TYLENOL) 500 mg tablet, Take 2 Tabs by mouth every six (6) hours. , Disp: 90 Tab, Rfl: 0    calcium-vitamin D (OS-TRUE +D3) 500 mg-200 unit per tablet, 1 Tab., Disp: , Rfl:     traZODone (DESYREL) 50 mg tablet, Take 50 mg by mouth nightly as needed. , Disp: , Rfl:     buPROPion SR (WELLBUTRIN, ZYBAN) 200 mg SR tablet, TAKE 1 TABLET TWICE A DAY (REPLACES PREVIOUS DOSE), Disp: 180 Tab, Rfl: 3    dicyclomine (BENTYL) 10 mg capsule, TAKE 2 CAPSULES FOUR TIMES A DAY AS NEEDED, Disp: 360 Cap, Rfl: 7    estradioL (Estrace) 0.01 % (0.1 mg/gram) vaginal cream, APPLY TO VAGINAL 2 TIMES PER WEEK WITH FINGERTIP, Disp: 42.5 g, Rfl: 2    lisinopriL (PRINIVIL, ZESTRIL) 40 mg tablet, TAKE 1 TABLET DAILY, Disp: 90 Tab, Rfl: 3    oxybutynin (DITROPAN) 5 mg tablet, TAKE 1 TABLET TWICE A DAY, Disp: 180 Tab, Rfl: 1    pantoprazole (PROTONIX) 40 mg tablet, TAKE 1 TABLET DAILY, Disp: 90 Tab, Rfl: 3    polyethylene glycol (MIRALAX) 17 gram packet, Take 17 g by mouth daily. , Disp: , Rfl:     cholecalciferol, vitamin D3, (Vitamin D3) 50 mcg (2,000 unit) tab, Take 1 Tab by mouth daily. , Disp: , Rfl:     hydroCHLOROthiazide (HYDRODIURIL) 25 mg tablet, TAKE 1 TABLET DAILY, Disp: 90 Tab, Rfl: 2    sucralfate (CARAFATE) 1 gram tablet, Take 1 g by mouth three (3) times daily as needed. , Disp: , Rfl:         Allergies   Allergen Reactions    Latex Hives    Codeine Other (comments)     Severe Headache    Morphine Hives    Ambien [Zolpidem] Other (comments)     Severe behavior changes    Dilaudid [Hydromorphone] Other (comments)     Memory loss    Other Medication Rash     BANDAIDS    Shellfish Containing Products Hives      MRI Results (most recent):  Results from Hospital Encounter encounter on 11/17/21    MRI CERV SPINE WO CONT    Narrative  EXAM: MRI CERV SPINE WO CONT    INDICATION: . Cervicalgia    COMPARISON: 10/10/2019 MRI    TECHNIQUE: MR imaging of the cervical spine was performed using the following  sequences: sagittal T1, T2, STIR;  axial T2, T1.    CONTRAST:  None. 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 Artifacts related to anterior cervical fixation are again shown at  C5-7. Visualized bone signal is normal. There is no vertebral compression  fracture. There is straightening of cervical lordosis. Unchanged 2 mm  anterolisthesis at C7-T1 is noted. Equivocal central cord signal alteration on the STIR images at the C4-5 level. Cord signal is otherwise normal and the visualized portions of the brainstem and  cerebellum are normal.    No intraspinal or paraspinal soft tissue mass is shown. C2-C3: As noted above, congenital osseous union. No canal stenosis or foraminal  stenosis. C3-C4: Moderate disc space narrowing. Moderate diffuse disc bulging and  bilateral facet osteoarthrosis progressed in the interval. Moderate to severe  canal stenosis with moderate cord compression and severe bilateral foraminal  stenosis increased in the interval.    C4-C5: Progression of disc space narrowing, now moderate. Moderate diffuse disc  bulging with small endplate osteophytes. Moderate to severe bilateral facet  osteoarthrosis. Severe canal stenosis with moderate to severe cord compression  progressed in the interval. Severe right and moderate left foraminal stenosis. C5-C6: No canal or foraminal stenosis. C6-C7: No canal or foraminal stenosis. C7-T1: Mild disc space narrowing and mild-moderate diffuse disc bulging and  moderate to severe left, moderate right facet osteoarthrosis. Mild canal  stenosis without cord compression. Moderate to severe left and moderate right  foraminal stenosis. T1-2: Minimal left lateral disc bulging. Mild left facet osteoarthrosis. No  canal stenosis. Mild left foraminal stenosis. T2-3 and T3-4: Shown on sagittal images only .  Moderate-sized left paracentral  disc herniation at T2-3 is shown in the interval. Normal T3-4 disc. Mild left  facet osteoarthrosis at T2-3. No cord compression demonstrated. Impression  1. 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  2. Anterior cervical fixation at C5-C7. 3. C3-4 and C4-5 degenerative findings are progressed in the interval with  moderate to severe C3-4 and severe C4-5 canal stenosis. 4. Degenerative findings at C7-T1 are similar to previous. Unchanged mild canal  stenosis with substantial left foraminal stenosis. 5. Interval demonstration of T2-3 moderate-sized left paracentral disc  herniation. Results from [place] encounter on 11/17/21    MRI CERV SPINE WO CONT    Narrative  EXAM: MRI CERV SPINE WO CONT    INDICATION: . Cervicalgia    COMPARISON: 10/10/2019 MRI    TECHNIQUE: MR imaging of the cervical spine was performed using the following  sequences: sagittal T1, T2, STIR;  axial T2, T1.    CONTRAST:  None. 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 Artifacts related to anterior cervical fixation are again shown at  C5-7. Visualized bone signal is normal. There is no vertebral compression  fracture. There is straightening of cervical lordosis. Unchanged 2 mm  anterolisthesis at C7-T1 is noted. Equivocal central cord signal alteration on the STIR images at the C4-5 level. Cord signal is otherwise normal and the visualized portions of the brainstem and  cerebellum are normal.    No intraspinal or paraspinal soft tissue mass is shown. C2-C3: As noted above, congenital osseous union. No canal stenosis or foraminal  stenosis. C3-C4: Moderate disc space narrowing.  Moderate diffuse disc bulging and  bilateral facet osteoarthrosis progressed in the interval. Moderate to severe  canal stenosis with moderate cord compression and severe bilateral foraminal  stenosis increased in the interval.    C4-C5: Progression of disc space narrowing, now moderate. Moderate diffuse disc  bulging with small endplate osteophytes. Moderate to severe bilateral facet  osteoarthrosis. Severe canal stenosis with moderate to severe cord compression  progressed in the interval. Severe right and moderate left foraminal stenosis. C5-C6: No canal or foraminal stenosis. C6-C7: No canal or foraminal stenosis. C7-T1: Mild disc space narrowing and mild-moderate diffuse disc bulging and  moderate to severe left, moderate right facet osteoarthrosis. Mild canal  stenosis without cord compression. Moderate to severe left and moderate right  foraminal stenosis. T1-2: Minimal left lateral disc bulging. Mild left facet osteoarthrosis. No  canal stenosis. Mild left foraminal stenosis. T2-3 and T3-4: Shown on sagittal images only . Moderate-sized left paracentral  disc herniation at T2-3 is shown in the interval. Normal T3-4 disc. Mild left  facet osteoarthrosis at T2-3. No cord compression demonstrated. Impression  1. 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  2. Anterior cervical fixation at C5-C7. 3. C3-4 and C4-5 degenerative findings are progressed in the interval with  moderate to severe C3-4 and severe C4-5 canal stenosis. 4. Degenerative findings at C7-T1 are similar to previous. Unchanged mild canal  stenosis with substantial left foraminal stenosis. 5. Interval demonstration of T2-3 moderate-sized left paracentral disc  herniation.     Review of Systems:  A comprehensive review of systems was negative except for: Constitutional: positive for fatigue and malaise  Musculoskeletal: positive for myalgias, arthralgias, stiff joints, neck pain and back pain  Neurological: positive for paresthesia and coordination problems  Behvioral/Psych: positive for anxiety and depression   Vitals:    11/18/21 0840   BP: 136/87   Pulse: 70   Temp: 97.6 °F (36.4 °C)   SpO2: 97%   Weight: 178 lb (80.7 kg)   Height: 5' 3\" (1.6 m)     Objective:     I      NEUROLOGICAL EXAM:    Appearance: The patient is well developed, well nourished, provides a fair history and is in no acute distress. Mental Status: Oriented to time, but not the day of the month, place and person, and the president, patient has some difficulty with serial sevens, and can remember 1 of 3 words at 30 seconds with distraction, and with a little difficulty could spell the word world backward and with effort seem to be able to draw clock that showed the time 10:50. Cognitive function is probably mildly abnormal and speech is fluent and no aphasia or dysarthria. Mood and affect appropriate. Cranial Nerves:   Intact visual fields. Fundi are benign, disc are flat, no lesions seen on funduscopy. SAMANTHA, EOM's full, no nystagmus, no ptosis. Facial sensation is normal. Corneal reflexes are not tested. Facial movement is symmetric. Hearing is abnormal bilaterally. Palate is midline with normal sternocleidomastoid and trapezius muscles are normal. Tongue is midline. Neck without meningismus or bruits  Temporal arteries are not tender or enlarged  TMJ areas are not tender on palpation   Motor:  4/5 strength in upper and lower proximal and distal muscles. Normal bulk and tone. No fasciculations. Rapid alternating movement is symmetric and intact bilaterally   Reflexes:   Deep tendon reflexes 2+/4 and symmetrical.  No babinski or clonus present   Sensory:   Abnormal to touch, pinprick and vibration and temperature in both feet to ankle level. DSS is intact   Gait:  Normal gait for patient's age, but she does move slowly. Tremor:   No tremor noted. Cerebellar:  No abnormal cerebellar signs present on Romberg and tandem testing and finger-nose-finger exam.   Neurovascular:  Normal heart sounds and regular rhythm, peripheral pulses decreased, and no carotid bruits. Assessment:       ICD-10-CM ICD-9-CM    1.  Disturbance of memory  R41.3 780.93 VITAMIN B12 & FOLATE      VITAMIN D, 25 HYDROXY      HEMOGLOBIN A1C WITH EAG      GM1 IGG AUTOANTIBODY      CK      IMMUNOELECTROPHORESIS (IMMUNOFIX.)      MELVA COMPREHENSIVE PLUS PANEL      XR SPINE CERV W OBL/FLEX/EXT MIN 6 V COMP      TSH 3RD GENERATION      CANCELED: MISC. LAB TEST   2. Bilateral carpal tunnel syndrome  G56.03 354.0 VITAMIN B12 & FOLATE      VITAMIN D, 25 HYDROXY      HEMOGLOBIN A1C WITH EAG      GM1 IGG AUTOANTIBODY      CK      IMMUNOELECTROPHORESIS (IMMUNOFIX.)      MELVA COMPREHENSIVE PLUS PANEL      XR SPINE CERV W OBL/FLEX/EXT MIN 6 V COMP      TSH 3RD GENERATION      CANCELED: MISC. LAB TEST   3. Cervical radiculopathy due to degenerative joint disease of spine  M47.22 721.0 VITAMIN B12 & FOLATE      VITAMIN D, 25 HYDROXY      HEMOGLOBIN A1C WITH EAG      GM1 IGG AUTOANTIBODY      CK      IMMUNOELECTROPHORESIS (IMMUNOFIX.)      MELVA COMPREHENSIVE PLUS PANEL      XR SPINE CERV W OBL/FLEX/EXT MIN 6 V COMP      TSH 3RD GENERATION      CANCELED: MISC. LAB TEST   4. Cerebral microvascular disease  I67.89 437.8 VITAMIN B12 & FOLATE      VITAMIN D, 25 HYDROXY      HEMOGLOBIN A1C WITH EAG      GM1 IGG AUTOANTIBODY      CK      IMMUNOELECTROPHORESIS (IMMUNOFIX.)      MELVA COMPREHENSIVE PLUS PANEL      XR SPINE CERV W OBL/FLEX/EXT MIN 6 V COMP      TSH 3RD GENERATION      CANCELED: MISC. LAB TEST   5. Carotid stenosis, bilateral  I65.23 433.10 VITAMIN B12 & FOLATE     433.30 VITAMIN D, 25 HYDROXY      HEMOGLOBIN A1C WITH EAG      GM1 IGG AUTOANTIBODY      CK      IMMUNOELECTROPHORESIS (IMMUNOFIX.)      MELVA COMPREHENSIVE PLUS PANEL      XR SPINE CERV W OBL/FLEX/EXT MIN 6 V COMP      TSH 3RD GENERATION      CANCELED: MISC. LAB TEST   6. B12 deficiency  E53.8 266.2 VITAMIN B12 & FOLATE   7. Vitamin D deficiency  E55.9 268.9 VITAMIN D, 25 HYDROXY   8. Hypothyroidism due to acquired atrophy of thyroid  E03.4 244.8 TSH 3RD GENERATION     246.8    9.  Diabetic peripheral neuropathy associated with type 2 diabetes mellitus (HCC)  E11.42 250.60 HEMOGLOBIN A1C WITH EAG     357.2 10. Congenital cervical spine stenosis  Q76.49 723.0    11. Cervical myelopathy with cervical radiculopathy ([facility])  G95.9 721.1     M54.12       Active Problems:    * No active hospital problems. *      Plan:     Patient with symptomatic numbness of her hands, there was an EMG study just done that suggest stable decompression of her carpal tunnel on the left side, and improved carpal tunnel on the right. Her MRI of the cervical spine shows severe spinal stenosis above the level of her decompression at C3-4 and C4-5 of severe degree, that is probably the source of the numbness in her hands. She has stable decompression from C5-C7. We will check a metabolic parameters just to make sure she does not have a sensory neuropathy, but I suspect it is more her cervical myelopathy. She also has significant degenerative disease in the lumbar spine that may be contributing some to the numbness in her feet that is somewhat intermittent. She is to continue following up with her spinal surgeon and consider decompressive laminectomy. Neuro check cervical spine x-rays with flexion-extension views for completeness. Her records are reviewed, her EMG reviewed, MRI scan reviewed of both lumbar and cervical spine, and her case was discussed in detail with the patient and her . Patient also has some memory problems, that were worse after her surgery, and have gotten better, but she may have an early dementia versus mild cognitive impairment versus a normal memory loss of aging, and she has neuropsych testing scheduled for February with Dr. Monserrat Riley and we encouraged him to make sure to keep that, and we will see her again in 3 months time or earlier as needed after her neuropsych testing to decide whether she is a candidate for cognitive enhancing agents.   Very difficult case, 55 minutes spent reviewing all of her records, going over her all her notes, going over all her new x-rays and EMG studies and MRI scans and discussing her diagnosis prognosis and treatment options with the patient and her  in detail. She will probably be a candidate for cognitive enhancing agents. She also probably be a candidate for cervical spinal decompressive surgery.     Signed By: Lori Demarco MD     November 18, 2021       CC: Elayne Gilford, MD  FAX: [phone]

## 2022-01-04 ENCOUNTER — VIRTUAL VISIT (OUTPATIENT)
Dept: FAMILY MEDICINE CLINIC | Age: 72
End: 2022-01-04
Payer: MEDICARE

## 2022-01-04 DIAGNOSIS — Z79.899 ENCOUNTER FOR LONG-TERM (CURRENT) USE OF MEDICATIONS: ICD-10-CM

## 2022-01-04 DIAGNOSIS — M79.7 FIBROMYALGIA: ICD-10-CM

## 2022-01-04 DIAGNOSIS — F33.1 MODERATE EPISODE OF RECURRENT MAJOR DEPRESSIVE DISORDER (HCC): Primary | ICD-10-CM

## 2022-01-04 DIAGNOSIS — Z98.890 STATUS POST LUMBAR SPINE SURGERY FOR DECOMPRESSION OF SPINAL CORD: ICD-10-CM

## 2022-01-04 DIAGNOSIS — M47.22 CERVICAL RADICULOPATHY DUE TO DEGENERATIVE JOINT DISEASE OF SPINE: ICD-10-CM

## 2022-01-04 DIAGNOSIS — R73.02 IGT (IMPAIRED GLUCOSE TOLERANCE): ICD-10-CM

## 2022-01-04 DIAGNOSIS — M48.02 CERVICAL SPINAL STENOSIS: ICD-10-CM

## 2022-01-04 DIAGNOSIS — N18.31 CHRONIC RENAL FAILURE, STAGE 3A (HCC): ICD-10-CM

## 2022-01-04 DIAGNOSIS — E78.2 MIXED HYPERLIPIDEMIA: ICD-10-CM

## 2022-01-04 DIAGNOSIS — I10 ESSENTIAL HYPERTENSION WITH GOAL BLOOD PRESSURE LESS THAN 140/90: ICD-10-CM

## 2022-01-04 PROCEDURE — 1101F PT FALLS ASSESS-DOCD LE1/YR: CPT | Performed by: FAMILY MEDICINE

## 2022-01-04 PROCEDURE — G0463 HOSPITAL OUTPT CLINIC VISIT: HCPCS | Performed by: FAMILY MEDICINE

## 2022-01-04 PROCEDURE — G9717 DOC PT DX DEP/BP F/U NT REQ: HCPCS | Performed by: FAMILY MEDICINE

## 2022-01-04 PROCEDURE — G9711 PT HX TOT COL OR COLON CA: HCPCS | Performed by: FAMILY MEDICINE

## 2022-01-04 PROCEDURE — 99214 OFFICE O/P EST MOD 30 MIN: CPT | Performed by: FAMILY MEDICINE

## 2022-01-04 PROCEDURE — G8756 NO BP MEASURE DOC: HCPCS | Performed by: FAMILY MEDICINE

## 2022-01-04 PROCEDURE — G8399 PT W/DXA RESULTS DOCUMENT: HCPCS | Performed by: FAMILY MEDICINE

## 2022-01-04 PROCEDURE — 1090F PRES/ABSN URINE INCON ASSESS: CPT | Performed by: FAMILY MEDICINE

## 2022-01-04 PROCEDURE — G8427 DOCREV CUR MEDS BY ELIG CLIN: HCPCS | Performed by: FAMILY MEDICINE

## 2022-01-04 RX ORDER — CITALOPRAM 40 MG/1
40 TABLET, FILM COATED ORAL DAILY
Status: CANCELLED | OUTPATIENT
Start: 2022-01-04

## 2022-01-04 RX ORDER — PREGABALIN 50 MG/1
CAPSULE ORAL
COMMUNITY
Start: 2021-10-21 | End: 2022-01-04 | Stop reason: ALTCHOICE

## 2022-01-04 RX ORDER — HYDROCHLOROTHIAZIDE 25 MG/1
TABLET ORAL
Qty: 90 TABLET | Refills: 2 | Status: SHIPPED | OUTPATIENT
Start: 2022-01-04 | End: 2022-09-16 | Stop reason: SDUPTHER

## 2022-01-04 RX ORDER — QUETIAPINE FUMARATE 25 MG/1
12.5 TABLET, FILM COATED ORAL
Qty: 30 TABLET | Refills: 0 | Status: SHIPPED | OUTPATIENT
Start: 2022-01-04 | End: 2022-07-02

## 2022-01-04 NOTE — PROGRESS NOTES
Chief Complaint   Patient presents with    Follow-up     3 month   1. Have you been to the ER, urgent care clinic since your last visit? Hospitalized since your last visit? No    2. Have you seen or consulted any other health care providers outside of the [address] [place] [address] since your last visit? Include any pap smears or colon screening.  No

## 2022-01-04 NOTE — PROGRESS NOTES
Christina Chang (: 1950) is a 66 y.o. female, established patient, here for evaluation of the following chief complaint(s):   Follow-up (3 month)       ASSESSMENT/PLAN: Depression not improving much. Trial of Seroquel and ct working on pain control with specialists. To Psych if not improving. Below is the assessment and plan developed based on review of pertinent history, labs, studies, and medications. 1. Moderate episode of recurrent major depressive disorder (HCC)  -     QUEtiapine (SEROquel) 25 mg tablet; Take 0.5 Tablets by mouth nightly., Normal, Disp-30 Tablet, R-0  -     REFERRAL TO PSYCHIATRY  2. Chronic renal failure, stage 3a (HCC)  -     LIPID PANEL; Future  -     METABOLIC PANEL, COMPREHENSIVE; Future  -     CBC W/O DIFF; Future  -     URINALYSIS W/ RFLX MICROSCOPIC; Future  -     MICROALBUMIN, UR, RAND W/ MICROALB/CREAT RATIO; Future  3. Status post lumbar spine surgery for decompression of spinal cord  4. Fibromyalgia  5. Essential hypertension with goal blood pressure less than 140/90  -     hydroCHLOROthiazide (HYDRODIURIL) 25 mg tablet; TAKE 1 TABLET DAILY, Normal, Disp-90 Tablet, R-2  -     METABOLIC PANEL, COMPREHENSIVE; Future  6. Mixed hyperlipidemia  -     LIPID PANEL; Future  -     METABOLIC PANEL, COMPREHENSIVE; Future  7. Encounter for long-term (current) use of medications  -     LIPID PANEL; Future  -     METABOLIC PANEL, COMPREHENSIVE; Future  -     CBC W/O DIFF; Future  -     URINALYSIS W/ RFLX MICROSCOPIC; Future  -     MICROALBUMIN, UR, RAND W/ MICROALB/CREAT RATIO; Future  8. IGT (impaired glucose tolerance)  -     MICROALBUMIN, UR, RAND W/ MICROALB/CREAT RATIO; Future  9. Cervical radiculopathy due to degenerative joint disease of spine  10. Cervical spinal stenosis      Return in about 4 weeks (around 2022).     SUBJECTIVE/OBJECTIVE:    PMH sig for HTN, HLD, CKD 3a, s/p lumbar surgery x 2, fibromyalgia, DDD and spinal stenosis of lumb spine, spinal stenosis of C spine depression. COVID vaccinated and boosted. Still feeling clumsy with trouble with some disequilibrium. Prev stopped her gabapentin and she and her  feel like her sx are much improved.     Still dealing with depression. Largely feels related to pain. Very little energy. Feels hopeless, irritable. Not sleeping well - gets about 6 hrs per night and takes naps occ. No SI/HI. Still taking Cymbalta 60 mg BID, Wellbutrin 200 mg BID, Trazodone 50 mg QHS. Has not been on atypical antipsychotics in the past.  Was previously seeing psychiatry many years ago. Chronic pain, spinal stenosis, and fibromyalgia and still dealing with ongoing neuropathy. Working with Dr. Meredith herrera. Has known DDD L spine and spinal stenosis. She is s/p L3-S1 Decompression in 8/2017 and L4-S1 POSTERIOR FUSION in 5/2021. Not doing therapy for a while. MRI L spine 7/31/2020:  \"IMPRESSION:   1. L2-3 degenerative changes with marked progression since 2017. Probable  subacute to chronic mild right L3 superior endplate compression. Moderate to severe L2-3 central stenosis. 2. Status post surgical decompression at L3-4. Mild stenosis at this level. 3. L4-5 degenerative changes. Prominent posterior element hypertrophy resulting  in severe central spinal stenosis. 4. L1-2 degenerative changes with progression since 2017 resulting in moderate  central stenosis. \"    MRI C spine 11/2021:  \"IMPRESSION  1. 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  2. Anterior cervical fixation at C5-C7. 3. C3-4 and C4-5 degenerative findings are progressed in the interval with moderate to severe C3-4 and severe C4-5 canal stenosis. 4. Degenerative findings at C7-T1 are similar to previous. Unchanged mild canal stenosis with substantial left foraminal stenosis. 5. Interval demonstration of T2-3 moderate-sized left paracentral disc herniation. \"    Ongoing IBS and colon issues after partial colectomy years ago.     ROS per HPI    Patient-Reported Systolic (Top): 570  Patient-Reported Diastolic (Bottom): 65  Patient-Reported Pulse: 76    Physical exam:  General appearance - alert, well appearing, and in no distress  Eyes -sclera anicteric, no discharge  HEENT normocephalic, atraumatic, moist mucous membranes, no visualized neck mass  Chest -normal respiratory effort, no visualized signs of respiratory distress  Neurological - alert, awake, normal speech, no focal findings or movement disorder noted  Psych - normal mood and affect  Skin no apparent lesions    On this date 01/04/2022 I have spent 30 minutes reviewing previous notes, test results and face to face (virtual) with the patient discussing the diagnosis and importance of compliance with the treatment plan as well as documenting on the day of the visit. Aniceto Hurd, was evaluated through a synchronous (real-time) audio-video encounter. The patient (or guardian if applicable) is aware that this is a billable service. Verbal consent to proceed has been obtained within the past 12 months. The visit was conducted pursuant to the emergency declaration under the [address] [place] [address] authority and the [employer] General Act. Patient identification was verified, and a caregiver was present when appropriate. The patient was located in a state where the provider was credentialed to provide care. An electronic signature was used to authenticate this note.   -- Iliana Nova MD

## 2022-02-17 ENCOUNTER — OFFICE VISIT (OUTPATIENT)
Dept: NEUROLOGY | Age: 72
End: 2022-02-17
Payer: MEDICARE

## 2022-02-17 DIAGNOSIS — F32.A CHRONIC DEPRESSION: ICD-10-CM

## 2022-02-17 DIAGNOSIS — R41.89 COGNITIVE DECLINE: ICD-10-CM

## 2022-02-17 DIAGNOSIS — R41.3 SHORT-TERM MEMORY LOSS: ICD-10-CM

## 2022-02-17 DIAGNOSIS — G89.29 OTHER CHRONIC PAIN: ICD-10-CM

## 2022-02-17 DIAGNOSIS — G31.84 MILD COGNITIVE IMPAIRMENT: Primary | ICD-10-CM

## 2022-02-17 PROCEDURE — 90791 PSYCH DIAGNOSTIC EVALUATION: CPT | Performed by: CLINICAL NEUROPSYCHOLOGIST

## 2022-02-17 NOTE — PROGRESS NOTES
[facility],[address]  [address]   [place] [address] [place] [address] [facility]    [phone] Office   [phone] Fax      Neuropsychology    Initial Diagnostic Interview Note      Referral:  Tanvir Minor MD    Anibal Angulo is a 66 y.o. right handed    Female who was accompanied by her spouse to the initial clinical interview on 2/17/22. Please refer to her medical records for details pertaining to her history. At the start of the appointment, I reviewed the patient's [facility] Epic Chart (including Media scanned in from previous providers) for the active Problem List, all pertinent Past Medical Hx, medications, recent radiologic and laboratory findings. In addition, I reviewed pt's documented Immunization Record and Encounter History. She completed the 12th grade without history of previously diagnosed LD and/or receipt of special education services. 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 She has noticed progressive decline in short term memory since her back surgery back in May. She had encephalopathy back then in hospital. She lost her memory for that whole week and had significant postop delirium. .  Since discharge, general memory has been progressively worsening. Forgets words. Forgets the content of conversations. Loses things. Goes into room and forgets why. Spouse says she had memory loss prior to surgery, too, and there has been a progressive general trend of decline. He thinks she had issues with the anesthesia causing the delirium and spouse says the memory issues was before and continues. She forgets places. Longer to process, comprehend, learn, recall new information. She has problems with focus, attention, decision making. She has chronic depression. She is not driving currently. Spouse reminds her take her medications and helps with the finances.  On meds for back pain and because she is on narcotics she is not driving. She is on Cymbalta. She was on gabapentin and tramadol. Had brain fog. She has stopped being on those intense pain meds now for a couple of months and problems are still progressive. It has probably been more than a month ago. Her mother had dementia, and started to show signs probably in her 62s. The patient herself has no known stroke, meningitis/encephalitis, KATELYN Fever, Lupus, Lyme, TBI, sz, etc.  She says she sleeps fine- some times are better than others. Appetite is okay. 12 years [facility] to [facility]. Told aging and mood. EXAM:  MRI BRAIN W WO CONT     INDICATION:    AMS     COMPARISON:  CT head 5/6/2021.     CONTRAST: 15 ml Dotarem.     TECHNIQUE:    Multiplanar multisequence acquisition without and with contrast of the brain.     FINDINGS:  Evaluation is significantly limited by motion.     Generalized parenchymal volume loss with commensurate dilation of the sulci and  ventricular system. Periventricular and deep white matter T2/FLAIR  hyperintensities, confluent in regions, consistent with moderate chronic  microvascular ischemic disease. 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 There is no acute infarct, hemorrhage, extra-axial fluid collection, or mass  effect. There is no cerebellar tonsillar herniation. Expected arterial  flow-voids are present. No evidence of abnormal enhancement.     The paranasal sinuses, mastoid air cells, and middle ears are clear. The orbital  contents are within normal limits. No significant osseous or scalp lesions are  identified. Cervical spine ACDF partially visualized extending superiorly to C4. Degenerative disc disease and facet arthropathy in the upper cervical spine.     IMPRESSION  Evaluation is significantly limited by motion.     1. No acute intracranial abnormality.   2. Generalized parenchymal volume loss and moderate chronic microvascular  ischemic disease. 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     Neuropsychological Mental Status Exam (NMSE):    Historian: Good  Praxis: No UE apraxia  R/L Orientation: Intact to self and to other  Dress: within normal limits   Weight: Overweight   Appearance/Hygiene: within normal limits   Gait: Slow, with cane   Assistive Devices: cane, glasses  Mood: within normal limits   Affect: within normal limits   Comprehension: within normal limits   Thought Process: within normal limits   Expressive Language: within normal limits   Receptive Language: within normal limits   Motor:  No cognitive or motor perseveration  ETOH: Occasional, not to excess  Tobacco: Quit 1159  Illicit: Denied  SI/HI: Denied  Psychosis: Denied  Insight: Within normal limits  Judgment: Within normal limits  Other Psych:      Past Medical History:   Diagnosis Date    Acute alteration in mental status     Arthritis     Bilateral carotid artery stenosis     Cerebral microvascular disease     Chronic kidney disease     CKD 3    Chronic pain     CHRONIC BOWEL PAIN    Convulsions (HCC)     Depression     Disturbance of memory     Diverticulitis     Fibromyalgia     GERD (gastroesophageal reflux disease)     Goiter     Hiatal hernia     Hypercholesterolemia     Hypertension     IBS (irritable bowel syndrome)     CONSTIPATION, CHRONIC SINCE HER 20s    Insomnia     TAKES TRAZODONE NIGHTLY    Migraine     REDUCED IN FREQUENCY AND SEVERITY SINCE MENOPAUSE    Rectocele        Past Surgical History:   Procedure Laterality Date    COLONOSCOPY N/A 6/21/2016    COLONOSCOPY performed by Susana Daniels MD at [facility] ENDOSCOPY    COLONOSCOPY N/A 6/27/2018    COLONOSCOPY performed by Susana Daniels MD at [facility] ENDOSCOPY    ENDOSCOPY, COLON, DIAGNOSTIC  11/2010    Dr. Molly Cruz 57  2011    HX CHOLECYSTECTOMY  2006    HX ENDOSCOPY      HX GI  X3  LAST ONE 12/10    COLONOSCOPY    HX GYN  1982    D&C WITH SUCTION    HX HYSTERECTOMY      HX LUMBAR FUSION  2017    HX ORTHOPAEDIC      right foot surgery    HX TONSILLECTOMY      HX TOTAL COLECTOMY  2007    Dr. Pond/ diverticulitis    IR INJ SPINE FLUORO GUIDED  8/28/2020    IR INJ SPINE FLUORO GUIDED  1/8/2021    IR INJ SPINE FLUORO GUIDED  2/5/2021       Allergies   Allergen Reactions    Latex Hives    Codeine Other (comments)     Severe Headache    Morphine Hives    Ambien [Zolpidem] Other (comments)     Severe behavior changes    Dilaudid [Hydromorphone] Other (comments)     Memory loss    Other Medication Rash     BANDAIDS    Shellfish Containing Products Hives       Family History   Problem Relation Age of Onset   Carrera Cancer Father         lung and bone    Stroke Sister     Cancer Sister 76        PANCREATIC    Hypertension Mother     High Cholesterol Mother     Alzheimer's Disease Mother         late 62s early 76s    Cancer Other         COLON    Cancer Other         breast    Diabetes Maternal Aunt     Cancer Maternal Uncle         COLON    Cancer Maternal Grandfather         COLON    Ovarian Cancer Daughter 28       Social History     Tobacco Use    Smoking status: Former Smoker     Packs/day: 1.00     Years: 45.00     Pack years: 45.00     Quit date: 1/1/2007     Years since quitting: 15.1    Smokeless tobacco: Never Used   Vaping Use    Vaping Use: Never used   Substance Use Topics    Alcohol use: Yes     Alcohol/week: 0.0 standard drinks     Comment: holidays    Drug use: No       Current Outpatient Medications   Medication Sig Dispense Refill    COVID-19 mRNA Vacc (MODERNA) 100 mcg/0.5 mL susp injection       hydroCHLOROthiazide (HYDRODIURIL) 25 mg tablet TAKE 1 TABLET DAILY 90 Tablet 2    QUEtiapine (SEROquel) 25 mg tablet Take 0.5 Tablets by mouth nightly. 30 Tablet 0    b complex vitamins tablet Take 1 Tablet by mouth daily.  pyridoxine, vitamin B6, (VITAMIN B-6) 50 mg tablet Take 50 mg by mouth daily.       aspirin delayed-release 81 mg tablet Take  by mouth daily.  OTHER Prevagan (Patient not taking: Reported on 1/4/2022)      rosuvastatin (CRESTOR) 20 mg tablet Take 20 mg by mouth nightly.  cyclobenzaprine (FLEXERIL) 5 mg tablet TAKE 1 TABLET NIGHTLY 90 Tablet 1    DULoxetine (CYMBALTA) 60 mg capsule TAKE 1 CAPSULE TWICE A  Capsule 3    acetaminophen (TYLENOL) 500 mg tablet Take 2 Tabs by mouth every six (6) hours. 90 Tab 0    calcium-vitamin D (OS-TRUE +D3) 500 mg-200 unit per tablet 1 Tab.  traZODone (DESYREL) 50 mg tablet Take 50 mg by mouth nightly as needed.  buPROPion SR (WELLBUTRIN, ZYBAN) 200 mg SR tablet TAKE 1 TABLET TWICE A DAY (REPLACES PREVIOUS DOSE) 180 Tab 3    dicyclomine (BENTYL) 10 mg capsule TAKE 2 CAPSULES FOUR TIMES A DAY AS NEEDED 360 Cap 7    estradioL (Estrace) 0.01 % (0.1 mg/gram) vaginal cream APPLY TO VAGINAL 2 TIMES PER WEEK WITH FINGERTIP (Patient not taking: Reported on 1/4/2022) 42.5 g 2    lisinopriL (PRINIVIL, ZESTRIL) 40 mg tablet TAKE 1 TABLET DAILY 90 Tab 3    oxybutynin (DITROPAN) 5 mg tablet TAKE 1 TABLET TWICE A  Tab 1    pantoprazole (PROTONIX) 40 mg tablet TAKE 1 TABLET DAILY 90 Tab 3    polyethylene glycol (MIRALAX) 17 gram packet Take 17 g by mouth daily.  cholecalciferol, vitamin D3, (Vitamin D3) 50 mcg (2,000 unit) tab Take 1 Tab by mouth daily.  sucralfate (CARAFATE) 1 gram tablet Take 1 g by mouth three (3) times daily as needed. Plan:  Obtain authorization for testing from insurance company. Report to follow once testing, scoring, and interpretation completed. ? Organic based neurocognitive issues versus mood disorder or combination of same. ? Problems organic, functional, or both? This note will not be viewable in [address]

## 2022-02-21 ENCOUNTER — OFFICE VISIT (OUTPATIENT)
Dept: NEUROLOGY | Age: 72
End: 2022-02-21
Payer: MEDICARE

## 2022-02-21 DIAGNOSIS — F45.0 ANXIETY WITH SOMATIZATION: ICD-10-CM

## 2022-02-21 DIAGNOSIS — G31.84 MILD COGNITIVE IMPAIRMENT: Primary | ICD-10-CM

## 2022-02-21 DIAGNOSIS — G89.29 OTHER CHRONIC PAIN: ICD-10-CM

## 2022-02-21 DIAGNOSIS — F41.9 ANXIETY WITH SOMATIZATION: ICD-10-CM

## 2022-02-21 DIAGNOSIS — F32.9 MAJOR DEPRESSION, CHRONIC: ICD-10-CM

## 2022-02-21 DIAGNOSIS — R41.3 FUNCTIONAL MEMORY PROBLEM: ICD-10-CM

## 2022-02-21 PROCEDURE — 96133 NRPSYC TST EVAL PHYS/QHP EA: CPT | Performed by: CLINICAL NEUROPSYCHOLOGIST

## 2022-02-21 PROCEDURE — 96136 PSYCL/NRPSYC TST PHY/QHP 1ST: CPT | Performed by: CLINICAL NEUROPSYCHOLOGIST

## 2022-02-21 PROCEDURE — 96138 PSYCL/NRPSYC TECH 1ST: CPT | Performed by: CLINICAL NEUROPSYCHOLOGIST

## 2022-02-21 PROCEDURE — 96137 PSYCL/NRPSYC TST PHY/QHP EA: CPT | Performed by: CLINICAL NEUROPSYCHOLOGIST

## 2022-02-21 PROCEDURE — 96132 NRPSYC TST EVAL PHYS/QHP 1ST: CPT | Performed by: CLINICAL NEUROPSYCHOLOGIST

## 2022-02-21 PROCEDURE — 96139 PSYCL/NRPSYC TST TECH EA: CPT | Performed by: CLINICAL NEUROPSYCHOLOGIST

## 2022-02-21 NOTE — LETTER
2/22/2022    Patient: Edith Kirby   YOB: 1950   Date of Visit: 2/21/2022     Tessie Ramos [id number]  Via In Basket    Dear Abelardo Sales MD,      Thank you for referring Ms. Di Deluna to [facility] for evaluation. My notes for this consultation are attached. If you have questions, please do not hesitate to call me. I look forward to following your patient along with you.       Sincerely,    Tonya Shirley PsyD

## 2022-02-22 NOTE — PROGRESS NOTES
[address] [facility],5Th Floor  [address]   [address] [place] [address] [place] [address] [address]   [phone] Office   [phone] Fax      Neuropsychological Evaluation Report    Referral:  Benito Allen MD    Katherine Zapata is a 66 y.o. right handed   female who was accompanied by her spouse to the initial clinical interview on 2/17/22. Please refer to her medical records for details pertaining to her history. At the start of the appointment, I reviewed the patient's [facility] Epic Chart (including Media scanned in from previous providers) for the active Problem List, all pertinent Past Medical Hx, medications, recent radiologic and laboratory findings. In addition, I reviewed pt's documented Immunization Record and Encounter History. She completed the 12th grade without history of previously diagnosed LD and/or receipt of special education services. 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 She has noticed progressive decline in short term memory since her back surgery back in May. She had encephalopathy back then in hospital. She lost her memory for that whole week and had significant postop delirium. .  Since discharge, general memory has been progressively worsening. Forgets words. Forgets the content of conversations. Loses things. Goes into room and forgets why. Spouse says she had memory loss prior to surgery, too, and there has been a progressive general trend of decline. He thinks she had issues with the anesthesia causing the delirium and spouse says the memory issues was before and continues. She forgets places. Longer to process, comprehend, learn, recall new information. She has problems with focus, attention, decision making. She has chronic depression. She is not driving currently. Spouse reminds her take her medications and helps with the finances.  On meds for back pain and because she is on narcotics she is not driving. She is on Cymbalta. She was on gabapentin and tramadol. Had brain fog. She has stopped being on those intense pain meds now for a couple of months and problems are still progressive. It has probably been more than a month ago. Her mother had dementia, and started to show signs probably in her 62s. The patient herself has no known stroke, meningitis/encephalitis, KATELYN Fever, Lupus, Lyme, TBI, sz, etc.  She says she sleeps fine- some times are better than others. Appetite is okay. 12 years ago had memory testing in the [email or web address] due to memory loss. Told aging and mood. EXAM:  MRI BRAIN W WO CONT     INDICATION:    AMS     COMPARISON:  CT head 5/6/2021.     CONTRAST: 15 ml Dotarem.     TECHNIQUE:    Multiplanar multisequence acquisition without and with contrast of the brain.     FINDINGS:  Evaluation is significantly limited by motion.     Generalized parenchymal volume loss with commensurate dilation of the sulci and  ventricular system. Periventricular and deep white matter T2/FLAIR  hyperintensities, confluent in regions, consistent with moderate chronic  microvascular ischemic disease. 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 There is no acute infarct, hemorrhage, extra-axial fluid collection, or mass  effect. There is no cerebellar tonsillar herniation. Expected arterial  flow-voids are present. No evidence of abnormal enhancement.     The paranasal sinuses, mastoid air cells, and middle ears are clear. The orbital  contents are within normal limits. No significant osseous or scalp lesions are  identified. Cervical spine ACDF partially visualized extending superiorly to C4. Degenerative disc disease and facet arthropathy in the upper cervical spine.     IMPRESSION  Evaluation is significantly limited by motion.     1. No acute intracranial abnormality. 2. Generalized parenchymal volume loss and moderate chronic microvascular  ischemic disease.  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     Neuropsychological Mental Status Exam (NMSE):    Historian: Good  Praxis: No UE apraxia  R/L Orientation: Intact to self and to other  Dress: within normal limits   Weight: Overweight   Appearance/Hygiene: within normal limits   Gait: Slow, with cane   Assistive Devices: cane, glasses  Mood: within normal limits   Affect: within normal limits   Comprehension: within normal limits   Thought Process: within normal limits   Expressive Language: within normal limits   Receptive Language: within normal limits   Motor:  No cognitive or motor perseveration  ETOH: Occasional, not to excess  Tobacco: Quit [id number]  Illicit: Denied  SI/HI: Denied  Psychosis: Denied  Insight: Within normal limits  Judgment: Within normal limits  Other Psych:      Past Medical History:   Diagnosis Date    Acute alteration in mental status     Arthritis     Bilateral carotid artery stenosis     Cerebral microvascular disease     Chronic kidney disease     CKD 3    Chronic pain     CHRONIC BOWEL PAIN    Convulsions (HCC)     Depression     Disturbance of memory     Diverticulitis     Fibromyalgia     GERD (gastroesophageal reflux disease)     Goiter     Hiatal hernia     Hypercholesterolemia     Hypertension     IBS (irritable bowel syndrome)     CONSTIPATION, CHRONIC SINCE HER 20s    Insomnia     TAKES TRAZODONE NIGHTLY    Migraine     REDUCED IN FREQUENCY AND SEVERITY SINCE MENOPAUSE    Rectocele        Past Surgical History:   Procedure Laterality Date    COLONOSCOPY N/A 6/21/2016    COL[facility]COPY    COLONOSCOPY N/A 6/27/2018    COLONOSCOPY performed by [facility]iny Her [facility] at [facility] ENDOSCOPY    ENDOSCOPY, COLON, DIAGNOSTIC  11/2010    Dr. Marquis Keila Cruz 57  2011    HX CHOLECYSTECTOMY  2006    HX ENDOSCOPY      HX GI  X3  LAST ONE 12/10    COLONOSCOPY    HX GYN  1982    D&C WITH SUCTION    HX HYSTERECTOMY      HX LUMBAR FUSION  2017    HX ORTHOPAEDIC      right foot surgery    HX TONSILLECTOMY      HX TOTAL COLECTOMY  2007    Dr. Pond/ diverticulitis    IR INJ SPINE FLUORO GUIDED  8/28/2020    IR INJ SPINE FLUORO GUIDED  1/8/2021    IR INJ SPINE FLUORO GUIDED  2/5/2021       Allergies   Allergen Reactions    Latex Hives    Codeine Other (comments)     Severe Headache    Morphine Hives    Ambien [Zolpidem] Other (comments)     Severe behavior changes    Dilaudid [Hydromorphone] Other (comments)     Memory loss    Other Medication Rash     BANDAIDS    Shellfish Containing Products Hives       Family History   Problem Relation Age of Onset   [facility] Cancer Father         lung and bone    Stroke Sister     Cancer Sister 76        PANCREATIC    Hypertension Mother     High Cholesterol Mother     Alzheimer's Disease Mother         late 62s early 76s    Cancer Other         COLON    Cancer Other         breast    Diabetes Maternal Aunt     Cancer Maternal Uncle         COLON    Cancer Maternal Grandfather         COLON    Ovarian Cancer Daughter 28       Social History     Tobacco Use    Smoking status: Former Smoker     Packs/day: 1.00     Years: 45.00     Pack years: 45.00     Quit date: 1/1/2007     Years since quitting: 15.1    Smokeless tobacco: Never Used   Vaping Use    Vaping Use: Never used   Substance Use Topics    Alcohol use: Yes     Alcohol/week: 0.0 standard drinks     Comment: holidays    Drug use: No       Current Outpatient Medications   Medication Sig Dispense Refill    COVID-19 mRNA Vacc (MODERNA) 100 mcg/0.5 mL susp injection       hydroCHLOROthiazide (HYDRODIURIL) 25 mg tablet TAKE 1 TABLET DAILY 90 Tablet 2    QUEtiapine (SEROquel) 25 mg tablet Take 0.5 Tablets by mouth nightly. 30 Tablet 0    b complex vitamins tablet Take 1 Tablet by mouth daily.  pyridoxine, vitamin B6, (VITAMIN B-6) 50 mg tablet Take 50 mg by mouth daily.       aspirin delayed-release 81 mg tablet Take  by mouth daily.  OTHER Prevagan (Patient not taking: Reported on 1/4/2022)      rosuvastatin (CRESTOR) 20 mg tablet Take 20 mg by mouth nightly.  cyclobenzaprine (FLEXERIL) 5 mg tablet TAKE 1 TABLET NIGHTLY 90 Tablet 1    DULoxetine (CYMBALTA) 60 mg capsule TAKE 1 CAPSULE TWICE A  Capsule 3    acetaminophen (TYLENOL) 500 mg tablet Take 2 Tabs by mouth every six (6) hours. 90 Tab 0    calcium-vitamin D (OS-TRUE +D3) 500 mg-200 unit per tablet 1 Tab.  traZODone (DESYREL) 50 mg tablet Take 50 mg by mouth nightly as needed.  buPROPion SR (WELLBUTRIN, ZYBAN) 200 mg SR tablet TAKE 1 TABLET TWICE A DAY (REPLACES PREVIOUS DOSE) 180 Tab 3    dicyclomine (BENTYL) 10 mg capsule TAKE 2 CAPSULES FOUR TIMES A DAY AS NEEDED 360 Cap 7    estradioL (Estrace) 0.01 % (0.1 mg/gram) vaginal cream APPLY TO VAGINAL 2 TIMES PER WEEK WITH FINGERTIP (Patient not taking: Reported on 1/4/2022) 42.5 g 2    lisinopriL (PRINIVIL, ZESTRIL) 40 mg tablet TAKE 1 TABLET DAILY 90 Tab 3    oxybutynin (DITROPAN) 5 mg tablet TAKE 1 TABLET TWICE A  Tab 1    pantoprazole (PROTONIX) 40 mg tablet TAKE 1 TABLET DAILY 90 Tab 3    polyethylene glycol (MIRALAX) 17 gram packet Take 17 g by mouth daily.  cholecalciferol, vitamin D3, (Vitamin D3) 50 mcg (2,000 unit) tab Take 1 Tab by mouth daily.  sucralfate (CARAFATE) 1 gram tablet Take 1 g by mouth three (3) times daily as needed. Plan:  Obtain authorization for testing from insurance company. Report to follow once testing, scoring, and interpretation completed. ? Organic based neurocognitive issues versus mood disorder or combination of same. ? Problems organic, functional, or both? This note will not be viewable in [address]     Neuropsychological Test Results  Patient Testing 2/21/22 Report Completed 2/22/22  A Psychometrist Assisted w/ portions of this evaluation while under my direct  supervision    The following evaluation procedures/tests were administered:      Neuropsychologist Performed, Interpreted, & Reported:  Neuropsychological Mental Status Exam, Revised Memory & Behavior Checklist,  Mini Mental Status Exam, Clock Drawing Test, Christine-Melzack Pain Questionnaire, Test Of Premorbid Functioning, History Taking  & Clinical Interview With The Patient, Additional History Taking w/ the Patient's Spouse CASE, ABAS-3, JAMES, CPT-III, Review Of Available Records. Psychometrist Administered under Neuropsychologist Supervision & Neuropsychologist Interpreted & Neuropsychologist Reported:  Verbal Fluency Tests, Owen & Owen - Revised, Trailmaking Test Parts A & B, Wechsler Adult Intelligence Scale - IV, [place] Verbal Learning Test - 3, Grooved Pegboard, Cabrales Depression Inventory - II, Cabrales Anxiety Inventory. Test Findings:  Test Findings:  Note:  The patients raw data have been compared with currently available norms which include demographic corrections for age, gender, and/or education. Sometimes, the patients scores are compared to demographically similar individuals as close to the patients age, education level, etc., as possible. \"Average\" is viewed as being +/- 1 standard deviation (SD) from the stated mean for a particular test score. \"Low average\" is viewed as being between 1 and 2 SD below the mean, and above average is viewed as being 1 and 2 SD above the mean. Scores falling in the borderline range (between 1-1/2 and 2 SD below the mean) are viewed with particular attention as to whether they are normal or abnormal neurocognitive test scores. Other methods of inference in analyzing the test data are also utilized, including the pattern and range of scores in the profile, bilateral motor functions, and the presence, if any, of pathognomonic signs. Behaviorally, the patient was friendly and cooperative and appeared motivated to perform well during this examination.    Within this context, the results of this evaluation are viewed as a valid reflection of the patients actual neurocognitive and emotional status. The patient's score of 30/30 on the Mini-Mental Status Exam was normal.  Clock drawing was normal.        Her structured word list fluency, as assessed by the FAS Test, was within the mildly impaired range with a T score 36. Category fluency was mildly impaired with a T score of 37. Confrontation naming, as assessed by the [facility] as revised, was within the average range with a T score of 48. This pattern performance is indicative of a patient is at increased risk for day-to-day problems with verbal fluency and confrontation naming is normal.     The patient was administered the [facility] Continuous Performance Test - III and review of the subscales within this instrument revealed numerous concerns for inattentiveness without impulsivity. This pattern of performance is indicative of a patient who is at increased risk for day-to-day problems with sustained visual attention/concentration. The patient is not showing problems with working memory capacity (23rd %ile) or processing speed (30th %ile) on the WAIS-IV. Her Verbal Comprehension Index score of 89 was low average. Her Perceptual Reasoning Index score of 86 was low average. These scores do not reflect a decline in functioning based on an assessment of premorbid functioning. The patient was administered the [place] Verbal Learning Test  - 3 and generated a normal range and positive learning curve over 5 repeated auditory word list learning trials. An interference trial was normal.  Recall for the original word list was within the normal range after both short and long delays. Recognition recall was normal.  Forced choice recall was also normal.  Taken together, this pattern of performance is not indicative of a patient who is at increased risk for day-to-day problems with auditory learning and/or memory.      Simple timed visual motor sequencing (Trailmaking Test Part A) was within the average range with a T score 46. Her performance on a similar, but more complex task of timed visual motor sequencing (Trailmaking Test Part B) was within the mildly mildly impaired reality score 30. She made 3 sequencing errors on this latter completed test.  Taken together, this pattern of performance is not strongly indicative of a patient who is at increased risk for day-to-day problems with executive functioning. Attention problems and psychomotor slowing issues are noted, however. Fine motor dexterity was within the moderately to severely impaired range bilaterally. This does not raise concern for particularly focal or lateralized brain dysfunction. The patient rated her current level of pain as \"4/5-horrible \" on the Christine-Melzack Pain Questionnaire. She reported pain in her head, arms, upper chest, lower abdomen, hands, spine, lower back, knees, upper legs, and toes. She reported pain associated with fibromyalgia \"all over\" as well as carpal tunnel issues and IBS problems. She also reported concerns due to arthritis. Additional symptoms reported included headaches, dizziness, and constipation. Her Cabrales Depression Inventory -II score of 17 was within the mildly depressed range. Her Cabrales Anxiety Inventory score of 16 reflected moderate anxiety. The patient's responses on the Personality Assessment Inventory were deemed valid for interpretation, though there is some defensiveness in responding. She has a tendency to pretreat herself as being relatively free of common shortcomings to which most individuals will admit. In addition, there are some elements of a \"cry for help\" pattern of responding here. Within this context, there is support for anxiety with somatic features as well as significant depression. Maladaptive behavior patterns aimed at controlling anxiety are present.   Self-concept is harsh, fixed, and negative. The spouse completed the ABAS-3 reported borderline range concerns regarding the patient's general adaptive skills (SS = 76), conceptual skills (SS = 73), social skills (SS = 76), and practical adaptive skills (SS = 79). On the CASE, the spouse  reports concern regarding the patient's cognitive functioning as well as concern for anxiety, depression, and somatic elements. Impressions & Recommendations: This is a mixed normal/abnormal range Neuropsychological Evaluation with respect to neurocognitive functioning. In this regard, she is showing impairments with verbal fluency, sustained visual attention, bilateral fine motor dexterity, and executive functioning abilities are slow. At the same time, her auditory learning, memory, intellectual capacity, and performances across all other neurocognitive domains assessed are normal.  From an emotional standpoint, there is moderate anxiety with somatic features and depression. In my opinion, this profile is not (yet) consistent with a dementia process. Instead, this is MCI exacerbated by significant anxiety and depression. In addition to continued medical care, my recommendations include psychiatric treatment for anxiety and depression along with active engagement in counseling. Consider also an appropriate medication for attention if this is not medically contraindicated. At this point in time, I find that the patient has capacity to make informed medical decisions, financial decisions, the ability to vote, to , or to own a firearm. Driving safety needs to be formally evaluated/monitored over time. She should be encouraged to remain as mentally, physically, socially active as possible. With an improvement in mood should come with it a significant improvement in day-to-day neurocognitive functioning. If not, follow-up neuropsychological evaluation with him indicated.   In the interim, she needs to remain as mentally, physically, and socially active as possible. We now have extensive baseline neurocognitive and psychologic data on her. Follow-up yearly, or as needed. Clinical correlation is, course, indicated. I will discuss these findings with the patient when she follows up with me in the near future. A follow up Neuropsychological Evaluation is indicated on a prn basis, especially if there are any cognitive and/or emotional changes. DIAGNOSES:  MCI Without Memory Loss (Attention, Fluency, Bilateral Fine Motor Dexterity)      Anxiety with Somatization     Major Depression - Mild To Moderate     The above information is based upon information currently available to me. If there is any additional information of which I am currently unaware, I would be more than happy to review it upon having it made available to me. Thank you for the opportunity to see this interesting individual.     Sincerely,       Carla De La Fuente. Arlet Jerez PsyD, EdS    CC:  Bruno Kendrick MD    Time Documentation:    68781*1 86278*9 (60 minutes)    94959 x 1  96139 x 5 Test Administration/Data Gathering By Technician: (3 hours). 71529 x 1 (first 30 minutes), 38106 x 5 (each additional 30 minutes)    96132 x 1  96133 x 1 Testing Evaluation Services by Neuropsychologist (1 hour, 50 minutes) 96132 x 1 (first hour), 96133 x 1 (50 minutes)    Definitions:      36643/75262:  Neurobehavioral Status Exam, Clinical interview. Clinical assessment of thinking, reasoning and judgment, by neuropsychologist, both face to face time with patient and time interpreting those test results and reporting, first and subsequent hours)    84231/81688: Neuropsychological Test Administration by Technician/Psychometrist, first 30 minutes and each additional 30 minutes. The above includes: Record review. Review of history provided by patient. Review of collaborative information. Testing by Clinician. Review of raw data. Scoring.  Report writing of individual tests administered by Clinician. Integration of individual tests administered by psychometrist with NSE/testing by clinician, review of records/history/collaborative information, case Conceptualization, treatment planning, clinical decision making, report writing, coordination Of Care. Psychometry test codes as time spent by psychometrist administering and scoring neurocognitive/psychological tests under supervision of neuropsychologist.  Integral services including scoring of raw data, data interpretation, case conceptualization, report writing etcetera were initiated after the patient finished testing/raw data collected and was completed on the date the report was signed. Please note that this dictation was completed with [employer] the [employer] voice recognition software. Quite often unanticipated grammatical, syntax, homophones, and other interpretive errors are inadvertently transcribed by the computer software. Please disregard these errors. Please excuse any errors that have escaped final proofreading. Thank you.

## 2022-03-18 PROBLEM — Q76.49 CONGENITAL CERVICAL SPINE STENOSIS: Status: ACTIVE | Noted: 2021-11-18

## 2022-03-18 PROBLEM — E55.9 VITAMIN D DEFICIENCY: Status: ACTIVE | Noted: 2021-11-18

## 2022-03-18 PROBLEM — M48.062 SPINAL STENOSIS OF LUMBAR REGION WITH NEUROGENIC CLAUDICATION: Status: ACTIVE | Noted: 2017-08-29

## 2022-03-18 PROBLEM — G95.9 CERVICAL MYELOPATHY WITH CERVICAL RADICULOPATHY (HCC): Status: ACTIVE | Noted: 2021-11-18

## 2022-03-18 PROBLEM — F33.2 DEPRESSION, MAJOR, SEVERE RECURRENCE (HCC): Status: ACTIVE | Noted: 2019-12-06

## 2022-03-18 PROBLEM — M54.12 CERVICAL MYELOPATHY WITH CERVICAL RADICULOPATHY (HCC): Status: ACTIVE | Noted: 2021-11-18

## 2022-03-19 PROBLEM — E03.4 HYPOTHYROIDISM DUE TO ACQUIRED ATROPHY OF THYROID: Status: ACTIVE | Noted: 2021-11-18

## 2022-03-19 PROBLEM — E53.8 B12 DEFICIENCY: Status: ACTIVE | Noted: 2021-11-18

## 2022-03-19 PROBLEM — E66.9 NON MORBID OBESITY: Status: ACTIVE | Noted: 2017-12-20

## 2022-03-19 PROBLEM — G56.03 BILATERAL CARPAL TUNNEL SYNDROME: Status: ACTIVE | Noted: 2021-11-18

## 2022-03-19 PROBLEM — M48.061 SPINAL STENOSIS, LUMBAR: Status: ACTIVE | Noted: 2021-05-03

## 2022-03-19 PROBLEM — E11.42 DIABETIC PERIPHERAL NEUROPATHY ASSOCIATED WITH TYPE 2 DIABETES MELLITUS (HCC): Status: ACTIVE | Noted: 2021-11-18

## 2022-03-20 PROBLEM — I65.23 CAROTID STENOSIS, BILATERAL: Status: ACTIVE | Noted: 2021-11-18

## 2022-03-20 PROBLEM — M47.22 CERVICAL RADICULOPATHY DUE TO DEGENERATIVE JOINT DISEASE OF SPINE: Status: ACTIVE | Noted: 2021-11-18

## 2022-03-20 PROBLEM — R41.3 DISTURBANCE OF MEMORY: Status: ACTIVE | Noted: 2021-11-18

## 2022-03-21 DIAGNOSIS — I10 ESSENTIAL HYPERTENSION WITH GOAL BLOOD PRESSURE LESS THAN 140/90: ICD-10-CM

## 2022-03-22 RX ORDER — LISINOPRIL 40 MG/1
TABLET ORAL
Qty: 90 TABLET | Refills: 3 | Status: SHIPPED | OUTPATIENT
Start: 2022-03-22

## 2022-05-13 NOTE — PROGRESS NOTES
Problem: Self Care Deficits Care Plan (Adult)  Goal: *Acute Goals and Plan of Care (Insert Text)  Occupational Therapy Goals  Initiated 8/30/2017  1. Patient will perform grooming, UE and LE dressing with modified independence using Reacher, Stocking Aid, Long [employer], Elastic Shoe Laces and Dressing Stick PRN within 7 days. 2. Patient will perform bathing with supervision/set-up using most appropriate DME within 7 days. 3. Patient will toileting and toilet transfer at modified independence within 7 days. 4. Patient will don/doff back brace at min A within 7 days. 5. Patient will verbalize/demonstrate 3/3 back precautions during ADL tasks without cues within 7 days. OCCUPATIONAL THERAPY EVALUATION  Patient: Gail Chong (18 y.o. female)  Date: 8/30/2017  Primary Diagnosis: LUMBAGO, RADICULOPATHY, STENOSIS  Spinal stenosis of lumbar region with neurogenic claudication  Procedure(s) (LRB):  L3-S1 DECOMPRESSION WITH BONE MARROW ASPIRATION FROM ILIAC CREST [employer]) (N/A) 1 Day Post-Op   Precautions:   Back (Spinal non instrumental fusion)      ASSESSMENT :  Based on the objective data described below, the patient presents with 2 days post op back surgery as noted above. Pain 3/10 post pain meds. Min/mod A self care without use of AE and DME which they report they will purchase. Will benefit from LE AE training and functional transfer training, min A to maintain precautions during transfers. Extensive training for safety in the home with ADLs and IADLs with patient, son and spouse with many questions answered. Good verbalized understanding. [facility] to maximize safe outcome post lumbar fusion. Patient will benefit from skilled intervention to address the above impairments.   Patients rehabilitation potential is considered to be Good  Factors which may influence rehabilitation potential include:   [X]             None noted  [ ]             Mental General ability/status  [ ]             Medical condition  [ ]             Home/family situation and support systems  [ ]             Safety awareness  [ ]             Pain tolerance/management  [ ]             Other:        PLAN :  Recommendations and Planned Interventions:  [X]               Self Care Training                  [X]        Therapeutic Activities  [X]               Functional Mobility Training    [ ]        Cognitive Retraining  [X]               Therapeutic Exercises           [X]        Endurance Activities  [X]               Balance Training                   [ ]        Neuromuscular Re-Education  [ ]               Visual/Perceptual Training     [X]   Home Safety Training  [X]               Patient Education                 [X]        Family Training/Education  [ ]               Other (comment):     Frequency/Duration: Patient will be followed by occupational therapy 5 times a week to address goals. Discharge Recommendations: Home Health  Further Equipment Recommendations for Discharge: shower chair and BSC; HHS       SUBJECTIVE:   Patient stated Oh its hard to sit with my head up.       OBJECTIVE DATA SUMMARY:   HISTORY:   Past Medical History:   Diagnosis Date    Arthritis      Cancer ([facility] [address]       skin cancer    Chronic pain       CHRONIC BOWEL PAIN    Diverticulitis      Fibromyalgia      GERD (gastroesophageal reflux disease)      Goiter      Hiatal hernia      Hypercholesterolemia      Hypertension      IBS (irritable bowel syndrome)      IBS (irritable bowel syndrome)       CONSTIPATION, CHRONIC SINCE HER 25s    Ill-defined condition       rectocele    Insomnia       TAKES TRAZODONE NIGHTLY    Migraine       REDUCED IN FREQUENCY AND SEVERITY SINCE MENOPAUSE    Other ill-defined conditions      Psychiatric disorder 3/11     DEPRESSION     Past Surgical History:   Procedure Laterality Date    COLONOSCOPY N/A 6/21/2016     COLON[facility]    ENDOSCOPY, COLON, DIAGNOSTIC   11/2010     Dr. Ria Drummond-     HX CHOLECYSTECTOMY   2006   Nedra Murillo   2007     Dr. Pond/ diverticulitis    HX GI   X3  LAST ONE 12/10     COLONOSCOPY    HX GYN   1982     D&C WITH SUCTION    HX HYSTERECTOMY        HX ORTHOPAEDIC         right foot surgery    HX ORTHOPAEDIC         neck surgery 3/21/11 Dr. Bernal Cords [address] [facility]         EGD    HX TONSILLECTOMY            Prior Level of Function/Home Situation: I PTA  Expanded or extensive additional review of patient history:      Home Situation  Home Environment: Private residence  # Steps to Enter: 2  Rails to Enter: No  Wheelchair Ramp: No  One/Two Story Residence: One story  Living Alone: No  Support Systems: Family member(s)  Patient Expects to be Discharged to[de-identified] Private residence  Current DME Used/Available at Home: None  Tub or Shower Type: Shower  [X]  Right hand dominant             [ ]  Left hand dominant     EXAMINATION OF PERFORMANCE DEFICITS:  Cognitive/Behavioral Status:  Neurologic State: Alert; Appropriate for age  Orientation Level: Oriented X4  Cognition: Appropriate decision making; Appropriate for age attention/concentration; Follows commands  Perception: Appears intact  Perseveration: No perseveration noted  Safety/Judgement: Awareness of environment; Fall prevention;Home safety; Insight into deficits     Skin: intact exceptr incision     Edema: trace B feet  Hearing:   Auditory  Auditory Impairment: None     Vision/Perceptual:                           Acuity:  (WFL)          Range of Motion:  WFL B UE  AROM: Within functional limits  PROM: Within functional limits                       Strength:  Strength:  (within limits of 5# spinal precautions; mild decreased endur)ance with sit to stand and vice versa while maintaining back precautions; core strengthening training provided                 Coordination:  Coordination:  (WFL within limits of back precautions)  Fine Motor Skills-Upper: Left Intact; Right Intact    Gross Motor Skills-Upper: Left Impaired;Right Impaired (mild due to back precautions/no twisting etc)     Tone & Sensation:     Tone: Normal  Sensation: Impaired (B feet)                       Balance:  Sitting: Intact; Without support  Standing: Impaired; Without support  Standing - Static: Good;Constant support  Standing - Dynamic : Good     Functional Mobility and Transfers for ADLs:  Bed Mobility:  Rolling: Supervision  Supine to Sit: Supervision  Sit to Supine: Supervision  Scooting: Contact guard assistance     Transfers:  Sit to Stand: Contact guard assistance  Stand to Sit: Contact guard assistance  Bed to Chair: Minimum assistance  Toilet Transfer : Minimum assistance     ADL Assessment:  Feeding: Supervision     Oral Facial Hygiene/Grooming: Minimum assistance     Bathing: Minimum assistance; Moderate assistance     Upper Body Dressing: Setup; Additional time     Lower Body Dressing: Minimum assistance; Moderate assistance; Additional time (AE recommended)     Toileting: Minimum assistance                 ADL Intervention and task modifications:     See above; ADLs with back precautions trained including eating, grooming, bathing, toileting and dressing                                      Cognitive Retraining  Safety/Judgement: Awareness of environment; Fall prevention;Home safety; Insight into deficits     Therapeutic Exercise:  Walking advised with goal of 30 cumulative minutes daily  Functional Measure:  Barthel Index:      Bathin  Bladder: 10  Bowels: 10  Groomin  Dressin  Feeding: 10  Mobility: 0  Stairs: 0  Toilet Use: 5  Transfer (Bed to Chair and Back): 10  Total: 50         Barthel and G-code impairment scale:  Percentage of impairment CH  0% CI  1-19% CJ  20-39% CK  40-59% CL  60-79% CM  80-99% CN  100%   Barthel Score 0-100 100 99-80 79-60 59-40 20-39 1-19    0   Barthel Score 0-20 20 17-19 13-16 9-12 5-8 1-4 0      The Barthel ADL Index: Guidelines  1.  The index should be used as a record of what a patient does, not as a record of what a patient could do. 2. The main aim is to establish degree of independence from any help, physical or verbal, however minor and for whatever reason. 3. The need for supervision renders the patient not independent. 4. A patient's performance should be established using the best available evidence. Asking the patient, friends/relatives and nurses are the usual sources, but direct observation and common sense are also important. However direct testing is not needed. 5. Usually the patient's performance over the preceding 24-48 hours is important, but occasionally longer periods will be relevant. 6. Middle categories imply that the patient supplies over 50 per cent of the effort. 7. Use of aids to be independent is allowed. Aleyda Cantor, Barthel, D.W. ([id number]). Functional evaluation: the Barthel Index. [address] [facility] (14)2. Joyce Paige nicolas FREDDY Yoder, Jose Bowen., Patricia Morales., [place], [address] [place] [address] (1999). Measuring the change indisability after inpatient rehabilitation; comparison of the responsiveness of the Barthel Index and Functional [place] Measure. Journal of Neurology, Neurosurgery, and Psychiatry, 66(4), 812-717. Beulah Arndt, N.J.A, NORMAN Moran.SUKHJINDER, & Sarita Ceballos, M.A. (2004.) Assessment of post-stroke quality of life in cost-effectiveness studies: The usefulness of the Barthel Index and the EuroQoL-5D. Quality of Life Research, 13, 707-46         G codes: In compliance with CMSs Claims Based Outcome Reporting, the following G-code set was chosen for this patient based on their primary functional limitation being treated: The outcome measure chosen to determine the severity of the functional limitation was the Barthel Index with a score of 50/100 which was correlated with the impairment scale.       · Self Care:               - CURRENT STATUS:    CK - 40%-59% impaired, limited or restricted               - GOAL STATUS:           CI - 1%-19% impaired, limited or restricted               - D/C STATUS:                       ---------------To be determined---------------      Occupational Therapy Evaluation Charge Determination   History Examination Decision-Making   LOW Complexity : Brief history review  MEDIUM Complexity : 3-5 performance deficits relating to physical, cognitive , or psychosocial skils that result in activity limitations and / or participation restrictions MEDIUM Complexity : Patient may present with comorbidities that affect occupational performnce. Miniml to moderate modification of tasks or assistance (eg, physical or verbal ) with assesment(s) is necessary to enable patient to complete evaluation       Based on the above components, the patient evaluation is determined to be of the following complexity level: LOW   Pain:  Pain Scale 1: Numeric (0 - 10)  Pain Intensity 1: 4  Pain Location 1: Back  Pain Orientation 1: Lower  Pain Description 1: Aching  Pain Intervention(s) 1: Medication (see MAR)  Activity Tolerance:   fair  Please refer to the flowsheet for vital signs taken during this treatment. After treatment:   [X] Patient left in no apparent distress sitting up in chair  [ ] Patient left in no apparent distress in bed  [X] Call bell left within reach  [X] Nursing notified  [X] Caregiver present  [ ] Bed alarm activated      COMMUNICATION/EDUCATION:   The patients plan of care was discussed with: Physical Therapist and Registered Nurse.  [X] Home safety education was provided and the patient/caregiver indicated understanding. [X] Patient/family have participated as able in goal setting and plan of care. [X] Patient/family agree to work toward stated goals and plan of care. [ ] Patient understands intent and goals of therapy, but is neutral about his/her participation. [ ] Patient is unable to participate in goal setting and plan of care.   This patients plan of care is appropriate for delegation to [facility].      Thank you for this referral.  Merna David OTR/L  Time Calculation: 60 mins

## 2022-05-25 ENCOUNTER — OFFICE VISIT (OUTPATIENT)
Dept: FAMILY MEDICINE CLINIC | Age: 72
End: 2022-05-25
Payer: MEDICARE

## 2022-05-25 VITALS
DIASTOLIC BLOOD PRESSURE: 59 MMHG | WEIGHT: 185 LBS | HEART RATE: 74 BPM | RESPIRATION RATE: 16 BRPM | OXYGEN SATURATION: 98 % | BODY MASS INDEX: 32.78 KG/M2 | SYSTOLIC BLOOD PRESSURE: 138 MMHG | TEMPERATURE: 97.7 F | HEIGHT: 63 IN

## 2022-05-25 DIAGNOSIS — S81.812D LACERATION OF LEFT LOWER EXTREMITY, SUBSEQUENT ENCOUNTER: Primary | ICD-10-CM

## 2022-05-25 PROCEDURE — 1101F PT FALLS ASSESS-DOCD LE1/YR: CPT | Performed by: FAMILY MEDICINE

## 2022-05-25 PROCEDURE — G9717 DOC PT DX DEP/BP F/U NT REQ: HCPCS | Performed by: FAMILY MEDICINE

## 2022-05-25 PROCEDURE — G8427 DOCREV CUR MEDS BY ELIG CLIN: HCPCS | Performed by: FAMILY MEDICINE

## 2022-05-25 PROCEDURE — G8536 NO DOC ELDER MAL SCRN: HCPCS | Performed by: FAMILY MEDICINE

## 2022-05-25 PROCEDURE — G9711 PT HX TOT COL OR COLON CA: HCPCS | Performed by: FAMILY MEDICINE

## 2022-05-25 PROCEDURE — 1090F PRES/ABSN URINE INCON ASSESS: CPT | Performed by: FAMILY MEDICINE

## 2022-05-25 PROCEDURE — 99213 OFFICE O/P EST LOW 20 MIN: CPT | Performed by: FAMILY MEDICINE

## 2022-05-25 PROCEDURE — G8417 CALC BMI ABV UP PARAM F/U: HCPCS | Performed by: FAMILY MEDICINE

## 2022-05-25 PROCEDURE — 1123F ACP DISCUSS/DSCN MKR DOCD: CPT | Performed by: FAMILY MEDICINE

## 2022-05-25 PROCEDURE — G8399 PT W/DXA RESULTS DOCUMENT: HCPCS | Performed by: FAMILY MEDICINE

## 2022-05-25 PROCEDURE — G8752 SYS BP LESS 140: HCPCS | Performed by: FAMILY MEDICINE

## 2022-05-25 PROCEDURE — G8754 DIAS BP LESS 90: HCPCS | Performed by: FAMILY MEDICINE

## 2022-05-25 RX ORDER — MIRTAZAPINE 7.5 MG/1
TABLET, FILM COATED ORAL
COMMUNITY
Start: 2022-04-26 | End: 2022-06-03 | Stop reason: SDUPTHER

## 2022-05-25 RX ORDER — BUPROPION HYDROCHLORIDE 300 MG/1
TABLET ORAL
COMMUNITY
Start: 2022-04-26 | End: 2022-06-03 | Stop reason: SDUPTHER

## 2022-05-25 NOTE — PROGRESS NOTES
Chief Complaint   Patient presents with    Follow-up     Laceration left leg still bleeding   1. Have you been to the ER, urgent care clinic since your last visit? Hospitalized since your last visit? Yes Where: [facility]     2. Have you seen or consulted any other health care providers outside of the [address] [place] [address] since your last visit? Include any pap smears or colon screening.  No

## 2022-05-25 NOTE — PROGRESS NOTES
Christian Burciaga (: 1950) is a 66 y.o. female, established patient, here for evaluation of the following chief complaint(s):  Follow-up (Laceration left leg still bleeding)       ASSESSMENT/PLAN: healing ok, ct wound care at home and will f/u in 10 days to remove staples and sutures    Below is the assessment and plan developed based on review of pertinent history, physical exam, labs, studies, and medications. 1. Laceration of left lower extremity, subsequent encounter      Return in about 10 days (around 2022). SUBJECTIVE/OBJECTIVE:    Ashleigh Hernandez on 20 and had LLE laceration. Went to [facility] ER and had staples and sutures placed. Concerned about this today as still with sig drainage/bleeding over the last few days. Also briefly reviewed neuopsych testing with [facility] w/o memory loss, anxiety and depression playing a role. Working with Neuro and Psych at this point. To see Dr. Villanueva in 2 weeks. To f/u with Dr. Hector Montana soon. To see Dr. Hilda Dyer (psych) in next few months too. Since I added on low dose Seroquel 12.5 mg in 2022, pt and  report improved sleep and mood.  also thinks her cognition has been a bit better. Denies side effects. ROS  Gen - no fever/chills  Resp - no dyspnea or cough  CV - no chest pain or KASPER  Rest per HPI    Blood pressure (!) 138/59, pulse 74, temperature 97.7 °F (36.5 °C), temperature source Temporal, resp. rate 16, height 5' 3\" (1.6 m), weight 185 lb (83.9 kg), SpO2 98 %. Physical Exam  General appearance - alert, well appearing, and in no distress  Eyes -sclera anicteric  Skin - LLE with laceration with numerous staples and few sutures. Minimal serosanguinous drainage. Surrounding resolving bruising.   Psych - normal mood and affect      On this date 2022 I have spent 20 minutes reviewing previous notes, test results and face to face with the patient discussing the diagnosis and importance of compliance with the treatment plan as well as documenting on the day of the visit. An electronic signature was used to authenticate this note.   -- Lesly Goddard MD

## 2022-06-03 ENCOUNTER — OFFICE VISIT (OUTPATIENT)
Dept: FAMILY MEDICINE CLINIC | Age: 72
End: 2022-06-03
Payer: MEDICARE

## 2022-06-03 VITALS
BODY MASS INDEX: 32.87 KG/M2 | OXYGEN SATURATION: 93 % | RESPIRATION RATE: 16 BRPM | DIASTOLIC BLOOD PRESSURE: 80 MMHG | HEART RATE: 51 BPM | HEIGHT: 63 IN | SYSTOLIC BLOOD PRESSURE: 151 MMHG | WEIGHT: 185.5 LBS | TEMPERATURE: 97.5 F

## 2022-06-03 DIAGNOSIS — S81.812D LACERATION OF LEFT LOWER EXTREMITY, SUBSEQUENT ENCOUNTER: Primary | ICD-10-CM

## 2022-06-03 PROCEDURE — G8536 NO DOC ELDER MAL SCRN: HCPCS | Performed by: FAMILY MEDICINE

## 2022-06-03 PROCEDURE — G8427 DOCREV CUR MEDS BY ELIG CLIN: HCPCS | Performed by: FAMILY MEDICINE

## 2022-06-03 PROCEDURE — 1123F ACP DISCUSS/DSCN MKR DOCD: CPT | Performed by: FAMILY MEDICINE

## 2022-06-03 PROCEDURE — G9717 DOC PT DX DEP/BP F/U NT REQ: HCPCS | Performed by: FAMILY MEDICINE

## 2022-06-03 PROCEDURE — G8753 SYS BP > OR = 140: HCPCS | Performed by: FAMILY MEDICINE

## 2022-06-03 PROCEDURE — G8399 PT W/DXA RESULTS DOCUMENT: HCPCS | Performed by: FAMILY MEDICINE

## 2022-06-03 PROCEDURE — 1090F PRES/ABSN URINE INCON ASSESS: CPT | Performed by: FAMILY MEDICINE

## 2022-06-03 PROCEDURE — G9711 PT HX TOT COL OR COLON CA: HCPCS | Performed by: FAMILY MEDICINE

## 2022-06-03 PROCEDURE — 1101F PT FALLS ASSESS-DOCD LE1/YR: CPT | Performed by: FAMILY MEDICINE

## 2022-06-03 PROCEDURE — 99213 OFFICE O/P EST LOW 20 MIN: CPT | Performed by: FAMILY MEDICINE

## 2022-06-03 PROCEDURE — G8754 DIAS BP LESS 90: HCPCS | Performed by: FAMILY MEDICINE

## 2022-06-03 PROCEDURE — G8417 CALC BMI ABV UP PARAM F/U: HCPCS | Performed by: FAMILY MEDICINE

## 2022-06-03 RX ORDER — MIRTAZAPINE 7.5 MG/1
7.5 TABLET, FILM COATED ORAL
Qty: 90 TABLET | Refills: 1 | Status: SHIPPED | OUTPATIENT
Start: 2022-06-03 | End: 2022-09-16 | Stop reason: SDUPTHER

## 2022-06-03 RX ORDER — BUPROPION HYDROCHLORIDE 300 MG/1
300 TABLET ORAL DAILY
Qty: 90 TABLET | Refills: 1 | Status: SHIPPED | OUTPATIENT
Start: 2022-06-03 | End: 2022-09-16 | Stop reason: SDUPTHER

## 2022-06-03 NOTE — PROGRESS NOTES
Chief Complaint   Patient presents with    Laceration     Follow-up   1. Have you been to the ER, urgent care clinic since your last visit? Hospitalized since your last visit? No    2. Have you seen or consulted any other health care providers outside of the [address] [place] [address] since your last visit? Include any pap smears or colon screening.  No

## 2022-06-03 NOTE — PROGRESS NOTES
Lida Mas (: 1950) is a 66 y.o. female, established patient, here for evaluation of the following chief complaint(s):  Laceration (Follow-up)       ASSESSMENT/PLAN: Sutures and staples removed with no problem. Given wound care instructions. Below is the assessment and plan developed based on review of pertinent history, physical exam, labs, studies, and medications. 1. Laceration of left lower extremity, subsequent encounter      Return if symptoms worsen or fail to improve. SUBJECTIVE/OBJECTIVE:  Here to follow-up on left lower extremity laceration with numerous staples and sutures placed in ER. Given slow healing at last appointment, discussed keeping sutures and staples in for a longer period and she is here to have these today. ROS per HPI    Blood pressure (!) 151/80, pulse (!) 51, temperature 97.5 °F (36.4 °C), temperature source Temporal, resp. rate 16, height 5' 3\" (1.6 m), weight 185 lb 8 oz (84.1 kg), SpO2 93 %. Physical Exam  General appearance - alert, well appearing, and in no distress  Eyes -sclera anicteric  Skin - large LLE laceration healing well with numerous staples and sutures. Removed today. Minimal discharge/bleeding. On this date 2022 I have spent 20 minutes reviewing previous notes, test results and face to face with the patient discussing the diagnosis and importance of compliance with the treatment plan as well as documenting on the day of the visit. An electronic signature was used to authenticate this note.   -- Reyna Calles MD

## 2022-06-09 ENCOUNTER — OFFICE VISIT (OUTPATIENT)
Dept: NEUROLOGY | Age: 72
End: 2022-06-09
Payer: MEDICARE

## 2022-06-09 VITALS
BODY MASS INDEX: 33.1 KG/M2 | WEIGHT: 186.8 LBS | RESPIRATION RATE: 16 BRPM | TEMPERATURE: 98.2 F | DIASTOLIC BLOOD PRESSURE: 80 MMHG | SYSTOLIC BLOOD PRESSURE: 140 MMHG | HEIGHT: 63 IN | HEART RATE: 98 BPM | OXYGEN SATURATION: 97 %

## 2022-06-09 DIAGNOSIS — I65.23 CAROTID STENOSIS, BILATERAL: ICD-10-CM

## 2022-06-09 DIAGNOSIS — E11.42 DIABETIC PERIPHERAL NEUROPATHY ASSOCIATED WITH TYPE 2 DIABETES MELLITUS (HCC): ICD-10-CM

## 2022-06-09 DIAGNOSIS — M47.22 CERVICAL RADICULOPATHY DUE TO DEGENERATIVE JOINT DISEASE OF SPINE: ICD-10-CM

## 2022-06-09 DIAGNOSIS — G95.9 CERVICAL MYELOPATHY WITH CERVICAL RADICULOPATHY (HCC): ICD-10-CM

## 2022-06-09 DIAGNOSIS — M54.12 CERVICAL MYELOPATHY WITH CERVICAL RADICULOPATHY (HCC): ICD-10-CM

## 2022-06-09 DIAGNOSIS — R41.3 DISTURBANCE OF MEMORY: ICD-10-CM

## 2022-06-09 DIAGNOSIS — G31.84 MILD COGNITIVE IMPAIRMENT: Primary | ICD-10-CM

## 2022-06-09 DIAGNOSIS — G56.03 BILATERAL CARPAL TUNNEL SYNDROME: ICD-10-CM

## 2022-06-09 PROCEDURE — 1090F PRES/ABSN URINE INCON ASSESS: CPT | Performed by: PSYCHIATRY & NEUROLOGY

## 2022-06-09 PROCEDURE — G8427 DOCREV CUR MEDS BY ELIG CLIN: HCPCS | Performed by: PSYCHIATRY & NEUROLOGY

## 2022-06-09 PROCEDURE — G9717 DOC PT DX DEP/BP F/U NT REQ: HCPCS | Performed by: PSYCHIATRY & NEUROLOGY

## 2022-06-09 PROCEDURE — 1101F PT FALLS ASSESS-DOCD LE1/YR: CPT | Performed by: PSYCHIATRY & NEUROLOGY

## 2022-06-09 PROCEDURE — G8536 NO DOC ELDER MAL SCRN: HCPCS | Performed by: PSYCHIATRY & NEUROLOGY

## 2022-06-09 PROCEDURE — G8754 DIAS BP LESS 90: HCPCS | Performed by: PSYCHIATRY & NEUROLOGY

## 2022-06-09 PROCEDURE — 2022F DILAT RTA XM EVC RTNOPTHY: CPT | Performed by: PSYCHIATRY & NEUROLOGY

## 2022-06-09 PROCEDURE — 1123F ACP DISCUSS/DSCN MKR DOCD: CPT | Performed by: PSYCHIATRY & NEUROLOGY

## 2022-06-09 PROCEDURE — G9711 PT HX TOT COL OR COLON CA: HCPCS | Performed by: PSYCHIATRY & NEUROLOGY

## 2022-06-09 PROCEDURE — G8399 PT W/DXA RESULTS DOCUMENT: HCPCS | Performed by: PSYCHIATRY & NEUROLOGY

## 2022-06-09 PROCEDURE — G8417 CALC BMI ABV UP PARAM F/U: HCPCS | Performed by: PSYCHIATRY & NEUROLOGY

## 2022-06-09 PROCEDURE — 3046F HEMOGLOBIN A1C LEVEL >9.0%: CPT | Performed by: PSYCHIATRY & NEUROLOGY

## 2022-06-09 PROCEDURE — 99214 OFFICE O/P EST MOD 30 MIN: CPT | Performed by: PSYCHIATRY & NEUROLOGY

## 2022-06-09 PROCEDURE — G8753 SYS BP > OR = 140: HCPCS | Performed by: PSYCHIATRY & NEUROLOGY

## 2022-06-09 NOTE — PROGRESS NOTES
Consult  REFERRED BY:  Moon Cantu MD    CHIEF COMPLAINT: Numbness of her hands      Subjective:     Rose Snow is a 66 y.o. right-handed  female seen at the request of Dr. Mirza García and Dr. Robby Olea for evaluation of more depression due to family problems of her children, with her daughter having cancer that is metastatic and her son having job stress and living with them, and had been placed on Seroquel by her psychiatrist in addition to her Wellbutrin and is on Remeron and Cymbalta for her depression, and seems to be a little better but still asking for more medication. These changes were recently we will not change the medication. She is seen for follow-up of her neuropsych evaluation, we went over the results with her and her neuropsychologist, and it showed that she had more mild cognitive impairment and stress and depression symptoms rather than a true organic dementia. We advised the patient that the best thing she can do to stay mentally and physically active and exercise regularly and eat a Mediterranean type diet to prevent progressive memory loss, or at least slow it down. We advised both the patient and her  for about 15 minutes on the benefit of these in a daily we will anxiously pursue these. She will try to get more active. Progressive numbness of her hands for the last 6 months that is slowly getting worse. It is not associate with any weakness, but is associated with some neck pain and discomfort. She also complains of some numbness and tingling at times in her feet and may have a neuropathy and is supposedly is not a diabetic. She had surgery about a year and a half ago for left carpal tunnel syndrome, and decided not to operate on the right 1 which got better on his own. She had some improvement in the left carpal tunnel syndrome but her symptoms have come back. Her surgery was done by Dr. Kaila Singh.  She had an EMG study just last month, by Dr. Wendy Amezcua in October 2021 and looking at the report she had normal motor and sensory conductions in latencies, and I did not see any clear EMG studies, and her studies were significantly improved on the left side from before surgery but she had a previous EMG study. Patient says her numbness is worse in the morning and certainly sounds like her carpal tunnel. She has no weakness in the hands and no difficulty doing her daily activities and remains very active. She has no gait disorder no toxin exposure, no trauma, no fever, no real weakness, and her bowel and bladder function is normal. The patient just had an MRI scan done of her cervical spine done yesterday, that showed a previous C5-C7 fixation from previous cervical laminectomy, and severe C3-4 and C4-5 cervical spinal stenosis, and stable see 7 T1 disease, and new disc herniation at T2-T3. Patient also had an MRI scan in September 2020 one of her back, that showed moderate severe spinal stenosis at L1-L2, and L2-L3, and L3-L4, stable post laminectomy decompressive changes seen from L4-S1. Patient does not have any difficulty walking per se. Her bowel and bladder function remain stable. She is being seen by Dr. Valentin Province her spinal surgeon. She does have a Tinel's over both median nerves at the wrist, right side worse than left. She denies any trauma, chemotherapy, vitamin deficiency, or other causes for her symptoms. Patient also seen for other new problem of some questionable memory loss, and she had an encephalopathy in the hospital in May for which she was seen, and had a work-up showing microvascular disease on her MRI scan but normal carotid Dopplers, and an EEG just showed mild generalized slowing. She got much better after her 2 major surgeries.  However she still has occasional memory problems and has a neuropsychological evaluation scheduled in a month or 2 with Dr. Naila Bernardo and we recommended she continue that, and we rechecked her B12 levels and other treatable causes of memory loss in addition. She may have either mild cognitive impairment, the normal memory loss of aging, or a primary early dementia like Alzheimer's disease.     Past Medical History:   Diagnosis Date    Acute alteration in mental status     Arthritis     Bilateral carotid artery stenosis     Cerebral microvascular disease     Chronic kidney disease     CKD 3    Chronic pain     CHRONIC BOWEL PAIN    Convulsions (HCC)     Depression     Disturbance of memory     Diverticulitis     Fibromyalgia     GERD (gastroesophageal reflux disease)     Goiter     Hiatal hernia     Hypercholesterolemia     Hypertension     IBS (irritable bowel syndrome)     CONSTIPATION, CHRONIC SINCE HER 20s    Insomnia     TAKES TRAZODONE NIGHTLY    Migraine     REDUCED IN FREQUENCY AND SEVERITY SINCE MENOPAUSE    Rectocele       Past Surgical History:   Procedure Laterality Date    COLONOSCOPY N/A 6/21/2016    COL[facility]COPY performed [facility] at [facility]OSCOPY    COLONOSCOPY N/A 6/27/2018    COLONOSCOPY performed by [facility] MD at [facility] ENDOSCOPY    ENDOSCOPY, COLON, DIAGNOSTIC  11/2010    Dr. Fernandez Hands  2011    HX CHOLECYSTECTOMY  2006    HX ENDOSCOPY      HX GI  X3  LAST ONE 12/10    COLONOSCOPY    HX GYN  1982    D&C WITH SUCTION    HX HYSTERECTOMY      HX LUMBAR FUSION  2017    HX ORTHOPAEDIC      right foot surgery    HX TONSILLECTOMY      HX TOTAL COLECTOMY  2007    Dr. Pond/ diverticulitis    IR INJ SPINE FLUORO GUIDED  8/28/2020    IR INJ SPINE FLUORO GUIDED  1/8/2021    IR INJ SPINE FLUORO GUIDED  2/5/2021     Family History   Problem Relation Age of Onset    Cancer Father         lung and bone    Stroke Sister     Cancer Sister 76        PANCREATIC    Hypertension Mother     High Cholesterol Mother     Alzheimer's Disease Mother         late 62s early 76s    Cancer Other         COLON    Cancer Other         breast    Diabetes Maternal Aunt     Cancer Maternal Uncle         COLON    Cancer Maternal Grandfather         COLON    Ovarian Cancer Daughter 28      Social History     Tobacco Use    Smoking status: Former Smoker     Packs/day: 1.00     Years: 45.00     Pack years: 45.00     Quit date: 1/1/2007     Years since quitting: 15.4    Smokeless tobacco: Never Used   Substance Use Topics    Alcohol use: Yes     Alcohol/week: 0.0 standard drinks     Comment: holidays         Current Outpatient Medications:     buPROPion XL (WELLBUTRIN XL) 300 mg XL tablet, Take 1 Tablet by mouth daily. , Disp: 90 Tablet, Rfl: 1    mirtazapine (REMERON) 7.5 mg tablet, Take 1 Tablet by mouth nightly., Disp: 90 Tablet, Rfl: 1    lisinopriL (PRINIVIL, ZESTRIL) 40 mg tablet, TAKE 1 TABLET DAILY, Disp: 90 Tablet, Rfl: 3    COVID-19 mRNA Vacc (MODERNA) 100 mcg/0.5 mL susp injection, , Disp: , Rfl:     hydroCHLOROthiazide (HYDRODIURIL) 25 mg tablet, TAKE 1 TABLET DAILY, Disp: 90 Tablet, Rfl: 2    QUEtiapine (SEROquel) 25 mg tablet, Take 0.5 Tablets by mouth nightly., Disp: 30 Tablet, Rfl: 0    b complex vitamins tablet, Take 1 Tablet by mouth daily. , Disp: , Rfl:     aspirin delayed-release 81 mg tablet, Take  by mouth daily. , Disp: , Rfl:     OTHER, Prevagan , Disp: , Rfl:     rosuvastatin (CRESTOR) 20 mg tablet, Take 20 mg by mouth nightly., Disp: , Rfl:     cyclobenzaprine (FLEXERIL) 5 mg tablet, TAKE 1 TABLET NIGHTLY, Disp: 90 Tablet, Rfl: 1    DULoxetine (CYMBALTA) 60 mg capsule, TAKE 1 CAPSULE TWICE A DAY, Disp: 180 Capsule, Rfl: 3    acetaminophen (TYLENOL) 500 mg tablet, Take 2 Tabs by mouth every six (6) hours. , Disp: 90 Tab, Rfl: 0    calcium-vitamin D (OS-TRUE +D3) 500 mg-200 unit per tablet, 1 Tab., Disp: , Rfl:     buPROPion SR (WELLBUTRIN, ZYBAN) 200 mg SR tablet, TAKE 1 TABLET TWICE A DAY (REPLACES PREVIOUS DOSE), Disp: 180 Tab, Rfl: 3    dicyclomine (BENTYL) 10 mg capsule, TAKE 2 CAPSULES FOUR TIMES A DAY AS NEEDED, Disp: 360 Cap, Rfl: 7   oxybutynin (DITROPAN) 5 mg tablet, TAKE 1 TABLET TWICE A DAY (Patient taking differently: as needed. TAKE 1 TABLET TWICE A DAY), Disp: 180 Tab, Rfl: 1    pantoprazole (PROTONIX) 40 mg tablet, TAKE 1 TABLET DAILY, Disp: 90 Tab, Rfl: 3    polyethylene glycol (MIRALAX) 17 gram packet, Take 17 g by mouth daily. , Disp: , Rfl:     cholecalciferol, vitamin D3, (Vitamin D3) 50 mcg (2,000 unit) tab, Take 1 Tab by mouth daily. , Disp: , Rfl:     sucralfate (CARAFATE) 1 gram tablet, Take 1 g by mouth three (3) times daily as needed. , Disp: , Rfl:         Allergies   Allergen Reactions    Latex Hives    Codeine Other (comments)     Severe Headache    Morphine Hives    Ambien [Zolpidem] Other (comments)     Severe behavior changes    Dilaudid [Hydromorphone] Other (comments)     Memory loss    Other Medication Rash     BANDAIDS    Shellfish Containing Products Hives      MRI Results (most recent):  Results from Hospital Encounter encounter on 11/17/21    MRI CERV SPINE WO CONT    Narrative  EXAM: MRI CERV SPINE WO CONT    INDICATION: . Cervicalgia    COMPARISON: 10/10/2019 MRI    TECHNIQUE: MR imaging of the cervical spine was performed using the following  sequences: sagittal T1, T2, STIR;  axial T2, T1.    CONTRAST:  None. 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 Artifacts related to anterior cervical fixation are again shown at  C5-7. Visualized bone signal is normal. There is no vertebral compression  fracture. There is straightening of cervical lordosis. Unchanged 2 mm  anterolisthesis at C7-T1 is noted. Equivocal central cord signal alteration on the STIR images at the C4-5 level. Cord signal is otherwise normal and the visualized portions of the brainstem and  cerebellum are normal.    No intraspinal or paraspinal soft tissue mass is shown. C2-C3: As noted above, congenital osseous union. No canal stenosis or foraminal  stenosis.     C3-C4: Moderate disc space narrowing. Moderate diffuse disc bulging and  bilateral facet osteoarthrosis progressed in the interval. Moderate to severe  canal stenosis with moderate cord compression and severe bilateral foraminal  stenosis increased in the interval.    C4-C5: Progression of disc space narrowing, now moderate. Moderate diffuse disc  bulging with small endplate osteophytes. Moderate to severe bilateral facet  osteoarthrosis. Severe canal stenosis with moderate to severe cord compression  progressed in the interval. Severe right and moderate left foraminal stenosis. C5-C6: No canal or foraminal stenosis. C6-C7: No canal or foraminal stenosis. C7-T1: Mild disc space narrowing and mild-moderate diffuse disc bulging and  moderate to severe left, moderate right facet osteoarthrosis. Mild canal  stenosis without cord compression. Moderate to severe left and moderate right  foraminal stenosis. T1-2: Minimal left lateral disc bulging. Mild left facet osteoarthrosis. No  canal stenosis. Mild left foraminal stenosis. T2-3 and T3-4: Shown on sagittal images only . Moderate-sized left paracentral  disc herniation at T2-3 is shown in the interval. Normal T3-4 disc. Mild left  facet osteoarthrosis at T2-3. No cord compression demonstrated. Impression  1. 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  2. Anterior cervical fixation at C5-C7. 3. C3-4 and C4-5 degenerative findings are progressed in the interval with  moderate to severe C3-4 and severe C4-5 canal stenosis. 4. Degenerative findings at C7-T1 are similar to previous. Unchanged mild canal  stenosis with substantial left foraminal stenosis. 5. Interval demonstration of T2-3 moderate-sized left paracentral disc  herniation.       Results from [place] encounter on 11/17/21    MRI CERV SPINE WO CONT    Narrative  EXAM: MRI CERV SPINE WO CONT    INDICATION: . Cervicalgia    COMPARISON: 10/10/2019 MRI    TECHNIQUE: MR imaging of the cervical spine was performed using the following  sequences: sagittal T1, T2, STIR;  axial T2, T1.    CONTRAST:  None. 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 Artifacts related to anterior cervical fixation are again shown at  C5-7. Visualized bone signal is normal. There is no vertebral compression  fracture. There is straightening of cervical lordosis. Unchanged 2 mm  anterolisthesis at C7-T1 is noted. Equivocal central cord signal alteration on the STIR images at the C4-5 level. Cord signal is otherwise normal and the visualized portions of the brainstem and  cerebellum are normal.    No intraspinal or paraspinal soft tissue mass is shown. C2-C3: As noted above, congenital osseous union. No canal stenosis or foraminal  stenosis. C3-C4: Moderate disc space narrowing. Moderate diffuse disc bulging and  bilateral facet osteoarthrosis progressed in the interval. Moderate to severe  canal stenosis with moderate cord compression and severe bilateral foraminal  stenosis increased in the interval.    C4-C5: Progression of disc space narrowing, now moderate. Moderate diffuse disc  bulging with small endplate osteophytes. Moderate to severe bilateral facet  osteoarthrosis. Severe canal stenosis with moderate to severe cord compression  progressed in the interval. Severe right and moderate left foraminal stenosis. C5-C6: No canal or foraminal stenosis. C6-C7: No canal or foraminal stenosis. C7-T1: Mild disc space narrowing and mild-moderate diffuse disc bulging and  moderate to severe left, moderate right facet osteoarthrosis. Mild canal  stenosis without cord compression. Moderate to severe left and moderate right  foraminal stenosis. T1-2: Minimal left lateral disc bulging. Mild left facet osteoarthrosis. No  canal stenosis. Mild left foraminal stenosis. T2-3 and T3-4: Shown on sagittal images only .  Moderate-sized left paracentral  disc herniation at T2-3 is shown in the interval. Normal T3-4 disc. Mild left  facet osteoarthrosis at T2-3. No cord compression demonstrated. Impression  1. 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  2. Anterior cervical fixation at C5-C7. 3. C3-4 and C4-5 degenerative findings are progressed in the interval with  moderate to severe C3-4 and severe C4-5 canal stenosis. 4. Degenerative findings at C7-T1 are similar to previous. Unchanged mild canal  stenosis with substantial left foraminal stenosis. 5. Interval demonstration of T2-3 moderate-sized left paracentral disc  herniation. Review of Systems:  A comprehensive review of systems was negative except for: Constitutional: positive for fatigue and malaise  Musculoskeletal: positive for myalgias, arthralgias, stiff joints, neck pain and back pain  Neurological: positive for paresthesia and coordination problems  Behvioral/Psych: positive for anxiety and depression   Vitals:    06/09/22 1019   BP: (!) 140/80   Pulse: 98   Resp: 16   Temp: 98.2 °F (36.8 °C)   TempSrc: Tympanic   SpO2: 97%   Weight: 186 lb 12.8 oz (84.7 kg)   Height: 5' 3\" (1.6 m)     Objective:     I      NEUROLOGICAL EXAM:    Appearance: The patient is well developed, well nourished, provides a fair history and is in no acute distress. Mental Status: Oriented to time, but not the day of the month, place and person, and the president, patient has some difficulty with serial sevens, and can remember 1 of 3 words at 30 seconds with distraction, and with a little difficulty could spell the word world backward and with effort seem to be able to draw clock that showed the time 10:50. Cognitive function is probably mildly abnormal and speech is fluent and no aphasia or dysarthria. Mood and affect appropriate. Cranial Nerves:   Intact visual fields. Fundi are benign, disc are flat, no lesions seen on funduscopy. SAMANTHA, EOM's full, no nystagmus, no ptosis. Facial sensation is normal. Corneal reflexes are not tested. Facial movement is symmetric. Hearing is abnormal bilaterally. Palate is midline with normal sternocleidomastoid and trapezius muscles are normal. Tongue is midline. Neck without meningismus or bruits  Temporal arteries are not tender or enlarged  TMJ areas are not tender on palpation   Motor:  4/5 strength in upper and lower proximal and distal muscles. Normal bulk and tone. No fasciculations. Rapid alternating movement is symmetric and intact bilaterally   Reflexes:   Deep tendon reflexes 1+/4 and symmetrical.  No babinski or clonus present   Sensory:   Abnormal to touch, pinprick and vibration and temperature in both feet to ankle level. DSS is intact   Gait:   Patient has a very slow and unsteady gait, and has to walk with a cane for ambulation but he is stable with a cane. Tremor:   No tremor noted. Cerebellar:   Mildly abnormal cerebellar signs present on Romberg and tandem testing and finger-nose-finger exam.   Neurovascular:  Normal heart sounds and regular rhythm, peripheral pulses decreased, and no carotid bruits. Assessment:       ICD-10-CM ICD-9-CM    1. MCI Without Memory Loss (Attention, Fluency, Bilateral Fine Motor Dexterity)  G31.84 331.83    2. Diabetic peripheral neuropathy associated with type 2 diabetes mellitus (HCC)  E11.42 250.60      357.2    3. Cervical radiculopathy due to degenerative joint disease of spine  M47.22 721.0    4. Bilateral carpal tunnel syndrome  G56.03 354.0    5. Disturbance of memory  R41.3 780.93    6. Cervical myelopathy with cervical radiculopathy ([facility])  G95.9 721.1     M54.12     7. Carotid stenosis, bilateral  I65.23 433.10      433.30      Active Problems:    * No active hospital problems.  *      Plan:     Patient with new problem of abnormal neuropsych test showing just mild cognitive impairment, no true dementia, and for that reason we will continue treating her depression and she just had her medications readjusted, we advised her on the 3 things she can do as far as diet and Mediterranean type diet, stay mentally and physically active, exercising regularly, went over that with the patient and her  and they will try to do that in the near future. Patient with problem of increasing numbness in her hands, most likely secondary to her severe spinal stenosis, and she is to follow-up with her orthopedic surgeon. Patient with symptomatic numbness of her hands, there was an EMG study just done that suggest stable decompression of her carpal tunnel on the left side, and improved carpal tunnel on the right. Her MRI of the cervical spine shows severe spinal stenosis above the level of her decompression at C3-4 and C4-5 of severe degree, that is probably the source of the numbness in her hands. She has stable decompression from C5-C7. She is followed by her surgeon who does not recommend surgery yet for this problem. We previously checked metabolic parameters just to make sure she does not have a sensory neuropathy, and they were unremarkable. She also has significant degenerative disease in the lumbar spine that may be contributing some to the numbness in her feet that is somewhat intermittent. She is to continue following up with her spinal surgeon and consider decompressive laminectomy. We checked cervical spine x-rays with flexion-extension views for completeness. Her records are reviewed, her EMG reviewed, MRI scan reviewed of both lumbar and cervical spine, and her case was discussed in detail with the patient and her .   Patient also has some memory problems, that were worse after her surgery, and have gotten better, but she may have an early dementia versus mild cognitive impairment versus a normal memory loss of aging, and she has neuropsych testing scheduled for February with Dr. Zohreh Lucero and we encouraged him to make sure to keep that, and we will see her again in 3 months time or earlier as needed after her neuropsych testing to decide whether she is a candidate for cognitive enhancing agents. Very difficult case, 35 minutes spent reviewing all of her records, going over her all her notes, going over all her new x-rays and EMG studies and MRI scans and discussing her diagnosis prognosis and treatment options with the patient and her  in detail. She is not a candidate for cognitive enhancing agents yet, but may be in the near future. She thinks she might need her memory test repeated in a years time. Very difficult case we will follow-up in 1 years time or earlier as need be she will call me if there is any problem.   Signed By: Navya Broderick MD     June 9, 2022       CC: Zulema Amaral MD  FAX: [phone]

## 2022-06-09 NOTE — PROGRESS NOTES
Chief Complaint   Patient presents with    Follow-up      7 Month follow up     Memory Loss     1. Have you been to the ER, urgent care clinic since your last visit? Yes   Hospitalized since your last visit? No  2. Have you seen or consulted any other health care providers outside of the [address] [place] [address] since your last visit? No  Include any pap smears or colon screening.  No  Had appointment with Dr Ross Flores 2/22

## 2022-06-17 ENCOUNTER — OFFICE VISIT (OUTPATIENT)
Dept: NEUROLOGY | Age: 72
End: 2022-06-17
Payer: MEDICARE

## 2022-06-17 DIAGNOSIS — G31.84 MILD COGNITIVE IMPAIRMENT: Primary | ICD-10-CM

## 2022-06-17 DIAGNOSIS — F43.9 STRESS AT HOME: ICD-10-CM

## 2022-06-17 DIAGNOSIS — F45.0 ANXIETY WITH SOMATIZATION: ICD-10-CM

## 2022-06-17 DIAGNOSIS — F41.9 ANXIETY WITH SOMATIZATION: ICD-10-CM

## 2022-06-17 DIAGNOSIS — F32.9 MAJOR DEPRESSION, CHRONIC: ICD-10-CM

## 2022-06-17 PROCEDURE — 90832 PSYTX W PT 30 MINUTES: CPT | Performed by: CLINICAL NEUROPSYCHOLOGIST

## 2022-06-17 PROCEDURE — 1123F ACP DISCUSS/DSCN MKR DOCD: CPT | Performed by: CLINICAL NEUROPSYCHOLOGIST

## 2022-06-17 NOTE — PROGRESS NOTES
Prior to seeing the patient I reviewed the records, including the previously completed report, the records in [place], and any updated visits from other providers since I saw the patient last.      Today, I engaged in a psychoeducational and supportive and cognitive/behavioral psychotherapy session with the patient and the spouse. I provided psychotherapy in the form of psychoeducation and support with respect to the results of the recent Neuropsychological Evaluation, including discussing individual tests as well as patient's areas of neurocognitive strength versus weakness. We discussed, in detail, the following: This is a mixed normal/abnormal range Neuropsychological Evaluation with respect to neurocognitive functioning. In this regard, she is showing impairments with verbal fluency, sustained visual attention, bilateral fine motor dexterity, and executive functioning abilities are slow. At the same time, her auditory learning, memory, intellectual capacity, and performances across all other neurocognitive domains assessed are normal.  From an emotional standpoint, there is moderate anxiety with somatic features and depression.                   In my opinion, this profile is not (yet) consistent with a dementia process. Instead, this is MCI exacerbated by significant anxiety and depression. In addition to continued medical care, my recommendations include psychiatric treatment for anxiety and depression along with active engagement in counseling. Consider also an appropriate medication for attention if this is not medically contraindicated. At this point in time, I find that the patient has capacity to make informed medical decisions, financial decisions, the ability to vote, to , or to own a firearm. Driving safety needs to be formally evaluated/monitored over time. She should be encouraged to remain as mentally, physically, socially active as possible.   With an improvement in mood should come with it a significant improvement in day-to-day neurocognitive functioning. If not, follow-up neuropsychological evaluation with him indicated. In the interim, she needs to remain as mentally, physically, and socially active as possible. We now have extensive baseline neurocognitive and psychologic data on her. Follow-up yearly, or as needed. Clinical correlation is, course, indicated. I will discuss these findings with the patient when she follows up with me in the near future. A follow up Neuropsychological Evaluation is indicated on a prn basis, especially if there are any cognitive and/or emotional changes.       DIAGNOSES:             MCI Without Memory Loss (Attention, Fluency, Bilateral Fine Motor Dexterity)                                       Anxiety with Somatization                                      Major Depression - Mild To Moderate       Education was provided regarding my diagnostic impressions, and we discussed treatment plan/options. I also answered numerous questions related to the clinical findings, including discussing various methods to improve cognition and mood. Counseling provided regarding mood and cognition. CBT and supportive psychotherapy techniques were utilized. Supportive/Cognitive Behavioral/Solution Focused psychotherapy provided  Discussed rational versus irrational thinking patterns and their consequences. Discussed healthy/adaptive and unhealthy/maladaptive coping. This is quite the complex case. Will follow up in one year or six months to track memory closely. daughter having cancer that is metastatic and her son having job stress and living with them, and had been placed on Seroquel by her psychiatrist in addition to her Wellbutrin and is on Remeron and Cymbalta for her depression, and seems to be a little better . Counsled patient and spouse regarding daughter's cancer issue which is very stressful.      The patient had the following concerns which I deferred to their referring provider: meds for mood/cognition      Time spent today: 22

## 2022-06-23 DIAGNOSIS — F33.1 MODERATE EPISODE OF RECURRENT MAJOR DEPRESSIVE DISORDER (HCC): ICD-10-CM

## 2022-07-02 RX ORDER — QUETIAPINE FUMARATE 25 MG/1
TABLET, FILM COATED ORAL
Qty: 30 TABLET | Refills: 0 | Status: SHIPPED | OUTPATIENT
Start: 2022-07-02 | End: 2022-10-20 | Stop reason: ALTCHOICE

## 2022-07-12 RX ORDER — PANTOPRAZOLE SODIUM 40 MG/1
TABLET, DELAYED RELEASE ORAL
Qty: 90 TABLET | Refills: 3 | Status: SHIPPED | OUTPATIENT
Start: 2022-07-12

## 2022-07-12 RX ORDER — ROSUVASTATIN CALCIUM 20 MG/1
TABLET, COATED ORAL
Qty: 90 TABLET | Refills: 3 | Status: SHIPPED | OUTPATIENT
Start: 2022-07-12

## 2022-07-22 ENCOUNTER — PATIENT MESSAGE (OUTPATIENT)
Dept: FAMILY MEDICINE CLINIC | Age: 72
End: 2022-07-22

## 2022-08-04 ENCOUNTER — VIRTUAL VISIT (OUTPATIENT)
Dept: FAMILY MEDICINE CLINIC | Age: 72
End: 2022-08-04
Payer: MEDICARE

## 2022-08-04 DIAGNOSIS — Z12.31 ENCOUNTER FOR SCREENING MAMMOGRAM FOR MALIGNANT NEOPLASM OF BREAST: ICD-10-CM

## 2022-08-04 DIAGNOSIS — M48.02 CERVICAL SPINAL STENOSIS: ICD-10-CM

## 2022-08-04 DIAGNOSIS — Z98.890 STATUS POST LUMBAR SPINE SURGERY FOR DECOMPRESSION OF SPINAL CORD: ICD-10-CM

## 2022-08-04 DIAGNOSIS — F33.1 MODERATE EPISODE OF RECURRENT MAJOR DEPRESSIVE DISORDER (HCC): ICD-10-CM

## 2022-08-04 DIAGNOSIS — R20.0 BILATERAL HAND NUMBNESS: ICD-10-CM

## 2022-08-04 DIAGNOSIS — M17.0 BILATERAL PRIMARY OSTEOARTHRITIS OF KNEE: Primary | ICD-10-CM

## 2022-08-04 DIAGNOSIS — Z79.899 ENCOUNTER FOR LONG-TERM (CURRENT) USE OF MEDICATIONS: ICD-10-CM

## 2022-08-04 DIAGNOSIS — M47.22 CERVICAL RADICULOPATHY DUE TO DEGENERATIVE JOINT DISEASE OF SPINE: ICD-10-CM

## 2022-08-04 PROCEDURE — 99443 PR PHYS/QHP TELEPHONE EVALUATION 21-30 MIN: CPT | Performed by: FAMILY MEDICINE

## 2022-08-04 NOTE — PROGRESS NOTES
Luisa Williamson (: 1950) is a 66 y.o. female, established patient, here for evaluation of the following chief complaint(s):   Depression (Follow-up)     Audio only visit    ASSESSMENT/PLAN: Ongoing depression. Sending for therapy. Concerns with hand numbness (CTS vs. Cervical radiculopathy vs. Other) and will clarify with EMG. Checking labs    Below is the assessment and plan developed based on review of pertinent history, labs, studies, and medications. 1. Bilateral primary osteoarthritis of knee  2. Moderate episode of recurrent major depressive disorder ([address]  -     REFERRAL TO SOCIAL WORK  3. Cervical radiculopathy due to degenerative joint disease of spine  -     EMG TWO EXTREMITIES UPPER; Future  4. Cervical spinal stenosis  5. Status post lumbar spine surgery for decompression of spinal cord  6. Bilateral hand numbness  -     EMG TWO EXTREMITIES UPPER; Future  7. Encounter for screening mammogram for malignant neoplasm of breast  -     [facility] 3D TANJA W MAMMO BI SCREENING INCL CAD; Future  8. Encounter for long-term (current) use of medications  -     HEMOGLOBIN A1C WITH EAG; Future  -     LIPID PANEL; Future  -     METABOLIC PANEL, COMPREHENSIVE; Future  -     TSH 3RD GENERATION; Future  -     CBC W/O DIFF; Future    Return in about 3 months (around 2022), or if symptoms worsen or fail to improve. Subjective:       Bilat hands with numbness constantly. Daughter with ovarian ca. Has found 2 nodules in her chest.    2021 MRI C spine:  IMPRESSION   1. 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  2. Anterior cervical fixation at C5-C7. 3. C3-4 and C4-5 degenerative findings are progressed in the interval with moderate to severe C3-4 and severe C4-5 canal stenosis. 4. Degenerative findings at C7-T1 are similar to previous. Unchanged mild canal stenosis with substantial left foraminal stenosis. 5. Interval demonstration of T2-3 moderate-sized left paracentral disc herniation.     Having trouble with knees. Hx of OA and was planning to have    EMG 2019 - Dr. Srinivas Arnold:  Electrodiagnostic summary:     Normal right median motor and sensory studies. Normal left median motor and sensory studies. Normal left ulnar sensory study and radial sensory     study. Normal right ulnar sensory study. Left carpal tunnel sensory index of 1.6 milliseconds with normal less than 1.0 milliseconds. Right carpal tunnel sensory index of 1.0 milliseconds. Normal EMG of the left upper extremity. Electrodiagnostic impression:       Electrodiagnostic evidence for a very mild left carpal tunnel syndrome involving only the sensory fibers and seen by using a carpal tunnel sensory index comparing the left median sensory fibers to the unilateral radial and ulnar sensory fibers. No electrodiagnostic evidence for right carpal tunnel syndrome, polyneuropathy, or radiculopathy involving the left upper extremity motor axons.         ROS  Gen - no fever/chills  Resp - no dyspnea or cough  CV - no chest pain or KASPER  Rest per HPI    Past Medical History:   Diagnosis Date    Acute alteration in mental status     Arthritis     Bilateral carotid artery stenosis     Cerebral microvascular disease     Chronic kidney disease     CKD 3    Chronic pain     CHRONIC BOWEL PAIN    Convulsions (HCC)     Depression     Disturbance of memory     Diverticulitis     Fibromyalgia     GERD (gastroesophageal reflux disease)     Goiter     Hiatal hernia     Hypercholesterolemia     Hypertension     IBS (irritable bowel syndrome)     CONSTIPATION, CHRONIC SINCE HER 20s    Insomnia     TAKES TRAZODONE NIGHTLY    Migraine     REDUCED IN FREQUENCY AND SEVERITY SINCE MENOPAUSE    Rectocele      Past Surgical History:   Procedure Laterality Date    COLONOSCOPY N/A 6/21/2016    COLONOSCOPY performed by Tiffany Wolff [facility] at [facility]    COLONOSCOPY N/A 6/27/2018    COLONOSCOPY performed by Tiffany Wolff MD at [facility]    ENDOSCOPY, COLON, DIAGNOSTIC  11/2010    Dr. Sarahi Gauthier    HX CHOLECYSTECTOMY  2006    HX ENDOSCOPY      HX GI  X3  LAST ONE 12/10    COLONOSCOPY    HX GYN  1982    D&C WITH SUCTION    HX HYSTERECTOMY      HX LUMBAR FUSION  2017    HX ORTHOPAEDIC      right foot surgery    HX TONSILLECTOMY      HX TOTAL COLECTOMY  2007    Dr. Pond/ diverticulitis    IR INJ SPINE FLUORO GUIDED  8/28/2020    IR INJ SPINE FLUORO GUIDED  1/8/2021    IR INJ SPINE FLUORO GUIDED  2/5/2021        Objective:     No data recorded     No exam d/t audio only    On this date 08/04/2022 I have spent 21 minutes reviewing previous notes, test results and face to face (virtual) with the patient discussing the diagnosis and importance of compliance with the treatment plan as well as documenting on the day of the visit. Rodo Crowe, was evaluated through a synchronous (real-time) audio-video encounter. The patient (or guardian if applicable) is aware that this is a billable service, which includes applicable co-pays. This Virtual Visit was conducted with patient's (and/or legal guardian's) consent. The visit was conducted pursuant to the emergency declaration under the [facility], [address] [place] [address] waiver authority and the [employer] General Act. Patient identification was verified, and a caregiver was present when appropriate. The patient was located at: Home: [address] [place] [address] Road  The provider was located at: Facility (Appt Department): [address] [facility] [address]  [address] [facility]       An electronic signature was used to authenticate this note.   -- Lina Dyer MD

## 2022-08-04 NOTE — PROGRESS NOTES
Chief Complaint   Patient presents with    Depression     Follow-up    1. Have you been to the ER, urgent care clinic since your last visit? Hospitalized since your last visit? No    2. Have you seen or consulted any other health care providers outside of the [address] [place] [address] since your last visit? Include any pap smears or colon screening.  No

## 2022-08-10 ENCOUNTER — TELEPHONE (OUTPATIENT)
Dept: FAMILY MEDICINE CLINIC | Age: 72
End: 2022-08-10

## 2022-08-10 NOTE — TELEPHONE ENCOUNTER
Spoke with  and given information to schedule appointment with Kemi Escobar . Transferred call to [facility].

## 2022-08-10 NOTE — TELEPHONE ENCOUNTER
----- Message from Ruy Thorpe sent at 8/5/2022 10:00 AM EDT -----  Subject: Message to Provider    QUESTIONS  Information for Provider? Patient Luis Estrella calling in regards to a   referral to Velia campos Behavorial Therapist and the patient does not have   her name and where she may be contacted.  ---------------------------------------------------------------------------  --------------  [id number] [employer]  [phone]; OK to leave message on voicemail  ---------------------------------------------------------------------------  --------------  SCRIPT ANSWERS  Relationship to Patient?  Self

## 2022-08-16 ENCOUNTER — TELEPHONE (OUTPATIENT)
Dept: FAMILY MEDICINE CLINIC | Age: 72
End: 2022-08-16

## 2022-08-17 DIAGNOSIS — M79.7 FIBROMYALGIA: ICD-10-CM

## 2022-08-17 DIAGNOSIS — K58.9 IRRITABLE BOWEL SYNDROME WITHOUT DIARRHEA: ICD-10-CM

## 2022-08-17 RX ORDER — DICYCLOMINE HYDROCHLORIDE 10 MG/1
CAPSULE ORAL
Qty: 360 CAPSULE | Refills: 7 | Status: SHIPPED | OUTPATIENT
Start: 2022-08-17

## 2022-08-17 RX ORDER — DULOXETIN HYDROCHLORIDE 60 MG/1
CAPSULE, DELAYED RELEASE ORAL
Qty: 180 CAPSULE | Refills: 3 | Status: SHIPPED | OUTPATIENT
Start: 2022-08-17

## 2022-08-17 NOTE — TELEPHONE ENCOUNTER
Identified patient 2 identifiers verified. Spoke with patient  she has Mammogram scheduled , appointment with Bhavana Ness is for 10/6/22, and patient has lab orders. No further questions or concerns.

## 2022-08-23 RX ORDER — OXYBUTYNIN CHLORIDE 5 MG/1
TABLET ORAL
Qty: 180 TABLET | Refills: 3 | Status: SHIPPED | OUTPATIENT
Start: 2022-08-23

## 2022-09-09 ENCOUNTER — HOSPITAL ENCOUNTER (OUTPATIENT)
Dept: MAMMOGRAPHY | Age: 72
Discharge: HOME | End: 2022-09-09
Attending: FAMILY MEDICINE
Payer: MEDICARE

## 2022-09-09 DIAGNOSIS — Z12.31 ENCOUNTER FOR SCREENING MAMMOGRAM FOR MALIGNANT NEOPLASM OF BREAST: ICD-10-CM

## 2022-09-09 PROCEDURE — 77063 BREAST TOMOSYNTHESIS BI: CPT

## 2022-09-16 DIAGNOSIS — I10 ESSENTIAL HYPERTENSION WITH GOAL BLOOD PRESSURE LESS THAN 140/90: ICD-10-CM

## 2022-09-16 RX ORDER — MIRTAZAPINE 7.5 MG/1
7.5 TABLET, FILM COATED ORAL
Qty: 90 TABLET | Refills: 1 | Status: SHIPPED | OUTPATIENT
Start: 2022-09-16 | End: 2022-10-20 | Stop reason: SINTOL

## 2022-09-16 RX ORDER — HYDROCHLOROTHIAZIDE 25 MG/1
TABLET ORAL
Qty: 90 TABLET | Refills: 2 | Status: SHIPPED | OUTPATIENT
Start: 2022-09-16

## 2022-09-16 RX ORDER — BUPROPION HYDROCHLORIDE 300 MG/1
300 TABLET ORAL DAILY
Qty: 90 TABLET | Refills: 1 | Status: SHIPPED | OUTPATIENT
Start: 2022-09-16

## 2022-09-21 NOTE — LETTER
6/9/2022    Patient: Aniceto Ernandez   YOB: 1950   Date of Visit: 6/9/2022     Tessie Sung [id number]  Via In Basket    Dear Inna Bay MD,      Thank you for referring Ms. Arline Favre to [address] [place] [address] for evaluation. My notes for this consultation are attached. Chief Complaint   Patient presents with    Follow-up      7 Month follow up     Memory Loss     1. Have you been to the ER, urgent care clinic since your last visit? Yes   Hospitalized since your last visit? No  2. Have you seen or consulted any other health care providers outside of the [address] [place] [address] since your last visit? No  Include any pap smears or colon screening. No  Had appointment with Dr Margaret Galvan 2/22    Consult  REFERRED BY:  Madhavi Fernandez MD    CHIEF COMPLAINT: Numbness of her hands      Subjective:     Aniceto Ernandez is a 66 y.o. right-handed  female seen at the request of Dr. Blanquita Leblanc and Dr. Jono Baldwin for evaluation of more depression due to family problems of her children, with her daughter having cancer that is metastatic and her son having job stress and living with them, and had been placed on Seroquel by her psychiatrist in addition to her Wellbutrin and is on Remeron and Cymbalta for her depression, and seems to be a little better but still asking for more medication. These changes were recently we will not change the medication. She is seen for follow-up of her neuropsych evaluation, we went over the results with her and her neuropsychologist, and it showed that she had more mild cognitive impairment and stress and depression symptoms rather than a true organic dementia. We advised the patient that the best thing she can do to stay mentally and physically active and exercise regularly and eat a Mediterranean type diet to prevent progressive memory loss, or at least slow it down.   We advised both the patient and her  for about 15 minutes on the benefit of these in a daily we will anxiously pursue these. She will try to get more active. Progressive numbness of her hands for the last 6 months that is slowly getting worse. It is not associate with any weakness, but is associated with some neck pain and discomfort. She also complains of some numbness and tingling at times in her feet and may have a neuropathy and is supposedly is not a diabetic. She had surgery about a year and a half ago for left carpal tunnel syndrome, and decided not to operate on the right 1 which got better on his own. She had some improvement in the left carpal tunnel syndrome but her symptoms have come back. Her surgery was done by Dr. Nasima Silver. She had an EMG study just last month, by Dr. [facility] in October 2021 and looking at the report she had normal motor and sensory conductions in latencies, and I did not see any clear EMG studies, and her studies were significantly improved on the left side from before surgery but she had a previous EMG study. Patient says her numbness is worse in the morning and certainly sounds like her carpal tunnel. She has no weakness in the hands and no difficulty doing her daily activities and remains very active. She has no gait disorder no toxin exposure, no trauma, no fever, no real weakness, and her bowel and bladder function is normal. The patient just had an MRI scan done of her cervical spine done yesterday, that showed a previous C5-C7 fixation from previous cervical laminectomy, and severe C3-4 and C4-5 cervical spinal stenosis, and stable see 7 T1 disease, and new disc herniation at T2-T3. Patient also had an MRI scan in September 2020 one of her back, that showed moderate severe spinal stenosis at L1-L2, and L2-L3, and L3-L4, stable post laminectomy decompressive changes seen from L4-S1. Patient does not have any difficulty walking per se. Her bowel and bladder function remain stable.  She is being seen by Dr. Madeline Samuel her spinal surgeon. She does have a Tinel's over both median nerves at the wrist, right side worse than left. She denies any trauma, chemotherapy, vitamin deficiency, or other causes for her symptoms. Patient also seen for other new problem of some questionable memory loss, and she had an encephalopathy in the hospital in May for which she was seen, and had a work-up showing microvascular disease on her MRI scan but normal carotid Dopplers, and an EEG just showed mild generalized slowing. She got much better after her 2 major surgeries. However she still has occasional memory problems and has a neuropsychological evaluation scheduled in a month or 2 with Dr. Lisa Wallace and we recommended she continue that, and we rechecked her B12 levels and other treatable causes of memory loss in addition. She may have either mild cognitive impairment, the normal memory loss of aging, or a primary early dementia like Alzheimer's disease.     Past Medical History:   Diagnosis Date    Acute alteration in mental status     Arthritis     Bilateral carotid artery stenosis     Cerebral microvascular disease     Chronic kidney disease     CKD 3    Chronic pain     CHRONIC BOWEL PAIN    Convulsions (HCC)     Depression     Disturbance of memory     Diverticulitis     Fibromyalgia     GERD (gastroesophageal reflux disease)     Goiter     Hiatal hernia     Hypercholesterolemia     Hypertension     IBS (irritable bowel syndrome)     CONSTIPATION, CHRONIC SINCE HER 20s    Insomnia     TAKES TRAZODONE NIGHTLY    Migraine     REDUCED IN FREQUENCY AND SEVERITY SINCE MENOPAUSE    Rectocele       Past Surgical History:   Procedure Laterality Date    COLONOSCOPY N/A 6/21/2016    COLONOSCOPY performed by Cam Marques MD at hospitals ENDOSCOPY    COLONOSCOPY N/A 6/27/2018    COLONOSCOPY performed by Cam Marques MD at hospitals ENDOSCOPY    ENDOSCOPY, COLON, DIAGNOSTIC  11/2010    Dr. Patria Jade-     HX CERVICAL FUSION  2011    HX CHOLECYSTECTOMY  2006    HX ENDOSCOPY      HX GI  X3  LAST ONE 12/10    COLONOSCOPY    HX GYN  1982    D&C WITH SUCTION    HX HYSTERECTOMY      HX LUMBAR FUSION  2017    HX ORTHOPAEDIC      right foot surgery    HX TONSILLECTOMY      HX TOTAL COLECTOMY  2007    Dr. Pond/ diverticulitis    IR INJ SPINE FLUORO GUIDED  8/28/2020    IR INJ SPINE FLUORO GUIDED  1/8/2021    IR INJ SPINE FLUORO GUIDED  2/5/2021     Family History   Problem Relation Age of Onset    Cancer Father         lung and bone    Stroke Sister     Cancer Sister 76        PANCREATIC    Hypertension Mother     High Cholesterol Mother     Alzheimer's Disease Mother         late 62s early 76s    Cancer Other         COLON    Cancer Other         breast    Diabetes Maternal Aunt     Cancer Maternal Uncle         COLON    Cancer Maternal Grandfather         COLON    Ovarian Cancer Daughter 28      Social History     Tobacco Use    Smoking status: Former Smoker     Packs/day: 1.00     Years: 45.00     Pack years: 45.00     Quit date: 1/1/2007     Years since quitting: 15.4    Smokeless tobacco: Never Used   Substance Use Topics    Alcohol use: Yes     Alcohol/week: 0.0 standard drinks     Comment: holidays         Current Outpatient Medications:     buPROPion XL (WELLBUTRIN XL) 300 mg XL tablet, Take 1 Tablet by mouth daily. , Disp: 90 Tablet, Rfl: 1    mirtazapine (REMERON) 7.5 mg tablet, Take 1 Tablet by mouth nightly., Disp: 90 Tablet, Rfl: 1    lisinopriL (PRINIVIL, ZESTRIL) 40 mg tablet, TAKE 1 TABLET DAILY, Disp: 90 Tablet, Rfl: 3    COVID-19 mRNA Vacc (MODERNA) 100 mcg/0.5 mL susp injection, , Disp: , Rfl:     hydroCHLOROthiazide (HYDRODIURIL) 25 mg tablet, TAKE 1 TABLET DAILY, Disp: 90 Tablet, Rfl: 2    QUEtiapine (SEROquel) 25 mg tablet, Take 0.5 Tablets by mouth nightly., Disp: 30 Tablet, Rfl: 0    b complex vitamins tablet, Take 1 Tablet by mouth daily. , Disp: , Rfl:     aspirin delayed-release 81 mg tablet, Take  by mouth daily. , Disp: , Rfl:     OTHER, Prevagan , Disp: , Rfl:     rosuvastatin (CRESTOR) 20 mg tablet, Take 20 mg by mouth nightly., Disp: , Rfl:     cyclobenzaprine (FLEXERIL) 5 mg tablet, TAKE 1 TABLET NIGHTLY, Disp: 90 Tablet, Rfl: 1    DULoxetine (CYMBALTA) 60 mg capsule, TAKE 1 CAPSULE TWICE A DAY, Disp: 180 Capsule, Rfl: 3    acetaminophen (TYLENOL) 500 mg tablet, Take 2 Tabs by mouth every six (6) hours. , Disp: 90 Tab, Rfl: 0    calcium-vitamin D (OS-TRUE +D3) 500 mg-200 unit per tablet, 1 Tab., Disp: , Rfl:     buPROPion SR (WELLBUTRIN, ZYBAN) 200 mg SR tablet, TAKE 1 TABLET TWICE A DAY (REPLACES PREVIOUS DOSE), Disp: 180 Tab, Rfl: 3    dicyclomine (BENTYL) 10 mg capsule, TAKE 2 CAPSULES FOUR TIMES A DAY AS NEEDED, Disp: 360 Cap, Rfl: 7    oxybutynin (DITROPAN) 5 mg tablet, TAKE 1 TABLET TWICE A DAY (Patient taking differently: as needed. TAKE 1 TABLET TWICE A DAY), Disp: 180 Tab, Rfl: 1    pantoprazole (PROTONIX) 40 mg tablet, TAKE 1 TABLET DAILY, Disp: 90 Tab, Rfl: 3    polyethylene glycol (MIRALAX) 17 gram packet, Take 17 g by mouth daily. , Disp: , Rfl:     cholecalciferol, vitamin D3, (Vitamin D3) 50 mcg (2,000 unit) tab, Take 1 Tab by mouth daily. , Disp: , Rfl:     sucralfate (CARAFATE) 1 gram tablet, Take 1 g by mouth three (3) times daily as needed. , Disp: , Rfl:         Allergies   Allergen Reactions    Latex Hives    Codeine Other (comments)     Severe Headache    Morphine Hives    Ambien [Zolpidem] Other (comments)     Severe behavior changes    Dilaudid [Hydromorphone] Other (comments)     Memory loss    Other Medication Rash     BANDAIDS    Shellfish Containing Products Hives      MRI Results (most recent):  Results from Hospital Encounter encounter on 11/17/21    MRI CERV SPINE WO CONT    Narrative  EXAM: MRI CERV SPINE WO CONT    INDICATION: . Cervicalgia    COMPARISON: 10/10/2019 MRI    TECHNIQUE: MR imaging of the cervical spine was performed using the following  sequences: sagittal T1, T2, STIR;  axial T2, T1.    CONTRAST:  None. 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 Artifacts related to anterior cervical fixation are again shown at  C5-7. Visualized bone signal is normal. There is no vertebral compression  fracture. There is straightening of cervical lordosis. Unchanged 2 mm  anterolisthesis at C7-T1 is noted. Equivocal central cord signal alteration on the STIR images at the C4-5 level. Cord signal is otherwise normal and the visualized portions of the brainstem and  cerebellum are normal.    No intraspinal or paraspinal soft tissue mass is shown. C2-C3: As noted above, congenital osseous union. No canal stenosis or foraminal  stenosis. C3-C4: Moderate disc space narrowing. Moderate diffuse disc bulging and  bilateral facet osteoarthrosis progressed in the interval. Moderate to severe  canal stenosis with moderate cord compression and severe bilateral foraminal  stenosis increased in the interval.    C4-C5: Progression of disc space narrowing, now moderate. Moderate diffuse disc  bulging with small endplate osteophytes. Moderate to severe bilateral facet  osteoarthrosis. Severe canal stenosis with moderate to severe cord compression  progressed in the interval. Severe right and moderate left foraminal stenosis. C5-C6: No canal or foraminal stenosis. C6-C7: No canal or foraminal stenosis. C7-T1: Mild disc space narrowing and mild-moderate diffuse disc bulging and  moderate to severe left, moderate right facet osteoarthrosis. Mild canal  stenosis without cord compression. Moderate to severe left and moderate right  foraminal stenosis. T1-2: Minimal left lateral disc bulging. Mild left facet osteoarthrosis. No  canal stenosis. Mild left foraminal stenosis. T2-3 and T3-4: Shown on sagittal images only .  Moderate-sized left paracentral  disc herniation at T2-3 is shown in the interval. Normal T3-4 disc. Mild left  facet osteoarthrosis at T2-3. No cord compression demonstrated. Impression  1. 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  2. Anterior cervical fixation at C5-C7. 3. C3-4 and C4-5 degenerative findings are progressed in the interval with  moderate to severe C3-4 and severe C4-5 canal stenosis. 4. Degenerative findings at C7-T1 are similar to previous. Unchanged mild canal  stenosis with substantial left foraminal stenosis. 5. Interval demonstration of T2-3 moderate-sized left paracentral disc  herniation. Results from [place] encounter on 11/17/21    MRI CERV SPINE WO CONT    Narrative  EXAM: MRI CERV SPINE WO CONT    INDICATION: . Cervicalgia    COMPARISON: 10/10/2019 MRI    TECHNIQUE: MR imaging of the cervical spine was performed using the following  sequences: sagittal T1, T2, STIR;  axial T2, T1.    CONTRAST:  None. 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 Artifacts related to anterior cervical fixation are again shown at  C5-7. Visualized bone signal is normal. There is no vertebral compression  fracture. There is straightening of cervical lordosis. Unchanged 2 mm  anterolisthesis at C7-T1 is noted. Equivocal central cord signal alteration on the STIR images at the C4-5 level. Cord signal is otherwise normal and the visualized portions of the brainstem and  cerebellum are normal.    No intraspinal or paraspinal soft tissue mass is shown. C2-C3: As noted above, congenital osseous union. No canal stenosis or foraminal  stenosis. C3-C4: Moderate disc space narrowing. Moderate diffuse disc bulging and  bilateral facet osteoarthrosis progressed in the interval. Moderate to severe  canal stenosis with moderate cord compression and severe bilateral foraminal  stenosis increased in the interval.    C4-C5: Progression of disc space narrowing, now moderate.  Moderate diffuse disc  bulging with small endplate osteophytes. Moderate to severe bilateral facet  osteoarthrosis. Severe canal stenosis with moderate to severe cord compression  progressed in the interval. Severe right and moderate left foraminal stenosis. C5-C6: No canal or foraminal stenosis. C6-C7: No canal or foraminal stenosis. C7-T1: Mild disc space narrowing and mild-moderate diffuse disc bulging and  moderate to severe left, moderate right facet osteoarthrosis. Mild canal  stenosis without cord compression. Moderate to severe left and moderate right  foraminal stenosis. T1-2: Minimal left lateral disc bulging. Mild left facet osteoarthrosis. No  canal stenosis. Mild left foraminal stenosis. T2-3 and T3-4: Shown on sagittal images only . Moderate-sized left paracentral  disc herniation at T2-3 is shown in the interval. Normal T3-4 disc. Mild left  facet osteoarthrosis at T2-3. No cord compression demonstrated. Impression  1. 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  2. Anterior cervical fixation at C5-C7. 3. C3-4 and C4-5 degenerative findings are progressed in the interval with  moderate to severe C3-4 and severe C4-5 canal stenosis. 4. Degenerative findings at C7-T1 are similar to previous. Unchanged mild canal  stenosis with substantial left foraminal stenosis. 5. Interval demonstration of T2-3 moderate-sized left paracentral disc  herniation.     Review of Systems:  A comprehensive review of systems was negative except for: Constitutional: positive for fatigue and malaise  Musculoskeletal: positive for myalgias, arthralgias, stiff joints, neck pain and back pain  Neurological: positive for paresthesia and coordination problems  Behvioral/Psych: positive for anxiety and depression   Vitals:    06/09/22 1019   BP: (!) 140/80   Pulse: 98   Resp: 16   Temp: 98.2 °F (36.8 °C)   TempSrc: Tympanic   SpO2: 97%   Weight: 186 lb 12.8 oz (84.7 kg)   Height: 5' 3\" (1.6 m)     Objective:     I      NEUROLOGICAL EXAM:    Appearance: The patient is well developed, well nourished, provides a fair history and is in no acute distress. Mental Status: Oriented to time, but not the day of the month, place and person, and the president, patient has some difficulty with serial sevens, and can remember 1 of 3 words at 30 seconds with distraction, and with a little difficulty could spell the word world backward and with effort seem to be able to draw clock that showed the time 10:50. Cognitive function is probably mildly abnormal and speech is fluent and no aphasia or dysarthria. Mood and affect appropriate. Cranial Nerves:   Intact visual fields. Fundi are benign, disc are flat, no lesions seen on funduscopy. SAMANTHA, EOM's full, no nystagmus, no ptosis. Facial sensation is normal. Corneal reflexes are not tested. Facial movement is symmetric. Hearing is abnormal bilaterally. Palate is midline with normal sternocleidomastoid and trapezius muscles are normal. Tongue is midline. Neck without meningismus or bruits  Temporal arteries are not tender or enlarged  TMJ areas are not tender on palpation   Motor:  4/5 strength in upper and lower proximal and distal muscles. Normal bulk and tone. No fasciculations. Rapid alternating movement is symmetric and intact bilaterally   Reflexes:   Deep tendon reflexes 1+/4 and symmetrical.  No babinski or clonus present   Sensory:   Abnormal to touch, pinprick and vibration and temperature in both feet to ankle level. DSS is intact   Gait:   Patient has a very slow and unsteady gait, and has to walk with a cane for ambulation but he is stable with a cane. Tremor:   No tremor noted. Cerebellar:   Mildly abnormal cerebellar signs present on Romberg and tandem testing and finger-nose-finger exam.   Neurovascular:  Normal heart sounds and regular rhythm, peripheral pulses decreased, and no carotid bruits. Assessment:       ICD-10-CM ICD-9-CM    1.  MCI Without Memory Loss (Attention, show Fluency, Bilateral Fine Motor Dexterity)  G31.84 331.83    2. Diabetic peripheral neuropathy associated with type 2 diabetes mellitus (HCC)  E11.42 250.60      357.2    3. Cervical radiculopathy due to degenerative joint disease of spine  M47.22 721.0    4. Bilateral carpal tunnel syndrome  G56.03 354.0    5. Disturbance of memory  R41.3 780.93    6. Cervical myelopathy with cervical radiculopathy (HCC)  G95.9 721.1     M54.12     7. Carotid stenosis, bilateral  I65.23 433.10      433.30      Active Problems:    * No active hospital problems. *      Plan:     Patient with new problem of abnormal neuropsych test showing just mild cognitive impairment, no true dementia, and for that reason we will continue treating her depression and she just had her medications readjusted, we advised her on the 3 things she can do as far as diet and Mediterranean type diet, stay mentally and physically active, exercising regularly, went over that with the patient and her  and they will try to do that in the near future. Patient with problem of increasing numbness in her hands, most likely secondary to her severe spinal stenosis, and she is to follow-up with her orthopedic surgeon. Patient with symptomatic numbness of her hands, there was an EMG study just done that suggest stable decompression of her carpal tunnel on the left side, and improved carpal tunnel on the right. Her MRI of the cervical spine shows severe spinal stenosis above the level of her decompression at C3-4 and C4-5 of severe degree, that is probably the source of the numbness in her hands. She has stable decompression from C5-C7. She is followed by her surgeon who does not recommend surgery yet for this problem. We previously checked metabolic parameters just to make sure she does not have a sensory neuropathy, and they were unremarkable.   She also has significant degenerative disease in the lumbar spine that may be contributing some to the numbness in her feet that is somewhat intermittent. She is to continue following up with her spinal surgeon and consider decompressive laminectomy. We checked cervical spine x-rays with flexion-extension views for completeness. Her records are reviewed, her EMG reviewed, MRI scan reviewed of both lumbar and cervical spine, and her case was discussed in detail with the patient and her . Patient also has some memory problems, that were worse after her surgery, and have gotten better, but she may have an early dementia versus mild cognitive impairment versus a normal memory loss of aging, and she has neuropsych testing scheduled for February with Dr. Blair Cedeno and we encouraged him to make sure to keep that, and we will see her again in 3 months time or earlier as needed after her neuropsych testing to decide whether she is a candidate for cognitive enhancing agents. Very difficult case, 35 minutes spent reviewing all of her records, going over her all her notes, going over all her new x-rays and EMG studies and MRI scans and discussing her diagnosis prognosis and treatment options with the patient and her  in detail. She is not a candidate for cognitive enhancing agents yet, but may be in the near future. She thinks she might need her memory test repeated in a years time. Very difficult case we will follow-up in 1 years time or earlier as need be she will call me if there is any problem. Signed By: Chantelle Dunn MD     June 9, 2022       CC: Lelo Cruz MD  FAX: [phone]      If you have questions, please do not hesitate to call me. I look forward to following your patient along with you.       Sincerely,    Chantelle Dunn MD

## 2022-10-13 DIAGNOSIS — G89.4 CHRONIC PAIN SYNDROME: ICD-10-CM

## 2022-10-13 DIAGNOSIS — M79.7 FIBROMYALGIA: ICD-10-CM

## 2022-10-13 DIAGNOSIS — M48.062 SPINAL STENOSIS OF LUMBAR REGION WITH NEUROGENIC CLAUDICATION: ICD-10-CM

## 2022-10-13 DIAGNOSIS — M54.50 ACUTE MIDLINE LOW BACK PAIN WITHOUT SCIATICA: ICD-10-CM

## 2022-10-13 DIAGNOSIS — Z98.890 STATUS POST LUMBAR SPINE SURGERY FOR DECOMPRESSION OF SPINAL CORD: ICD-10-CM

## 2022-10-14 RX ORDER — CYCLOBENZAPRINE HCL 5 MG
TABLET ORAL
Qty: 90 TABLET | Refills: 3 | Status: SHIPPED | OUTPATIENT
Start: 2022-10-14

## 2022-10-20 ENCOUNTER — OFFICE VISIT (OUTPATIENT)
Dept: FAMILY MEDICINE CLINIC | Age: 72
End: 2022-10-20
Payer: MEDICARE

## 2022-10-20 VITALS
RESPIRATION RATE: 16 BRPM | WEIGHT: 180.6 LBS | OXYGEN SATURATION: 100 % | HEIGHT: 63 IN | DIASTOLIC BLOOD PRESSURE: 67 MMHG | SYSTOLIC BLOOD PRESSURE: 168 MMHG | BODY MASS INDEX: 32 KG/M2 | HEART RATE: 77 BPM | TEMPERATURE: 97.1 F

## 2022-10-20 DIAGNOSIS — I10 ESSENTIAL HYPERTENSION WITH GOAL BLOOD PRESSURE LESS THAN 140/90: ICD-10-CM

## 2022-10-20 DIAGNOSIS — M48.062 SPINAL STENOSIS OF LUMBAR REGION WITH NEUROGENIC CLAUDICATION: ICD-10-CM

## 2022-10-20 DIAGNOSIS — F33.1 MODERATE EPISODE OF RECURRENT MAJOR DEPRESSIVE DISORDER (HCC): Primary | ICD-10-CM

## 2022-10-20 DIAGNOSIS — M47.22 CERVICAL RADICULOPATHY DUE TO DEGENERATIVE JOINT DISEASE OF SPINE: ICD-10-CM

## 2022-10-20 DIAGNOSIS — M48.02 CERVICAL SPINAL STENOSIS: ICD-10-CM

## 2022-10-20 DIAGNOSIS — F33.9 RECURRENT MAJOR DEPRESSION RESISTANT TO TREATMENT (HCC): ICD-10-CM

## 2022-10-20 DIAGNOSIS — M17.0 BILATERAL PRIMARY OSTEOARTHRITIS OF KNEE: ICD-10-CM

## 2022-10-20 DIAGNOSIS — M79.7 FIBROMYALGIA: ICD-10-CM

## 2022-10-20 DIAGNOSIS — G31.84 MCI (MILD COGNITIVE IMPAIRMENT): ICD-10-CM

## 2022-10-20 DIAGNOSIS — G89.4 CHRONIC PAIN SYNDROME: ICD-10-CM

## 2022-10-20 DIAGNOSIS — K58.9 IRRITABLE BOWEL SYNDROME WITHOUT DIARRHEA: ICD-10-CM

## 2022-10-20 DIAGNOSIS — R20.0 BILATERAL HAND NUMBNESS: ICD-10-CM

## 2022-10-20 DIAGNOSIS — Z98.890 STATUS POST LUMBAR SPINE SURGERY FOR DECOMPRESSION OF SPINAL CORD: ICD-10-CM

## 2022-10-20 PROCEDURE — G8536 NO DOC ELDER MAL SCRN: HCPCS | Performed by: FAMILY MEDICINE

## 2022-10-20 PROCEDURE — 3078F DIAST BP <80 MM HG: CPT | Performed by: FAMILY MEDICINE

## 2022-10-20 PROCEDURE — G9711 PT HX TOT COL OR COLON CA: HCPCS | Performed by: FAMILY MEDICINE

## 2022-10-20 PROCEDURE — G8753 SYS BP > OR = 140: HCPCS | Performed by: FAMILY MEDICINE

## 2022-10-20 PROCEDURE — G9899 SCRN MAM PERF RSLTS DOC: HCPCS | Performed by: FAMILY MEDICINE

## 2022-10-20 PROCEDURE — 1090F PRES/ABSN URINE INCON ASSESS: CPT | Performed by: FAMILY MEDICINE

## 2022-10-20 PROCEDURE — 1123F ACP DISCUSS/DSCN MKR DOCD: CPT | Performed by: FAMILY MEDICINE

## 2022-10-20 PROCEDURE — 3074F SYST BP LT 130 MM HG: CPT | Performed by: FAMILY MEDICINE

## 2022-10-20 PROCEDURE — G8399 PT W/DXA RESULTS DOCUMENT: HCPCS | Performed by: FAMILY MEDICINE

## 2022-10-20 PROCEDURE — 99213 OFFICE O/P EST LOW 20 MIN: CPT | Performed by: FAMILY MEDICINE

## 2022-10-20 PROCEDURE — G0439 PPPS, SUBSEQ VISIT: HCPCS | Performed by: FAMILY MEDICINE

## 2022-10-20 PROCEDURE — G9717 DOC PT DX DEP/BP F/U NT REQ: HCPCS | Performed by: FAMILY MEDICINE

## 2022-10-20 PROCEDURE — 1101F PT FALLS ASSESS-DOCD LE1/YR: CPT | Performed by: FAMILY MEDICINE

## 2022-10-20 PROCEDURE — G8754 DIAS BP LESS 90: HCPCS | Performed by: FAMILY MEDICINE

## 2022-10-20 PROCEDURE — G8417 CALC BMI ABV UP PARAM F/U: HCPCS | Performed by: FAMILY MEDICINE

## 2022-10-20 PROCEDURE — G8428 CUR MEDS NOT DOCUMENT: HCPCS | Performed by: FAMILY MEDICINE

## 2022-10-20 RX ORDER — ARIPIPRAZOLE 2 MG/1
2 TABLET ORAL DAILY
Qty: 30 TABLET | Refills: 1 | Status: SHIPPED | OUTPATIENT
Start: 2022-10-20

## 2022-10-20 NOTE — PROGRESS NOTES
Chief Complaint   Patient presents with    Depression     Follow-up    Annual Wellness Visit     Medicare    1. Have you been to the ER, urgent care clinic since your last visit? Hospitalized since your last visit? No    2. Have you seen or consulted any other health care providers outside of the [address] [place] [address] since your last visit? Include any pap smears or colon screening.  No      Discuss numbness and pain in hands

## 2022-10-20 NOTE — PROGRESS NOTES
Varun Crenshaw (: 1950) is a 74 y.o. female, established patient, here for evaluation of the following chief complaint(s):  Depression (Follow-up) and Annual Wellness Visit (Medicare)       ASSESSMENT/PLAN:  Did better after coming off gabapentin with memory and confusion issues but ct to struggle with depression - trial with Abilify in addition to Cymbalta and wellbutrin. Working with therapist short term. Discussed checking in with Psych at this point. Ct working with Ortho on chronic pain. Below is the assessment and plan developed based on review of pertinent history, physical exam, labs, studies, and medications. 1. Moderate episode of recurrent major depressive disorder (HCC)  -     REFERRAL TO PSYCHIATRY  -     ARIPiprazole (ABILIFY) 2 mg tablet; Take 1 Tablet by mouth daily. , Normal, Disp-30 Tablet, R-1  2. Recurrent major depression resistant to treatment ([facility] [address]  -     REFERRAL TO PSYCHIATRY  -     ARIPiprazole (ABILIFY) 2 mg tablet; Take 1 Tablet by mouth daily. , Normal, Disp-30 Tablet, R-1  3. Fibromyalgia  4. Chronic pain syndrome  5. Spinal stenosis of lumbar region with neurogenic claudication  6. Status post lumbar spine surgery for decompression of spinal cord  7. Essential hypertension with goal blood pressure less than 140/90  8. Irritable bowel syndrome without diarrhea  9. Bilateral primary osteoarthritis of knee  10. Cervical spinal stenosis  11. Cervical radiculopathy due to degenerative joint disease of spine  12. Bilateral hand numbness  13. MCI (mild cognitive impairment)  -     REFERRAL TO PSYCHIATRY      Return in about 6 weeks (around 2022). SUBJECTIVE/OBJECTIVE:    Family concerned about pt - not active, not leaving house. She has low motivation - ex of hygiene. Family is doing a lot to take care of her. This appt was 1st time out of house in a few weeks. No longer participating in family dinners.   She endorse these sx and     Initially, had concerns of trouble with memory that has been worse since having her second spinal surgery. Appeared c/w postop delirium. Neuro evaluation with EEG which showed mild metabolic encephalopathy. There were concerns about mild ongoing memory loss that was either related to early dementia versus MCI versus normal aging. Some improvement after stopping Gabapentin but still ongoing memory issues. Neuropsych testing c/w MCI and sig depression and anxiety. Reports feeling depression largely in relation to functional limitations and pain. Very little energy. Still trouble with concentration and attention. Not sleeping well - gets about 6 hrs per night and takes naps occ. No SI/HI. Still taking Cymbalta 60 mg twice daily and Wellbutrin 200 mg twice daily. Has not been on atypical antipsychotics in the past.  Was previously seeing psychiatry many years ago. Still dealing with ongoing neuropathy. Working on this with Ortho. ROS  Gen - no fever/chills  Resp - no dyspnea or cough  CV - no chest pain or KASPER  Rest per HPI    Blood pressure (!) 168/67, pulse 77, temperature 97.1 °F (36.2 °C), temperature source Temporal, resp. rate 16, height 5' 3\" (1.6 m), weight 180 lb 9.6 oz (81.9 kg), SpO2 100 %. Physical Exam  General appearance - alert, well appearing, and in no distress  Eyes -sclera anicteric  Neck - supple, no significant adenopathy, no thyromegaly  Chest - clear to auscultation, no wheezes, rales or rhonchi, symmetric air entry  Heart - normal rate, regular rhythm, normal S1, S2, no murmurs, rubs, clicks or gallops  Neurological - alert, oriented, normal speech, no focal findings or movement disorder noted  Extr - no edema  Psych - normal mood and affect    On this date 10/20/2022 I have spent 25 minutes reviewing previous notes, test results and face to face with the patient discussing the diagnosis and importance of compliance with the treatment plan as well as documenting on the day of the visit.     An electronic signature was used to authenticate this note. -- Stephanie Rodriguez MD       This is the Subsequent Medicare Annual Wellness Exam, performed 12 months or more after the Initial AWV or the last Subsequent AWV    I have reviewed the patient's medical history in detail and updated the computerized patient record. Assessment/Plan   Education and counseling provided:  Are appropriate based on today's review and evaluation    1. Moderate episode of recurrent major depressive disorder (HCC)  -     REFERRAL TO PSYCHIATRY  -     ARIPiprazole (ABILIFY) 2 mg tablet; Take 1 Tablet by mouth daily. , Normal, Disp-30 Tablet, R-1  2. Recurrent major depression resistant to treatment ([facility] [address]  -     REFERRAL TO PSYCHIATRY  -     ARIPiprazole (ABILIFY) 2 mg tablet; Take 1 Tablet by mouth daily. , Normal, Disp-30 Tablet, R-1  3. Fibromyalgia  4. Chronic pain syndrome  5. Spinal stenosis of lumbar region with neurogenic claudication  6. Status post lumbar spine surgery for decompression of spinal cord  7. Essential hypertension with goal blood pressure less than 140/90  8. Irritable bowel syndrome without diarrhea  9. Bilateral primary osteoarthritis of knee  10. Cervical spinal stenosis  11. Cervical radiculopathy due to degenerative joint disease of spine  12. Bilateral hand numbness  13.  MCI (mild cognitive impairment)  -     REFERRAL TO PSYCHIATRY       Depression Risk Factor Screening     3 most recent PHQ Screens 10/25/2022   Little interest or pleasure in doing things Several days   Feeling down, depressed, irritable, or hopeless Several days   Total Score PHQ 2 2   Trouble falling or staying asleep, or sleeping too much Nearly every day   Feeling tired or having little energy Nearly every day   Poor appetite, weight loss, or overeating Not at all   Feeling bad about yourself - or that you are a failure or have let yourself or your family down More than half the days   Trouble concentrating on things such as school, work, reading, or watching TV Nearly every day   Moving or speaking so slowly that other people could have noticed; or the opposite being so fidgety that others notice Several days   Thoughts of being better off dead, or hurting yourself in some way Not at all   PHQ 9 Score 14   How difficult have these problems made it for you to do your work, take care of your home and get along with others Somewhat difficult       Alcohol Risk Screen    Do you average more than 1 drink per night or more than 7 drinks a week:  No    On any one occasion in the past three months have you have had more than 3 drinks containing alcohol:  No        Functional Ability and Level of Safety    Hearing: Hearing is good. Activities of Daily Living: The home contains: no safety equipment. Patient does total self care      Ambulation: with no difficulty     Fall Risk:  Fall Risk Assessment, last 12 mths 6/9/2022   Able to walk? Yes   Fall in past 12 months? 1   Do you feel unsteady? 1   Are you worried about falling 1   Is TUG test greater than 12 seconds? -   Is the gait abnormal? 1   Number of falls in past 12 months 1   Fall with injury?  1      Abuse Screen:  Patient is not abused       Cognitive Screening    Has your family/caregiver stated any concerns about your memory: yes, but much better with coming off gabapentin     Cognitive Screening: Normal - MMSE (Mini Mental Status Exam), Verbal Fluency Test    Health Maintenance Due     Health Maintenance Due   Topic Date Due    Foot Exam Q1  Never done    Eye Exam Retinal or Dilated  Never done    Shingrix Vaccine Age 50> (1 of 2) Never done    MICROALBUMIN Q1  12/15/2016    COVID-19 Vaccine (3 - Booster for Moderna series) 06/09/2021    Flu Vaccine (1) 08/01/2022    Medicare Yearly Exam  09/16/2022       Patient Care Team   Patient Care Team:  Tanvir Minor MD as PCP - General (Family Medicine)  Tanvir Minor MD as PCP - [facility] Empaneled Provider  Darylene Gills, MD (Cardiovascular Disease Physician)    History     Patient Active Problem List   Diagnosis Code    IBS (irritable bowel syndrome) K58.9    Fibromyalgia M79.7    Hiatal hernia K44.9    GERD (gastroesophageal reflux disease) K21.9    Hypercholesterolemia E78.00    Diverticulitis K57.92    Depression F32. A    Essential hypertension I10    Encounter for medication monitoring Z51.81    DDD (degenerative disc disease), cervical M50.30    Spinal stenosis of lumbar region with neurogenic claudication M48.062    Non morbid obesity E66.9    Depression, major, severe recurrence (HCC) F33.2    Spinal stenosis, lumbar M48.061    Acute alteration in mental status R41.82    Convulsions ([address] R56.9    Cerebral microvascular disease I67.89    Disturbance of memory R41. 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    MCI Without Memory Loss (Attention, Fluency, Bilateral Fine Motor Dexterity) G31.84     Past Medical History:   Diagnosis Date    Acute alteration in mental status     Arthritis     Bilateral carotid artery stenosis     Cerebral microvascular disease     Chronic kidney disease     CKD 3    Chronic pain     CHRONIC BOWEL PAIN    Convulsions (HCC)     Depression     Disturbance of memory     Diverticulitis     Fibromyalgia     GERD (gastroesophageal reflux disease)     Goiter     Hiatal hernia     Hypercholesterolemia     Hypertension     IBS (irritable bowel syndrome)     CONSTIPATION, CHRONIC SINCE HER 20s    Insomnia     TAKES TRAZODONE NIGHTLY    Migraine     REDUCED IN FREQUENCY AND SEVERITY SINCE MENOPAUSE    Rectocele        Past Surgical History:   Procedure Laterality Date    COLONOSCOPY N/A 6/21/2016    COLONOSCOPY performed by Mira Salgado MD at [facility] ENDOSCOPY    COLONOSCOPY N/A 6/27/2018    COLONOSCOPY performed by Mira Salgado MD at [facility] ENDOSCOPY    ENDOSCOPY, COLON, DIAGNOSTIC  11/2010    Dr. Sonny Macario  2011    HX CHOLECYSTECTOMY  2006    HX ENDOSCOPY      HX GI  X3  LAST ONE 12/10    COLONOSCOPY    HX GYN  1982    D&C WITH SUCTION    HX HYSTERECTOMY      HX LUMBAR FUSION  2017    HX ORTHOPAEDIC      right foot surgery    HX TONSILLECTOMY      HX TOTAL COLECTOMY  2007    Dr. Pond/ diverticulitis    IR INJ SPINE FLUORO GUIDED  8/28/2020    IR INJ SPINE FLUORO GUIDED  1/8/2021    IR INJ SPINE FLUORO GUIDED  2/5/2021     Current Outpatient Medications   Medication Sig Dispense Refill    ARIPiprazole (ABILIFY) 2 mg tablet Take 1 Tablet by mouth daily. 30 Tablet 1    cyclobenzaprine (FLEXERIL) 5 mg tablet TAKE 1 TABLET NIGHTLY 90 Tablet 3    hydroCHLOROthiazide (HYDRODIURIL) 25 mg tablet TAKE 1 TABLET DAILY 90 Tablet 2    buPROPion XL (WELLBUTRIN XL) 300 mg XL tablet Take 1 Tablet by mouth daily. 90 Tablet 1    oxybutynin (DITROPAN) 5 mg tablet TAKE 1 TABLET TWICE A  Tablet 3    dicyclomine (BENTYL) 10 mg capsule TAKE 2 CAPSULES FOUR TIMES A DAY AS NEEDED 360 Capsule 7    DULoxetine (CYMBALTA) 60 mg capsule TAKE 1 CAPSULE TWICE A  Capsule 3    rosuvastatin (CRESTOR) 20 mg tablet TAKE 1 TABLET NIGHTLY 90 Tablet 3    pantoprazole (PROTONIX) 40 mg tablet TAKE 1 TABLET DAILY 90 Tablet 3    lisinopriL (PRINIVIL, ZESTRIL) 40 mg tablet TAKE 1 TABLET DAILY 90 Tablet 3    b complex vitamins tablet Take 1 Tablet by mouth daily. aspirin delayed-release 81 mg tablet Take  by mouth daily. OTHER every other day. Prevagan      acetaminophen (TYLENOL) 500 mg tablet Take 2 Tabs by mouth every six (6) hours.  90 Tab 0    calcium-vitamin D (OS-TRUE +D3) 500 mg-200 unit per tablet 1 Tab. polyethylene glycol (MIRALAX) 17 gram packet Take 17 g by mouth daily. cholecalciferol, vitamin D3, 50 mcg (2,000 unit) tab Take 1 Tab by mouth daily. sucralfate (CARAFATE) 1 gram tablet Take 1 g by mouth three (3) times daily as needed. COVID-19 mRNA Vacc (MODERNA) 100 mcg/0.5 mL susp injection        Allergies   Allergen Reactions    Latex Hives    Codeine Other (comments)     Severe Headache    Morphine Hives    Ambien [Zolpidem] Other (comments)     Severe behavior changes    Dilaudid [Hydromorphone] Other (comments)     Memory loss    Other Medication Rash     BANDAIDS    Shellfish Containing Products Hives       Family History   Problem Relation Age of Onset    Cancer Father         lung and bone    Stroke Sister     Cancer Sister 76        PANCREATIC    Hypertension Mother     High Cholesterol Mother     Alzheimer's Disease Mother         late 62s early 76s    Cancer Other         COLON    Cancer Other         breast    Diabetes Maternal Aunt     Cancer Maternal Uncle         COLON    Cancer Maternal Grandfather         COLON    Ovarian Cancer Daughter 28     Social History     Tobacco Use    Smoking status: Former     Packs/day: 1.00     Years: 45.00     Pack years: 45.00     Types: Cigarettes     Quit date: 1/1/2007     Years since quitting: 15.8    Smokeless tobacco: Never   Substance Use Topics    Alcohol use:  Yes     Alcohol/week: 0.0 standard drinks     Comment: holidays         Philip Pedro MD

## 2022-10-25 ENCOUNTER — VIRTUAL VISIT (OUTPATIENT)
Dept: BEHAVIORAL/MENTAL HEALTH CLINIC | Age: 72
End: 2022-10-25
Payer: MEDICARE

## 2022-10-25 DIAGNOSIS — F33.1 DEPRESSION, MAJOR, RECURRENT, MODERATE (HCC): Primary | ICD-10-CM

## 2022-10-25 DIAGNOSIS — F41.9 ANXIETY: ICD-10-CM

## 2022-10-25 PROCEDURE — 90791 PSYCH DIAGNOSTIC EVALUATION: CPT | Performed by: SOCIAL WORKER

## 2022-10-25 PROCEDURE — G8427 DOCREV CUR MEDS BY ELIG CLIN: HCPCS | Performed by: SOCIAL WORKER

## 2022-10-25 PROCEDURE — G9717 DOC PT DX DEP/BP F/U NT REQ: HCPCS | Performed by: SOCIAL WORKER

## 2022-10-25 PROCEDURE — 1123F ACP DISCUSS/DSCN MKR DOCD: CPT | Performed by: SOCIAL WORKER

## 2022-10-25 NOTE — PROGRESS NOTES
Outpatient Initial Assessment and Psychotherapy Note     Chief Complaint:     Chief Complaint   Patient presents with    Anxiety    Depression     ** Session lasted 55 minutes**    History of Present Illness: Louis Baez is a 66 y.o. female who presents with symptoms of depression and anxiety. She is referred for individual psychotherapy by her PCP, Dr. Cassie Martin MD.  Client reports experiencing the following clinical symptoms:  depressed mood, anxiety, sleep disturbance, difficulty concentrating, lack of energy and anhedonia. She denies both suicidal and homicidal ideation. Psychosocial stressors that impact mood include:  her daughter is going through cancer treatments and client has chronic pain. Significant Social History:  Client was born and raised in [place], [place]. She is the youngest of three and had two older sisters. Both of her sisters are now  from cancer. Parents were  until their deaths. Father  of lung cancer and mother had dementia. Client describes a very good childhood--free of trauma/abuse. Client met her  in high school. They  shortly after graduation. They have been  for 53 years. They have two children, a daughter and a son. Son presently lives with them. Daughter is  and lives closeby with her . Daughter was diagnosed several years ago with ovarian cancer. She had to have hysterectomy and chemotherapy. She was cancer free for many years, but now has been diagnosed again with cancer near colon and is again undergoing treatment. Client is very concerned about daughter and this is source of stress. 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   Client shared that she has had neuropsychological testing and it did not indicate dementia but that her depression and anxiety could be affecting her memory. Client has several pain issues to include neuropathy in her hands, knee pain due to arthritis and back pain. Pain issues have also affected mood. Substance Abuse History:    Denies      Current  Medication List:   Current Outpatient Medications   Medication Sig Dispense Refill    ARIPiprazole (ABILIFY) 2 mg tablet Take 1 Tablet by mouth daily. 30 Tablet 1    cyclobenzaprine (FLEXERIL) 5 mg tablet TAKE 1 TABLET NIGHTLY 90 Tablet 3    hydroCHLOROthiazide (HYDRODIURIL) 25 mg tablet TAKE 1 TABLET DAILY 90 Tablet 2    buPROPion XL (WELLBUTRIN XL) 300 mg XL tablet Take 1 Tablet by mouth daily. 90 Tablet 1    oxybutynin (DITROPAN) 5 mg tablet TAKE 1 TABLET TWICE A  Tablet 3    dicyclomine (BENTYL) 10 mg capsule TAKE 2 CAPSULES FOUR TIMES A DAY AS NEEDED 360 Capsule 7    DULoxetine (CYMBALTA) 60 mg capsule TAKE 1 CAPSULE TWICE A  Capsule 3    rosuvastatin (CRESTOR) 20 mg tablet TAKE 1 TABLET NIGHTLY 90 Tablet 3    pantoprazole (PROTONIX) 40 mg tablet TAKE 1 TABLET DAILY 90 Tablet 3    lisinopriL (PRINIVIL, ZESTRIL) 40 mg tablet TAKE 1 TABLET DAILY 90 Tablet 3    COVID-19 mRNA Vacc (MODERNA) 100 mcg/0.5 mL susp injection       b complex vitamins tablet Take 1 Tablet by mouth daily. aspirin delayed-release 81 mg tablet Take  by mouth daily. OTHER every other day. Prevagan      acetaminophen (TYLENOL) 500 mg tablet Take 2 Tabs by mouth every six (6) hours. 90 Tab 0    calcium-vitamin D (OS-TRUE +D3) 500 mg-200 unit per tablet 1 Tab. polyethylene glycol (MIRALAX) 17 gram packet Take 17 g by mouth daily. cholecalciferol, vitamin D3, 50 mcg (2,000 unit) tab Take 1 Tab by mouth daily. sucralfate (CARAFATE) 1 gram tablet Take 1 g by mouth three (3) times daily as needed.             Family Psychiatric History:   Family History   Problem Relation Age of Onset    Cancer Father         lung and bone    Stroke Sister     Cancer Sister 76 PANCREATIC    Hypertension Mother     High Cholesterol Mother     Alzheimer's Disease Mother         late 62s early 76s    Cancer Other         COLON    Cancer Other         breast    Diabetes Maternal Aunt     Cancer Maternal Uncle         COLON    Cancer Maternal Grandfather         COLON    Ovarian Cancer Daughter 28          Family medical problems:  Family History   Problem Relation Age of Onset    Cancer Father         lung and bone    Stroke Sister     Cancer Sister 76        PANCREATIC    Hypertension Mother     High Cholesterol Mother     Alzheimer's Disease Mother         late 62s early 76s    Cancer Other         COLON    Cancer Other         breast    Diabetes Maternal Aunt     Cancer Maternal Uncle         COLON    Cancer Maternal Grandfather         COLON    Ovarian Cancer Daughter 28       Social History:      Social History     Socioeconomic History    Marital status:      Spouse name: Not on file    Number of children: Not on file    Years of education: Not on file    Highest education level: Not on file   Occupational History    Not on file   Tobacco Use    Smoking status: Former     Packs/day: 1.00     Years: 45.00     Pack years: 45.00     Types: Cigarettes     Quit date: 1/1/2007     Years since quitting: 15.8    Smokeless tobacco: Never   Vaping Use    Vaping Use: Never used   Substance and Sexual Activity    Alcohol use:  Yes     Alcohol/week: 0.0 standard drinks     Comment: holidays    Drug use: No    Sexual activity: Yes     Partners: Male     Comment:    Other Topics Concern     Service Not Asked    Blood Transfusions Not Asked    Caffeine Concern Not Asked    Occupational Exposure Not Asked    Hobby Hazards Not Asked    Sleep Concern Not Asked    Stress Concern Not Asked    Weight Concern Not Asked    Special Diet Not Asked    Back Care Not Asked    Exercise Not Asked    Bike Helmet Not Asked    Seat Belt Not Asked    Self-Exams Not Asked   Social History Narrative Not on file     Social Determinants of Health     Financial Resource Strain: Low Risk     Difficulty of Paying Living Expenses: Not hard at all   Food Insecurity: No Food Insecurity    Worried About Running Out of Food in the Last Year: Never true    Ran Out of Food in the Last Year: Never true   Transportation Needs: Not on file   Physical Activity: Not on file   Stress: Not on file   Social Connections: Not on file   Intimate Partner Violence: Not on file   Housing Stability: Not on file       Allergies: Allergies   Allergen Reactions    Latex Hives    Codeine Other (comments)     Severe Headache    Morphine Hives    Ambien [Zolpidem] Other (comments)     Severe behavior changes    Dilaudid [Hydromorphone] Other (comments)     Memory loss    Other Medication Rash     BANDAIDS    Shellfish Containing Products Hives          Psychiatric/Mental Status Examination:     MENTAL STATUS EXAM:  Sensorium  oriented to time, place and person   Orientation person, place, time/date, situation, day of week, month of year, and year   Relations cooperative   Eye Contact appropriate   Appearance:  casually dressed   Motor Behavior:  within normal limits   Speech:  normal pitch and normal volume   Vocabulary average   Thought Process: goal directed and logical   Thought Content free of delusions and free of hallucinations   Suicidal ideations none   Homicidal ideations none   Mood:  anxious and depressed   Affect:  anxious and depressed   Memory recent  adequate   Memory remote:  adequate   Concentration:  adequate   Abstraction:  abstract   Insight:  fair   Reliability fair   Judgment:  fair   Diagnoses:   Axis I: Major Depression, Recurrent, Moderate;  Anxiety  Axis II: Deferred  Axis III:   Past Medical History:   Diagnosis Date    Acute alteration in mental status     Arthritis     Bilateral carotid artery stenosis     Cerebral microvascular disease     Chronic kidney disease     CKD 3    Chronic pain     CHRONIC BOWEL PAIN Convulsions (HCC)     Depression     Disturbance of memory     Diverticulitis     Fibromyalgia     GERD (gastroesophageal reflux disease)     Goiter     Hiatal hernia     Hypercholesterolemia     Hypertension     IBS (irritable bowel syndrome)     CONSTIPATION, CHRONIC SINCE HER 20s    Insomnia     TAKES TRAZODONE NIGHTLY    Migraine     REDUCED IN FREQUENCY AND SEVERITY SINCE MENOPAUSE    Rectocele      Axis IV: Problems with primary support group, Problems related to social environment, and Other psychosocial or environmental problems  Axis V:51-60 moderate symptoms      Clinical Impressions:  Bulmaro Arriaga is a 66 y.o. female who presents with symptoms of depression and anxiety. She is referred for individual psychotherapy by her PCP, Dr. Rosmery Bangura MD.  Client reports experiencing the following clinical symptoms:  depressed mood, anxiety, sleep disturbance, difficulty concentrating, lack of energy and anhedonia. She denies both suicidal and homicidal ideation. Psychosocial stressors that impact mood include:  her daughter is going through cancer treatments and client has chronic pain. As goals, client wants to work on improving mood, reducing anxiety and improving coping skills to manage stressors. Will work with client on stated goals utilizing CBT approach. Follow up established. Pursuant to the emergency declaration under the [facility], [facility] waiver authority and the [employer] and [employer] Act, this Virtual Visit was conducted, with patient and parent and/or guardian's consent, to reduce the patient's risk of exposure to COVID-19 and provide continuity of care for an established patient.      Due to the state of emergency related to the coronavirus, the [place] of [employer] [place] and the [place] [employer] is allowing practitioners holding my licensure and/or certification status the ability to provide telehealth services without the usual requirements. This individual was evaluated through a synchronous (real-time) audio-video encounter, and/or his/her healthcare decision maker, is aware that it is a billable service, which includes applicable co-pays, with coverage as determined by his/her insurance carrier. He/she provided verbal consent to proceed and patient identification was verified. This visit was conducted pursuant to the emergency declaration under the [address] [place] [address], [address] [place] [address] and the [employer] and [employer] Act. A caregiver was present when appropriate. Ability to conduct physical exam was limited. The patient was located at home in a state where the provider was licensed to provide care. The nature and course of the patient's psychiatric diagnosis were discussed with the patient. Office and confidentiality policies and procedures were discussed with patient. The patient has my contact information for routine or urgent concerns.       Darwin Quintana LCSW

## 2022-11-03 ENCOUNTER — VIRTUAL VISIT (OUTPATIENT)
Dept: BEHAVIORAL/MENTAL HEALTH CLINIC | Age: 72
End: 2022-11-03
Payer: MEDICARE

## 2022-11-03 DIAGNOSIS — F33.1 DEPRESSION, MAJOR, RECURRENT, MODERATE (HCC): Primary | ICD-10-CM

## 2022-11-03 DIAGNOSIS — F41.9 ANXIETY: ICD-10-CM

## 2022-11-03 PROCEDURE — 1123F ACP DISCUSS/DSCN MKR DOCD: CPT | Performed by: SOCIAL WORKER

## 2022-11-03 PROCEDURE — 90834 PSYTX W PT 45 MINUTES: CPT | Performed by: SOCIAL WORKER

## 2022-11-03 PROCEDURE — G9717 DOC PT DX DEP/BP F/U NT REQ: HCPCS | Performed by: SOCIAL WORKER

## 2022-11-03 PROCEDURE — G8427 DOCREV CUR MEDS BY ELIG CLIN: HCPCS | Performed by: SOCIAL WORKER

## 2022-11-03 NOTE — PROGRESS NOTES
PSYCHOTHERAPY NOTE      Idania Ramon is a 66 y.o. female who presents with anxiety/depression. Client was seen for a virtual individual psychotherapy session that lasted for 50 minutes. Progress:  Client presents with an improved mood and affect. She is lucid and engaged in conversation. She reports having started to take the Abilify as prescribed and feels a slight improvement in mood. She reports feeling less anxious and depressed. Client shared some concerns re: her daughter's cancer treatment and also some concerns re: her sister in law who is struggling with Parkinson's and loss of her son who recently passed away. Processed all of this in session and explored ways for client to provide support to both of them as they go through these challenges. Memory is better. Client acknowledges not always being as sharp as she once was but does not feel memory has affected her ADL's. Shared with client some mindfulness and breathing exercises and encouraged her to practice if triggered. Focus today was on self care and behavioral activation to help mood. Mental Status exam:         Sensorium  oriented to time, place and person   Relations cooperative   Appearance:  casually dressed   Motor Behavior:  within normal limits   Speech:  normal pitch and normal volume   Thought Process: goal directed and logical   Thought Content free of delusions and free of hallucinations   Suicidal ideations none   Homicidal ideations none   Mood:  euthymic   Affect:  euthymic   Memory recent  adequate   Memory remote:  adequate   Concentration:  adequate   Abstraction:  abstract   Insight:  fair   Reliability fair   Judgment:  fair         DIAGNOSIS AND IMPRESSION:    Axis I: Major Depression, Recurrent, Moderate;  Anxiety  Axis II: Deferred    Axis III:   Past Medical History:   Diagnosis Date    Acute alteration in mental status     Arthritis     Bilateral carotid artery stenosis     Cerebral microvascular disease     Chronic kidney disease     CKD 3    Chronic pain     CHRONIC BOWEL PAIN    Convulsions (HCC)     Depression     Disturbance of memory     Diverticulitis     Fibromyalgia     GERD (gastroesophageal reflux disease)     Goiter     Hiatal hernia     Hypercholesterolemia     Hypertension     IBS (irritable bowel syndrome)     CONSTIPATION, CHRONIC SINCE HER 20s    Insomnia     TAKES TRAZODONE NIGHTLY    Migraine     REDUCED IN FREQUENCY AND SEVERITY SINCE MENOPAUSE    Rectocele      Axis IV: Problems with primary support group, Problems related to social environment, and Other psychosocial or environmental problems  Axis V:  61-70 mild symptoms    Interventions/plans: Follow up in 2 weeks      Pursuant to the emergency declaration under the [facility], [facility] waiver authority and the [employer] Act, this Virtual Visit was conducted, with patient and parent and/or guardian's consent, to reduce the patient's risk of exposure to COVID-19 and provide continuity of care for an established patient. Due to the state of emergency related to the coronavirus, the [place] [employer] and the [place] [employer] is allowing practitioners holding my licensure and/or certification status the ability to provide telehealth services without the usual requirements. This individual was evaluated through a synchronous (real-time) audio-video encounter, and/or his/her healthcare decision maker, is aware that it is a billable service, which includes applicable co-pays, with coverage as determined by his/her insurance carrier. He/she provided verbal consent to proceed and patient identification was verified.  This visit was conducted pursuant to the emergency declaration under the [facility], 1135 waiver authority and the [employer] Appropriations Act. A caregiver was present when appropriate. Ability to conduct physical exam was limited. The patient was located at home in a state where the provider was licensed to provide care.

## 2022-11-11 ENCOUNTER — TELEPHONE (OUTPATIENT)
Dept: NEUROLOGY | Age: 72
End: 2022-11-11

## 2022-11-11 NOTE — TELEPHONE ENCOUNTER
Patient called to schedule an appointment with Dr. Nadyne Fabry. She stated she also has a referral for the appointment.     A good call back number: [phone]

## 2022-11-17 ENCOUNTER — VIRTUAL VISIT (OUTPATIENT)
Dept: BEHAVIORAL/MENTAL HEALTH CLINIC | Age: 72
End: 2022-11-17
Payer: MEDICARE

## 2022-11-17 DIAGNOSIS — F41.9 ANXIETY: ICD-10-CM

## 2022-11-17 DIAGNOSIS — F33.1 DEPRESSION, MAJOR, RECURRENT, MODERATE (HCC): Primary | ICD-10-CM

## 2022-11-17 PROCEDURE — G9717 DOC PT DX DEP/BP F/U NT REQ: HCPCS | Performed by: SOCIAL WORKER

## 2022-11-17 PROCEDURE — 1123F ACP DISCUSS/DSCN MKR DOCD: CPT | Performed by: SOCIAL WORKER

## 2022-11-17 PROCEDURE — G8427 DOCREV CUR MEDS BY ELIG CLIN: HCPCS | Performed by: SOCIAL WORKER

## 2022-11-17 PROCEDURE — 90834 PSYTX W PT 45 MINUTES: CPT | Performed by: SOCIAL WORKER

## 2022-11-17 NOTE — PROGRESS NOTES
PSYCHOTHERAPY NOTE      Lydia Maldonado is a 74 y.o. female who presents with anxiety/depression. Client was seen for a virtual individual psychotherapy session that lasted for 50 minutes. Progress:  Client reports mood to be \"up and down\" lately. She feels that her physical health has affected her mood. She continues to struggle with some eye issues , IBS and fibromyalgia.  shared that her mood does become more irritable at times. Will address with PCP. Also provided client with information on local psychiatrists. Today we focused on ways to help soothe if triggered. Client has been practicing  her breathing and mindfulness exercises and reports some benefit. We also explored distress tolerance skills and distracting techniques. Client use to enjoy making candles, but cannot do this now due to her physical health issues. Discussed some other hobbies and activities to help when triggered. We also explored ways to help increase socialization as she tends to isolate. Focus today was on self care and distress tolerance     Mental Status exam:         Sensorium  oriented to time, place and person   Relations cooperative   Appearance:  casually dressed   Motor Behavior:  within normal limits   Speech:  normal pitch and normal volume   Thought Process: goal directed and logical   Thought Content free of delusions and free of hallucinations   Suicidal ideations none   Homicidal ideations none   Mood:  anxious and depressed   Affect:  mood-congruent   Memory recent  adequate   Memory remote:  adequate   Concentration:  adequate   Abstraction:  abstract   Insight:  fair   Reliability fair   Judgment:  fair         DIAGNOSIS AND IMPRESSION:    Axis I: Major Depression, Recurrent, Moderate;  Anxiety  Axis II: Deferred  Axis III:   Past Medical History:   Diagnosis Date    Acute alteration in mental status     Arthritis     Bilateral carotid artery stenosis     Cerebral microvascular disease     Chronic kidney disease     CKD 3    Chronic pain     CHRONIC BOWEL PAIN    Convulsions (HCC)     Depression     Disturbance of memory     Diverticulitis     Fibromyalgia     GERD (gastroesophageal reflux disease)     Goiter     Hiatal hernia     Hypercholesterolemia     Hypertension     IBS (irritable bowel syndrome)     CONSTIPATION, CHRONIC SINCE HER 20s    Insomnia     TAKES TRAZODONE NIGHTLY    Migraine     REDUCED IN FREQUENCY AND SEVERITY SINCE MENOPAUSE    Rectocele      Axis IV: Problems with primary support group, Problems related to social environment, and Other psychosocial or environmental problems  Axis V:  61-70 mild symptoms    Interventions/plans: Follow up in 2 weeks      Pursuant to the emergency declaration under the [facility], [facility] waiver authority and the [employer] Act, this Virtual Visit was conducted, with patient and parent and/or guardian's consent, to reduce the patient's risk of exposure to COVID-19 and provide continuity of care for an established patient. Due to the state of emergency related to the coronavirus, the [place] [employer] and the [place] [employer] is allowing practitioners holding my licensure and/or certification status the ability to provide telehealth services without the usual requirements. This individual was evaluated through a synchronous (real-time) audio-video encounter, and/or his/her healthcare decision maker, is aware that it is a billable service, which includes applicable co-pays, with coverage as determined by his/her insurance carrier. He/she provided verbal consent to proceed and patient identification was verified.  This visit was conducted pursuant to the emergency declaration under the [facility], [facility]5 waiver authority and the [employer] Act. A caregiver was present when appropriate. Ability to conduct physical exam was limited. The patient was located at home in a state where the provider was licensed to provide care.

## 2022-11-18 ENCOUNTER — OFFICE VISIT (OUTPATIENT)
Dept: NEUROLOGY | Age: 72
End: 2022-11-18
Payer: MEDICARE

## 2022-11-18 VITALS
HEART RATE: 89 BPM | SYSTOLIC BLOOD PRESSURE: 110 MMHG | WEIGHT: 183.6 LBS | DIASTOLIC BLOOD PRESSURE: 60 MMHG | BODY MASS INDEX: 32.53 KG/M2 | OXYGEN SATURATION: 98 % | TEMPERATURE: 98.7 F | HEIGHT: 63 IN | RESPIRATION RATE: 16 BRPM

## 2022-11-18 DIAGNOSIS — R20.0 BILATERAL HAND NUMBNESS: Primary | ICD-10-CM

## 2022-11-18 DIAGNOSIS — M79.642 BILATERAL HAND PAIN: ICD-10-CM

## 2022-11-18 DIAGNOSIS — M79.641 BILATERAL HAND PAIN: ICD-10-CM

## 2022-11-18 DIAGNOSIS — M48.02 CERVICAL STENOSIS OF SPINAL CANAL: ICD-10-CM

## 2022-11-18 PROCEDURE — 3078F DIAST BP <80 MM HG: CPT | Performed by: NURSE PRACTITIONER

## 2022-11-18 PROCEDURE — 3074F SYST BP LT 130 MM HG: CPT | Performed by: NURSE PRACTITIONER

## 2022-11-18 PROCEDURE — 99213 OFFICE O/P EST LOW 20 MIN: CPT | Performed by: NURSE PRACTITIONER

## 2022-11-18 PROCEDURE — 1123F ACP DISCUSS/DSCN MKR DOCD: CPT | Performed by: NURSE PRACTITIONER

## 2022-11-18 RX ORDER — PLECANATIDE 3 MG/1
TABLET ORAL
COMMUNITY
Start: 2022-11-09

## 2022-11-18 RX ORDER — TOBRAMYCIN AND DEXAMETHASONE 3; 1 MG/ML; MG/ML
2 SUSPENSION/ DROPS OPHTHALMIC DAILY
COMMUNITY
Start: 2022-11-10 | End: 2022-11-26

## 2022-11-18 RX ORDER — NORTRIPTYLINE HYDROCHLORIDE 10 MG/1
10 CAPSULE ORAL
Qty: 30 CAPSULE | Refills: 2 | Status: SHIPPED | OUTPATIENT
Start: 2022-11-18

## 2022-11-18 NOTE — PROGRESS NOTES
Chief Complaint   Patient presents with    Follow-up     Numbness of her hands     1. Have you been to the ER, urgent care clinic since your last visit? No Hospitalized since your last visit? No    2. Have you seen or consulted any other health care providers outside of the [address] [place] [address] since your last visit? Yes eye exam, dentist , GI  & dermatologist Include any pap smears or colon screening. No    Patient continue with physical theraphy twice weekly ([facility]).

## 2022-11-18 NOTE — PROGRESS NOTES
Hospitalist History & Physical     Patient: Taylor Chau Date: 7/24/2021   YOB: 1941 Admission Date: 7/24/2021   MRN: [id number] Attending: Kunal Jaramillo MD     Date of Service: 7/24/2021    Chief Complaint:  Chief Complaint   Patient presents with   • Fall        HPI:  Taylor Chau is a 80 year old female with a PMH including but not limited to CHF, diverticulosis, GERD, hypertension, hypothyroidism, paroxysmal AFib on Eliquis presented to ED with a fall that occurred prior to arrival in ED.  Patient reports that she was brushing her teeth in the bathroom when she began feeling dizzy.  She attempted to  the same but able to hold her and she fell to the ground.  Patient reports that she used her Life Alert and call for help.  Associated symptoms include right shoulder pain and neck pain which she reports is unchanged from baseline.  Patient reports he never had a fall prior to this episode.  Patient does not remember if she hit her head during the fall.  Patient reports that she got a COVID-19 vaccine.  Patient reports that she lives alone and ambulates with a walker.  Patient reports compliance with all her medication took her medications today.     In ED, patient had blood pressure 156/63, respiratory rate of 19, pulse of 73, temperature of 103.2° and saturating at 87% on room air which improved with 2 L nasal cannula oxygen.  Lab workup revealed a CBC with hemoglobin of 9.1, 1.3, creatinine of 1.35, NT proBNP of 7530, urinalysis positive for nitrites and pyuria.  Rapid COVID was done which came back negative.  Chest x-ray was done which was consistent with CHF, x-ray of the left hip was done which was otherwise negative for acute fractures.  CT abdomen pelvis with contrast was done which revealed diverticulitis but no abscess.  CT cervical spine and CT head without contrast was done which was negative for acute infarcts.  EKG was done which revealed accelerated junctional rhythm  [facility]  [facility] [address] [place] [address]  Jorge Jackson  Tel: [phone]  Fax: [phone]      Date:  22     Name:  Langston Osgood  :  1950  MRN:  [id number]     PCP:  Arvind Jung MD    Chief Complaint   Patient presents with    Follow-up     Numbness of her hands       HISTORY OF PRESENT ILLNESS:  Patient presents today for 5-6 month follow up. She complains of worsening numbness and pain in her hands. Dr Rachel Chanel did  CTS surgery on the left hand several years ago. Dr El Leo did a EMG approximately 1 year ago. Patient also notes she has issues with her cervical spine. She did see Dr. Rachel Chanel about a week ago, who ordered some physical therapy. She has yet to go. She also has been back issues as well. Patient has previously been tried on Lyrica as well as gabapentin, but had side effects. She wonders about some over-the-counter medications as well. Recap from last visit 2022: Patient with new problem of abnormal neuropsych test showing just mild cognitive impairment, no true dementia, and for that reason we will continue treating her depression and she just had her medications readjusted, we advised her on the 3 things she can do as far as diet and Mediterranean type diet, stay mentally and physically active, exercising regularly, went over that with the patient and her  and they will try to do that in the near future. Patient with problem of increasing numbness in her hands, most likely secondary to her severe spinal stenosis, and she is to follow-up with her orthopedic surgeon. Patient with symptomatic numbness of her hands, there was an EMG study just done that suggest stable decompression of her carpal tunnel on the left side, and improved carpal tunnel on the right.   Her MRI of the cervical spine shows severe spinal stenosis above the level of her decompression at C3-4 and C4-5 of severe degree, that is probably the source of the numbness in her hands. She has stable decompression from C5-C7. She is followed by her surgeon who does not recommend surgery yet for this problem. We previously checked metabolic parameters just to make sure she does not have a sensory neuropathy, and they were unremarkable. She also has significant degenerative disease in the lumbar spine that may be contributing some to the numbness in her feet that is somewhat intermittent. She is to continue following up with her spinal surgeon and consider decompressive laminectomy. We checked cervical spine x-rays with flexion-extension views for completeness. Her records are reviewed, her EMG reviewed, MRI scan reviewed of both lumbar and cervical spine, and her case was discussed in detail with the patient and her . REVIEW OF SYSTEMS:     Review of Systems   Musculoskeletal:  Positive for back pain and joint pain (b hands ache). Negative for neck pain. Neurological:  Positive for tingling (B hands). Negative for weakness. All other systems reviewed and are negative. Current Outpatient Medications   Medication Sig    Trulance 3 mg tab TAKE 1 TABLET BY MOUTH EVERY DAY FOR CONSTIPATION    tobramycin-dexamethasone (TOBRADEX) ophthalmic suspension Administer 2 Drops to both eyes daily. nortriptyline (PAMELOR) 10 mg capsule Take 1 Capsule by mouth nightly. ARIPiprazole (ABILIFY) 2 mg tablet Take 1 Tablet by mouth daily. cyclobenzaprine (FLEXERIL) 5 mg tablet TAKE 1 TABLET NIGHTLY    hydroCHLOROthiazide (HYDRODIURIL) 25 mg tablet TAKE 1 TABLET DAILY    buPROPion XL (WELLBUTRIN XL) 300 mg XL tablet Take 1 Tablet by mouth daily.     oxybutynin (DITROPAN) 5 mg tablet TAKE 1 TABLET TWICE A DAY    dicyclomine (BENTYL) 10 mg capsule TAKE 2 CAPSULES FOUR TIMES A DAY AS NEEDED    DULoxetine (CYMBALTA) 60 mg capsule TAKE 1 CAPSULE TWICE A DAY    rosuvastatin (CRESTOR) 20 mg tablet TAKE 1 TABLET NIGHTLY    pantoprazole (PROTONIX) 40 mg tablet TAKE 1 with prolonged QT of 565. Patient was started on IV antibiotics and oral torsemide and admitted for further management.    Patient denies fever, chills, chest pain, palpitations, nausea, vomiting, diarrhea, constipation, dysuria,, recent sick contacts or foreign travel.    PMH:  Past Medical History:   Diagnosis Date   • Abdominal pain     right side   • Anemia     pernicious . daily B12 tab 1000mcg-age 28   • Ankle pain     left-had xrays, MRI, and PT   • Ankle swelling, left     L > R   • Back pain    • Chicken pox    • Colon Polyp    • Diverticulitis 11/2020   • Diverticulosis    • GERD (gastroesophageal reflux disease)    • Glaucoma    • High cholesterol    • History of bacteremia 04/29/2012   • HTN (hypertension)    • Hypothyroid    • Knee pain     s/p cortisone injection   • Osteopenia    • PAF (paroxysmal atrial fibrillation) (CMS/[facility]) 01/2018    asa   • Shingles 7/10    back   • Shoulder pain     s/p injections   • Wears hearing aid in both ears        MEDS:  Medications Prior to Admission   Medication Sig Dispense Refill   • ferrous sulfate 325 (65 FE) MG EC tablet Take 1 tablet by mouth 2 times daily (with meals). Indications: Anemia From Inadequate Iron in the Body 100 tablet 0   • polyethylene glycol (MiraLax) 17 GM/SCOOP powder Take 17 g by mouth 2 times daily. Indications: Constipation Stir and dissolve powder in any 4 to 8 ounces of beverage, then drink. 255 g 0   • levothyroxine 125 MCG tablet Take 1 tablet by mouth daily. Indications: Underactive Thyroid 90 tablet 1   • apixaBAN (Eliquis) 5 MG Tab Take 1 tablet by mouth every 12 hours. 60 tablet 2   • gabapentin (NEURONTIN) 100 MG capsule Take 2 capsules by mouth 3 times daily. Increase in dosage. 270 capsule 3   • traMADol (ULTRAM) 50 MG tablet Take 1 tablet by mouth every 6 hours as needed for Pain. patient cuts pill in half 20 tablet 0   • torsemide (DEMADEX) 20 MG tablet Take 1 tablet by mouth daily. 90 tablet 3   • metoPROLOL succinate  (TOPROL-XL) 50 MG 24 hr tablet Take 1 tablet by mouth 2 times daily. 180 tablet 3   • metoCLOPramide (Reglan) 5 MG tablet Take 1 tablet by mouth 4 times daily as needed for Nausea or Vomiting. 40 tablet 0   • AMIODarone (PACERONE) 200 MG tablet Take 1 tablet by mouth 2 times daily. 60 tablet 11   • loratadine (CLARITIN) 10 MG tablet Take 10 mg by mouth daily.     • omeprazole (PrilOSEC) 20 MG capsule Take 1 capsule by mouth 2 times daily. Indications: Gastroesophageal Reflux Disease (Patient taking differently: Take 20 mg by mouth 3 times daily. Indications: Gastroesophageal Reflux Disease ) 180 capsule 3   • folic acid (FOLATE) 400 MCG tablet Take 1 tablet by mouth daily. Indications: Folate deficiency 100 tablet 0   • Multiple Vitamins-Minerals (PreserVision AREDS) capsule Take 1 capsule by mouth daily.      • melatonin 3 MG Take 3 mg by mouth nightly.     • lidocaine (LIDODERM) 5 % patch Place 1 patch onto the skin every 24 hours. Remove patch 12 hours after applying 30 patch 0   • ipratropium (ATROVENT) 0.06 % nasal spray Spray 2 sprays in each nostril 3 times daily as needed (sinus).   3   • acetaminophen (TYLENOL) 500 MG tablet Take 500-1,000 mg by mouth every 6 hours as needed for Pain.      • vitamin B-12 (CYANOCOBALAMIN) 1000 MCG tablet Take 1,000 mcg by mouth daily.     • Calcium Carbonate-Vit D-Min (CALCIUM 1200 PO) Take 1 tablet by mouth daily.      • latanoprost (XALATAN) 0.005 % ophthalmic solution Place 1 drop into both eyes nightly.     • hydroCORTisone-pramoxine (ANALPRAM-HC) 2.5-1 % rectal cream Indications: Proctalgia Apply to perianal area 4 times daily as needed for pain 30 g 0   • estradiol 0.05 mg/g (0.005 %) cream (in natural base) Apply 1 gram (1 mL) into the vagina every other night at bedtime. 30 g 11       ALLERGIES:  ALLERGIES:   Allergen Reactions   • Ciprofloxacin RASH   • Hydrochlorothiazide Other (See Comments)     Low sodium   • Furosemide RASH       PSH:  Past Surgical History:  TABLET DAILY    lisinopriL (PRINIVIL, ZESTRIL) 40 mg tablet TAKE 1 TABLET DAILY    b complex vitamins tablet Take 1 Tablet by mouth daily. aspirin delayed-release 81 mg tablet Take  by mouth daily. acetaminophen (TYLENOL) 500 mg tablet Take 2 Tabs by mouth every six (6) hours. calcium-vitamin D (OS-TRUE +D3) 500 mg-200 unit per tablet 1 Tab. polyethylene glycol (MIRALAX) 17 gram packet Take 17 g by mouth daily. cholecalciferol, vitamin D3, 50 mcg (2,000 unit) tab Take 1 Tab by mouth daily. sucralfate (CARAFATE) 1 gram tablet Take 1 g by mouth three (3) times daily as needed. No current facility-administered medications for this visit.      Allergies   Allergen Reactions    Latex Hives    Codeine Other (comments)     Severe Headache    Morphine Hives    Ambien [Zolpidem] Other (comments)     Severe behavior changes    Dilaudid [Hydromorphone] Other (comments)     Memory loss    Other Medication Rash     BANDAIDS    Shellfish Containing Products Hives     Past Medical History:   Diagnosis Date    Acute alteration in mental status     Arthritis     Bilateral carotid artery stenosis     Cerebral microvascular disease     Chronic kidney disease     CKD 3    Chronic pain     CHRONIC BOWEL PAIN    Convulsions (HCC)     Depression     Disturbance of memory     Diverticulitis     Fibromyalgia     GERD (gastroesophageal reflux disease)     Goiter     Hiatal hernia     Hypercholesterolemia     Hypertension     IBS (irritable bowel syndrome)     CONSTIPATION, CHRONIC SINCE HER 20s    Insomnia     TAKES TRAZODONE NIGHTLY    Migraine     REDUCED IN FREQUENCY AND SEVERITY SINCE MENOPAUSE    Rectocele      Past Surgical History:   Procedure Laterality Date    COLONOSCOPY N/A 6/21/2016    COLONOSCOPY performed by Yolie Fajardo MD at [facility] ENDOSCOPY    COLONOSCOPY N/A 6/27/2018    COLONOSCOPY performed by Yolie Fajardo MD at [facility] ENDOSCOPY    ENDOSCOPY, COLON, DIAGNOSTIC  11/2010    Dr. Linnea Rodriguez   Procedure Laterality Date   • Ankle surgery Right     48 yrs old-right   • Appendectomy  1965   • Cataract extraction w/  intraocular lens implant Left 11/02/2016   • Cataract extraction w/  intraocular lens implant Right 11/30/2016   • Colonoscopy  7/11/13    tics and polyps   • Esophagogastroduodenoscopy  02/18/2021   • Inguinal hernia repair Right 2006    open procedure    • Joint replacement Right 03/13/2012    Total Knee Replacement   • Removal of ovary/tube(s)  1965    Ovary and Tube, removal-left   • Shoulder arthroscopy Left 02/07/2014    and rotator cuff repair per H&P note 01-21-14   • Stress test  11/14    negative   • Tonsillectomy and adenoidectomy      9yrs old   • Upper gastrointestinal endoscopy  7/11/13       FH:  Family History   Problem Relation Age of Onset   • Dementia/Alzheimers Father        SH:  Social History     Tobacco Use   • Smoking status: Never Smoker   • Smokeless tobacco: Never Used   Substance Use Topics   • Alcohol use: Yes     Alcohol/week: 1.0 standard drinks     Types: 1 Standard drinks or equivalent per week     Comment: social   • Drug use: No       PMD:  Mir Washington MD    ROS:12 systems reviewed by me, pertinent positives and negatives mentioned in history of present illness, other systems are negative       PHYSICAL EXAM:  Vital Signs:  Visit Vitals  BP (!) 141/64 (BP Location: LUE - Left upper extremity, Patient Position: Semi-Chin's)   Pulse 61   Temp 98.2 °F (36.8 °C) (Oral)   Resp 16   Wt 67.9 kg   SpO2 99%   BMI 24.16 kg/m²        Gen:  No acute distress, well appearing female   HEENT:  Extraocular movements intact, pupils equal, round and reactive to light, no scleral icterus, no conjunctival pallor, no nasal discharge, oropharynx w/o erythema or exudate   Neck:  Trachea midline, supple  Resp:  Clear to auscultation bilaterally, no retractions or increased work of breathing  CV:  Regular rate/rhythm, normal S1/S2, no murmurs, rubs, gallops, no jugular venous  CERVICAL FUSION  2011    HX CHOLECYSTECTOMY  2006    HX ENDOSCOPY      HX GI  X3  LAST ONE 12/10    COLONOSCOPY    HX GYN  1982    D&C WITH SUCTION    HX HYSTERECTOMY      HX LUMBAR FUSION  2017    HX ORTHOPAEDIC      right foot surgery    HX TONSILLECTOMY      HX TOTAL COLECTOMY  2007    Dr. Pond/ diverticulitis    IR INJ SPINE FLUORO GUIDED  8/28/2020    IR INJ SPINE FLUORO GUIDED  1/8/2021    IR INJ SPINE FLUORO GUIDED  2/5/2021     Social History     Socioeconomic History    Marital status:      Spouse name: Not on file    Number of children: Not on file    Years of education: Not on file    Highest education level: Not on file   Occupational History    Not on file   Tobacco Use    Smoking status: Former     Packs/day: 1.00     Years: 45.00     Pack years: 45.00     Types: Cigarettes     Quit date: 1/1/2007     Years since quitting: 15.8    Smokeless tobacco: Never   Vaping Use    Vaping Use: Never used   Substance and Sexual Activity    Alcohol use:  Yes     Alcohol/week: 0.0 standard drinks     Comment: holidays    Drug use: No    Sexual activity: Yes     Partners: Male     Comment:    Other Topics Concern     Service Not Asked    Blood Transfusions Not Asked    Caffeine Concern Not Asked    Occupational Exposure Not Asked    Hobby Hazards Not Asked    Sleep Concern Not Asked    Stress Concern Not Asked    Weight Concern Not Asked    Special Diet Not Asked    Back Care Not Asked    Exercise Not Asked    Bike Helmet Not Asked    Seat Belt Not Asked    Self-Exams Not Asked   Social History Narrative    Not on file     Social Determinants of Health     Financial Resource Strain: Low Risk     Difficulty of Paying Living Expenses: Not hard at all   Food Insecurity: No Food Insecurity    Worried About Running Out of Food in the Last Year: Never true    Ran Out of Food in the Last Year: Never true   Transportation Needs: Not on file   Physical Activity: Not on file   Stress: Not on file Social Connections: Not on file   Intimate Partner Violence: Not on file   Housing Stability: Not on file     Family History   Problem Relation Age of Onset    Cancer Father         lung and bone    Stroke Sister     Cancer Sister 76        PANCREATIC    Hypertension Mother     High Cholesterol Mother     Alzheimer's Disease Mother         late 62s early 76s    Cancer Other         COLON    Cancer Other         breast    Diabetes Maternal Aunt     Cancer Maternal Uncle         COLON    Cancer Maternal Grandfather         COLON    Ovarian Cancer Daughter 28         PHYSICAL EXAMINATION:        General:  Well defined, nourished, and well groomed individual in no acute distress. Neck: Supple, nontender, normal range of motion. Musculoskeletal:  Extremities revealed no edema and had full range of motion of joints. Negative Finkelstein's bilaterally. No specific tenderness along the DIP or PIP joints. Psych:  Good mood and bright affect, presents today with her significant other. NEUROLOGICAL EXAMINATION:     Mental Status:   Alert and oriented to person, place, and time with recent and remote memory intact. Attention span and concentration are normal. Clear speech. Fund of knowledge preserved. Cranial Nerves: Grossly intact    Motor Examination: Normal tone. 5/5 muscle strength in bilateral upper extremities. No cogwheel rigidity. No muscle wasting, no twitching or fasciculation noted. Sensory exam:  Normal throughout to light touch in bilateral upper extremities. Negative Tinel's to bilateral wrist and elbows. Gait and Station: Slightly antalgic gait. Reflexes:  DTRs 2+ in bilateral biceps, brachioradialis, patella . ASSESSMENT AND PLAN      ICD-10-CM ICD-9-CM    1. Bilateral hand numbness  R20.0 782.0 REFERRAL TO PHYSICIAL MEDICINE REHAB      nortriptyline (PAMELOR) 10 mg capsule      2. Bilateral hand pain  M79.641 729.5 REFERRAL TO PHYSICIAL MEDICINE REHAB    M79.642        3. distention. Capillary refill: Less than 3 seconds  Abd:  Positive bowel sounds, soft, diffuse abdominal tenderness  :  No suprapubic or costovertebral tenderness   MSK:   Range of motion & motor strength grossly normal proximal/distal upper extremities/lower extremities, no joint erythema or swelling. No bilateral lower extremity edema, no cyanosis, pedal pulses 2+ throughout & symmetric   Skin:  Small wound to left malleolus no streaking  Neuro:  Cranial nerves II-XII grossly intact, no gross motor or sensory deficits   Psych:  Appropriate mood & affect       LABS:  Lab Results   Component Value Date/Time    WBC 5.5 07/24/2021 04:33 PM    WBC 8.8 10/11/2019 04:12 PM    HGB 9.1 (L) 07/24/2021 04:33 PM    HGB 13.1 10/11/2019 04:12 PM    HCT 28.1 (L) 07/24/2021 04:33 PM    HCT 36.8 10/11/2019 04:12 PM     07/24/2021 04:33 PM     10/11/2019 04:12 PM     01/21/2014 10:15 AM       Lab Results   Component Value Date/Time    SODIUM 139 07/24/2021 04:33 PM    SODIUM 134 (L) 04/29/2019 09:42 AM    POTASSIUM 3.5 07/24/2021 04:33 PM    POTASSIUM 4.1 04/29/2019 09:42 AM    CHLORIDE 104 07/24/2021 04:33 PM    CHLORIDE 98 04/29/2019 09:42 AM    CO2 25 07/24/2021 04:33 PM    CO2 28 04/29/2019 09:42 AM    BUN 31 (H) 07/24/2021 04:33 PM    BUN 18 04/29/2019 09:42 AM    CREATININE 1.40 (H) 07/24/2021 04:42 PM    CREATININE 0.91 04/29/2019 09:42 AM    GLUCOSE 110 (H) 07/24/2021 04:33 PM    GLUCOSE 85 04/29/2019 09:42 AM    CALCIUM 8.5 07/24/2021 04:33 PM    CALCIUM 9.4 04/29/2019 09:42 AM    ALBUMIN 2.4 (L) 07/24/2021 04:33 PM    ALBUMIN 3.8 04/29/2019 09:42 AM    AST 19 07/24/2021 04:33 PM    AST 12 04/29/2019 09:42 AM    GPT 25 07/24/2021 04:33 PM    GPT 18 04/29/2019 09:42 AM    ALKPT 88 07/24/2021 04:33 PM    ALKPT 70 04/29/2019 09:42 AM    BILIRUBIN 0.4 07/24/2021 04:33 PM    BILIRUBIN 0.5 04/29/2019 09:42 AM    MG 1.8 07/24/2021 04:33 PM    MG 2.2 01/10/2018 06:46 AM    ANIONGAP 14 07/24/2021 04:33 PM     ANIONGAP 12 04/29/2019 09:42 AM       Recent Labs   Lab 07/24/21  1633   NTPROB 7,[id number]*         IMAGING:  CT Cervical Spine WO Contrast   Final Result   IMPRESSION:        CT HEAD:    No acute intracranial abnormality.       CT CERVICAL SPINE:   No acute osseous abnormality.      I, Attending Radiologist Remy Rowe MD, have reviewed the images and   report and concur with these findings interpreted by Resident Radiologist,   Ken Jimenez MD.       CT HEAD WO CONTRAST   Final Result   IMPRESSION:        CT HEAD:    No acute intracranial abnormality.       CT CERVICAL SPINE:   No acute osseous abnormality.      I, Attending Radiologist Remy Rowe MD, have reviewed the images and   report and concur with these findings interpreted by Resident Radiologist,   Ken Jimenez MD.       CT ABDOMEN PELVIS W CONTRAST w/ IV contrast only   Final Result   IMPRESSION:      There are multiple diverticula throughout the sigmoid colon. Small amount   of fluid and trace fat stranding within the pelvis is nonspecific and could   be related to mild, acute diverticulitis. The sigmoid colonic wall appears   thickened, similar to prior exam, possibly related to chronic inflammatory   changes. Correlation with colonoscopy is suggested.      Additional findings, as described.         XR Hip Left AP and Lateral   Final Result   IMPRESSION:      No acute findings radiographically. Mild degenerative changes. If patient   cannot bear weight, follow-up with CT would be warranted.            XR Chest AP or PA   Final Result   FINDINGS/IMPRESSION:      1. Increased pulmonary vascularity and scattered interstitial opacities.   Moderate congestive changes.   2. Mild cardiomegaly.   3. No pleural effusion or pneumothorax.                         EKG  Results for orders placed or performed during the hospital encounter of 07/24/21   Electrocardiogram 12-Lead   Result Value Ref Range    Systolic Blood Pressure 156     Diastolic Blood  Cervical stenosis of spinal canal  M48.02 723.0         1. Bilateral hand numbness: Patient last had an EMG with Dr. Xi Lamar approximately 1 year ago, we will refer her back for further evaluation and note for any changes. I will start patient on low-dose nortriptyline 10 mg 1 p.o. nighttime, side effects and safety discussed. If tolerating can increase further. She is monitor for any worsening. Patient notes she also plans to start physical therapy soon.  -     REFERRAL TO PHYSICIAL MEDICINE REHAB  -     nortriptyline (PAMELOR) 10 mg capsule; Take 1 Capsule by mouth nightly., Normal, Disp-30 Capsule, R-2  2. Bilateral hand pain: Patient is to follow-up with orthopedics, Dr. Glo Russo as well as Dr. Xi Lamar for repeat EMG test.  Discussed patient could benefit from Voltaren gel to use topically. -     REFERRAL TO PHYSICIAL MEDICINE REHAB  3. Cervical stenosis of spinal canal: Patient last had MRI approximately 1 year ago, consider updated MRI based upon EMG results    Patient and/or family verbalized understand of all instructions and all questions/concerns were addressed. Safety/side effects of medications discussed. Patient has an appointment scheduled with Dr. Diana Jefferson in June, sooner if needed.   BAY Bergman-BC Pressure 63     Ventricular Rate EKG/Min (BPM) 72     Atrial Rate (BPM) 72     HI-Interval (MSEC) 170     QRS-Interval (MSEC) 98     QT-Interval (MSEC) 516     QTc 565     R Axis (Degrees) 41     T Axis (Degrees) 137     REPORT TEXT       Accelerated  Junctional rhythm  Nonspecific ST and T wave abnormality  Prolonged QT  Abnormal ECG  When compared with ECG of  24-JUL-2021 15:45,  T wave inversion no longer evident in  Inferior leads  Nonspecific T wave abnormality, worse in  Anterolateral leads  QT has lengthened  Confirmed by GOLD CARDOZA MD ([id number]),  Annika Merida ([id number]) on 7/24/2021 5:07:53 PM          ASSESSMENT & PLAN:  This patient is a 80 year old female presenting with weakness/fall. 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  Cont IV Rocephin and Flagyl started in ED  Consider cardiology and GI consult in a.m.  Strict I&Os, daily weights  Telemetry monitoring  Pending urine cultures and blood cultures  Hold off on fluid hydration given heart failure  Continue home med torsemide, pt is allergic to Lasix  Monitor renal function  PT/OT - may need subacute rehab  Monitor vitals  Labs in AM  Cont home meds once reconciled by pharmacy    DVT Prophylaxis: Eliquis, SCDs    Advance Care Planning Note    Does the patient have the capacity to make healthcare decisions?   Yes, the patient is able to receive, process, and then give feedback to information regarding  medical discussions.     Does the patient have an Advance Care Directive document in Epic?  Yes, it is current and updated. It is the most recent version, signed and dated by the patient, and witnessed by two non-family/non-caregiver signatures.    After code status discussion, the patient has decided on: Full Resuscitation     Does Taylor Chau meet inclusion criteria?    Age greater than 80    DISPOSITION  Admitting diagnosis  And  Differential diagnosis is tentative and  is based on  Available history ,labs and imaging studies on admission  -   It was discussed with pt and family that during the hospital course diagnosis could change - based on  Clinical status and further diagnostic studies .possible length of  stay was discussed with pt in details - its not promised -could change based on clinical outcome and as case progresses      Admission Requirements and Anticipated Length of Stay:   The patient IS expected to require a minimum of  a two midnight stay in the hospital.   Admission is required to provide services and treatment only available in the hospital.   Admission is supported by the followin. The documented complex medical factors and comorbidities.   2. The severity of signs and symptoms.   3. The current and anticipated ongoing medical needs.   4. The potential risk for an adverse event or outcome.             Kunal Jaramillo MD   2021 10:31 PM  Pager: [phone]

## 2022-11-22 ENCOUNTER — APPOINTMENT (OUTPATIENT)
Dept: MRI IMAGING | Age: 72
DRG: 092 | End: 2022-11-22
Attending: EMERGENCY MEDICINE
Payer: MEDICARE

## 2022-11-22 ENCOUNTER — APPOINTMENT (OUTPATIENT)
Dept: CT IMAGING | Age: 72
DRG: 092 | End: 2022-11-22
Attending: STUDENT IN AN ORGANIZED HEALTH CARE EDUCATION/TRAINING PROGRAM
Payer: MEDICARE

## 2022-11-22 ENCOUNTER — APPOINTMENT (OUTPATIENT)
Dept: CT IMAGING | Age: 72
DRG: 092 | End: 2022-11-22
Attending: EMERGENCY MEDICINE
Payer: MEDICARE

## 2022-11-22 ENCOUNTER — HOSPITAL ENCOUNTER (INPATIENT)
Age: 72
LOS: 3 days | Discharge: HOME | DRG: 092 | End: 2022-11-26
Attending: EMERGENCY MEDICINE | Admitting: STUDENT IN AN ORGANIZED HEALTH CARE EDUCATION/TRAINING PROGRAM
Payer: MEDICARE

## 2022-11-22 DIAGNOSIS — H49.22 LEFT ABDUCENS NERVE PALSY: ICD-10-CM

## 2022-11-22 DIAGNOSIS — G08 CAVERNOUS SINUS THROMBOSIS: Primary | ICD-10-CM

## 2022-11-22 LAB
ALBUMIN SERPL-MCNC: 3.7 G/DL (ref 3.5–5)
ALBUMIN/GLOB SERPL: 0.8 {RATIO} (ref 1.1–2.2)
ALP SERPL-CCNC: 125 U/L (ref 45–117)
ALT SERPL-CCNC: 28 U/L (ref 12–78)
ANION GAP SERPL CALC-SCNC: 5 MMOL/L (ref 5–15)
AST SERPL-CCNC: 30 U/L (ref 15–37)
BASOPHILS # BLD: 0.1 K/UL (ref 0–0.1)
BASOPHILS NFR BLD: 1 % (ref 0–1)
BILIRUB SERPL-MCNC: 0.5 MG/DL (ref 0.2–1)
BUN SERPL-MCNC: 17 MG/DL (ref 6–20)
BUN/CREAT SERPL: 11 (ref 12–20)
CALCIUM SERPL-MCNC: 9.6 MG/DL (ref 8.5–10.1)
CHLORIDE SERPL-SCNC: 104 MMOL/L (ref 97–108)
CO2 SERPL-SCNC: 28 MMOL/L (ref 21–32)
COMMENT, HOLDF: NORMAL
CREAT SERPL-MCNC: 1.52 MG/DL (ref 0.55–1.02)
CRP SERPL-MCNC: 0.8 MG/DL (ref 0–0.6)
DIFFERENTIAL METHOD BLD: NORMAL
EOSINOPHIL # BLD: 0.2 K/UL (ref 0–0.4)
EOSINOPHIL NFR BLD: 2 % (ref 0–7)
ERYTHROCYTE [DISTWIDTH] IN BLOOD BY AUTOMATED COUNT: 12.3 % (ref 11.5–14.5)
ERYTHROCYTE [SEDIMENTATION RATE] IN BLOOD: 46 MM/HR (ref 0–30)
GLOBULIN SER CALC-MCNC: 4.6 G/DL (ref 2–4)
GLUCOSE SERPL-MCNC: 87 MG/DL (ref 65–100)
HCT VFR BLD AUTO: 39.4 % (ref 35–47)
HGB BLD-MCNC: 13.1 G/DL (ref 11.5–16)
IMM GRANULOCYTES # BLD AUTO: 0 K/UL (ref 0–0.04)
IMM GRANULOCYTES NFR BLD AUTO: 0 % (ref 0–0.5)
LYMPHOCYTES # BLD: 1.9 K/UL (ref 0.8–3.5)
LYMPHOCYTES NFR BLD: 18 % (ref 12–49)
MCH RBC QN AUTO: 31.5 PG (ref 26–34)
MCHC RBC AUTO-ENTMCNC: 33.2 G/DL (ref 30–36.5)
MCV RBC AUTO: 94.7 FL (ref 80–99)
MONOCYTES # BLD: 1 K/UL (ref 0–1)
MONOCYTES NFR BLD: 9 % (ref 5–13)
NEUTS SEG # BLD: 7.5 K/UL (ref 1.8–8)
NEUTS SEG NFR BLD: 70 % (ref 32–75)
NRBC # BLD: 0 K/UL (ref 0–0.01)
NRBC BLD-RTO: 0 PER 100 WBC
PLATELET # BLD AUTO: 368 K/UL (ref 150–400)
PMV BLD AUTO: 10.2 FL (ref 8.9–12.9)
POTASSIUM SERPL-SCNC: 3.5 MMOL/L (ref 3.5–5.1)
PROT SERPL-MCNC: 8.3 G/DL (ref 6.4–8.2)
RBC # BLD AUTO: 4.16 M/UL (ref 3.8–5.2)
SAMPLES BEING HELD,HOLD: NORMAL
SODIUM SERPL-SCNC: 137 MMOL/L (ref 136–145)
WBC # BLD AUTO: 10.8 K/UL (ref 3.6–11)

## 2022-11-22 PROCEDURE — 74011000250 HC RX REV CODE- 250: Performed by: EMERGENCY MEDICINE

## 2022-11-22 PROCEDURE — 84443 ASSAY THYROID STIM HORMONE: CPT

## 2022-11-22 PROCEDURE — 70545 MR ANGIOGRAPHY HEAD W/DYE: CPT

## 2022-11-22 PROCEDURE — 96372 THER/PROPH/DIAG INJ SC/IM: CPT

## 2022-11-22 PROCEDURE — 70450 CT HEAD/BRAIN W/O DYE: CPT

## 2022-11-22 PROCEDURE — 99285 EMERGENCY DEPT VISIT HI MDM: CPT

## 2022-11-22 PROCEDURE — 36415 COLL VENOUS BLD VENIPUNCTURE: CPT

## 2022-11-22 PROCEDURE — 86140 C-REACTIVE PROTEIN: CPT

## 2022-11-22 PROCEDURE — 85025 COMPLETE CBC W/AUTO DIFF WBC: CPT

## 2022-11-22 PROCEDURE — 74011250637 HC RX REV CODE- 250/637: Performed by: EMERGENCY MEDICINE

## 2022-11-22 PROCEDURE — 74011250636 HC RX REV CODE- 250/636

## 2022-11-22 PROCEDURE — A9576 INJ PROHANCE MULTIPACK: HCPCS

## 2022-11-22 PROCEDURE — 80053 COMPREHEN METABOLIC PANEL: CPT

## 2022-11-22 PROCEDURE — 74011250636 HC RX REV CODE- 250/636: Performed by: EMERGENCY MEDICINE

## 2022-11-22 PROCEDURE — 85652 RBC SED RATE AUTOMATED: CPT

## 2022-11-22 PROCEDURE — 70543 MRI ORBT/FAC/NCK W/O &W/DYE: CPT

## 2022-11-22 RX ORDER — ENOXAPARIN SODIUM 100 MG/ML
80 INJECTION SUBCUTANEOUS
Status: COMPLETED | OUTPATIENT
Start: 2022-11-22 | End: 2022-11-22

## 2022-11-22 RX ORDER — ACETAMINOPHEN 500 MG
1000 TABLET ORAL ONCE
Status: COMPLETED | OUTPATIENT
Start: 2022-11-22 | End: 2022-11-22

## 2022-11-22 RX ORDER — SODIUM CHLORIDE 0.9 % (FLUSH) 0.9 %
10 SYRINGE (ML) INJECTION
Status: COMPLETED | OUTPATIENT
Start: 2022-11-22 | End: 2022-11-22

## 2022-11-22 RX ADMIN — ACETAMINOPHEN 1000 MG: 500 TABLET ORAL at 20:42

## 2022-11-22 RX ADMIN — ENOXAPARIN SODIUM 80 MG: 100 INJECTION SUBCUTANEOUS at 21:18

## 2022-11-22 RX ADMIN — GADOTERIDOL 15 ML: 279.3 INJECTION, SOLUTION INTRAVENOUS at 19:32

## 2022-11-22 RX ADMIN — SODIUM CHLORIDE, PRESERVATIVE FREE 10 ML: 5 INJECTION INTRAVENOUS at 19:33

## 2022-11-22 NOTE — ED TRIAGE NOTES
Pt stated she went to the eye md today and was sent to r/o stroke, bilateral hand numbness, denies speech difficulty, mumbling per family, denies head trauma, no blood thinners, denies numbness/tingling to face or legs, pt with vision loss to left eye , symptoms x one week

## 2022-11-23 ENCOUNTER — APPOINTMENT (OUTPATIENT)
Dept: NON INVASIVE DIAGNOSTICS | Age: 72
DRG: 092 | End: 2022-11-23
Attending: STUDENT IN AN ORGANIZED HEALTH CARE EDUCATION/TRAINING PROGRAM
Payer: MEDICARE

## 2022-11-23 PROBLEM — G08 CAVERNOUS SINUS THROMBOSIS: Status: ACTIVE | Noted: 2022-11-23

## 2022-11-23 LAB
COMMENT, HOLDF: NORMAL
CRP SERPL-MCNC: 0.92 MG/DL (ref 0–0.6)
ECHO EST RA PRESSURE: 3 MMHG
ECHO LA DIAMETER INDEX: 1.88 CM/M2
ECHO LA DIAMETER: 3.5 CM
ECHO LV E' LATERAL VELOCITY: 5 CM/S
ECHO LV E' SEPTAL VELOCITY: 7 CM/S
ECHO LV FRACTIONAL SHORTENING: 25 % (ref 28–44)
ECHO LV INTERNAL DIMENSION DIASTOLE INDEX: 2.15 CM/M2
ECHO LV INTERNAL DIMENSION DIASTOLIC: 4 CM (ref 3.9–5.3)
ECHO LV INTERNAL DIMENSION SYSTOLIC INDEX: 1.61 CM/M2
ECHO LV INTERNAL DIMENSION SYSTOLIC: 3 CM
ECHO LV IVSD: 1 CM (ref 0.6–0.9)
ECHO LV MASS 2D: 127.1 G (ref 67–162)
ECHO LV MASS INDEX 2D: 68.3 G/M2 (ref 43–95)
ECHO LV POSTERIOR WALL DIASTOLIC: 1 CM (ref 0.6–0.9)
ECHO LV RELATIVE WALL THICKNESS RATIO: 0.5
ECHO LVOT MEAN GRADIENT: 2 MMHG
ECHO LVOT PEAK GRADIENT: 4 MMHG
ECHO LVOT PEAK VELOCITY: 1 M/S
ECHO LVOT VTI: 20.4 CM
ECHO MV A VELOCITY: 0.86 M/S
ECHO MV E VELOCITY: 0.68 M/S
ECHO MV E/A RATIO: 0.79
ECHO MV E/E' LATERAL: 13.6
ECHO MV E/E' RATIO (AVERAGED): 11.66
ECHO MV E/E' SEPTAL: 9.71
ECHO RIGHT VENTRICULAR SYSTOLIC PRESSURE (RVSP): 21 MMHG
ECHO TV REGURGITANT MAX VELOCITY: 2.13 M/S
ECHO TV REGURGITANT PEAK GRADIENT: 18 MMHG
ERYTHROCYTE [SEDIMENTATION RATE] IN BLOOD: 48 MM/HR (ref 0–30)
SAMPLES BEING HELD,HOLD: NORMAL
TSH SERPL DL<=0.05 MIU/L-ACNC: 2.19 UIU/ML (ref 0.36–3.74)

## 2022-11-23 PROCEDURE — 86038 ANTINUCLEAR ANTIBODIES: CPT

## 2022-11-23 PROCEDURE — 86140 C-REACTIVE PROTEIN: CPT

## 2022-11-23 PROCEDURE — 99223 1ST HOSP IP/OBS HIGH 75: CPT | Performed by: PSYCHIATRY & NEUROLOGY

## 2022-11-23 PROCEDURE — 74011250637 HC RX REV CODE- 250/637: Performed by: STUDENT IN AN ORGANIZED HEALTH CARE EDUCATION/TRAINING PROGRAM

## 2022-11-23 PROCEDURE — 74011250636 HC RX REV CODE- 250/636: Performed by: STUDENT IN AN ORGANIZED HEALTH CARE EDUCATION/TRAINING PROGRAM

## 2022-11-23 PROCEDURE — 65270000046 HC RM TELEMETRY

## 2022-11-23 PROCEDURE — 93306 TTE W/DOPPLER COMPLETE: CPT | Performed by: SPECIALIST

## 2022-11-23 PROCEDURE — 93306 TTE W/DOPPLER COMPLETE: CPT

## 2022-11-23 PROCEDURE — 36415 COLL VENOUS BLD VENIPUNCTURE: CPT

## 2022-11-23 PROCEDURE — 85652 RBC SED RATE AUTOMATED: CPT

## 2022-11-23 RX ORDER — ASPIRIN 81 MG/1
81 TABLET ORAL DAILY
Status: DISCONTINUED | OUTPATIENT
Start: 2022-11-23 | End: 2022-11-26 | Stop reason: HOSPADM

## 2022-11-23 RX ORDER — PANTOPRAZOLE SODIUM 40 MG/1
40 TABLET, DELAYED RELEASE ORAL DAILY
Status: DISCONTINUED | OUTPATIENT
Start: 2022-11-23 | End: 2022-11-26 | Stop reason: HOSPADM

## 2022-11-23 RX ORDER — HYDROCHLOROTHIAZIDE 25 MG/1
25 TABLET ORAL DAILY
Status: DISCONTINUED | OUTPATIENT
Start: 2022-11-23 | End: 2022-11-26 | Stop reason: HOSPADM

## 2022-11-23 RX ORDER — ROSUVASTATIN CALCIUM 10 MG/1
20 TABLET, COATED ORAL
Status: DISCONTINUED | OUTPATIENT
Start: 2022-11-23 | End: 2022-11-26 | Stop reason: HOSPADM

## 2022-11-23 RX ORDER — BUPROPION HYDROCHLORIDE 150 MG/1
150 TABLET, EXTENDED RELEASE ORAL EVERY 12 HOURS
Status: DISCONTINUED | OUTPATIENT
Start: 2022-11-23 | End: 2022-11-26 | Stop reason: HOSPADM

## 2022-11-23 RX ORDER — POLYETHYLENE GLYCOL 3350 17 G/17G
17 POWDER, FOR SOLUTION ORAL DAILY PRN
Status: DISCONTINUED | OUTPATIENT
Start: 2022-11-23 | End: 2022-11-26 | Stop reason: HOSPADM

## 2022-11-23 RX ORDER — ARIPIPRAZOLE 2 MG/1
2 TABLET ORAL DAILY
Status: DISCONTINUED | OUTPATIENT
Start: 2022-11-23 | End: 2022-11-26 | Stop reason: HOSPADM

## 2022-11-23 RX ORDER — NORTRIPTYLINE HYDROCHLORIDE 10 MG/1
10 CAPSULE ORAL
Status: DISCONTINUED | OUTPATIENT
Start: 2022-11-23 | End: 2022-11-26 | Stop reason: HOSPADM

## 2022-11-23 RX ORDER — ONDANSETRON 2 MG/ML
4 INJECTION INTRAMUSCULAR; INTRAVENOUS
Status: DISCONTINUED | OUTPATIENT
Start: 2022-11-23 | End: 2022-11-26 | Stop reason: HOSPADM

## 2022-11-23 RX ORDER — LISINOPRIL 20 MG/1
40 TABLET ORAL DAILY
Status: DISCONTINUED | OUTPATIENT
Start: 2022-11-23 | End: 2022-11-26 | Stop reason: HOSPADM

## 2022-11-23 RX ORDER — SODIUM CHLORIDE 0.9 % (FLUSH) 0.9 %
5-40 SYRINGE (ML) INJECTION EVERY 8 HOURS
Status: DISCONTINUED | OUTPATIENT
Start: 2022-11-23 | End: 2022-11-26 | Stop reason: HOSPADM

## 2022-11-23 RX ORDER — ONDANSETRON 4 MG/1
4 TABLET, ORALLY DISINTEGRATING ORAL
Status: DISCONTINUED | OUTPATIENT
Start: 2022-11-23 | End: 2022-11-26 | Stop reason: HOSPADM

## 2022-11-23 RX ORDER — DULOXETIN HYDROCHLORIDE 20 MG/1
60 CAPSULE, DELAYED RELEASE ORAL EVERY 12 HOURS
Status: DISCONTINUED | OUTPATIENT
Start: 2022-11-23 | End: 2022-11-26 | Stop reason: HOSPADM

## 2022-11-23 RX ORDER — ACETAMINOPHEN 325 MG/1
650 TABLET ORAL
Status: DISCONTINUED | OUTPATIENT
Start: 2022-11-23 | End: 2022-11-26 | Stop reason: HOSPADM

## 2022-11-23 RX ORDER — ENOXAPARIN SODIUM 100 MG/ML
1 INJECTION SUBCUTANEOUS EVERY 12 HOURS
Status: DISCONTINUED | OUTPATIENT
Start: 2022-11-23 | End: 2022-11-25

## 2022-11-23 RX ORDER — SODIUM CHLORIDE 0.9 % (FLUSH) 0.9 %
5-40 SYRINGE (ML) INJECTION AS NEEDED
Status: DISCONTINUED | OUTPATIENT
Start: 2022-11-23 | End: 2022-11-26 | Stop reason: HOSPADM

## 2022-11-23 RX ORDER — OXYBUTYNIN CHLORIDE 5 MG/1
5 TABLET ORAL 2 TIMES DAILY
Status: DISCONTINUED | OUTPATIENT
Start: 2022-11-23 | End: 2022-11-26 | Stop reason: HOSPADM

## 2022-11-23 RX ORDER — DICYCLOMINE HYDROCHLORIDE 10 MG/1
20 CAPSULE ORAL
Status: DISCONTINUED | OUTPATIENT
Start: 2022-11-23 | End: 2022-11-26 | Stop reason: HOSPADM

## 2022-11-23 RX ORDER — ACETAMINOPHEN 650 MG/1
650 SUPPOSITORY RECTAL
Status: DISCONTINUED | OUTPATIENT
Start: 2022-11-23 | End: 2022-11-26 | Stop reason: HOSPADM

## 2022-11-23 RX ADMIN — PANTOPRAZOLE SODIUM 40 MG: 40 TABLET, DELAYED RELEASE ORAL at 10:54

## 2022-11-23 RX ADMIN — BUPROPION HYDROCHLORIDE 150 MG: 150 TABLET, EXTENDED RELEASE ORAL at 11:04

## 2022-11-23 RX ADMIN — ROSUVASTATIN 20 MG: 10 TABLET, FILM COATED ORAL at 22:11

## 2022-11-23 RX ADMIN — ENOXAPARIN SODIUM 80 MG: 100 INJECTION SUBCUTANEOUS at 11:04

## 2022-11-23 RX ADMIN — ARIPIPRAZOLE 2 MG: 2 TABLET ORAL at 10:54

## 2022-11-23 RX ADMIN — ASPIRIN 81 MG: 81 TABLET, COATED ORAL at 10:54

## 2022-11-23 RX ADMIN — NORTRIPTYLINE HYDROCHLORIDE 10 MG: 10 CAPSULE ORAL at 22:11

## 2022-11-23 RX ADMIN — HYDROCHLOROTHIAZIDE 25 MG: 25 TABLET ORAL at 10:54

## 2022-11-23 RX ADMIN — OXYBUTYNIN CHLORIDE 5 MG: 5 TABLET ORAL at 18:27

## 2022-11-23 RX ADMIN — OXYBUTYNIN CHLORIDE 5 MG: 5 TABLET ORAL at 10:54

## 2022-11-23 RX ADMIN — LISINOPRIL 40 MG: 10 TABLET ORAL at 10:54

## 2022-11-23 RX ADMIN — ENOXAPARIN SODIUM 80 MG: 100 INJECTION SUBCUTANEOUS at 22:11

## 2022-11-23 RX ADMIN — BUPROPION HYDROCHLORIDE 150 MG: 150 TABLET, EXTENDED RELEASE ORAL at 22:11

## 2022-11-23 RX ADMIN — DULOXETINE 60 MG: 20 CAPSULE, DELAYED RELEASE ORAL at 22:11

## 2022-11-23 RX ADMIN — DULOXETINE 60 MG: 20 CAPSULE, DELAYED RELEASE ORAL at 10:55

## 2022-11-23 NOTE — ED NOTES
Has a final transfer review been performed?  Yes    Reason for Admission: loss of vision  Patient comes from: home  Mental Status: Alert and oriented  ADL:partial assistance  Ambulation:mild difficulty  Pertinent Info/Safety Concerns: Pt needs a bed alarm, frequently gets out of bed despite reeducation to not ambulate without staff present, unsteady on her feet and has her cane with her to assist.  COVID Status: Not Tested  MEWS Score: 1       Vitals:    11/23/22 0657 11/23/22 0833 11/23/22 1153 11/23/22 1210   BP: 130/60 136/81 137/71 136/81   Pulse:  81 81    Resp:  20 18    Temp:  97.8 °F (36.6 °C) 98.3 °F (36.8 °C)    SpO2:  93% 94%    Weight:    83.3 kg (183 lb 10.3 oz)   Height:    5' 3\" (1.6 m)     Lines:   Peripheral IV 11/22/22 Left Antecubital (Active)      Transport mode:ED stretcher    Edith Kirby  being transferred to 6E(unit) for routine progression of care     \"Notification of etransfer note given to

## 2022-11-23 NOTE — ED NOTES
Bedside shift change report given to DEBBIE NGUYEN RN (oncoming nurse) by Ebony Nelson RN (offgoing nurse). Report included the following information SBAR, ED Summary, Intake/Output, Recent Results, and Med Rec Status.

## 2022-11-23 NOTE — CONSULTS
INPATIENT NEUROLOGY CONSULTATION  11/23/2022     Consulted by: Castillo Turcios MD        Patient ID:  Rodo Crowe  [id number]  74 y.o.  1950    CC: Double vision    HPI    Dandre Johnson is a 68-year-old woman with a history of C-spine disease, reflux, fibromyalgia, hypertension, migraine who is here because for the past 3 weeks approximately she has had double vision. Symptoms started after a suspected bout of COVID in which she was acutely ill despite a negative COVID test.  Her  was COVID-positive at the same time. For the past 3 weeks approximately she has had double vision and she saw the ophthalmologist on 2 occasions the last occasion she was referred to the hospital for further evaluation. There is no loss of vision. She cannot see straight. No headache. Head CT was fine. 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 She has no weakness in the extremities. No difficulty speaking or talking. MRI brain itself does not show an acute stroke. Review of Systems   Eyes:  Positive for double vision. Neurological:  Negative for headaches. All other systems reviewed and are negative.     Past Medical History:   Diagnosis Date    Acute alteration in mental status     Arthritis     Bilateral carotid artery stenosis     Cerebral microvascular disease     Chronic kidney disease     CKD 3    Chronic pain     CHRONIC BOWEL PAIN    Convulsions (HCC)     Depression     Disturbance of memory     Diverticulitis     Fibromyalgia     GERD (gastroesophageal reflux disease)     Goiter     Hiatal hernia     Hypercholesterolemia     Hypertension     IBS (irritable bowel syndrome)     CONSTIPATION, CHRONIC SINCE HER 20s    Insomnia     TAKES TRAZODONE NIGHTLY    Migraine     REDUCED IN FREQUENCY AND SEVERITY SINCE MENOPAUSE    Rectocele      Family History   Problem Relation Age of Onset    Cancer Father         lung and bone    Stroke Sister     Cancer Sister 76        PANCREATIC    Hypertension Mother High Cholesterol Mother     Alzheimer's Disease Mother         late 62s early 76s    Cancer Other         COLON    Cancer Other         breast    Diabetes Maternal Aunt     Cancer Maternal Uncle         COLON    Cancer Maternal Grandfather         COLON    Ovarian Cancer Daughter 28     Social History     Socioeconomic History    Marital status:      Spouse name: Not on file    Number of children: Not on file    Years of education: Not on file    Highest education level: Not on file   Occupational History    Not on file   Tobacco Use    Smoking status: Former     Packs/day: 1.00     Years: 45.00     Pack years: 45.00     Types: Cigarettes     Quit date: 1/1/2007     Years since quitting: 15.9    Smokeless tobacco: Never   Vaping Use    Vaping Use: Never used   Substance and Sexual Activity    Alcohol use:  Yes     Alcohol/week: 0.0 standard drinks     Comment: holidays    Drug use: No    Sexual activity: Yes     Partners: Male     Comment:    Other Topics Concern     Service Not Asked    Blood Transfusions Not Asked    Caffeine Concern Not Asked    Occupational Exposure Not Asked    Hobby Hazards Not Asked    Sleep Concern Not Asked    Stress Concern Not Asked    Weight Concern Not Asked    Special Diet Not Asked    Back Care Not Asked    Exercise Not Asked    Bike Helmet Not Asked    Seat Belt Not Asked    Self-Exams Not Asked   Social History Narrative    Not on file     Social Determinants of Health     Financial Resource Strain: Low Risk     Difficulty of Paying Living Expenses: Not hard at all   Food Insecurity: No Food Insecurity    Worried About Running Out of Food in the Last Year: Never true    Ran Out of Food in the Last Year: Never true   Transportation Needs: Not on file   Physical Activity: Not on file   Stress: Not on file   Social Connections: Not on file   Intimate Partner Violence: Not on file   Housing Stability: Not on file     Current Facility-Administered Medications Medication Dose Route Frequency    ARIPiprazole (ABILIFY) tablet 2 mg  2 mg Oral DAILY    aspirin delayed-release tablet 81 mg  81 mg Oral DAILY    buPROPion SR (WELLBUTRIN SR) tablet 150 mg  150 mg Oral Q12H    dicyclomine (BENTYL) capsule 20 mg  20 mg Oral QID PRN    DULoxetine (CYMBALTA) capsule 60 mg  60 mg Oral Q12H    hydroCHLOROthiazide (HYDRODIURIL) tablet 25 mg  25 mg Oral DAILY    lisinopriL (PRINIVIL, ZESTRIL) tablet 40 mg  40 mg Oral DAILY    nortriptyline (PAMELOR) capsule 10 mg  10 mg Oral QHS    oxybutynin (DITROPAN) tablet 5 mg  5 mg Oral BID    pantoprazole (PROTONIX) tablet 40 mg  40 mg Oral DAILY    rosuvastatin (CRESTOR) tablet 20 mg  20 mg Oral QHS    sodium chloride (NS) flush 5-40 mL  5-40 mL IntraVENous Q8H    sodium chloride (NS) flush 5-40 mL  5-40 mL IntraVENous PRN    acetaminophen (TYLENOL) tablet 650 mg  650 mg Oral Q6H PRN    Or    acetaminophen (TYLENOL) suppository 650 mg  650 mg Rectal Q6H PRN    polyethylene glycol (MIRALAX) packet 17 g  17 g Oral DAILY PRN    ondansetron (ZOFRAN ODT) tablet 4 mg  4 mg Oral Q8H PRN    Or    ondansetron (ZOFRAN) injection 4 mg  4 mg IntraVENous Q6H PRN    enoxaparin (LOVENOX) injection 80 mg  1 mg/kg SubCUTAneous Q12H     Current Outpatient Medications   Medication Sig    Trulance 3 mg tab TAKE 1 TABLET BY MOUTH EVERY DAY FOR CONSTIPATION    tobramycin-dexamethasone (TOBRADEX) ophthalmic suspension Administer 2 Drops to both eyes daily. nortriptyline (PAMELOR) 10 mg capsule Take 1 Capsule by mouth nightly. ARIPiprazole (ABILIFY) 2 mg tablet Take 1 Tablet by mouth daily. cyclobenzaprine (FLEXERIL) 5 mg tablet TAKE 1 TABLET NIGHTLY    hydroCHLOROthiazide (HYDRODIURIL) 25 mg tablet TAKE 1 TABLET DAILY    buPROPion XL (WELLBUTRIN XL) 300 mg XL tablet Take 1 Tablet by mouth daily.     oxybutynin (DITROPAN) 5 mg tablet TAKE 1 TABLET TWICE A DAY    dicyclomine (BENTYL) 10 mg capsule TAKE 2 CAPSULES FOUR TIMES A DAY AS NEEDED    DULoxetine (CYMBALTA) 60 mg capsule TAKE 1 CAPSULE TWICE A DAY    rosuvastatin (CRESTOR) 20 mg tablet TAKE 1 TABLET NIGHTLY    pantoprazole (PROTONIX) 40 mg tablet TAKE 1 TABLET DAILY    lisinopriL (PRINIVIL, ZESTRIL) 40 mg tablet TAKE 1 TABLET DAILY    b complex vitamins tablet Take 1 Tablet by mouth daily. aspirin delayed-release 81 mg tablet Take  by mouth daily. acetaminophen (TYLENOL) 500 mg tablet Take 2 Tabs by mouth every six (6) hours. calcium-vitamin D (OS-TRUE +D3) 500 mg-200 unit per tablet 1 Tab. polyethylene glycol (MIRALAX) 17 gram packet Take 17 g by mouth daily. cholecalciferol, vitamin D3, 50 mcg (2,000 unit) tab Take 1 Tab by mouth daily. sucralfate (CARAFATE) 1 gram tablet Take 1 g by mouth three (3) times daily as needed. Allergies   Allergen Reactions    Latex Hives    Codeine Other (comments)     Severe Headache    Morphine Hives    Ambien [Zolpidem] Other (comments)     Severe behavior changes    Dilaudid [Hydromorphone] Other (comments)     Memory loss    Other Medication Rash     BANDAIDS    Shellfish Containing Products Hives       Visit Vitals  /81   Pulse 81   Temp 97.8 °F (36.6 °C)   Resp 20   Ht 5' 3\" (1.6 m)   Wt 183 lb 10.3 oz (83.3 kg)   SpO2 93%   BMI 32.53 kg/m²     Physical Exam  Vitals and nursing note reviewed. Constitutional:       Appearance: Normal appearance. She is normal weight. Cardiovascular:      Rate and Rhythm: Normal rate. Pulmonary:      Effort: Pulmonary effort is normal.   Neurological:      Mental Status: She is alert. Neurologic Exam     Mental Status   Age-appropriate woman supine. Pleasant. Follows commands. Very talkative. Pupils are symmetric grossly reactive. On testing extraocular muscles, the right eye does not abduction but does adductor, the left eye has very trace adduction and no abduction. Ophthalmoplegia left more than right. Her lower face is symmetric. Speech is clear.   She is moving all extremities spontaneously supine without ataxia with purpose. No drift. Sensation intact grossly  Gait deferred          Lab Results   Component Value Date/Time    WBC 10.8 11/22/2022 02:31 PM    HGB 13.1 11/22/2022 02:31 PM    Hemoglobin (POC) 11.9 08/29/2017 09:26 AM    HCT 39.4 11/22/2022 02:31 PM    Hematocrit (POC) 35 08/29/2017 09:26 AM    PLATELET 244 87/81/7027 02:31 PM    MCV 94.7 11/22/2022 02:31 PM     Lab Results   Component Value Date/Time    Hemoglobin A1c 5.4 09/09/2022 10:45 AM    Hemoglobin A1c 5.5 11/18/2021 10:10 AM    Hemoglobin A1c 5.2 04/19/2021 12:34 PM    Glucose 87 11/22/2022 02:31 PM    Glucose (POC) 97 08/29/2017 09:26 AM    Glucose POC 77 12/20/2017 04:09 PM    Microalbumin/Creat ratio (mg/g creat) 5 08/19/2009 12:44 PM    Microalb/Creat ratio (ug/mg creat.) <8.4 12/15/2015 05:00 PM    Microalbumin,urine random 0.69 08/19/2009 12:44 PM    LDL, calculated 91.8 09/09/2022 10:45 AM    Creatinine (POC) 1.2 11/14/2019 03:45 PM    Creatinine 1.52 (H) 11/22/2022 02:31 PM      Lab Results   Component Value Date/Time    Cholesterol, total 165 09/09/2022 10:45 AM    HDL Cholesterol 48 09/09/2022 10:45 AM    LDL, calculated 91.8 09/09/2022 10:45 AM    Triglyceride 126 09/09/2022 10:45 AM    CHOL/HDL Ratio 3.4 09/09/2022 10:45 AM     Lab Results   Component Value Date/Time    ALT (SGPT) 28 11/22/2022 02:31 PM    Alk.  phosphatase 125 (H) 11/22/2022 02:31 PM    Bilirubin, direct 0.2 04/10/2009 03:07 PM    Bilirubin, total 0.5 11/22/2022 02:31 PM    Albumin 3.7 11/22/2022 02:31 PM    Protein, total 8.3 (H) 11/22/2022 02:31 PM    INR 1.0 04/21/2021 01:43 PM    Prothrombin time 10.4 04/21/2021 01:43 PM    PLATELET 895 78/83/4035 02:31 PM       Lab Results   Component Value Date/Time    INR 1.0 04/21/2021 01:43 PM    INR 1.0 08/18/2017 03:27 PM    INR 1.1 03/27/2011 12:52 AM    Prothrombin time 10.4 04/21/2021 01:43 PM    Prothrombin time 10.1 08/18/2017 03:27 PM    Prothrombin time 10.8 03/27/2011 12:52 AM         CT Results (maximum last 3): Results from [place] encounter on 11/22/22    CT HEAD WO CONT    Narrative  EXAM: CT HEAD WO CONT    INDICATION: vision loss    COMPARISON: MRI brain 5/6/2021, CT head 5/6/2020. CONTRAST: None. TECHNIQUE: Unenhanced CT of the head was performed using 5 mm images. Brain and  bone windows were generated. Coronal and sagittal reformats. CT dose reduction  was achieved through use of a standardized protocol tailored for this  examination and automatic exposure control for dose modulation. FINDINGS:  Generalized volume loss. Scattered white matter hypodensities may reflect  chronic microangiopathic change. There is no intracranial hemorrhage,  extra-axial collection, or mass effect. The basilar cisterns are open. No CT  evidence of acute infarct. The bone windows demonstrate no abnormalities. The visualized portions of the  paranasal sinuses and mastoid air cells are clear. Impression  No acute abnormality. Results from [place] encounter on 05/03/21    CT HEAD WO CONT    Narrative  EXAM: CT HEAD WO CONT    INDICATION: Assisted ground-level fall today. Lower extremity weakness. COMPARISON: CT head on 3/27/2011. TECHNIQUE: Noncontrast head CT. Coronal and sagittal reformats. CT dose  reduction was achieved through the use of a standardized protocol tailored for  this examination and automatic exposure control for dose modulation. FINDINGS: Motion artifact obscures fine detail and limits sensitivity for  detecting pathology. The ventricles and sulci are age-appropriate without hydrocephalus. There is no  mass effect or midline shift. There is no intracranial hemorrhage or extra-axial  fluid collection. Microvascular ischemic disease is increased and moderate in  the bilateral frontal and parietal periventricular white matter. No loss of  gray-white differentiation. The calvarium is intact.  The visualized paranasal sinuses and mastoid air cells  are clear.    Impression  Limited by motion artifact. No evidence of intracranial hemorrhage. Increased  microvascular ischemic disease since 2011. MRI Results (maximum last 3): Results from [facility] encounter on 11/22/22    MRV BRAIN W CONT    Narrative  *PRELIMINARY REPORT*    1.  Study limited with motion. 2.  No acute abnormality. 3.  Moderate white matter disease. 4.  Focus blooming on gradient sequence in the left candi may reflect a focus of  microhemorrhage or hemosiderin. 5.  No definite evidence of dural venous sinus thrombosis. Preliminary report was provided by Dr. Miya Minor, the on-call radiologist, at 2031  hours 11/22/2022    Final report to follow. *END PRELIMINARY REPORT*    FINAL REPORT:    INDICATION:  diplopia    COMPARISON:  May 6, 2021    TECHNIQUE:  Multiplanar multisequence acquisition with and without contrast of  the brain/orbits. Multiplanar 2-D time-of-flight MRV of the brain was performed  with reconstructions. FINDINGS:    Ventricles:  Midline, no hydrocephalus. Intracranial Hemorrhage:  No acute hemorrhage. Chronic hemosiderin deposition  left ventral candi. Brain Parenchyma/Brainstem: Moderate chronic T2 hyperintensity in the  supratentorial brain. Small focus of T2 hyperintensity left ventral candi. No  acute infarction. Basal Cisterns:  Normal.  Flow Voids:  Normal.  Extraocular Muscles:  Left extraocular muscle slightly larger and possibly  edematous compared to the right. Optic Nerve Sheath Complex:  Normal  Intraorbital Fat/Lacrimal Glands:  Normal. Prominent left superior ophthalmic  vein. Globes/Lenses:  Normal.  Suprasellar Cistern/Optic Chiasm:  Normal.  Post Contrast:  Faint enhancement left ventral candi. Asymmetric heterogeneous  enhancement left cavernous sinus with a central area of hypoenhancement, which  may correspond to susceptibility artifact (series 10 image 74, series 8 image  75). Additional Comments:  N/A.     There is no evidence of superficial or deep venous sinus thrombosis. No evidence  of venous stenosis. Impression  1. Asymmetric fullness and heterogeneous enhancement left cavernous sinus with  dilated left superior ophthalmic vein. 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 2.  Chronic microvascular ischemic disease with no acute infarction. The findings were called to Dr. Jerel Sarmiento on 11/22/2022 at 8:45 PM by myself.    789. MRI ORBITS W WO CONT    Narrative  *PRELIMINARY REPORT*    1.  Study limited with motion. 2.  No acute abnormality. 3.  Moderate white matter disease. 4.  Focus blooming on gradient sequence in the left candi may reflect a focus of  microhemorrhage or hemosiderin. 5.  No definite evidence of dural venous sinus thrombosis. Preliminary report was provided by Dr. Rohit Morelos, the on-call radiologist, at 2031  hours 11/22/2022    Final report to follow. *END PRELIMINARY REPORT*    FINAL REPORT:    INDICATION:  diplopia    COMPARISON:  May 6, 2021    TECHNIQUE:  Multiplanar multisequence acquisition with and without contrast of  the brain/orbits. Multiplanar 2-D time-of-flight MRV of the brain was performed  with reconstructions. FINDINGS:    Ventricles:  Midline, no hydrocephalus. Intracranial Hemorrhage:  No acute hemorrhage. Chronic hemosiderin deposition  left ventral candi. Brain Parenchyma/Brainstem: Moderate chronic T2 hyperintensity in the  supratentorial brain. Small focus of T2 hyperintensity left ventral candi. No  acute infarction. Basal Cisterns:  Normal.  Flow Voids:  Normal.  Extraocular Muscles:  Left extraocular muscle slightly larger and possibly  edematous compared to the right. Optic Nerve Sheath Complex:  Normal  Intraorbital Fat/Lacrimal Glands:  Normal. Prominent left superior ophthalmic  vein. Globes/Lenses:  Normal.  Suprasellar Cistern/Optic Chiasm:  Normal.  Post Contrast:  Faint enhancement left ventral candi.  Asymmetric heterogeneous  enhancement left cavernous sinus with a central area of hypoenhancement, which  may correspond to susceptibility artifact (series 10 image 74, series 8 image  75). Additional Comments:  N/A. There is no evidence of superficial or deep venous sinus thrombosis. No evidence  of venous stenosis. Impression  1. Asymmetric fullness and heterogeneous enhancement left cavernous sinus with  dilated left superior ophthalmic vein. 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 2.  Chronic microvascular ischemic disease with no acute infarction. The findings were called to Dr. Johnathan Austin on 11/22/2022 at 8:45 PM by myself.    789. Results from [place] encounter on 11/17/21    MRI CERV SPINE WO CONT    Narrative  EXAM: MRI CERV SPINE WO CONT    INDICATION: . Cervicalgia    COMPARISON: 10/10/2019 MRI    TECHNIQUE: MR imaging of the cervical spine was performed using the following  sequences: sagittal T1, T2, STIR;  axial T2, T1.    CONTRAST:  None. 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 Artifacts related to anterior cervical fixation are again shown at  C5-7. Visualized bone signal is normal. There is no vertebral compression  fracture. There is straightening of cervical lordosis. Unchanged 2 mm  anterolisthesis at C7-T1 is noted. Equivocal central cord signal alteration on the STIR images at the C4-5 level. Cord signal is otherwise normal and the visualized portions of the brainstem and  cerebellum are normal.    No intraspinal or paraspinal soft tissue mass is shown. C2-C3: As noted above, congenital osseous union. No canal stenosis or foraminal  stenosis. C3-C4: Moderate disc space narrowing.  Moderate diffuse disc bulging and  bilateral facet osteoarthrosis progressed in the interval. Moderate to severe  canal stenosis with moderate cord compression and severe bilateral foraminal  stenosis increased in the interval.    C4-C5: Progression of disc space narrowing, now moderate. Moderate diffuse disc  bulging with small endplate osteophytes. Moderate to severe bilateral facet  osteoarthrosis. Severe canal stenosis with moderate to severe cord compression  progressed in the interval. Severe right and moderate left foraminal stenosis. C5-C6: No canal or foraminal stenosis. C6-C7: No canal or foraminal stenosis. C7-T1: Mild disc space narrowing and mild-moderate diffuse disc bulging and  moderate to severe left, moderate right facet osteoarthrosis. Mild canal  stenosis without cord compression. Moderate to severe left and moderate right  foraminal stenosis. T1-2: Minimal left lateral disc bulging. Mild left facet osteoarthrosis. No  canal stenosis. Mild left foraminal stenosis. T2-3 and T3-4: Shown on sagittal images only . Moderate-sized left paracentral  disc herniation at T2-3 is shown in the interval. Normal T3-4 disc. Mild left  facet osteoarthrosis at T2-3. No cord compression demonstrated. Impression  1. 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  2. Anterior cervical fixation at C5-C7. 3. C3-4 and C4-5 degenerative findings are progressed in the interval with  moderate to severe C3-4 and severe C4-5 canal stenosis. 4. Degenerative findings at C7-T1 are similar to previous. Unchanged mild canal  stenosis with substantial left foraminal stenosis. 5. Interval demonstration of T2-3 moderate-sized left paracentral disc  herniation. VAS/US/Carotid Doppler Results (maximum last 3): No results found for this or any previous visit. PET Results (maximum last 3): No results found for this or any previous visit. 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 It sounds like this has been going on for almost 3 weeks.   Its possible it may have been triggered by her COVID infection causing a hypercoagulable state. She is on therapeutic Lovenox now. I discussed the symptoms with her and her  and her daughter on the phone. I explained the imaging results and the plan at this time. Neuro interventional has been consulted for an opinion as well which I think is reasonable. I think she should be watched here in the hospital tonight while she is on anticoagulation to make sure there is no complication (bleeding?). Can transition to oral options if appropriate, will defer to primary team to make decision on choice of anticoagulation orally. She needs to continue to follow-up with the ophthalmologist.  During today's visit, I reviewed the neuroimaging personally, I spoke with the patient, her , her daughter. Time at the bedside examining her extensively. I will sign off for now. Please call if needed. During this evaluation, we also discussed stroke education to include signs and symptoms of stroke and TIA. This clinical note was dictated with an electronic dictation software that can make unintentional errors. If there are any questions, please contact me directly for clarification.       2 [facility],   NEUROLOGIST  Diplomate KYLEE  11/23/2022

## 2022-11-23 NOTE — ED NOTES
Bedside shift change report given to miguel (oncoming nurse) by Keron Carrion (offgoing nurse). Report included the following information SBAR and ED Summary.

## 2022-11-23 NOTE — ED NOTES
Educated patient multiple times on using call bell prior to ambulation. Pt verbalizes understanding, then found walking in room. Pt unsteady on her feet and not using cane. Bed alarm activated, pt back in the bed, call bell in reach, pt once again verbalizing understanding regarding fall risk and to not ambulate without staff present.

## 2022-11-23 NOTE — ED PROVIDER NOTES
The history is provided by the patient. Loss of Vision   This is a new problem. The current episode started more than 2 days ago. The problem occurs constantly. The problem has been gradually worsening. The left (but noting double vision progressively) eye is affected. The patient is experiencing no pain. There is No history of trauma to the eye. Associated symptoms include double vision. She has tried nothing for the symptoms.       Past Medical History:   Diagnosis Date    Acute alteration in mental status     Arthritis     Bilateral carotid artery stenosis     Cerebral microvascular disease     Chronic kidney disease     CKD 3    Chronic pain     CHRONIC BOWEL PAIN    Convulsions ([address]     Depression     Disturbance of memory     Diverticulitis     Fibromyalgia     GERD (gastroesophageal reflux disease)     Goiter     Hiatal hernia     Hypercholesterolemia     Hypertension     IBS (irritable bowel syndrome)     CONSTIPATION, CHRONIC SINCE HER 20s    Insomnia     TAKES TRAZODONE NIGHTLY    Migraine     REDUCED IN FREQUENCY AND SEVERITY SINCE MENOPAUSE    Rectocele        Past Surgical History:   Procedure Laterality Date    COLONOSCOPY N/A 6/21/2016    COLONOSCOPY performed by Tiffany Wolff MD at [facility] ENDOSCOPY    COLONOSCOPY N/A 6/27/2018    COLONOSCOPY performed by Tiffany Wolff MD at [facility] ENDOSCOPY    ENDOSCOPY, COLON, DIAGNOSTIC  11/2010    Dr. Luma Almeida  2011    HX CHOLECYSTECTOMY  2006    HX ENDOSCOPY      HX GI  X3  LAST ONE 12/10    COLONOSCOPY    HX GYN  1982    D&C WITH SUCTION    HX HYSTERECTOMY      HX LUMBAR FUSION  2017    HX ORTHOPAEDIC      right foot surgery    HX TONSILLECTOMY      HX TOTAL COLECTOMY  2007    Dr. Pond/ diverticulitis    IR INJ SPINE FLUORO GUIDED  8/28/2020    IR INJ SPINE FLUORO GUIDED  1/8/2021    IR INJ SPINE FLUORO GUIDED  2/5/2021         Family History:   Problem Relation Age of Onset    Cancer Father         lung and bone    Stroke Sister     Cancer Sister 76        PANCREATIC    Hypertension Mother     High Cholesterol Mother     Alzheimer's Disease Mother         late 62s early 76s    Cancer Other         COLON    Cancer Other         breast    Diabetes Maternal Aunt     Cancer Maternal Uncle         COLON    Cancer Maternal Grandfather         COLON    Ovarian Cancer Daughter 28       Social History     Socioeconomic History    Marital status:      Spouse name: Not on file    Number of children: Not on file    Years of education: Not on file    Highest education level: Not on file   Occupational History    Not on file   Tobacco Use    Smoking status: Former     Packs/day: 1.00     Years: 45.00     Pack years: 45.00     Types: Cigarettes     Quit date: 1/1/2007     Years since quitting: 15.9    Smokeless tobacco: Never   Vaping Use    Vaping Use: Never used   Substance and Sexual Activity    Alcohol use:  Yes     Alcohol/week: 0.0 standard drinks     Comment: holidays    Drug use: No    Sexual activity: Yes     Partners: Male     Comment:    Other Topics Concern     Service Not Asked    Blood Transfusions Not Asked    Caffeine Concern Not Asked    Occupational Exposure Not Asked    Hobby Hazards Not Asked    Sleep Concern Not Asked    Stress Concern Not Asked    Weight Concern Not Asked    Special Diet Not Asked    Back Care Not Asked    Exercise Not Asked    Bike Helmet Not Asked    Seat Belt Not Asked    Self-Exams Not Asked   Social History Narrative    Not on file     Social Determinants of Health     Financial Resource Strain: Low Risk     Difficulty of Paying Living Expenses: Not hard at all   Food Insecurity: No Food Insecurity    Worried About Running Out of Food in the Last Year: Never true    Ran Out of Food in the Last Year: Never true   Transportation Needs: Not on file   Physical Activity: Not on file   Stress: Not on file   Social Connections: Not on file   Intimate Partner Violence: Not on file   Housing Stability: Not on file         ALLERGIES: Latex, Codeine, Morphine, Ambien [zolpidem], Dilaudid [hydromorphone], Other medication, and Shellfish containing products    Review of Systems   Eyes:  Positive for double vision. All other systems reviewed and are negative. Vitals:    11/22/22 1407 11/22/22 1556 11/22/22 2007   BP: 108/64 130/68 134/68   Pulse: 91 78 80   Resp: 16 15 16   Temp: 97.6 °F (36.4 °C)  97.5 °F (36.4 °C)   SpO2: 97% 95% 96%            Physical Exam  Vitals and nursing note reviewed. Constitutional:       General: She is not in acute distress. Appearance: She is well-developed. HENT:      Head: Normocephalic and atraumatic. Eyes:      Conjunctiva/sclera: Conjunctivae normal.   Cardiovascular:      Rate and Rhythm: Normal rate and regular rhythm. Pulmonary:      Effort: Pulmonary effort is normal. No respiratory distress. Abdominal:      General: There is no distension. Musculoskeletal:         General: No deformity. Normal range of motion. Cervical back: Neck supple. Skin:     General: Skin is warm and dry. Neurological:      Mental Status: She is alert. Cranial Nerves: Cranial nerve deficit (left abducens palsy, dense) present. Psychiatric:         Behavior: Behavior normal.        MDM       68-year-old female presents with left extraocular movement difficulty, diplopia and progressive vision difficulty worsening in the last week. Sent here by ophthalmology with recommendations for imaging and workup. No evidence of GCA, aneurysm, or bleeding.  had covid shortly before patient developed symptoms, unclear if this was related as she tested negative on home tests. Discussed with radiology, neuro interventional surgery, and teleneurology and treatment will be medical with anticoagulation. Inpatient workup with further coagulopathy, oncologic, and neurologic evaluation is recommended by neurology.     Procedures    Perfect Serve Consult for Admission  9:20 PM    ED Room Number: AD09/32  Patient Name and age:  Luisa Williamson 74 y.o.  female  Working Diagnosis:   1. Cavernous sinus thrombosis    2.  Left abducens nerve palsy        COVID-19 Suspicion:  no  Sepsis present:  no  Reassessment needed: no  Code Status:  Full Code  Readmission: no  Isolation Requirements:  no  Recommended Level of Care:  telemetry  Department:[facility] - 21

## 2022-11-23 NOTE — H&P
History & Physical    Primary Care Provider: Lazarus Broaden, MD  Source of Information: Patient and chart review    History of Presenting Illness:   Odalys Ruiz is a 74 y.o. female with history of major depressive disorder, hypertension, GERD, obesity, CKD stage III, history of memory impairment, dyslipidemia who presented to ED with complaints of vision abnormalities. Patient reports near 1 week history of distorted vision. She finds it hard to describe but states she has had difficulty maintaining straight line vision in her right eye and has had episodes of swelling and blurry vision in her left eye. States she was seen at [facility] and was prescribed a steroid taper. She returned to [facility] today for reevaluation and was advised to come to ED for further evaluation and treatment. She has had no vision loss. Denies any visual distortion no visual symptoms at this time. No associated headaches. The patient denies any fever, chills, chest or abdominal pain, nausea, vomiting, cough, congestion, recent illness, palpitations, or dysuria. Remarkable vitals on ER Presentations: Vital signs stable. Labs Remarkable for: Elevated CRP and ESR  ER Images: CT head showed no acute process. MRI orbits and MRV brain: Asymmetric fullness and heterogeneous enhancement left cavernous sinus with  dilated left superior ophthalmic vein. Findings concerning for cavernous sinus  thrombosis given clinical history  ER treatment: Therapeutic Lovenox     Review of Systems:  Pertinent items are noted in the History of Present Illness.      Past Medical History:   Diagnosis Date    Acute alteration in mental status     Arthritis     Bilateral carotid artery stenosis     Cerebral microvascular disease     Chronic kidney disease     CKD 3    Chronic pain     CHRONIC BOWEL PAIN    Convulsions ([facility])     Depression     Disturbance of memory     Diverticulitis Fibromyalgia     GERD (gastroesophageal reflux disease)     Goiter     Hiatal hernia     Hypercholesterolemia     Hypertension     IBS (irritable bowel syndrome)     CONSTIPATION, CHRONIC SINCE HER 20s    Insomnia     TAKES TRAZODONE NIGHTLY    Migraine     REDUCED IN FREQUENCY AND SEVERITY SINCE MENOPAUSE    Rectocele       Past Surgical History:   Procedure Laterality Date    COLONOSCOPY N/A 6/21/2016    COLONOSCOPY performed by Benigno Delgado MD at \[facility]\"" ENDOSCOPY    COLONOSCOPY N/A 6/27/2018    COLONOSCOPY performed by Benigno Delgado MD at \[facility]\"" ENDOSCOPY    ENDOSCOPY, COLON, DIAGNOSTIC  11/2010    Dr. Lexa Garrido  2011    HX CHOLECYSTECTOMY  2006    HX ENDOSCOPY      HX GI  X3  LAST ONE 12/10    COLONOSCOPY    HX GYN  1982    D&C WITH SUCTION    HX HYSTERECTOMY      HX LUMBAR FUSION  2017    HX ORTHOPAEDIC      right foot surgery    HX TONSILLECTOMY      HX TOTAL COLECTOMY  2007    Dr. Pond/ diverticulitis    IR INJ SPINE FLUORO GUIDED  8/28/2020    IR INJ SPINE FLUORO GUIDED  1/8/2021    IR INJ SPINE FLUORO GUIDED  2/5/2021     Prior to Admission medications    Medication Sig Start Date End Date Taking? Authorizing Provider   Trulance 3 mg tab TAKE 1 TABLET BY MOUTH EVERY DAY FOR CONSTIPATION 11/9/22   Provider, Historical   tobramycin-dexamethasone (TOBRADEX) ophthalmic suspension Administer 2 Drops to both eyes daily. 11/10/22   Provider, Historical   nortriptyline (PAMELOR) 10 mg capsule Take 1 Capsule by mouth nightly. 11/18/22   Dodie Goldman, KEVIN   ARIPiprazole (ABILIFY) 2 mg tablet Take 1 Tablet by mouth daily. 10/20/22   Armando Mattson MD   cyclobenzaprine (FLEXERIL) 5 mg tablet TAKE 1 TABLET NIGHTLY 10/14/22   Armando Mattson MD   hydroCHLOROthiazide (HYDRODIURIL) 25 mg tablet TAKE 1 TABLET DAILY 9/16/22   Armando Mattson MD   buPROPion XL (WELLBUTRIN XL) 300 mg XL tablet Take 1 Tablet by mouth daily.  9/16/22   Armando Mattson MD   oxybutynin (DITROPAN) 5 mg tablet TAKE 1 TABLET TWICE A DAY 8/23/22   Corin Youssef MD   dicyclomine (BENTYL) 10 mg capsule TAKE 2 CAPSULES FOUR TIMES A DAY AS NEEDED 8/17/22   Corin Youssef MD   DULoxetine (CYMBALTA) 60 mg capsule TAKE 1 CAPSULE TWICE A DAY 8/17/22   Corin Youssef MD   rosuvastatin (CRESTOR) 20 mg tablet TAKE 1 TABLET NIGHTLY 7/12/22   Corin Youssef MD   pantoprazole (PROTONIX) 40 mg tablet TAKE 1 TABLET DAILY 7/12/22   Corin Youssef MD   lisinopriL (PRINIVIL, ZESTRIL) 40 mg tablet TAKE 1 TABLET DAILY 3/22/22   Corin Youssef MD   b complex vitamins tablet Take 1 Tablet by mouth daily. Provider, Historical   aspirin delayed-release 81 mg tablet Take  by mouth daily. Provider, Historical   acetaminophen (TYLENOL) 500 mg tablet Take 2 Tabs by mouth every six (6) hours. 5/8/21   Federico Cameron PA-C   calcium-vitamin D (OS-TRUE +D3) 500 mg-200 unit per tablet 1 Tab. Provider, Historical   polyethylene glycol (MIRALAX) 17 gram packet Take 17 g by mouth daily. 6/25/20   Provider, Historical   cholecalciferol, vitamin D3, 50 mcg (2,000 unit) tab Take 1 Tab by mouth daily. Provider, Historical   sucralfate (CARAFATE) 1 gram tablet Take 1 g by mouth three (3) times daily as needed.     Provider, Historical     Allergies   Allergen Reactions    Latex Hives    Codeine Other (comments)     Severe Headache    Morphine Hives    Ambien [Zolpidem] Other (comments)     Severe behavior changes    Dilaudid [Hydromorphone] Other (comments)     Memory loss    Other Medication Rash     BANDAIDS    Shellfish Containing Products Hives      Family History   Problem Relation Age of Onset    Cancer Father         lung and bone    Stroke Sister     Cancer Sister 76        PANCREATIC    Hypertension Mother     High Cholesterol Mother     Alzheimer's Disease Mother         late 62s early 76s    Cancer Other         COLON    Cancer Other         breast    Diabetes Maternal Aunt     Cancer Maternal Uncle COLON    Cancer Maternal Grandfather         COLON    Ovarian Cancer Daughter 28        SOCIAL HISTORY:  Patient resides:  Independently x   Assisted Living    SNF    With family care       Smoking history:   None x   Former    Chronic      Alcohol history:   None x   Social    Chronic      Ambulates:   Independently x   w/cane    w/walker    w/wc    CODE STATUS:  DNR    Full x   Other      Objective:     Physical Exam:     Visit Vitals  BP (!) 109/58   Pulse 77   Temp 97.5 °F (36.4 °C)   Resp 19   SpO2 91%      O2 Device: None (Room air)    General:  Alert, cooperative, no distress, appears stated age. Head:  Normocephalic, without obvious abnormality, atraumatic. Eyes:  Conjunctivae/corneas clear. PERRL, EOMs intact. Nose: Nares normal. Septum midline. Mucosa normal.        Neck: Supple, symmetrical, trachea midline, no carotid bruit and no JVD. Lungs:   Clear to auscultation bilaterally. Chest wall:  No tenderness or deformity. Heart:  Regular rate and rhythm, S1, S2 normal, no murmur, click, rub or gallop. Abdomen:   Soft, non-tender. Bowel sounds normal. No masses,  No organomegaly. Extremities: Extremities normal, atraumatic, no cyanosis or edema. Pulses: 2+ and symmetric all extremities. Skin: Skin color, texture, turgor normal. No rashes or lesions   Neurologic: CNII-XII intact. Data Review:     Recent Days:  Recent Labs     11/22/22  1431   WBC 10.8   HGB 13.1   HCT 39.4        Recent Labs     11/22/22  1431      K 3.5      CO2 28   GLU 87   BUN 17   CREA 1.52*   CA 9.6   ALB 3.7   ALT 28     No results for input(s): PH, PCO2, PO2, HCO3, FIO2 in the last 72 hours.     24 Hour Results:  Recent Results (from the past 24 hour(s))   CBC WITH AUTOMATED DIFF    Collection Time: 11/22/22  2:31 PM   Result Value Ref Range    WBC 10.8 3.6 - 11.0 K/uL    RBC 4.16 3.80 - 5.20 M/uL    HGB 13.1 11.5 - 16.0 g/dL    HCT 39.4 35.0 - 47.0 %    MCV 94.7 80.0 - 99.0 FL    MCH 31.5 26.0 - 34.0 PG    MCHC 33.2 30.0 - 36.5 g/dL    RDW 12.3 11.5 - 14.5 %    PLATELET 499 921 - 863 K/uL    MPV 10.2 8.9 - 12.9 FL    NRBC 0.0 0  WBC    ABSOLUTE NRBC 0.00 0.00 - 0.01 K/uL    NEUTROPHILS 70 32 - 75 %    LYMPHOCYTES 18 12 - 49 %    MONOCYTES 9 5 - 13 %    EOSINOPHILS 2 0 - 7 %    BASOPHILS 1 0 - 1 %    IMMATURE GRANULOCYTES 0 0.0 - 0.5 %    ABS. NEUTROPHILS 7.5 1.8 - 8.0 K/UL    ABS. LYMPHOCYTES 1.9 0.8 - 3.5 K/UL    ABS. MONOCYTES 1.0 0.0 - 1.0 K/UL    ABS. EOSINOPHILS 0.2 0.0 - 0.4 K/UL    ABS. BASOPHILS 0.1 0.0 - 0.1 K/UL    ABS. IMM. GRANS. 0.0 0.00 - 0.04 K/UL    DF AUTOMATED     METABOLIC PANEL, COMPREHENSIVE    Collection Time: 11/22/22  2:31 PM   Result Value Ref Range    Sodium 137 136 - 145 mmol/L    Potassium 3.5 3.5 - 5.1 mmol/L    Chloride 104 97 - 108 mmol/L    CO2 28 21 - 32 mmol/L    Anion gap 5 5 - 15 mmol/L    Glucose 87 65 - 100 mg/dL    BUN 17 6 - 20 MG/DL    Creatinine 1.52 (H) 0.55 - 1.02 MG/DL    BUN/Creatinine ratio 11 (L) 12 - 20      eGFR 36 (L) >60 ml/min/1.73m2    Calcium 9.6 8.5 - 10.1 MG/DL    Bilirubin, total 0.5 0.2 - 1.0 MG/DL    ALT (SGPT) 28 12 - 78 U/L    AST (SGOT) 30 15 - 37 U/L    Alk. phosphatase 125 (H) 45 - 117 U/L    Protein, total 8.3 (H) 6.4 - 8.2 g/dL    Albumin 3.7 3.5 - 5.0 g/dL    Globulin 4.6 (H) 2.0 - 4.0 g/dL    A-G Ratio 0.8 (L) 1.1 - 2.2     SAMPLES BEING HELD    Collection Time: 11/22/22  2:31 PM   Result Value Ref Range    SAMPLES BEING HELD 1BLU 1RED     COMMENT        Add-on orders for these samples will be processed based on acceptable specimen integrity and analyte stability, which may vary by analyte.    SED RATE (ESR)    Collection Time: 11/22/22  2:31 PM   Result Value Ref Range    Sed rate, automated 46 (H) 0 - 30 mm/hr   C REACTIVE PROTEIN, QT    Collection Time: 11/22/22  2:31 PM   Result Value Ref Range    C-Reactive protein 0.80 (H) 0.00 - 0.60 mg/dL         Imaging:     Assessment:     Aniceto Hurd is a 74 y.o. female with history of major depressive disorder, hypertension, GERD, obesity, CKD stage III, history of memory impairment, dyslipidemia who is admitted for cavernous sinus thrombosis.        Plan:       Cavernous sinus thrombosis  -continue therapeutic lovenox  -Check MELVA, ANCa, echocardiogram  -No interventional surgery on consult    Hypertension  -Continue home medications    Mood disorder  -Continue home Cymbalta, Abilify    Precipitating  -Home Crestor    Detrusor instability  -Home Ditropan    IBS  -Continue home meds    Obesity  -Counseled on weight loss, dieting and exercise        FEN/GI -p.o. hydration  Activity -as tolerated  DVT prophylaxis -Lovenox  GI prophylaxis -not indicated  Disposition -Home    CODE STATUS: Full code       Signed By: Sylvia Esteban MD     November 23, 2022

## 2022-11-24 LAB
ANION GAP SERPL CALC-SCNC: 9 MMOL/L (ref 5–15)
BUN SERPL-MCNC: 19 MG/DL (ref 6–20)
BUN/CREAT SERPL: 14 (ref 12–20)
CALCIUM SERPL-MCNC: 9.7 MG/DL (ref 8.5–10.1)
CHLORIDE SERPL-SCNC: 105 MMOL/L (ref 97–108)
CO2 SERPL-SCNC: 26 MMOL/L (ref 21–32)
CREAT SERPL-MCNC: 1.35 MG/DL (ref 0.55–1.02)
GLUCOSE SERPL-MCNC: 103 MG/DL (ref 65–100)
POTASSIUM SERPL-SCNC: 3.3 MMOL/L (ref 3.5–5.1)
SODIUM SERPL-SCNC: 140 MMOL/L (ref 136–145)

## 2022-11-24 PROCEDURE — 74011250637 HC RX REV CODE- 250/637: Performed by: STUDENT IN AN ORGANIZED HEALTH CARE EDUCATION/TRAINING PROGRAM

## 2022-11-24 PROCEDURE — 74011250636 HC RX REV CODE- 250/636: Performed by: NURSE PRACTITIONER

## 2022-11-24 PROCEDURE — 74011000250 HC RX REV CODE- 250: Performed by: STUDENT IN AN ORGANIZED HEALTH CARE EDUCATION/TRAINING PROGRAM

## 2022-11-24 PROCEDURE — 36415 COLL VENOUS BLD VENIPUNCTURE: CPT

## 2022-11-24 PROCEDURE — 65270000046 HC RM TELEMETRY

## 2022-11-24 PROCEDURE — 74011250636 HC RX REV CODE- 250/636: Performed by: STUDENT IN AN ORGANIZED HEALTH CARE EDUCATION/TRAINING PROGRAM

## 2022-11-24 PROCEDURE — 80048 BASIC METABOLIC PNL TOTAL CA: CPT

## 2022-11-24 RX ORDER — SODIUM CHLORIDE 9 MG/ML
75 INJECTION, SOLUTION INTRAVENOUS CONTINUOUS
Status: DISCONTINUED | OUTPATIENT
Start: 2022-11-24 | End: 2022-11-26 | Stop reason: HOSPADM

## 2022-11-24 RX ADMIN — SODIUM CHLORIDE, PRESERVATIVE FREE 10 ML: 5 INJECTION INTRAVENOUS at 09:39

## 2022-11-24 RX ADMIN — NORTRIPTYLINE HYDROCHLORIDE 10 MG: 10 CAPSULE ORAL at 22:19

## 2022-11-24 RX ADMIN — ARIPIPRAZOLE 2 MG: 2 TABLET ORAL at 09:38

## 2022-11-24 RX ADMIN — ENOXAPARIN SODIUM 80 MG: 100 INJECTION SUBCUTANEOUS at 09:35

## 2022-11-24 RX ADMIN — SODIUM CHLORIDE, PRESERVATIVE FREE 10 ML: 5 INJECTION INTRAVENOUS at 22:00

## 2022-11-24 RX ADMIN — PANTOPRAZOLE SODIUM 40 MG: 40 TABLET, DELAYED RELEASE ORAL at 09:35

## 2022-11-24 RX ADMIN — OXYBUTYNIN CHLORIDE 5 MG: 5 TABLET ORAL at 09:35

## 2022-11-24 RX ADMIN — SODIUM CHLORIDE, PRESERVATIVE FREE 10 ML: 5 INJECTION INTRAVENOUS at 14:03

## 2022-11-24 RX ADMIN — DULOXETINE 60 MG: 20 CAPSULE, DELAYED RELEASE ORAL at 22:18

## 2022-11-24 RX ADMIN — ASPIRIN 81 MG: 81 TABLET, COATED ORAL at 09:34

## 2022-11-24 RX ADMIN — DULOXETINE 60 MG: 20 CAPSULE, DELAYED RELEASE ORAL at 09:34

## 2022-11-24 RX ADMIN — BUPROPION HYDROCHLORIDE 150 MG: 150 TABLET, EXTENDED RELEASE ORAL at 22:18

## 2022-11-24 RX ADMIN — SODIUM CHLORIDE 75 ML/HR: 9 INJECTION, SOLUTION INTRAVENOUS at 11:39

## 2022-11-24 RX ADMIN — POTASSIUM BICARBONATE 40 MEQ: 782 TABLET, EFFERVESCENT ORAL at 14:02

## 2022-11-24 RX ADMIN — OXYBUTYNIN CHLORIDE 5 MG: 5 TABLET ORAL at 18:54

## 2022-11-24 RX ADMIN — ROSUVASTATIN 20 MG: 10 TABLET, FILM COATED ORAL at 22:18

## 2022-11-24 RX ADMIN — ENOXAPARIN SODIUM 80 MG: 100 INJECTION SUBCUTANEOUS at 22:19

## 2022-11-24 RX ADMIN — BUPROPION HYDROCHLORIDE 150 MG: 150 TABLET, EXTENDED RELEASE ORAL at 09:35

## 2022-11-24 NOTE — PROGRESS NOTES
Progress note  Nehemias Patterson MD      Date of service:11/23/22       History of Presenting Illness:   Admitted for cavernous sinus thrombosis. Subjective/interval history   She notes improvement in clarity in her visual field. No headache, nausea or vomiting. Her family has Jazmin last month and she had upper respiratory symptoms with tested negative by home test.  No history of VTE. Not on hormones. Cavernous sinus thrombosis. -- Continue Lovenox, transition to NOACs on discharge. Discussed with patient. Neurology evaluation and recommendations appreciated. Hypertension  -Continue home medications    Mood disorder  -Continue home Cymbalta, Abilify    Dyslipidemia  -Home Crestor    Detrusor instability  -Home Ditropan    IBS  -Continue home meds    Obesity  -Counseled on weight loss, dieting and exercise    DELROY: Hold lisinopril and HCTZ temporarily. Recheck BMP in a.m. CODE STATUS: Full  Disposition: Anticipate home tomorrow. Review of Systems:  Pertinent items are noted in the History of Present Illness.      Past Medical History:   Diagnosis Date    Acute alteration in mental status     Arthritis     Bilateral carotid artery stenosis     Cerebral microvascular disease     Chronic kidney disease     CKD 3    Chronic pain     CHRONIC BOWEL PAIN    Convulsions (HCC)     Depression     Disturbance of memory     Diverticulitis     Fibromyalgia     GERD (gastroesophageal reflux disease)     Goiter     Hiatal hernia     Hypercholesterolemia     Hypertension     IBS (irritable bowel syndrome)     CONSTIPATION, CHRONIC SINCE HER 20s    Insomnia     TAKES TRAZODONE NIGHTLY    Migraine     REDUCED IN FREQUENCY AND SEVERITY SINCE MENOPAUSE    Rectocele       Past Surgical History:   Procedure Laterality Date    COLONOSCOPY N/A 6/21/2016    COLONOSCOPY performed by Leah Chacon MD at [facility] ENDOSCOPY    COLONOSCOPY N/A 6/27/2018    COLONOSCOPY performed by Triny Her MD at [facility] ENDOSCOPY    ENDOSCOPY, COLON, DIAGNOSTIC  11/2010    Dr. Ally Ramirez    HX CHOLECYSTECTOMY  2006    HX ENDOSCOPY      HX GI  X3  LAST ONE 12/10    COLONOSCOPY    HX GYN  1982    D&C WITH SUCTION    HX HYSTERECTOMY      HX LUMBAR FUSION  2017    HX ORTHOPAEDIC      right foot surgery    HX TONSILLECTOMY      HX TOTAL COLECTOMY  2007    Dr. Pond/ diverticulitis    IR INJ SPINE FLUORO GUIDED  8/28/2020    IR INJ SPINE FLUORO GUIDED  1/8/2021    IR INJ SPINE FLUORO GUIDED  2/5/2021     Prior to Admission medications    Medication Sig Start Date End Date Taking? Authorizing Provider   Trulance 3 mg tab TAKE 1 TABLET BY MOUTH EVERY DAY FOR CONSTIPATION 11/9/22   Provider, Historical   tobramycin-dexamethasone (TOBRADEX) ophthalmic suspension Administer 2 Drops to both eyes daily. 11/10/22   Provider, Historical   nortriptyline (PAMELOR) 10 mg capsule Take 1 Capsule by mouth nightly. 11/18/22   Dodie Goldman, KEVIN   ARIPiprazole (ABILIFY) 2 mg tablet Take 1 Tablet by mouth daily. 10/20/22   Ricardo Labor, MD   cyclobenzaprine (FLEXERIL) 5 mg tablet TAKE 1 TABLET NIGHTLY 10/14/22   Ricardo Labor, MD   hydroCHLOROthiazide (HYDRODIURIL) 25 mg tablet TAKE 1 TABLET DAILY 9/16/22   Ricardo Labor, MD   buPROPion XL (WELLBUTRIN XL) 300 mg XL tablet Take 1 Tablet by mouth daily.  9/16/22   Ricardo Labor, MD   oxybutynin (DITROPAN) 5 mg tablet TAKE 1 TABLET TWICE A DAY 8/23/22   Ricardo Labor, MD   dicyclomine (BENTYL) 10 mg capsule TAKE 2 CAPSULES FOUR TIMES A DAY AS NEEDED 8/17/22   Ricardo Labor, MD   DULoxetine (CYMBALTA) 60 mg capsule TAKE 1 CAPSULE TWICE A DAY 8/17/22   Ricardo Labor, MD   rosuvastatin (CRESTOR) 20 mg tablet TAKE 1 TABLET NIGHTLY 7/12/22   Ricardo Labor, MD   pantoprazole (PROTONIX) 40 mg tablet TAKE 1 TABLET DAILY 7/12/22   Ricardo Labor, MD   lisinopriL (PRINIVIL, ZESTRIL) 40 mg tablet TAKE 1 TABLET DAILY 3/22/22   Erika Pruitt MD   b complex vitamins tablet Take 1 Tablet by mouth daily. Provider, Historical   aspirin delayed-release 81 mg tablet Take  by mouth daily. Provider, Historical   acetaminophen (TYLENOL) 500 mg tablet Take 2 Tabs by mouth every six (6) hours. 5/8/21   Bismark Sprague PA-C   calcium-vitamin D (OS-TRUE +D3) 500 mg-200 unit per tablet 1 Tab. Provider, Historical   polyethylene glycol (MIRALAX) 17 gram packet Take 17 g by mouth daily. 6/25/20   Provider, Historical   cholecalciferol, vitamin D3, 50 mcg (2,000 unit) tab Take 1 Tab by mouth daily. Provider, Historical   sucralfate (CARAFATE) 1 gram tablet Take 1 g by mouth three (3) times daily as needed.     Provider, Historical     Allergies   Allergen Reactions    Latex Hives    Codeine Other (comments)     Severe Headache    Morphine Hives    Ambien [Zolpidem] Other (comments)     Severe behavior changes    Dilaudid [Hydromorphone] Other (comments)     Memory loss    Other Medication Rash     BANDAIDS    Shellfish Containing Products Hives      Family History   Problem Relation Age of Onset    Cancer Father         lung and bone    Stroke Sister     Cancer Sister 76        PANCREATIC    Hypertension Mother     High Cholesterol Mother     Alzheimer's Disease Mother         late 62s early 76s    Cancer Other         COLON    Cancer Other         breast    Diabetes Maternal Aunt     Cancer Maternal Uncle         COLON    Cancer Maternal Grandfather         COLON    Ovarian Cancer Daughter 28        SOCIAL HISTORY:  Patient resides:  Independently x   Assisted Living    SNF    With family care       Smoking history:   None x   Former    Chronic      Alcohol history:   None x   Social    Chronic      Ambulates:   Independently x   w/cane    w/walker    w/wc    CODE STATUS:  DNR    Full x   Other      Objective:     Physical Exam:   Visit Vitals  /63 (BP 1 Location: Right upper arm, BP Patient Position: At rest)   Pulse 86 Temp 98 °F (36.7 °C)   Resp 26   Ht 5' 3\" (1.6 m)   Wt 83.3 kg (183 lb 10.3 oz)   SpO2 95%   BMI 32.53 kg/m²         General:  Seen laying in bed, on room air, alert oriented, not in distress. Head:  Normocephalic, without obvious abnormality, atraumatic. Eyes:  Mild left eyelid droop. Nose: Nares normal. Septum midline. Mucosa normal.        Neck: Supple, symmetrical, trachea midline, no carotid bruit and no JVD. Lungs:   Clear to auscultation bilaterally. Chest wall:  No tenderness or deformity. Heart:  Regular rate and rhythm, S1, S2 normal, no murmur, click, rub or gallop. Abdomen:   Soft, non-tender. Bowel sounds normal. No masses,  No organomegaly. Extremities: Extremities normal, atraumatic, no cyanosis or edema. Pulses: 2+ and symmetric all extremities. Skin: Skin color, texture, turgor normal. No rashes or lesions   Neurologic: Alert orient x3. Motor exam 5/5 throughout. Intact visual acuity and visual field by bedside exam.       Data Review:     Recent Days:  Recent Labs     11/22/22  1431   WBC 10.8   HGB 13.1   HCT 39.4          Recent Labs     11/22/22  1431      K 3.5      CO2 28   GLU 87   BUN 17   CREA 1.52*   CA 9.6   ALB 3.7   ALT 28       No results for input(s): PH, PCO2, PO2, HCO3, FIO2 in the last 72 hours. 24 Hour Results:  Recent Results (from the past 24 hour(s))   SAMPLES BEING HELD    Collection Time: 11/23/22  1:13 AM   Result Value Ref Range    SAMPLES BEING HELD 1BLUE 1LAV 1PST     COMMENT        Add-on orders for these samples will be processed based on acceptable specimen integrity and analyte stability, which may vary by analyte.    C REACTIVE PROTEIN, QT    Collection Time: 11/23/22  1:13 AM   Result Value Ref Range    C-Reactive protein 0.92 (H) 0.00 - 0.60 mg/dL   SED RATE (ESR)    Collection Time: 11/23/22  1:13 AM   Result Value Ref Range    Sed rate, automated 48 (H) 0 - 30 mm/hr   ECHO ADULT COMPLETE    Collection Time: 11/23/22  9:08 AM   Result Value Ref Range    IVSd 1.0 (A) 0.6 - 0.9 cm    LVIDd 4.0 3.9 - 5.3 cm    LVIDs 3.0 cm    LVPWd 1.0 (A) 0.6 - 0.9 cm    LVOT Peak Gradient 4 mmHg    LVOT Mean Gradient 2 mmHg    LVOT Peak Velocity 1.0 m/s    LVOT VTI 20.4 cm    RVSP 21 mmHg    LA Diameter 3.5 cm    Est. RA Pressure 3 mmHg    MV A Velocity 0.86 m/s    MV E Velocity 0.68 m/s    LV E' Lateral Velocity 5 cm/s    LV E' Septal Velocity 7 cm/s    TR Peak Gradient 18 mmHg    TR Max Velocity 2.13 m/s    Fractional Shortening 2D 25 28 - 44 %    LVIDd Index 2.15 cm/m2    LVIDs Index 1.61 cm/m2    LV RWT Ratio 0.50     LV Mass 2D 127.1 67 - 162 g    LV Mass 2D Index 68.3 43 - 95 g/m2    MV E/A 0.79     E/E' Ratio (Averaged) 11.66     E/E' Lateral 13.60     E/E' Septal 9.71     LA Size Index 1.88 cm/m2         Imaging:       Signed By: Rod Lima MD     November 23, 2022

## 2022-11-24 NOTE — PROGRESS NOTES
Bedside and Verbal shift change report given to [facility] (oncoming nurse) by Rafaela Keene (offgoing nurse). Report included the following information SBAR, Kardex, MAR, Cardiac Rhythm NSR, and Dual Neuro Assessment.

## 2022-11-24 NOTE — H&P (VIEW-ONLY)
Anesthesia PreOp Note    HPI:     Hazel Asencio is a 43year old female who presents for preoperative consultation requested by: Zander Merrill MD    Date of Surgery: 9/12/2018    Procedure(s):  NASAL SEPTOPLASTY BILAT SINUS ENDOSCOPY ETHM MAX  [facility] Neurointerventional Surgery Consult  Mike May NP    Patient: Ary Chery MRN: [id number]  SSN: [id number]    YOB: 1950  Age: 74 y.o. Sex: female      Chief Complaint:Double vision    Subjective:      Ary Chery is a 74 y.o. F with a PMH of chronic pain, memory difficulties, CKD3, depression, diverticulitis, fibromyalgia, GERD, goiter, hiatal hernia, hypercholesterolemia, HTN, IBS, migraine, total colectomy, and lumbar fusion,  who presented to the ER  on 11/22/22 with complaints of double vision lasting for several weeks which started after a bout of suspected COVID at which time she was acutely ill. Her COVID test was negative but her 's test was COVID-positive at the same time. She had gone to the Ophthalmologist twice and the first time they put her on steroid eye drops. She came back on 11/22  and she was sent to the ER to rule out possible stroke. MRI orbits and MRV brain were completed showing fullness and heterogeneous enhancement of the left cavernous sinus with dilated left superior ophthalmic vein. Findings were concerning for cavernous sinus thrombosis. No acute infarction, but with chronic microvascular ischemic disease. Neurology was consulted and patient was placed on therapeutic Lovenox injections and baby aspirin. Neurointerventional Surgery was consulted for further recommendations and further imaging with DSA if needed.    Past Medical History:   Diagnosis Date    Acute alteration in mental status     Arthritis     Bilateral carotid artery stenosis     Cerebral microvascular disease     Chronic kidney disease     CKD 3    Chronic pain     CHRONIC BOWEL PAIN    Convulsions (HCC)     Depression     Disturbance of memory     Diverticulitis     Fibromyalgia     GERD (gastroesophageal reflux disease)     Goiter     Hiatal hernia     Hypercholesterolemia     Hypertension     IBS (irritable bowel syndrome)     CONSTIPATION, CHRONIC SINCE HER 20s    Insomnia TAKES TRAZODONE NIGHTLY    Migraine     REDUCED IN FREQUENCY AND SEVERITY SINCE MENOPAUSE    Rectocele      Family History   Problem Relation Age of Onset    Cancer Father         lung and bone    Stroke Sister     Cancer Sister 76        PANCREATIC    Hypertension Mother     High Cholesterol Mother     Alzheimer's Disease Mother         late 62s early 76s    Cancer Other         COLON    Cancer Other         breast    Diabetes Maternal Aunt     Cancer Maternal Uncle         COLON    Cancer Maternal Grandfather         COLON    Ovarian Cancer Daughter 28     Social History     Tobacco Use    Smoking status: Former     Packs/day: 1.00     Years: 45.00     Pack years: 45.00     Types: Cigarettes     Quit date: 2007     Years since quitting: 15.9    Smokeless tobacco: Never   Substance Use Topics    Alcohol use: Yes     Alcohol/week: 0.0 standard drinks     Comment: holidays      Prior to Admission Medications   Prescriptions Last Dose Informant Patient Reported? Taking? ARIPiprazole (ABILIFY) 2 mg tablet   No No   Sig: Take 1 Tablet by mouth daily. DULoxetine (CYMBALTA) 60 mg capsule   No No   Sig: TAKE 1 CAPSULE TWICE A DAY   Trulance 3 mg tab   Yes No   Sig: TAKE 1 TABLET BY MOUTH EVERY DAY FOR CONSTIPATION   acetaminophen (TYLENOL) 500 mg tablet   No No   Sig: Take 2 Tabs by mouth every six (6) hours. aspirin delayed-release 81 mg tablet   Yes No   Sig: Take  by mouth daily. b complex vitamins tablet   Yes No   Sig: Take 1 Tablet by mouth daily. buPROPion XL (WELLBUTRIN XL) 300 mg XL tablet   No No   Sig: Take 1 Tablet by mouth daily. calcium-vitamin D (OS-TRUE +D3) 500 mg-200 unit per tablet   Yes No   Si Tab. cholecalciferol, vitamin D3, 50 mcg (2,000 unit) tab   Yes No   Sig: Take 1 Tab by mouth daily.    cyclobenzaprine (FLEXERIL) 5 mg tablet   No No   Sig: TAKE 1 TABLET NIGHTLY   dicyclomine (BENTYL) 10 mg capsule   No No   Sig: TAKE 2 CAPSULES FOUR TIMES A DAY AS NEEDED morphINE sulfate (PF) 4 MG/ML injection 1 mg 1 mg Intravenous Q2H PRN Ting Krishnan MD    Or        morphINE sulfate (PF) 4 MG/ML injection 1.52 mg 1.52 mg Intravenous Q2H PRN Ting Krishnan MD    Or        morphINE sulfate (PF) 4 MG/ML injection 2 mg Not on file      Available pre-op labs reviewed.   Lab Results   Component Value Date    URINEPREG Negative 09/12/2018     Lab Results   Component Value Date     08/08/2018    K 4.0 08/08/2018     08/08/2018    CO2 27 08/08/2018    BUN 6 (L) hydroCHLOROthiazide (HYDRODIURIL) 25 mg tablet   No No   Sig: TAKE 1 TABLET DAILY   lisinopriL (PRINIVIL, ZESTRIL) 40 mg tablet   No No   Sig: TAKE 1 TABLET DAILY   nortriptyline (PAMELOR) 10 mg capsule   No No   Sig: Take 1 Capsule by mouth nightly. oxybutynin (DITROPAN) 5 mg tablet   No No   Sig: TAKE 1 TABLET TWICE A DAY   pantoprazole (PROTONIX) 40 mg tablet   No No   Sig: TAKE 1 TABLET DAILY   polyethylene glycol (MIRALAX) 17 gram packet   Yes No   Sig: Take 17 g by mouth daily. rosuvastatin (CRESTOR) 20 mg tablet   No No   Sig: TAKE 1 TABLET NIGHTLY   sucralfate (CARAFATE) 1 gram tablet   Yes No   Sig: Take 1 g by mouth three (3) times daily as needed. tobramycin-dexamethasone (TOBRADEX) ophthalmic suspension   Yes No   Sig: Administer 2 Drops to both eyes daily. Facility-Administered Medications: None       Allergies   Allergen Reactions    Latex Hives    Codeine Other (comments)     Severe Headache    Morphine Hives    Ambien [Zolpidem] Other (comments)     Severe behavior changes    Dilaudid [Hydromorphone] Other (comments)     Memory loss    Other Medication Rash     BANDAIDS    Shellfish Containing Products Hives       Review of Systems:    Denies numbness, tingling, chest pain, leg pain, nausea, vomiting, difficulty swallowing, headache, and dyspnea. Complains of double vision. Objective:     Vitals:    11/23/22 1530 11/23/22 1756 11/23/22 1820 11/23/22 2007   BP: 115/89 120/63     Pulse: 81 86 86 82   Resp: 28 26     Temp: 99.2 °F (37.3 °C) 98 °F (36.7 °C)     SpO2: 95% 95%     Weight:       Height:          Physical Exam:  GENERAL: Calm, cooperative, NAD  SKIN: Warm, dry, color appropriate for ethnicity. Neurologic Exam:  Mental Status:  Alert and oriented x 4. Appropriate affect, mood and behavior. Language:    Normal fluency, repetition, comprehension and naming. Cranial Nerves:   Pupils 3 mm, equal, round and reactive to light.      Visual fields full to confrontation. Extraocular movements intact in right eye, left eye with ptosis. Adduction intact and vertical movement intact. Cannot abduct left eye. Facial sensation intact. Full facial strength, no asymmetry. Hearing grossly intact bilaterally. No dysarthria. Tongue protrudes to midline, palate elevates symmetrically. Shoulder shrug 5/5 bilaterally. Motor:    No pronator drift. Bulk and tone normal.      5/5 power in all extremities proximally and distally. No involuntary movements. Sensation:    Sensation intact throughout to light touch, temperature, pinprick, vibration, proprioception     Reflexes:    Negative Babinskis    Coordination & Gait: Gait not assessed. FTN and HTS intact with no ataxia present. Labs:  Lab Results   Component Value Date/Time    WBC 10.8 11/22/2022 02:31 PM    Hemoglobin (POC) 11.9 08/29/2017 09:26 AM    HGB 13.1 11/22/2022 02:31 PM    Hematocrit (POC) 35 08/29/2017 09:26 AM    HCT 39.4 11/22/2022 02:31 PM    PLATELET 241 77/15/7528 02:31 PM    MCV 94.7 11/22/2022 02:31 PM      Lab Results   Component Value Date/Time    Sodium 137 11/22/2022 02:31 PM    Potassium 3.5 11/22/2022 02:31 PM    Chloride 104 11/22/2022 02:31 PM    CO2 28 11/22/2022 02:31 PM    Anion gap 5 11/22/2022 02:31 PM    Glucose 87 11/22/2022 02:31 PM    BUN 17 11/22/2022 02:31 PM    Creatinine 1.52 (H) 11/22/2022 02:31 PM    BUN/Creatinine ratio 11 (L) 11/22/2022 02:31 PM    GFR est AA 54 (L) 09/09/2022 10:45 AM    GFR est non-AA 45 (L) 09/09/2022 10:45 AM    Calcium 9.6 11/22/2022 02:31 PM     Lab Results   Component Value Date/Time    CK 46 11/18/2021 10:10 AM    CK - MB 3.2 07/13/2017 07:23 PM    CK-MB Index 2.5 07/13/2017 07:23 PM    Troponin-I, Qt. <0.04 07/13/2017 07:23 PM       Imaging:  CT Results (maximum last 3):   Results from [place] encounter on 11/22/22    CT HEAD WO CONT    Narrative  EXAM: CT HEAD WO CONT    INDICATION: vision loss    COMPARISON: MRI brain 5/6/2021, CT head 5/6/2020. CONTRAST: None. TECHNIQUE: Unenhanced CT of the head was performed using 5 mm images. Brain and  bone windows were generated. Coronal and sagittal reformats. CT dose reduction  was achieved through use of a standardized protocol tailored for this  examination and automatic exposure control for dose modulation. FINDINGS:  Generalized volume loss. Scattered white matter hypodensities may reflect  chronic microangiopathic change. There is no intracranial hemorrhage,  extra-axial collection, or mass effect. The basilar cisterns are open. No CT  evidence of acute infarct. The bone windows demonstrate no abnormalities. The visualized portions of the  paranasal sinuses and mastoid air cells are clear. Impression  No acute abnormality. Results from [place] encounter on 05/03/21    CT HEAD WO CONT    Narrative  EXAM: CT HEAD WO CONT    INDICATION: Assisted ground-level fall today. Lower extremity weakness. COMPARISON: CT head on 3/27/2011. TECHNIQUE: Noncontrast head CT. Coronal and sagittal reformats. CT dose  reduction was achieved through the use of a standardized protocol tailored for  this examination and automatic exposure control for dose modulation. FINDINGS: Motion artifact obscures fine detail and limits sensitivity for  detecting pathology. The ventricles and sulci are age-appropriate without hydrocephalus. There is no  mass effect or midline shift. There is no intracranial hemorrhage or extra-axial  fluid collection. Microvascular ischemic disease is increased and moderate in  the bilateral frontal and parietal periventricular white matter. No loss of  gray-white differentiation. The calvarium is intact. The visualized paranasal sinuses and mastoid air cells  are clear. Impression  Limited by motion artifact. No evidence of intracranial hemorrhage.  Increased  microvascular ischemic disease since 2011.      CT SPINE LUMB WO CONT    Narrative  *PRELIMINARY REPORT*    Status post right lateral fusion and placement of a prosthetic disc at L4-5. Gas  in the retroperitoneum is related to the recent operation. Preliminary report was provided by Dr. Magdiel Solorzano, the on-call radiologist, at 10:05  PM on 5/3/2021    Final report to follow. *END PRELIMINARY REPORT*      EXAM:  CT LUMBAR SPINE WITHOUT  CONTRAST    INDICATION: Post lumbar fusion. [facility] protocol    COMPARISON: MRI 2/22/2021. CONTRAST:  None. TECHNIQUE: Multislice helical CT of the lumbar spine was performed without  intravenous contrast administration. Coronal and sagittal reformats were  generated. CT dose reduction was achieved through use of a standardized  protocol tailored for this examination and automatic exposure control for dose  modulation. FINDINGS:    There is minimal grade 1 retrolisthesis of L2 on L3. The patient is status post  right lateral fusion and prosthetic disc placement at L4-5. There is a chronic  limbus vertebra at L5. Areas of degenerative sclerosis and irregularity in the  endplates are seen at F3-7, L2-3, and L4-5. There is no fracture or compression  deformity. There is mild atelectasis at the lung bases. The patient is status post  cholecystectomy. Multiple locules of gas in the right retroperitoneum and right  abdominal wall related to the recent operation. Several are also seen along the  surface of the left psoas muscle. Lower thoracic spine: No herniation or stenosis. L1-L2: Severe disc space narrowing. Moderate broad-based disc bulge causing  moderate spinal stenosis. No significant neural foraminal narrowing. Margurette Schuler L2-L3: Severe disc space narrowing. Mild broad-based disc bulge causing moderate  spinal stenosis. There is severe right neural foraminal narrowing. L3-L4: Moderate to severe disc space narrowing. Mild broad-based disc bulge  causing mild to moderate spinal stenosis.  There is moderate bilateral neural  foraminal narrowing. L4-L5: Status post right lateral fusion and prosthetic disc placement. Status  post right hemilaminotomy. Significant spinal stenosis remains. There is  moderate bilateral neural foraminal narrowing. L5-S1: Moderate disc space narrowing. Mild broad-based disc bulge with minimal  spinal stenosis. There is mild bilateral neural foraminal narrowing. .    Impression  1. Status post recent right lateral fusion and prosthetic disc placement. Gas in  the right retroperitoneum with several locules of gas along the left psoas  muscle related to the recent operation. 2.  Otherwise unchanged spondylosis as above. Assessment:     Hospital Problems  Date Reviewed: 11/18/2022            Codes Class Noted POA    Cavernous sinus thrombosis ICD-10-CM: G08  ICD-9-CM: 760  11/23/2022 Unknown         Plan:     Left cavernous sinus thrombosis  -MRVb showed no acute abnormality, moderate white matter disease. Focus blooming on gradient sequence in the left candi may reflect a focus of micro hemorrhage or hemosiderin. No definite evidence of dural venous sinus thrombosis. -MRI orbits w w/o showed asymmetric fullness and heterogeneous enhancement of left cavernous sinus with dilated left superior ophthalmic vein, concerning for cavernous sinus thrombosis, chronic microvascular ischemic dx with no acute infarction.     -Continue frequent neuro checks per unit protocol  -Continue therapeutic lovenox subcutaneous bid with conversion to oral anticoagulants at Neurology discretion   -Asa 81 mg po q day   -Considering possible diagnostic cerebral angiogram.    -Further recommendations to follow by Dr. Inocente Moreno    I have discussed the diagnosis and the intended plan as seen in the above orders with Dr. Inocente Moreno, the patient and the primary RN. Patient updated on current plan of care and is in agreement. All questions were answered.     Thank you for this consult and participating in the care of this patient. Signed By: Steffanie Leone NP     November 23, 2022         I personally reviewed imaging, saw and evaluated the patient and agree with the NP assessment and plan as documented in the note above.

## 2022-11-24 NOTE — PROGRESS NOTES
Problem: Falls - Risk of  Goal: *Absence of Falls  Description: Document Zachery Blake Fall Risk and appropriate interventions in the flowsheet.   Outcome: Progressing Towards Goal  Note: Fall Risk Interventions:  Mobility Interventions: Communicate number of staff needed for ambulation/transfer, Patient to call before getting OOB, Bed/chair exit alarm    Mentation Interventions: Adequate sleep, hydration, pain control, Bed/chair exit alarm, Door open when patient unattended, Evaluate medications/consider consulting pharmacy, Increase mobility, More frequent rounding, Reorient patient, Room close to nurse's station, Toileting rounds, Update white board    Medication Interventions: Bed/chair exit alarm, Evaluate medications/consider consulting pharmacy, Patient to call before getting OOB, Teach patient to arise slowly    Elimination Interventions: Bed/chair exit alarm, Call light in reach, Patient to call for help with toileting needs, Stay With Me (per policy), Toilet paper/wipes in reach, Toileting schedule/hourly rounds              Problem: TIA/CVA Stroke: Day 2 Until Discharge  Goal: Off Pathway (Use only if patient is Off Pathway)  Outcome: Progressing Towards Goal  Goal: Activity/Safety  Outcome: Progressing Towards Goal  Goal: Diagnostic Test/Procedures  Outcome: Progressing Towards Goal  Goal: Nutrition/Diet  Outcome: Progressing Towards Goal  Goal: Discharge Planning  Outcome: Progressing Towards Goal  Goal: Medications  Outcome: Progressing Towards Goal  Goal: Respiratory  Outcome: Progressing Towards Goal  Goal: Treatments/Interventions/Procedures  Outcome: Progressing Towards Goal  Goal: Psychosocial  Outcome: Progressing Towards Goal  Goal: *Verbalizes anxiety and depression are reduced or absent  Outcome: Progressing Towards Goal  Goal: *Absence of aspiration  Outcome: Progressing Towards Goal  Goal: *Absence of deep venous thrombosis signs and symptoms(Stroke Metric)  Outcome: Progressing Towards Goal  Goal: *Optimal pain control at patient's stated goal  Outcome: Progressing Towards Goal  Goal: *Tolerating diet  Outcome: Progressing Towards Goal  Goal: *Ability to perform ADLs and demonstrates progressive mobility and function  Outcome: Progressing Towards Goal  Goal: *Stroke education continued(Stroke Metric)  Outcome: Progressing Towards Goal     Problem: Pain  Goal: *Control of Pain  Outcome: Progressing Towards Goal  Goal: *PALLIATIVE CARE:  Alleviation of Pain  Outcome: Progressing Towards Goal

## 2022-11-24 NOTE — PROGRESS NOTES
NIS Brief Note    Rounded on patient with Dr. Natalie Griffiths today. Patient is being followed by [facility] for cavernous sinus thrombosis. She reports she still endorses double vision. She has some dizziness with movement, but none at rest. She denies any current headache. She has intermittent numbness and tingling in her hands due to bilateral carpal tunnel syndrome. She has baseline bilateral leg weakness from a back surgery she had a year ago per patient report. She walks with a cane and is currently seeing PT for this. Physical Exam:  Gen: NAD, calm, cooperative  CV: RRR, no murmur, click, rub, or gallop  Resp: Lungs clear to auscultation  Neuro: A&Ox4. Follows commands. Speech clear. No aphasia. Affect normal. PERRL. Visual fields intact. Disconjugate gaze present at times. Unable to track laterally to the right with right eye. Unable to track laterally to the left with left eye, otherwise EOMIs. Left eye ptosis with mild redness in left eye. Some periorbital edema present around eyes. Some mild nystagmus present with horizontal eye movements. Face symmetric. Palate symmetric. Tongue midline. Petty spontaneously. Strength 5/5 in RUE and LUE. Bilateral lower leg drift, Generalized weakness in BLE, strength 4+/5 with legs extended, otherwise strength 5/5 in BLE. (Patient reports this is baseline). Bulk and tone normal. No involuntary movements. No ataxia with FTN and HTS bilaterally. No neglect. Gait deferred. Extremities: Trace peripheral edema in BLE. Atraumatic, no cyanosis  Skin: Semi-warm, dry, color appropriate for ethnicity. PLAN:  Left cavernous sinus thrombosis  - continue aspirin 81 mg daily  - continue therapeutic Lovenox   - plan for diagnostic cerebral angiogram tomorrow to further assess cerebral blood vessel in the setting of sinus thrombosis. All risks, benefits, and alternatives were explained in detail to the patient and patient's  at bedside.  Written informed consent obtained from the patient.   - NPO at midnight except meds  - start NS at 75 ml/hr for IV hydration  - ordered eye patch for placement to help with double vision      Plan discussed with Dr. Ezio Lara, Dr. Christopher Dooley, Dr. Sunny Taveras, RN, patient, and patient's .        Maria Dolores Alejandre NP  Neurocritical Care Nurse Practitioner

## 2022-11-24 NOTE — H&P (VIEW-ONLY)
Neurointerventional Surgery Consult  Justin Joseph NP    Patient: Luisa Williamson MRN: [id number]  SSN: [id number]    YOB: 1950  Age: 74 y.o. Sex: female      Chief Complaint:Double vision    Subjective:      Luisa Williamson is a 74 y.o. F with a PMH of chronic pain, memory difficulties, CKD3, depression, diverticulitis, fibromyalgia, GERD, goiter, hiatal hernia, hypercholesterolemia, HTN, IBS, migraine, total colectomy, and lumbar fusion,  who presented to the ER  on 11/22/22 with complaints of double vision lasting for several weeks which started after a bout of suspected COVID at which time she was acutely ill. Her COVID test was negative but her 's test was COVID-positive at the same time. She had gone to the Ophthalmologist twice and the first time they put her on steroid eye drops. She came back on 11/22  and she was sent to the ER to rule out possible stroke. MRI orbits and MRV brain were completed showing fullness and heterogeneous enhancement of the left cavernous sinus with dilated left superior ophthalmic vein. Findings were concerning for cavernous sinus thrombosis. No acute infarction, but with chronic microvascular ischemic disease. Neurology was consulted and patient was placed on therapeutic Lovenox injections and baby aspirin. Neurointerventional Surgery was consulted for further recommendations and further imaging with DSA if needed.    Past Medical History:   Diagnosis Date    Acute alteration in mental status     Arthritis     Bilateral carotid artery stenosis     Cerebral microvascular disease     Chronic kidney disease     CKD 3    Chronic pain     CHRONIC BOWEL PAIN    Convulsions (HCC)     Depression     Disturbance of memory     Diverticulitis     Fibromyalgia     GERD (gastroesophageal reflux disease)     Goiter     Hiatal hernia     Hypercholesterolemia     Hypertension     IBS (irritable bowel syndrome)     CONSTIPATION, CHRONIC SINCE HER 20s    Insomnia TAKES TRAZODONE NIGHTLY    Migraine     REDUCED IN FREQUENCY AND SEVERITY SINCE MENOPAUSE    Rectocele      Family History   Problem Relation Age of Onset    Cancer Father         lung and bone    Stroke Sister     Cancer Sister 76        PANCREATIC    Hypertension Mother     High Cholesterol Mother     Alzheimer's Disease Mother         late 62s early 76s    Cancer Other         COLON    Cancer Other         breast    Diabetes Maternal Aunt     Cancer Maternal Uncle         COLON    Cancer Maternal Grandfather         COLON    Ovarian Cancer Daughter 28     Social History     Tobacco Use    Smoking status: Former     Packs/day: 1.00     Years: 45.00     Pack years: 45.00     Types: Cigarettes     Quit date: 2007     Years since quitting: 15.9    Smokeless tobacco: Never   Substance Use Topics    Alcohol use: Yes     Alcohol/week: 0.0 standard drinks     Comment: holidays      Prior to Admission Medications   Prescriptions Last Dose Informant Patient Reported? Taking? ARIPiprazole (ABILIFY) 2 mg tablet   No No   Sig: Take 1 Tablet by mouth daily. DULoxetine (CYMBALTA) 60 mg capsule   No No   Sig: TAKE 1 CAPSULE TWICE A DAY   Trulance 3 mg tab   Yes No   Sig: TAKE 1 TABLET BY MOUTH EVERY DAY FOR CONSTIPATION   acetaminophen (TYLENOL) 500 mg tablet   No No   Sig: Take 2 Tabs by mouth every six (6) hours. aspirin delayed-release 81 mg tablet   Yes No   Sig: Take  by mouth daily. b complex vitamins tablet   Yes No   Sig: Take 1 Tablet by mouth daily. buPROPion XL (WELLBUTRIN XL) 300 mg XL tablet   No No   Sig: Take 1 Tablet by mouth daily. calcium-vitamin D (OS-TRUE +D3) 500 mg-200 unit per tablet   Yes No   Si Tab. cholecalciferol, vitamin D3, 50 mcg (2,000 unit) tab   Yes No   Sig: Take 1 Tab by mouth daily.    cyclobenzaprine (FLEXERIL) 5 mg tablet   No No   Sig: TAKE 1 TABLET NIGHTLY   dicyclomine (BENTYL) 10 mg capsule   No No   Sig: TAKE 2 CAPSULES FOUR TIMES A DAY AS NEEDED hydroCHLOROthiazide (HYDRODIURIL) 25 mg tablet   No No   Sig: TAKE 1 TABLET DAILY   lisinopriL (PRINIVIL, ZESTRIL) 40 mg tablet   No No   Sig: TAKE 1 TABLET DAILY   nortriptyline (PAMELOR) 10 mg capsule   No No   Sig: Take 1 Capsule by mouth nightly. oxybutynin (DITROPAN) 5 mg tablet   No No   Sig: TAKE 1 TABLET TWICE A DAY   pantoprazole (PROTONIX) 40 mg tablet   No No   Sig: TAKE 1 TABLET DAILY   polyethylene glycol (MIRALAX) 17 gram packet   Yes No   Sig: Take 17 g by mouth daily. rosuvastatin (CRESTOR) 20 mg tablet   No No   Sig: TAKE 1 TABLET NIGHTLY   sucralfate (CARAFATE) 1 gram tablet   Yes No   Sig: Take 1 g by mouth three (3) times daily as needed. tobramycin-dexamethasone (TOBRADEX) ophthalmic suspension   Yes No   Sig: Administer 2 Drops to both eyes daily. Facility-Administered Medications: None       Allergies   Allergen Reactions    Latex Hives    Codeine Other (comments)     Severe Headache    Morphine Hives    Ambien [Zolpidem] Other (comments)     Severe behavior changes    Dilaudid [Hydromorphone] Other (comments)     Memory loss    Other Medication Rash     BANDAIDS    Shellfish Containing Products Hives       Review of Systems:    Denies numbness, tingling, chest pain, leg pain, nausea, vomiting, difficulty swallowing, headache, and dyspnea. Complains of double vision. Objective:     Vitals:    11/23/22 1530 11/23/22 1756 11/23/22 1820 11/23/22 2007   BP: 115/89 120/63     Pulse: 81 86 86 82   Resp: 28 26     Temp: 99.2 °F (37.3 °C) 98 °F (36.7 °C)     SpO2: 95% 95%     Weight:       Height:          Physical Exam:  GENERAL: Calm, cooperative, NAD  SKIN: Warm, dry, color appropriate for ethnicity. Neurologic Exam:  Mental Status:  Alert and oriented x 4. Appropriate affect, mood and behavior. Language:    Normal fluency, repetition, comprehension and naming. Cranial Nerves:   Pupils 3 mm, equal, round and reactive to light.      Visual fields full to confrontation. Extraocular movements intact in right eye, left eye with ptosis. Adduction intact and vertical movement intact. Cannot abduct left eye. Facial sensation intact. Full facial strength, no asymmetry. Hearing grossly intact bilaterally. No dysarthria. Tongue protrudes to midline, palate elevates symmetrically. Shoulder shrug 5/5 bilaterally. Motor:    No pronator drift. Bulk and tone normal.      5/5 power in all extremities proximally and distally. No involuntary movements. Sensation:    Sensation intact throughout to light touch, temperature, pinprick, vibration, proprioception     Reflexes:    Negative Babinskis    Coordination & Gait: Gait not assessed. FTN and HTS intact with no ataxia present. Labs:  Lab Results   Component Value Date/Time    WBC 10.8 11/22/2022 02:31 PM    Hemoglobin (POC) 11.9 08/29/2017 09:26 AM    HGB 13.1 11/22/2022 02:31 PM    Hematocrit (POC) 35 08/29/2017 09:26 AM    HCT 39.4 11/22/2022 02:31 PM    PLATELET 526 60/65/9007 02:31 PM    MCV 94.7 11/22/2022 02:31 PM      Lab Results   Component Value Date/Time    Sodium 137 11/22/2022 02:31 PM    Potassium 3.5 11/22/2022 02:31 PM    Chloride 104 11/22/2022 02:31 PM    CO2 28 11/22/2022 02:31 PM    Anion gap 5 11/22/2022 02:31 PM    Glucose 87 11/22/2022 02:31 PM    BUN 17 11/22/2022 02:31 PM    Creatinine 1.52 (H) 11/22/2022 02:31 PM    BUN/Creatinine ratio 11 (L) 11/22/2022 02:31 PM    GFR est AA 54 (L) 09/09/2022 10:45 AM    GFR est non-AA 45 (L) 09/09/2022 10:45 AM    Calcium 9.6 11/22/2022 02:31 PM     Lab Results   Component Value Date/Time    CK 46 11/18/2021 10:10 AM    CK - MB 3.2 07/13/2017 07:23 PM    CK-MB Index 2.5 07/13/2017 07:23 PM    Troponin-I, Qt. <0.04 07/13/2017 07:23 PM       Imaging:  CT Results (maximum last 3):   Results from [place] encounter on 11/22/22    CT HEAD WO CONT    Narrative  EXAM: CT HEAD WO CONT    INDICATION: vision loss    COMPARISON: MRI brain 5/6/2021, CT head 5/6/2020. CONTRAST: None. TECHNIQUE: Unenhanced CT of the head was performed using 5 mm images. Brain and  bone windows were generated. Coronal and sagittal reformats. CT dose reduction  was achieved through use of a standardized protocol tailored for this  examination and automatic exposure control for dose modulation. FINDINGS:  Generalized volume loss. Scattered white matter hypodensities may reflect  chronic microangiopathic change. There is no intracranial hemorrhage,  extra-axial collection, or mass effect. The basilar cisterns are open. No CT  evidence of acute infarct. The bone windows demonstrate no abnormalities. The visualized portions of the  paranasal sinuses and mastoid air cells are clear. Impression  No acute abnormality. Results from [place] encounter on 05/03/21    CT HEAD WO CONT    Narrative  EXAM: CT HEAD WO CONT    INDICATION: Assisted ground-level fall today. Lower extremity weakness. COMPARISON: CT head on 3/27/2011. TECHNIQUE: Noncontrast head CT. Coronal and sagittal reformats. CT dose  reduction was achieved through the use of a standardized protocol tailored for  this examination and automatic exposure control for dose modulation. FINDINGS: Motion artifact obscures fine detail and limits sensitivity for  detecting pathology. The ventricles and sulci are age-appropriate without hydrocephalus. There is no  mass effect or midline shift. There is no intracranial hemorrhage or extra-axial  fluid collection. Microvascular ischemic disease is increased and moderate in  the bilateral frontal and parietal periventricular white matter. No loss of  gray-white differentiation. The calvarium is intact. The visualized paranasal sinuses and mastoid air cells  are clear. Impression  Limited by motion artifact. No evidence of intracranial hemorrhage.  Increased  microvascular ischemic disease since 2011.      CT SPINE LUMB WO CONT    Narrative  *PRELIMINARY REPORT*    Status post right lateral fusion and placement of a prosthetic disc at L4-5. Gas  in the retroperitoneum is related to the recent operation. Preliminary report was provided by Dr. James Noyola, the on-call radiologist, at 10:05  PM on 5/3/2021    Final report to follow. *END PRELIMINARY REPORT*      EXAM:  CT LUMBAR SPINE WITHOUT  CONTRAST    INDICATION: Post lumbar fusion. [facility] protocol    COMPARISON: MRI 2/22/2021. CONTRAST:  None. TECHNIQUE: Multislice helical CT of the lumbar spine was performed without  intravenous contrast administration. Coronal and sagittal reformats were  generated. CT dose reduction was achieved through use of a standardized  protocol tailored for this examination and automatic exposure control for dose  modulation. FINDINGS:    There is minimal grade 1 retrolisthesis of L2 on L3. The patient is status post  right lateral fusion and prosthetic disc placement at L4-5. There is a chronic  limbus vertebra at L5. Areas of degenerative sclerosis and irregularity in the  endplates are seen at B6-0, L2-3, and L4-5. There is no fracture or compression  deformity. There is mild atelectasis at the lung bases. The patient is status post  cholecystectomy. Multiple locules of gas in the right retroperitoneum and right  abdominal wall related to the recent operation. Several are also seen along the  surface of the left psoas muscle. Lower thoracic spine: No herniation or stenosis. L1-L2: Severe disc space narrowing. Moderate broad-based disc bulge causing  moderate spinal stenosis. No significant neural foraminal narrowing. [place] L2-L3: Severe disc space narrowing. Mild broad-based disc bulge causing moderate  spinal stenosis. There is severe right neural foraminal narrowing. L3-L4: Moderate to severe disc space narrowing. Mild broad-based disc bulge  causing mild to moderate spinal stenosis.  There is moderate bilateral neural  foraminal narrowing. L4-L5: Status post right lateral fusion and prosthetic disc placement. Status  post right hemilaminotomy. Significant spinal stenosis remains. There is  moderate bilateral neural foraminal narrowing. L5-S1: Moderate disc space narrowing. Mild broad-based disc bulge with minimal  spinal stenosis. There is mild bilateral neural foraminal narrowing. .    Impression  1. Status post recent right lateral fusion and prosthetic disc placement. Gas in  the right retroperitoneum with several locules of gas along the left psoas  muscle related to the recent operation. 2.  Otherwise unchanged spondylosis as above. Assessment:     Hospital Problems  Date Reviewed: 11/18/2022            Codes Class Noted POA    Cavernous sinus thrombosis ICD-10-CM: G08  ICD-9-CM: 556  11/23/2022 Unknown         Plan:     Left cavernous sinus thrombosis  -MRVb showed no acute abnormality, moderate white matter disease. Focus blooming on gradient sequence in the left candi may reflect a focus of micro hemorrhage or hemosiderin. No definite evidence of dural venous sinus thrombosis. -MRI orbits w w/o showed asymmetric fullness and heterogeneous enhancement of left cavernous sinus with dilated left superior ophthalmic vein, concerning for cavernous sinus thrombosis, chronic microvascular ischemic dx with no acute infarction.     -Continue frequent neuro checks per unit protocol  -Continue therapeutic lovenox subcutaneous bid with conversion to oral anticoagulants at Neurology discretion   -Asa 81 mg po q day   -Considering possible diagnostic cerebral angiogram.    -Further recommendations to follow by Dr. Virgen Manrique    I have discussed the diagnosis and the intended plan as seen in the above orders with Dr. Virgen Manrique, the patient and the primary RN. Patient updated on current plan of care and is in agreement. All questions were answered.     Thank you for this consult and participating in the care of this patient. Signed By: Lyndon Zapata NP     November 23, 2022         I personally reviewed imaging, saw and evaluated the patient and agree with the NP assessment and plan as documented in the note above.

## 2022-11-24 NOTE — PROGRESS NOTES
Problem: Falls - Risk of  Goal: *Absence of Falls  Description: Document [place] Fall Risk and appropriate interventions in the flowsheet.   Outcome: Progressing Towards Goal  Note: Fall Risk Interventions:  Mobility Interventions: Patient to call before getting OOB, Strengthening exercises (ROM-active/passive)    Mentation Interventions: Bed/chair exit alarm, Increase mobility, Reorient patient    Medication Interventions: Teach patient to arise slowly, Patient to call before getting OOB, Bed/chair exit alarm    Elimination Interventions: Call light in reach, Bed/chair exit alarm              Problem: Patient Education: Go to Patient Education Activity  Goal: Patient/Family Education  Outcome: Progressing Towards Goal     Problem: Patient Education: Go to Patient Education Activity  Goal: Patient/Family Education  Outcome: Progressing Towards Goal     Problem: TIA/CVA Stroke: 0-24 hours  Goal: Off Pathway (Use only if patient is Off Pathway)  Outcome: Progressing Towards Goal  Goal: Activity/Safety  Outcome: Progressing Towards Goal  Goal: Consults, if ordered  Outcome: Progressing Towards Goal  Goal: Diagnostic Test/Procedures  Outcome: Progressing Towards Goal  Goal: Nutrition/Diet  Outcome: Progressing Towards Goal  Goal: Discharge Planning  Outcome: Progressing Towards Goal  Goal: Medications  Outcome: Progressing Towards Goal  Goal: Respiratory  Outcome: Progressing Towards Goal  Goal: Treatments/Interventions/Procedures  Outcome: Progressing Towards Goal  Goal: Minimize risk of bleeding post-thrombolytic infusion  Outcome: Progressing Towards Goal  Goal: Monitor for complications post-thrombolytic infusion  Outcome: Progressing Towards Goal  Goal: Psychosocial  Outcome: Progressing Towards Goal  Goal: *Hemodynamically stable  Outcome: Progressing Towards Goal  Goal: *Neurologically stable  Description: Absence of additional neurological deficits    Outcome: Progressing Towards Goal  Goal: *Verbalizes anxiety and depression are reduced or absent  Outcome: Progressing Towards Goal  Goal: *Absence of Signs of Aspiration on Current Diet  Outcome: Progressing Towards Goal  Goal: *Absence of deep venous thrombosis signs and symptoms(Stroke Metric)  Outcome: Progressing Towards Goal  Goal: *Ability to perform ADLs and demonstrates progressive mobility and function  Outcome: Progressing Towards Goal  Goal: *Stroke education started(Stroke Metric)  Outcome: Progressing Towards Goal  Goal: *Dysphagia screen performed(Stroke Metric)  Outcome: Progressing Towards Goal  Goal: *Rehab consulted(Stroke Metric)  Outcome: Progressing Towards Goal     Problem: TIA/CVA Stroke: Day 2 Until Discharge  Goal: Off Pathway (Use only if patient is Off Pathway)  Outcome: Progressing Towards Goal  Goal: Activity/Safety  Outcome: Progressing Towards Goal  Goal: Diagnostic Test/Procedures  Outcome: Progressing Towards Goal  Goal: Nutrition/Diet  Outcome: Progressing Towards Goal  Goal: Discharge Planning  Outcome: Progressing Towards Goal  Goal: Medications  Outcome: Progressing Towards Goal  Goal: Respiratory  Outcome: Progressing Towards Goal  Goal: Treatments/Interventions/Procedures  Outcome: Progressing Towards Goal  Goal: Psychosocial  Outcome: Progressing Towards Goal  Goal: *Verbalizes anxiety and depression are reduced or absent  Outcome: Progressing Towards Goal  Goal: *Absence of aspiration  Outcome: Progressing Towards Goal  Goal: *Absence of deep venous thrombosis signs and symptoms(Stroke Metric)  Outcome: Progressing Towards Goal  Goal: *Optimal pain control at patient's stated goal  Outcome: Progressing Towards Goal  Goal: *Tolerating diet  Outcome: Progressing Towards Goal  Goal: *Ability to perform ADLs and demonstrates progressive mobility and function  Outcome: Progressing Towards Goal  Goal: *Stroke education continued(Stroke Metric)  Outcome: Progressing Towards Goal     Problem: Ischemic Stroke: Discharge Outcomes  Goal: *Verbalizes anxiety and depression are reduced or absent  Outcome: Progressing Towards Goal  Goal: *Verbalize understanding of risk factor modification(Stroke Metric)  Outcome: Progressing Towards Goal  Goal: *Hemodynamically stable  Outcome: Progressing Towards Goal  Goal: *Absence of aspiration pneumonia  Outcome: Progressing Towards Goal  Goal: *Aware of needed dietary changes  Outcome: Progressing Towards Goal  Goal: *Verbalize understanding of prescribed medications including anti-coagulants, anti-lipid, and/or anti-platelets(Stroke Metric)  Outcome: Progressing Towards Goal  Goal: *Tolerating diet  Outcome: Progressing Towards Goal  Goal: *Aware of follow-up diagnostics related to anticoagulants  Outcome: Progressing Towards Goal  Goal: *Ability to perform ADLs and demonstrates progressive mobility and function  Outcome: Progressing Towards Goal  Goal: *Absence of DVT(Stroke Metric)  Outcome: Progressing Towards Goal  Goal: *Absence of aspiration  Outcome: Progressing Towards Goal  Goal: *Optimal pain control at patient's stated goal  Outcome: Progressing Towards Goal  Goal: *Home safety concerns addressed  Outcome: Progressing Towards Goal  Goal: *Describes available resources and support systems  Outcome: Progressing Towards Goal  Goal: *Verbalizes understanding of activation of EMS(911) for stroke symptoms(Stroke Metric)  Outcome: Progressing Towards Goal  Goal: *Understands and describes signs and symptoms to report to providers(Stroke Metric)  Outcome: Progressing Towards Goal  Goal: *Neurolgocially stable (absence of additional neurological deficits)  Outcome: Progressing Towards Goal  Goal: *Verbalizes importance of follow-up with primary care physician(Stroke Metric)  Outcome: Progressing Towards Goal  Goal: *Smoking cessation discussed,if applicable(Stroke Metric)  Outcome: Progressing Towards Goal  Goal: *Depression screening completed(Stroke Metric)  Outcome: Progressing Towards Goal     Problem: Discharge Planning  Goal: *Discharge to safe environment  Outcome: Progressing Towards Goal  Goal: *Knowledge of medication management  Outcome: Progressing Towards Goal  Goal: *Knowledge of discharge instructions  Outcome: Progressing Towards Goal     Problem: Patient Education: Go to Patient Education Activity  Goal: Patient/Family Education  Outcome: Progressing Towards Goal     Problem: Pain  Goal: *Control of Pain  Outcome: Progressing Towards Goal  Goal: *PALLIATIVE CARE:  Alleviation of Pain  Outcome: Progressing Towards Goal

## 2022-11-24 NOTE — PROGRESS NOTES
RUR: 10% Low    POORNIMA: Anticipated discharge home. Patient's  will provide transport home once medically stable. Follow-up with PCP/specialist.     Primary Contact: , Luna Padron, [phone]    *Will need 2nd IM letter prior to DC. Care Management Interventions  PCP Verified by CM: Yes (Dr. Sue Jones - last seen Summer of 2022)  Mode of Transport at Discharge: Other (see comment) ()  Transition of Care Consult (CM Consult): Discharge Planning  Discharge Durable Medical Equipment: No  Physical Therapy Consult: No  Occupational Therapy Consult: No  Speech Therapy Consult: No  Support Systems: Spouse/Significant Other  Confirm Follow Up Transport: Family  The Plan for Transition of Care is Related to the Following Treatment Goals : Home  Discharge Location  Patient Expects to be Discharged to[de-identified] Home with family assistance    Reason for Admission:  Cavenous sinus thrombosis                    RUR Score:   10% Low            Plan for utilizing home health:          PCP: First and Last name:  Gui Gonzalez MD     Name of Practice:    Are you a current patient: Yes/No: Yes   Approximate date of last visit: Summer of 2022   Can you participate in a virtual visit with your PCP: Yes                    Current Advanced Directive/Advance Care Plan: Full Code    Healthcare Decision Maker:   Click here to complete [address] including selection of the Healthcare Decision Maker Relationship (ie \"Primary\")             Primary Decision Maker: HubertAnkush - Spouse - [phone]                  Transition of Care Plan:         Home    CM met with patient at bedside to introduce self and explain role. Patient lives with her  and son in a 1 story home with 3 steps to enter. Patient was independent with ADL's and IADL's. Per patient, as of recent she has been ambulating more with the use of a cane. Patient also owns crutches and FWW.  Patient reports no history of home health or SNF/IPR. CM verified patient's PCP, demographics and insurance. Preferred pharmacy is [facility] on [address] [place] [address] in [place] with no barriers obtaining needed prescriptions. Patient's  will provide transport home once medically stable. Unit CM will continue to follow as needed.     NABIL Crandall   [phone]

## 2022-11-24 NOTE — PROGRESS NOTES
[address] [facility]  Hospitalist Group                                                                                          Hospitalist Progress Note  Dax Anand MD  Answering service: [phone] from in house phone        Date of Service:  2022  NAME:  Leilani Welch  :  1950  MRN:  [id number]      Admission Summary:   Leilani Welch is a 74 y.o. female with history of major depressive disorder, hypertension, GERD, obesity, CKD stage III, history of memory impairment, dyslipidemia who presented to ED with complaints of vision abnormalities. Patient reports near 1 week history of distorted vision. She finds it hard to describe but states she has had difficulty maintaining straight line vision in her right eye and has had episodes of swelling and blurry vision in her left eye. States she was seen at [address] [place] [address] and was prescribed a steroid taper. She returned to [address] [place] [address] today for reevaluation and was advised to come to ED for further evaluation and treatment. She has had no vision loss. Denies any visual distortion no visual symptoms at this time. No associated headaches. The patient denies any fever, chills, chest or abdominal pain, nausea, vomiting, cough, congestion, recent illness, palpitations, or dysuria. Interval history / Subjective:   Patient seen and examined earlier this morning by me for follow-up of left cavernous sinus thrombosis. Patient reports continued double vision. No headaches at this time. No other acute complaints. No events overnight. We discussed that the plan for tomorrow is diagnostic cerebral angiogram tomorrow. Assessment & Plan:     Cavernous sinus thrombosis. -- Continue Lovenox, transition to NOACs on discharge. Discussed with patient. Neurology evaluation and recommendations appreciated.   -Patient continues on Lovenox  -Plan is diagnostic cerebral angiogram tomorrow Hypertension  -Lisinopril and hydrochlorothiazide were held by the previous doctor due to DELROY. Continue to hold. Patient's blood pressure remains very good without those medications. Mood disorder  -Continue home Cymbalta, Abilify     Dyslipidemia  -Home Crestor     Detrusor instability  -Home Ditropan     IBS  -Continue home meds     Obesity  -Counseled on weight loss, dieting and exercise     DELROY: Hold lisinopril and HCTZ temporarily. Recheck BMP in a.m.  -BMP has improved but patient's blood pressure is borderline low without these medications will continue to hold. Code status: Full  Prophylaxis: Lovenox  Care Plan discussed with: Patient, nurse, subspecialists  Anticipated Disposition: 48 hours     Hospital Problems  Date Reviewed: 11/18/2022            Codes Class Noted POA    Cavernous sinus thrombosis ICD-10-CM: G08  ICD-9-CM: 321  11/23/2022 Unknown             Review of Systems:   A comprehensive review of systems was negative except for that written in the HPI. Vital Signs:    Last 24hrs VS reviewed since prior progress note. Most recent are:  Visit Vitals  BP (!) 109/59 (BP 1 Location: Right upper arm, BP Patient Position: At rest)   Pulse 93   Temp 98.9 °F (37.2 °C)   Resp 21   Ht 5' 3\" (1.6 m)   Wt 81.5 kg (179 lb 10.8 oz)   SpO2 96%   BMI 31.83 kg/m²       No intake or output data in the 24 hours ending 11/24/22 1631     Physical Examination:     I had a face to face encounter with this patient and independently examined them on 11/24/2022 as outlined below:          Constitutional:  No acute distress, cooperative, pleasant    ENT:  Oral mucosa moist, oropharynx benign. Resp:  CTA bilaterally. No wheezing/rhonchi/rales. No accessory muscle use. CV:  Regular rhythm, normal rate, no murmurs, gallops, rubs    GI:  Soft, non distended, non tender.  normoactive bowel sounds, no hepatosplenomegaly     Musculoskeletal:  No edema, warm, 2+ pulses throughout    Neurologic:  Moves all extremities. AAOx3, redness in left eye. Unable to track on left side. Data Review:    Review and/or order of clinical lab test  Review and/or order of tests in the radiology section of CPT  Review and/or order of tests in the medicine section of CPT      Labs:     Recent Labs     11/22/22  1431   WBC 10.8   HGB 13.1   HCT 39.4        Recent Labs     11/24/22  0504 11/22/22  1431    137   K 3.3* 3.5    104   CO2 26 28   BUN 19 17   CREA 1.35* 1.52*   * 87   CA 9.7 9.6     Recent Labs     11/22/22  1431   ALT 28   *   TBILI 0.5   TP 8.3*   ALB 3.7   GLOB 4.6*     No results for input(s): INR, PTP, APTT, INREXT in the last 72 hours. No results for input(s): FE, TIBC, PSAT, FERR in the last 72 hours. Lab Results   Component Value Date/Time    Folate 17.8 11/18/2021 10:10 AM      No results for input(s): PH, PCO2, PO2 in the last 72 hours. No results for input(s): CPK, CKNDX, TROIQ in the last 72 hours.     No lab exists for component: CPKMB  Lab Results   Component Value Date/Time    Cholesterol, total 165 09/09/2022 10:45 AM    HDL Cholesterol 48 09/09/2022 10:45 AM    LDL, calculated 91.8 09/09/2022 10:45 AM    Triglyceride 126 09/09/2022 10:45 AM    CHOL/HDL Ratio 3.4 09/09/2022 10:45 AM     Lab Results   Component Value Date/Time    Glucose (POC) 97 08/29/2017 09:26 AM    Glucose (POC) 112 (H) 08/19/2010 11:52 AM    Glucose POC 77 12/20/2017 04:09 PM     Lab Results   Component Value Date/Time    Color YELLOW/STRAW 05/06/2021 12:47 PM    Appearance CLEAR 05/06/2021 12:47 PM    Specific gravity 1.007 05/06/2021 12:47 PM    Specific gravity 1.025 03/16/2017 04:55 AM    pH (UA) 6.5 05/06/2021 12:47 PM    Protein Negative 05/06/2021 12:47 PM    Glucose Negative 05/06/2021 12:47 PM    Ketone Negative 05/06/2021 12:47 PM    Bilirubin Negative 05/06/2021 12:47 PM    Urobilinogen 0.2 05/06/2021 12:47 PM    Nitrites Negative 05/06/2021 12:47 PM    Leukocyte Esterase Negative 05/06/2021 12:47 PM    Epithelial cells FEW 05/06/2021 12:47 PM    Bacteria Negative 05/06/2021 12:47 PM    WBC 0-4 05/06/2021 12:47 PM    RBC 0-5 05/06/2021 12:47 PM         Medications Reviewed:     Current Facility-Administered Medications   Medication Dose Route Frequency    0.9% sodium chloride infusion  75 mL/hr IntraVENous CONTINUOUS    ARIPiprazole (ABILIFY) tablet 2 mg  2 mg Oral DAILY    aspirin delayed-release tablet 81 mg  81 mg Oral DAILY    buPROPion SR (WELLBUTRIN SR) tablet 150 mg  150 mg Oral Q12H    dicyclomine (BENTYL) capsule 20 mg  20 mg Oral QID PRN    DULoxetine (CYMBALTA) capsule 60 mg  60 mg Oral Q12H    [Held by provider] hydroCHLOROthiazide (HYDRODIURIL) tablet 25 mg  25 mg Oral DAILY    [Held by provider] lisinopriL (PRINIVIL, ZESTRIL) tablet 40 mg  40 mg Oral DAILY    nortriptyline (PAMELOR) capsule 10 mg  10 mg Oral QHS    oxybutynin (DITROPAN) tablet 5 mg  5 mg Oral BID    pantoprazole (PROTONIX) tablet 40 mg  40 mg Oral DAILY    rosuvastatin (CRESTOR) tablet 20 mg  20 mg Oral QHS    sodium chloride (NS) flush 5-40 mL  5-40 mL IntraVENous Q8H    sodium chloride (NS) flush 5-40 mL  5-40 mL IntraVENous PRN    acetaminophen (TYLENOL) tablet 650 mg  650 mg Oral Q6H PRN    Or    acetaminophen (TYLENOL) suppository 650 mg  650 mg Rectal Q6H PRN    polyethylene glycol (MIRALAX) packet 17 g  17 g Oral DAILY PRN    ondansetron (ZOFRAN ODT) tablet 4 mg  4 mg Oral Q8H PRN    Or    ondansetron (ZOFRAN) injection 4 mg  4 mg IntraVENous Q6H PRN    enoxaparin (LOVENOX) injection 80 mg  1 mg/kg SubCUTAneous Q12H     ______________________________________________________________________  EXPECTED LENGTH OF STAY: - - -  ACTUAL LENGTH OF STAY:          [facility] Kylie Harkins MD

## 2022-11-24 NOTE — CONSULTS
Neurointerventional Surgery Consult  Consuelo Hughes NP    Patient: Evelyn Valentino MRN: [id number]  SSN: [id number]    YOB: 1950  Age: 74 y.o. Sex: female      Chief Complaint:Double vision    Subjective:      Evelyn Valentino is a 74 y.o. F with a PMH of chronic pain, memory difficulties, CKD3, depression, diverticulitis, fibromyalgia, GERD, goiter, hiatal hernia, hypercholesterolemia, HTN, IBS, migraine, total colectomy, and lumbar fusion,  who presented to the ER  on 11/22/22 with complaints of double vision lasting for several weeks which started after a bout of suspected COVID at which time she was acutely ill. Her COVID test was negative but her 's test was COVID-positive at the same time. She had gone to the Ophthalmologist twice and the first time they put her on steroid eye drops. She came back on 11/22  and she was sent to the ER to rule out possible stroke. MRI orbits and MRV brain were completed showing fullness and heterogeneous enhancement of the left cavernous sinus with dilated left superior ophthalmic vein. Findings were concerning for cavernous sinus thrombosis. No acute infarction, but with chronic microvascular ischemic disease. Neurology was consulted and patient was placed on therapeutic Lovenox injections and baby aspirin. Neurointerventional Surgery was consulted for further recommendations and further imaging with DSA if needed.    Past Medical History:   Diagnosis Date    Acute alteration in mental status     Arthritis     Bilateral carotid artery stenosis     Cerebral microvascular disease     Chronic kidney disease     CKD 3    Chronic pain     CHRONIC BOWEL PAIN    Convulsions (HCC)     Depression     Disturbance of memory     Diverticulitis     Fibromyalgia     GERD (gastroesophageal reflux disease)     Goiter     Hiatal hernia     Hypercholesterolemia     Hypertension     IBS (irritable bowel syndrome)     CONSTIPATION, CHRONIC SINCE HER 20s    Insomnia TAKES TRAZODONE NIGHTLY    Migraine     REDUCED IN FREQUENCY AND SEVERITY SINCE MENOPAUSE    Rectocele      Family History   Problem Relation Age of Onset    Cancer Father         lung and bone    Stroke Sister     Cancer Sister 76        PANCREATIC    Hypertension Mother     High Cholesterol Mother     Alzheimer's Disease Mother         late 62s early 76s    Cancer Other         COLON    Cancer Other         breast    Diabetes Maternal Aunt     Cancer Maternal Uncle         COLON    Cancer Maternal Grandfather         COLON    Ovarian Cancer Daughter 28     Social History     Tobacco Use    Smoking status: Former     Packs/day: 1.00     Years: 45.00     Pack years: 45.00     Types: Cigarettes     Quit date: 2007     Years since quitting: 15.9    Smokeless tobacco: Never   Substance Use Topics    Alcohol use: Yes     Alcohol/week: 0.0 standard drinks     Comment: holidays      Prior to Admission Medications   Prescriptions Last Dose Informant Patient Reported? Taking? ARIPiprazole (ABILIFY) 2 mg tablet   No No   Sig: Take 1 Tablet by mouth daily. DULoxetine (CYMBALTA) 60 mg capsule   No No   Sig: TAKE 1 CAPSULE TWICE A DAY   Trulance 3 mg tab   Yes No   Sig: TAKE 1 TABLET BY MOUTH EVERY DAY FOR CONSTIPATION   acetaminophen (TYLENOL) 500 mg tablet   No No   Sig: Take 2 Tabs by mouth every six (6) hours. aspirin delayed-release 81 mg tablet   Yes No   Sig: Take  by mouth daily. b complex vitamins tablet   Yes No   Sig: Take 1 Tablet by mouth daily. buPROPion XL (WELLBUTRIN XL) 300 mg XL tablet   No No   Sig: Take 1 Tablet by mouth daily. calcium-vitamin D (OS-TRUE +D3) 500 mg-200 unit per tablet   Yes No   Si Tab. cholecalciferol, vitamin D3, 50 mcg (2,000 unit) tab   Yes No   Sig: Take 1 Tab by mouth daily.    cyclobenzaprine (FLEXERIL) 5 mg tablet   No No   Sig: TAKE 1 TABLET NIGHTLY   dicyclomine (BENTYL) 10 mg capsule   No No   Sig: TAKE 2 CAPSULES FOUR TIMES A DAY AS NEEDED hydroCHLOROthiazide (HYDRODIURIL) 25 mg tablet   No No   Sig: TAKE 1 TABLET DAILY   lisinopriL (PRINIVIL, ZESTRIL) 40 mg tablet   No No   Sig: TAKE 1 TABLET DAILY   nortriptyline (PAMELOR) 10 mg capsule   No No   Sig: Take 1 Capsule by mouth nightly. oxybutynin (DITROPAN) 5 mg tablet   No No   Sig: TAKE 1 TABLET TWICE A DAY   pantoprazole (PROTONIX) 40 mg tablet   No No   Sig: TAKE 1 TABLET DAILY   polyethylene glycol (MIRALAX) 17 gram packet   Yes No   Sig: Take 17 g by mouth daily. rosuvastatin (CRESTOR) 20 mg tablet   No No   Sig: TAKE 1 TABLET NIGHTLY   sucralfate (CARAFATE) 1 gram tablet   Yes No   Sig: Take 1 g by mouth three (3) times daily as needed. tobramycin-dexamethasone (TOBRADEX) ophthalmic suspension   Yes No   Sig: Administer 2 Drops to both eyes daily. Facility-Administered Medications: None       Allergies   Allergen Reactions    Latex Hives    Codeine Other (comments)     Severe Headache    Morphine Hives    Ambien [Zolpidem] Other (comments)     Severe behavior changes    Dilaudid [Hydromorphone] Other (comments)     Memory loss    Other Medication Rash     BANDAIDS    Shellfish Containing Products Hives       Review of Systems:    Denies numbness, tingling, chest pain, leg pain, nausea, vomiting, difficulty swallowing, headache, and dyspnea. Complains of double vision. Objective:     Vitals:    11/23/22 1530 11/23/22 1756 11/23/22 1820 11/23/22 2007   BP: 115/89 120/63     Pulse: 81 86 86 82   Resp: 28 26     Temp: 99.2 °F (37.3 °C) 98 °F (36.7 °C)     SpO2: 95% 95%     Weight:       Height:          Physical Exam:  GENERAL: Calm, cooperative, NAD  SKIN: Warm, dry, color appropriate for ethnicity. Neurologic Exam:  Mental Status:  Alert and oriented x 4. Appropriate affect, mood and behavior. Language:    Normal fluency, repetition, comprehension and naming. Cranial Nerves:   Pupils 3 mm, equal, round and reactive to light.      Visual fields full to confrontation. Extraocular movements intact in right eye, left eye with ptosis. Adduction intact and vertical movement intact. Cannot abduct left eye. Facial sensation intact. Full facial strength, no asymmetry. Hearing grossly intact bilaterally. No dysarthria. Tongue protrudes to midline, palate elevates symmetrically. Shoulder shrug 5/5 bilaterally. Motor:    No pronator drift. Bulk and tone normal.      5/5 power in all extremities proximally and distally. No involuntary movements. Sensation:    Sensation intact throughout to light touch, temperature, pinprick, vibration, proprioception     Reflexes:    Negative Babinskis    Coordination & Gait: Gait not assessed. FTN and HTS intact with no ataxia present. Labs:  Lab Results   Component Value Date/Time    WBC 10.8 11/22/2022 02:31 PM    Hemoglobin (POC) 11.9 08/29/2017 09:26 AM    HGB 13.1 11/22/2022 02:31 PM    Hematocrit (POC) 35 08/29/2017 09:26 AM    HCT 39.4 11/22/2022 02:31 PM    PLATELET 619 87/42/9579 02:31 PM    MCV 94.7 11/22/2022 02:31 PM      Lab Results   Component Value Date/Time    Sodium 137 11/22/2022 02:31 PM    Potassium 3.5 11/22/2022 02:31 PM    Chloride 104 11/22/2022 02:31 PM    CO2 28 11/22/2022 02:31 PM    Anion gap 5 11/22/2022 02:31 PM    Glucose 87 11/22/2022 02:31 PM    BUN 17 11/22/2022 02:31 PM    Creatinine 1.52 (H) 11/22/2022 02:31 PM    BUN/Creatinine ratio 11 (L) 11/22/2022 02:31 PM    GFR est AA 54 (L) 09/09/2022 10:45 AM    GFR est non-AA 45 (L) 09/09/2022 10:45 AM    Calcium 9.6 11/22/2022 02:31 PM     Lab Results   Component Value Date/Time    CK 46 11/18/2021 10:10 AM    CK - MB 3.2 07/13/2017 07:23 PM    CK-MB Index 2.5 07/13/2017 07:23 PM    Troponin-I, Qt. <0.04 07/13/2017 07:23 PM       Imaging:  CT Results (maximum last 3):   Results from [place] encounter on 11/22/22    CT HEAD WO CONT    Narrative  EXAM: CT HEAD WO CONT    INDICATION: vision loss    COMPARISON: MRI brain 5/6/2021, CT head 5/6/2020. CONTRAST: None. TECHNIQUE: Unenhanced CT of the head was performed using 5 mm images. Brain and  bone windows were generated. Coronal and sagittal reformats. CT dose reduction  was achieved through use of a standardized protocol tailored for this  examination and automatic exposure control for dose modulation. FINDINGS:  Generalized volume loss. Scattered white matter hypodensities may reflect  chronic microangiopathic change. There is no intracranial hemorrhage,  extra-axial collection, or mass effect. The basilar cisterns are open. No CT  evidence of acute infarct. The bone windows demonstrate no abnormalities. The visualized portions of the  paranasal sinuses and mastoid air cells are clear. Impression  No acute abnormality. Results from [place] encounter on 05/03/21    CT HEAD WO CONT    Narrative  EXAM: CT HEAD WO CONT    INDICATION: Assisted ground-level fall today. Lower extremity weakness. COMPARISON: CT head on 3/27/2011. TECHNIQUE: Noncontrast head CT. Coronal and sagittal reformats. CT dose  reduction was achieved through the use of a standardized protocol tailored for  this examination and automatic exposure control for dose modulation. FINDINGS: Motion artifact obscures fine detail and limits sensitivity for  detecting pathology. The ventricles and sulci are age-appropriate without hydrocephalus. There is no  mass effect or midline shift. There is no intracranial hemorrhage or extra-axial  fluid collection. Microvascular ischemic disease is increased and moderate in  the bilateral frontal and parietal periventricular white matter. No loss of  gray-white differentiation. The calvarium is intact. The visualized paranasal sinuses and mastoid air cells  are clear. Impression  Limited by motion artifact. No evidence of intracranial hemorrhage.  Increased  microvascular ischemic disease since 2011.      CT SPINE LUMB WO CONT    Narrative  *PRELIMINARY REPORT*    Status post right lateral fusion and placement of a prosthetic disc at L4-5. Gas  in the retroperitoneum is related to the recent operation. Preliminary report was provided by Dr. Maria Luz Vila, the on-call radiologist, at 10:05  PM on 5/3/2021    Final report to follow. *END PRELIMINARY REPORT*      EXAM:  CT LUMBAR SPINE WITHOUT  CONTRAST    INDICATION: Post lumbar fusion. [facility] protocol    COMPARISON: MRI 2/22/2021. CONTRAST:  None. TECHNIQUE: Multislice helical CT of the lumbar spine was performed without  intravenous contrast administration. Coronal and sagittal reformats were  generated. CT dose reduction was achieved through use of a standardized  protocol tailored for this examination and automatic exposure control for dose  modulation. FINDINGS:    There is minimal grade 1 retrolisthesis of L2 on L3. The patient is status post  right lateral fusion and prosthetic disc placement at L4-5. There is a chronic  limbus vertebra at L5. Areas of degenerative sclerosis and irregularity in the  endplates are seen at W4-6, L2-3, and L4-5. There is no fracture or compression  deformity. There is mild atelectasis at the lung bases. The patient is status post  cholecystectomy. Multiple locules of gas in the right retroperitoneum and right  abdominal wall related to the recent operation. Several are also seen along the  surface of the left psoas muscle. Lower thoracic spine: No herniation or stenosis. L1-L2: Severe disc space narrowing. Moderate broad-based disc bulge causing  moderate spinal stenosis. No significant neural foraminal narrowing. Yashira Fanning L2-L3: Severe disc space narrowing. Mild broad-based disc bulge causing moderate  spinal stenosis. There is severe right neural foraminal narrowing. L3-L4: Moderate to severe disc space narrowing. Mild broad-based disc bulge  causing mild to moderate spinal stenosis.  There is moderate bilateral neural  foraminal narrowing. L4-L5: Status post right lateral fusion and prosthetic disc placement. Status  post right hemilaminotomy. Significant spinal stenosis remains. There is  moderate bilateral neural foraminal narrowing. L5-S1: Moderate disc space narrowing. Mild broad-based disc bulge with minimal  spinal stenosis. There is mild bilateral neural foraminal narrowing. .    Impression  1. Status post recent right lateral fusion and prosthetic disc placement. Gas in  the right retroperitoneum with several locules of gas along the left psoas  muscle related to the recent operation. 2.  Otherwise unchanged spondylosis as above. Assessment:     Hospital Problems  Date Reviewed: 11/18/2022            Codes Class Noted POA    Cavernous sinus thrombosis ICD-10-CM: G08  ICD-9-CM: 174  11/23/2022 Unknown         Plan:     Left cavernous sinus thrombosis  -MRVb showed no acute abnormality, moderate white matter disease. Focus blooming on gradient sequence in the left candi may reflect a focus of micro hemorrhage or hemosiderin. No definite evidence of dural venous sinus thrombosis. -MRI orbits w w/o showed asymmetric fullness and heterogeneous enhancement of left cavernous sinus with dilated left superior ophthalmic vein, concerning for cavernous sinus thrombosis, chronic microvascular ischemic dx with no acute infarction.     -Continue frequent neuro checks per unit protocol  -Continue therapeutic lovenox subcutaneous bid with conversion to oral anticoagulants at Neurology discretion   -Asa 81 mg po q day   -Considering possible diagnostic cerebral angiogram.    -Further recommendations to follow by Dr. Pat Chan    I have discussed the diagnosis and the intended plan as seen in the above orders with Dr. Pat Chan, the patient and the primary RN. Patient updated on current plan of care and is in agreement. All questions were answered.     Thank you for this consult and participating in the care of this patient. Signed By: Mike May NP     November 23, 2022         I personally reviewed imaging, saw and evaluated the patient and agree with the NP assessment and plan as documented in the note above.

## 2022-11-25 ENCOUNTER — APPOINTMENT (OUTPATIENT)
Dept: INTERVENTIONAL RADIOLOGY/VASCULAR | Age: 72
DRG: 092 | End: 2022-11-25
Attending: NURSE PRACTITIONER
Payer: MEDICARE

## 2022-11-25 LAB
ANA SER QL: NEGATIVE
ANION GAP SERPL CALC-SCNC: 5 MMOL/L (ref 5–15)
BASOPHILS # BLD: 0.1 K/UL (ref 0–0.1)
BASOPHILS NFR BLD: 1 % (ref 0–1)
BUN SERPL-MCNC: 24 MG/DL (ref 6–20)
BUN/CREAT SERPL: 18 (ref 12–20)
CALCIUM SERPL-MCNC: 9 MG/DL (ref 8.5–10.1)
CHLORIDE SERPL-SCNC: 105 MMOL/L (ref 97–108)
CO2 SERPL-SCNC: 29 MMOL/L (ref 21–32)
COMMENT, HOLDF: NORMAL
CREAT SERPL-MCNC: 1.32 MG/DL (ref 0.55–1.02)
DIFFERENTIAL METHOD BLD: ABNORMAL
EOSINOPHIL # BLD: 0.2 K/UL (ref 0–0.4)
EOSINOPHIL NFR BLD: 3 % (ref 0–7)
ERYTHROCYTE [DISTWIDTH] IN BLOOD BY AUTOMATED COUNT: 12.4 % (ref 11.5–14.5)
GLUCOSE SERPL-MCNC: 90 MG/DL (ref 65–100)
HCT VFR BLD AUTO: 34.5 % (ref 35–47)
HGB BLD-MCNC: 11.6 G/DL (ref 11.5–16)
IMM GRANULOCYTES # BLD AUTO: 0 K/UL (ref 0–0.04)
IMM GRANULOCYTES NFR BLD AUTO: 0 % (ref 0–0.5)
LYMPHOCYTES # BLD: 2.1 K/UL (ref 0.8–3.5)
LYMPHOCYTES NFR BLD: 25 % (ref 12–49)
MCH RBC QN AUTO: 32 PG (ref 26–34)
MCHC RBC AUTO-ENTMCNC: 33.6 G/DL (ref 30–36.5)
MCV RBC AUTO: 95.3 FL (ref 80–99)
MONOCYTES # BLD: 0.7 K/UL (ref 0–1)
MONOCYTES NFR BLD: 8 % (ref 5–13)
NEUTS SEG # BLD: 5.2 K/UL (ref 1.8–8)
NEUTS SEG NFR BLD: 63 % (ref 32–75)
NRBC # BLD: 0 K/UL (ref 0–0.01)
NRBC BLD-RTO: 0 PER 100 WBC
PLATELET # BLD AUTO: 320 K/UL (ref 150–400)
PMV BLD AUTO: 10.4 FL (ref 8.9–12.9)
POTASSIUM SERPL-SCNC: 3.8 MMOL/L (ref 3.5–5.1)
RBC # BLD AUTO: 3.62 M/UL (ref 3.8–5.2)
SAMPLES BEING HELD,HOLD: NORMAL
SODIUM SERPL-SCNC: 139 MMOL/L (ref 136–145)
WBC # BLD AUTO: 8.3 K/UL (ref 3.6–11)

## 2022-11-25 PROCEDURE — 74011000250 HC RX REV CODE- 250: Performed by: RADIOLOGY

## 2022-11-25 PROCEDURE — 65270000046 HC RM TELEMETRY

## 2022-11-25 PROCEDURE — 36415 COLL VENOUS BLD VENIPUNCTURE: CPT

## 2022-11-25 PROCEDURE — 74011000250 HC RX REV CODE- 250: Performed by: STUDENT IN AN ORGANIZED HEALTH CARE EDUCATION/TRAINING PROGRAM

## 2022-11-25 PROCEDURE — B3181ZZ FLUOROSCOPY OF BILATERAL INTERNAL CAROTID ARTERIES USING LOW OSMOLAR CONTRAST: ICD-10-PCS | Performed by: RADIOLOGY

## 2022-11-25 PROCEDURE — B3151ZZ FLUOROSCOPY OF BILATERAL COMMON CAROTID ARTERIES USING LOW OSMOLAR CONTRAST: ICD-10-PCS | Performed by: RADIOLOGY

## 2022-11-25 PROCEDURE — 77030012468 HC VLV BLEEDBK CNTRL ABBT -B

## 2022-11-25 PROCEDURE — 77030008584 HC TOOL GDWRE DEV TERU -A

## 2022-11-25 PROCEDURE — 77030008638 HC TU CONN COOK -A

## 2022-11-25 PROCEDURE — 2709999900 HC NON-CHARGEABLE SUPPLY

## 2022-11-25 PROCEDURE — 77002 NEEDLE LOCALIZATION BY XRAY: CPT

## 2022-11-25 PROCEDURE — B31F1ZZ FLUOROSCOPY OF LEFT VERTEBRAL ARTERY USING LOW OSMOLAR CONTRAST: ICD-10-PCS | Performed by: RADIOLOGY

## 2022-11-25 PROCEDURE — C1769 GUIDE WIRE: HCPCS

## 2022-11-25 PROCEDURE — 85025 COMPLETE CBC W/AUTO DIFF WBC: CPT

## 2022-11-25 PROCEDURE — B31C1ZZ FLUOROSCOPY OF BILATERAL EXTERNAL CAROTID ARTERIES USING LOW OSMOLAR CONTRAST: ICD-10-PCS | Performed by: RADIOLOGY

## 2022-11-25 PROCEDURE — C1894 INTRO/SHEATH, NON-LASER: HCPCS

## 2022-11-25 PROCEDURE — 80048 BASIC METABOLIC PNL TOTAL CA: CPT

## 2022-11-25 PROCEDURE — 74011250636 HC RX REV CODE- 250/636: Performed by: RADIOLOGY

## 2022-11-25 PROCEDURE — C1887 CATHETER, GUIDING: HCPCS

## 2022-11-25 PROCEDURE — C1760 CLOSURE DEV, VASC: HCPCS

## 2022-11-25 PROCEDURE — 74011250637 HC RX REV CODE- 250/637: Performed by: STUDENT IN AN ORGANIZED HEALTH CARE EDUCATION/TRAINING PROGRAM

## 2022-11-25 PROCEDURE — 74011250636 HC RX REV CODE- 250/636: Performed by: STUDENT IN AN ORGANIZED HEALTH CARE EDUCATION/TRAINING PROGRAM

## 2022-11-25 PROCEDURE — 99152 MOD SED SAME PHYS/QHP 5/>YRS: CPT

## 2022-11-25 PROCEDURE — 74011000636 HC RX REV CODE- 636

## 2022-11-25 RX ORDER — SODIUM CHLORIDE 9 MG/ML
25 INJECTION, SOLUTION INTRAVENOUS
Status: DISCONTINUED | OUTPATIENT
Start: 2022-11-25 | End: 2022-11-25

## 2022-11-25 RX ORDER — LIDOCAINE 40 MG/G
CREAM TOPICAL
Status: DISCONTINUED | OUTPATIENT
Start: 2022-11-25 | End: 2022-11-25

## 2022-11-25 RX ORDER — VERAPAMIL HYDROCHLORIDE 2.5 MG/ML
2.5 INJECTION, SOLUTION INTRAVENOUS
Status: DISCONTINUED | OUTPATIENT
Start: 2022-11-25 | End: 2022-11-25

## 2022-11-25 RX ORDER — FLUMAZENIL 0.1 MG/ML
0.5 INJECTION INTRAVENOUS
Status: DISCONTINUED | OUTPATIENT
Start: 2022-11-25 | End: 2022-11-25

## 2022-11-25 RX ORDER — HEPARIN SODIUM 1000 [USP'U]/ML
2000 INJECTION, SOLUTION INTRAVENOUS; SUBCUTANEOUS
Status: DISCONTINUED | OUTPATIENT
Start: 2022-11-25 | End: 2022-11-25

## 2022-11-25 RX ORDER — MIDAZOLAM HYDROCHLORIDE 1 MG/ML
.5-5 INJECTION, SOLUTION INTRAMUSCULAR; INTRAVENOUS
Status: DISCONTINUED | OUTPATIENT
Start: 2022-11-25 | End: 2022-11-25

## 2022-11-25 RX ORDER — NALOXONE HYDROCHLORIDE 1 MG/ML
1 INJECTION INTRAMUSCULAR; INTRAVENOUS; SUBCUTANEOUS
Status: DISCONTINUED | OUTPATIENT
Start: 2022-11-25 | End: 2022-11-25

## 2022-11-25 RX ORDER — FENTANYL CITRATE 50 UG/ML
25-200 INJECTION, SOLUTION INTRAMUSCULAR; INTRAVENOUS
Status: DISCONTINUED | OUTPATIENT
Start: 2022-11-25 | End: 2022-11-25

## 2022-11-25 RX ORDER — LIDOCAINE HYDROCHLORIDE 10 MG/ML
10 INJECTION INFILTRATION; PERINEURAL
Status: COMPLETED | OUTPATIENT
Start: 2022-11-25 | End: 2022-11-25

## 2022-11-25 RX ADMIN — ENOXAPARIN SODIUM 80 MG: 100 INJECTION SUBCUTANEOUS at 12:44

## 2022-11-25 RX ADMIN — OXYBUTYNIN CHLORIDE 5 MG: 5 TABLET ORAL at 12:44

## 2022-11-25 RX ADMIN — IOPAMIDOL 81 ML: 612 INJECTION, SOLUTION INTRAVENOUS at 10:12

## 2022-11-25 RX ADMIN — BUPROPION HYDROCHLORIDE 150 MG: 150 TABLET, EXTENDED RELEASE ORAL at 21:47

## 2022-11-25 RX ADMIN — BUPROPION HYDROCHLORIDE 150 MG: 150 TABLET, EXTENDED RELEASE ORAL at 12:44

## 2022-11-25 RX ADMIN — SODIUM CHLORIDE, PRESERVATIVE FREE 10 ML: 5 INJECTION INTRAVENOUS at 18:05

## 2022-11-25 RX ADMIN — SODIUM CHLORIDE, PRESERVATIVE FREE 10 ML: 5 INJECTION INTRAVENOUS at 07:37

## 2022-11-25 RX ADMIN — SODIUM CHLORIDE, PRESERVATIVE FREE 10 ML: 5 INJECTION INTRAVENOUS at 21:49

## 2022-11-25 RX ADMIN — ARIPIPRAZOLE 2 MG: 2 TABLET ORAL at 12:48

## 2022-11-25 RX ADMIN — Medication 4000 UNITS: at 09:32

## 2022-11-25 RX ADMIN — LIDOCAINE HYDROCHLORIDE 10 ML: 10 INJECTION, SOLUTION INFILTRATION; PERINEURAL at 09:32

## 2022-11-25 RX ADMIN — ASPIRIN 81 MG: 81 TABLET, COATED ORAL at 12:44

## 2022-11-25 RX ADMIN — DULOXETINE 60 MG: 20 CAPSULE, DELAYED RELEASE ORAL at 21:47

## 2022-11-25 RX ADMIN — FENTANYL CITRATE 50 MCG: 50 INJECTION INTRAMUSCULAR; INTRAVENOUS at 09:31

## 2022-11-25 RX ADMIN — NORTRIPTYLINE HYDROCHLORIDE 10 MG: 10 CAPSULE ORAL at 21:47

## 2022-11-25 RX ADMIN — PANTOPRAZOLE SODIUM 40 MG: 40 TABLET, DELAYED RELEASE ORAL at 12:44

## 2022-11-25 RX ADMIN — OXYBUTYNIN CHLORIDE 5 MG: 5 TABLET ORAL at 18:06

## 2022-11-25 RX ADMIN — SODIUM CHLORIDE 25 ML/HR: 9 INJECTION, SOLUTION INTRAVENOUS at 09:30

## 2022-11-25 RX ADMIN — MIDAZOLAM 1 MG: 1 INJECTION INTRAMUSCULAR; INTRAVENOUS at 09:31

## 2022-11-25 RX ADMIN — Medication 4000 UNITS: at 09:26

## 2022-11-25 RX ADMIN — Medication 4000 UNITS: at 09:34

## 2022-11-25 RX ADMIN — Medication 4000 UNITS: at 09:35

## 2022-11-25 RX ADMIN — ROSUVASTATIN 20 MG: 10 TABLET, FILM COATED ORAL at 21:47

## 2022-11-25 RX ADMIN — DULOXETINE 60 MG: 20 CAPSULE, DELAYED RELEASE ORAL at 12:44

## 2022-11-25 NOTE — INTERVAL H&P NOTE
Update History & Physical    The Patient's History and Physical of November 24, 2022 was reviewed with the patient and I examined the patient. There was no change. The surgical site was confirmed by the patient and me. Plan:  The risk, benefits, expected outcome, and alternative to the recommended procedure have been discussed with the patient. Patient understands and wants to proceed with the procedure. Consent signed and on the patients chart. All questions answered. Gen: alert/oriented x 4, NAD  Resp: no wheezing, no SOB  CV: No chest pain, regular rate/rhythm, 140/56  Neuro: A&Ox4. Follows commands. Speech clear. Affect normal. PERRL, 3 mm bilaterally. Blinks to threat. Bilateral abducens nerve palsy, bilateral eyes adducted (looking towards nose, will go to midline with effort of focusing) left eye ptosis with increased redness and swelling in the left eye as noted earlier. Face symmetric. Palate symmetric. Tongue midline. Petty spontaneously. Strength 5/5 in UE and LE BL. Negative drift. Bulk and tone normal. No involuntary movements. Able to ambulate with assistance.      Electronically signed by Margaret Ernst NP on 11/25/2022 at 8:44 AM

## 2022-11-25 NOTE — PROGRESS NOTES
[facility]  Hospitalist Group                                                                                          Hospitalist Progress Note  Pilar Holland MD  Answering service: [phone] from in house phone        Date of Service:  2022  NAME:  Aniceto Hurd  :  1950  MRN:  [id number]      Admission Summary:   Aniceto Hurd is a 74 y.o. female with history of major depressive disorder, hypertension, GERD, obesity, CKD stage III, history of memory impairment, dyslipidemia who presented to ED with complaints of vision abnormalities. Patient reports near 1 week history of distorted vision. She finds it hard to describe but states she has had difficulty maintaining straight line vision in her right eye and has had episodes of swelling and blurry vision in her left eye. States she was seen at [facility] and was prescribed a steroid taper. She returned to [facility] today for reevaluation and was advised to come to ED for further evaluation and treatment. She has had no vision loss. Denies any visual distortion no visual symptoms at this time. No associated headaches. The patient denies any fever, chills, chest or abdominal pain, nausea, vomiting, cough, congestion, recent illness, palpitations, or dysuria. Interval history / Subjective:   Patient seen and examined earlier this morning by me for follow-up of left cavernous sinus thrombosis. Patient is on her way for arteriogram.  She reports continued double vision. No other complaints at the time of my exam.     Assessment & Plan:     Cavernous sinus thrombosis. -- Continue Lovenox, transition to NOACs on discharge. Discussed with patient. Neurology evaluation and recommendations appreciated.   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 Continue to hold. Patient's blood pressure remains very good without those medications. Mood disorder  -Continue home Cymbalta, Abilify     Dyslipidemia  -Home Crestor     Detrusor instability  -Home Ditropan     IBS  -Continue home meds     Obesity  -Counseled on weight loss, dieting and exercise     DELROY: Hold lisinopril and HCTZ temporarily. Recheck BMP in a.m.  -BMP has improved but patient's blood pressure is borderline low without these medications will continue to hold. Plan is to schedule coil embolization of fistula with NIS likely for Monday. Patient will be discharged tomorrow morning and will return for the procedure as an outpatient. Stopping lovenox. Code status: Full  Prophylaxis: Lovenox  Care Plan discussed with: Patient, nurse, subspecialists  Anticipated Disposition: 24 hours     Hospital Problems  Date Reviewed: 11/18/2022            Codes Class Noted POA    Cavernous sinus thrombosis ICD-10-CM: G08  ICD-9-CM: 015  11/23/2022 Yes           Review of Systems:   A comprehensive review of systems was negative except for that written in the HPI. Vital Signs:    Last 24hrs VS reviewed since prior progress note. Most recent are:  Visit Vitals  /71 (BP 1 Location: Left upper arm, BP Patient Position: At rest;Lying)   Pulse 90   Temp 98.4 °F (36.9 °C)   Resp 16   Ht 5' 3\" (1.6 m)   Wt 81.5 kg (179 lb 10.8 oz)   SpO2 97%   BMI 31.83 kg/m²         Intake/Output Summary (Last 24 hours) at 11/25/2022 1525  Last data filed at 11/25/2022 0400  Gross per 24 hour   Intake 1226.25 ml   Output --   Net 1226.25 ml          Physical Examination:     I had a face to face encounter with this patient and independently examined them on 11/25/2022 as outlined below:          Constitutional:  No acute distress, cooperative, pleasant    ENT:  Oral mucosa moist, oropharynx benign.     Resp:  CTA bilaterally. No wheezing/rhonchi/rales. No accessory muscle use. CV:  Regular rhythm, normal rate, no murmurs, gallops, rubs    GI:  Soft, non distended, non tender. normoactive bowel sounds, no hepatosplenomegaly     Musculoskeletal:  No edema, warm, 2+ pulses throughout    Neurologic:  Moves all extremities. AAOx3, redness in left eye. Unable to track on left side. Data Review:    Review and/or order of clinical lab test  Review and/or order of tests in the radiology section of CPT  Review and/or order of tests in the medicine section of CPT      Labs:     Recent Labs     11/25/22 0414   WBC 8.3   HGB 11.6   HCT 34.5*          Recent Labs     11/25/22 0414 11/24/22  0504    140   K 3.8 3.3*    105   CO2 29 26   BUN 24* 19   CREA 1.32* 1.35*   GLU 90 103*   CA 9.0 9.7       No results for input(s): ALT, AP, TBIL, TBILI, TP, ALB, GLOB, GGT, AML, LPSE in the last 72 hours. No lab exists for component: SGOT, GPT, AMYP, HLPSE    No results for input(s): INR, PTP, APTT, INREXT, INREXT in the last 72 hours. No results for input(s): FE, TIBC, PSAT, FERR in the last 72 hours. Lab Results   Component Value Date/Time    Folate 17.8 11/18/2021 10:10 AM        No results for input(s): PH, PCO2, PO2 in the last 72 hours. No results for input(s): CPK, CKNDX, TROIQ in the last 72 hours.     No lab exists for component: CPKMB  Lab Results   Component Value Date/Time    Cholesterol, total 165 09/09/2022 10:45 AM    HDL Cholesterol 48 09/09/2022 10:45 AM    LDL, calculated 91.8 09/09/2022 10:45 AM    Triglyceride 126 09/09/2022 10:45 AM    CHOL/HDL Ratio 3.4 09/09/2022 10:45 AM     Lab Results   Component Value Date/Time    Glucose (POC) 97 08/29/2017 09:26 AM    Glucose (POC) 112 (H) 08/19/2010 11:52 AM    Glucose POC 77 12/20/2017 04:09 PM     Lab Results   Component Value Date/Time    Color YELLOW/STRAW 05/06/2021 12:47 PM    Appearance CLEAR 05/06/2021 12:47 PM    Specific gravity 1.007 05/06/2021 12:47 PM    Specific gravity 1.025 03/16/2017 04:55 AM    pH (UA) 6.5 05/06/2021 12:47 PM    Protein Negative 05/06/2021 12:47 PM    Glucose Negative 05/06/2021 12:47 PM    Ketone Negative 05/06/2021 12:47 PM    Bilirubin Negative 05/06/2021 12:47 PM    Urobilinogen 0.2 05/06/2021 12:47 PM    Nitrites Negative 05/06/2021 12:47 PM    Leukocyte Esterase Negative 05/06/2021 12:47 PM    Epithelial cells FEW 05/06/2021 12:47 PM    Bacteria Negative 05/06/2021 12:47 PM    WBC 0-4 05/06/2021 12:47 PM    RBC 0-5 05/06/2021 12:47 PM         Medications Reviewed:     Current Facility-Administered Medications   Medication Dose Route Frequency    0.9% sodium chloride infusion  75 mL/hr IntraVENous CONTINUOUS    ARIPiprazole (ABILIFY) tablet 2 mg  2 mg Oral DAILY    aspirin delayed-release tablet 81 mg  81 mg Oral DAILY    buPROPion SR (WELLBUTRIN SR) tablet 150 mg  150 mg Oral Q12H    dicyclomine (BENTYL) capsule 20 mg  20 mg Oral QID PRN    DULoxetine (CYMBALTA) capsule 60 mg  60 mg Oral Q12H    [Held by provider] hydroCHLOROthiazide (HYDRODIURIL) tablet 25 mg  25 mg Oral DAILY    [Held by provider] lisinopriL (PRINIVIL, ZESTRIL) tablet 40 mg  40 mg Oral DAILY    nortriptyline (PAMELOR) capsule 10 mg  10 mg Oral QHS    oxybutynin (DITROPAN) tablet 5 mg  5 mg Oral BID    pantoprazole (PROTONIX) tablet 40 mg  40 mg Oral DAILY    rosuvastatin (CRESTOR) tablet 20 mg  20 mg Oral QHS    sodium chloride (NS) flush 5-40 mL  5-40 mL IntraVENous Q8H    sodium chloride (NS) flush 5-40 mL  5-40 mL IntraVENous PRN    acetaminophen (TYLENOL) tablet 650 mg  650 mg Oral Q6H PRN    Or    acetaminophen (TYLENOL) suppository 650 mg  650 mg Rectal Q6H PRN    polyethylene glycol (MIRALAX) packet 17 g  17 g Oral DAILY PRN    ondansetron (ZOFRAN ODT) tablet 4 mg  4 mg Oral Q8H PRN    Or    ondansetron (ZOFRAN) injection 4 mg  4 mg IntraVENous Q6H PRN    enoxaparin (LOVENOX) injection 80 mg  1 mg/kg SubCUTAneous Q12H ______________________________________________________________________  EXPECTED LENGTH OF STAY: 3d 0h  ACTUAL LENGTH OF STAY:          [id number] [facility] Jerry Eller MD

## 2022-11-25 NOTE — PROGRESS NOTES
Neurocritical Care Brief Progress Note:    Dr. Raine Canales and Dr. Sia Ashby met with the patient and her family in her room to explain about the carotid cavernous fistula and plans for treatment. Dr. Raine Canales would like to do a diagnostic cerebral angiogram with possible embolization of carotid cavernous fistula with possible groin closure device on Monday, 11/27/22. Risks/Benefits/Alternatives discussed with family at bedside and  on phone. All questions were answered and consent was signed by the patient. If pt remains stable overnight, she may go home in the morning and return on Monday. Plan:   - D/C in am if stable overnight   - Return Monday am for DSA with embolization   - NPO after midnight on Sunday    - No need to continue anticoagulation    - Put pt on schedule for 0900 and consent place in Neuro consent folder in IR. Skin: Warm, dry, color appropriate for ethnicity. Groin arteriotomy site soft and non-tender on palpation, dressing is C/D/I, with no active bleeding or drainage noted.        Anil Arias NP  Neurocritical Care Nurse Practitioner  [phone]

## 2022-11-25 NOTE — BRIEF OP NOTE
Neuro interventional surgery brief Procedure Note      Patient: Varun Crenshaw MRN: [id number]  SSN: [id number]    YOB: 1950  Age: 74 y.o. 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 Post-Procedure Diagnosis: SAME    Procedure(s): Catheter cerebral arteriogram    Vessels selected: Right common carotid artery, right internal carotid artery, right external carotid artery, left common carotid artery, left internal carotid artery, left external carotid artery, left vertebral artery. 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 No definite drainage into the petrosal sinuses noted. No additional aneurysm, AV malformation or hemodynamically significant stenosis noted. Right common femoral arterial puncture site hemostasis achieved by deploying Angio-Seal closure device without complications. Anesthesia:Moderate Sedation    Estimated Blood Loss: 10 ml. Specimens:  None    Implants:  None    Apparent Intraoperative Complications:  NONE    Patient Condition: Stable    Disposition: Inpatient unit    Attestation:  I performed the procedure.         Signed By: Jim Nix MD     November 25, 2022

## 2022-11-25 NOTE — PROGRESS NOTES
Problem: Falls - Risk of  Goal: *Absence of Falls  Description: Document Varsha Gutierrez Fall Risk and appropriate interventions in the flowsheet.   Outcome: Progressing Towards Goal  Note: Fall Risk Interventions:  Mobility Interventions: Patient to call before getting OOB, Utilize walker, cane, or other assistive device    Mentation Interventions: Bed/chair exit alarm, Increase mobility, Reorient patient, Room close to nurse's station    Medication Interventions: Teach patient to arise slowly, Patient to call before getting OOB    Elimination Interventions: Call light in reach, Patient to call for help with toileting needs, Stay With Me (per policy)              Problem: Patient Education: Go to Patient Education Activity  Goal: Patient/Family Education  Outcome: Progressing Towards Goal     Problem: Patient Education: Go to Patient Education Activity  Goal: Patient/Family Education  Outcome: Progressing Towards Goal     Problem: TIA/CVA Stroke: 0-24 hours  Goal: Off Pathway (Use only if patient is Off Pathway)  Outcome: Progressing Towards Goal  Goal: Activity/Safety  Outcome: Progressing Towards Goal  Goal: Consults, if ordered  Outcome: Progressing Towards Goal  Goal: Diagnostic Test/Procedures  Outcome: Progressing Towards Goal  Goal: Nutrition/Diet  Outcome: Progressing Towards Goal  Goal: Discharge Planning  Outcome: Progressing Towards Goal  Goal: Medications  Outcome: Progressing Towards Goal  Goal: Respiratory  Outcome: Progressing Towards Goal  Goal: Treatments/Interventions/Procedures  Outcome: Progressing Towards Goal  Goal: Minimize risk of bleeding post-thrombolytic infusion  Outcome: Progressing Towards Goal  Goal: Monitor for complications post-thrombolytic infusion  Outcome: Progressing Towards Goal  Goal: Psychosocial  Outcome: Progressing Towards Goal  Goal: *Hemodynamically stable  Outcome: Progressing Towards Goal  Goal: *Neurologically stable  Description: Absence of additional neurological deficits    Outcome: Progressing Towards Goal  Goal: *Verbalizes anxiety and depression are reduced or absent  Outcome: Progressing Towards Goal  Goal: *Absence of Signs of Aspiration on Current Diet  Outcome: Progressing Towards Goal  Goal: *Absence of deep venous thrombosis signs and symptoms(Stroke Metric)  Outcome: Progressing Towards Goal  Goal: *Ability to perform ADLs and demonstrates progressive mobility and function  Outcome: Progressing Towards Goal  Goal: *Stroke education started(Stroke Metric)  Outcome: Progressing Towards Goal  Goal: *Dysphagia screen performed(Stroke Metric)  Outcome: Progressing Towards Goal  Goal: *Rehab consulted(Stroke Metric)  Outcome: Progressing Towards Goal     Problem: TIA/CVA Stroke: Day 2 Until Discharge  Goal: Off Pathway (Use only if patient is Off Pathway)  Outcome: Progressing Towards Goal  Goal: Activity/Safety  Outcome: Progressing Towards Goal  Goal: Diagnostic Test/Procedures  Outcome: Progressing Towards Goal  Goal: Nutrition/Diet  Outcome: Progressing Towards Goal  Goal: Discharge Planning  Outcome: Progressing Towards Goal  Goal: Medications  Outcome: Progressing Towards Goal  Goal: Respiratory  Outcome: Progressing Towards Goal  Goal: Treatments/Interventions/Procedures  Outcome: Progressing Towards Goal  Goal: Psychosocial  Outcome: Progressing Towards Goal  Goal: *Verbalizes anxiety and depression are reduced or absent  Outcome: Progressing Towards Goal  Goal: *Absence of aspiration  Outcome: Progressing Towards Goal  Goal: *Absence of deep venous thrombosis signs and symptoms(Stroke Metric)  Outcome: Progressing Towards Goal  Goal: *Optimal pain control at patient's stated goal  Outcome: Progressing Towards Goal  Goal: *Tolerating diet  Outcome: Progressing Towards Goal  Goal: *Ability to perform ADLs and demonstrates progressive mobility and function  Outcome: Progressing Towards Goal  Goal: *Stroke education continued(Stroke Metric)  Outcome: Progressing Towards Goal     Problem: Ischemic Stroke: Discharge Outcomes  Goal: *Verbalizes anxiety and depression are reduced or absent  Outcome: Progressing Towards Goal  Goal: *Verbalize understanding of risk factor modification(Stroke Metric)  Outcome: Progressing Towards Goal  Goal: *Hemodynamically stable  Outcome: Progressing Towards Goal  Goal: *Absence of aspiration pneumonia  Outcome: Progressing Towards Goal  Goal: *Aware of needed dietary changes  Outcome: Progressing Towards Goal  Goal: *Verbalize understanding of prescribed medications including anti-coagulants, anti-lipid, and/or anti-platelets(Stroke Metric)  Outcome: Progressing Towards Goal  Goal: *Tolerating diet  Outcome: Progressing Towards Goal  Goal: *Aware of follow-up diagnostics related to anticoagulants  Outcome: Progressing Towards Goal  Goal: *Ability to perform ADLs and demonstrates progressive mobility and function  Outcome: Progressing Towards Goal  Goal: *Absence of DVT(Stroke Metric)  Outcome: Progressing Towards Goal  Goal: *Absence of aspiration  Outcome: Progressing Towards Goal  Goal: *Optimal pain control at patient's stated goal  Outcome: Progressing Towards Goal  Goal: *Home safety concerns addressed  Outcome: Progressing Towards Goal  Goal: *Describes available resources and support systems  Outcome: Progressing Towards Goal  Goal: *Verbalizes understanding of activation of EMS(911) for stroke symptoms(Stroke Metric)  Outcome: Progressing Towards Goal  Goal: *Understands and describes signs and symptoms to report to providers(Stroke Metric)  Outcome: Progressing Towards Goal  Goal: *Neurolgocially stable (absence of additional neurological deficits)  Outcome: Progressing Towards Goal  Goal: *Verbalizes importance of follow-up with primary care physician(Stroke Metric)  Outcome: Progressing Towards Goal  Goal: *Smoking cessation discussed,if applicable(Stroke Metric)  Outcome: Progressing Towards Goal  Goal: *Depression screening completed(Stroke Metric)  Outcome: Progressing Towards Goal     Problem: Discharge Planning  Goal: *Discharge to safe environment  Outcome: Progressing Towards Goal  Goal: *Knowledge of medication management  Outcome: Progressing Towards Goal  Goal: *Knowledge of discharge instructions  Outcome: Progressing Towards Goal     Problem: Patient Education: Go to Patient Education Activity  Goal: Patient/Family Education  Outcome: Progressing Towards Goal     Problem: Pain  Goal: *Control of Pain  Outcome: Progressing Towards Goal  Goal: *PALLIATIVE CARE:  Alleviation of Pain  Outcome: Progressing Towards Goal

## 2022-11-25 NOTE — PROGRESS NOTES
Neurointerventional Surgery Brief Progress Note:    Pt has been NPO since last night. Has has no pills this morning. Keeps left eye close. 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 Consent is signed and on chart.  is at bedside. Transport is here to get her. Denies chest pain, SOB, wheezing, N/V. /56    Physical Exam:  Gen: NAD, calm, cooperative  Neuro: A&Ox4. Follows commands. Speech clear. Affect normal. PERRL, 3 mm bilaterally. Blinks to threat. Bilateral abducens nerve palsy, bilateral eyes adducted (looking towards nose, will go to midline with effort of focusing) left eye ptosis with increased redness and swelling in the left eye as noted earlier. Face symmetric. Palate symmetric. Tongue midline. Petty spontaneously. Strength 5/5 in UE and LE BL. Negative drift. Bulk and tone normal. No involuntary movements. Able to ambulate with assistance. Skin: Warm, dry, color appropriate for ethnicity.        Francis Pacheco NP  Neurocritical Care Nurse Practitioner  [phone]

## 2022-11-25 NOTE — ROUTINE PROCESS
0820: Received report from RN. [id number]: Pt arrives via stretcher to angio department  for diagnostic cerebral angiogram and possible placement of groin closure device procedure. All assessments completed and consent was reviewed. Education given was regarding procedure, moderate sedation, post-procedure care and  management/follow-up. Opportunity for questions was provided and all questions and concerns were addressed. Name of procedure: diagnostic cerebral angiogram     Sedation medication given: 50 mcg fent, 1 mg versed    Sedation tolerated: well    Total sedation time:42 minutes    Vital Signs: stable    1045: TRANSFER - OUT REPORT:    Verbal report given to Jerzy Layne RN(name) on Idania Crystal  being transferred to [facility](unit) for routine post - op       Report consisted of patients Situation, Background, Assessment and   Recommendations(SBAR). Information from the following report(s) Procedure Summary, Intake/Output, MAR, and Cardiac Rhythm NSR  was reviewed with the receiving nurse. Lines:   Peripheral IV 11/22/22 Left Antecubital (Active)   Site Assessment Clean, dry, & intact 11/25/22 0400   Phlebitis Assessment 0 11/25/22 0400   Infiltration Assessment 0 11/25/22 0400   Dressing Status Clean, dry, & intact 11/25/22 0400   Dressing Type Transparent;Tape 11/25/22 0400   Hub Color/Line Status Pink; Infusing 11/25/22 0400   Action Taken Open ports on tubing capped 11/25/22 0400   Alcohol Cap Used Yes 11/25/22 0400        Opportunity for questions and clarification was provided.       Patient transported with:   RABT

## 2022-11-26 VITALS
HEIGHT: 63 IN | DIASTOLIC BLOOD PRESSURE: 86 MMHG | HEART RATE: 82 BPM | SYSTOLIC BLOOD PRESSURE: 142 MMHG | BODY MASS INDEX: 31.84 KG/M2 | OXYGEN SATURATION: 97 % | TEMPERATURE: 98.2 F | RESPIRATION RATE: 21 BRPM | WEIGHT: 179.68 LBS

## 2022-11-26 PROBLEM — G08 CAVERNOUS SINUS THROMBOSIS: Status: RESOLVED | Noted: 2022-11-23 | Resolved: 2022-11-26

## 2022-11-26 LAB
ANION GAP SERPL CALC-SCNC: 6 MMOL/L (ref 5–15)
BASOPHILS # BLD: 0.1 K/UL (ref 0–0.1)
BASOPHILS NFR BLD: 1 % (ref 0–1)
BUN SERPL-MCNC: 18 MG/DL (ref 6–20)
BUN/CREAT SERPL: 16 (ref 12–20)
CALCIUM SERPL-MCNC: 9.2 MG/DL (ref 8.5–10.1)
CHLORIDE SERPL-SCNC: 107 MMOL/L (ref 97–108)
CO2 SERPL-SCNC: 25 MMOL/L (ref 21–32)
CREAT SERPL-MCNC: 1.15 MG/DL (ref 0.55–1.02)
DIFFERENTIAL METHOD BLD: ABNORMAL
EOSINOPHIL # BLD: 0.2 K/UL (ref 0–0.4)
EOSINOPHIL NFR BLD: 2 % (ref 0–7)
ERYTHROCYTE [DISTWIDTH] IN BLOOD BY AUTOMATED COUNT: 12.3 % (ref 11.5–14.5)
GLUCOSE SERPL-MCNC: 115 MG/DL (ref 65–100)
HCT VFR BLD AUTO: 38.3 % (ref 35–47)
HGB BLD-MCNC: 12.5 G/DL (ref 11.5–16)
IMM GRANULOCYTES # BLD AUTO: 0.1 K/UL (ref 0–0.04)
IMM GRANULOCYTES NFR BLD AUTO: 1 % (ref 0–0.5)
LYMPHOCYTES # BLD: 1.9 K/UL (ref 0.8–3.5)
LYMPHOCYTES NFR BLD: 22 % (ref 12–49)
MCH RBC QN AUTO: 32.1 PG (ref 26–34)
MCHC RBC AUTO-ENTMCNC: 32.6 G/DL (ref 30–36.5)
MCV RBC AUTO: 98.5 FL (ref 80–99)
MONOCYTES # BLD: 0.7 K/UL (ref 0–1)
MONOCYTES NFR BLD: 7 % (ref 5–13)
NEUTS SEG # BLD: 5.9 K/UL (ref 1.8–8)
NEUTS SEG NFR BLD: 67 % (ref 32–75)
NRBC # BLD: 0 K/UL (ref 0–0.01)
NRBC BLD-RTO: 0 PER 100 WBC
PLATELET # BLD AUTO: 307 K/UL (ref 150–400)
PMV BLD AUTO: 9.8 FL (ref 8.9–12.9)
POTASSIUM SERPL-SCNC: 4 MMOL/L (ref 3.5–5.1)
RBC # BLD AUTO: 3.89 M/UL (ref 3.8–5.2)
SODIUM SERPL-SCNC: 138 MMOL/L (ref 136–145)
WBC # BLD AUTO: 8.9 K/UL (ref 3.6–11)

## 2022-11-26 PROCEDURE — 74011000250 HC RX REV CODE- 250: Performed by: STUDENT IN AN ORGANIZED HEALTH CARE EDUCATION/TRAINING PROGRAM

## 2022-11-26 PROCEDURE — 85025 COMPLETE CBC W/AUTO DIFF WBC: CPT

## 2022-11-26 PROCEDURE — 36415 COLL VENOUS BLD VENIPUNCTURE: CPT

## 2022-11-26 PROCEDURE — 80048 BASIC METABOLIC PNL TOTAL CA: CPT

## 2022-11-26 PROCEDURE — 74011250637 HC RX REV CODE- 250/637: Performed by: STUDENT IN AN ORGANIZED HEALTH CARE EDUCATION/TRAINING PROGRAM

## 2022-11-26 RX ADMIN — BUPROPION HYDROCHLORIDE 150 MG: 150 TABLET, EXTENDED RELEASE ORAL at 10:00

## 2022-11-26 RX ADMIN — ARIPIPRAZOLE 2 MG: 2 TABLET ORAL at 10:00

## 2022-11-26 RX ADMIN — SODIUM CHLORIDE, PRESERVATIVE FREE 10 ML: 5 INJECTION INTRAVENOUS at 05:45

## 2022-11-26 RX ADMIN — DULOXETINE 60 MG: 20 CAPSULE, DELAYED RELEASE ORAL at 10:00

## 2022-11-26 RX ADMIN — PANTOPRAZOLE SODIUM 40 MG: 40 TABLET, DELAYED RELEASE ORAL at 10:00

## 2022-11-26 RX ADMIN — OXYBUTYNIN CHLORIDE 5 MG: 5 TABLET ORAL at 10:00

## 2022-11-26 RX ADMIN — ASPIRIN 81 MG: 81 TABLET, COATED ORAL at 10:00

## 2022-11-26 NOTE — DISCHARGE INSTRUCTIONS
YOUR BLOOD PRESSURE REMAINED CONTROLLED WITHOUT MEDICATION DURING YOUR STAY. HOLD OFF ON TAKING YOUR LISINOPRIL AND HYDROCHLOROTHIAZIDE FOR NOW. MEASURE YOUR BLOOD PRESSURE 3 TIMES PER DAY. IF YOUR BLOOD PRESSURE STARTS TO GO ABOVE 140'S 'S, RESTART TAKING YOUR LISINOPRIL FIRST AND CONTINUE TO MEASURE YOUR BLOOD PRESSURE 3 TIMES A DAY. IF BLOOD PRESSURE IS STILL ABOVE 140'S 'S, THEN RESTART HYDROCHLOROTHIAZIDE. CALL YOUR PRIMARY DOCTOR'S OFFICE TO LET THEM KNOW YOUR BLOOD PRESSURE MEASUREMENTS AND THEY CAN ASSIST IN RESTARTING YOUR MEDS.

## 2022-11-26 NOTE — PROGRESS NOTES
Neurointerventional Surgery Brief Note:    Pt seen in her room, sitting on side of bed. No acute changes overnight. From NIS perspective, pt is okay to be discharged from the hospital. No change from prior neuro exams. Plan:  - Return Monday am for DSA with embolization   - NPO after midnight on Sunday   - Put pt on schedule for 0900 and consent place in Neuro consent folder in IR.  - Please reconsult NIS if pt exhibits sudden change in status      Skin: Warm, dry, color appropriate for ethnicity. Groin arteriotomy site soft and non-tender on palpation, dressing removed, with no active bleeding or drainage noted. Neuro: A&Ox4. Follows commands. Speech clear. Affect normal. PERRL, 3 mm bilaterally. Blinks to threat. Bilateral abducens nerve palsy, bilateral eyes adducted (looking towards nose, will go to midline with effort of focusing) left eye ptosis with increased redness and swelling in the left eye as noted earlier. Face symmetric. Palate symmetric. Tongue midline. Petty spontaneously. Strength 5/5 in UE and LE BL. Negative drift. Bulk and tone normal. No involuntary movements.  Able to ambulate with assistance    Tyrell Brar NP  Neurocritical Care Nurse Practitioner  [phone]

## 2022-11-26 NOTE — PROGRESS NOTES
Problem: Falls - Risk of  Goal: *Absence of Falls  Description: Document [place] Fall Risk and appropriate interventions in the flowsheet.   Outcome: Progressing Towards Goal  Note: Fall Risk Interventions:  Mobility Interventions: Bed/chair exit alarm, Patient to call before getting OOB    Mentation Interventions: Bed/chair exit alarm, Adequate sleep, hydration, pain control    Medication Interventions: Bed/chair exit alarm, Teach patient to arise slowly, Patient to call before getting OOB    Elimination Interventions: Call light in reach, Bed/chair exit alarm, Stay With Me (per policy), Patient to call for help with toileting needs              Problem: Patient Education: Go to Patient Education Activity  Goal: Patient/Family Education  Outcome: Progressing Towards Goal     Problem: Patient Education: Go to Patient Education Activity  Goal: Patient/Family Education  Outcome: Progressing Towards Goal     Problem: TIA/CVA Stroke: 0-24 hours  Goal: Off Pathway (Use only if patient is Off Pathway)  Outcome: Progressing Towards Goal  Goal: Activity/Safety  Outcome: Progressing Towards Goal  Goal: Consults, if ordered  Outcome: Progressing Towards Goal  Goal: Diagnostic Test/Procedures  Outcome: Progressing Towards Goal  Goal: Nutrition/Diet  Outcome: Progressing Towards Goal  Goal: Discharge Planning  Outcome: Progressing Towards Goal  Goal: Medications  Outcome: Progressing Towards Goal  Goal: Respiratory  Outcome: Progressing Towards Goal  Goal: Treatments/Interventions/Procedures  Outcome: Progressing Towards Goal  Goal: Minimize risk of bleeding post-thrombolytic infusion  Outcome: Progressing Towards Goal  Goal: Monitor for complications post-thrombolytic infusion  Outcome: Progressing Towards Goal  Goal: Psychosocial  Outcome: Progressing Towards Goal  Goal: *Hemodynamically stable  Outcome: Progressing Towards Goal  Goal: *Neurologically stable  Description: Absence of additional neurological deficits    Outcome: Progressing Towards Goal  Goal: *Verbalizes anxiety and depression are reduced or absent  Outcome: Progressing Towards Goal  Goal: *Absence of Signs of Aspiration on Current Diet  Outcome: Progressing Towards Goal  Goal: *Absence of deep venous thrombosis signs and symptoms(Stroke Metric)  Outcome: Progressing Towards Goal  Goal: *Ability to perform ADLs and demonstrates progressive mobility and function  Outcome: Progressing Towards Goal  Goal: *Stroke education started(Stroke Metric)  Outcome: Progressing Towards Goal  Goal: *Dysphagia screen performed(Stroke Metric)  Outcome: Progressing Towards Goal  Goal: *Rehab consulted(Stroke Metric)  Outcome: Progressing Towards Goal     Problem: TIA/CVA Stroke: Day 2 Until Discharge  Goal: Off Pathway (Use only if patient is Off Pathway)  Outcome: Progressing Towards Goal  Goal: Activity/Safety  Outcome: Progressing Towards Goal  Goal: Diagnostic Test/Procedures  Outcome: Progressing Towards Goal  Goal: Nutrition/Diet  Outcome: Progressing Towards Goal  Goal: Discharge Planning  Outcome: Progressing Towards Goal  Goal: Medications  Outcome: Progressing Towards Goal  Goal: Respiratory  Outcome: Progressing Towards Goal  Goal: Treatments/Interventions/Procedures  Outcome: Progressing Towards Goal  Goal: Psychosocial  Outcome: Progressing Towards Goal  Goal: *Verbalizes anxiety and depression are reduced or absent  Outcome: Progressing Towards Goal  Goal: *Absence of aspiration  Outcome: Progressing Towards Goal  Goal: *Absence of deep venous thrombosis signs and symptoms(Stroke Metric)  Outcome: Progressing Towards Goal  Goal: *Optimal pain control at patient's stated goal  Outcome: Progressing Towards Goal  Goal: *Tolerating diet  Outcome: Progressing Towards Goal  Goal: *Ability to perform ADLs and demonstrates progressive mobility and function  Outcome: Progressing Towards Goal  Goal: *Stroke education continued(Stroke Metric)  Outcome: Progressing Towards Goal     Problem: Ischemic Stroke: Discharge Outcomes  Goal: *Verbalizes anxiety and depression are reduced or absent  Outcome: Progressing Towards Goal  Goal: *Verbalize understanding of risk factor modification(Stroke Metric)  Outcome: Progressing Towards Goal  Goal: *Hemodynamically stable  Outcome: Progressing Towards Goal  Goal: *Absence of aspiration pneumonia  Outcome: Progressing Towards Goal  Goal: *Aware of needed dietary changes  Outcome: Progressing Towards Goal  Goal: *Verbalize understanding of prescribed medications including anti-coagulants, anti-lipid, and/or anti-platelets(Stroke Metric)  Outcome: Progressing Towards Goal  Goal: *Tolerating diet  Outcome: Progressing Towards Goal  Goal: *Aware of follow-up diagnostics related to anticoagulants  Outcome: Progressing Towards Goal  Goal: *Ability to perform ADLs and demonstrates progressive mobility and function  Outcome: Progressing Towards Goal  Goal: *Absence of DVT(Stroke Metric)  Outcome: Progressing Towards Goal  Goal: *Absence of aspiration  Outcome: Progressing Towards Goal  Goal: *Optimal pain control at patient's stated goal  Outcome: Progressing Towards Goal  Goal: *Home safety concerns addressed  Outcome: Progressing Towards Goal  Goal: *Describes available resources and support systems  Outcome: Progressing Towards Goal  Goal: *Verbalizes understanding of activation of EMS(911) for stroke symptoms(Stroke Metric)  Outcome: Progressing Towards Goal  Goal: *Understands and describes signs and symptoms to report to providers(Stroke Metric)  Outcome: Progressing Towards Goal  Goal: *Neurolgocially stable (absence of additional neurological deficits)  Outcome: Progressing Towards Goal  Goal: *Verbalizes importance of follow-up with primary care physician(Stroke Metric)  Outcome: Progressing Towards Goal  Goal: *Smoking cessation discussed,if applicable(Stroke Metric)  Outcome: Progressing Towards Goal  Goal: *Depression screening completed(Stroke Metric)  Outcome: Progressing Towards Goal     Problem: Discharge Planning  Goal: *Discharge to safe environment  Outcome: Progressing Towards Goal  Goal: *Knowledge of medication management  Outcome: Progressing Towards Goal  Goal: *Knowledge of discharge instructions  Outcome: Progressing Towards Goal     Problem: Patient Education: Go to Patient Education Activity  Goal: Patient/Family Education  Outcome: Progressing Towards Goal     Problem: Pain  Goal: *Control of Pain  Outcome: Progressing Towards Goal  Goal: *PALLIATIVE CARE:  Alleviation of Pain  Outcome: Progressing Towards Goal

## 2022-11-26 NOTE — PROGRESS NOTES
CM gave patient second Medicare IM Letter which informed patient of the right to appeal the discharge. Patient signed the original which was given to the patient and a copy was placed on the chart.

## 2022-11-26 NOTE — DISCHARGE SUMMARY
Discharge Summary       PATIENT ID: Jaleesa Cabrera  MRN: [id number]   YOB: 1950    DATE OF ADMISSION: 11/22/2022  3:36 PM    DATE OF DISCHARGE: 11/25/22   PRIMARY CARE PROVIDER: Brenna Soliz MD     ATTENDING PHYSICIAN: Katrin Portillo MD  DISCHARGING PROVIDER: Katrin Portillo MD    To contact this individual call [phone] and ask the  to page. If unavailable ask to be transferred the Adult Hospitalist Department. CONSULTATIONS: IP CONSULT TO NEUROINTERVENTIONAL SURGERY  IP CONSULT TO NEUROINTERVENTIONAL SURGERY  IP CONSULT TO NEUROLOGY    PROCEDURES/SURGERIES: * No surgery found *    ADMITTING DIAGNOSES & HOSPITAL COURSE:   Jaleesa Cabrera is a 74 y.o. female with history of major depressive disorder, hypertension, GERD, obesity, CKD stage III, history of memory impairment, dyslipidemia who presented to ED with complaints of vision abnormalities. Patient reports near 1 week history of distorted vision. She finds it hard to describe but states she has had difficulty maintaining straight line vision in her right eye and has had episodes of swelling and blurry vision in her left eye. States she was seen at [address] [place] [address] and was prescribed a steroid taper. She returned to [address] [place] [address] today for reevaluation and was advised to come to ED for further evaluation and treatment. She has had no vision loss. Denies any visual distortion no visual symptoms at this time. No associated headaches. The patient denies any fever, chills, chest or abdominal pain, nausea, vomiting, cough, congestion, recent illness, palpitations, or dysuria. Remarkable vitals on ER Presentations: Vital signs stable. Labs Remarkable for: Elevated CRP and ESR  ER Images: CT head showed no acute process. MRI orbits and MRV brain: Asymmetric fullness and heterogeneous enhancement left cavernous sinus with  dilated left superior ophthalmic vein.  Findings concerning for cavernous sinus  thrombosis given clinical history  ER treatment: Therapeutic Lovenox    Cavernous sinus thrombosis  -continue therapeutic lovenox  -Check MELVA, ANCa, echocardiogram  -No interventional surgery on consult     Hypertension  -Continue home medications     Mood disorder  -Continue home Cymbalta, Abilify     Precipitating  -Home Crestor     Detrusor instability  -Home Ditropan     IBS  -Continue home meds     Obesity  -Counseled on weight loss, dieting and exercise    Hospital course  Patient was admitted and started on therapeutic Lovenox for potential cavernous sinus thrombosis. Patient was evaluated with neuro interventional surgery and underwent arteriogram which showed a cavernous-carotid fistula. Anticoagulation was stopped. Patient discussed with neuro interventional surgery and it was decided that intervention would be attempted the following week. Was discharged to come back on Monday for procedure. Of note, during hospital stay the patient's blood pressure medication was stopped due to slight DELROY and the patient did not require blood pressure medication after that. I spoke to the patient at length about holding her blood pressure medication and measuring blood pressure at home 3 times a day and gave instructions as to how to restart medication as needed. I recommend that she talk to her primary care provider as well and have them follow along. DISCHARGE DIAGNOSES / PLAN:      Indirect carotid-cavernous fistula. Procedure scheduled for Monday. Hypertension. Patient did not require blood pressure medication. Hold blood pressure medication and take blood pressure 3 times a day slowly reintroducing the lisinopril first and hydrochlorothiazide second based on blood pressure readings above 140s to 150s. Talk to your primary care provider for assistance in restarting medication. Mood disorder. Continue home meds. Dyslipidemia. Continue home meds. Detrusor instability.   Continue home meds. IBS. Home meds. Obesity. Counseled. DELROY. Resolved. PENDING TEST RESULTS:   At the time of discharge the following test results are still pending: none    FOLLOW UP APPOINTMENTS:    Follow-up Information       Follow up With Specialties Details Why Pietro Springer MD Neuroradiology Go on 11/28/2022 diagnositc angiogram planned for Monday 9 am. [address] [facility] [address] [facility]      Tanvir Minor, [address] [phone]      Tanvir Minor MD Family Medicine Call Call to schedule hospital follow up within 1 week. [address] [facility]  [phone]               ADDITIONAL CARE RECOMMENDATIONS: none    DIET: Regular Diet    ACTIVITY: Activity as tolerated    WOUND CARE: none    EQUIPMENT needed: none      DISCHARGE MEDICATIONS:  Discharge Medication List as of 11/26/2022 11:01 AM        CONTINUE these medications which have NOT CHANGED    Details   Trulance 3 mg tab TAKE 1 TABLET BY MOUTH EVERY DAY FOR CONSTIPATION, Historical Med, ALVARADO      nortriptyline (PAMELOR) 10 mg capsule Take 1 Capsule by mouth nightly., Normal, Disp-30 Capsule, R-2      ARIPiprazole (ABILIFY) 2 mg tablet Take 1 Tablet by mouth daily. , Normal, Disp-30 Tablet, R-1      cyclobenzaprine (FLEXERIL) 5 mg tablet TAKE 1 TABLET NIGHTLY, Normal, Disp-90 Tablet, R-3      buPROPion XL (WELLBUTRIN XL) 300 mg XL tablet Take 1 Tablet by mouth daily. , Normal, Disp-90 Tablet, R-1      oxybutynin (DITROPAN) 5 mg tablet TAKE 1 TABLET TWICE A DAY, Normal, Disp-180 Tablet, R-3      dicyclomine (BENTYL) 10 mg capsule TAKE 2 CAPSULES FOUR TIMES A DAY AS NEEDED, Normal, Disp-360 Capsule, R-7      DULoxetine (CYMBALTA) 60 mg capsule TAKE 1 CAPSULE TWICE A DAY, Normal, Disp-180 Capsule, R-3      rosuvastatin (CRESTOR) 20 mg tablet TAKE 1 TABLET NIGHTLY, Normal, Disp-90 Tablet, R-3      pantoprazole (PROTONIX) 40 mg tablet TAKE 1 TABLET DAILY, Normal, Disp-90 Tablet, R-3      b complex vitamins tablet Take 1 Tablet by mouth daily. , Historical Med      aspirin delayed-release 81 mg tablet Take  by mouth daily. , Historical Med      acetaminophen (TYLENOL) 500 mg tablet Take 2 Tabs by mouth every six (6) hours. , Normal, Disp-90 Tab, R-0      calcium-vitamin D (OS-TRUE +D3) 500 mg-200 unit per tablet 1 Tab., Historical Med      polyethylene glycol (MIRALAX) 17 gram packet Take 17 g by mouth daily. , Historical Med      cholecalciferol, vitamin D3, 50 mcg (2,000 unit) tab Take 1 Tab by mouth daily. , Historical Med      sucralfate (CARAFATE) 1 gram tablet Take 1 g by mouth three (3) times daily as needed., Historical Med           STOP taking these medications       tobramycin-dexamethasone (TOBRADEX) ophthalmic suspension Comments:   Reason for Stopping:         hydroCHLOROthiazide (HYDRODIURIL) 25 mg tablet Comments:   Reason for Stopping:         lisinopriL (PRINIVIL, ZESTRIL) 40 mg tablet Comments:   Reason for Stopping:                 NOTIFY YOUR PHYSICIAN FOR ANY OF THE FOLLOWING:   Fever over 101 degrees for 24 hours. Chest pain, shortness of breath, fever, chills, nausea, vomiting, diarrhea, change in mentation, falling, weakness, bleeding. Severe pain or pain not relieved by medications. Or, any other signs or symptoms that you may have questions about. DISPOSITION:  X  Home With:   OT  PT  HH  RN       Long term SNF/Inpatient Rehab    Independent/assisted living    Hospice    Other:       PATIENT CONDITION AT DISCHARGE:     Functional status    Poor     Deconditioned    X Independent      Cognition    X Lucid     Forgetful     Dementia      Catheters/lines (plus indication)    Wilhelm     PICC     PEG    X None      Code status    X Full code     DNR      PHYSICAL EXAMINATION AT DISCHARGE:  Constitutional:  No acute distress, cooperative, pleasant    ENT:  Oral mucosa moist, oropharynx benign.     Resp:  CTA bilaterally. No wheezing/rhonchi/rales. No accessory muscle use. CV:  Regular rhythm, normal rate, no murmurs, gallops, rubs    GI:  Soft, non distended, non tender. normoactive bowel sounds, no hepatosplenomegaly     Musculoskeletal:  No edema, warm, 2+ pulses throughout    Neurologic:  Moves all extremities. AAOx3, redness in left eye. Unable to track on left side.       CHRONIC MEDICAL DIAGNOSES:  Problem List as of 11/26/2022 Date Reviewed: 11/18/2022            Codes Class Noted - Resolved    MCI Without Memory Loss (Attention, Fluency, Bilateral Fine Motor Dexterity) ICD-10-CM: G31.84  ICD-9-CM: 331.83  6/9/2022 - Present        Disturbance of memory ICD-10-CM: R41.3  ICD-9-CM: 780.93  11/18/2021 - Present        Bilateral carpal tunnel syndrome ICD-10-CM: G56.03  ICD-9-CM: 354.0  11/18/2021 - Present        Cervical radiculopathy due to degenerative joint disease of spine ICD-10-CM: M47.22  ICD-9-CM: 721.0  11/18/2021 - Present        Carotid stenosis, bilateral ICD-10-CM: I65.23  ICD-9-CM: 433.10, 433.30  11/18/2021 - Present        B12 deficiency ICD-10-CM: E53.8  ICD-9-CM: 266.2  11/18/2021 - Present        Vitamin D deficiency ICD-10-CM: E55.9  ICD-9-CM: 268.9  11/18/2021 - Present        Hypothyroidism due to acquired atrophy of thyroid ICD-10-CM: E03.4  ICD-9-CM: 244.8, 246.8  11/18/2021 - Present        Diabetic peripheral neuropathy associated with type 2 diabetes mellitus ([facility] ICD-10-CM: E11.42  ICD-9-CM: 250.60, 357.2  11/18/2021 - Present        Cervical myelopathy with cervical radiculopathy ([facility] [address] [facility] ICD-10-CM: G95.9, M54.12  ICD-9-CM: 721.1  11/18/2021 - Present        Congenital cervical spine stenosis ICD-10-CM: Q76.49  ICD-9-CM: 723.0  11/18/2021 - Present        Acute alteration in mental status ICD-10-CM: R41.82  ICD-9-CM: 780.97  Unknown - Present        Convulsions ([facility] [facility] ICD-10-CM: R56.9  ICD-9-CM: 780.39  Unknown - Present        Cerebral microvascular disease ICD-10-CM: I67.89  ICD-9-CM: 437.8  Unknown - Present        Spinal stenosis, lumbar ICD-10-CM: M48.061  ICD-9-CM: 724.02  5/3/2021 - Present        Depression, major, severe recurrence (HCC) ICD-10-CM: F33.2  ICD-9-CM: 296.33  12/6/2019 - Present        Non morbid obesity ICD-10-CM: E66.9  ICD-9-CM: 278.00  12/20/2017 - Present        Spinal stenosis of lumbar region with neurogenic claudication ICD-10-CM: M48.062  ICD-9-CM: 724.03  8/29/2017 - Present        DDD (degenerative disc disease), cervical ICD-10-CM: M50.30  ICD-9-CM: 722.4  11/21/2016 - Present        Encounter for medication monitoring ICD-10-CM: Z51.81  ICD-9-CM: V58.83  10/14/2015 - Present        Essential hypertension ICD-10-CM: I10  ICD-9-CM: 401.9  5/18/2015 - Present        Depression ICD-10-CM: F32. A  ICD-9-CM: 449  8/13/2012 - Present        Diverticulitis ICD-10-CM: K57.92  ICD-9-CM: 562.11  Unknown - Present        IBS (irritable bowel syndrome) ICD-10-CM: K58.9  ICD-9-CM: 564.1  Unknown - Present        Fibromyalgia ICD-10-CM: M79.7  ICD-9-CM: 729.1  Unknown - Present        Hiatal hernia ICD-10-CM: K44.9  ICD-9-CM: 553.3  Unknown - Present        GERD (gastroesophageal reflux disease) ICD-10-CM: K21.9  ICD-9-CM: 530.81  Unknown - Present        Hypercholesterolemia ICD-10-CM: E78.00  ICD-9-CM: 272.0  Unknown - Present        RESOLVED: Cavernous sinus thrombosis ICD-10-CM: G08  ICD-9-CM: 229  11/23/2022 - 11/26/2022        RESOLVED: Cancer ([address] ICD-10-CM: C80.1  ICD-9-CM: 199.1  Unknown - 6/14/2016    Overview Signed 11/19/2013 10:09 AM by Costella Gist     skin cancer             RESOLVED: Encounter for long-term (current) use of other medications ICD-10-CM: Z79.899  ICD-9-CM: V58.69  2/23/2011 - 10/14/2015        RESOLVED: Hypovitaminosis D ICD-10-CM: E55.9  ICD-9-CM: 268.9  8/19/2010 - 3/20/2018        RESOLVED: Hypertension ICD-10-CM: I10  ICD-9-CM: 401.9  Unknown - 12/15/2015           Greater than 30 minutes were spent with the patient on counseling and coordination of care    Signed:   Rosemarie Charles MD  11/26/2022  4:57 PM

## 2022-11-26 NOTE — PROGRESS NOTES
I have reviewed discharge instructions with the patient and spouse. The patient verbalized understanding. Patient was discharged with her personal belongings.

## 2022-11-26 NOTE — PROGRESS NOTES
Problem: Falls - Risk of  Goal: *Absence of Falls  Description: Document Dennie Oak Fall Risk and appropriate interventions in the flowsheet.   Outcome: Progressing Towards Goal  Note: Fall Risk Interventions:  Mobility Interventions: Bed/chair exit alarm, Patient to call before getting OOB, Utilize walker, cane, or other assistive device    Mentation Interventions: Adequate sleep, hydration, pain control, Bed/chair exit alarm, More frequent rounding, Reorient patient    Medication Interventions: Bed/chair exit alarm, Patient to call before getting OOB, Teach patient to arise slowly    Elimination Interventions: Bed/chair exit alarm, Call light in reach, Stay With Me (per policy)              Problem: Patient Education: Go to Patient Education Activity  Goal: Patient/Family Education  Outcome: Progressing Towards Goal     Problem: Patient Education: Go to Patient Education Activity  Goal: Patient/Family Education  Outcome: Progressing Towards Goal     Problem: TIA/CVA Stroke: 0-24 hours  Goal: Off Pathway (Use only if patient is Off Pathway)  Outcome: Progressing Towards Goal  Goal: Activity/Safety  Outcome: Progressing Towards Goal  Goal: Consults, if ordered  Outcome: Progressing Towards Goal  Goal: Diagnostic Test/Procedures  Outcome: Progressing Towards Goal  Goal: Nutrition/Diet  Outcome: Progressing Towards Goal  Goal: Discharge Planning  Outcome: Progressing Towards Goal  Goal: Medications  Outcome: Progressing Towards Goal  Goal: Respiratory  Outcome: Progressing Towards Goal  Goal: Treatments/Interventions/Procedures  Outcome: Progressing Towards Goal  Goal: Minimize risk of bleeding post-thrombolytic infusion  Outcome: Progressing Towards Goal  Goal: Monitor for complications post-thrombolytic infusion  Outcome: Progressing Towards Goal  Goal: Psychosocial  Outcome: Progressing Towards Goal  Goal: *Hemodynamically stable  Outcome: Progressing Towards Goal  Goal: *Neurologically stable  Description: Absence of additional neurological deficits    Outcome: Progressing Towards Goal  Goal: *Verbalizes anxiety and depression are reduced or absent  Outcome: Progressing Towards Goal  Goal: *Absence of Signs of Aspiration on Current Diet  Outcome: Progressing Towards Goal  Goal: *Absence of deep venous thrombosis signs and symptoms(Stroke Metric)  Outcome: Progressing Towards Goal  Goal: *Ability to perform ADLs and demonstrates progressive mobility and function  Outcome: Progressing Towards Goal  Goal: *Stroke education started(Stroke Metric)  Outcome: Progressing Towards Goal  Goal: *Dysphagia screen performed(Stroke Metric)  Outcome: Progressing Towards Goal  Goal: *Rehab consulted(Stroke Metric)  Outcome: Progressing Towards Goal     Problem: TIA/CVA Stroke: Day 2 Until Discharge  Goal: Off Pathway (Use only if patient is Off Pathway)  Outcome: Progressing Towards Goal  Goal: Activity/Safety  Outcome: Progressing Towards Goal  Goal: Diagnostic Test/Procedures  Outcome: Progressing Towards Goal  Goal: Nutrition/Diet  Outcome: Progressing Towards Goal  Goal: Discharge Planning  Outcome: Progressing Towards Goal  Goal: Medications  Outcome: Progressing Towards Goal  Goal: Respiratory  Outcome: Progressing Towards Goal  Goal: Treatments/Interventions/Procedures  Outcome: Progressing Towards Goal  Goal: Psychosocial  Outcome: Progressing Towards Goal  Goal: *Verbalizes anxiety and depression are reduced or absent  Outcome: Progressing Towards Goal  Goal: *Absence of aspiration  Outcome: Progressing Towards Goal  Goal: *Absence of deep venous thrombosis signs and symptoms(Stroke Metric)  Outcome: Progressing Towards Goal  Goal: *Optimal pain control at patient's stated goal  Outcome: Progressing Towards Goal  Goal: *Tolerating diet  Outcome: Progressing Towards Goal  Goal: *Ability to perform ADLs and demonstrates progressive mobility and function  Outcome: Progressing Towards Goal  Goal: *Stroke education continued(Stroke Metric)  Outcome: Progressing Towards Goal     Problem: Ischemic Stroke: Discharge Outcomes  Goal: *Verbalizes anxiety and depression are reduced or absent  Outcome: Progressing Towards Goal  Goal: *Verbalize understanding of risk factor modification(Stroke Metric)  Outcome: Progressing Towards Goal  Goal: *Hemodynamically stable  Outcome: Progressing Towards Goal  Goal: *Absence of aspiration pneumonia  Outcome: Progressing Towards Goal  Goal: *Aware of needed dietary changes  Outcome: Progressing Towards Goal  Goal: *Verbalize understanding of prescribed medications including anti-coagulants, anti-lipid, and/or anti-platelets(Stroke Metric)  Outcome: Progressing Towards Goal  Goal: *Tolerating diet  Outcome: Progressing Towards Goal  Goal: *Aware of follow-up diagnostics related to anticoagulants  Outcome: Progressing Towards Goal  Goal: *Ability to perform ADLs and demonstrates progressive mobility and function  Outcome: Progressing Towards Goal  Goal: *Absence of DVT(Stroke Metric)  Outcome: Progressing Towards Goal  Goal: *Absence of aspiration  Outcome: Progressing Towards Goal  Goal: *Optimal pain control at patient's stated goal  Outcome: Progressing Towards Goal  Goal: *Home safety concerns addressed  Outcome: Progressing Towards Goal  Goal: *Describes available resources and support systems  Outcome: Progressing Towards Goal  Goal: *Verbalizes understanding of activation of EMS(911) for stroke symptoms(Stroke Metric)  Outcome: Progressing Towards Goal  Goal: *Understands and describes signs and symptoms to report to providers(Stroke Metric)  Outcome: Progressing Towards Goal  Goal: *Neurolgocially stable (absence of additional neurological deficits)  Outcome: Progressing Towards Goal  Goal: *Verbalizes importance of follow-up with primary care physician(Stroke Metric)  Outcome: Progressing Towards Goal  Goal: *Smoking cessation discussed,if applicable(Stroke Metric)  Outcome: Progressing Towards Goal  Goal: *Depression screening completed(Stroke Metric)  Outcome: Progressing Towards Goal     Problem: Discharge Planning  Goal: *Discharge to safe environment  Outcome: Progressing Towards Goal  Goal: *Knowledge of medication management  Outcome: Progressing Towards Goal  Goal: *Knowledge of discharge instructions  Outcome: Progressing Towards Goal     Problem: Patient Education: Go to Patient Education Activity  Goal: Patient/Family Education  Outcome: Progressing Towards Goal     Problem: Pain  Goal: *Control of Pain  Outcome: Progressing Towards Goal  Goal: *PALLIATIVE CARE:  Alleviation of Pain  Outcome: Progressing Towards Goal

## 2022-11-28 ENCOUNTER — PATIENT OUTREACH (OUTPATIENT)
Dept: CASE MANAGEMENT | Age: 72
End: 2022-11-28

## 2022-11-28 ENCOUNTER — ANESTHESIA (OUTPATIENT)
Dept: INTERVENTIONAL RADIOLOGY/VASCULAR | Age: 72
DRG: 272 | End: 2022-11-28
Payer: MEDICARE

## 2022-11-28 ENCOUNTER — ANESTHESIA EVENT (OUTPATIENT)
Dept: INTERVENTIONAL RADIOLOGY/VASCULAR | Age: 72
DRG: 272 | End: 2022-11-28
Payer: MEDICARE

## 2022-11-28 ENCOUNTER — HOSPITAL ENCOUNTER (INPATIENT)
Dept: INTERVENTIONAL RADIOLOGY/VASCULAR | Age: 72
LOS: 1 days | Discharge: HOME | DRG: 279 | End: 2022-11-29
Attending: RADIOLOGY | Admitting: RADIOLOGY
Payer: MEDICARE

## 2022-11-28 DIAGNOSIS — I67.1 CAROTID-CAVERNOUS FISTULA: ICD-10-CM

## 2022-11-28 LAB
ACT BLD: 149 SECS (ref 79–138)
ACT BLD: 173 SECS (ref 79–138)
ACT BLD: 173 SECS (ref 79–138)
ACT BLD: 197 SECS (ref 79–138)
ACT BLD: 215 SECS (ref 79–138)
ACT BLD: 221 SECS (ref 79–138)

## 2022-11-28 PROCEDURE — C1887 CATHETER, GUIDING: HCPCS

## 2022-11-28 PROCEDURE — 76060000041 HC ANESTHESIA 5 TO 5.5 HR

## 2022-11-28 PROCEDURE — C1760 CLOSURE DEV, VASC: HCPCS

## 2022-11-28 PROCEDURE — 74011000250 HC RX REV CODE- 250: Performed by: RADIOLOGY

## 2022-11-28 PROCEDURE — 74011250636 HC RX REV CODE- 250/636: Performed by: ANESTHESIOLOGY

## 2022-11-28 PROCEDURE — 65610000006 HC RM INTENSIVE CARE

## 2022-11-28 PROCEDURE — C1769 GUIDE WIRE: HCPCS

## 2022-11-28 PROCEDURE — 85347 COAGULATION TIME ACTIVATED: CPT

## 2022-11-28 PROCEDURE — C1889 IMPLANT/INSERT DEVICE, NOC: HCPCS

## 2022-11-28 PROCEDURE — 74011250637 HC RX REV CODE- 250/637: Performed by: NURSE PRACTITIONER

## 2022-11-28 PROCEDURE — 74011250636 HC RX REV CODE- 250/636: Performed by: NURSE PRACTITIONER

## 2022-11-28 PROCEDURE — 74011000250 HC RX REV CODE- 250: Performed by: NURSE ANESTHETIST, CERTIFIED REGISTERED

## 2022-11-28 PROCEDURE — 74011000636 HC RX REV CODE- 636: Performed by: RADIOLOGY

## 2022-11-28 PROCEDURE — 03VG3DZ RESTRICTION OF INTRACRANIAL ARTERY WITH INTRALUMINAL DEVICE, PERCUTANEOUS APPROACH: ICD-10-PCS | Performed by: RADIOLOGY

## 2022-11-28 PROCEDURE — 77030029288 HC HNDL INZONE DTCH STRY -C

## 2022-11-28 PROCEDURE — C1894 INTRO/SHEATH, NON-LASER: HCPCS

## 2022-11-28 PROCEDURE — 74011250636 HC RX REV CODE- 250/636: Performed by: RADIOLOGY

## 2022-11-28 PROCEDURE — 74011250636 HC RX REV CODE- 250/636: Performed by: NURSE ANESTHETIST, CERTIFIED REGISTERED

## 2022-11-28 PROCEDURE — 77030008584 HC TOOL GDWRE DEV TERU -A

## 2022-11-28 PROCEDURE — 77030014021 HC SYR ANGI FIX LOK MRTM -A

## 2022-11-28 PROCEDURE — 77030008684 HC TU ET CUF COVD -B: Performed by: ANESTHESIOLOGY

## 2022-11-28 PROCEDURE — 74011000250 HC RX REV CODE- 250: Performed by: NURSE PRACTITIONER

## 2022-11-28 PROCEDURE — 77030026438 HC STYL ET INTUB CARD -A: Performed by: ANESTHESIOLOGY

## 2022-11-28 PROCEDURE — 77030008638 HC TU CONN COOK -A

## 2022-11-28 PROCEDURE — 36216 PLACE CATHETER IN ARTERY: CPT

## 2022-11-28 PROCEDURE — 77030040173 HC COIL EMB ORBIT GALAXY J&J -H

## 2022-11-28 PROCEDURE — 77030040172 HC SYS EMB LIQ TRUFILL J&J -D

## 2022-11-28 PROCEDURE — 74011000258 HC RX REV CODE- 258: Performed by: NURSE ANESTHETIST, CERTIFIED REGISTERED

## 2022-11-28 PROCEDURE — 2709999900 HC NON-CHARGEABLE SUPPLY

## 2022-11-28 PROCEDURE — 74011000250 HC RX REV CODE- 250: Performed by: ANESTHESIOLOGY

## 2022-11-28 PROCEDURE — 77030012468 HC VLV BLEEDBK CNTRL ABBT -B

## 2022-11-28 RX ORDER — PANTOPRAZOLE SODIUM 40 MG/1
40 TABLET, DELAYED RELEASE ORAL
Status: DISCONTINUED | OUTPATIENT
Start: 2022-11-29 | End: 2022-11-29 | Stop reason: HOSPADM

## 2022-11-28 RX ORDER — DEXAMETHASONE 4 MG/1
4 TABLET ORAL EVERY 12 HOURS
Status: DISCONTINUED | OUTPATIENT
Start: 2022-11-28 | End: 2022-11-29 | Stop reason: HOSPADM

## 2022-11-28 RX ORDER — DULOXETIN HYDROCHLORIDE 60 MG/1
60 CAPSULE, DELAYED RELEASE ORAL 2 TIMES DAILY
Status: DISCONTINUED | OUTPATIENT
Start: 2022-11-28 | End: 2022-11-29 | Stop reason: HOSPADM

## 2022-11-28 RX ORDER — GUAIFENESIN 100 MG/5ML
81 LIQUID (ML) ORAL DAILY
Status: DISCONTINUED | OUTPATIENT
Start: 2022-11-28 | End: 2022-11-28 | Stop reason: SDUPTHER

## 2022-11-28 RX ORDER — HEPARIN SODIUM 1000 [USP'U]/ML
INJECTION, SOLUTION INTRAVENOUS; SUBCUTANEOUS AS NEEDED
Status: DISCONTINUED | OUTPATIENT
Start: 2022-11-28 | End: 2022-11-28 | Stop reason: HOSPADM

## 2022-11-28 RX ORDER — ROCURONIUM BROMIDE 10 MG/ML
INJECTION, SOLUTION INTRAVENOUS AS NEEDED
Status: DISCONTINUED | OUTPATIENT
Start: 2022-11-28 | End: 2022-11-28 | Stop reason: HOSPADM

## 2022-11-28 RX ORDER — PROPOFOL 10 MG/ML
INJECTION, EMULSION INTRAVENOUS AS NEEDED
Status: DISCONTINUED | OUTPATIENT
Start: 2022-11-28 | End: 2022-11-28 | Stop reason: HOSPADM

## 2022-11-28 RX ORDER — DEXAMETHASONE SODIUM PHOSPHATE 4 MG/ML
INJECTION, SOLUTION INTRA-ARTICULAR; INTRALESIONAL; INTRAMUSCULAR; INTRAVENOUS; SOFT TISSUE AS NEEDED
Status: DISCONTINUED | OUTPATIENT
Start: 2022-11-28 | End: 2022-11-28 | Stop reason: HOSPADM

## 2022-11-28 RX ORDER — FENTANYL CITRATE 50 UG/ML
INJECTION, SOLUTION INTRAMUSCULAR; INTRAVENOUS
Status: COMPLETED
Start: 2022-11-28 | End: 2022-11-28

## 2022-11-28 RX ORDER — SODIUM CHLORIDE 0.9 % (FLUSH) 0.9 %
5-40 SYRINGE (ML) INJECTION EVERY 8 HOURS
Status: DISCONTINUED | OUTPATIENT
Start: 2022-11-28 | End: 2022-11-29 | Stop reason: HOSPADM

## 2022-11-28 RX ORDER — SODIUM CHLORIDE 0.9 % (FLUSH) 0.9 %
5-40 SYRINGE (ML) INJECTION AS NEEDED
Status: DISCONTINUED | OUTPATIENT
Start: 2022-11-28 | End: 2022-11-29 | Stop reason: HOSPADM

## 2022-11-28 RX ORDER — LIDOCAINE HYDROCHLORIDE 10 MG/ML
10 INJECTION INFILTRATION; PERINEURAL
Status: COMPLETED | OUTPATIENT
Start: 2022-11-28 | End: 2022-11-28

## 2022-11-28 RX ORDER — ROSUVASTATIN CALCIUM 10 MG/1
20 TABLET, COATED ORAL
Status: DISCONTINUED | OUTPATIENT
Start: 2022-11-28 | End: 2022-11-29 | Stop reason: HOSPADM

## 2022-11-28 RX ORDER — SODIUM CHLORIDE 9 MG/ML
INJECTION, SOLUTION INTRAVENOUS
Status: DISCONTINUED | OUTPATIENT
Start: 2022-11-28 | End: 2022-11-28 | Stop reason: HOSPADM

## 2022-11-28 RX ORDER — DEXAMETHASONE 1 MG/1
2 TABLET ORAL DAILY
Status: DISCONTINUED | OUTPATIENT
Start: 2022-12-03 | End: 2022-11-29 | Stop reason: HOSPADM

## 2022-11-28 RX ORDER — BUPROPION HYDROCHLORIDE 150 MG/1
150 TABLET, EXTENDED RELEASE ORAL 2 TIMES DAILY
Status: DISCONTINUED | OUTPATIENT
Start: 2022-11-28 | End: 2022-11-29 | Stop reason: HOSPADM

## 2022-11-28 RX ORDER — ASPIRIN 81 MG/1
81 TABLET ORAL DAILY
Status: DISCONTINUED | OUTPATIENT
Start: 2022-11-28 | End: 2022-11-29 | Stop reason: HOSPADM

## 2022-11-28 RX ORDER — FENTANYL CITRATE 50 UG/ML
INJECTION, SOLUTION INTRAMUSCULAR; INTRAVENOUS AS NEEDED
Status: DISCONTINUED | OUTPATIENT
Start: 2022-11-28 | End: 2022-11-28 | Stop reason: HOSPADM

## 2022-11-28 RX ORDER — DEXAMETHASONE 1 MG/1
2 TABLET ORAL EVERY 12 HOURS
Status: DISCONTINUED | OUTPATIENT
Start: 2022-12-01 | End: 2022-11-29 | Stop reason: HOSPADM

## 2022-11-28 RX ORDER — SODIUM CHLORIDE 9 MG/ML
75 INJECTION, SOLUTION INTRAVENOUS CONTINUOUS
Status: DISCONTINUED | OUTPATIENT
Start: 2022-11-28 | End: 2022-11-29

## 2022-11-28 RX ORDER — ONDANSETRON 2 MG/ML
INJECTION INTRAMUSCULAR; INTRAVENOUS AS NEEDED
Status: DISCONTINUED | OUTPATIENT
Start: 2022-11-28 | End: 2022-11-28 | Stop reason: HOSPADM

## 2022-11-28 RX ORDER — PHENYLEPHRINE HCL IN 0.9% NACL 0.4MG/10ML
SYRINGE (ML) INTRAVENOUS AS NEEDED
Status: DISCONTINUED | OUTPATIENT
Start: 2022-11-28 | End: 2022-11-28 | Stop reason: HOSPADM

## 2022-11-28 RX ORDER — SUCCINYLCHOLINE CHLORIDE 20 MG/ML
INJECTION INTRAMUSCULAR; INTRAVENOUS AS NEEDED
Status: DISCONTINUED | OUTPATIENT
Start: 2022-11-28 | End: 2022-11-28 | Stop reason: HOSPADM

## 2022-11-28 RX ORDER — OXYBUTYNIN CHLORIDE 5 MG/1
5 TABLET ORAL 2 TIMES DAILY
Status: DISCONTINUED | OUTPATIENT
Start: 2022-11-28 | End: 2022-11-29 | Stop reason: HOSPADM

## 2022-11-28 RX ORDER — CEFAZOLIN SODIUM 1 G/3ML
INJECTION, POWDER, FOR SOLUTION INTRAMUSCULAR; INTRAVENOUS AS NEEDED
Status: DISCONTINUED | OUTPATIENT
Start: 2022-11-28 | End: 2022-11-28 | Stop reason: HOSPADM

## 2022-11-28 RX ORDER — ACETAMINOPHEN 325 MG/1
650 TABLET ORAL
Status: DISCONTINUED | OUTPATIENT
Start: 2022-11-28 | End: 2022-11-29 | Stop reason: HOSPADM

## 2022-11-28 RX ORDER — LIDOCAINE HYDROCHLORIDE 20 MG/ML
INJECTION, SOLUTION EPIDURAL; INFILTRATION; INTRACAUDAL; PERINEURAL AS NEEDED
Status: DISCONTINUED | OUTPATIENT
Start: 2022-11-28 | End: 2022-11-28 | Stop reason: HOSPADM

## 2022-11-28 RX ADMIN — Medication 4000 UNITS: at 14:33

## 2022-11-28 RX ADMIN — IOPAMIDOL 165 ML: 612 INJECTION, SOLUTION INTRAVENOUS at 17:47

## 2022-11-28 RX ADMIN — ROCURONIUM BROMIDE 20 MG: 10 INJECTION INTRAVENOUS at 14:12

## 2022-11-28 RX ADMIN — ONDANSETRON HYDROCHLORIDE 4 MG: 2 INJECTION, SOLUTION INTRAMUSCULAR; INTRAVENOUS at 17:51

## 2022-11-28 RX ADMIN — ROCURONIUM BROMIDE 10 MG: 10 INJECTION INTRAVENOUS at 15:39

## 2022-11-28 RX ADMIN — DEXAMETHASONE 4 MG: 4 TABLET ORAL at 22:53

## 2022-11-28 RX ADMIN — FENTANYL CITRATE 50 MCG: 50 INJECTION, SOLUTION INTRAMUSCULAR; INTRAVENOUS at 13:51

## 2022-11-28 RX ADMIN — HEPARIN SODIUM 1000 UNITS: 1000 INJECTION, SOLUTION INTRAVENOUS; SUBCUTANEOUS at 16:34

## 2022-11-28 RX ADMIN — ROCURONIUM BROMIDE 5 MG: 10 INJECTION INTRAVENOUS at 13:51

## 2022-11-28 RX ADMIN — Medication 80 MCG: at 15:02

## 2022-11-28 RX ADMIN — Medication 4000 UNITS: at 14:35

## 2022-11-28 RX ADMIN — LIDOCAINE HYDROCHLORIDE 7 ML: 10 INJECTION, SOLUTION INFILTRATION; PERINEURAL at 15:37

## 2022-11-28 RX ADMIN — PROPOFOL 50 MG: 10 INJECTION, EMULSION INTRAVENOUS at 18:10

## 2022-11-28 RX ADMIN — HEPARIN SODIUM 1000 UNITS: 1000 INJECTION, SOLUTION INTRAVENOUS; SUBCUTANEOUS at 15:48

## 2022-11-28 RX ADMIN — Medication 4000 UNITS: at 14:32

## 2022-11-28 RX ADMIN — CEFAZOLIN 2 G: 330 INJECTION, POWDER, FOR SOLUTION INTRAMUSCULAR; INTRAVENOUS at 17:43

## 2022-11-28 RX ADMIN — PROPOFOL 100 MG: 10 INJECTION, EMULSION INTRAVENOUS at 14:54

## 2022-11-28 RX ADMIN — SUGAMMADEX 200 MG: 100 INJECTION, SOLUTION INTRAVENOUS at 17:56

## 2022-11-28 RX ADMIN — Medication 120 MCG: at 14:00

## 2022-11-28 RX ADMIN — DEXAMETHASONE SODIUM PHOSPHATE 4 MG: 4 INJECTION, SOLUTION INTRAMUSCULAR; INTRAVENOUS at 14:31

## 2022-11-28 RX ADMIN — HEPARIN SODIUM 3000 UNITS: 1000 INJECTION, SOLUTION INTRAVENOUS; SUBCUTANEOUS at 14:58

## 2022-11-28 RX ADMIN — ROSUVASTATIN 20 MG: 10 TABLET, FILM COATED ORAL at 22:53

## 2022-11-28 RX ADMIN — DEXTROSE 5 MG/HR: 5 SOLUTION INTRAVENOUS at 17:52

## 2022-11-28 RX ADMIN — BUPROPION HYDROCHLORIDE 150 MG: 150 TABLET, EXTENDED RELEASE ORAL at 22:52

## 2022-11-28 RX ADMIN — ROCURONIUM BROMIDE 15 MG: 10 INJECTION INTRAVENOUS at 14:35

## 2022-11-28 RX ADMIN — SODIUM CHLORIDE 75 ML/HR: 9 INJECTION, SOLUTION INTRAVENOUS at 19:17

## 2022-11-28 RX ADMIN — ROCURONIUM BROMIDE 10 MG: 10 INJECTION INTRAVENOUS at 16:36

## 2022-11-28 RX ADMIN — Medication 4000 UNITS: at 14:31

## 2022-11-28 RX ADMIN — Medication 120 MCG: at 14:51

## 2022-11-28 RX ADMIN — SODIUM CHLORIDE: 900 INJECTION, SOLUTION INTRAVENOUS at 16:06

## 2022-11-28 RX ADMIN — Medication 4000 UNITS: at 14:34

## 2022-11-28 RX ADMIN — SODIUM CHLORIDE 7.5 MG/HR: 9 INJECTION, SOLUTION INTRAVENOUS at 19:34

## 2022-11-28 RX ADMIN — SUCCINYLCHOLINE CHLORIDE 160 MG: 20 INJECTION, SOLUTION INTRAMUSCULAR; INTRAVENOUS at 13:52

## 2022-11-28 RX ADMIN — SODIUM CHLORIDE: 900 INJECTION, SOLUTION INTRAVENOUS at 13:48

## 2022-11-28 RX ADMIN — LIDOCAINE HYDROCHLORIDE 100 MG: 20 INJECTION, SOLUTION EPIDURAL; INFILTRATION; INTRACAUDAL; PERINEURAL at 13:51

## 2022-11-28 RX ADMIN — PROPOFOL 200 MG: 10 INJECTION, EMULSION INTRAVENOUS at 13:51

## 2022-11-28 RX ADMIN — ASPIRIN 81 MG: 81 TABLET, COATED ORAL at 22:53

## 2022-11-28 RX ADMIN — SODIUM CHLORIDE 5 MG/HR: 9 INJECTION, SOLUTION INTRAVENOUS at 23:49

## 2022-11-28 RX ADMIN — SODIUM CHLORIDE 30 MCG/MIN: 9 INJECTION, SOLUTION INTRAVENOUS at 14:01

## 2022-11-28 RX ADMIN — Medication 120 MCG: at 14:05

## 2022-11-28 RX ADMIN — FENTANYL CITRATE 50 MCG: 50 INJECTION, SOLUTION INTRAMUSCULAR; INTRAVENOUS at 14:36

## 2022-11-28 RX ADMIN — Medication 80 MCG: at 15:17

## 2022-11-28 RX ADMIN — ROCURONIUM BROMIDE 10 MG: 10 INJECTION INTRAVENOUS at 14:59

## 2022-11-28 RX ADMIN — HEPARIN SODIUM 1000 UNITS: 1000 INJECTION, SOLUTION INTRAVENOUS; SUBCUTANEOUS at 15:11

## 2022-11-28 RX ADMIN — PROPOFOL 50 MG: 10 INJECTION, EMULSION INTRAVENOUS at 13:56

## 2022-11-28 NOTE — INTERVAL H&P NOTE
Update History & Physical    The Patient's History and Physical of November 24, 2022 was reviewed with the patient and I examined the patient. There was no change in history. She still endorses double vision and dizziness with movement. She has some issues with balance and coordination at baseline. She has baseline bilateral weakness in her legs from back surgery a year ago and is currently doing outpatient PT. She walks with a cane. She denies any headache, chest pain, SOB, speech difficulty, numbness, tingling, nausea, or vomiting. Patient is alert and oriented x 4. PERRL. Visual fields intact. Disconjugate gaze with EOMs. Unable to track laterally to right with right eye. Unable to track laterally to left with left eye. Tracks medially with both eyes with some subtle nystagmus present. Tracks vertically with eyes. This was present when patient was hospitalized a few days ago. No facial droop. No arm pronator drift. Strength 5/5 in BUE. Subtle bilateral leg drift, strength 4+/5 in BLE. No aphasia or dysarthria. Tongue midline. Palate elevates symmetrically. No neglect. Sensation intact. No ataxia with FTN and HTS bilaterally. Gait deferred. NIHSS 3 (1-gaze, 1- right leg drift, 1- left leg drift)  Resp: Lungs clear to auscultation bilaterally. CV: regular rate, and rhythm, S1, S2 present, no murmur, click, rub, or gallop. Abdomen: soft, non-tender. Active bowel sounds. No organomegaly or masses. Extremities: atraumatic, no cyanosis, trace peripheral edema. Posterior tibial pulses faint with palpation bilaterally, but present via doppler. Pedal pulses +2 bilaterally. Skin: Warm to touch. Appropriate for ethnicity. Patient had diagnostic angiogram on 11/25/2022. Right groin site clean, dry, and intact. No hematoma, bleeding, or bruising noted. The surgical site was confirmed by the patient and me.     Plan:  The risk, benefits, expected outcome, and alternative to the recommended procedure have been discussed with the patient. Patient understands and wants to proceed with the procedure. We will proceed with a cerebral angiogram with embolization of a carotid cavernous fistula. Ordered 500 ml NS bolus for IV hydration prior to procedure. Recent creatinine 1.15 on 11/26/2022. Will recheck CBC and BMP during hospitalization. Patient will be admitted to ICU overnight for close observation of neuro status post procedure.      Electronically signed by Nia Pink NP on 11/28/2022 at 10:44 AM

## 2022-11-28 NOTE — PROGRESS NOTES
[facility] 11/22-11/26 Dx Cavernous sinus thrombosis. Plan- 11/28 for cerebral angiogram with embolization of carotid cavernous fistula. Noted, patient was admitted 11/28. Remains at [facility] at this time. CTN to follow and will contact once discharged.

## 2022-11-28 NOTE — ROUTINE PROCESS
Pt arrives via [facility] to angio department accompanied by  for DSA w possible embolization procedure. All assessments completed and consent was reviewed. Education given was regarding procedure, anesthesia sedation, post-procedure care and  management/follow-up. Opportunity for questions was provided and all questions and concerns were addressed.

## 2022-11-28 NOTE — ROUTINE PROCESS
Dr Raghav Mai at bedside placing R arterial line and obtaining anesthesia consent. Pt remains resting in bed with no complaints. Pt's , Miah Fabian, updated that procedure has not started yet. R 20 g PIV no longer working, multiple RN's looked for additional access, unable to obtain, per Dr Raghav Mai, anesthesia will look prior to procedure. 2:17 PM  Patient allergies verbally reviewed by staff and providers during/before procedural timeout. Prior to opening products, dual verification was performed by IR staff to confirm Latex free contents. No contraindications noted, see LDA/Implants for product information. 6:00 PM  TRANSFER - OUT REPORT:    Verbal report given to Alexis RN(name) on Replaced by [facility]  being transferred to ICU 19(unit) for routine post - op       Report consisted of patients Situation, Background, Assessment and   Recommendations(SBAR). Information from the following report(s) SBAR, Kardex, Procedure Summary, MAR, Recent Results, and Quality Measures was reviewed with the receiving nurse. Lines:   Peripheral IV 11/28/22 Right Antecubital (Active)       Peripheral IV 11/28/22 Left Wrist (Active)       Arterial Line 11/28/22 Right Radial artery (Active)        Opportunity for questions and clarification was provided. Patient transported with:   Monitor  O2 @ 3 liters  Registered Nurse, CRNA          6:17 PM  Conjunctival swelling noted in both eyes, MD notified of change, no new orders.

## 2022-11-28 NOTE — ANESTHESIA PREPROCEDURE EVALUATION
Anesthetic History   No history of anesthetic complications            Review of Systems / Medical History  Patient summary reviewed, nursing notes reviewed and pertinent labs reviewed    Pulmonary  Within defined limits                 Neuro/Psych         Headaches and psychiatric history     Cardiovascular    Hypertension: well controlled          Hyperlipidemia    Exercise tolerance: >4 METS     GI/Hepatic/Renal     GERD: well controlled      Hiatal hernia    Comments: IBS Endo/Other      Hypothyroidism  Obesity and arthritis     Other Findings   Comments: Left carpal tunnel syndrome    Fibromyalgia  Chronic pain  Thyroid Goiter           Physical Exam    Airway  Mallampati: II  TM Distance: 4 - 6 cm  Neck ROM: normal range of motion   Mouth opening: Normal     Cardiovascular    Rhythm: regular  Rate: normal         Dental    Dentition: Implants     Pulmonary  Breath sounds clear to auscultation               Abdominal  GI exam deferred       Other Findings            Anesthetic Plan    ASA: 3  Anesthesia type: general    Monitoring Plan: Arterial line      Induction: Intravenous  Anesthetic plan and risks discussed with: Patient

## 2022-11-28 NOTE — ANESTHESIA POSTPROCEDURE EVALUATION
* No procedures listed *.    general    Anesthesia Post Evaluation        Patient location during evaluation: PACU  Patient participation: complete - patient participated  Level of consciousness: awake and alert  Pain management: adequate  Airway patency: patent  Anesthetic complications: no  Cardiovascular status: acceptable  Respiratory status: acceptable  Hydration status: acceptable  Comments: I have seen and evaluated the patient and is ready for discharge.  Kathlene Morales MD    Post anesthesia nausea and vomiting:  none      INITIAL Post-op Vital signs:   Vitals Value Taken Time   /53 11/28/22 1854   Temp 36.4 °C (97.5 °F) 11/28/22 1854   Pulse 100 11/28/22 1854   Resp 16 11/28/22 1854   SpO2 97 % 11/28/22 1854

## 2022-11-28 NOTE — ANESTHESIA PROCEDURE NOTES
Arterial Line Placement    Start time: 11/28/2022 11:10 AM  End time: 11/28/2022 11:18 AM  Performed by: Radha Monreal MD  Authorized by: aRdha Monreal MD     Pre-Procedure  Indications:  Arterial pressure monitoring and blood sampling  Preanesthetic Checklist: patient identified, risks and benefits discussed, site marked, patient being monitored, timeout performed and patient being monitored    Timeout Time: 11:10 EST      Procedure:   Prep:  Chlorhexidine  Seldinger Technique?: Yes    Orientation:  Right  Location:  Radial artery  Catheter size:  20 G  Number of attempts:  1  Cont Cardiac Output Sensor: No      Assessment:   Post-procedure:  Line secured and sterile dressing applied  Patient Tolerance:  Patient tolerated the procedure well with no immediate complications

## 2022-11-28 NOTE — PROGRESS NOTES
Addendum to H and P. Patient has left eye ptosis and swelling around eyes worse on the left and some mild left eye redness, this was present during her recent hospitalization a few days ago.     Priscilla Robertson [facility]-BC  Neurointerventional Surgery

## 2022-11-29 VITALS
BODY MASS INDEX: 33.69 KG/M2 | DIASTOLIC BLOOD PRESSURE: 78 MMHG | RESPIRATION RATE: 19 BRPM | SYSTOLIC BLOOD PRESSURE: 120 MMHG | HEART RATE: 99 BPM | TEMPERATURE: 98.3 F | WEIGHT: 197.31 LBS | OXYGEN SATURATION: 96 % | HEIGHT: 64 IN

## 2022-11-29 LAB
ANION GAP SERPL CALC-SCNC: 8 MMOL/L (ref 5–15)
BUN SERPL-MCNC: 12 MG/DL (ref 6–20)
BUN/CREAT SERPL: 13 (ref 12–20)
CALCIUM SERPL-MCNC: 8.4 MG/DL (ref 8.5–10.1)
CHLORIDE SERPL-SCNC: 113 MMOL/L (ref 97–108)
CO2 SERPL-SCNC: 20 MMOL/L (ref 21–32)
CREAT SERPL-MCNC: 0.91 MG/DL (ref 0.55–1.02)
ERYTHROCYTE [DISTWIDTH] IN BLOOD BY AUTOMATED COUNT: 12.4 % (ref 11.5–14.5)
GLUCOSE SERPL-MCNC: 124 MG/DL (ref 65–100)
HCT VFR BLD AUTO: 34.3 % (ref 35–47)
HGB BLD-MCNC: 11.4 G/DL (ref 11.5–16)
MAGNESIUM SERPL-MCNC: 2 MG/DL (ref 1.6–2.4)
MCH RBC QN AUTO: 32.6 PG (ref 26–34)
MCHC RBC AUTO-ENTMCNC: 33.2 G/DL (ref 30–36.5)
MCV RBC AUTO: 98 FL (ref 80–99)
NRBC # BLD: 0 K/UL (ref 0–0.01)
NRBC BLD-RTO: 0 PER 100 WBC
PHOSPHATE SERPL-MCNC: 3.1 MG/DL (ref 2.6–4.7)
PLATELET # BLD AUTO: 299 K/UL (ref 150–400)
PMV BLD AUTO: 10.1 FL (ref 8.9–12.9)
POTASSIUM SERPL-SCNC: 4.1 MMOL/L (ref 3.5–5.1)
RBC # BLD AUTO: 3.5 M/UL (ref 3.8–5.2)
SODIUM SERPL-SCNC: 141 MMOL/L (ref 136–145)
WBC # BLD AUTO: 10.3 K/UL (ref 3.6–11)

## 2022-11-29 PROCEDURE — 74011250636 HC RX REV CODE- 250/636: Performed by: NURSE PRACTITIONER

## 2022-11-29 PROCEDURE — 84100 ASSAY OF PHOSPHORUS: CPT

## 2022-11-29 PROCEDURE — 83735 ASSAY OF MAGNESIUM: CPT

## 2022-11-29 PROCEDURE — 36415 COLL VENOUS BLD VENIPUNCTURE: CPT

## 2022-11-29 PROCEDURE — 74011000250 HC RX REV CODE- 250: Performed by: NURSE PRACTITIONER

## 2022-11-29 PROCEDURE — 80048 BASIC METABOLIC PNL TOTAL CA: CPT

## 2022-11-29 PROCEDURE — 74011250637 HC RX REV CODE- 250/637: Performed by: NURSE PRACTITIONER

## 2022-11-29 PROCEDURE — 36600 WITHDRAWAL OF ARTERIAL BLOOD: CPT

## 2022-11-29 PROCEDURE — 85027 COMPLETE CBC AUTOMATED: CPT

## 2022-11-29 RX ORDER — METHYLPREDNISOLONE 4 MG/1
TABLET ORAL
Qty: 1 DOSE PACK | Refills: 0 | Status: SHIPPED | OUTPATIENT
Start: 2022-11-29 | End: 2022-12-08 | Stop reason: ALTCHOICE

## 2022-11-29 RX ADMIN — PANTOPRAZOLE SODIUM 40 MG: 40 TABLET, DELAYED RELEASE ORAL at 07:01

## 2022-11-29 RX ADMIN — ACETAMINOPHEN 650 MG: 325 TABLET ORAL at 00:11

## 2022-11-29 RX ADMIN — ACETAMINOPHEN 650 MG: 325 TABLET ORAL at 08:33

## 2022-11-29 RX ADMIN — BUPROPION HYDROCHLORIDE 150 MG: 150 TABLET, EXTENDED RELEASE ORAL at 08:36

## 2022-11-29 RX ADMIN — SODIUM CHLORIDE, PRESERVATIVE FREE 10 ML: 5 INJECTION INTRAVENOUS at 05:02

## 2022-11-29 RX ADMIN — DEXAMETHASONE 4 MG: 4 TABLET ORAL at 08:33

## 2022-11-29 RX ADMIN — DULOXETINE HYDROCHLORIDE 60 MG: 60 CAPSULE, DELAYED RELEASE ORAL at 08:33

## 2022-11-29 RX ADMIN — OXYBUTYNIN CHLORIDE 5 MG: 5 TABLET ORAL at 08:34

## 2022-11-29 NOTE — PROGRESS NOTES
Bupropion  mg BID is P&T approved autosubstitution for Bupropion  mg QD. Contact Pharmacy with any questions.

## 2022-11-29 NOTE — PROGRESS NOTES
Reason for Readmission:  Scheduled embolization          RUR Score/Risk Level:   13%      PCP: First and Last name:  Dr Eli Jung   Name of Practice:    Are you a current patient: Yes/No: Yes   Approximate date of last visit: 10/20/22   Can you participate in a virtual visit with your PCP: Yes    Is a Care Conference indicated: No      Did you attend your follow up appointment (s): If not, why not: Not scheduled         Resources/supports as identified by patient/family:          Top Challenges facing patient (as identified by patient/family and CM): Finances/Medication cost?    None voiced    Transportation     Spouse   Support system or lack thereof?  and 2 adult children  Living arrangements? With spouse int asingle level home with a ramp         Self-care/ADLs/Cognition? A&O x 4       Current Advanced Directive/Advance Care Plan: On file           Plan for utilizing home health:  NA              Transition of Care Plan:    Based on readmission, the patient's previous Plan of Care   has been evaluated and/or modified. The current Transition of Care Plan is:       Care manager met with patient to introduce self and explain role. Patient is expected to be discharged today, no needs identified.    Stan Bangura RN,Care Management  Readmission Assessment  Number of days since last admission?: 1-7 days (D/C 11/26)  Previous disposition: Home with Family  Who is being interviewed?: Patient  What was the patient's/caregiver's perception as to why they think they needed to return back to the hospital?: Other (Comment) (Scheduled Embolization)  Who advised the patient to return to the hospital?: Physician  Does the patient report anything that got in the way of taking their medications?: No  In our efforts to provide the best possible care to you and others like you, can you think of anything that we could have done to help you after you left the hospital the first time, so that you might not have needed to return so soon?: Other (Comment) (Patient was a scheduled embolization)   Medicare pt has received, reviewed, and signed 2nd IM letter informing them of their right to appeal the discharge. Signed copy has been placed on pt bedside chart.

## 2022-11-29 NOTE — ROUTINE PROCESS
Bedside shift change report given to this RN (oncoming nurse) by [facility] [place], RN (offgoing nurse). Report included the following information SBAR, Kardex, Intake/Output, MAR, Accordion, and Recent Results. 0800: Pt A&Ox3 (not to time). Pt obeys all commands, speech clear, pupils 2-3 and brisk. Pt currently sitting up in bed eating breakfast. All pulses palpable. VSS. Afebrile. 0945: Ambulated pt around unit, per MD verbal order. Pt did well with cane and 1 assist. No c/o dizziness. Pt now sitting up in recliner with family visiting at bedside.

## 2022-11-29 NOTE — PROGRESS NOTES
1930: Bedside and Verbal shift change report given to [address] (oncoming nurse) by Wilfredo Morse RN (offgoing nurse). Report included the following information SBAR, Kardex, ED Summary, Intake/Output, MAR, Recent Results, Cardiac Rhythm SR, Alarm Parameters , and Dual Neuro Assessment. 0000: Pt intermittently confused, not oriented to month and sometimes location. Kitty Dooley NP updated, will continue to monitor. 0200: Pt still intermittently confused. Per Kitty Dooley NP let pt rest until 0400 neuro check. Pt with known memory issues. Will continue to monitor. 0730: Bedside and Verbal shift change report given to [facility] (oncoming nurse) by [facility] GONZALEZ RN (offgoing nurse). Report included the following information SBAR, Kardex, ED Summary, Intake/Output, MAR, Recent Results, Cardiac Rhythm SR, and Alarm Parameters .

## 2022-11-29 NOTE — PROGRESS NOTES
Neurocritical Care Brief Progress Note:  Patient is resting comfortably in bed. C/o 5/10 headache. Prn tylenol ordered and started short-term steroids taper dose. Continues to have 6th nerve palsy with ptosis and erythema otherwise rest of neuro exams are intact. Diplopia seems to be improving according to patient. Sounds congested. Lungs ctab. Encouraged DCAT with fluids intake. Pending morning labs. R groin C/D/I, no bleeding/hematoma. +pulse    Addendum@ 0500  -Nurse notified neuro NP with patient exhibiting memory issues. Patient with intermittent memory issues which is not new according to patient. Patient stated she has history of memory disturbances/Impaired remote memory   -L ptosis with erythema seems to be improving, no diplopia. Persistent dysconjugate.   -admits improvement with headaches  -morning labs are non-revealing       Physical Exam:   Blood pressure (!) 136/53, pulse 100, temperature 97.5 °F (36.4 °C), resp. rate 16, height 5' 4\" (1.626 m), weight 77.1 kg (170 lb), SpO2 97 %, not currently breastfeeding. GEN: Cooperative, Calm, Well developed, well nourished patient in NAD  HEENT: Normocephalic. Non-icter, sounds congestion  Lungs:  CTA bilaterally Ant; non-labored breathing   Cardiac: S1,S2, normal rate and rhythm, with no murmurs. no carotid bruits, no gallops  Abdomen: Normal bowel sounds, no distention, soft, non-tender  Extremities: 2+ Radial pulses, no clubbing, cyanosis, or edema  Skin: no rashes or lesions noted. R groin c/d/i      NEURO:  Mental status: Oriented to place and person, disoriented to time and situation. Able to follow commands appropriately  Cranial Nerves:  II-XII intact; Speech and language intact. 6th nerve palsy, dysconjugate, PERRL, intermittent nystagmus, L ptosis. No dysarthria or aphasia. Full facial strength, no asymmetry. Hearing intact bilaterally. Tongue protrudes to midline, palate elevates symmetrically.  Shrug Shoulders B/L  Motor:  Normal bulk and tone, 5/5 strength x 4 extremities proximally and distally; No involuntary movements.   Reflexes: +2 throughout, down going toes bilaterally   Sensation: Intact x 4 extremities to light touch  Gait:  Deferred        Continue current plan per [facility].     >25% time spent in counseling or coordination of care of the above in the assessment and plan       [facility]-NP  Neurocritical Care Nurse Practitioner  [phone]

## 2022-11-29 NOTE — DISCHARGE SUMMARY
Neurointerventional Surgery Discharge Summary  [address], [facility] [address]    Patient: Pavan Ramsey MRN: [id number]  SSN: [id number]    YOB: 1950  Age: 74 y.o. Sex: female      DATE OF ADMISSION: 11/28/2022    DATE OF DISCHARGE: 11/29/22     ADMITTING PROVIDER: Jad Gonzalez MD    DISCHARGING PROVIDER:Shruthi Dash NP    PROCEDURES/SURGERIES: Procedure(s) with comments: cerebral angiogram and embolization of carotid fistula. OFFICE NUMBER: [phone] , you can reach the on call physician 24/7 even during non-business hours     [address]:     ICD-10-CM ICD-9-CM    1. 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 Poonam Graham. The procedure was performed in the Interventional Radiology suite under general anesthesia. The patient tolerated the procedure well without complications. Following extubation, patient was transferred to ICU, fully recovered and returned to neurological baseline. The patient was admitted overnight for continued monitoring due to potential risks of stroke and thrombosis. There were no neurological events overnight. She was started on steroids for L ptosis with erythema which seems improved with steroids. The hospital course was uneventful and the patient was appropriate for discharge home today in stable condition.     Patient Vitals for the past 12 hrs:   Temp Pulse Resp BP SpO2   11/29/22 1000 -- 99 19 120/78 96 %   11/29/22 0900 -- 100 23 (!) 140/69 96 %   11/29/22 0800 98.3 °F (36.8 °C) 87 16 122/71 96 %   11/29/22 0700 -- 92 17 (!) 149/117 98 %   11/29/22 0600 -- 97 15 (!) 137/56 95 %   11/29/22 0500 -- 95 15 112/69 96 %   11/29/22 0400 98.2 °F (36.8 °C) 98 19 (!) 146/87 94 %   11/29/22 0300 -- 91 16 136/69 93 %   11/29/22 0200 -- 89 15 135/71 98 %   11/29/22 0130 -- 91 13 135/66 96 %   11/29/22 0100 -- 91 19 (!) 130/96 90 %   11/29/22 0030 -- 94 14 126/72 95 %   11/29/22 0000 98 °F (36.7 °C) 92 20 127/67 97 %   11/28/22 2330 -- 94 17 106/83 96 %   11/28/22 2300 -- 91 19 (!) 124/53 97 %       Physical Exam:  GENERAL: NAD, calm, cooperative  HEART: RRR  LUNGS: CTAB  SKIN: Warm, dry, color appropriate for ethnicity. Neurologic Exam:  Mental Status:  Alert and oriented x 4. Appropriate affect, mood and behavior. Language:    Normal fluency, repetition, comprehension and naming. Cranial Nerves:   Pupils 3 mm bilaterally, equal, round and reactive to light. Visual fields full to confrontation. Extraocular movements intact in right eye, left eye with ptosis and erythema. Adduction intact and vertical movement intact. Cannot abduct left eye. Facial sensation intact. Full facial strength, no asymmetry. Hearing intact bilaterally. No dysarthria. Tongue protrudes to midline, palate elevates symmetrically. Shoulder shrug 5/5 bilaterally. Motor:    No pronator drift. Bulk and tone normal.      5/5 power in all extremities proximally and distally. No involuntary movements. Sensation:    Sensation intact throughout to light touch. Coordination & Gait: Gait deferred. FTN and HTS intact with no ataxia present.     Labs:  Lab Results   Component Value Date/Time    Sodium 141 11/29/2022 04:45 AM    Potassium 4.1 11/29/2022 04:45 AM    Chloride 113 (H) 11/29/2022 04:45 AM    CO2 20 (L) 11/29/2022 04:45 AM    Anion gap 8 11/29/2022 04:45 AM    Glucose 124 (H) 11/29/2022 04:45 AM    BUN 12 11/29/2022 04:45 AM    Creatinine 0.91 11/29/2022 04:45 AM    BUN/Creatinine ratio 13 11/29/2022 04:45 AM    GFR est AA 54 (L) 09/09/2022 10:45 AM    GFR est non-AA 45 (L) 09/09/2022 10:45 AM    Calcium 8.4 (L) 11/29/2022 04:45 AM     Lab Results   Component Value Date/Time    WBC 10.3 11/29/2022 04:45 AM Hemoglobin (POC) 11.9 08/29/2017 09:26 AM    HGB 11.4 (L) 11/29/2022 04:45 AM    Hematocrit (POC) 35 08/29/2017 09:26 AM    HCT 34.3 (L) 11/29/2022 04:45 AM    PLATELET 969 20/93/3153 04:45 AM    MCV 98.0 11/29/2022 04:45 AM     Lab Results   Component Value Date/Time    MCH 32.6 11/29/2022 04:45 AM    MCHC 33.2 11/29/2022 04:45 AM    BASOPHILS 1 11/26/2022 12:45 AM    ABS. LYMPHOCYTES 1.9 11/26/2022 12:45 AM    ABS. MONOCYTES 0.7 11/26/2022 12:45 AM    ABS. EOSINOPHILS 0.2 11/26/2022 12:45 AM    ABS. BASOPHILS 0.1 11/26/2022 12:45 AM    Phosphorus 3.1 11/29/2022 04:45 AM    Magnesium 2.0 11/29/2022 04:45 AM     DIET:   Normal diet    PLAN:  Discharge home with self care. FOLLOW UP APPOINTMENTS:   1. Follow up in the [facility] clinic with Dr. Deshawn Jackman on 2:00 12/13/22  2   Please make an appointment to follow up with/ PCP as needed. ACTIVITY:   Do not lift anything over 10 lbs for two weeks. Try to limit going up and down stairs as much as possible. Rest and hydrate. May resume all physical activities as tolerated after 2 weeks. WOUND CARE:   [place] for signs and sx of infection including fever, expanding inflammation and discolored drainage. Report any changes in temperature in affected extremity, numbness, weakness or hematoma. If you experience any increased bruising or bleeding at your groin puncture site either outside or under the skin please apply direct, firm pressure for 20 minutes. Keep track of the bruising at the groin site by marking it with a pen or marker. If the bruising continues to be dark purple or blue in color, OR is expanding, please call our office to let the on call doctor know. We have someone on call 24/7. You may shower normally and cleanse the site with gentle soap. Do not soak in the bathtub. OTHER RECOMMENDATIONS:  BEFAST reviewed to educate for stroke symptoms.  Please call 911 and proceed to emergency department immediately if you develop any of the following:  - Acute changes in your balance or vision  - Weakness in your face, arm or leg   - Speech changes or difficulties. DISCHARGE MEDICATIONS:   1. Resume all home meds  2. Continue Aspirin 81 mg daily   3. Start Medrol dosepak    Current Discharge Medication List        START taking these medications    Details   methylPREDNISolone (MEDROL DOSEPACK) 4 mg tablet Follow taper instruction on package  Qty: 1 Dose Pack, Refills: 0  Start date: 11/29/2022           CONTINUE these medications which have NOT CHANGED    Details   Trulance 3 mg tab TAKE 1 TABLET BY MOUTH EVERY DAY FOR CONSTIPATION      nortriptyline (PAMELOR) 10 mg capsule Take 1 Capsule by mouth nightly. Qty: 30 Capsule, Refills: 2    Associated Diagnoses: Bilateral hand numbness      ARIPiprazole (ABILIFY) 2 mg tablet Take 1 Tablet by mouth daily. Qty: 30 Tablet, Refills: 1    Associated Diagnoses: Moderate episode of recurrent major depressive disorder ([facility] [address] Recurrent major depression resistant to treatment ([facility])      cyclobenzaprine (FLEXERIL) 5 mg tablet TAKE 1 TABLET NIGHTLY  Qty: 90 Tablet, Refills: 3    Associated Diagnoses: Fibromyalgia; Chronic pain syndrome; Spinal stenosis of lumbar region with neurogenic claudication; Status post lumbar spine surgery for decompression of spinal cord; Acute midline low back pain without sciatica      buPROPion XL (WELLBUTRIN XL) 300 mg XL tablet Take 1 Tablet by mouth daily.   Qty: 90 Tablet, Refills: 1      oxybutynin (DITROPAN) 5 mg tablet TAKE 1 TABLET TWICE A DAY  Qty: 180 Tablet, Refills: 3      dicyclomine (BENTYL) 10 mg capsule TAKE 2 CAPSULES FOUR TIMES A DAY AS NEEDED  Qty: 360 Capsule, Refills: 7    Associated Diagnoses: Irritable bowel syndrome without diarrhea      DULoxetine (CYMBALTA) 60 mg capsule TAKE 1 CAPSULE TWICE A DAY  Qty: 180 Capsule, Refills: 3    Associated Diagnoses: Fibromyalgia      rosuvastatin (CRESTOR) 20 mg tablet TAKE 1 TABLET NIGHTLY  Qty: 90 Tablet, Refills: 3      pantoprazole (PROTONIX) 40 mg tablet TAKE 1 TABLET DAILY  Qty: 90 Tablet, Refills: 3      b complex vitamins tablet Take 1 Tablet by mouth daily. aspirin delayed-release 81 mg tablet Take  by mouth daily. acetaminophen (TYLENOL) 500 mg tablet Take 2 Tabs by mouth every six (6) hours. Qty: 90 Tab, Refills: 0      calcium-vitamin D (OS-TRUE +D3) 500 mg-200 unit per tablet 1 Tab. cholecalciferol, vitamin D3, 50 mcg (2,000 unit) tab Take 1 Tab by mouth daily. sucralfate (CARAFATE) 1 gram tablet Take 1 g by mouth three (3) times daily as needed. polyethylene glycol (MIRALAX) 17 gram packet Take 17 g by mouth daily. Fide Manjarrez NP  Neurointerventional Surgery        I personally reviewed imaging, saw and evaluated the patient and agree with the NP assessment and plan as documented in the note above.   This is a late not for service dated 11/29/22

## 2022-11-30 ENCOUNTER — PATIENT OUTREACH (OUTPATIENT)
Dept: CASE MANAGEMENT | Age: 72
End: 2022-11-30

## 2022-11-30 NOTE — PROGRESS NOTES
Care Transitions Initial Call    Call within 2 business days of discharge: Yes     Patient: Louis Baez Patient : 1950 MRN: [id number]    Last Discharge [address]       Date Complaint Diagnosis Description Type Department Provider    22  Carotid-cavernous fistula Admission (Discharged) from  JESUS Valencia MD            Was this an external facility discharge? No Discharge Facility: [facility] -; [facility] -    Challenges to be reviewed by the provider   Additional needs identified to be addressed with provider: yes  [facility] -; [facility] -. DX- Carotid cavernous fistula  Angiogram and embolization of carotid fistula  per .       Method of communication with provider : chart routing    Discussed COVID-19 related testing which was not done at this time. Advance Care Planning:   Does patient have an Advance Directive: yes, reviewed and on file. .     Inpatient Readmission Risk score: Unplanned Readmit Risk Score: 7.9    Was this a readmission? yes   Patient stated reason for the admission: planned cerebral angiogram and embolization of carotid fistula    Patients top risk factors for readmission: medical condition-Cavernous sinus thrombosis, s/p cerebral angiogram and embolization of carotid fistula; h/o HTN,CKD3,Bilateral carotid artery stenosis,Depression,GERD,obesity. Interventions to address risk factors: Scheduled appointment with PCP-  at 10am, Scheduled appointment with [address] [place] [address]  at 2pm , and Obtained and reviewed discharge summary and/or continuity of care documents    Care Transition Nurse (CTN) contacted the patient by telephone to perform post hospital discharge assessment. Verified name and  with patient as identifiers. Provided introduction to self, and explanation of the CTN role. Spoke with patient and her . Reports she feels good today.  Vision had been improving until this surgery, has double vision in left eye again, r/t swelling from surgery. No worse than when was in hospital prior to discharge. Monitoring carefully for any changes. No soreness.  checked her bp this am, 141/79 and P-79. She was instructed to stop the Lisinopril and HCTZ when discharged 11/26 by hospitalist.     CTN reviewed discharge instructions, medical action plan and red flags with patient who verbalized understanding. Were discharge instructions available to patient? yes. Reviewed appropriate site of care based on symptoms and resources available to patient including: PCP, Specialist, Urgent Care Clinics, When to call 911, and [employer] Messaging. Patient given an opportunity to ask questions and does not have any further questions or concerns at this time. The patient agrees to contact the PCP office for questions related to their healthcare. Medication reconciliation was performed with patient, who verbalizes understanding of administration of home medications. Advised obtaining a 90-day supply of all daily and as-needed medications. Referral to Pharm D needed: no     Home Health/Outpatient orders at discharge: [address]: na  Date of initial visit: na    Durable Medical Equipment ordered at discharge: None  [address]: na  Durable Medical Equipment received: na    Was patient discharged with a pulse oximeter? no    Discussed follow-up appointments. If no appointment was previously scheduled, appointment scheduling offered: yes. Is follow up appointment scheduled within 7 days of discharge? yes.    [facility] follow up appointment(s):   Future Appointments   Date Time Provider Ebony Blancas   12/1/2022  9:00 AM Madhav Xavier LCSW LMSW BS AMB   12/8/2022 10:00 AM Carlos Polo MD [facility] BS AMB   12/13/2022 11:20 AM Vicki Myers PsyD [facility] BS AMB   12/13/2022  2:00 PM Jean Kennedy MD [facility] BS AMB   6/12/2023  9:40 AM MD Robert Randolph BS AMB     Non-[facility] follow up appointment(s): na    Plan for follow-up call in 5-7 days based on severity of symptoms and risk factors. Plan for next call: symptom management-assess current symptoms, self management-following discharge instructions, follow up appointment-saw pcp for POORNIMA visit, and medication management-taking meds as ordered. CTN provided contact information for future needs. Goals Addressed                   This Visit's Progress     Understands red flags post discharge. 11/30/22 [facility] 11/22-11/26; [facility] 11/28-11/29 Carotid cavernous fistula s/p cerebral angiogram and embolization of fistula  Reviewed discharge instructions with patient and  using teach back. Reviewed meds, education provided on new meds, using teach back. Reviewed red flags: fever, inflammation,discolored drainage, change in temperature-numbness,weakness,hematoma. Bruising or bleeding at groin puncture site. Also watch for change in balance or vision, weakness in face/arm/legs, speech changes or difficulties. POORNIMA with pcp, Luis Serrato 12/8 at [address] [place] [address], 12/13 at 2pm.  Given info on Luis Lawson as resource. Given CTN contact info if questions/concerns. CTN to check back in about 5-7 days for update. teddy

## 2022-12-01 ENCOUNTER — VIRTUAL VISIT (OUTPATIENT)
Dept: BEHAVIORAL/MENTAL HEALTH CLINIC | Age: 72
End: 2022-12-01
Payer: MEDICARE

## 2022-12-01 DIAGNOSIS — F33.1 DEPRESSION, MAJOR, RECURRENT, MODERATE (HCC): Primary | ICD-10-CM

## 2022-12-01 DIAGNOSIS — F41.9 ANXIETY: ICD-10-CM

## 2022-12-01 PROCEDURE — 1111F DSCHRG MED/CURRENT MED MERGE: CPT | Performed by: SOCIAL WORKER

## 2022-12-01 PROCEDURE — G9717 DOC PT DX DEP/BP F/U NT REQ: HCPCS | Performed by: SOCIAL WORKER

## 2022-12-01 PROCEDURE — 1123F ACP DISCUSS/DSCN MKR DOCD: CPT | Performed by: SOCIAL WORKER

## 2022-12-01 PROCEDURE — 90834 PSYTX W PT 45 MINUTES: CPT | Performed by: SOCIAL WORKER

## 2022-12-01 PROCEDURE — G8427 DOCREV CUR MEDS BY ELIG CLIN: HCPCS | Performed by: SOCIAL WORKER

## 2022-12-01 NOTE — PROGRESS NOTES
PSYCHOTHERAPY NOTE      Lydia Maldonado is a 74 y.o. female who presents with anxiety/depression. Client was seen for a virtual individual psychotherapy session that lasted for 50 minutes. Progress:  Client presents with a bright mood and affect despite enduring significant stressors since last session. Client shared that she continued to have issues with her eye (swelling and double vision). She went to the [facility] who referred her to ER. She was then sent to [facility] [place] and eventually had to have a surgical procedure. She is hopeful that this will resolve this issue. She is beginning to feel better and has noticed a discernable difference with a decrease in swelling and vision improving. All of this happened during Thanksgiving holiday so they were not able to celebrated Thanksgiving that day. Client shared; however, that daughter prepared a dinner after she was discharged from hospital and her son has helped to clean her house and took time off to help her. She feels very supported. Mood is improving. Client is doing a better job of practicing mindfulness and reframing cognitions. Does have periods of irritability at times and we explored ways to help client self soothe and improve frustration tolerance. Focus today was on self care, mindfulness and improving coping strategies.      Mental Status exam:         Sensorium  oriented to time, place and person   Relations cooperative   Appearance:  casually dressed   Motor Behavior:  within normal limits   Speech:  normal pitch and normal volume   Thought Process: goal directed and logical   Thought Content free of delusions and free of hallucinations   Suicidal ideations none   Homicidal ideations none   Mood:  within normal limits   Affect:  mood-congruent   Memory recent  adequate   Memory remote:  adequate   Concentration:  adequate   Abstraction:  abstract   Insight:  fair   Reliability fair   Judgment:  fair         DIAGNOSIS AND IMPRESSION:    Axis I: Major Depression, Recurrent, Moderate; Anxiety  Axis II: No dx  Axis III:   Past Medical History:   Diagnosis Date    Acute alteration in mental status     Arthritis     Bilateral carotid artery stenosis     Cerebral microvascular disease     Chronic kidney disease     CKD 3    Chronic pain     CHRONIC BOWEL PAIN    Convulsions (HCC)     Depression     Disturbance of memory     Diverticulitis     Fibromyalgia     GERD (gastroesophageal reflux disease)     Goiter     Hiatal hernia     Hypercholesterolemia     Hypertension     IBS (irritable bowel syndrome)     CONSTIPATION, CHRONIC SINCE HER 20s    Insomnia     TAKES TRAZODONE NIGHTLY    Migraine     REDUCED IN FREQUENCY AND SEVERITY SINCE MENOPAUSE    Rectocele      Axis IV: Problems with primary support group, Problems related to social environment, and Other psychosocial or environmental problems  Axis V:  61-70 mild symptoms    Interventions/plans: Follow up in 2 weeks      Pursuant to the emergency declaration under the [facility][note 3]1 [facility], [facility] waiver authority and the [employer] Act, this Virtual Visit was conducted, with patient and parent and/or guardian's consent, to reduce the patient's risk of exposure to COVID-19 and provide continuity of care for an established patient. Due to the state of emergency related to the coronavirus, the [place] of [employer] and the [place] [employer] is allowing practitioners holding my licensure and/or certification status the ability to provide telehealth services without the usual requirements. This individual was evaluated through a synchronous (real-time) audio-video encounter, and/or his/her healthcare decision maker, is aware that it is a billable service, which includes applicable co-pays, with coverage as determined by his/her insurance carrier.  He/she provided verbal consent to proceed and patient identification was verified. This visit was conducted pursuant to the emergency declaration under the [address] [place] [address] and the [employer] Act. A caregiver was present when appropriate. Ability to conduct physical exam was limited. The patient was located at home in a state where the provider was licensed to provide care.

## 2022-12-08 ENCOUNTER — PATIENT OUTREACH (OUTPATIENT)
Dept: CASE MANAGEMENT | Age: 72
End: 2022-12-08

## 2022-12-08 ENCOUNTER — OFFICE VISIT (OUTPATIENT)
Dept: FAMILY MEDICINE CLINIC | Age: 72
End: 2022-12-08
Payer: MEDICARE

## 2022-12-08 VITALS
DIASTOLIC BLOOD PRESSURE: 54 MMHG | TEMPERATURE: 97.9 F | HEART RATE: 71 BPM | RESPIRATION RATE: 16 BRPM | OXYGEN SATURATION: 97 % | SYSTOLIC BLOOD PRESSURE: 115 MMHG | BODY MASS INDEX: 29.37 KG/M2 | HEIGHT: 64 IN | WEIGHT: 172 LBS

## 2022-12-08 DIAGNOSIS — F41.9 ANXIETY: ICD-10-CM

## 2022-12-08 DIAGNOSIS — Z09 HOSPITAL DISCHARGE FOLLOW-UP: Primary | ICD-10-CM

## 2022-12-08 DIAGNOSIS — I67.1 CAROTID-CAVERNOUS FISTULA: ICD-10-CM

## 2022-12-08 DIAGNOSIS — Z23 NEEDS FLU SHOT: ICD-10-CM

## 2022-12-08 DIAGNOSIS — F33.9 RECURRENT MAJOR DEPRESSION RESISTANT TO TREATMENT (HCC): ICD-10-CM

## 2022-12-08 DIAGNOSIS — R39.15 URINARY URGENCY: ICD-10-CM

## 2022-12-08 DIAGNOSIS — F33.1 MODERATE EPISODE OF RECURRENT MAJOR DEPRESSIVE DISORDER (HCC): ICD-10-CM

## 2022-12-08 RX ORDER — OXYBUTYNIN CHLORIDE 5 MG/1
5 TABLET ORAL
Qty: 90 TABLET | Refills: 0 | Status: SHIPPED | OUTPATIENT
Start: 2022-12-08

## 2022-12-08 NOTE — PROGRESS NOTES
Patient identified with two identification factors, Name and Date of Birth. Chief Complaint   Patient presents with    Hypertension    Depression     6 week follow-up. Pt will like to discuss shingrix vaccine and COVID booster. Pt will receive flu vaccine today. Visit Vitals  BP (!) 115/54 (BP 1 Location: Right arm, BP Patient Position: Sitting, BP Cuff Size: Adult)   Pulse 71   Temp 97.9 °F (36.6 °C) (Oral)   Resp 16   Ht 5' 4\" (1.626 m)   Wt 172 lb (78 kg)   SpO2 97%   BMI 29.52 kg/m²       Health Maintenance Due   Topic    Foot Exam Q1     Eye Exam Retinal or Dilated     Shingrix Vaccine Age 50> (1 of 2)    MICROALBUMIN Q1     COVID-19 Vaccine (3 - Booster for Moderna series)    Flu Vaccine (1)     1. \"Have you been to the ER, urgent care clinic since your last visit? Hospitalized since your last visit? \" Yes . [facility] for double vision possibility from a past fall from over a year prior. 2. \"Have you seen or consulted any other health care providers outside of the [address] [place] [address] since your last visit? \" No

## 2022-12-08 NOTE — PROGRESS NOTES
Transitional Care Management Progress Note    Patient: Kenny Carvajal  : 1950  PCP: Erika Pruitt MD    Date of office visit: 2022   Date of admission: 22  Date of discharge: 22  Hospital: [facility]    Call initiated w/i 2 business dates of discharge: Yes   Date of the most recent call to the patient: 2022 10:55 AM      Assessment/Plan:   Diagnoses and all orders for this visit:    1. 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 Moderate episode of recurrent major depressive disorder ([facility]  4. Recurrent major depression resistant to treatment ([facility]  6. Anxiety  - stopped Abilify given my concern for possible SEs though she reports some improvement in mood. -     REFERRAL TO PSYCHIATRY    5. Urinary urgency  -     oxybutynin (DITROPAN) 5 mg tablet; Take 1 Tablet by mouth nightly. Decreased frequency (Patient not taking: Reported on 2022)    7. Needs flu shot  -     INFLUENZA, FLUAD, (AGE 72 Y+), IM, PF, 0.5 ML    The complexity of medical decision making for this patient's transitional care is moderate   Follow-up and Dispositions    Return in 1 week (on 12/15/2022), or if symptoms worsen or fail to improve. Subjective:   Kenny Carvajal is a 74 y.o. female presenting today for follow-up after hospital discharge. This encounter and supporting documentation was reviewed if available. Medication reconciliation was performed today. 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 Complications during admission: none    Hospital discharge follow up reviewed with highlights below:  1. \"Indirect carotid-cavernous fistula. Procedure scheduled for Monday. Hypertension. Patient did not require blood pressure medication.   Hold blood pressure medication and take blood pressure 3 times a day slowly reintroducing the lisinopril first and hydrochlorothiazide second based on blood pressure readings above 140s to 150s. Talk to your primary care provider for assistance in restarting medication. Mood disorder. Continue home meds. Dyslipidemia. Continue home meds. Detrusor instability. Continue home meds. IBS. Home meds. Obesity. Counseled. DELROY. Resolved. \"    Feeling some concerns with memory changes worse since being on Abilfy. Has not been able to see Psych yet    Medications marked \"taking\" at this time:  Prior to Admission medications    Medication Sig Start Date End Date Taking? Authorizing Provider   oxybutynin (DITROPAN) 5 mg tablet Take 1 Tablet by mouth nightly. Decreased frequency  Patient not taking: Reported on 12/13/2022 12/8/22  Yes Eloina Lynn MD   Trulance 3 mg tab TAKE 1 TABLET BY MOUTH EVERY DAY FOR CONSTIPATION 11/9/22  Yes Provider, Historical   buPROPion XL (WELLBUTRIN XL) 300 mg XL tablet Take 1 Tablet by mouth daily. 9/16/22  Yes Eloina Lynn MD   dicyclomine (BENTYL) 10 mg capsule TAKE 2 CAPSULES FOUR TIMES A DAY AS NEEDED 8/17/22  Yes Eloina Lynn MD   DULoxetine (CYMBALTA) 60 mg capsule TAKE 1 CAPSULE TWICE A DAY 8/17/22  Yes Eloina Lynn MD   rosuvastatin (CRESTOR) 20 mg tablet TAKE 1 TABLET NIGHTLY  Patient not taking: Reported on 12/13/2022 7/12/22  Yes Eloina Lynn MD   pantoprazole (PROTONIX) 40 mg tablet TAKE 1 TABLET DAILY 7/12/22  Yes Eloina Lynn MD   acetaminophen (TYLENOL) 500 mg tablet Take 2 Tabs by mouth every six (6) hours. 5/8/21  Yes Myra Syed PA-C   LISINOPRIL PO Take  by mouth daily. Provider, Historical   HYDROCHLOROTHIAZIDE PO Take  by mouth daily. Provider, Historical   aripiprazole (ABILIFY PO) Take  by mouth daily. Provider, Historical   aspirin delayed-release 81 mg tablet Take  by mouth daily.   Patient not taking: No sig reported    Provider, Historical      ROS per HPI      Past Medical History:   Diagnosis Date    Acute alteration in mental status     Arthritis     Bilateral carotid artery stenosis     Cerebral microvascular disease     Chronic kidney disease     CKD 3    Chronic pain     CHRONIC BOWEL PAIN    Convulsions (HCC)     Depression     Disturbance of memory     Diverticulitis     Fibromyalgia     GERD (gastroesophageal reflux disease)     Goiter     Hiatal hernia     Hypercholesterolemia     Hypertension     IBS (irritable bowel syndrome)     CONSTIPATION, CHRONIC SINCE HER 20s    Insomnia     TAKES TRAZODONE NIGHTLY    Migraine     REDUCED IN FREQUENCY AND SEVERITY SINCE MENOPAUSE    Rectocele      Past Surgical History:   Procedure Laterality Date    COLONOSCOPY N/A 6/21/2016    COLONOSCOPY performed by Cheri Randolph MD at [facility] ENDOSCOPY    COLONOSCOPY N/A 6/27/2018    COLONOSCOPY performed by Cheri Randolph MD at [facility] ENDOSCOPY    ENDOSCOPY, COLON, DIAGNOSTIC  11/2010    Dr. Tabatha Elizabeht  2011    HX CHOLECYSTECTOMY  2006    HX ENDOSCOPY      HX GI  X3  LAST ONE 12/10    COLONOSCOPY    HX GYN  1982    D&C WITH SUCTION    HX HYSTERECTOMY      HX LUMBAR FUSION  2017    HX ORTHOPAEDIC      right foot surgery    HX TONSILLECTOMY      HX TOTAL COLECTOMY  2007    Dr. Pond/ diverticulitis    IR INJ SPINE FLUORO GUIDED  8/28/2020    IR INJ SPINE FLUORO GUIDED  1/8/2021    IR INJ SPINE FLUORO GUIDED  2/5/2021          Objective:   Visit Vitals  BP (!) 115/54 (BP 1 Location: Right arm, BP Patient Position: Sitting, BP Cuff Size: Adult)   Pulse 71   Temp 97.9 °F (36.6 °C) (Oral)   Resp 16   Ht 5' 4\" (1.626 m)   Wt 172 lb (78 kg)   SpO2 97%   BMI 29.52 kg/m²      PE  General appearance - alert, well appearing, and in no distress  Eyes -sclera anicteric  Neck - supple, no significant adenopathy, no thyromegaly  Chest - clear to auscultation, no wheezes, rales or rhonchi, symmetric air entry  Heart - normal rate, regular rhythm, normal S1, S2, no murmurs, rubs, clicks or gallops  Neurological - alert, oriented, normal speech, no focal findings or movement disorder noted  Extr - no edema  Psych - normal mood and affect      We discussed the expected course, resolution and complications of the diagnosis(es) in detail. Medication risks, benefits, costs, interactions, and alternatives were discussed as indicated. I advised her to contact the office if her condition worsens, changes or fails to improve as anticipated. She expressed understanding with the diagnosis(es) and plan.      Carlene Haji MD

## 2022-12-08 NOTE — PROGRESS NOTES
She denies any symptoms , reactions or allergies that would exclude them from being immunized today. Risks and adverse reactions were discussed and the VIS was given to them. All questions were addressed. She was observed for 15 min post injection. There were no reactions observed.     Shelbi Rose LPN

## 2022-12-08 NOTE — PATIENT INSTRUCTIONS
Vaccine Information Statement    Influenza (Flu) Vaccine (Inactivated or Recombinant): What You Need to Know    Many vaccine information statements are available in Hebrew and other languages. See www.immunize.org/vis. Hojas de información sobre vacunas están disponibles en español y en muchos otros idiomas. Visite www.immunize.org/vis. 1. Why get vaccinated? Influenza vaccine can prevent influenza (flu). Flu is a contagious disease that spreads around the [facility] every year, usually between October and May. Anyone can get the flu, but it is more dangerous for some people. Infants and young children, people 72 years and older, pregnant people, and people with certain health conditions or a weakened immune system are at greatest risk of flu complications. Pneumonia, bronchitis, sinus infections, and ear infections are examples of flu-related complications. If you have a medical condition, such as heart disease, cancer, or diabetes, flu can make it worse. Flu can cause fever and chills, sore throat, muscle aches, fatigue, cough, headache, and runny or stuffy nose. Some people may have vomiting and diarrhea, though this is more common in children than adults. In an average year, thousands of people in the [facility] die from flu, and many more are hospitalized. Flu vaccine prevents millions of illnesses and flu-related visits to the doctor each year. 2. Influenza vaccines     CDC recommends everyone 6 months and older get vaccinated every flu season. Children 6 months through 6years of age may need 2 doses during a single flu season. Everyone else needs only 1 dose each flu season. It takes about 2 weeks for protection to develop after vaccination. There are many flu viruses, and they are always changing. Each year a new flu vaccine is made to protect against the influenza viruses believed to be likely to cause disease in the upcoming flu season.  Even when the vaccine doesnt exactly match these viruses, it may still provide some protection. Influenza vaccine does not cause flu. Influenza vaccine may be given at the same time as other vaccines. 3. Talk with your health care provider    Tell your vaccination provider if the person getting the vaccine:  Has had an allergic reaction after a previous dose of influenza vaccine, or has any severe, life-threatening allergies   Has ever had Guillain-Barré Syndrome (also called GBS)    In some cases, your health care provider may decide to postpone influenza vaccination until a future visit. Influenza vaccine can be administered at any time during pregnancy. People who are or will be pregnant during influenza season should receive inactivated influenza vaccine. People with minor illnesses, such as a cold, may be vaccinated. People who are moderately or severely ill should usually wait until they recover before getting influenza vaccine. Your health care provider can give you more information. 4. Risks of a vaccine reaction    Soreness, redness, and swelling where the shot is given, fever, muscle aches, and headache can happen after influenza vaccination. There may be a very small increased risk of Guillain-Barré Syndrome (GBS) after inactivated influenza vaccine (the flu shot). Martita Aldridge children who get the flu shot along with pneumococcal vaccine (PCV13) and/or DTaP vaccine at the same time might be slightly more likely to have a seizure caused by fever. Tell your health care provider if a child who is getting flu vaccine has ever had a seizure. People sometimes faint after medical procedures, including vaccination. Tell your provider if you feel dizzy or have vision changes or ringing in the ears. As with any medicine, there is a very remote chance of a vaccine causing a severe allergic reaction, other serious injury, or death. 5. What if there is a serious problem?     An allergic reaction could occur after the vaccinated person leaves the clinic. If you see signs of a severe allergic reaction (hives, swelling of the face and throat, difficulty breathing, a fast heartbeat, dizziness, or weakness), call 9-1-1 and get the person to the nearest hospital.    For other signs that concern you, call your health care provider. Adverse reactions should be reported to the Vaccine Adverse Event Reporting System (VAERS). Your health care provider will usually file this report, or you can do it yourself. Visit the VAERS website at www.vaers. [facility].gov or call 3-479.675.4231. VAERS is only for reporting reactions, and VAERS staff members do not give medical advice. 6. The National Vaccine Injury Compensation Program    The [facility] Vaccine Injury Compensation Program (VICP) is a federal program that was created to compensate people who may have been injured by certain vaccines. Claims regarding alleged injury or death due to vaccination have a time limit for filing, which may be as short as two years. Visit the VICP website at www.[facility].gov/vaccinecompensation or call 9-884.994.1292 to learn about the program and about filing a claim. 7. How can I learn more? Ask your health care provider. Call your local or state health department. Visit the website of the Food and Drug Administration (FDA) for vaccine package inserts and additional information at www.fda.gov/vaccines-blood-biologics/vaccines. Contact the Centers for Disease Control and Prevention (CDC): Call 5-251.512.3553 (1-131-BML-INFO) or  Visit CDCs influenza website at www.cdc.gov/flu. Vaccine Information Statement   Inactivated Influenza Vaccine   8/6/2021  42 ANUM Banks [id number]   Department of Health and Human Services  Centers for Disease Control and Prevention    Office Use Only

## 2022-12-09 NOTE — PROGRESS NOTES
Physician Progress Note      PATIENT:               Wanda Ramirez  [facility] #:                  [id number]  :                       1950  ADMIT DATE:       2022 3:36 PM  [address] DATE:        2022 11:35 AM  RESPONDING  PROVIDER #:        Darlin Potts MD          QUERY TEXT:    Katyin query:    Dear Attending,    Based on clinical indicators, the diagnosis of?DELROY? may be challenged on a clinical basis by an external reviewer. Patient was noted to have DELROY with a creatinine of 1.52 that improved to 1. 15. Patient has a history of HTN, DM and CKD. Due to a lack of supporting KDGIO criteria, DELROY may be challenged. In order to support the diagnosis of DELROY, please include additional clinical indicators in your documentation. ? Or please document if the diagnosis of DELROY has been ruled out after further study. The medical record reflects the following:  Risk Factors: pmhx CKD stage III, HTN  Clinical Indicators: creatinine of 22 1.52--> 22 1.32-->1.15  Treatment: IVFs @ 75ml/hr, Hold lisinopril and HCTZ temporarily. Recheck BMP,    Defined by Kidney Disease Improving Global Outcomes (KDIGO) clinical practice guideline for acute kidney injury:  -Increase in SCr by greater than or equal to 0.3 mg/dl within 48 hours; or  -Increase or decrease in SCr to greater than or equal to 1.5 times baseline, which is known or presumed to have occurred within the prior 7 days; or  -Urine volume < 0.5ml/kg/h for 6 hours. Options provided:  -- Acute kidney injury evidenced by, Please document evidence as well as a numerical baseline creatinine, if known. -- Currently resolved acute kidney injury was evidenced by, Please document evidence as well as a numerical baseline creatinine, if known.   -- Acute kidney injury ruled out after study  -- Other - I will add my own diagnosis  -- Disagree - Not applicable / Not valid  -- Disagree - Clinically unable to determine / Unknown  -- Refer to Clinical Documentation Reviewer    PROVIDER RESPONSE TEXT:    This patient has acute kidney injury as evidenced by creatinine was >0.3 above baseline. seen with improvement on fluids. Query created by:  Artem Piper on 12/9/2022 7:03 AM      Electronically signed by:  Shante Gilliland MD 12/9/2022 12:08 PM

## 2022-12-13 ENCOUNTER — TELEPHONE (OUTPATIENT)
Dept: NEUROLOGY | Age: 72
End: 2022-12-13

## 2022-12-13 ENCOUNTER — OFFICE VISIT (OUTPATIENT)
Dept: NEUROSURGERY | Age: 72
End: 2022-12-13
Payer: MEDICARE

## 2022-12-13 VITALS
OXYGEN SATURATION: 97 % | BODY MASS INDEX: 27.14 KG/M2 | HEIGHT: 64 IN | TEMPERATURE: 96.3 F | SYSTOLIC BLOOD PRESSURE: 120 MMHG | HEART RATE: 63 BPM | WEIGHT: 159 LBS | DIASTOLIC BLOOD PRESSURE: 82 MMHG

## 2022-12-13 DIAGNOSIS — I67.1: Primary | ICD-10-CM

## 2022-12-13 NOTE — PROGRESS NOTES
[facility] [place] procedure follow up for Carotid Fistula. Spouse at visit. No acute problems reported.

## 2022-12-13 NOTE — PATIENT INSTRUCTIONS
Follow up in 1 year after imaging is completed. See attached to schedule imaging for November 2023. Referral to OT/PT at [facility].

## 2022-12-15 ENCOUNTER — VIRTUAL VISIT (OUTPATIENT)
Dept: FAMILY MEDICINE CLINIC | Age: 72
End: 2022-12-15

## 2022-12-15 DIAGNOSIS — F33.9 RECURRENT MAJOR DEPRESSION RESISTANT TO TREATMENT (HCC): ICD-10-CM

## 2022-12-15 DIAGNOSIS — F41.9 ANXIETY: ICD-10-CM

## 2022-12-15 DIAGNOSIS — I67.1 CAROTID-CAVERNOUS FISTULA: Primary | ICD-10-CM

## 2022-12-15 NOTE — PROGRESS NOTES
Not billing for visit today - brief phone conversation    Spoke with pt and  by phone. Doing well since procedure for carotid cavernous fistula. Mood improving during day but still with worse at night. Still with trouble with memory. Stopped Abilify d/t concerns at last appt. Pt feels like mood is a little worse but  thinks she is a little better. No appt for psych yet - trying to set this up. Doing therapy.

## 2022-12-16 ENCOUNTER — PATIENT OUTREACH (OUTPATIENT)
Dept: CASE MANAGEMENT | Age: 72
End: 2022-12-16

## 2022-12-18 NOTE — PROGRESS NOTES
Neurointerventional Surgery Clinic Note        Patient: Peggy Mckenzie MRN: [id number]  SSN: [id number]    YOB: 1950  Age: 74 y.o. Sex: female      Subjective:      Peggy Mckenzie is a 74 y.o. 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       Past Medical History:   Diagnosis Date    Acute alteration in mental status     Arthritis     Bilateral carotid artery stenosis     Cerebral microvascular disease     Chronic kidney disease     CKD 3    Chronic pain     CHRONIC BOWEL PAIN    Convulsions ([address]     Depression     Disturbance of memory     Diverticulitis     Fibromyalgia     GERD (gastroesophageal reflux disease)     Goiter     Hiatal hernia     Hypercholesterolemia     Hypertension     IBS (irritable bowel syndrome)     CONSTIPATION, CHRONIC SINCE HER 20s    Insomnia     TAKES TRAZODONE NIGHTLY    Migraine     REDUCED IN FREQUENCY AND SEVERITY SINCE MENOPAUSE    Rectocele      Past Surgical History:   Procedure Laterality Date    COLONOSCOPY N/A 6/21/2016    COLONOSCOPY performed by Casimiro Jacobo MD at [facility] ENDOSCOPY    COLONOSCOPY N/A 6/27/2018    COLONOSCOPY performed by Casimiro Jacobo MD at [facility] ENDOSCOPY    ENDOSCOPY, COLON, DIAGNOSTIC  11/2010    Dr. Parkinson [facility]  2011    HX CHOLECYSTECTOMY  2006    HX ENDOSCOPY      HX GI  X3  LAST ONE 12/10    COLONOSCOPY    HX GYN  1982    D&C WITH SUCTION    HX HYSTERECTOMY      HX LUMBAR FUSION  2017    HX ORTHOPAEDIC      right foot surgery    HX TONSILLECTOMY      HX TOTAL COLECTOMY  2007    Dr. Pond/ diverticulitis    IR INJ SPINE FLUORO GUIDED  8/28/2020    IR INJ SPINE FLUORO GUIDED  1/8/2021    IR INJ SPINE FLUORO GUIDED  2/5/2021      Family History   Problem Relation Age of Onset    Cancer Father         lung and bone    Stroke Sister     Cancer Sister 76        PANCREATIC    Hypertension Mother     High Cholesterol Mother     Alzheimer's Disease Mother         late 62s early 76s    Cancer Other         COLON    Cancer Other         breast    Diabetes Maternal Aunt     Cancer Maternal Uncle         COLON    Cancer Maternal Grandfather         COLON    Ovarian Cancer Daughter 28     Social History     Tobacco Use    Smoking status: Former     Packs/day: 1.00     Years: 45.00     Pack years: 45.00     Types: Cigarettes     Quit date: 1/1/2007     Years since quitting: 15.9    Smokeless tobacco: Never   Substance Use Topics    Alcohol use: Not Currently     Comment: holidays      Current Outpatient Medications   Medication Sig Dispense Refill    LISINOPRIL PO Take  by mouth daily. HYDROCHLOROTHIAZIDE PO Take  by mouth daily. aripiprazole (ABILIFY PO) Take  by mouth daily. Trulance 3 mg tab TAKE 1 TABLET BY MOUTH EVERY DAY FOR CONSTIPATION      buPROPion XL (WELLBUTRIN XL) 300 mg XL tablet Take 1 Tablet by mouth daily. 90 Tablet 1    dicyclomine (BENTYL) 10 mg capsule TAKE 2 CAPSULES FOUR TIMES A DAY AS NEEDED 360 Capsule 7    DULoxetine (CYMBALTA) 60 mg capsule TAKE 1 CAPSULE TWICE A  Capsule 3    pantoprazole (PROTONIX) 40 mg tablet TAKE 1 TABLET DAILY 90 Tablet 3    acetaminophen (TYLENOL) 500 mg tablet Take 2 Tabs by mouth every six (6) hours. 90 Tab 0    oxybutynin (DITROPAN) 5 mg tablet Take 1 Tablet by mouth nightly. Decreased frequency (Patient not taking: Reported on 12/13/2022) 90 Tablet 0    rosuvastatin (CRESTOR) 20 mg tablet TAKE 1 TABLET NIGHTLY (Patient not taking: Reported on 12/13/2022) 90 Tablet 3    aspirin delayed-release 81 mg tablet Take  by mouth daily.  (Patient not taking: No sig reported)          Allergies   Allergen Reactions    Latex Hives    Codeine Other (comments)     Severe Headache    Morphine Hives    Ambien [Zolpidem] Other (comments)     Severe behavior changes    Dilaudid [Hydromorphone] Other (comments)     Memory loss    Other Medication Rash     BANDAIDS Shellfish Containing Products Hives       Review of Systems:  Pertinent items are noted in the History of Present Illness. Objective:     Vitals:    12/13/22 1357   BP: 120/82   Pulse: 63   Temp: (!) 96.3 °F (35.7 °C)   TempSrc: Temporal   SpO2: 97%   Weight: 159 lb (72.1 kg)   Height: 5' 4\" (1.626 m)        Physical Exam:  GENERAL: alert, cooperative, no distress, appears stated age    Neurologic Exam:  Mental Status:  Alert and oriented x 4. Appropriate affect, mood and behavior. Language:    Normal fluency, repetition, comprehension and naming. Cranial Nerves:   Left lateral rectus palsy, improved since treatment. Facial sensation intact V1 - V3. Full facial strength, no asymmetry. Hearing intact bilaterally. No dysarthria. Tongue protrudes to midline  symmetrically. Shoulder shrug 5/5 bilaterally. Motor:    No pronator drift. Bulk and tone normal.      5/5 power in all extremities proximally and distally. No involuntary movements. Sensation:    Sensation intact throughout to light touch    Reflexes:    Deferred    Coordination & Gait: Normal      My interpretation of Imaging:     I reviewed the angiographic images from the endovascular embolization of left carotid-cavernous fistula with the patient in detail. The images demonstrated no residual AV shunting posttreatment. Assessment:     Patient has done well post her carotid-cavernous fistula treatment and continues to improve neurologically with decreased double vision and improved left 6th nerve palsy. Her left cheek access site has completely healed. The left groin puncture site has also completely healed without any complaints. She has not seen any physical therapist for her lateral rectus palsy. I have made the appropriate referral to help in recovery of the left 6th nerve palsy and adaptation for her double vision. She does not report any new interval neurologic complaints.     Plan:     Patient has done well post left indirect carotid-cavernous fistula embolization without any new complaints. She continues to improve neurologically, especially her left 6th nerve palsy. No additional intervention recommended at this time. A follow-up MR angiogram of the head has been ordered to be performed in 1 year for follow-up. All of patient's and her 's questions answered to their satisfaction. They understand and agree to the plan. Thank you for allowing me to participate in the care of this patient. Greater than 45 minutes were spent in patient management, greater than half of which was spent in counseling and coordination of care.         Signed By: Melinda Rolon MD, MBA  Neuro-endovascular Surgery  Director - Comprehensive Stroke center, [facility]  Associate Professor of Radiology, [address]       December 17, 2022

## 2022-12-19 NOTE — PROGRESS NOTES
Patient and  called 12/19 and LM that she was doing well. CTN attempted to call her back, LM for her. Will continue to try and reach.

## 2022-12-27 ENCOUNTER — PATIENT OUTREACH (OUTPATIENT)
Dept: CASE MANAGEMENT | Age: 72
End: 2022-12-27

## 2022-12-27 NOTE — PROGRESS NOTES
Called and LM for patient. There have not been any ED visits or hospitalizations since [address] [place] [address] 11/28-11/29. In message left, advised to call CTN if any ongoing concerns or questions.  Otherwise, continue current POC and wished her all the best.

## 2023-01-05 ENCOUNTER — VIRTUAL VISIT (OUTPATIENT)
Dept: BEHAVIORAL/MENTAL HEALTH CLINIC | Age: 73
End: 2023-01-05
Payer: MEDICARE

## 2023-01-05 DIAGNOSIS — F33.1 DEPRESSION, MAJOR, RECURRENT, MODERATE (HCC): Primary | ICD-10-CM

## 2023-01-05 DIAGNOSIS — F41.9 ANXIETY: ICD-10-CM

## 2023-01-05 PROCEDURE — 90834 PSYTX W PT 45 MINUTES: CPT | Performed by: SOCIAL WORKER

## 2023-01-05 PROCEDURE — 1123F ACP DISCUSS/DSCN MKR DOCD: CPT | Performed by: SOCIAL WORKER

## 2023-01-05 PROCEDURE — G9717 DOC PT DX DEP/BP F/U NT REQ: HCPCS | Performed by: SOCIAL WORKER

## 2023-01-05 PROCEDURE — G8427 DOCREV CUR MEDS BY ELIG CLIN: HCPCS | Performed by: SOCIAL WORKER

## 2023-01-05 NOTE — PROGRESS NOTES
PSYCHOTHERAPY NOTE      Stephanie Zaldivar is a 74 y.o. female who presents with anxiety/depression. Client was seen for a virtual individual psychotherapy session that lasted for 50 minutes. Progress:  Client presented with pleasant mood and affect; however, reports mood has been \"up and down\" since our last session. States at times she feels melancholy and lacks energy. Client does report complying with medications.  shared that PCP made some changes to her medication regimen and he feels that she is doing better. Client does have appt with behavioral health on 1/16 to have medications reviewed. Client also has some anxiety re: her daughter who has cancer. Today her daughter is going to the oncologist to see if her chemo has been working. Client and  are very concerned and we processed this in session today. Holidays were difficult as daughter and her  had the flu and could not celebrate with them. It was a quiet holiday. During the holidays, though, they were able to see some extended family and friends and this was helpful to mood. Today we focused on improving mood, reducing anxiety and self care. Mental Status exam:         Sensorium  oriented to time, place and person   Relations cooperative   Appearance:  casually dressed   Motor Behavior:  within normal limits   Speech:  normal pitch and normal volume   Thought Process: goal directed and logical   Thought Content free of delusions and free of hallucinations   Suicidal ideations none   Homicidal ideations none   Mood:  anxious   Affect:  mood-congruent   Memory recent  adequate   Memory remote:  adequate   Concentration:  adequate   Abstraction:  abstract   Insight:  fair   Reliability fair   Judgment:  fair         DIAGNOSIS AND IMPRESSION:    Axis I: Major Depression, Recurrent, Moderate;  Anxiety  Axis II: No dx    Axis III:   Past Medical History:   Diagnosis Date    Acute alteration in mental status Arthritis     Bilateral carotid artery stenosis     Cerebral microvascular disease     Chronic kidney disease     CKD 3    Chronic pain     CHRONIC BOWEL PAIN    Convulsions (HCC)     Depression     Disturbance of memory     Diverticulitis     Fibromyalgia     GERD (gastroesophageal reflux disease)     Goiter     Hiatal hernia     Hypercholesterolemia     Hypertension     IBS (irritable bowel syndrome)     CONSTIPATION, CHRONIC SINCE HER 20s    Insomnia     TAKES TRAZODONE NIGHTLY    Migraine     REDUCED IN FREQUENCY AND SEVERITY SINCE MENOPAUSE    Rectocele      Axis IV: Problems with primary support group, Problems related to social environment, and Other psychosocial or environmental problems  Axis V:  61-70 mild symptoms    Interventions/plans: Follow up in 2 weeks      Pursuant to the emergency declaration under the [facility] waiver authority and the [employer] Act, this Virtual Visit was conducted, with patient and parent and/or guardian's consent, to reduce the patient's risk of exposure to COVID-19 and provide continuity of care for an established patient. Due to the state of emergency related to the coronavirus, the [place] and the [place] is allowing practitioners holding my licensure and/or certification status the ability to provide telehealth services without the usual requirements. This individual was evaluated through a synchronous (real-time) audio-video encounter, and/or his/her healthcare decision maker, is aware that it is a billable service, which includes applicable co-pays, with coverage as determined by his/her insurance carrier. He/she provided verbal consent to proceed and patient identification was verified.  This visit was conducted pursuant to the emergency declaration under the [facility], [facility] waiver authority and the [employer] Act. A caregiver was present when appropriate. Ability to conduct physical exam was limited. The patient was located at home in a state where the provider was licensed to provide care.

## 2023-01-18 ENCOUNTER — VIRTUAL VISIT (OUTPATIENT)
Dept: BEHAVIORAL/MENTAL HEALTH CLINIC | Age: 73
End: 2023-01-18
Payer: MEDICARE

## 2023-01-18 DIAGNOSIS — F33.1 DEPRESSION, MAJOR, RECURRENT, MODERATE (HCC): Primary | ICD-10-CM

## 2023-01-18 DIAGNOSIS — F41.9 ANXIETY: ICD-10-CM

## 2023-01-18 PROCEDURE — G8427 DOCREV CUR MEDS BY ELIG CLIN: HCPCS | Performed by: SOCIAL WORKER

## 2023-01-18 PROCEDURE — 90834 PSYTX W PT 45 MINUTES: CPT | Performed by: SOCIAL WORKER

## 2023-01-18 PROCEDURE — G9717 DOC PT DX DEP/BP F/U NT REQ: HCPCS | Performed by: SOCIAL WORKER

## 2023-01-18 PROCEDURE — 1123F ACP DISCUSS/DSCN MKR DOCD: CPT | Performed by: SOCIAL WORKER

## 2023-01-18 NOTE — PROGRESS NOTES
PSYCHOTHERAPY NOTE      Maninder Mas is a 74 y.o. female who presents with anxiety/depression. Client was seen for a virtual individual psychotherapy session that lasted for 50 minutes. Progress:  Mood remains on the melancholy side. Continues to feel depressed and much is due to various situational stressors that we processed today (her eye problems, her daughter's cancer dx, etc)    Client does have appt with behavioral health next week. It was rescheduled from this week. She is hopeful to have her antidepressant medications evaluated as she does not feel them to be as effective. Stressed importance of behavioral activation and brain stormed suggestions to help increase activity and socialization to help with mood. Focus of today was on self care, improving mood and improving coping strategies. Also shared with her information on [address] as she does not drive anymore and is dependent on . Client is aware of short term nature of my role and that we only have a few remaining sessions. Shared with her information on local long term therapists. Mental Status exam:         Sensorium  oriented to time, place and person   Relations cooperative   Appearance:  casually dressed   Motor Behavior:  within normal limits   Speech:  normal pitch and normal volume   Thought Process: goal directed and logical   Thought Content free of delusions and free of hallucinations   Suicidal ideations none   Homicidal ideations none   Mood:  melancholy   Affect:  mood-congruent   Memory recent  adequate   Memory remote:  adequate   Concentration:  adequate   Abstraction:  abstract   Insight:  fair   Reliability fair   Judgment:  fair         DIAGNOSIS AND IMPRESSION:    Axis I: Major Depression, Recurrent, Moderate;  Anxiety  Axis II: No dx    Axis III:   Past Medical History:   Diagnosis Date    Acute alteration in mental status     Arthritis     Bilateral carotid artery stenosis     Cerebral microvascular disease     Chronic kidney disease     CKD 3    Chronic pain     CHRONIC BOWEL PAIN    Convulsions (HCC)     Depression     Disturbance of memory     Diverticulitis     Fibromyalgia     GERD (gastroesophageal reflux disease)     Goiter     Hiatal hernia     Hypercholesterolemia     Hypertension     IBS (irritable bowel syndrome)     CONSTIPATION, CHRONIC SINCE HER 20s    Insomnia     TAKES TRAZODONE NIGHTLY    Migraine     REDUCED IN FREQUENCY AND SEVERITY SINCE MENOPAUSE    Rectocele      Axis IV: Problems with primary support group, Problems related to social environment, and Other psychosocial or environmental problems  Axis V:  61-70 mild symptoms    Interventions/plans: Follow up in 2 weeks      Pursuant to the emergency declaration under the [facility] waiver authority and the [employer] Act, this Virtual Visit was conducted, with patient and parent and/or guardian's consent, to reduce the patient's risk of exposure to COVID-19 and provide continuity of care for an established patient. Due to the state of emergency related to the coronavirus, the [place] and the [place] is allowing practitioners holding my licensure and/or certification status the ability to provide telehealth services without the usual requirements. This individual was evaluated through a synchronous (real-time) audio-video encounter, and/or his/her healthcare decision maker, is aware that it is a billable service, which includes applicable co-pays, with coverage as determined by his/her insurance carrier. He/she provided verbal consent to proceed and patient identification was verified.  This visit was conducted pursuant to the emergency declaration under the [facility], [facility] waiver authority and the [employer] Act. A caregiver was present when appropriate. Ability to conduct physical exam was limited. The patient was located at home in a state where the provider was licensed to provide care.

## 2023-02-01 ENCOUNTER — VIRTUAL VISIT (OUTPATIENT)
Dept: BEHAVIORAL/MENTAL HEALTH CLINIC | Age: 73
End: 2023-02-01
Payer: MEDICARE

## 2023-02-01 DIAGNOSIS — F41.9 ANXIETY: ICD-10-CM

## 2023-02-01 DIAGNOSIS — F33.1 DEPRESSION, MAJOR, RECURRENT, MODERATE (HCC): Primary | ICD-10-CM

## 2023-02-01 NOTE — PROGRESS NOTES
PSYCHOTHERAPY NOTE      Tanner Loza is a 74 y.o. female who presents with anxiety/depression. Client was seen for a virtual individual psychotherapy session that lasted for 50 minutes. Progress:  Overall, mood and affect are WNL; however, client does reports having some times where she experiences a dip in her mood. This appears to be more related to situational stressors to include her ongoing health and eye issues as well as concerns re: her daughter who has cancer. Client is compliant with medications. She is scheduled to see Behavioral Health next week to have medications evaluated. Client becomes frustrated at times due to limitations and now having to depend on others, especially her . While she is appreciative of the assistance, she yearns for more independence. Processed this in session. Explored ways for client to communicate to her family what she needs (and does not need) and to let them know what she is capable of doing independently. Again addressed concept of behavioral activation to improve mood and encouraged client to increase social opportunities      Client is aware that our next session will be our last due to short term nature of my role. She has been provided with listing of local long term therapists.      Mental Status exam:         Sensorium  oriented to time, place and person   Relations cooperative   Appearance:  casually dressed   Motor Behavior:  within normal limits   Speech:  normal pitch and normal volume   Thought Process: goal directed and logical   Thought Content free of delusions and free of hallucinations   Suicidal ideations none   Homicidal ideations none   Mood:  euthymic   Affect:  euthymic   Memory recent  adequate   Memory remote:  adequate   Concentration:  adequate   Abstraction:  abstract   Insight:  fair   Reliability fair   Judgment:  fair         DIAGNOSIS AND IMPRESSION:    Axis I: Major Depression, Recurrent, Moderate; Anxiety  Axis II: No dx      Axis III:   Past Medical History:   Diagnosis Date    Acute alteration in mental status     Arthritis     Bilateral carotid artery stenosis     Cerebral microvascular disease     Chronic kidney disease     CKD 3    Chronic pain     CHRONIC BOWEL PAIN    Convulsions (HCC)     Depression     Disturbance of memory     Diverticulitis     Fibromyalgia     GERD (gastroesophageal reflux disease)     Goiter     Hiatal hernia     Hypercholesterolemia     Hypertension     IBS (irritable bowel syndrome)     CONSTIPATION, CHRONIC SINCE HER 20s    Insomnia     TAKES TRAZODONE NIGHTLY    Migraine     REDUCED IN FREQUENCY AND SEVERITY SINCE MENOPAUSE    Rectocele      Axis IV: Problems with primary support group, Problems related to social environment, and Other psychosocial or environmental problems  Axis V:  61-70 mild symptoms    Clinical assignments: Increase socialization opportunities. Interventions/plans: Follow up in 3 weeks      Pursuant to the emergency declaration under the [facility] waiver authority and the [employer] Act, this Virtual Visit was conducted, with patient and parent and/or guardian's consent, to reduce the patient's risk of exposure to COVID-19 and provide continuity of care for an established patient. Due to the state of emergency related to the coronavirus, the [place] and the [place] is allowing practitioners holding my licensure and/or certification status the ability to provide telehealth services without the usual requirements. This individual was evaluated through a synchronous (real-time) audio-video encounter, and/or his/her healthcare decision maker, is aware that it is a billable service, which includes applicable co-pays, with coverage as determined by his/her insurance carrier.  He/she provided verbal consent to proceed and patient identification was verified. This visit was conducted pursuant to the emergency declaration under the [address] [place] [address] and the [employer] Act. A caregiver was present when appropriate. Ability to conduct physical exam was limited. The patient was located at home in a state where the provider was licensed to provide care.

## 2023-02-07 ENCOUNTER — OFFICE VISIT (OUTPATIENT)
Dept: FAMILY MEDICINE CLINIC | Age: 73
End: 2023-02-07
Payer: MEDICARE

## 2023-02-07 VITALS
HEIGHT: 64 IN | TEMPERATURE: 97.3 F | WEIGHT: 159.8 LBS | RESPIRATION RATE: 16 BRPM | HEART RATE: 77 BPM | OXYGEN SATURATION: 96 % | BODY MASS INDEX: 27.28 KG/M2 | DIASTOLIC BLOOD PRESSURE: 77 MMHG | SYSTOLIC BLOOD PRESSURE: 138 MMHG

## 2023-02-07 DIAGNOSIS — E03.4 HYPOTHYROIDISM DUE TO ACQUIRED ATROPHY OF THYROID: ICD-10-CM

## 2023-02-07 DIAGNOSIS — N18.31 CHRONIC RENAL FAILURE, STAGE 3A (HCC): ICD-10-CM

## 2023-02-07 DIAGNOSIS — F33.9 RECURRENT MAJOR DEPRESSION RESISTANT TO TREATMENT (HCC): ICD-10-CM

## 2023-02-07 DIAGNOSIS — E78.2 MIXED HYPERLIPIDEMIA: ICD-10-CM

## 2023-02-07 DIAGNOSIS — I10 PRIMARY HYPERTENSION: Primary | ICD-10-CM

## 2023-02-07 DIAGNOSIS — K58.9 IRRITABLE BOWEL SYNDROME WITHOUT DIARRHEA: ICD-10-CM

## 2023-02-07 DIAGNOSIS — Z79.899 ENCOUNTER FOR LONG-TERM (CURRENT) USE OF MEDICATIONS: ICD-10-CM

## 2023-02-07 DIAGNOSIS — M54.12 CERVICAL MYELOPATHY WITH CERVICAL RADICULOPATHY (HCC): ICD-10-CM

## 2023-02-07 DIAGNOSIS — I67.1 CAROTID-CAVERNOUS FISTULA: ICD-10-CM

## 2023-02-07 DIAGNOSIS — G95.9 CERVICAL MYELOPATHY WITH CERVICAL RADICULOPATHY (HCC): ICD-10-CM

## 2023-02-07 DIAGNOSIS — E11.42 DIABETIC PERIPHERAL NEUROPATHY ASSOCIATED WITH TYPE 2 DIABETES MELLITUS (HCC): ICD-10-CM

## 2023-02-07 PROCEDURE — 1101F PT FALLS ASSESS-DOCD LE1/YR: CPT | Performed by: FAMILY MEDICINE

## 2023-02-07 PROCEDURE — 1090F PRES/ABSN URINE INCON ASSESS: CPT | Performed by: FAMILY MEDICINE

## 2023-02-07 PROCEDURE — 2022F DILAT RTA XM EVC RTNOPTHY: CPT | Performed by: FAMILY MEDICINE

## 2023-02-07 PROCEDURE — G8417 CALC BMI ABV UP PARAM F/U: HCPCS | Performed by: FAMILY MEDICINE

## 2023-02-07 PROCEDURE — G9717 DOC PT DX DEP/BP F/U NT REQ: HCPCS | Performed by: FAMILY MEDICINE

## 2023-02-07 PROCEDURE — G8399 PT W/DXA RESULTS DOCUMENT: HCPCS | Performed by: FAMILY MEDICINE

## 2023-02-07 PROCEDURE — G9711 PT HX TOT COL OR COLON CA: HCPCS | Performed by: FAMILY MEDICINE

## 2023-02-07 PROCEDURE — 3046F HEMOGLOBIN A1C LEVEL >9.0%: CPT | Performed by: FAMILY MEDICINE

## 2023-02-07 PROCEDURE — 1123F ACP DISCUSS/DSCN MKR DOCD: CPT | Performed by: FAMILY MEDICINE

## 2023-02-07 PROCEDURE — G9899 SCRN MAM PERF RSLTS DOC: HCPCS | Performed by: FAMILY MEDICINE

## 2023-02-07 PROCEDURE — 3074F SYST BP LT 130 MM HG: CPT | Performed by: FAMILY MEDICINE

## 2023-02-07 PROCEDURE — 3078F DIAST BP <80 MM HG: CPT | Performed by: FAMILY MEDICINE

## 2023-02-07 PROCEDURE — G8427 DOCREV CUR MEDS BY ELIG CLIN: HCPCS | Performed by: FAMILY MEDICINE

## 2023-02-07 PROCEDURE — G8536 NO DOC ELDER MAL SCRN: HCPCS | Performed by: FAMILY MEDICINE

## 2023-02-07 PROCEDURE — 99213 OFFICE O/P EST LOW 20 MIN: CPT | Performed by: FAMILY MEDICINE

## 2023-02-07 RX ORDER — POLYETHYLENE GLYCOL-3350 AND ELECTROLYTES 236; 6.74; 5.86; 2.97; 22.74 G/274.31G; G/274.31G; G/274.31G; G/274.31G; G/274.31G
POWDER, FOR SOLUTION ORAL
COMMUNITY
Start: 2023-01-10

## 2023-02-07 RX ORDER — DICYCLOMINE HYDROCHLORIDE 10 MG/1
10 CAPSULE ORAL
Qty: 180 CAPSULE | Refills: 0 | Status: SHIPPED | OUTPATIENT
Start: 2023-02-07

## 2023-02-07 NOTE — PROGRESS NOTES
Chief Complaint   Patient presents with    Follow-up     Depression and  Anxiety   1. Have you been to the ER, urgent care clinic since your last visit? Hospitalized since your last visit? No    2. Have you seen or consulted any other health care providers outside of the [address] [place] [address] since your last visit? Include any pap smears or colon screening.  Yes Eye Doctor

## 2023-02-07 NOTE — PROGRESS NOTES
Jerzy Zapata (: 1950) is a 74 y.o. female, established patient, here for evaluation of the following chief complaint(s):  Follow-up (Depression and  Anxiety)       ASSESSMENT/PLAN:  Much improved with med changed. Below is the assessment and plan developed based on review of pertinent history, physical exam, labs, studies, and medications. 1. Carotid-cavernous fistula  2. Chronic renal failure, stage 3a (HCC)  3. Cervical myelopathy with cervical radiculopathy ([facility] [address]  4. Recurrent major depression resistant to treatment ([facility] [address]  5. Diabetic peripheral neuropathy associated with type 2 diabetes mellitus ([facility] [address]  6. Irritable bowel syndrome without diarrhea  The following orders have not been finalized:  -     dicyclomine (BENTYL) 10 mg capsule      No follow-ups on file. SUBJECTIVE:  73 yo CF with PMH sig for     Mood and cognition much improved    Saw Dr. Sidra Sanford and on Trulance every other day. Having abd pain again. Did well on Trulance daily but had fecal urgency when taking. Sig challenges    Family concerned about pt - not active, not leaving house. She has low motivation - ex of hygiene. Family is doing a lot to take care of her. This appt was 1st time out of house in a few weeks. No longer participating in family dinners. She endorse these sx and     Initially, had concerns of trouble with memory that has been worse since having her second spinal surgery. Appeared c/w postop delirium. Neuro evaluation with EEG which showed mild metabolic encephalopathy. There were concerns about mild ongoing memory loss that was either related to early dementia versus MCI versus normal aging. Some improvement after stopping Gabapentin but still ongoing memory issues. Neuropsych testing c/w MCI and sig depression and anxiety. Reports feeling depression largely in relation to functional limitations and pain. Very little energy. Still trouble with concentration and attention.  Not sleeping well - gets about 6 hrs per night and takes naps occ. No SI/HI. Still taking Cymbalta 60 mg twice daily and Wellbutrin 200 mg twice daily. Has not been on atypical antipsychotics in the past.  Was previously seeing psychiatry many years ago. Still dealing with ongoing neuropathy. Working on this with Ortho. MELIDA  Gen - no fever/chills  Resp - no dyspnea or cough  CV - no chest pain or KASPER  Rest per HPI    OBJECTIVE:  Blood pressure 138/77, pulse 77, temperature 97.3 °F (36.3 °C), temperature source Temporal, resp. rate 16, height 5' 4\" (1.626 m), weight 159 lb 12.8 oz (72.5 kg), SpO2 96 %. Physical Exam  General appearance - alert, well appearing, and in no distress  Eyes -sclera anicteric  Neck - supple, no significant adenopathy, no thyromegaly  Chest - clear to auscultation, no wheezes, rales or rhonchi, symmetric air entry  Heart - normal rate, regular rhythm, normal S1, S2, no murmurs, rubs, clicks or gallops  Neurological - alert, oriented, normal speech, no focal findings or movement disorder noted  Extr - no edema  Psych - normal mood and affect    On this date 02/07/2023 I have spent 25 minutes reviewing previous notes, test results and face to face with the patient discussing the diagnosis and importance of compliance with the treatment plan as well as documenting on the day of the visit. An electronic signature was used to authenticate this note.   -- Lacy Fitzpatrick MD Hypercholesterolemia     Hypertension     IBS (irritable bowel syndrome)     CONSTIPATION, CHRONIC SINCE HER 20s    Insomnia     TAKES TRAZODONE NIGHTLY    Migraine     REDUCED IN FREQUENCY AND SEVERITY SINCE MENOPAUSE    Rectocele        OBJECTIVE:  Blood pressure 138/77, pulse 77, temperature 97.3 °F (36.3 °C), temperature source Temporal, resp. rate 16, height 5' 4\" (1.626 m), weight 159 lb 12.8 oz (72.5 kg), SpO2 96 %. Physical Exam  General appearance - alert, well appearing, and in no distress  Eyes -sclera anicteric  Neck - supple, no significant adenopathy, no thyromegaly  Chest - clear to auscultation, no wheezes, rales or rhonchi, symmetric air entry  Heart - normal rate, regular rhythm, normal S1, S2, no murmurs, rubs, clicks or gallops  Neurological - alert, oriented, normal speech, no focal findings or movement disorder noted  Extr - no edema  Psych - normal mood and affect    On this date 02/07/2023 I have spent 25 minutes reviewing previous notes, test results and face to face with the patient discussing the diagnosis and importance of compliance with the treatment plan as well as documenting on the day of the visit. An electronic signature was used to authenticate this note.   -- [place], MD

## 2023-02-09 ENCOUNTER — OFFICE VISIT (OUTPATIENT)
Dept: BEHAVIORAL/MENTAL HEALTH CLINIC | Age: 73
End: 2023-02-09
Payer: MEDICARE

## 2023-02-09 VITALS
WEIGHT: 162.4 LBS | SYSTOLIC BLOOD PRESSURE: 150 MMHG | BODY MASS INDEX: 27.72 KG/M2 | TEMPERATURE: 97.8 F | HEART RATE: 70 BPM | HEIGHT: 64 IN | RESPIRATION RATE: 17 BRPM | DIASTOLIC BLOOD PRESSURE: 84 MMHG | OXYGEN SATURATION: 98 %

## 2023-02-09 DIAGNOSIS — F33.1 DEPRESSION, MAJOR, RECURRENT, MODERATE (HCC): Primary | ICD-10-CM

## 2023-02-09 DIAGNOSIS — F41.9 ANXIETY: ICD-10-CM

## 2023-02-09 DIAGNOSIS — M79.7 FIBROMYALGIA: ICD-10-CM

## 2023-02-09 NOTE — PROGRESS NOTES
Chief Complaint   Patient presents with    New Patient       History of Present Ilness:   Roselia Torres is a  74y.o.  year old female with a reported history of depression and anxiety who presents today to establish psychiatric care. Patients symptoms have been present for years. Patient reports symptoms are of low/moderate severity. She identified current stress with daughter having cancer and her own health issues. Patient's  stated that she is doing better after having some adjustments in her medications. Her depression generally presents with low mood, feeling unhappy, easily frustrated, no motivation, low energy. Anxiety presents with excess worrying, restlessness, disturbed sleep. She denies any history of trauma. Patient is currently in therapy. Both her and her  feel this has been beneficial. She reports infrequent use of alcohol. Denies use of marijuana or illicit substances. Denies symptoms of psychosis or louie. Denies thoughts of self-harm, suicidal, or homicidal ideation. Past Psychiatric History:  Providers:Jayro Weiss MD  Therapist:Jenna Xavier  Diagnosis: Depression   Medication: Cymbalta, Wellbutrin  Hospitalization:Denies any hospitalizations  Denies any history of self-harm, suicide attempts, or violence. Admits to some anger issues.       Social History:  Born: [employer]  Developmental Delays: Denies any known developmental delays  Children: adult daughter and son  Occupation:Retired  Living situation:lives with  and son  Marital Status: 47 years  Support System:, friends  Legal: Denies  Substance History:   Tobacco Use: Denies                   Tobacco Counseling given: NA  Alcohol Use:  infrequently(holidays)                              Denies history of DT's, withdrawal            Caffeine: not asked            Marijuana:Denies   Other Drugs/Substances: Denies  Abuse/Trauma: Denies    Family History:  Family History Problem Relation Age of Onset    Cancer Father         lung and bone    Stroke Sister     Cancer Sister 76        PANCREATIC    Hypertension Mother     High Cholesterol Mother     Alzheimer's Disease Mother         late 62s early 76s    Cancer Other         COLON    Cancer Other         breast    Diabetes Maternal Aunt     Cancer Maternal Uncle         COLON    Cancer Maternal Grandfather         COLON    Ovarian Cancer Daughter 28       Past Medical History:  Past Medical History:   Diagnosis Date    Acute alteration in mental status     Arthritis     Bilateral carotid artery stenosis     Cerebral microvascular disease     Chronic kidney disease     CKD 3    Chronic pain     CHRONIC BOWEL PAIN    Convulsions (HCC)     Depression     Disturbance of memory     Diverticulitis     Fibromyalgia     GERD (gastroesophageal reflux disease)     Goiter     Hiatal hernia     Hypercholesterolemia     Hypertension     IBS (irritable bowel syndrome)     CONSTIPATION, CHRONIC SINCE HER 20s    Insomnia     TAKES TRAZODONE NIGHTLY    Migraine     REDUCED IN FREQUENCY AND SEVERITY SINCE MENOPAUSE    Rectocele    Denies any history of asthma, seizures, diabetes, liver problems. Allergies: Allergies   Allergen Reactions    Latex Hives    Codeine Other (comments)     Severe Headache    Morphine Hives    Ambien [Zolpidem] Other (comments)     Severe behavior changes    Dilaudid [Hydromorphone] Other (comments)     Memory loss    Other Medication Rash     BANDAIDS    Shellfish Containing Products Hives       Medication List:  Current Outpatient Medications   Medication Sig Dispense Refill    González-G 236-22.74-6.74 -5.86 gram suspension TAKE BY MOUTH AS DIRECTED FOR COLONOSCOPY  Scheduled 6/2023      dicyclomine (BENTYL) 10 mg capsule Take 1 Capsule by mouth two (2) times daily as needed for Abdominal Cramps.  BE SURE TO TAKE ONLY AS NEEDED (NOT SCHEDULED) 180 Capsule 0    oxybutynin (DITROPAN) 5 mg tablet Take 1 Tablet by mouth nightly. Decreased frequency 90 Tablet 0    Trulance 3 mg tab TAKE 1 TABLET BY MOUTH EVERY DAY FOR CONSTIPATION      buPROPion XL (WELLBUTRIN XL) 300 mg XL tablet Take 1 Tablet by mouth daily. 90 Tablet 1    DULoxetine (CYMBALTA) 60 mg capsule TAKE 1 CAPSULE TWICE A  Capsule 3    pantoprazole (PROTONIX) 40 mg tablet TAKE 1 TABLET DAILY 90 Tablet 3    aspirin delayed-release 81 mg tablet Take  by mouth daily. (Patient not taking: No sig reported)      acetaminophen (TYLENOL) 500 mg tablet Take 2 Tabs by mouth every six (6) hours. 90 Tab 0     Review of Systems:  Psychiatric: Admits to symptoms as per HPI. Constitutional: No report of fever, or weight gain. Eyes: patient wearing eye patch over left eye after trauma to eye 5 weeks ago. ENT: No report of hearing changes or difficulty swallowing. Cardiac: No report of chest pain, edema or orthopnea. Respiratory: Denies dyspnea, cough or wheeze. GI: No report of abdominal pain. : No report of dysuria or hematuria. Musculoskeletal: No report of joint pain or swelling. Ambulates with cane. Skin: No report of rash, lesion, or abrasions. Neurologic: No report of seizures, blackout, numbness. Endocrine: No report of polyuria or polydipsia. Hematologic: No report of blood clots or easy bleeding. Allergy: No report of hives or allergic reaction.    Reproductive: No report of significant issues    Scales:  3 most recent PHQ Screens 2/9/2023   Little interest or pleasure in doing things Nearly every day   Feeling down, depressed, irritable, or hopeless More than half the days   Total Score PHQ 2 5   Trouble falling or staying asleep, or sleeping too much More than half the days   Feeling tired or having little energy Nearly every day   Poor appetite, weight loss, or overeating Several days   Feeling bad about yourself - or that you are a failure or have let yourself or your family down More than half the days   Trouble concentrating on things such as school, work, reading, or watching TV More than half the days   Moving or speaking so slowly that other people could have noticed; or the opposite being so fidgety that others notice Not at all   Thoughts of being better off dead, or hurting yourself in some way Not at all   PHQ 9 Score 15   How difficult have these problems made it for you to do your work, take care of your home and get along with others Somewhat difficult         SHARON 2/7 2/9/2023 10/25/2022   Feeling nervous, anxious, or on edge 0 2   Not being able to stop or control worrying 2 3   Worrying too much about different things 2 2   Trouble relaxing 2 2   Being so restless that it is hard to sit still 0 0   Becoming easily annoyed or irritable 2 3   Feeling afraid as if something awful might happen 0 3   SHARON-7 Total Score 8 15        No flowsheet data found. MENTAL STATUS EXAM:   Appearance: Appears stated age. Good grooming. Involuntary Movements: None  Attitude: Cooperative  Speech: normal  Mood:  \"ok\"  Affect: congruent with mood  Thought Process: Logical   Thought Content: no delusion or paranoid thoughts. Suicidal/ Homicidal Ideations: denied  Thought Perception: no auditory or visual hallucinations  Orientation: alert, oriented to person, place and time. Attention/Concentration: able to spell word WORLD backwards. Insight/ Judgment: Good      Assessment & Diagnosis:  Patient is a 75 yo female with a history of depression and anxiety. Patient would benefit from psychopharmaceutical intervention, psychotherapy, and continued engagement with social supports to help manage her depression and anxiety. Prognosis is good considering the patient remains adherent to medication and therapy to address/manage her symptoms. Discussed medications. Agreed to continue with current medications. Reviewed risks, benefits, and side effects of all medications in detail. Discussed the need for patient to continue to be social and physically active. Encouraged patient and  to follow up with connecting with another therapist since the number of visits with the current therapist is limited and will be ending soon. Patient/Family verbalized understanding and agrees with this plan. ICD-10-CM ICD-9-CM    1. Depression, major, recurrent, moderate (HCC)  F33.1 296.32       2. Anxiety  F41.9 300.00          Strength & Weaknesses:  Strengths: stable living environment, Good support system, history of treatment  Needs: An explanation of my diagnosis. Help in managing my feelings. Abilities: Able to ask for help from others, Can easily share their thoughts and feelings with others  Preferences: none reported    Treatment Plan:   1. Medication: Continue bupropion     Medication changes made today: None  2. Counseling and coordination of care including instructions for treatment, risks/benefits, risk factor reduction and patient/family education. She agrees with the plan. Patient instructed to call with any side effects, questions or issues. 3. Strongly encouraged compliance with individual therapy. 4. Strongly encouraged increased physical and social activities  5. Patient to follow up with medical provider for medical issues as appropriate          Follow-up and Dispositions    Return in about 3 months (around 5/9/2023).           Arie Yates NP

## 2023-02-09 NOTE — PROGRESS NOTES
Chief Complaint   Patient presents with    New Patient     Visit Vitals  BP (!) 150/84 (BP 1 Location: Right upper arm, BP Patient Position: Sitting, BP Cuff Size: Adult)   Pulse 70   Temp 97.8 °F (36.6 °C) (Oral)   Resp 17   Ht 5' 4\" (1.626 m)   Wt 73.7 kg (162 lb 6.4 oz)   SpO2 98%   BMI 27.88 kg/m²   Patient states she was taken off Lisinopril 3 months ago. Advised patient to monitor bp over next week. If still elevated call PCP. Patient states she has been going to doctors all week. She is \"a little stressed about it. \"      Prior to Admission medications    Medication Sig Start Date End Date Taking? Authorizing Provider   Sandip [id number] -5.86 gram suspension TAKE BY MOUTH AS DIRECTED FOR COLONOSCOPY  Scheduled 6/2023 1/10/23   Provider, Historical   dicyclomine (BENTYL) 10 mg capsule Take 1 Capsule by mouth two (2) times daily as needed for Abdominal Cramps. BE SURE TO TAKE ONLY AS NEEDED (NOT SCHEDULED) 2/7/23   Rambo Tracey MD   oxybutynin (DITROPAN) 5 mg tablet Take 1 Tablet by mouth nightly. Decreased frequency 12/8/22   Rambo Tracey MD   Trulance 3 mg tab TAKE 1 TABLET BY MOUTH EVERY DAY FOR CONSTIPATION 11/9/22   Provider, Historical   buPROPion XL (WELLBUTRIN XL) 300 mg XL tablet Take 1 Tablet by mouth daily. 9/16/22   Rambo Tracey MD   DULoxetine (CYMBALTA) 60 mg capsule TAKE 1 CAPSULE TWICE A DAY 8/17/22   Rambo Tracey MD   pantoprazole (PROTONIX) 40 mg tablet TAKE 1 TABLET DAILY 7/12/22   Rambo Tracey MD   aspirin delayed-release 81 mg tablet Take  by mouth daily. Patient not taking: No sig reported    Provider, Historical   acetaminophen (TYLENOL) 500 mg tablet Take 2 Tabs by mouth every six (6) hours.  5/8/21   Raquel Isaac PA-C   Provider to review PHQ      PHQ 9 Score: 8 (2/7/2023 10:44 AM)  Thoughts of being better off dead, or hurting yourself in some way: 0 (2/7/2023 10:44 AM)

## 2023-02-14 ENCOUNTER — OFFICE VISIT (OUTPATIENT)
Dept: NEUROLOGY | Age: 73
End: 2023-02-14
Payer: MEDICARE

## 2023-02-14 DIAGNOSIS — F41.9 ANXIETY AND DEPRESSION: ICD-10-CM

## 2023-02-14 DIAGNOSIS — F32.0 MILD MAJOR DEPRESSION (HCC): ICD-10-CM

## 2023-02-14 DIAGNOSIS — R29.6 FREQUENT FALLS: ICD-10-CM

## 2023-02-14 DIAGNOSIS — F32.A ANXIETY AND DEPRESSION: ICD-10-CM

## 2023-02-14 DIAGNOSIS — R41.89 COGNITIVE DECLINE: ICD-10-CM

## 2023-02-14 DIAGNOSIS — F41.8 ANXIETY WITH SOMATIC FEATURES: ICD-10-CM

## 2023-02-14 DIAGNOSIS — G31.84 MILD COGNITIVE IMPAIRMENT: Primary | ICD-10-CM

## 2023-02-14 DIAGNOSIS — F43.9 STRESS AT HOME: ICD-10-CM

## 2023-02-14 DIAGNOSIS — R41.3 MEMORY LOSS: ICD-10-CM

## 2023-02-14 DIAGNOSIS — F02.80 LATE ONSET ALZHEIMER'S DEMENTIA WITHOUT BEHAVIORAL DISTURBANCE (HCC): Primary | ICD-10-CM

## 2023-02-14 DIAGNOSIS — G30.1 LATE ONSET ALZHEIMER'S DEMENTIA WITHOUT BEHAVIORAL DISTURBANCE (HCC): Primary | ICD-10-CM

## 2023-02-14 PROCEDURE — 96137 PSYCL/NRPSYC TST PHY/QHP EA: CPT | Performed by: CLINICAL NEUROPSYCHOLOGIST

## 2023-02-14 PROCEDURE — 96136 PSYCL/NRPSYC TST PHY/QHP 1ST: CPT | Performed by: CLINICAL NEUROPSYCHOLOGIST

## 2023-02-14 PROCEDURE — 1123F ACP DISCUSS/DSCN MKR DOCD: CPT | Performed by: CLINICAL NEUROPSYCHOLOGIST

## 2023-02-14 PROCEDURE — 96138 PSYCL/NRPSYC TECH 1ST: CPT | Performed by: CLINICAL NEUROPSYCHOLOGIST

## 2023-02-14 PROCEDURE — 96132 NRPSYC TST EVAL PHYS/QHP 1ST: CPT | Performed by: CLINICAL NEUROPSYCHOLOGIST

## 2023-02-14 PROCEDURE — 96133 NRPSYC TST EVAL PHYS/QHP EA: CPT | Performed by: CLINICAL NEUROPSYCHOLOGIST

## 2023-02-14 PROCEDURE — 96139 PSYCL/NRPSYC TST TECH EA: CPT | Performed by: CLINICAL NEUROPSYCHOLOGIST

## 2023-02-14 PROCEDURE — 90791 PSYCH DIAGNOSTIC EVALUATION: CPT | Performed by: CLINICAL NEUROPSYCHOLOGIST

## 2023-02-14 NOTE — LETTER
2/15/2023    Patient: Tomas Lara   YOB: 1950   Date of Visit: 2/14/2023     [place] [id number]  Via In Basket    Dear Kimberly Medellin MD,      Thank you for referring Ms. Elana Guido to [facility] for evaluation. My notes for this consultation are attached. If you have questions, please do not hesitate to call me. I look forward to following your patient along with you.       Sincerely,    Rishi Castillo PsyD

## 2023-02-14 NOTE — PROGRESS NOTES
[address] [facility],5Th Floor  [address]   [place] [address] [place] [place] [address] [address]   [phone] Office   [phone] Fax      Neuropsychology    Exam # 2    Initial Diagnostic Interview Note      Referral:  Cari Yen MD    Dena Faustin is a 74 y.o. right handed   female who was accompanied by her spouse to the initial clinical interview on 2/14/23. Please refer to her medical records for details pertaining to her history. At the start of the appointment, I reviewed the patient's [facility] Epic Chart (including Media scanned in from previous providers) for the active Problem List, all pertinent Past Medical Hx, medications, recent radiologic and laboratory findings. In addition, I reviewed pt's documented Immunization Record and Encounter History. When I saw her last:    Dena Faustin is a 66 y.o. right handed   female who was accompanied by her spouse to the initial clinical interview on 2/17/22. Please refer to her medical records for details pertaining to her history. At the start of the appointment, I reviewed the patient's [facility] Epic Chart (including Media scanned in from previous providers) for the active Problem List, all pertinent Past Medical Hx, medications, recent radiologic and laboratory findings. In addition, I reviewed pt's documented Immunization Record and Encounter History. She completed the 12th grade without history of previously diagnosed LD and/or receipt of special education services. 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 She has noticed progressive decline in short term memory since her back surgery back in May. She had encephalopathy back then in hospital. She lost her memory for that whole week and had significant postop delirium. .  Since discharge, general memory has been progressively worsening. Forgets words.  Forgets the content of conversations. Loses things. Goes into room and forgets why. Spouse says she had memory loss prior to surgery, too, and there has been a progressive general trend of decline. He thinks she had issues with the anesthesia causing the delirium and spouse says the memory issues was before and continues. She forgets places. Longer to process, comprehend, learn, recall new information. She has problems with focus, attention, decision making. She has chronic depression. She is not driving currently. Spouse reminds her take her medications and helps with the finances. On meds for back pain and because she is on narcotics she is not driving. She is on Cymbalta. She was on gabapentin and tramadol. Had brain fog. She has stopped being on those intense pain meds now for a couple of months and problems are still progressive. It has probably been more than a month ago. Her mother had dementia, and started to show signs probably in her 62s. The patient herself has no known stroke, meningitis/encephalitis, KATELYN Fever, Lupus, Lyme, TBI, sz, etc.  She says she sleeps fine- some times are better than others. Appetite is okay. 12 years ago had memory testing in the [address] [place] [address] due to memory loss. Told aging and mood. EXAM:  MRI BRAIN W WO CONT     INDICATION:    AMS     COMPARISON:  CT head 5/6/2021. CONTRAST: 15 ml Dotarem. TECHNIQUE:    Multiplanar multisequence acquisition without and with contrast of the brain. FINDINGS:  Evaluation is significantly limited by motion. Generalized parenchymal volume loss with commensurate dilation of the sulci and  ventricular system. Periventricular and deep white matter T2/FLAIR  hyperintensities, confluent in regions, consistent with moderate chronic  microvascular ischemic disease. 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 There is no acute infarct, hemorrhage, extra-axial fluid collection, or mass  effect. There is no cerebellar tonsillar herniation. Expected arterial  flow-voids are present. No evidence of abnormal enhancement. The paranasal sinuses, mastoid air cells, and middle ears are clear. The orbital  contents are within normal limits. No significant osseous or scalp lesions are  identified. Cervical spine ACDF partially visualized extending superiorly to C4. Degenerative disc disease and facet arthropathy in the upper cervical spine. IMPRESSION  Evaluation is significantly limited by motion. 1. No acute intracranial abnormality. 2. Generalized parenchymal volume loss and moderate chronic microvascular  ischemic disease. 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 Dx in 2022 included: This is a mixed normal/abnormal range Neuropsychological Evaluation with respect to neurocognitive functioning. In this regard, she is showing impairments with verbal fluency, sustained visual attention, bilateral fine motor dexterity, and executive functioning abilities are slow. At the same time, her auditory learning, memory, intellectual capacity, and performances across all other neurocognitive domains assessed are normal.  From an emotional standpoint, there is moderate anxiety with somatic features and depression. In my opinion, this profile is not (yet) consistent with a dementia process. Instead, this is MCI exacerbated by significant anxiety and depression. In addition to continued medical care, my recommendations include psychiatric treatment for anxiety and depression along with active engagement in counseling. Consider also an appropriate medication for attention if this is not medically contraindicated. At this point in time, I find that the patient has capacity to make informed medical decisions, financial decisions, the ability to vote, to , or to own a firearm. Driving safety needs to be formally evaluated/monitored over time.   She should be encouraged to remain as mentally, physically, socially active as possible. With an improvement in mood should come with it a significant improvement in day-to-day neurocognitive functioning. If not, follow-up neuropsychological evaluation with him indicated. In the interim, she needs to remain as mentally, physically, and socially active as possible. We now have extensive baseline neurocognitive and psychologic data on her. Follow-up yearly, or as needed. Clinical correlation is, course, indicated. I will discuss these findings with the patient when she follows up with me in the near future. A follow up Neuropsychological Evaluation is indicated on a prn basis, especially if there are any cognitive and/or emotional changes. DIAGNOSES:             MCI Without Memory Loss (Attention, Fluency, Bilateral Fine Motor Dexterity)                                       Anxiety with Somatization                                      Major Depression - Mild To Moderate    Since I saw her last, she has no new stroke, meningitis/encephalitis, KATELYN Fever, Lupus, Lyme, TBI,. Sz. She and her spouse have noticed a progressive decline in her short term memory. Forgets conversations. Misplaces things. Loses train of thought. She loses words. Spouse notes she will ask the same questions over and over. I note today that her rate of speech is slower than it was when I saw her last.  There has been a decline in her walking and she has had some falls in the last year. No LOC. She has a squat and than a drop when she falls. She recently started with Ms. Gini Hanson NP, for psychiatric care. Daughter with cancer which is a stress. She continues to have depression and anxiety. Sleep remains disruptive. On buproprion and she has been in individual therapy.       Neuropsychological Mental Status Exam (NMSE):     Historian: Good  Praxis: No UE apraxia  R/L Orientation: Intact to self and to other  Dress: within normal limits   Weight: Overweight Appearance/Hygiene: within normal limits   Gait: Slow, with cane   Assistive Devices: cane, glasses  Mood: within normal limits   Affect: within normal limits   Comprehension: within normal limits   Thought Process: within normal limits   Expressive Language: within normal limits   Receptive Language: within normal limits   Motor:  No cognitive or motor perseveration  ETOH: Occasional, not to excess  Tobacco: Quit 2027  Illicit: Denied  SI/HI: Denied  Psychosis: Denied  Insight: Within normal limits  Judgment: Within normal limits  Other Psych:    Past Medical History:   Diagnosis Date    Acute alteration in mental status     Arthritis     Bilateral carotid artery stenosis     Cerebral microvascular disease     Chronic kidney disease     CKD 3    Chronic pain     CHRONIC BOWEL PAIN    Convulsions (HCC)     Depression     Disturbance of memory     Diverticulitis     Fibromyalgia     GERD (gastroesophageal reflux disease)     Goiter     Hiatal hernia     Hypercholesterolemia     Hypertension     IBS (irritable bowel syndrome)     CONSTIPATION, CHRONIC SINCE HER 20s    Insomnia     TAKES TRAZODONE NIGHTLY    Migraine     REDUCED IN FREQUENCY AND SEVERITY SINCE MENOPAUSE    Rectocele        Past Surgical History:   Procedure Laterality Date    COLONOSCOPY N/A 6/21/2016    COLONOSCOPY performed by Diogenes Castellano MD at [facility] ENDOSCOPY    COLONOSCOPY N/A 6/27/2018    COLONOSCOPY performed by Diogenes Castellano MD at [facility] ENDOSCOPY    ENDOSCOPY, COLON, DIAGNOSTIC  11/2010    Dr. Redia Goodpasture  2011    HX CHOLECYSTECTOMY  2006    HX ENDOSCOPY      HX GI  X3  LAST ONE 12/10    COLONOSCOPY    HX GYN  1982    D&C WITH SUCTION    HX HYSTERECTOMY      HX LUMBAR FUSION  2017    HX ORTHOPAEDIC      right foot surgery    HX TONSILLECTOMY      HX TOTAL COLECTOMY  2007    Dr. Pond/ diverticulitis    IR INJ SPINE FLUORO GUIDED  8/28/2020    IR INJ SPINE FLUORO GUIDED  1/8/2021    IR INJ SPINE FLUORO GUIDED 2021       Allergies   Allergen Reactions    Latex Hives    Codeine Other (comments)     Severe Headache    Morphine Hives    Ambien [Zolpidem] Other (comments)     Severe behavior changes    Dilaudid [Hydromorphone] Other (comments)     Memory loss    Other Medication Rash     BANDAIDS    Shellfish Containing Products Hives       Family History   Problem Relation Age of Onset    Cancer Father         lung and bone    Stroke Sister     Cancer Sister 76        PANCREATIC    Hypertension Mother     High Cholesterol Mother     Alzheimer's Disease Mother         late 62s early 76s    Cancer Other         COLON    Cancer Other         breast    Diabetes Maternal Aunt     Cancer Maternal Uncle         COLON    Cancer Maternal Grandfather         COLON    Ovarian Cancer Daughter 28       Social History     Tobacco Use    Smoking status: Former     Packs/day: 1.00     Years: 45.00     Pack years: 45.00     Types: Cigarettes     Quit date: 2007     Years since quittin.1    Smokeless tobacco: Never   Vaping Use    Vaping Use: Never used   Substance Use Topics    Alcohol use: Not Currently     Comment: holidays    Drug use: No       Current Outpatient Medications   Medication Sig Dispense Refill    GaviLyte-G [id number] -5.86 gram suspension TAKE BY MOUTH AS DIRECTED FOR COLONOSCOPY  Scheduled 2023      dicyclomine (BENTYL) 10 mg capsule Take 1 Capsule by mouth two (2) times daily as needed for Abdominal Cramps. BE SURE TO TAKE ONLY AS NEEDED (NOT SCHEDULED) 180 Capsule 0    oxybutynin (DITROPAN) 5 mg tablet Take 1 Tablet by mouth nightly. Decreased frequency 90 Tablet 0    Trulance 3 mg tab TAKE 1 TABLET BY MOUTH EVERY DAY FOR CONSTIPATION      buPROPion XL (WELLBUTRIN XL) 300 mg XL tablet Take 1 Tablet by mouth daily.  90 Tablet 1    DULoxetine (CYMBALTA) 60 mg capsule TAKE 1 CAPSULE TWICE A  Capsule 3    pantoprazole (PROTONIX) 40 mg tablet TAKE 1 TABLET DAILY 90 Tablet 3    aspirin delayed-release 81 mg tablet Take  by mouth daily. (Patient not taking: No sig reported)      acetaminophen (TYLENOL) 500 mg tablet Take 2 Tabs by mouth every six (6) hours. 90 Tab 0         Plan:  Obtain authorization for testing from [facility]. Report to follow once testing, scoring, and interpretation completed. ? Organic based neurocognitive issues versus mood disorder or combination of same. ? Problems organic, functional, or both? The patient has not gotten better from any treatment to date. Treatment decisions cannot be appropriately made without testing. The patient is not abusing drugs. There is a suspected brain problem, which can be identified, quantified, and hopefully addressed via neurocognitive and psychological testing. This note will not be viewable in [address]

## 2023-02-15 NOTE — PROGRESS NOTES
[address] [facility],[address]Th [address]  [address]   [place] [address] [place] [facility]    [phone] Office   [phone] Fax      Neuropsychological Evaluation Report    Exam # 2  Referral:  Divine Andersen MD    Twila Melara is a 74 y.o. right handed   female who was accompanied by her spouse to the initial clinical interview on 2/14/23. Please refer to her medical records for details pertaining to her history. At the start of the appointment, I reviewed the patient's [facility] Epic Chart (including Media scanned in from previous providers) for the active Problem List, all pertinent Past Medical Hx, medications, recent radiologic and laboratory findings. In addition, I reviewed pt's documented Immunization Record and Encounter History. When I saw her last:    Twila Melara is a 66 y.o. right handed   female who was accompanied by her spouse to the initial clinical interview on 2/17/22. Please refer to her medical records for details pertaining to her history. At the start of the appointment, I reviewed the patient's [facility] Epic Chart (including Media scanned in from previous providers) for the active Problem List, all pertinent Past Medical Hx, medications, recent radiologic and laboratory findings. In addition, I reviewed pt's documented Immunization Record and Encounter History. She completed the 12th grade without history of previously diagnosed LD and/or receipt of special education services. 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 She has noticed progressive decline in short term memory since her back surgery back in May. She had encephalopathy back then in hospital. She lost her memory for that whole week and had significant postop delirium. .  Since discharge, general memory has been progressively worsening. Forgets words. Forgets the content of conversations. Loses things. Goes into room and forgets why. Spouse says she had memory loss prior to surgery, too, and there has been a progressive general trend of decline. He thinks she had issues with the anesthesia causing the delirium and spouse says the memory issues was before and continues. She forgets places. Longer to process, comprehend, learn, recall new information. She has problems with focus, attention, decision making. She has chronic depression. She is not driving currently. Spouse reminds her take her medications and helps with the finances. On meds for back pain and because she is on narcotics she is not driving. She is on Cymbalta. She was on gabapentin and tramadol. Had brain fog. She has stopped being on those intense pain meds now for a couple of months and problems are still progressive. It has probably been more than a month ago. Her mother had dementia, and started to show signs probably in her 62s. The patient herself has no known stroke, meningitis/encephalitis, KATELYN Fever, Lupus, Lyme, TBI, sz, etc.  She says she sleeps fine- some times are better than others. Appetite is okay. 12 years ago had memory testing in the [address] [place] [address] due to memory loss. Told aging and mood. EXAM:  MRI BRAIN W WO CONT     INDICATION:    AMS     COMPARISON:  CT head 5/6/2021. CONTRAST: 15 ml Dotarem. TECHNIQUE:    Multiplanar multisequence acquisition without and with contrast of the brain. FINDINGS:  Evaluation is significantly limited by motion. Generalized parenchymal volume loss with commensurate dilation of the sulci and  ventricular system. Periventricular and deep white matter T2/FLAIR  hyperintensities, confluent in regions, consistent with moderate chronic  microvascular ischemic disease. 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 There is no acute infarct, hemorrhage, extra-axial fluid collection, or mass  effect. There is no cerebellar tonsillar herniation.  Expected arterial  flow-voids are present. No evidence of abnormal enhancement. The paranasal sinuses, mastoid air cells, and middle ears are clear. The orbital  contents are within normal limits. No significant osseous or scalp lesions are  identified. Cervical spine ACDF partially visualized extending superiorly to C4. Degenerative disc disease and facet arthropathy in the upper cervical spine. IMPRESSION  Evaluation is significantly limited by motion. 1. No acute intracranial abnormality. 2. Generalized parenchymal volume loss and moderate chronic microvascular  ischemic disease. Small chronic infarct in the left ventral cnadi. Dx in 2022 included: This is a mixed normal/abnormal range Neuropsychological Evaluation with respect to neurocognitive functioning. In this regard, she is showing impairments with verbal fluency, sustained visual attention, bilateral fine motor dexterity, and executive functioning abilities are slow. At the same time, her auditory learning, memory, intellectual capacity, and performances across all other neurocognitive domains assessed are normal.  From an emotional standpoint, there is moderate anxiety with somatic features and depression. In my opinion, this profile is not (yet) consistent with a dementia process. Instead, this is MCI exacerbated by significant anxiety and depression. In addition to continued medical care, my recommendations include psychiatric treatment for anxiety and depression along with active engagement in counseling. Consider also an appropriate medication for attention if this is not medically contraindicated. At this point in time, I find that the patient has capacity to make informed medical decisions, financial decisions, the ability to vote, to , or to own a firearm. Driving safety needs to be formally evaluated/monitored over time.   She should be encouraged to remain as mentally, physically, socially active as possible. With an improvement in mood should come with it a significant improvement in day-to-day neurocognitive functioning. If not, follow-up neuropsychological evaluation with him indicated. In the interim, she needs to remain as mentally, physically, and socially active as possible. We now have extensive baseline neurocognitive and psychologic data on her. Follow-up yearly, or as needed. Clinical correlation is, course, indicated. I will discuss these findings with the patient when she follows up with me in the near future. A follow up Neuropsychological Evaluation is indicated on a prn basis, especially if there are any cognitive and/or emotional changes. DIAGNOSES:             MCI Without Memory Loss (Attention, Fluency, Bilateral Fine Motor Dexterity)                                       Anxiety with Somatization                                      Major Depression - Mild To Moderate    Since I saw her last, she has no new stroke, meningitis/encephalitis, KATELYN Fever, Lupus, Lyme, TBI,. Sz. She and her spouse have noticed a progressive decline in her short term memory. Forgets conversations. Misplaces things. Loses train of thought. She loses words. Spouse notes she will ask the same questions over and over. I note today that her rate of speech is slower than it was when I saw her last.  There has been a decline in her walking and she has had some falls in the last year. No LOC. She has a squat and than a drop when she falls. She recently started with Ms. Juanito Hargrove NP, for psychiatric care. Daughter with cancer which is a stress. She continues to have depression and anxiety. Sleep remains disruptive. On buproprion and she has been in individual therapy.       Neuropsychological Mental Status Exam (NMSE):     Historian: Good  Praxis: No UE apraxia  R/L Orientation: Intact to self and to other  Dress: within normal limits   Weight: Overweight Appearance/Hygiene: within normal limits   Gait: Slow, with cane   Assistive Devices: cane, glasses  Mood: within normal limits   Affect: within normal limits   Comprehension: within normal limits   Thought Process: within normal limits   Expressive Language: within normal limits   Receptive Language: within normal limits   Motor:  No cognitive or motor perseveration  ETOH: Occasional, not to excess  Tobacco: Quit 7158  Illicit: Denied  SI/HI: Denied  Psychosis: Denied  Insight: Within normal limits  Judgment: Within normal limits  Other Psych:    Past Medical History:   Diagnosis Date    Acute alteration in mental status     Arthritis     Bilateral carotid artery stenosis     Cerebral microvascular disease     Chronic kidney disease     CKD 3    Chronic pain     CHRONIC BOWEL PAIN    Convulsions (HCC)     Depression     Disturbance of memory     Diverticulitis     Fibromyalgia     GERD (gastroesophageal reflux disease)     Goiter     Hiatal hernia     Hypercholesterolemia     Hypertension     IBS (irritable bowel syndrome)     CONSTIPATION, CHRONIC SINCE HER 20s    Insomnia     TAKES TRAZODONE NIGHTLY    Migraine     REDUCED IN FREQUENCY AND SEVERITY SINCE MENOPAUSE    Rectocele        Past Surgical History:   Procedure Laterality Date    COLONOSCOPY N/A 6/21/2016    COLONOSCOPY performed by Tony Daniels MD at [facility] ENDOSCOPY    COLONOSCOPY N/A 6/27/2018    COLONOSCOPY performed by Tony Daniels MD at [facility] ENDOSCOPY    ENDOSCOPY, COLON, DIAGNOSTIC  11/2010    Dr. Anil Atwood  2011    HX CHOLECYSTECTOMY  2006    HX ENDOSCOPY      HX GI  X3  LAST ONE 12/10    COLONOSCOPY    HX GYN  1982    D&C WITH SUCTION    HX HYSTERECTOMY      HX LUMBAR FUSION  2017    HX ORTHOPAEDIC      right foot surgery    HX TONSILLECTOMY      HX TOTAL COLECTOMY  2007    Dr. Pond/ diverticulitis    IR INJ SPINE FLUORO GUIDED  8/28/2020    IR INJ SPINE FLUORO GUIDED  1/8/2021    IR INJ SPINE FLUORO GUIDED 2021       Allergies   Allergen Reactions    Latex Hives    Codeine Other (comments)     Severe Headache    Morphine Hives    Ambien [Zolpidem] Other (comments)     Severe behavior changes    Dilaudid [Hydromorphone] Other (comments)     Memory loss    Other Medication Rash     BANDAIDS    Shellfish Containing Products Hives       Family History   Problem Relation Age of Onset    Cancer Father         lung and bone    Stroke Sister     Cancer Sister 76        PANCREATIC    Hypertension Mother     High Cholesterol Mother     Alzheimer's Disease Mother         late 62s early 76s    Cancer Other         COLON    Cancer Other         breast    Diabetes Maternal Aunt     Cancer Maternal Uncle         COLON    Cancer Maternal Grandfather         COLON    Ovarian Cancer Daughter 28       Social History     Tobacco Use    Smoking status: Former     Packs/day: 1.00     Years: 45.00     Pack years: 45.00     Types: Cigarettes     Quit date: 2007     Years since quittin.1    Smokeless tobacco: Never   Vaping Use    Vaping Use: Never used   Substance Use Topics    Alcohol use: Not Currently     Comment: holidays    Drug use: No       Current Outpatient Medications   Medication Sig Dispense Refill    GaviLyte-G 236-22.74-6.74 -5.86 gram suspension TAKE BY MOUTH AS DIRECTED FOR COLONOSCOPY  Scheduled 2023      dicyclomine (BENTYL) 10 mg capsule Take 1 Capsule by mouth two (2) times daily as needed for Abdominal Cramps. BE SURE TO TAKE ONLY AS NEEDED (NOT SCHEDULED) 180 Capsule 0    oxybutynin (DITROPAN) 5 mg tablet Take 1 Tablet by mouth nightly. Decreased frequency 90 Tablet 0    Trulance 3 mg tab TAKE 1 TABLET BY MOUTH EVERY DAY FOR CONSTIPATION      buPROPion XL (WELLBUTRIN XL) 300 mg XL tablet Take 1 Tablet by mouth daily.  90 Tablet 1    DULoxetine (CYMBALTA) 60 mg capsule TAKE 1 CAPSULE TWICE A  Capsule 3    pantoprazole (PROTONIX) 40 mg tablet TAKE 1 TABLET DAILY 90 Tablet 3    aspirin delayed-release 81 mg tablet Take  by mouth daily. (Patient not taking: No sig reported)      acetaminophen (TYLENOL) 500 mg tablet Take 2 Tabs by mouth every six (6) hours. 90 Tab 0         Plan:  Obtain authorization for testing from [employer]. Report to follow once testing, scoring, and interpretation completed. ? Organic based neurocognitive issues versus mood disorder or combination of same. ? Problems organic, functional, or both? The patient has not gotten better from any treatment to date. Treatment decisions cannot be appropriately made without testing. The patient is not abusing drugs. There is a suspected brain problem, which can be identified, quantified, and hopefully addressed via neurocognitive and psychological testing. This note will not be viewable in [address] Neuropsychological Test Results  Patient Testing 2/14/23 Report Completed 2/15/23  A Psychometrist Assisted w/ portions of this evaluation while under my direct  supervision    The following evaluation procedures/tests were administered:      Neuropsychologist Performed, Interpreted, & Reported:  Neuropsychological Mental Status Exam, Revised Memory & Behavior Checklist,  Mini Mental Status Exam, Clock Drawing Test, Christine-Melzack Pain Questionnaire, Test Of Premorbid Functioning, History Taking  & Clinical Interview With The Patient, Additional History Taking w/ the Patient's Spouse, CASE, ABAS-3, JAMES, CPT-III, Review Of Available Records. Psychometrist Administered under Neuropsychologist Supervision & Neuropsychologist Interpreted & Neuropsychologist Reported:  Verbal Fluency Tests, Owen & Owen - Revised, Trailmaking Test Parts A & B, Wechsler Adult Intelligence Scale - IV, [place] Verbal Learning Test - 3, Grooved Pegboard, Cabrales Depression Inventory - II, Cabrales Anxiety Inventory.    Test Findings:  Test Findings:  Note:  The patients raw data have been compared with currently available norms which include demographic corrections for age, gender, and/or education. Sometimes, the patients scores are compared to demographically similar individuals as close to the patients age, education level, etc., as possible. \"Average\" is viewed as being +/- 1 standard deviation (SD) from the stated mean for a particular test score. \"Low average\" is viewed as being between 1 and 2 SD below the mean, and above average is viewed as being 1 and 2 SD above the mean. Scores falling in the borderline range (between 1-1/2 and 2 SD below the mean) are viewed with particular attention as to whether they are normal or abnormal neurocognitive test scores. Other methods of inference in analyzing the test data are also utilized, including the pattern and range of scores in the profile, bilateral motor functions, and the presence, if any, of pathognomonic signs. Behaviorally, the patient was friendly and cooperative and appeared motivated to perform well during this examination. Within this context, the results of this evaluation are viewed as a valid reflection of the patients actual neurocognitive and emotional status. The patient's score of 28/30 on the Mini-Mental Status Exam was normal.  She was not oriented to city. Backward spelling is 4/5 correct. Previous score was 30/30 correct. Clock drawing was normal.                    Her structured word list fluency, as assessed by the FAS Test, was within the moderately to severely impaired range with a T score of 22. Category fluency was moderately to severely impaired with a T score 20. Confrontation naming, as assessed by the [facility], was within the average range with a T score of 49. This is a marked decline in verbal fluency over time.   Confrontation naming remains normal.                The patient was administered the [facility] Continuous Performance Test - III and review of the subscales within this instrument revealed moderate to severe concerns for inattentiveness without impulsivity. This pattern of performance is indicative of a patient who is at increased risk for day-to-day problems with sustained visual attention/concentration. This is a decline in functioning over time. The patient is not showing problems with working memory capacity (ninth %ile) though processing speed (2nd  %ile) was extremely low on the WAIS-IV. Her Verbal Comprehension Index score of 80 was low average. Her Perceptual Reasoning Index score of 77 was borderline. These scores reflect a decline in functioning based on an assessment of premorbid functioning. This is a decline in functioning over time. The patient was administered the [place] Verbal Learning Test  - 3 and generated a normal range and positive learning curve over 5 repeated auditory word list learning trials. An interference trial was normal.  Recall for the original word list was within the mildly impaired range after both short and long delays. Recognition recall was impaired. This is a decline from previous testing. Forced choice recall was normal again, suggesting good effort. This pattern of performance is now indicative of a patient has increased risk for day-to-day problems with auditory learning and memory. Simple timed visual motor sequencing (Trailmaking Test Part A) was within the moderately impaired range with a T score of 29. Her performance on a similar, but more complex task of timed visual motor sequencing (Trailmaking Test Part B) was within the severely impaired range with a T score of 19. This latter test was discontinued. This pattern of performance is indicative of a patient who is at increased risk for day-to-day problems with executive functioning. This is a considerable decline in functioning over time.                  strength was within the moderately impaired range for dominant hand and mildly impaired for her nondominant hand.  This is a slight fluctuation from previous testing which does not raise concern for particularly focal or lateralized issue. Neurologic correlation is indicated. The patient rated her current level of pain as \"2/5-discomforting\" on the Christine-Melzack Pain Questionnaire. She reported pain in her right torso, right lower back, hands, and knees. She reported headache, dizziness, and constipation. Her Beck Depression Inventory -II score of 14 was within the mildly depressed range. Her Beck Anxiety Inventory score of 11 reflected mild anxiety. The patient's responses on the Personality Assessment Inventory were deemed valid for interpretation, there is elements of anxiety with somatic features here. Mild depression is also seen. The personality profile is otherwise normal.                The spouse completed the ABAS-3 reported significant concerns regarding the patient's general adaptive skills (SS = 67), conceptual skills (SS = 63), social skills (SS = 76), and practical adaptive skills (SS = 67). On the CASE, the spouse  reports concern regarding the patient's cognitive functioning as well as concern for anxiety, depression, and somatic elements. He notes a decline in her functioning over time. Impressions & Recommendations: Results from serial neurocognitive testing show a clear decline in neurocognitive functioning over time. In this regard, previously diagnosed MCI has now transitioned into a dementia type condition. Anxiety has improved somewhat over time but she continues to have mild depression and anxiety concerns. There is a somatic element to her anxiety. This is not pseudodementia. In addition to continued medical care, my recommendations include memory management medication, ongoing psychiatric reviews of her medication management for anxiety and depression and supportive counseling as needed.   The patient's executive functioning is not compromised and I suggest a power of  be assigned for high-level medical and financial decision making. Formal driving safety evaluation should she wish to drive. She needs supervision for nutritional/meal preparation, medication management, and finance management as well as day-to-day household safety. Consider in-home health to assist especially as this is a progressive process. More long-term care plans need to be established as well I will discuss this with the patient and spouse at follow-up. Follow-up yearly at this juncture. We now have updated data on her. Clinical correlation is advised. I will discuss these findings with the patient when she follows up with me in the near future. A follow up Neuropsychological Evaluation is indicated on a prn basis, especially if there are any cognitive and/or emotional changes. DIAGNOSES:            Late Onset Alzheimer's Disease                                       Anxiety with Somatization (improving with treatment)                                      Major Depression - Mild (improving with treatment)        The above information is based upon information currently available to me. If there is any additional information of which I am currently unaware, I would be more than happy to review it upon having it made available to me. Thank you for the opportunity to see this interesting individual.     Sincerely,       Naila Elder. Hernán Scott PsyD, EdS    CC:  Charles Bain MD    Time Documentation:    07583*3 39023*4 (60 minutes)    34929 x 1  96139 x 5 Test Administration/Data Gathering By Technician: (3 hours). 67688 x 1 (first 30 minutes), 97257 x 5 (each additional 30 minutes)    96132 x 1  96133 x 1 Testing Evaluation Services by Neuropsychologist (1 hour, 50 minutes) 96132 x 1 (first hour), 96133 x 1 (50 minutes)    Definitions:      48794/11875:  Neurobehavioral Status Exam, Clinical interview.   Clinical assessment of thinking, reasoning and judgment, by neuropsychologist, both face to face time with patient and time interpreting those test results and reporting, first and subsequent hours)    59271/91203: Neuropsychological Test Administration by Technician/Psychometrist, first 30 minutes and each additional 30 minutes. The above includes: Record review. Review of history provided by patient. Review of collaborative information. Testing by Clinician. Review of raw data. Scoring. Report writing of individual tests administered by Clinician. Integration of individual tests administered by psychometrist with NSE/testing by clinician, review of records/history/collaborative information, case Conceptualization, treatment planning, clinical decision making, report writing, coordination Of Care. Psychometry test codes as time spent by psychometrist administering and scoring neurocognitive/psychological tests under supervision of neuropsychologist.  Integral services including scoring of raw data, data interpretation, case conceptualization, report writing etcetera were initiated after the patient finished testing/raw data collected and was completed on the date the report was signed. Please note that this dictation was completed with [employer], the [employer] voice recognition software. Quite often unanticipated grammatical, syntax, homophones, and other interpretive errors are inadvertently transcribed by the computer software. Please disregard these errors. Please excuse any errors that have escaped final proofreading. Thank you.

## 2023-02-22 ENCOUNTER — VIRTUAL VISIT (OUTPATIENT)
Dept: BEHAVIORAL/MENTAL HEALTH CLINIC | Age: 73
End: 2023-02-22
Payer: MEDICARE

## 2023-02-22 DIAGNOSIS — F33.1 DEPRESSION, MAJOR, RECURRENT, MODERATE (HCC): Primary | ICD-10-CM

## 2023-02-22 DIAGNOSIS — F41.9 ANXIETY: ICD-10-CM

## 2023-02-22 PROCEDURE — 1123F ACP DISCUSS/DSCN MKR DOCD: CPT | Performed by: SOCIAL WORKER

## 2023-02-22 PROCEDURE — G9717 DOC PT DX DEP/BP F/U NT REQ: HCPCS | Performed by: SOCIAL WORKER

## 2023-02-22 PROCEDURE — G8427 DOCREV CUR MEDS BY ELIG CLIN: HCPCS | Performed by: SOCIAL WORKER

## 2023-02-22 PROCEDURE — 90834 PSYTX W PT 45 MINUTES: CPT | Performed by: SOCIAL WORKER

## 2023-02-22 NOTE — PROGRESS NOTES
PSYCHOTHERAPY NOTE      Connie Sheldon is a 74 y.o. female who presents with anxiety/depression. Client was seen for a virtual individual psychotherapy session that lasted for 50 minutes. Progress:  Client continues to deal with health issues and this has an affect on mood. She reports some issues with balance and falls.  stays with her for support, although, client becomes aggravated with needing assistance. We processed this in today's session. Client did have evaluation by Dr. Irving Ortega. His notes indicate that her issues may stem from her anxiety and depression. She is scheduled to see Behavioral Health next month to evaluate current medication and she is strongly encouraged to keep that appt. Her health issues, eye issues and concerns about her daughter's cancer diagnosis contribute to anxiety and we also addressed this in today's session,     Client was aware that due to short term nature of my role, today is our last session. We reflected on her progress and she was encouraged to continue with therapy on long term basis. She has been provided with information on long term therapists in the community. Mental Status exam:         Sensorium  oriented to time, place and person   Relations cooperative   Appearance:  casually dressed   Motor Behavior:  within normal limits   Speech:  normal pitch and normal volume   Thought Process: goal directed and logical   Thought Content free of delusions and free of hallucinations   Suicidal ideations none   Homicidal ideations none   Mood:  euthymic   Affect:  euthymic   Memory recent  adequate   Memory remote:  adequate   Concentration:  adequate   Abstraction:  abstract   Insight:  fair   Reliability fair   Judgment:  fair         DIAGNOSIS AND IMPRESSION:    Axis I: Major Depression, Recurrent, Moderate;  Anxiety  Axis II: No dx  Axis III:   Past Medical History:   Diagnosis Date    Acute alteration in mental status     Arthritis Bilateral carotid artery stenosis     Cerebral microvascular disease     Chronic kidney disease     CKD 3    Chronic pain     CHRONIC BOWEL PAIN    Convulsions (HCC)     Depression     Disturbance of memory     Diverticulitis     Fibromyalgia     GERD (gastroesophageal reflux disease)     Goiter     Hiatal hernia     Hypercholesterolemia     Hypertension     IBS (irritable bowel syndrome)     CONSTIPATION, CHRONIC SINCE HER 20s    Insomnia     TAKES TRAZODONE NIGHTLY    Migraine     REDUCED IN FREQUENCY AND SEVERITY SINCE MENOPAUSE    Rectocele      Axis IV: Problems with primary support group, Problems related to social environment, and Other psychosocial or environmental problems  Axis V:  61-70 mild symptoms    Interventions/plans:  Secure long term therapist and attend appt at [facility] to the emergency declaration under the [employer] and the [place] [address] waiver authority and the [employer] and [employer] Act, this Virtual Visit was conducted, with patient and parent and/or guardian's consent, to reduce the patient's risk of exposure to COVID-19 and provide continuity of care for an established patient. Due to the state of emergency related to the coronavirus, the [place] of [employer] and the [place] [employer] is allowing practitioners holding my licensure and/or certification status the ability to provide telehealth services without the usual requirements. This individual was evaluated through a synchronous (real-time) audio-video encounter, and/or his/her healthcare decision maker, is aware that it is a billable service, which includes applicable co-pays, with coverage as determined by his/her insurance carrier. He/she provided verbal consent to proceed and patient identification was verified.  This visit was conducted pursuant to the emergency declaration under the [address] Emergencies Act, [facility] waiver authority and the Coronavirus Preparedness and Response Supplemental Appropriations Act. A caregiver was present when appropriate. Ability to conduct physical exam was limited. The patient was located at home in a state where the provider was licensed to provide care.

## 2023-03-12 LAB
ALBUMIN SERPL-MCNC: 4.5 G/DL (ref 3.7–4.7)
ALBUMIN/CREAT UR: 110 MG/G CREAT (ref 0–29)
ALBUMIN/GLOB SERPL: 1.8 {RATIO} (ref 1.2–2.2)
ALP SERPL-CCNC: 117 IU/L (ref 44–121)
ALT SERPL-CCNC: 16 IU/L (ref 0–32)
AST SERPL-CCNC: 17 IU/L (ref 0–40)
BILIRUB SERPL-MCNC: 0.3 MG/DL (ref 0–1.2)
BUN SERPL-MCNC: 17 MG/DL (ref 8–27)
BUN/CREAT SERPL: 16 (ref 12–28)
CALCIUM SERPL-MCNC: 10 MG/DL (ref 8.7–10.3)
CHLORIDE SERPL-SCNC: 102 MMOL/L (ref 96–106)
CHOLEST SERPL-MCNC: 264 MG/DL (ref 100–199)
CO2 SERPL-SCNC: 22 MMOL/L (ref 20–29)
CREAT SERPL-MCNC: 1.08 MG/DL (ref 0.57–1)
CREAT UR-MCNC: 65 MG/DL
EGFRCR SERPLBLD CKD-EPI 2021: 54 ML/MIN/1.73
EST. AVERAGE GLUCOSE BLD GHB EST-MCNC: 105 MG/DL
GLOBULIN SER CALC-MCNC: 2.5 G/DL (ref 1.5–4.5)
GLUCOSE SERPL-MCNC: 118 MG/DL (ref 70–99)
HBA1C MFR BLD: 5.3 % (ref 4.8–5.6)
HDLC SERPL-MCNC: 44 MG/DL
LABORATORY COMMENT REPORT: ABNORMAL
LDLC SERPL CALC-MCNC: 191 MG/DL (ref 0–99)
MICROALBUMIN UR-MCNC: 71.6 UG/ML
POTASSIUM SERPL-SCNC: 4.2 MMOL/L (ref 3.5–5.2)
PROT SERPL-MCNC: 7 G/DL (ref 6–8.5)
REPORT: NORMAL
SODIUM SERPL-SCNC: 139 MMOL/L (ref 134–144)
TRIGL SERPL-MCNC: 157 MG/DL (ref 0–149)
TSH SERPL DL<=0.005 MIU/L-ACNC: 1.96 UIU/ML (ref 0.45–4.5)
VLDLC SERPL CALC-MCNC: 29 MG/DL (ref 5–40)

## 2023-04-23 ENCOUNTER — TRANSCRIBE ORDERS (OUTPATIENT)
Facility: HOSPITAL | Age: 73
End: 2023-04-23

## 2023-04-23 DIAGNOSIS — I67.1: Primary | ICD-10-CM

## 2023-05-05 ENCOUNTER — OFFICE VISIT (OUTPATIENT)
Dept: NEUROLOGY | Age: 73
End: 2023-05-05

## 2023-05-05 DIAGNOSIS — F32.0 MILD MAJOR DEPRESSION (HCC): ICD-10-CM

## 2023-05-05 DIAGNOSIS — F41.8 ANXIETY WITH SOMATIC FEATURES: ICD-10-CM

## 2023-05-05 DIAGNOSIS — G30.1 LATE ONSET ALZHEIMER'S DEMENTIA WITHOUT BEHAVIORAL DISTURBANCE (HCC): Primary | ICD-10-CM

## 2023-05-05 DIAGNOSIS — F02.80 LATE ONSET ALZHEIMER'S DEMENTIA WITHOUT BEHAVIORAL DISTURBANCE (HCC): Primary | ICD-10-CM

## 2023-05-05 DIAGNOSIS — F43.9 STRESS AT HOME: ICD-10-CM

## 2023-05-08 PROBLEM — R12 HEARTBURN: Status: ACTIVE | Noted: 2023-05-08

## 2023-05-08 PROBLEM — E55.9 VITAMIN D DEFICIENCY: Status: RESOLVED | Noted: 2021-11-18 | Resolved: 2023-05-08

## 2023-05-08 PROBLEM — N18.31 CHRONIC RENAL FAILURE, STAGE 3A (HCC): Status: RESOLVED | Noted: 2023-02-07 | Resolved: 2023-05-08

## 2023-05-08 PROBLEM — E78.00 HIGH CHOLESTEROL: Status: ACTIVE | Noted: 2020-08-26

## 2023-05-08 PROBLEM — E66.9 NON MORBID OBESITY: Status: RESOLVED | Noted: 2017-12-20 | Resolved: 2023-05-08

## 2023-05-08 PROBLEM — E78.00 HIGH CHOLESTEROL: Status: RESOLVED | Noted: 2020-08-26 | Resolved: 2023-05-08

## 2023-05-08 PROBLEM — E53.8 B12 DEFICIENCY: Status: RESOLVED | Noted: 2021-11-18 | Resolved: 2023-05-08

## 2023-05-08 PROBLEM — M54.31 BILATERAL SCIATICA: Status: ACTIVE | Noted: 2017-07-19

## 2023-05-08 PROBLEM — R56.9 CONVULSIONS (HCC): Status: ACTIVE | Noted: 2021-09-16

## 2023-05-08 PROBLEM — R41.3 DISTURBANCE OF MEMORY: Status: RESOLVED | Noted: 2021-11-18 | Resolved: 2023-05-08

## 2023-05-08 PROBLEM — M48.062 SPINAL STENOSIS OF LUMBAR REGION WITH NEUROGENIC CLAUDICATION: Status: ACTIVE | Noted: 2017-08-10

## 2023-05-08 PROBLEM — G56.03 BILATERAL CARPAL TUNNEL SYNDROME: Status: RESOLVED | Noted: 2021-11-18 | Resolved: 2023-05-08

## 2023-05-08 PROBLEM — I67.89 CEREBRAL MICROVASCULAR DISEASE: Status: RESOLVED | Noted: 2021-09-16 | Resolved: 2023-05-08

## 2023-05-08 PROBLEM — M47.817 LUMBOSACRAL SPONDYLOSIS WITHOUT MYELOPATHY: Status: ACTIVE | Noted: 2017-07-19

## 2023-05-08 PROBLEM — M47.817 LUMBOSACRAL SPONDYLOSIS WITHOUT MYELOPATHY: Status: RESOLVED | Noted: 2017-07-19 | Resolved: 2023-05-08

## 2023-05-08 PROBLEM — K44.9 HIATAL HERNIA: Status: RESOLVED | Noted: 2020-08-26 | Resolved: 2023-05-08

## 2023-05-08 PROBLEM — E11.42 DIABETIC PERIPHERAL NEUROPATHY ASSOCIATED WITH TYPE 2 DIABETES MELLITUS (HCC): Status: RESOLVED | Noted: 2021-11-18 | Resolved: 2023-05-08

## 2023-05-08 PROBLEM — E03.4 HYPOTHYROIDISM DUE TO ACQUIRED ATROPHY OF THYROID: Status: RESOLVED | Noted: 2021-11-18 | Resolved: 2023-05-08

## 2023-05-08 PROBLEM — K21.9 GERD (GASTROESOPHAGEAL REFLUX DISEASE): Status: RESOLVED | Noted: 2020-08-26 | Resolved: 2023-05-08

## 2023-05-08 PROBLEM — I65.23 CAROTID STENOSIS, BILATERAL: Status: RESOLVED | Noted: 2021-11-18 | Resolved: 2023-05-08

## 2023-05-08 PROBLEM — M54.32 BILATERAL SCIATICA: Status: ACTIVE | Noted: 2017-07-19

## 2023-05-08 PROBLEM — I67.1: Status: RESOLVED | Noted: 2022-11-28 | Resolved: 2023-05-08

## 2023-05-08 PROBLEM — K58.9 IBS (IRRITABLE BOWEL SYNDROME): Status: RESOLVED | Noted: 2020-08-26 | Resolved: 2023-05-08

## 2023-05-08 PROBLEM — D50.9 IRON DEFICIENCY ANEMIA, UNSPECIFIED: Status: RESOLVED | Noted: 2023-05-08 | Resolved: 2023-05-08

## 2023-05-08 PROBLEM — M48.061 FORAMINAL STENOSIS OF LUMBAR REGION: Status: RESOLVED | Noted: 2017-08-29 | Resolved: 2023-05-08

## 2023-05-08 PROBLEM — I67.1 CAROTID-CAVERNOUS FISTULA: Status: RESOLVED | Noted: 2022-11-25 | Resolved: 2023-05-08

## 2023-05-08 PROBLEM — K57.92 DIVERTICULITIS: Status: ACTIVE | Noted: 2020-08-26

## 2023-05-08 PROBLEM — M47.22 CERVICAL RADICULOPATHY DUE TO DEGENERATIVE JOINT DISEASE OF SPINE: Status: RESOLVED | Noted: 2021-11-18 | Resolved: 2023-05-08

## 2023-05-08 PROBLEM — M54.31 BILATERAL SCIATICA: Status: RESOLVED | Noted: 2017-07-19 | Resolved: 2023-05-08

## 2023-05-08 PROBLEM — R56.9 CONVULSIONS (HCC): Status: RESOLVED | Noted: 2021-09-16 | Resolved: 2023-05-08

## 2023-05-08 PROBLEM — K57.92 DIVERTICULITIS: Status: RESOLVED | Noted: 2020-08-26 | Resolved: 2023-05-08

## 2023-05-08 PROBLEM — M48.062 SPINAL STENOSIS OF LUMBAR REGION WITH NEUROGENIC CLAUDICATION: Status: RESOLVED | Noted: 2017-08-10 | Resolved: 2023-05-08

## 2023-05-08 PROBLEM — D50.9 IRON DEFICIENCY ANEMIA, UNSPECIFIED: Status: ACTIVE | Noted: 2023-05-08

## 2023-05-08 PROBLEM — K21.9 GERD (GASTROESOPHAGEAL REFLUX DISEASE): Status: ACTIVE | Noted: 2020-08-26

## 2023-05-08 PROBLEM — R41.82 ACUTE ALTERATION IN MENTAL STATUS: Status: RESOLVED | Noted: 2021-09-16 | Resolved: 2023-05-08

## 2023-05-08 PROBLEM — K58.9 IBS (IRRITABLE BOWEL SYNDROME): Status: ACTIVE | Noted: 2020-08-26

## 2023-05-08 PROBLEM — F33.2 DEPRESSION, MAJOR, SEVERE RECURRENCE (HCC): Status: RESOLVED | Noted: 2019-12-06 | Resolved: 2023-05-08

## 2023-05-08 PROBLEM — G31.84 MILD COGNITIVE IMPAIRMENT: Status: RESOLVED | Noted: 2022-06-09 | Resolved: 2023-05-08

## 2023-05-08 PROBLEM — M54.32 BILATERAL SCIATICA: Status: RESOLVED | Noted: 2017-07-19 | Resolved: 2023-05-08

## 2023-05-08 PROBLEM — I67.89 CEREBRAL MICROVASCULAR DISEASE: Status: ACTIVE | Noted: 2021-09-16

## 2023-05-08 PROBLEM — R41.82 ACUTE ALTERATION IN MENTAL STATUS: Status: ACTIVE | Noted: 2021-09-16

## 2023-05-08 PROBLEM — G95.9 CERVICAL MYELOPATHY WITH CERVICAL RADICULOPATHY (HCC): Status: RESOLVED | Noted: 2021-11-18 | Resolved: 2023-05-08

## 2023-05-08 PROBLEM — M48.061 FORAMINAL STENOSIS OF LUMBAR REGION: Status: ACTIVE | Noted: 2017-08-29

## 2023-05-08 PROBLEM — M79.7 FIBROMYALGIA: Status: ACTIVE | Noted: 2017-07-19

## 2023-05-08 PROBLEM — M54.12 CERVICAL MYELOPATHY WITH CERVICAL RADICULOPATHY (HCC): Status: RESOLVED | Noted: 2021-11-18 | Resolved: 2023-05-08

## 2023-05-08 PROBLEM — K44.9 HIATAL HERNIA: Status: ACTIVE | Noted: 2020-08-26

## 2023-05-08 PROBLEM — Q76.49 CONGENITAL CERVICAL SPINE STENOSIS: Status: RESOLVED | Noted: 2021-11-18 | Resolved: 2023-05-08

## 2023-05-08 PROBLEM — K92.1 HEMATOCHEZIA: Status: RESOLVED | Noted: 2023-05-08 | Resolved: 2023-05-08

## 2023-05-08 PROBLEM — M79.7 FIBROMYALGIA: Status: RESOLVED | Noted: 2017-07-19 | Resolved: 2023-05-08

## 2023-05-08 PROBLEM — K92.1 HEMATOCHEZIA: Status: ACTIVE | Noted: 2023-05-08

## 2023-05-09 ENCOUNTER — OFFICE VISIT (OUTPATIENT)
Age: 73
End: 2023-05-09
Payer: MEDICARE

## 2023-05-09 VITALS
SYSTOLIC BLOOD PRESSURE: 118 MMHG | TEMPERATURE: 97.9 F | BODY MASS INDEX: 27.83 KG/M2 | DIASTOLIC BLOOD PRESSURE: 75 MMHG | OXYGEN SATURATION: 99 % | WEIGHT: 163 LBS | RESPIRATION RATE: 16 BRPM | HEIGHT: 64 IN | HEART RATE: 80 BPM

## 2023-05-09 DIAGNOSIS — F33.1 DEPRESSION, MAJOR, RECURRENT, MODERATE (HCC): Primary | ICD-10-CM

## 2023-05-09 PROCEDURE — G8427 DOCREV CUR MEDS BY ELIG CLIN: HCPCS | Performed by: NURSE PRACTITIONER

## 2023-05-09 PROCEDURE — G8419 CALC BMI OUT NRM PARAM NOF/U: HCPCS | Performed by: NURSE PRACTITIONER

## 2023-05-09 PROCEDURE — 1123F ACP DISCUSS/DSCN MKR DOCD: CPT | Performed by: NURSE PRACTITIONER

## 2023-05-09 PROCEDURE — 99214 OFFICE O/P EST MOD 30 MIN: CPT | Performed by: NURSE PRACTITIONER

## 2023-05-09 PROCEDURE — 3017F COLORECTAL CA SCREEN DOC REV: CPT | Performed by: NURSE PRACTITIONER

## 2023-05-09 PROCEDURE — 1090F PRES/ABSN URINE INCON ASSESS: CPT | Performed by: NURSE PRACTITIONER

## 2023-05-09 PROCEDURE — 1036F TOBACCO NON-USER: CPT | Performed by: NURSE PRACTITIONER

## 2023-05-09 PROCEDURE — G8400 PT W/DXA NO RESULTS DOC: HCPCS | Performed by: NURSE PRACTITIONER

## 2023-05-09 RX ORDER — BUPROPION HYDROCHLORIDE 300 MG/1
300 TABLET ORAL EVERY MORNING
Qty: 90 TABLET | Refills: 1 | Status: SHIPPED | OUTPATIENT
Start: 2023-05-09

## 2023-05-09 RX ORDER — DULOXETIN HYDROCHLORIDE 60 MG/1
60 CAPSULE, DELAYED RELEASE ORAL 2 TIMES DAILY
Qty: 180 CAPSULE | Refills: 1 | Status: SHIPPED | OUTPATIENT
Start: 2023-05-09

## 2023-05-09 ASSESSMENT — PATIENT HEALTH QUESTIONNAIRE - PHQ9
9. THOUGHTS THAT YOU WOULD BE BETTER OFF DEAD, OR OF HURTING YOURSELF: 0
5. POOR APPETITE OR OVEREATING: 1
8. MOVING OR SPEAKING SO SLOWLY THAT OTHER PEOPLE COULD HAVE NOTICED. OR THE OPPOSITE, BEING SO FIGETY OR RESTLESS THAT YOU HAVE BEEN MOVING AROUND A LOT MORE THAN USUAL: 1
6. FEELING BAD ABOUT YOURSELF - OR THAT YOU ARE A FAILURE OR HAVE LET YOURSELF OR YOUR FAMILY DOWN: 1
2. FEELING DOWN, DEPRESSED OR HOPELESS: 2
4. FEELING TIRED OR HAVING LITTLE ENERGY: 2
10. IF YOU CHECKED OFF ANY PROBLEMS, HOW DIFFICULT HAVE THESE PROBLEMS MADE IT FOR YOU TO DO YOUR WORK, TAKE CARE OF THINGS AT HOME, OR GET ALONG WITH OTHER PEOPLE: 1
SUM OF ALL RESPONSES TO PHQ QUESTIONS 1-9: 12
1. LITTLE INTEREST OR PLEASURE IN DOING THINGS: 2
SUM OF ALL RESPONSES TO PHQ QUESTIONS 1-9: 12
SUM OF ALL RESPONSES TO PHQ QUESTIONS 1-9: 12
7. TROUBLE CONCENTRATING ON THINGS, SUCH AS READING THE NEWSPAPER OR WATCHING TELEVISION: 2
3. TROUBLE FALLING OR STAYING ASLEEP: 1
SUM OF ALL RESPONSES TO PHQ9 QUESTIONS 1 & 2: 4
SUM OF ALL RESPONSES TO PHQ QUESTIONS 1-9: 12

## 2023-05-09 NOTE — PROGRESS NOTES
Chief Complaint   Patient presents with    Medication Check     Vitals:    05/09/23 0945   BP: 118/75   Pulse: 80   Resp: 16   Temp: 97.9 °F (36.6 °C)   SpO2: 99%     Prior to Admission medications    Medication Sig Start Date End Date Taking? Authorizing Provider   acetaminophen (TYLENOL) 500 MG tablet Take by mouth every 6 hours 5/8/21  Yes Ar Automatic Reconciliation   aspirin 81 MG EC tablet Take by mouth daily   Yes Ar Automatic Reconciliation   buPROPion (WELLBUTRIN XL) 300 MG extended release tablet Take by mouth daily 9/16/22  Yes Ar Automatic Reconciliation   dicyclomine (BENTYL) 10 MG capsule Take by mouth 2 times daily as needed 2/7/23  Yes Ar Automatic Reconciliation   DULoxetine (CYMBALTA) 60 MG extended release capsule TAKE 1 CAPSULE TWICE A DAY 8/17/22  Yes Ar Automatic Reconciliation   oxybutynin (DITROPAN) 5 MG tablet Take by mouth 12/8/22  Yes Ar Automatic Reconciliation   pantoprazole (PROTONIX) 40 MG tablet TAKE 1 TABLET DAILY 7/12/22  Yes Ar Automatic Reconciliation   polyethylene glycol (GAVILYTE-G) 236 g solution TAKE BY MOUTH AS DIRECTED FOR COLONOSCOPY  Scheduled 6/2023 1/10/23  Yes Ar Automatic Reconciliation   Plecanatide (TRULANCE) 3 MG TABS TAKE 1 TABLET BY MOUTH EVERY DAY FOR CONSTIPATION 11/9/22  Yes Ar Automatic Reconciliation     PHQ-9 Total Score: 12 (5/9/2023  9:48 AM)  Thoughts that you would be better off dead, or of hurting yourself in some way: 0 (5/9/2023  9:48 AM)    1. Have you been to the ER, urgent care clinic since your last visit? Hospitalized since your last visit? No    2. Have you seen or consulted any other health care providers outside of the [address] [place] [address] since your last visit? Include any pap smears or colon screening.  No
Maternal Aunt     Cancer Other         breast    Cancer Other         COLON    Alzheimer's Disease Mother         late 62s early 76s    Cancer Father         lung and bone    Stroke Sister     Ovarian Cancer Daughter 28    Hypertension Mother     High Cholesterol Mother           Social History     Socioeconomic History    Marital status:      Spouse name: Not on file    Number of children: Not on file    Years of education: Not on file    Highest education level: Not on file   Occupational History    Not on file   Tobacco Use    Smoking status: Former     Packs/day: 1.00     Types: Cigarettes     Quit date: 2007     Years since quittin.3    Smokeless tobacco: Never   Substance and Sexual Activity    Alcohol use: Not Currently    Drug use: No    Sexual activity: Not on file     Comment:    Other Topics Concern    Not on file   Social History Narrative    Not on file     Social Determinants of Health     Financial Resource Strain: Low Risk     Difficulty of Paying Living Expenses: Not hard at all   Food Insecurity: No Food Insecurity    Worried About Running Out of Food in the Last Year: Never true    Ran Out of Food in the Last Year: Never true   Transportation Needs: Not on file   Physical Activity: Not on file   Stress: Not on file   Social Connections: Not on file   Intimate Partner Violence: Not on file   Housing Stability: Not on file          REVIEW OF SYSTEMS:  Psychiatric symptoms being monitored for:  depression, anxiety  Constitutional: No report of fever, or weight gain. Eyes: No report of acute vision changes. ENT: No report of hearing changes or difficulty swallowing. Cardiac: No report of chest pain, edema or orthopnea. Respiratory: Denies dyspnea, cough or wheeze. GI: No report of abdominal pain. : No report of dysuria or hematuria. Musculoskeletal: No report of joint pain or swelling. Skin: No report of rash, lesion, or abrasions.    Neurologic: No report of

## 2023-05-11 ENCOUNTER — OFFICE VISIT (OUTPATIENT)
Age: 73
End: 2023-05-11
Payer: MEDICARE

## 2023-05-11 VITALS — BODY MASS INDEX: 28.15 KG/M2 | DIASTOLIC BLOOD PRESSURE: 104 MMHG | WEIGHT: 164 LBS | SYSTOLIC BLOOD PRESSURE: 144 MMHG

## 2023-05-11 DIAGNOSIS — G30.1 ALZHEIMER'S DISEASE WITH LATE ONSET (CODE) (HCC): Primary | ICD-10-CM

## 2023-05-11 DIAGNOSIS — G30.1 ALZHEIMER'S DISEASE WITH LATE ONSET (CODE) (HCC): ICD-10-CM

## 2023-05-11 PROBLEM — F33.9 MAJOR DEPRESSIVE DISORDER, RECURRENT, UNSPECIFIED (HCC): Status: ACTIVE | Noted: 2023-05-11

## 2023-05-11 PROBLEM — F33.0 MAJOR DEPRESSIVE DISORDER, RECURRENT, MILD (HCC): Status: ACTIVE | Noted: 2023-05-11

## 2023-05-11 PROBLEM — E11.9 TYPE 2 DIABETES MELLITUS (HCC): Status: ACTIVE | Noted: 2023-05-11

## 2023-05-11 PROBLEM — F33.1 MAJOR DEPRESSIVE DISORDER, RECURRENT, MODERATE (HCC): Status: ACTIVE | Noted: 2023-05-11

## 2023-05-11 PROCEDURE — G8399 PT W/DXA RESULTS DOCUMENT: HCPCS | Performed by: NURSE PRACTITIONER

## 2023-05-11 PROCEDURE — G8419 CALC BMI OUT NRM PARAM NOF/U: HCPCS | Performed by: NURSE PRACTITIONER

## 2023-05-11 PROCEDURE — 1123F ACP DISCUSS/DSCN MKR DOCD: CPT | Performed by: NURSE PRACTITIONER

## 2023-05-11 PROCEDURE — 99215 OFFICE O/P EST HI 40 MIN: CPT | Performed by: NURSE PRACTITIONER

## 2023-05-11 PROCEDURE — 1036F TOBACCO NON-USER: CPT | Performed by: NURSE PRACTITIONER

## 2023-05-11 PROCEDURE — 1090F PRES/ABSN URINE INCON ASSESS: CPT | Performed by: NURSE PRACTITIONER

## 2023-05-11 PROCEDURE — G8427 DOCREV CUR MEDS BY ELIG CLIN: HCPCS | Performed by: NURSE PRACTITIONER

## 2023-05-11 PROCEDURE — 3017F COLORECTAL CA SCREEN DOC REV: CPT | Performed by: NURSE PRACTITIONER

## 2023-05-11 RX ORDER — DONEPEZIL HYDROCHLORIDE 10 MG/1
10 TABLET, FILM COATED ORAL NIGHTLY
Qty: 90 TABLET | Refills: 0 | Status: SHIPPED | OUTPATIENT
Start: 2023-05-11

## 2023-05-11 RX ORDER — DONEPEZIL HYDROCHLORIDE 5 MG/1
TABLET, FILM COATED ORAL
Qty: 90 TABLET | OUTPATIENT
Start: 2023-05-11

## 2023-05-11 RX ORDER — DONEPEZIL HYDROCHLORIDE 5 MG/1
5 TABLET, FILM COATED ORAL NIGHTLY
Qty: 30 TABLET | Refills: 0 | Status: SHIPPED | OUTPATIENT
Start: 2023-05-11

## 2023-05-11 NOTE — PROGRESS NOTES
Patient having good and bad days, is having more good days  Currently living with  and son  Is fairly independent  Has some trouble staying asleep, will nap through out the day  Eating and drinking looks OK  Mood has been doing ok, does have some anxiety and depression but manageable at the moment

## 2023-05-11 NOTE — PROGRESS NOTES
Tereso Kendrick is a 74 y.o. female who presents with the following  Chief Complaint   Patient presents with    Follow-up       HPI    FU with daughter, . For memory testing.    They did meet with Dr. Fiona Smith and discussed  We discussed this here again in full    Here to talk about treatment and progression and education  We do feel like starting medication    She does live with her  and son  They do notice that she has more trouble with short-term  Retaining and expressing  They know that she has no concerns with functional deficits  She is able to do everything by herself  She is eating well  She is eating on  She is working with primary care to get off other kinds of medications  She is never been on anything for her memory  No falls  Her anxiety and depression has gotten a lot better per their report  The only thing is she is trying to be more physically and mentally active so they are having trouble with figuring this out      Allergies   Allergen Reactions    Latex Hives    Codeine Other (See Comments)     Severe Headache    Morphine Hives    Hydromorphone Other (See Comments)     Memory loss    Shellfish Allergy Hives    Zolpidem Other (See Comments)     Severe behavior changes       Current Outpatient Medications   Medication Sig Dispense Refill    donepezil (ARICEPT) 10 MG tablet Take 1 tablet by mouth nightly 90 tablet 0    donepezil (ARICEPT) 5 MG tablet Take 1 tablet by mouth nightly 30 tablet 0    buPROPion (WELLBUTRIN XL) 300 MG extended release tablet Take 1 tablet by mouth every morning 90 tablet 1    DULoxetine (CYMBALTA) 60 MG extended release capsule Take 1 capsule by mouth 2 times daily 180 capsule 1    aspirin 81 MG EC tablet Take by mouth daily      dicyclomine (BENTYL) 10 MG capsule Take by mouth 2 times daily as needed      oxybutynin (DITROPAN) 5 MG tablet Take by mouth      pantoprazole (PROTONIX) 40 MG tablet TAKE 1 TABLET DAILY      Plecanatide (TRULANCE) 3 MG TABS

## 2023-05-18 ENCOUNTER — TELEPHONE (OUTPATIENT)
Age: 73
End: 2023-05-18

## 2023-05-18 NOTE — PERIOP NOTES
Dr. Seble Conway aware PCP office contacted for follow up related to stress testing prior to surgery. Patient has instructions to follow up with PCP/testing clearance as well. SSKI Counseling:  I discussed with the patient the risks of SSKI including but not limited to thyroid abnormalities, metallic taste, GI upset, fever, headache, acne, arthralgias, paraesthesias, lymphadenopathy, easy bleeding, arrhythmias, and allergic reaction.

## 2023-05-18 NOTE — TELEPHONE ENCOUNTER
Patient  requesting a call to discuss a referral for his wife that was suppose to go to [address] [place] [address] but they haven't received it yet. Please call to discuss.

## 2023-06-21 ENCOUNTER — ANESTHESIA EVENT (OUTPATIENT)
Facility: HOSPITAL | Age: 73
End: 2023-06-21
Payer: MEDICARE

## 2023-06-21 ENCOUNTER — HOSPITAL ENCOUNTER (OUTPATIENT)
Facility: HOSPITAL | Age: 73
Setting detail: OUTPATIENT SURGERY
Discharge: HOME | End: 2023-06-21
Attending: SPECIALIST | Admitting: SPECIALIST
Payer: MEDICARE

## 2023-06-21 ENCOUNTER — ANESTHESIA (OUTPATIENT)
Facility: HOSPITAL | Age: 73
End: 2023-06-21
Payer: MEDICARE

## 2023-06-21 VITALS
SYSTOLIC BLOOD PRESSURE: 162 MMHG | DIASTOLIC BLOOD PRESSURE: 89 MMHG | HEIGHT: 64 IN | HEART RATE: 71 BPM | OXYGEN SATURATION: 100 % | TEMPERATURE: 98.6 F | BODY MASS INDEX: 27.39 KG/M2 | WEIGHT: 160.4 LBS | RESPIRATION RATE: 16 BRPM

## 2023-06-21 PROCEDURE — 6360000002 HC RX W HCPCS: Performed by: NURSE ANESTHETIST, CERTIFIED REGISTERED

## 2023-06-21 PROCEDURE — 3600007502: Performed by: SPECIALIST

## 2023-06-21 PROCEDURE — 3700000000 HC ANESTHESIA ATTENDED CARE: Performed by: SPECIALIST

## 2023-06-21 PROCEDURE — 2709999900 HC NON-CHARGEABLE SUPPLY: Performed by: SPECIALIST

## 2023-06-21 PROCEDURE — 2580000003 HC RX 258: Performed by: SPECIALIST

## 2023-06-21 PROCEDURE — 3600007512: Performed by: SPECIALIST

## 2023-06-21 PROCEDURE — 7100000010 HC PHASE II RECOVERY - FIRST 15 MIN: Performed by: SPECIALIST

## 2023-06-21 PROCEDURE — 2500000003 HC RX 250 WO HCPCS: Performed by: NURSE ANESTHETIST, CERTIFIED REGISTERED

## 2023-06-21 PROCEDURE — 7100000011 HC PHASE II RECOVERY - ADDTL 15 MIN: Performed by: SPECIALIST

## 2023-06-21 PROCEDURE — 3700000001 HC ADD 15 MINUTES (ANESTHESIA): Performed by: SPECIALIST

## 2023-06-21 RX ORDER — SODIUM CHLORIDE 9 MG/ML
25 INJECTION, SOLUTION INTRAVENOUS PRN
Status: DISCONTINUED | OUTPATIENT
Start: 2023-06-21 | End: 2023-06-21 | Stop reason: HOSPADM

## 2023-06-21 RX ORDER — SODIUM CHLORIDE 0.9 % (FLUSH) 0.9 %
5-40 SYRINGE (ML) INJECTION PRN
Status: DISCONTINUED | OUTPATIENT
Start: 2023-06-21 | End: 2023-06-21 | Stop reason: HOSPADM

## 2023-06-21 RX ORDER — LIDOCAINE HYDROCHLORIDE 20 MG/ML
INJECTION, SOLUTION EPIDURAL; INFILTRATION; INTRACAUDAL; PERINEURAL PRN
Status: DISCONTINUED | OUTPATIENT
Start: 2023-06-21 | End: 2023-06-21 | Stop reason: SDUPTHER

## 2023-06-21 RX ORDER — SODIUM CHLORIDE 0.9 % (FLUSH) 0.9 %
5-40 SYRINGE (ML) INJECTION EVERY 12 HOURS SCHEDULED
Status: DISCONTINUED | OUTPATIENT
Start: 2023-06-21 | End: 2023-06-21 | Stop reason: HOSPADM

## 2023-06-21 RX ADMIN — SODIUM CHLORIDE 25 ML: 9 INJECTION, SOLUTION INTRAVENOUS at 07:07

## 2023-06-21 RX ADMIN — PROPOFOL 30 MG: 10 INJECTION, EMULSION INTRAVENOUS at 07:36

## 2023-06-21 RX ADMIN — PROPOFOL 50 MG: 10 INJECTION, EMULSION INTRAVENOUS at 07:40

## 2023-06-21 RX ADMIN — LIDOCAINE HYDROCHLORIDE 100 MG: 20 INJECTION, SOLUTION EPIDURAL; INFILTRATION; INTRACAUDAL; PERINEURAL at 07:33

## 2023-06-21 RX ADMIN — PROPOFOL 70 MG: 10 INJECTION, EMULSION INTRAVENOUS at 07:33

## 2023-06-21 RX ADMIN — SODIUM CHLORIDE: 9 INJECTION, SOLUTION INTRAVENOUS at 07:30

## 2023-06-21 ASSESSMENT — PAIN - FUNCTIONAL ASSESSMENT: PAIN_FUNCTIONAL_ASSESSMENT: NONE - DENIES PAIN

## 2023-06-21 NOTE — DISCHARGE INSTRUCTIONS
[facility]  [id number]  1950    COLON DISCHARGE INSTRUCTIONS  Discomfort:  Redness at IV site- apply warm compress to area; if redness or soreness persist- contact your physician  There may be a slight amount of blood passed from the rectum  Gaseous discomfort- walking, belching will help relieve any discomfort  You may not operate a vehicle for 12 hours  You may not engage in an occupation involving machinery or appliances for rest of today  You may not drink alcoholic beverages for at least 12 hours  Avoid making any critical decisions for at least 24 hour  DIET:   Regular diet. - however -  remember your colon is empty and a heavy meal will produce gas. Avoid these foods:  vegetables, fried / greasy foods, carbonated drinks for today. MEDICATIONS:        Regarding Aspirin or Nonsteroidal medications, please see below. ACTIVITY:  You may resume your normal daily activities it is recommended that you spend the remainder of the day resting -  avoid any strenuous activity. CALL M.D. ANY SIGN OF:  Increasing pain, nausea, vomiting  Abdominal distension (swelling)  New increased bleeding (oral or rectal)  Fever (chills)  Pain in chest area  Bloody discharge from nose or mouth  Shortness of breath  Tylenol as needed for pain.       Follow-up Instructions:   Call Dr. Li Henry for questions about procedure at telephone #  [phone]              Repeat Colonoscopy in 3 years              Continue Trulance daily for constipation/IBS                  Patient Education on Sedation / Analgesia Administered for Procedure      For 24 hours after general anesthesia or intravenous analgesia / sedation:  Have someone responsible help you with your care  Limit your activities  Do not drive and operate hazardous machinery  Do not make important personal, legal or business decisions  Do not drink alcoholic beverages  If you have not urinated within 8 hours after discharge, please contact your

## 2023-06-21 NOTE — PROGRESS NOTES
Endo Head to Toe Assessment:    NEURO: Awake, Alert, Oriented x4, Clear Speech, Appropriate Developmental for Age, Appropriate Judgement, Safety awareness, Attention and Concentration    CARDIAC:  EKG Leads were placed with new electrodes. Alarms are set and audible. EKG Rhythm:normal sinus  Rhythm.     GI:  Soft and Non Distended    RESPIRATORY: Even and Unlabored with Normal Depth

## 2023-06-21 NOTE — H&P
[facility], APRN - NP   acetaminophen (TYLENOL) 500 MG tablet Take 2 tablets by mouth in the morning and 2 tablets at noon and 2 tablets in the evening and 2 tablets before bedtime. 5/8/21   Ar Automatic Reconciliation   dicyclomine (BENTYL) 10 MG capsule Take 1 capsule by mouth 2 times daily as needed 2/7/23   Ar Automatic Reconciliation   oxybutynin (DITROPAN) 5 MG tablet Take by mouth 12/8/22   Ar Automatic Reconciliation   pantoprazole (PROTONIX) 40 MG tablet TAKE 1 TABLET DAILY 7/12/22   Ar Automatic Reconciliation   Plecanatide (TRULANCE) 3 MG TABS Take 1 tablet by mouth daily 11/9/22   Ar Automatic Reconciliation       Physical Exam:   General: NAD   HEENT: Head: Normocephalic, no lesions, without obvious abnormality.    Heart: regular rate and rhythm, S1, S2 normal, no murmur, click, rub or gallop   Lungs: chest clear, no wheezing, rales, normal symmetric air entry   Abdominal: soft, NT/ND+ BS   Neurological: Grossly normal   Extremities: extremities normal, atraumatic, no cyanosis or edema     Findings/Diagnosis: H/O Colon Polyps    Plan of Care/Planned Procedure: Colonoscopy

## 2023-06-21 NOTE — ANESTHESIA POSTPROCEDURE EVALUATION
Department of Anesthesiology  Postprocedure Note    Patient: Lucas Ho  MRN: [id number]  YOB: 1950  Date of evaluation: 6/21/2023      Procedure Summary     Date: 06/21/23 Room / Location: [facility] ENDO 04 / [facility] ENDOSCOPY    Anesthesia Start: 0730 Anesthesia Stop: 0216    Procedure: COLONOSCOPY (Lower GI Region) Diagnosis:       Personal history of colonic polyps      Irritable bowel syndrome with predominant constipation      (Personal history of colonic polyps [Z86.010])      (Irritable bowel syndrome with predominant constipation [K58.1])    Surgeons: Jose Enrique Rios MD Responsible Provider: Olive Segundo MD    Anesthesia Type: MAC ASA Status: 2          Anesthesia Type: MAC    Connor Phase I: Connor Score: 10    Connor Phase II: Connor Score: 10      Anesthesia Post Evaluation    Patient location during evaluation: PACU  Patient participation: complete - patient participated  Level of consciousness: awake and alert  Airway patency: patent  Nausea & Vomiting: no nausea  Complications: no  Cardiovascular status: hemodynamically stable  Respiratory status: acceptable  Hydration status: euvolemic

## 2023-06-21 NOTE — ANESTHESIA PRE PROCEDURE
Department of Anesthesiology  Preprocedure Note       Name:  Bartolo Fry   Age:  74 y.o.  :  1950                                          MRN:  [id number]         Date:  2023      Surgeon: Jim Scott):  Benjy Espinosa MD    Procedure: Procedure(s):  COLONOSCOPY    Medications prior to admission:   Prior to Admission medications    Medication Sig Start Date End Date Taking? Authorizing Provider   celecoxib (CELEBREX) 200 MG capsule Take 1 capsule by mouth daily 23  Historical Provider, MD   Multiple Vitamin (MULTIVITAMIN ADULT PO) Take 1 tablet by mouth daily    Historical Provider, MD   lisinopril (PRINIVIL;ZESTRIL) 20 MG tablet Take 1 tablet by mouth daily 23   Morales Garcia MD   donepezil (ARICEPT) 10 MG tablet Take 1 tablet by mouth nightly 23   HERMELINDO Bates NP   buPROPion (WELLBUTRIN XL) 300 MG extended release tablet Take 1 tablet by mouth every morning 23   HERMELINDO Goyal NP   DULoxetine (CYMBALTA) 60 MG extended release capsule Take 1 capsule by mouth 2 times daily  Patient taking differently: Take 1 capsule by mouth daily 23   HERMELINDO Goyal NP   acetaminophen (TYLENOL) 500 MG tablet Take 2 tablets by mouth in the morning and 2 tablets at noon and 2 tablets in the evening and 2 tablets before bedtime.  21   Ar Automatic Reconciliation   dicyclomine (BENTYL) 10 MG capsule Take 1 capsule by mouth 2 times daily as needed 23   Ar Automatic Reconciliation   oxybutynin (DITROPAN) 5 MG tablet Take by mouth 22   Ar Automatic Reconciliation   pantoprazole (PROTONIX) 40 MG tablet TAKE 1 TABLET DAILY 22   Ar Automatic Reconciliation   Plecanatide (TRULANCE) 3 MG TABS Take 1 tablet by mouth daily 22   Ar Automatic Reconciliation       Current medications:    Current Facility-Administered Medications   Medication Dose Route Frequency Provider Last Rate Last Admin    sodium chloride flush 0.9 %

## 2023-06-21 NOTE — PROCEDURES
Colonoscopy Procedure Note    Indications:   Personal history of colon polyps (screening only)    Referring Physician: Jacob Lopes MD  Anesthesia/Sedation: MAC anesthesia Propofol  Endoscopist:  Dr. Salma Hernandez    Procedure in Detail:  Informed consent was obtained for the procedure, including sedation. Risks of perforation, hemorrhage, adverse drug reaction, and aspiration were discussed. The patient was placed in the left lateral decubitus position. Based on the pre-procedure assessment, including review of the patient's medical history, medications, allergies, and review of systems, she had been deemed to be an appropriate candidate for moderate sedation; she was therefore sedated with the medications listed above. The patient was monitored continuously with ECG tracing, pulse oximetry, blood pressure monitoring, and direct observations. A rectal examination was performed. The [id number] was inserted into the rectum and advanced under direct vision to the cecum, which was identified by the ileocecal valve and appendiceal orifice. The quality of the colonic preparation was fair. A careful inspection was made as the colonoscope was withdrawn, including a retroflexed view of the rectum; findings and interventions are described below. Appropriate photodocumentation was obtained. Findings:    Scope advanced to the cecum. Preparation was only fair given some residual retained stool scattered mostly in the rectum and sigmoid and more liquid in the proximal colon and cecum. We suctioned and lavaged the mucosa to attempt adequate visualization but smaller polyps could have been missed. Small internal hemorrhoids. No polyps seen. Therapies:  none    Specimen:  none     Complications: None were encountered during the procedure. EBL: < 10 ml.     Recommendations:   -Repeat Colonoscopy in 3 years with a 2 day prep    Signed By: Salma Hernandez MD                        June 21, 2023

## 2023-06-21 NOTE — PROGRESS NOTES
Endoscopy Case End Note:     Procedure scope was pre-cleaned, per protocol, at bedside by LISA Starr. Report received from anesthesia. See anesthesia flowsheet for intra-procedure vital signs and events. Glasses returned to patient. Belongings remain under stretcher with patient.

## 2023-06-30 NOTE — LETTER
Jennifer Gaona MUQ:[birth year] MR #:[id number] Provider Name:Park Ganesh Dempsey MD  
*DNPF-553* BSMG-491 (5/16) Page 1 of 5 Initial [employer] CONTROLLED SUBSTANCE AGREEMENT I may be prescribed medications that are controlled substances as part  of my treatment plan for management of my medical condition(s). The goal of my treatment plan is to maintain and/or improve my health and wellbeing. Because controlled substances have an increased risk of abuse or harm, continual re-evaluation is needed determine if the goals of my treatment plan are being met for my safety and the safety of others. Rajni Joan  am entering into this Controlled Substance Agreement with my provider, Dandre Alarcon MD at [facility] . I understand that successful treatment requires mutual trust and honesty between me and my provider. I understand that there are state and federal laws and regulations which apply to the medications that my provider may prescribe that must be followed. I understand there are risks and benefits ts of taking the medicines that my provider may prescribe. I understand and agree that following this Agreement is necessary in continuing my provider-patient relationship and success of my treatment plan. As a part of my treatment plan, I agree to the following: COMMUNICATION: 
 
1. I will communicate fully with my provider about my medical condition(s), including the effect on my daily life and how well my medications are helping. I will tell my provider all of the medications that I take for any reason, including medications I receive from another health care provider, and will notify my provider about all issues, problems or concerns, including any side effects, which may be related to my medications. I understand that this information allows my provider to adjust my treatment plan to help manage my medical condition.  I understand that this Received a call from Dr. Delroy Young inquiring scheduling pt for an echo or poss stress test. Pt follows a [facility] cardiologist and [address] [place] [address] does have this [address] [place] [address] whereiin they can schedule pt echo and if needed stress test they will be able to schedule it right away. Will endorse to incoming [facility] to call the [facility] for the pt. information will become part of my permanent medical record. 2. I will notify my provider if I have a history of alcohol/drug misuse/addiction or if I have had treatment for alcohol/drug addiction in the past, or if I have a new problem with or concern about alcohol/drug use/addiction, because this increases the likelihood of high risk behaviors and may lead to serious medical conditions. 3. Females Only: I will notify my provider if I am or become pregnant, or if I intend to become pregnant, or if I intend to breastfeed. I understand that communication of these issues with my provider is important, due to possible effects my medication could have on an unborn fetus or breastfeeding child. Soraida Roth RSI:15/8/[birth year] MR #:[id number] Provider Name:Park Mcgarry MD  
*PFJV-430* BSMG-491 (5/16) Page 2 of 5 Initial [employer] MISUSE OF MEDICATIONS / DRUGS: 
 
1. I agree to take all controlled substances as prescribed, and will not misuse or abuse any controlled substances prescribed by my provider. For my safety, I will not increase the amount of medicine I take without first talking with and getting permission from my provider. 2. If I have a medical emergency, another health care provider may prescribe me medication. If I seek emergency treatment, I will notify my provider within seventy-two (72) hours. 3. I understand that my provider may discuss my use and/or possible misuse/abuse of controlled substances and alcohol, as appropriate, with any health care provider involved in my care, pharmacist or legal authority. ILLEGAL DRUGS: 
 
1. I will not use illegal drugs of any kind, including but not limited to marijuana, heroin, cocaine, or any prescription drug which is not prescribed to me. DRUG DIVERSION / PRESCRIPTION FRAUD: 
 
1. I will not share, sell, trade, give away, or otherwise misuse my prescriptions or medications. 2. I will not alter any prescriptions provided to me by my provider. SINGLE PROVIDER: 
 
1. I agree that all controlled substances that I take will be prescribed only by my provider (or his/her covering provider) under this Agreement. This agreement does not prevent me from seeking emergency medical treatment or receiving pain management related to a surgery. PROTECTING MEDICATIONS: 
 
1. I am responsible for keeping my prescriptions and medications in a safe and secure place including safeguarding them from loss or theft. I understand that lost, stolen or damaged/destroyed prescriptions or medications will not be replaced. Katherine Nair CALIN:[birth year] MR #:[id number] Provider Name:Park Pablo MD  
*WCUG-631* BSMG-491 (5/16) Page 3 of 5 Initial [employer] PRESCRIPTION RENEWALS/REFILLS: 
 
1. I will follow my controlled substance medication schedule as prescribed by my provider. 2. I understand and agree that I will make any requests for renewals or refills of my prescriptions only at the time of an office visit or during my providers regular office hours subject to the prescription refill requirements of the individual practice. 3. I understand that my provider may not call in prescriptions for controlled substances to my pharmacy. 4. I understand that my provider may adjust or discontinue these medications as deemed appropriate for my medical treatment plan. This Agreement does not guarantee the prescription of controlled medications. 5. I agree that if my medications are adjusted or discontinued, I will properly dispose of any remaining medications. I understand that I will be required to dispose of any remaining controlled medications prior to being provided with any prescriptions for other controlled medications.  
 
 
1. I authorize my provider and my pharmacy to cooperate fully with any local, state, or federal law enforcement agency in the investigation of any possible misuse, sale, or other diversion of my controlled substance prescriptions or medications. RISKS: 
 
 
1. I understand that if I do not adhere to this Agreement in any way, my provider may change my prescriptions, stop prescribing controlled substances or end our provider-patient relationship. 2. If my provider decides to stop prescribing medication, or decides to end our provider-patient relationship,my provider may require that I taper my medications slowly. If necessary, my provider may also provide a prescription for other medications to treat my withdrawal symptoms. UNDERSTANDING THIS AGREEMENT: 
 
I understand that my provider may adjust or stop my prescriptions for controlled substances based on my medical condition and my treatment plan. I understand that this Agreement does not guarantee that I will be prescribed medications or controlled substances. I understand that controlled substances may be just one part 
of my treatment plan. My initial on each page and my signature below shows that I have read each page of this Agreement, I have had an opportunity to ask questions, and all of my questions have been answered to my satisfaction by my provider. By signing below, I agree to comply with this Agreement, and I understand that if I do not follow the Agreements listed above, my provider may stop 
 
 
 
_________________________________________  Date/Time 7/25/2018 11:47 AM   
             (Patient Signature)

## 2023-07-05 ENCOUNTER — TELEMEDICINE (OUTPATIENT)
Age: 73
End: 2023-07-05
Payer: MEDICARE

## 2023-07-05 DIAGNOSIS — S20.219D CONTUSION OF CHEST WALL, UNSPECIFIED LATERALITY, SUBSEQUENT ENCOUNTER: ICD-10-CM
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
## 2023-07-05 DIAGNOSIS — S22.49XD CLOSED FRACTURE OF MULTIPLE RIBS WITH ROUTINE HEALING, UNSPECIFIED LATERALITY, SUBSEQUENT ENCOUNTER: Primary | ICD-10-CM

## 2023-07-05 PROCEDURE — 3017F COLORECTAL CA SCREEN DOC REV: CPT | Performed by: FAMILY MEDICINE

## 2023-07-05 PROCEDURE — G8399 PT W/DXA RESULTS DOCUMENT: HCPCS | Performed by: FAMILY MEDICINE

## 2023-07-05 PROCEDURE — 1123F ACP DISCUSS/DSCN MKR DOCD: CPT | Performed by: FAMILY MEDICINE

## 2023-07-05 PROCEDURE — 99213 OFFICE O/P EST LOW 20 MIN: CPT | Performed by: FAMILY MEDICINE

## 2023-07-05 PROCEDURE — 1090F PRES/ABSN URINE INCON ASSESS: CPT | Performed by: FAMILY MEDICINE

## 2023-07-05 PROCEDURE — G8427 DOCREV CUR MEDS BY ELIG CLIN: HCPCS | Performed by: FAMILY MEDICINE

## 2023-07-05 RX ORDER — TRAMADOL HYDROCHLORIDE 50 MG/1
TABLET ORAL
COMMUNITY
Start: 2023-07-02

## 2023-07-05 RX ORDER — LIDOCAINE 50 MG/G
2 PATCH TOPICAL DAILY
Qty: 20 PATCH | Refills: 0 | Status: SHIPPED | OUTPATIENT
Start: 2023-07-05 | End: 2023-07-15

## 2023-07-05 RX ORDER — LIDOCAINE 50 MG/G
PATCH TOPICAL
COMMUNITY
Start: 2023-07-02

## 2023-07-05 RX ORDER — TRAMADOL HYDROCHLORIDE 50 MG/1
50 TABLET ORAL EVERY 6 HOURS PRN
Qty: 28 TABLET | Refills: 0 | Status: SHIPPED | OUTPATIENT
Start: 2023-07-05 | End: 2023-07-12

## 2023-07-05 NOTE — PROGRESS NOTES
Chief Complaint   Patient presents with    Follow-up     ER follow-up  7/1/2023 [facility] \"Fall\"     1. Have you been to the ER, urgent care clinic since your last visit? Hospitalized since your last visit? Yes Reason for visit: 7/1/2023 [facility] \"Fall\"    2. Have you seen or consulted any other health care providers outside of the [address] [place] [address] since your last visit? Include any pap smears or colon screening.  No
Hypertension     Hypothyroidism due to acquired atrophy of thyroid 11/18/2021    IBS (irritable bowel syndrome)     CONSTIPATION, CHRONIC SINCE HER 20s    Iron deficiency anemia, unspecified 5/8/2023    Lumbosacral spondylosis without myelopathy 7/19/2017    Migraine     REDUCED IN FREQUENCY AND SEVERITY SINCE MENOPAUSE    Non morbid obesity 12/20/2017    Rectocele     Spinal stenosis of lumbar region with neurogenic claudication 8/10/2017    Vitamin D deficiency 11/18/2021     Past Surgical History:   Procedure Laterality Date    CERVICAL FUSION  2011    CHOLECYSTECTOMY  2006    COLONOSCOPY  11/2010    Dr. Ruchi Dillon-     COLONOSCOPY N/A 6/21/2016    COLONOSCOPY performed by Shanna Cooper MD at [facility] ENDOSCOPY    COLONOSCOPY N/A 6/27/2018    COLONOSCOPY performed by Shanna Cooper MD at [facility] ENDOSCOPY    COLONOSCOPY N/A 6/21/2023    COLONOSCOPY performed by Shanna Cooper MD at [facility] ENDOSCOPY    GI  X3  LAST ONE 12/10    COLONOSCOPY    GYN  1982    D&C WITH SUCTION    HYSTERECTOMY (CERVIX STATUS UNKNOWN)      IR FLUOROSCOPY GUIDED SPINAL INJECTION  2/5/2021    IR FLUOROSCOPY GUIDED SPINAL INJECTION  1/8/2021    IR FLUOROSCOPY GUIDED SPINAL INJECTION  8/28/2020    LUMBAR FUSION  2017    ORTHOPEDIC SURGERY      right foot surgery    TONSILLECTOMY      TOTAL COLECTOMY  2007    Dr. Sarah/ diverticulitis    UPPER GASTROINTESTINAL ENDOSCOPY          Objective:     No flowsheet data found. Physical exam:  General appearance - alert, well appearing, and in no distress  Eyes -sclera anicteric, no discharge  HEENT- normocephalic, atraumatic, moist mucous membranes, no visualized neck mass  Chest -normal respiratory effort, no visualized signs of respiratory distress  Neurological - alert, awake, normal speech, no focal findings or movement disorder noted  Psych - normal mood and affect  Skin- no apparent lesions      We discussed the expected course, resolution and complications of the diagnosis(es) in detail.

## 2023-08-07 ENCOUNTER — OFFICE VISIT (OUTPATIENT)
Age: 73
End: 2023-08-07
Payer: MEDICARE

## 2023-08-07 VITALS
DIASTOLIC BLOOD PRESSURE: 78 MMHG | HEART RATE: 77 BPM | OXYGEN SATURATION: 97 % | BODY MASS INDEX: 27.46 KG/M2 | SYSTOLIC BLOOD PRESSURE: 120 MMHG | WEIGHT: 160 LBS

## 2023-08-07 DIAGNOSIS — G30.1 ALZHEIMER'S DISEASE WITH LATE ONSET (CODE) (HCC): Primary | ICD-10-CM

## 2023-08-07 PROCEDURE — 1036F TOBACCO NON-USER: CPT | Performed by: NURSE PRACTITIONER

## 2023-08-07 PROCEDURE — 99215 OFFICE O/P EST HI 40 MIN: CPT | Performed by: NURSE PRACTITIONER

## 2023-08-07 PROCEDURE — 3017F COLORECTAL CA SCREEN DOC REV: CPT | Performed by: NURSE PRACTITIONER

## 2023-08-07 PROCEDURE — G8427 DOCREV CUR MEDS BY ELIG CLIN: HCPCS | Performed by: NURSE PRACTITIONER

## 2023-08-07 PROCEDURE — G8419 CALC BMI OUT NRM PARAM NOF/U: HCPCS | Performed by: NURSE PRACTITIONER

## 2023-08-07 PROCEDURE — G8399 PT W/DXA RESULTS DOCUMENT: HCPCS | Performed by: NURSE PRACTITIONER

## 2023-08-07 PROCEDURE — 1090F PRES/ABSN URINE INCON ASSESS: CPT | Performed by: NURSE PRACTITIONER

## 2023-08-07 PROCEDURE — 1123F ACP DISCUSS/DSCN MKR DOCD: CPT | Performed by: NURSE PRACTITIONER

## 2023-08-07 RX ORDER — MEMANTINE HYDROCHLORIDE 10 MG/1
10 TABLET ORAL 2 TIMES DAILY
Qty: 180 TABLET | Refills: 1 | Status: SHIPPED | OUTPATIENT
Start: 2023-08-07

## 2023-08-08 NOTE — PROGRESS NOTES
Per patient, since med start having bad dreams, said they are odd  Denies REM symptoms   Is a handling meds well  Noticed some clarity   PT/OT going well  Did have a fall in June, 2 cracked vertebrae
ventral katlyn. Asymmetric heterogeneous  enhancement left cavernous sinus with a central area of hypoenhancement, which  may correspond to susceptibility artifact (series 10 image 74, series 8 image  75). Additional Comments:  N/A. There is no evidence of superficial or deep venous sinus thrombosis. No evidence  of venous stenosis. Impression  1. Asymmetric fullness and heterogeneous enhancement left cavernous sinus with  dilated left superior ophthalmic vein. 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 2.  Chronic microvascular ischemic disease with no acute infarction. The findings were called to Dr. Lotus Raines on 11/22/2022 at 8:45 PM by myself.    789. CT Result (most recent):  CT HEAD WO CONTRAST 11/22/2022    Narrative  EXAM: CT HEAD WO CONT    INDICATION: vision loss    COMPARISON: MRI brain 5/6/2021, CT head 5/6/2020. CONTRAST: None. TECHNIQUE: Unenhanced CT of the head was performed using 5 mm images. Brain and  bone windows were generated. Coronal and sagittal reformats. CT dose reduction  was achieved through use of a standardized protocol tailored for this  examination and automatic exposure control for dose modulation. FINDINGS:  Generalized volume loss. Scattered white matter hypodensities may reflect  chronic microangiopathic change. There is no intracranial hemorrhage,  extra-axial collection, or mass effect. The basilar cisterns are open. No CT  evidence of acute infarct. The bone windows demonstrate no abnormalities. The visualized portions of the  paranasal sinuses and mastoid air cells are clear. Impression  No acute abnormality.       EEG Result:      Carotid Doppler:        Recent Labs:  Lab Results   Component Value Date    WBC 10.3 11/29/2022    HGB 11.4 (L) 11/29/2022    HCT 34.3 (L) 11/29/2022    MCV 98.0 11/29/2022     11/29/2022     Lab Results   Component Value Date     03/10/2023    K 4.2 03/10/2023

## 2023-08-18 DIAGNOSIS — G30.1 ALZHEIMER'S DISEASE WITH LATE ONSET (CODE) (HCC): ICD-10-CM

## 2023-08-18 NOTE — TELEPHONE ENCOUNTER
Pharmacy is requesting the  To sign off on Home Delivery  [facility] for medication Donepezil 10 mg 90 day supply with 3 refills.     Please contact

## 2023-08-21 RX ORDER — DONEPEZIL HYDROCHLORIDE 10 MG/1
10 TABLET, FILM COATED ORAL NIGHTLY
Qty: 90 TABLET | Refills: 0 | OUTPATIENT
Start: 2023-08-21

## 2023-09-07 DIAGNOSIS — G30.1 ALZHEIMER'S DISEASE WITH LATE ONSET (CODE) (HCC): ICD-10-CM

## 2023-09-13 ENCOUNTER — OFFICE VISIT (OUTPATIENT)
Age: 73
End: 2023-09-13
Payer: MEDICARE

## 2023-09-13 VITALS
RESPIRATION RATE: 16 BRPM | SYSTOLIC BLOOD PRESSURE: 135 MMHG | HEIGHT: 64 IN | TEMPERATURE: 97 F | DIASTOLIC BLOOD PRESSURE: 77 MMHG | BODY MASS INDEX: 27.83 KG/M2 | HEART RATE: 68 BPM | WEIGHT: 163 LBS | OXYGEN SATURATION: 96 %

## 2023-09-13 DIAGNOSIS — I67.1 CAROTID-CAVERNOUS FISTULA: ICD-10-CM

## 2023-09-13 DIAGNOSIS — G31.84 MILD COGNITIVE IMPAIRMENT OF UNCERTAIN OR UNKNOWN ETIOLOGY: ICD-10-CM

## 2023-09-13 DIAGNOSIS — R35.1 NOCTURIA: ICD-10-CM

## 2023-09-13 DIAGNOSIS — F33.1 MODERATE EPISODE OF RECURRENT MAJOR DEPRESSIVE DISORDER (HCC): ICD-10-CM

## 2023-09-13 DIAGNOSIS — I10 ESSENTIAL (PRIMARY) HYPERTENSION: Primary | ICD-10-CM

## 2023-09-13 DIAGNOSIS — E11.9 DIABETES MELLITUS IN REMISSION (HCC): ICD-10-CM

## 2023-09-13 DIAGNOSIS — N18.31 CHRONIC KIDNEY DISEASE, STAGE 3A (HCC): ICD-10-CM

## 2023-09-13 DIAGNOSIS — Z79.899 ENCOUNTER FOR LONG-TERM (CURRENT) USE OF MEDICATIONS: ICD-10-CM

## 2023-09-13 DIAGNOSIS — I10 ESSENTIAL (PRIMARY) HYPERTENSION: ICD-10-CM

## 2023-09-13 PROCEDURE — 3044F HG A1C LEVEL LT 7.0%: CPT | Performed by: FAMILY MEDICINE

## 2023-09-13 PROCEDURE — 99214 OFFICE O/P EST MOD 30 MIN: CPT | Performed by: FAMILY MEDICINE

## 2023-09-13 PROCEDURE — 1123F ACP DISCUSS/DSCN MKR DOCD: CPT | Performed by: FAMILY MEDICINE

## 2023-09-13 PROCEDURE — G8399 PT W/DXA RESULTS DOCUMENT: HCPCS | Performed by: FAMILY MEDICINE

## 2023-09-13 PROCEDURE — 1036F TOBACCO NON-USER: CPT | Performed by: FAMILY MEDICINE

## 2023-09-13 PROCEDURE — G8427 DOCREV CUR MEDS BY ELIG CLIN: HCPCS | Performed by: FAMILY MEDICINE

## 2023-09-13 PROCEDURE — 2022F DILAT RTA XM EVC RTNOPTHY: CPT | Performed by: FAMILY MEDICINE

## 2023-09-13 PROCEDURE — 3074F SYST BP LT 130 MM HG: CPT | Performed by: FAMILY MEDICINE

## 2023-09-13 PROCEDURE — 3078F DIAST BP <80 MM HG: CPT | Performed by: FAMILY MEDICINE

## 2023-09-13 PROCEDURE — 3017F COLORECTAL CA SCREEN DOC REV: CPT | Performed by: FAMILY MEDICINE

## 2023-09-13 PROCEDURE — 1090F PRES/ABSN URINE INCON ASSESS: CPT | Performed by: FAMILY MEDICINE

## 2023-09-13 PROCEDURE — G8419 CALC BMI OUT NRM PARAM NOF/U: HCPCS | Performed by: FAMILY MEDICINE

## 2023-09-13 RX ORDER — OXYBUTYNIN CHLORIDE 5 MG/1
5 TABLET ORAL DAILY
Qty: 90 TABLET | Refills: 1 | Status: SHIPPED | OUTPATIENT
Start: 2023-09-13

## 2023-09-13 RX ORDER — DICYCLOMINE HYDROCHLORIDE 10 MG/1
10 CAPSULE ORAL 2 TIMES DAILY PRN
Qty: 180 CAPSULE | Refills: 0 | Status: SHIPPED | OUTPATIENT
Start: 2023-09-13

## 2023-09-13 RX ORDER — PLECANATIDE 3 MG/1
1 TABLET ORAL DAILY
Qty: 90 TABLET | Refills: 1 | Status: SHIPPED | OUTPATIENT
Start: 2023-09-13

## 2023-09-13 RX ORDER — LISINOPRIL 20 MG/1
20 TABLET ORAL DAILY
Qty: 90 TABLET | Refills: 1 | Status: SHIPPED | OUTPATIENT
Start: 2023-09-13

## 2023-09-13 RX ORDER — PANTOPRAZOLE SODIUM 40 MG/1
40 TABLET, DELAYED RELEASE ORAL DAILY
Qty: 90 TABLET | Refills: 1 | Status: SHIPPED | OUTPATIENT
Start: 2023-09-13

## 2023-09-13 RX ORDER — CELECOXIB 200 MG/1
200 CAPSULE ORAL DAILY
Qty: 90 CAPSULE | Refills: 0 | Status: SHIPPED | OUTPATIENT
Start: 2023-09-13 | End: 2024-01-04

## 2023-09-13 NOTE — PROGRESS NOTES
Elveria Lombard (: 1950) is a 74 y.o. female, established patient, here for evaluation of the following chief complaint(s):  Depression (Follow-up)       ASSESSMENT/PLAN:  Nenita Pierce was seen today for depression. Diagnoses and all orders for this visit:    Essential (primary) hypertension - controlled  -     lisinopril (PRINIVIL;ZESTRIL) 20 MG tablet; Take 1 tablet by mouth daily  -     Basic Metabolic Panel; Future    Chronic kidney disease, stage 3a (HCC) - stable, ct lisinopril, rechecking labs  -     lisinopril (PRINIVIL;ZESTRIL) 20 MG tablet; Take 1 tablet by mouth daily  -     Hemoglobin A1C; Future  -     Basic Metabolic Panel; Future    Mild cognitive impairment of uncertain or unknown etiology- stable overall, ct on Aricept and orin well    Moderate episode of recurrent major depressive disorder ([address]) - doing well with no new concerns, ct med ans therapy    Encounter for long-term (current) use of medications  -     Hemoglobin A1C; Future  -     Basic Metabolic Panel; Future    Diabetes mellitus in remission ([address]) - well controlled for years  -     lisinopril (PRINIVIL;ZESTRIL) 20 MG tablet; Take 1 tablet by mouth daily  -     Hemoglobin A1C; Future  -     Basic Metabolic Panel; Future    Carotid-cavernous fistula - s/p embolization     Nocturia - encouraged her to cut off fluids in pm and monitor  -     oxybutynin (DITROPAN) 5 MG tablet; Take 1 tablet by mouth daily    Other orders  -     Plecanatide (TRULANCE) 3 MG TABS; Take 1 tablet by mouth daily  -     pantoprazole (PROTONIX) 40 MG tablet; Take 1 tablet by mouth daily  -     dicyclomine (BENTYL) 10 MG capsule; Take 1 capsule by mouth 2 times daily as needed (cramping)  -     celecoxib (CELEBREX) 200 MG capsule; Take 1 capsule by mouth daily        Return in about 3 months (around 2023).         Subjective:   73 yo CF with PMH sig for HTN, HLD, depression, lumbar spinal stenosis s/p surgery, CKD st 3a, hypothyroidism, carotid-cavernous

## 2023-09-13 NOTE — PROGRESS NOTES
Chief Complaint   Patient presents with    Depression     Follow-up    1. Have you been to the ER, urgent care clinic since your last visit? Hospitalized since your last visit? No    2. Have you seen or consulted any other health care providers outside of the [address] [place] [address] since your last visit? Include any pap smears or colon screening.  No

## 2023-09-14 LAB
AMORPH CRY URNS QL MICRO: ABNORMAL
ANION GAP SERPL CALC-SCNC: 5 MMOL/L (ref 5–15)
APPEARANCE UR: ABNORMAL
BACTERIA URNS QL MICRO: NEGATIVE /HPF
BILIRUB UR QL CFM: NEGATIVE
BUN SERPL-MCNC: 22 MG/DL (ref 6–20)
BUN/CREAT SERPL: 19 (ref 12–20)
CALCIUM SERPL-MCNC: 9.7 MG/DL (ref 8.5–10.1)
CAOX CRY URNS QL MICRO: ABNORMAL
CHLORIDE SERPL-SCNC: 107 MMOL/L (ref 97–108)
CO2 SERPL-SCNC: 27 MMOL/L (ref 21–32)
COLOR UR: ABNORMAL
CREAT SERPL-MCNC: 1.13 MG/DL (ref 0.55–1.02)
EPITH CASTS URNS QL MICRO: ABNORMAL /LPF
EST. AVERAGE GLUCOSE BLD GHB EST-MCNC: 97 MG/DL
GLUCOSE SERPL-MCNC: 106 MG/DL (ref 65–100)
GLUCOSE UR STRIP.AUTO-MCNC: NEGATIVE MG/DL
HBA1C MFR BLD: 5 % (ref 4–5.6)
HGB UR QL STRIP: NEGATIVE
KETONES UR QL STRIP.AUTO: NEGATIVE MG/DL
LEUKOCYTE ESTERASE UR QL STRIP.AUTO: ABNORMAL
NITRITE UR QL STRIP.AUTO: NEGATIVE
PH UR STRIP: 5.5 (ref 5–8)
POTASSIUM SERPL-SCNC: 3.9 MMOL/L (ref 3.5–5.1)
PROT UR STRIP-MCNC: 30 MG/DL
RBC #/AREA URNS HPF: ABNORMAL /HPF (ref 0–5)
SODIUM SERPL-SCNC: 139 MMOL/L (ref 136–145)
SP GR UR REFRACTOMETRY: 1.02 (ref 1–1.03)
URINE CULTURE IF INDICATED: ABNORMAL
UROBILINOGEN UR QL STRIP.AUTO: 1 EU/DL (ref 0.2–1)
WBC URNS QL MICRO: ABNORMAL /HPF (ref 0–4)

## 2023-09-15 RX ORDER — DONEPEZIL HYDROCHLORIDE 10 MG/1
10 TABLET, FILM COATED ORAL NIGHTLY
Qty: 90 TABLET | Refills: 0 | Status: SHIPPED | OUTPATIENT
Start: 2023-09-15 | End: 2023-09-15 | Stop reason: SDUPTHER

## 2023-09-15 RX ORDER — DONEPEZIL HYDROCHLORIDE 10 MG/1
10 TABLET, FILM COATED ORAL NIGHTLY
Qty: 20 TABLET | Refills: 0 | Status: SHIPPED | OUTPATIENT
Start: 2023-09-15

## 2023-09-15 NOTE — TELEPHONE ENCOUNTER
Patient  requesting a call to discuss the delivery of her medication. He stated received a call form [facility] with instructions    Please call to discuss because she's out of medication.

## 2023-11-13 DIAGNOSIS — F33.1 DEPRESSION, MAJOR, RECURRENT, MODERATE (HCC): ICD-10-CM

## 2023-11-13 RX ORDER — BUPROPION HYDROCHLORIDE 300 MG/1
300 TABLET ORAL EVERY MORNING
Qty: 90 TABLET | Refills: 0 | Status: SHIPPED | OUTPATIENT
Start: 2023-11-13 | End: 2023-11-16 | Stop reason: SDUPTHER

## 2023-11-16 ENCOUNTER — OFFICE VISIT (OUTPATIENT)
Age: 73
End: 2023-11-16
Payer: MEDICARE

## 2023-11-16 VITALS
BODY MASS INDEX: 28 KG/M2 | RESPIRATION RATE: 16 BRPM | HEART RATE: 68 BPM | OXYGEN SATURATION: 98 % | WEIGHT: 164 LBS | TEMPERATURE: 97.9 F | DIASTOLIC BLOOD PRESSURE: 74 MMHG | SYSTOLIC BLOOD PRESSURE: 136 MMHG | HEIGHT: 64 IN

## 2023-11-16 DIAGNOSIS — F33.1 DEPRESSION, MAJOR, RECURRENT, MODERATE (HCC): ICD-10-CM

## 2023-11-16 PROCEDURE — G8399 PT W/DXA RESULTS DOCUMENT: HCPCS | Performed by: NURSE PRACTITIONER

## 2023-11-16 PROCEDURE — 99214 OFFICE O/P EST MOD 30 MIN: CPT | Performed by: NURSE PRACTITIONER

## 2023-11-16 PROCEDURE — 1123F ACP DISCUSS/DSCN MKR DOCD: CPT | Performed by: NURSE PRACTITIONER

## 2023-11-16 PROCEDURE — G8419 CALC BMI OUT NRM PARAM NOF/U: HCPCS | Performed by: NURSE PRACTITIONER

## 2023-11-16 PROCEDURE — 1036F TOBACCO NON-USER: CPT | Performed by: NURSE PRACTITIONER

## 2023-11-16 PROCEDURE — 3017F COLORECTAL CA SCREEN DOC REV: CPT | Performed by: NURSE PRACTITIONER

## 2023-11-16 PROCEDURE — G8428 CUR MEDS NOT DOCUMENT: HCPCS | Performed by: NURSE PRACTITIONER

## 2023-11-16 PROCEDURE — G8484 FLU IMMUNIZE NO ADMIN: HCPCS | Performed by: NURSE PRACTITIONER

## 2023-11-16 PROCEDURE — 1090F PRES/ABSN URINE INCON ASSESS: CPT | Performed by: NURSE PRACTITIONER

## 2023-11-16 RX ORDER — BUPROPION HYDROCHLORIDE 300 MG/1
300 TABLET ORAL EVERY MORNING
Qty: 90 TABLET | Refills: 0 | Status: SHIPPED | OUTPATIENT
Start: 2023-11-16

## 2023-11-16 ASSESSMENT — PATIENT HEALTH QUESTIONNAIRE - PHQ9
6. FEELING BAD ABOUT YOURSELF - OR THAT YOU ARE A FAILURE OR HAVE LET YOURSELF OR YOUR FAMILY DOWN: 1
SUM OF ALL RESPONSES TO PHQ QUESTIONS 1-9: 10
10. IF YOU CHECKED OFF ANY PROBLEMS, HOW DIFFICULT HAVE THESE PROBLEMS MADE IT FOR YOU TO DO YOUR WORK, TAKE CARE OF THINGS AT HOME, OR GET ALONG WITH OTHER PEOPLE: 1
7. TROUBLE CONCENTRATING ON THINGS, SUCH AS READING THE NEWSPAPER OR WATCHING TELEVISION: 0
2. FEELING DOWN, DEPRESSED OR HOPELESS: 1
4. FEELING TIRED OR HAVING LITTLE ENERGY: 3
1. LITTLE INTEREST OR PLEASURE IN DOING THINGS: 1
SUM OF ALL RESPONSES TO PHQ9 QUESTIONS 1 & 2: 2
SUM OF ALL RESPONSES TO PHQ QUESTIONS 1-9: 10
5. POOR APPETITE OR OVEREATING: 2
SUM OF ALL RESPONSES TO PHQ QUESTIONS 1-9: 10
8. MOVING OR SPEAKING SO SLOWLY THAT OTHER PEOPLE COULD HAVE NOTICED. OR THE OPPOSITE, BEING SO FIGETY OR RESTLESS THAT YOU HAVE BEEN MOVING AROUND A LOT MORE THAN USUAL: 0
3. TROUBLE FALLING OR STAYING ASLEEP: 2
SUM OF ALL RESPONSES TO PHQ QUESTIONS 1-9: 10
9. THOUGHTS THAT YOU WOULD BE BETTER OFF DEAD, OR OF HURTING YOURSELF: 0

## 2023-11-16 ASSESSMENT — ANXIETY QUESTIONNAIRES
1. FEELING NERVOUS, ANXIOUS, OR ON EDGE: 1
3. WORRYING TOO MUCH ABOUT DIFFERENT THINGS: 2
4. TROUBLE RELAXING: 2
IF YOU CHECKED OFF ANY PROBLEMS ON THIS QUESTIONNAIRE, HOW DIFFICULT HAVE THESE PROBLEMS MADE IT FOR YOU TO DO YOUR WORK, TAKE CARE OF THINGS AT HOME, OR GET ALONG WITH OTHER PEOPLE: SOMEWHAT DIFFICULT
5. BEING SO RESTLESS THAT IT IS HARD TO SIT STILL: 0
6. BECOMING EASILY ANNOYED OR IRRITABLE: 2
2. NOT BEING ABLE TO STOP OR CONTROL WORRYING: 1
7. FEELING AFRAID AS IF SOMETHING AWFUL MIGHT HAPPEN: 1
GAD7 TOTAL SCORE: 9

## 2023-11-16 NOTE — PROGRESS NOTES
CHIEF COMPLAINT:  Sadie Hunt is a 74 y.o. female and was seen today for follow-up of psychiatric condition and psychotropic medication management. Patient provided verbal consent for her  [facility]) to attend this visit. Patient was seen first and  joined later. HPI:    Roney Mosqueda reports the following psychiatric symptoms by hx:  depression, anxiety, impaired memory. Depression and anxiety symptoms have been present for years. Memory has been declining over the past year. Currently symptoms are of  moderate severity. The symptoms occur less frequently and are less severe. She reports her symptoms have been mostly manageable. Her depression and anxiety are both rated at 5/10 with 10 being the worse. She identifies some stress with her daughter having cancer. She feels her memory is stable, no change since last visit. MMSE today was 30. Energy level is fair to good. She reports compliance with her medication and feels it has been helpful. Denies any side effects. Appetite: fluctuates. Sleep is mostly good. States last night it was poor due to left knee pain. Patient/ denies any current safety issues. She denies any symptoms of psychosis. Denies any suicidal or homicidal ideation.      Current Outpatient Medications on File Prior to Visit   Medication Sig Dispense Refill    donepezil (ARICEPT) 10 MG tablet Take 1 tablet by mouth nightly 20 tablet 0    lisinopril (PRINIVIL;ZESTRIL) 20 MG tablet Take 1 tablet by mouth daily 90 tablet 1    Plecanatide (TRULANCE) 3 MG TABS Take 1 tablet by mouth daily 90 tablet 1    pantoprazole (PROTONIX) 40 MG tablet Take 1 tablet by mouth daily 90 tablet 1    oxybutynin (DITROPAN) 5 MG tablet Take 1 tablet by mouth daily 90 tablet 1    dicyclomine (BENTYL) 10 MG capsule Take 1 capsule by mouth 2 times daily as needed (cramping) 180 capsule 0    celecoxib (CELEBREX) 200 MG capsule Take 1 capsule by mouth daily 90 capsule 0    memantine (NAMENDA) 10

## 2023-11-27 ENCOUNTER — OFFICE VISIT (OUTPATIENT)
Age: 73
End: 2023-11-27
Payer: MEDICARE

## 2023-11-27 VITALS — HEART RATE: 82 BPM | DIASTOLIC BLOOD PRESSURE: 80 MMHG | OXYGEN SATURATION: 97 % | SYSTOLIC BLOOD PRESSURE: 118 MMHG

## 2023-11-27 DIAGNOSIS — G30.1 ALZHEIMER'S DISEASE WITH LATE ONSET (CODE) (HCC): ICD-10-CM

## 2023-11-27 PROCEDURE — 1123F ACP DISCUSS/DSCN MKR DOCD: CPT | Performed by: NURSE PRACTITIONER

## 2023-11-27 PROCEDURE — 99215 OFFICE O/P EST HI 40 MIN: CPT | Performed by: NURSE PRACTITIONER

## 2023-11-27 RX ORDER — DONEPEZIL HYDROCHLORIDE 10 MG/1
10 TABLET, FILM COATED ORAL DAILY
Qty: 90 TABLET | Refills: 1 | Status: SHIPPED | OUTPATIENT
Start: 2023-11-27

## 2023-11-27 RX ORDER — DULOXETIN HYDROCHLORIDE 60 MG/1
1 CAPSULE, DELAYED RELEASE ORAL
COMMUNITY

## 2023-11-27 RX ORDER — MEMANTINE HYDROCHLORIDE 10 MG/1
10 TABLET ORAL 2 TIMES DAILY
Qty: 180 TABLET | Refills: 1 | Status: SHIPPED | OUTPATIENT
Start: 2023-11-27

## 2023-11-27 NOTE — PROGRESS NOTES
Since med start per patient things are going well  Handles meds well  Would say she has been stable  Said she has been having issues with long term memory

## 2023-11-28 ENCOUNTER — HOSPITAL ENCOUNTER (OUTPATIENT)
Facility: HOSPITAL | Age: 73
Discharge: HOME | End: 2023-12-01
Payer: MEDICARE

## 2023-11-28 DIAGNOSIS — I67.1: ICD-10-CM

## 2023-11-28 PROCEDURE — A9579 GAD-BASE MR CONTRAST NOS,1ML: HCPCS | Performed by: RADIOLOGY

## 2023-11-28 PROCEDURE — 6360000004 HC RX CONTRAST MEDICATION: Performed by: RADIOLOGY

## 2023-11-28 PROCEDURE — 70546 MR ANGIOGRAPH HEAD W/O&W/DYE: CPT

## 2023-11-28 PROCEDURE — 2580000003 HC RX 258: Performed by: RADIOLOGY

## 2023-11-28 RX ORDER — 0.9 % SODIUM CHLORIDE 0.9 %
100 INTRAVENOUS SOLUTION INTRAVENOUS ONCE
Status: COMPLETED | OUTPATIENT
Start: 2023-11-28 | End: 2023-11-28

## 2023-11-28 RX ADMIN — GADOTERIDOL 15 ML: 279.3 INJECTION, SOLUTION INTRAVENOUS at 11:14

## 2023-11-28 RX ADMIN — SODIUM CHLORIDE 100 ML: 9 INJECTION, SOLUTION INTRAVENOUS at 11:15

## 2023-11-28 NOTE — PROGRESS NOTES
Charleen Fernandez is a 74 y.o. female who presents with the following  Chief Complaint   Patient presents with    Follow-up       HPI    FU with . Added Namenda 10 mg BID last visit. On Aricept 10 mg   Switched Aricept to daytime dosing, not as many dreams      She does live with her  and son and daughter   They do notice that she has more trouble with short-term  Retaining and expressing  They know that she has no concerns with functional deficits  She is able to do everything by herself  She is eating well  She is eating on  She is working with primary care to get off other kinds of medications  She is never been on anything for her memory  Her anxiety and depression has gotten a lot better per their report  The only thing is she is trying to be more physically and mentally active so they are having trouble with figuring this out  Does not like to do much more then watch tv        Had a fall in June       Allergies   Allergen Reactions    Latex Hives    Codeine Other (See Comments)     Severe Headache  Other reaction(s): Other (comments)  Severe Headache  Other reaction(s): Other (comments)  Severe Headache      Morphine Hives    Hydromorphone Other (See Comments)     Memory loss  Memory loss  Other reaction(s): Other (comments)  Memory loss    Shellfish Allergy Hives    Zolpidem Other (See Comments)     Severe behavior changes  Other reaction(s):  Other (comments)  Severe behavior changes       Current Outpatient Medications   Medication Sig Dispense Refill    DULoxetine (CYMBALTA) 60 MG extended release capsule 1 capsule      donepezil (ARICEPT) 10 MG tablet Take 1 tablet by mouth daily 90 tablet 1    memantine (NAMENDA) 10 MG tablet Take 1 tablet by mouth 2 times daily 180 tablet 1    buPROPion (WELLBUTRIN XL) 300 MG extended release tablet Take 1 tablet by mouth every morning 90 tablet 0    lisinopril (PRINIVIL;ZESTRIL) 20 MG tablet Take 1 tablet by mouth daily 90 tablet 1

## 2023-12-13 ENCOUNTER — OFFICE VISIT (OUTPATIENT)
Age: 73
End: 2023-12-13
Payer: MEDICARE

## 2023-12-13 VITALS
HEART RATE: 70 BPM | DIASTOLIC BLOOD PRESSURE: 77 MMHG | OXYGEN SATURATION: 97 % | RESPIRATION RATE: 18 BRPM | BODY MASS INDEX: 28.71 KG/M2 | HEIGHT: 64 IN | TEMPERATURE: 97.7 F | SYSTOLIC BLOOD PRESSURE: 139 MMHG | WEIGHT: 168.2 LBS

## 2023-12-13 VITALS
OXYGEN SATURATION: 97 % | SYSTOLIC BLOOD PRESSURE: 130 MMHG | HEART RATE: 77 BPM | BODY MASS INDEX: 28.34 KG/M2 | TEMPERATURE: 97.4 F | DIASTOLIC BLOOD PRESSURE: 88 MMHG | WEIGHT: 166 LBS | HEIGHT: 64 IN

## 2023-12-13 DIAGNOSIS — Z23 NEED FOR VACCINATION: ICD-10-CM

## 2023-12-13 DIAGNOSIS — Z76.89 ESTABLISHING CARE WITH NEW DOCTOR, ENCOUNTER FOR: Primary | ICD-10-CM

## 2023-12-13 DIAGNOSIS — F33.9 RECURRENT MAJOR DEPRESSIVE DISORDER, REMISSION STATUS UNSPECIFIED (HCC): ICD-10-CM

## 2023-12-13 DIAGNOSIS — K58.2 IRRITABLE BOWEL SYNDROME WITH BOTH CONSTIPATION AND DIARRHEA: ICD-10-CM

## 2023-12-13 DIAGNOSIS — Z13.1 SCREENING FOR DIABETES MELLITUS: ICD-10-CM

## 2023-12-13 DIAGNOSIS — R39.15 URINARY URGENCY: ICD-10-CM

## 2023-12-13 DIAGNOSIS — E78.00 PURE HYPERCHOLESTEROLEMIA: ICD-10-CM

## 2023-12-13 DIAGNOSIS — I67.1: Primary | ICD-10-CM

## 2023-12-13 DIAGNOSIS — I10 PRIMARY HYPERTENSION: ICD-10-CM

## 2023-12-13 DIAGNOSIS — N18.31 CHRONIC KIDNEY DISEASE, STAGE 3A (HCC): ICD-10-CM

## 2023-12-13 DIAGNOSIS — K21.9 GASTROESOPHAGEAL REFLUX DISEASE, UNSPECIFIED WHETHER ESOPHAGITIS PRESENT: ICD-10-CM

## 2023-12-13 DIAGNOSIS — Z79.899 ENCOUNTER FOR LONG-TERM (CURRENT) USE OF MEDICATIONS: ICD-10-CM

## 2023-12-13 DIAGNOSIS — G30.9 ALZHEIMER'S DISEASE, UNSPECIFIED (CODE) (HCC): ICD-10-CM

## 2023-12-13 PROCEDURE — G8399 PT W/DXA RESULTS DOCUMENT: HCPCS | Performed by: RADIOLOGY

## 2023-12-13 PROCEDURE — G8484 FLU IMMUNIZE NO ADMIN: HCPCS | Performed by: FAMILY MEDICINE

## 2023-12-13 PROCEDURE — 1036F TOBACCO NON-USER: CPT | Performed by: FAMILY MEDICINE

## 2023-12-13 PROCEDURE — 1123F ACP DISCUSS/DSCN MKR DOCD: CPT | Performed by: RADIOLOGY

## 2023-12-13 PROCEDURE — G8399 PT W/DXA RESULTS DOCUMENT: HCPCS | Performed by: FAMILY MEDICINE

## 2023-12-13 PROCEDURE — G8427 DOCREV CUR MEDS BY ELIG CLIN: HCPCS | Performed by: FAMILY MEDICINE

## 2023-12-13 PROCEDURE — 3017F COLORECTAL CA SCREEN DOC REV: CPT | Performed by: FAMILY MEDICINE

## 2023-12-13 PROCEDURE — G8419 CALC BMI OUT NRM PARAM NOF/U: HCPCS | Performed by: FAMILY MEDICINE

## 2023-12-13 PROCEDURE — G8484 FLU IMMUNIZE NO ADMIN: HCPCS | Performed by: RADIOLOGY

## 2023-12-13 PROCEDURE — 1090F PRES/ABSN URINE INCON ASSESS: CPT | Performed by: RADIOLOGY

## 2023-12-13 PROCEDURE — 1036F TOBACCO NON-USER: CPT | Performed by: RADIOLOGY

## 2023-12-13 PROCEDURE — 1123F ACP DISCUSS/DSCN MKR DOCD: CPT | Performed by: FAMILY MEDICINE

## 2023-12-13 PROCEDURE — G8427 DOCREV CUR MEDS BY ELIG CLIN: HCPCS | Performed by: RADIOLOGY

## 2023-12-13 PROCEDURE — 3078F DIAST BP <80 MM HG: CPT | Performed by: FAMILY MEDICINE

## 2023-12-13 PROCEDURE — 99213 OFFICE O/P EST LOW 20 MIN: CPT | Performed by: RADIOLOGY

## 2023-12-13 PROCEDURE — 3075F SYST BP GE 130 - 139MM HG: CPT | Performed by: FAMILY MEDICINE

## 2023-12-13 PROCEDURE — 1090F PRES/ABSN URINE INCON ASSESS: CPT | Performed by: FAMILY MEDICINE

## 2023-12-13 PROCEDURE — 99204 OFFICE O/P NEW MOD 45 MIN: CPT | Performed by: FAMILY MEDICINE

## 2023-12-13 PROCEDURE — 3017F COLORECTAL CA SCREEN DOC REV: CPT | Performed by: RADIOLOGY

## 2023-12-13 PROCEDURE — 90694 VACC AIIV4 NO PRSRV 0.5ML IM: CPT | Performed by: FAMILY MEDICINE

## 2023-12-13 PROCEDURE — G8419 CALC BMI OUT NRM PARAM NOF/U: HCPCS | Performed by: RADIOLOGY

## 2023-12-13 PROCEDURE — PBSHW INFLUENZA, FLUAD, (AGE 65 Y+), IM, PF, 0.5 ML: Performed by: FAMILY MEDICINE

## 2023-12-13 RX ORDER — PLECANATIDE 3 MG/1
1 TABLET ORAL DAILY
Qty: 90 TABLET | Refills: 1 | Status: SHIPPED | OUTPATIENT
Start: 2023-12-13

## 2023-12-13 RX ORDER — PANTOPRAZOLE SODIUM 40 MG/1
40 TABLET, DELAYED RELEASE ORAL DAILY
Qty: 90 TABLET | Refills: 1 | Status: SHIPPED | OUTPATIENT
Start: 2023-12-13

## 2023-12-13 RX ORDER — OXYBUTYNIN CHLORIDE 5 MG/1
5 TABLET ORAL DAILY
Qty: 90 TABLET | Refills: 1 | Status: SHIPPED | OUTPATIENT
Start: 2023-12-13

## 2023-12-13 RX ORDER — LISINOPRIL 20 MG/1
20 TABLET ORAL DAILY
Qty: 90 TABLET | Refills: 1 | Status: SHIPPED | OUTPATIENT
Start: 2023-12-13

## 2023-12-13 NOTE — PROGRESS NOTES
Kenya Strickland is a 74 y.o. female, who's a new patient to our practice. Previous PCP: Dr. Masoud butt her  Jorge Dc    Assessment/Plans:    Sonny Mitchell was seen today for establish care. Diagnoses and all orders for this visit:    Establishing care with new doctor, encounter for    Alzheimer's disease, unspecified  -     CBC; Future  -     Comprehensive Metabolic Panel; Future  -     TSH; Future    Primary hypertension  -     lisinopril (PRINIVIL;ZESTRIL) 20 MG tablet; Take 1 tablet by mouth daily  -     CBC; Future  -     Comprehensive Metabolic Panel; Future  -     TSH; Future    Chronic kidney disease, stage 3a (HCC)  -     lisinopril (PRINIVIL;ZESTRIL) 20 MG tablet; Take 1 tablet by mouth daily  -     CBC; Future  -     Comprehensive Metabolic Panel; Future  -     TSH; Future  -     Lipid Panel; Future  -     Hemoglobin A1C; Future    Pure hypercholesterolemia  -     Lipid Panel; Future    Irritable bowel syndrome with both constipation and diarrhea  -     Plecanatide (TRULANCE) 3 MG TABS; Take 1 tablet by mouth daily    Urinary urgency  -     oxybutynin (DITROPAN) 5 MG tablet; Take 1 tablet by mouth daily    Gastroesophageal reflux disease, unspecified whether esophagitis present  -     pantoprazole (PROTONIX) 40 MG tablet; Take 1 tablet by mouth daily    Recurrent major depressive disorder, remission status unspecified ([address])    Need for vaccination  -     Influenza, FLUAD, (age 72 y+), IM, Preservative Free, 0.5 mL    Encounter for long-term (current) use of medications  -     CBC; Future  -     Comprehensive Metabolic Panel; Future  -     TSH; Future  -     Lipid Panel; Future  -     Hemoglobin A1C; Future    Screening for diabetes mellitus  -     Hemoglobin A1C; Future      Discussed plans, risk/benefits of treatments/observations. Through the use of shared decision making, above plans were agreed upon. Medication compliance advised. Patient verbalized understanding.      Return in

## 2023-12-13 NOTE — PROGRESS NOTES
Chief Complaint   Patient presents with    Establish Care         Order placed for Fluad from provider Dr. Eliza Vega, verified by Verbal Order Read Back with provider. Observed for 15 min, no reaction.

## 2023-12-14 NOTE — PROGRESS NOTES
Annual follow up for Cerebral aneurysm and imaging review. Spouse at visit. Patient reports no symptoms at this time. Denies headaches, dizziness, numbness and tingling, blurred or double vision. No acute problems voiced.
facial strength, no asymmetry. Hearing intact bilaterally. No dysarthria. Tongue protrudes to midline,       Shoulder shrug 5/5 bilaterally. Motor:    No pronator drift. Bulk and tone normal.      5/5 power in all extremities proximally and distally. No involuntary movements. Sensation:    Sensation intact throughout to light touch    Reflexes:    Deferred    Coordination & Gait: Normal      Imaging: The MR angiogram demonstrates no residual AV shunting involving the previously embolized left carotid-cavernous fistula. Assessment:     Patient has done well postprocedure without any residual or recurrence of symptoms of left carotid-cavernous fistula. Her ophthalmologic follow-up is normal.  Based on the imaging and clinical findings, no further imaging or intervention is recommended. Plan:     No further neurointerventional follow-up. Thank you for allowing me to participate in the care of this patient. Greater than 25 minutes were spent in patient management, greater than half of which was spent in counseling and coordination of care.         Signed By: Alexandria Soria MD     December 14, 2023        iMchelle Santana MD, MBA  Director, [facility]  [facility] Neurointerventional Surgery Service    , Departments of Radiology  [facility]

## 2024-01-04 ENCOUNTER — TELEPHONE (OUTPATIENT)
Age: 74
End: 2024-01-04

## 2024-02-05 DIAGNOSIS — F33.1 DEPRESSION, MAJOR, RECURRENT, MODERATE (HCC): ICD-10-CM

## 2024-02-06 RX ORDER — BUPROPION HYDROCHLORIDE 300 MG/1
300 TABLET ORAL EVERY MORNING
Qty: 90 TABLET | Refills: 0 | Status: SHIPPED | OUTPATIENT
Start: 2024-02-06

## 2024-03-01 ENCOUNTER — OFFICE VISIT (OUTPATIENT)
Age: 74
End: 2024-03-01
Payer: MEDICARE

## 2024-03-01 VITALS
WEIGHT: 165 LBS | HEART RATE: 83 BPM | SYSTOLIC BLOOD PRESSURE: 126 MMHG | DIASTOLIC BLOOD PRESSURE: 80 MMHG | BODY MASS INDEX: 28.17 KG/M2 | RESPIRATION RATE: 16 BRPM | HEIGHT: 64 IN | OXYGEN SATURATION: 99 % | TEMPERATURE: 98 F

## 2024-03-01 DIAGNOSIS — F33.1 DEPRESSION, MAJOR, RECURRENT, MODERATE (HCC): ICD-10-CM

## 2024-03-01 PROCEDURE — 99214 OFFICE O/P EST MOD 30 MIN: CPT | Performed by: NURSE PRACTITIONER

## 2024-03-01 PROCEDURE — G8484 FLU IMMUNIZE NO ADMIN: HCPCS | Performed by: NURSE PRACTITIONER

## 2024-03-01 PROCEDURE — 1036F TOBACCO NON-USER: CPT | Performed by: NURSE PRACTITIONER

## 2024-03-01 PROCEDURE — G8428 CUR MEDS NOT DOCUMENT: HCPCS | Performed by: NURSE PRACTITIONER

## 2024-03-01 PROCEDURE — 1123F ACP DISCUSS/DSCN MKR DOCD: CPT | Performed by: NURSE PRACTITIONER

## 2024-03-01 PROCEDURE — G8419 CALC BMI OUT NRM PARAM NOF/U: HCPCS | Performed by: NURSE PRACTITIONER

## 2024-03-01 PROCEDURE — G8399 PT W/DXA RESULTS DOCUMENT: HCPCS | Performed by: NURSE PRACTITIONER

## 2024-03-01 PROCEDURE — 3017F COLORECTAL CA SCREEN DOC REV: CPT | Performed by: NURSE PRACTITIONER

## 2024-03-01 PROCEDURE — 1090F PRES/ABSN URINE INCON ASSESS: CPT | Performed by: NURSE PRACTITIONER

## 2024-03-01 RX ORDER — BUPROPION HYDROCHLORIDE 300 MG/1
300 TABLET ORAL EVERY MORNING
Qty: 90 TABLET | Refills: 0 | Status: SHIPPED | OUTPATIENT
Start: 2024-03-01

## 2024-03-01 ASSESSMENT — PATIENT HEALTH QUESTIONNAIRE - PHQ9
SUM OF ALL RESPONSES TO PHQ QUESTIONS 1-9: 5
5. POOR APPETITE OR OVEREATING: 0
3. TROUBLE FALLING OR STAYING ASLEEP: 0
7. TROUBLE CONCENTRATING ON THINGS, SUCH AS READING THE NEWSPAPER OR WATCHING TELEVISION: 0
SUM OF ALL RESPONSES TO PHQ QUESTIONS 1-9: 5
9. THOUGHTS THAT YOU WOULD BE BETTER OFF DEAD, OR OF HURTING YOURSELF: 0
8. MOVING OR SPEAKING SO SLOWLY THAT OTHER PEOPLE COULD HAVE NOTICED. OR THE OPPOSITE, BEING SO FIGETY OR RESTLESS THAT YOU HAVE BEEN MOVING AROUND A LOT MORE THAN USUAL: 0
SUM OF ALL RESPONSES TO PHQ9 QUESTIONS 1 & 2: 2
SUM OF ALL RESPONSES TO PHQ QUESTIONS 1-9: 5
6. FEELING BAD ABOUT YOURSELF - OR THAT YOU ARE A FAILURE OR HAVE LET YOURSELF OR YOUR FAMILY DOWN: 1
1. LITTLE INTEREST OR PLEASURE IN DOING THINGS: 1
2. FEELING DOWN, DEPRESSED OR HOPELESS: 1
4. FEELING TIRED OR HAVING LITTLE ENERGY: 2
10. IF YOU CHECKED OFF ANY PROBLEMS, HOW DIFFICULT HAVE THESE PROBLEMS MADE IT FOR YOU TO DO YOUR WORK, TAKE CARE OF THINGS AT HOME, OR GET ALONG WITH OTHER PEOPLE: 0

## 2024-03-01 ASSESSMENT — ANXIETY QUESTIONNAIRES
7. FEELING AFRAID AS IF SOMETHING AWFUL MIGHT HAPPEN: 0
4. TROUBLE RELAXING: 1
2. NOT BEING ABLE TO STOP OR CONTROL WORRYING: 0
6. BECOMING EASILY ANNOYED OR IRRITABLE: 1
5. BEING SO RESTLESS THAT IT IS HARD TO SIT STILL: 0
1. FEELING NERVOUS, ANXIOUS, OR ON EDGE: 0
IF YOU CHECKED OFF ANY PROBLEMS ON THIS QUESTIONNAIRE, HOW DIFFICULT HAVE THESE PROBLEMS MADE IT FOR YOU TO DO YOUR WORK, TAKE CARE OF THINGS AT HOME, OR GET ALONG WITH OTHER PEOPLE: NOT DIFFICULT AT ALL
GAD7 TOTAL SCORE: 2
3. WORRYING TOO MUCH ABOUT DIFFERENT THINGS: 0

## 2024-03-01 NOTE — PROGRESS NOTES
Chief Complaint   Patient presents with    Medication Check     Vitals:    03/01/24 1037   BP: 126/80   Pulse: 83   Resp: 16   Temp: 98 °F (36.7 °C)   SpO2: 99%     Prior to Admission medications    Medication Sig Start Date End Date Taking? Authorizing Provider   buPROPion (WELLBUTRIN XL) 300 MG extended release tablet TAKE 1 TABLET EVERY MORNING 2/6/24  Yes Darlene Augustine APRN - NP   lisinopril (PRINIVIL;ZESTRIL) 20 MG tablet Take 1 tablet by mouth daily 12/13/23  Yes Viry Blandon MD   oxybutynin (DITROPAN) 5 MG tablet Take 1 tablet by mouth daily 12/13/23  Yes Viry Blandon MD   pantoprazole (PROTONIX) 40 MG tablet Take 1 tablet by mouth daily 12/13/23  Yes Viry Blandon MD   Plecanatide (TRULANCE) 3 MG TABS Take 1 tablet by mouth daily 12/13/23  Yes Viry Blandon MD   DULoxetine (CYMBALTA) 60 MG extended release capsule 1 capsule   Yes Provider, MD Ramona   donepezil (ARICEPT) 10 MG tablet Take 1 tablet by mouth daily 11/27/23  Yes Beny Villar APRN - NP   memantine (NAMENDA) 10 MG tablet Take 1 tablet by mouth 2 times daily 11/27/23  Yes Beny Villar APRN - NP   dicyclomine (BENTYL) 10 MG capsule Take 1 capsule by mouth 2 times daily as needed (cramping) 9/13/23  Yes Frandy King MD   Multiple Vitamin (MULTIVITAMIN ADULT PO) Take 1 tablet by mouth daily   Yes Provider, MD Ramona   acetaminophen (TYLENOL) 500 MG tablet Take 2 tablets by mouth every 6 hours 5/8/21  Yes Automatic Reconciliation, Ar   celecoxib (CELEBREX) 200 MG capsule Take 1 capsule by mouth daily  Patient not taking: Reported on 12/13/2023 9/13/23 1/4/24  Frandy King MD     PHQ-9 Total Score: 5 (3/1/2024 10:36 AM)  Thoughts that you would be better off dead, or of hurting yourself in some way: 0 (3/1/2024 10:36 AM)        3/1/2024    10:37 AM 11/16/2023     9:00 AM 6/13/2023     7:51 AM   MICHELET-7 SCREENING   Feeling nervous, anxious, or on edge Not at all Several days Several days   Not being able

## 2024-03-01 NOTE — PROGRESS NOTES
CHIEF COMPLAINT:  Terri Guillen is a 73 y.o. female and was seen today for follow-up of psychiatric condition and psychotropic medication management.     HPI:    Terri  reports the following psychiatric symptoms by hx:  depression, anxiety, impaired memory. Depression and anxiety symptoms have been present for years. Memory has been declining over the past year. Currently symptoms are of low severity. The symptoms occur less frequently and are less severe.  She reports her symptoms have been mostly manageable. Her depression and anxiety are both rated at 3/10 with 10 being the worse. She identifies continued stress and worry for her daughter with cancer. She feels her memory is stable, no change since last visit.  states that she can become a little irritated with not being able to recall things. Patient admits that she has always had a great memory and admits she gets frustrated. Energy level is fair to good. She reports compliance with her medication and feels it has been helpful. Denies any side effects. Appetite: good. Sleep good, achieving 8 hours. Patient/ denies any current safety issues. She denies any symptoms of psychosis. Denies any suicidal or homicidal ideation.       Current Outpatient Medications on File Prior to Visit   Medication Sig Dispense Refill    lisinopril (PRINIVIL;ZESTRIL) 20 MG tablet Take 1 tablet by mouth daily 90 tablet 1    oxybutynin (DITROPAN) 5 MG tablet Take 1 tablet by mouth daily 90 tablet 1    pantoprazole (PROTONIX) 40 MG tablet Take 1 tablet by mouth daily 90 tablet 1    Plecanatide (TRULANCE) 3 MG TABS Take 1 tablet by mouth daily 90 tablet 1    DULoxetine (CYMBALTA) 60 MG extended release capsule 1 capsule      donepezil (ARICEPT) 10 MG tablet Take 1 tablet by mouth daily 90 tablet 1    memantine (NAMENDA) 10 MG tablet Take 1 tablet by mouth 2 times daily 180 tablet 1    dicyclomine (BENTYL) 10 MG capsule Take 1 capsule by mouth 2 times daily as needed

## 2024-03-18 ENCOUNTER — TELEPHONE (OUTPATIENT)
Age: 74
End: 2024-03-18

## 2024-03-18 DIAGNOSIS — G30.1 ALZHEIMER'S DISEASE WITH LATE ONSET (CODE) (HCC): ICD-10-CM

## 2024-03-18 NOTE — TELEPHONE ENCOUNTER
Patients  was informed by [facility] patients script for Donepezil has  and patient needs a refill.    Patient has 2 weeks of medication left.

## 2024-03-19 RX ORDER — DONEPEZIL HYDROCHLORIDE 10 MG/1
10 TABLET, FILM COATED ORAL DAILY
Qty: 90 TABLET | Refills: 1 | Status: SHIPPED | OUTPATIENT
Start: 2024-03-19

## 2024-05-08 ENCOUNTER — OFFICE VISIT (OUTPATIENT)
Age: 74
End: 2024-05-08
Payer: MEDICARE

## 2024-05-08 VITALS
DIASTOLIC BLOOD PRESSURE: 79 MMHG | BODY MASS INDEX: 27.86 KG/M2 | SYSTOLIC BLOOD PRESSURE: 124 MMHG | HEART RATE: 65 BPM | RESPIRATION RATE: 16 BRPM | WEIGHT: 163.2 LBS | TEMPERATURE: 97.1 F | OXYGEN SATURATION: 99 % | HEIGHT: 64 IN

## 2024-05-08 DIAGNOSIS — E78.00 PURE HYPERCHOLESTEROLEMIA: ICD-10-CM

## 2024-05-08 DIAGNOSIS — G30.9 ALZHEIMER'S DISEASE, UNSPECIFIED (CODE) (HCC): ICD-10-CM

## 2024-05-08 DIAGNOSIS — B35.1 ONYCHOMYCOSIS: ICD-10-CM

## 2024-05-08 DIAGNOSIS — N18.31 CHRONIC KIDNEY DISEASE, STAGE 3A (HCC): ICD-10-CM

## 2024-05-08 DIAGNOSIS — Z00.00 MEDICARE ANNUAL WELLNESS VISIT, SUBSEQUENT: Primary | ICD-10-CM

## 2024-05-08 DIAGNOSIS — I10 PRIMARY HYPERTENSION: ICD-10-CM

## 2024-05-08 DIAGNOSIS — N39.46 MIXED STRESS AND URGE URINARY INCONTINENCE: ICD-10-CM

## 2024-05-08 DIAGNOSIS — Z79.899 ENCOUNTER FOR LONG-TERM (CURRENT) USE OF MEDICATIONS: ICD-10-CM

## 2024-05-08 DIAGNOSIS — Z13.1 SCREENING FOR DIABETES MELLITUS: ICD-10-CM

## 2024-05-08 DIAGNOSIS — Z12.31 ENCOUNTER FOR SCREENING MAMMOGRAM FOR MALIGNANT NEOPLASM OF BREAST: ICD-10-CM

## 2024-05-08 PROCEDURE — 1123F ACP DISCUSS/DSCN MKR DOCD: CPT | Performed by: FAMILY MEDICINE

## 2024-05-08 PROCEDURE — 3017F COLORECTAL CA SCREEN DOC REV: CPT | Performed by: FAMILY MEDICINE

## 2024-05-08 PROCEDURE — G0439 PPPS, SUBSEQ VISIT: HCPCS | Performed by: FAMILY MEDICINE

## 2024-05-08 PROCEDURE — G8419 CALC BMI OUT NRM PARAM NOF/U: HCPCS | Performed by: FAMILY MEDICINE

## 2024-05-08 PROCEDURE — 99214 OFFICE O/P EST MOD 30 MIN: CPT | Performed by: FAMILY MEDICINE

## 2024-05-08 PROCEDURE — 1036F TOBACCO NON-USER: CPT | Performed by: FAMILY MEDICINE

## 2024-05-08 PROCEDURE — 3078F DIAST BP <80 MM HG: CPT | Performed by: FAMILY MEDICINE

## 2024-05-08 PROCEDURE — G8399 PT W/DXA RESULTS DOCUMENT: HCPCS | Performed by: FAMILY MEDICINE

## 2024-05-08 PROCEDURE — 1090F PRES/ABSN URINE INCON ASSESS: CPT | Performed by: FAMILY MEDICINE

## 2024-05-08 PROCEDURE — 3074F SYST BP LT 130 MM HG: CPT | Performed by: FAMILY MEDICINE

## 2024-05-08 PROCEDURE — 0509F URINE INCON PLAN DOCD: CPT | Performed by: FAMILY MEDICINE

## 2024-05-08 PROCEDURE — G8427 DOCREV CUR MEDS BY ELIG CLIN: HCPCS | Performed by: FAMILY MEDICINE

## 2024-05-08 RX ORDER — LISINOPRIL 20 MG/1
20 TABLET ORAL DAILY
Qty: 90 TABLET | Refills: 1 | Status: SHIPPED | OUTPATIENT
Start: 2024-05-08

## 2024-05-08 ASSESSMENT — PATIENT HEALTH QUESTIONNAIRE - PHQ9
2. FEELING DOWN, DEPRESSED OR HOPELESS: NEARLY EVERY DAY
10. IF YOU CHECKED OFF ANY PROBLEMS, HOW DIFFICULT HAVE THESE PROBLEMS MADE IT FOR YOU TO DO YOUR WORK, TAKE CARE OF THINGS AT HOME, OR GET ALONG WITH OTHER PEOPLE: VERY DIFFICULT
SUM OF ALL RESPONSES TO PHQ QUESTIONS 1-9: 9
4. FEELING TIRED OR HAVING LITTLE ENERGY: SEVERAL DAYS
SUM OF ALL RESPONSES TO PHQ QUESTIONS 1-9: 9
SUM OF ALL RESPONSES TO PHQ9 QUESTIONS 1 & 2: 4
9. THOUGHTS THAT YOU WOULD BE BETTER OFF DEAD, OR OF HURTING YOURSELF: SEVERAL DAYS
SUM OF ALL RESPONSES TO PHQ QUESTIONS 1-9: 9
7. TROUBLE CONCENTRATING ON THINGS, SUCH AS READING THE NEWSPAPER OR WATCHING TELEVISION: NOT AT ALL
5. POOR APPETITE OR OVEREATING: NOT AT ALL
1. LITTLE INTEREST OR PLEASURE IN DOING THINGS: SEVERAL DAYS
6. FEELING BAD ABOUT YOURSELF - OR THAT YOU ARE A FAILURE OR HAVE LET YOURSELF OR YOUR FAMILY DOWN: NEARLY EVERY DAY
3. TROUBLE FALLING OR STAYING ASLEEP: NOT AT ALL
8. MOVING OR SPEAKING SO SLOWLY THAT OTHER PEOPLE COULD HAVE NOTICED. OR THE OPPOSITE, BEING SO FIGETY OR RESTLESS THAT YOU HAVE BEEN MOVING AROUND A LOT MORE THAN USUAL: NOT AT ALL
SUM OF ALL RESPONSES TO PHQ QUESTIONS 1-9: 8

## 2024-05-08 ASSESSMENT — COLUMBIA-SUICIDE SEVERITY RATING SCALE - C-SSRS
6. HAVE YOU EVER DONE ANYTHING, STARTED TO DO ANYTHING, OR PREPARED TO DO ANYTHING TO END YOUR LIFE?: NO
2. HAVE YOU ACTUALLY HAD ANY THOUGHTS OF KILLING YOURSELF?: NO
1. WITHIN THE PAST MONTH, HAVE YOU WISHED YOU WERE DEAD OR WISHED YOU COULD GO TO SLEEP AND NOT WAKE UP?: YES

## 2024-05-08 ASSESSMENT — LIFESTYLE VARIABLES
HOW OFTEN DO YOU HAVE A DRINK CONTAINING ALCOHOL: NEVER
HOW MANY STANDARD DRINKS CONTAINING ALCOHOL DO YOU HAVE ON A TYPICAL DAY: PATIENT DOES NOT DRINK

## 2024-05-08 NOTE — PROGRESS NOTES
Chief Complaint   Patient presents with    Medicare AWV    Hypertension         1. Have you been to the ER, urgent care clinic since your last visit?  Hospitalized since your last visit?No    2. Have you seen or consulted any other health care providers outside of the [facility] since your last visit?  Include any pap smears or colon screening. No

## 2024-05-08 NOTE — PROGRESS NOTES
Medicare Annual Wellness Visit    Terri Guillen is here for Medicare AWV and Hypertension    Assessment & Plan   Medicare annual wellness visit, subsequent  Primary hypertension  -     lisinopril (PRINIVIL;ZESTRIL) 20 MG tablet; Take 1 tablet by mouth daily, Disp-90 tablet, R-1Normal  Chronic kidney disease, stage 3a (HCC)  -     lisinopril (PRINIVIL;ZESTRIL) 20 MG tablet; Take 1 tablet by mouth daily, Disp-90 tablet, R-1Normal  Onychomycosis  -     [facility] - Sanchez Fonseca DPM, Podiatry, [facility]  Alzheimer's disease, unspecified (CODE) (HCC)  Mixed stress and urge urinary incontinence  -     AFL - Stoney Huynh MD, Urology, [facility]  Encounter for long-term (current) use of medications  Encounter for screening mammogram for malignant neoplasm of breast  -     BANDAR DIGITAL DIAGNOSTIC W OR WO CAD BILATERAL; Future    Recommendations for Preventive Services Due: see orders and patient instructions/AVS.  Recommended screening schedule for the next 5-10 years is provided to the patient in written form: see Patient Instructions/AVS.     Return in about 6 months (around 11/8/2024) for chronic medical issues, sooner as needed.     Subjective   The following acute and/or chronic problems were also addressed today:    Second visit with this physician,    Present w her  Enzo     Forgot to do labs     has a past medical history of Acute alteration in mental status, Arthritis, B12 deficiency, Bilateral carpal tunnel syndrome, Bilateral sciatica, Carotid stenosis, bilateral, Carotid-cavernous fistula, Cerebral microvascular disease, Cervical myelopathy with cervical radiculopathy (HCC), Cervical radiculopathy due to degenerative joint disease of spine, Chronic pain, Chronic renal failure, stage 3a (HCC), Congenital cervical spine stenosis, Convulsions (HCC), DDD (degenerative disc disease), cervical, Depression, Disturbance of memory, Diverticulitis, Dysthymic disorder, Fibromyalgia, Foraminal stenosis of

## 2024-05-09 LAB
ALBUMIN SERPL-MCNC: 3.6 G/DL (ref 3.5–5)
ALBUMIN/GLOB SERPL: 1.3 (ref 1.1–2.2)
ALP SERPL-CCNC: 94 U/L (ref 45–117)
ALT SERPL-CCNC: 18 U/L (ref 12–78)
ANION GAP SERPL CALC-SCNC: 5 MMOL/L (ref 5–15)
AST SERPL-CCNC: 16 U/L (ref 15–37)
BILIRUB SERPL-MCNC: 0.4 MG/DL (ref 0.2–1)
BUN SERPL-MCNC: 25 MG/DL (ref 6–20)
BUN/CREAT SERPL: 25 (ref 12–20)
CALCIUM SERPL-MCNC: 9.8 MG/DL (ref 8.5–10.1)
CHLORIDE SERPL-SCNC: 109 MMOL/L (ref 97–108)
CHOLEST SERPL-MCNC: 272 MG/DL
CO2 SERPL-SCNC: 26 MMOL/L (ref 21–32)
CREAT SERPL-MCNC: 0.99 MG/DL (ref 0.55–1.02)
ERYTHROCYTE [DISTWIDTH] IN BLOOD BY AUTOMATED COUNT: 12.5 % (ref 11.5–14.5)
EST. AVERAGE GLUCOSE BLD GHB EST-MCNC: 97 MG/DL
GLOBULIN SER CALC-MCNC: 2.8 G/DL (ref 2–4)
GLUCOSE SERPL-MCNC: 86 MG/DL (ref 65–100)
HBA1C MFR BLD: 5 % (ref 4–5.6)
HCT VFR BLD AUTO: 34.8 % (ref 35–47)
HDLC SERPL-MCNC: 51 MG/DL
HDLC SERPL: 5.3 (ref 0–5)
HGB BLD-MCNC: 11.5 G/DL (ref 11.5–16)
LDLC SERPL CALC-MCNC: 190 MG/DL (ref 0–100)
MCH RBC QN AUTO: 31.9 PG (ref 26–34)
MCHC RBC AUTO-ENTMCNC: 33 G/DL (ref 30–36.5)
MCV RBC AUTO: 96.4 FL (ref 80–99)
NRBC # BLD: 0 K/UL (ref 0–0.01)
NRBC BLD-RTO: 0 PER 100 WBC
PLATELET # BLD AUTO: 332 K/UL (ref 150–400)
PMV BLD AUTO: 10.3 FL (ref 8.9–12.9)
POTASSIUM SERPL-SCNC: 4.2 MMOL/L (ref 3.5–5.1)
PROT SERPL-MCNC: 6.4 G/DL (ref 6.4–8.2)
RBC # BLD AUTO: 3.61 M/UL (ref 3.8–5.2)
SODIUM SERPL-SCNC: 140 MMOL/L (ref 136–145)
TRIGL SERPL-MCNC: 155 MG/DL
TSH SERPL DL<=0.05 MIU/L-ACNC: 1.55 UIU/ML (ref 0.36–3.74)
VLDLC SERPL CALC-MCNC: 31 MG/DL
WBC # BLD AUTO: 7.8 K/UL (ref 3.6–11)

## 2024-05-28 ENCOUNTER — OFFICE VISIT (OUTPATIENT)
Age: 74
End: 2024-05-28
Payer: MEDICARE

## 2024-05-28 VITALS
DIASTOLIC BLOOD PRESSURE: 79 MMHG | RESPIRATION RATE: 18 BRPM | HEART RATE: 86 BPM | SYSTOLIC BLOOD PRESSURE: 167 MMHG | OXYGEN SATURATION: 97 %

## 2024-05-28 DIAGNOSIS — G30.1 ALZHEIMER'S DISEASE WITH LATE ONSET (CODE) (HCC): Primary | ICD-10-CM

## 2024-05-28 PROCEDURE — 1036F TOBACCO NON-USER: CPT | Performed by: NURSE PRACTITIONER

## 2024-05-28 PROCEDURE — 1123F ACP DISCUSS/DSCN MKR DOCD: CPT | Performed by: NURSE PRACTITIONER

## 2024-05-28 PROCEDURE — 99215 OFFICE O/P EST HI 40 MIN: CPT | Performed by: NURSE PRACTITIONER

## 2024-05-28 PROCEDURE — G8419 CALC BMI OUT NRM PARAM NOF/U: HCPCS | Performed by: NURSE PRACTITIONER

## 2024-05-28 PROCEDURE — 3017F COLORECTAL CA SCREEN DOC REV: CPT | Performed by: NURSE PRACTITIONER

## 2024-05-28 PROCEDURE — 3078F DIAST BP <80 MM HG: CPT | Performed by: NURSE PRACTITIONER

## 2024-05-28 PROCEDURE — G8427 DOCREV CUR MEDS BY ELIG CLIN: HCPCS | Performed by: NURSE PRACTITIONER

## 2024-05-28 PROCEDURE — 1090F PRES/ABSN URINE INCON ASSESS: CPT | Performed by: NURSE PRACTITIONER

## 2024-05-28 PROCEDURE — G8399 PT W/DXA RESULTS DOCUMENT: HCPCS | Performed by: NURSE PRACTITIONER

## 2024-05-28 PROCEDURE — 3077F SYST BP >= 140 MM HG: CPT | Performed by: NURSE PRACTITIONER

## 2024-05-28 RX ORDER — MEMANTINE HYDROCHLORIDE 10 MG/1
10 TABLET ORAL 2 TIMES DAILY
Qty: 180 TABLET | Refills: 1 | Status: SHIPPED | OUTPATIENT
Start: 2024-05-28

## 2024-05-28 RX ORDER — DONEPEZIL HYDROCHLORIDE 10 MG/1
10 TABLET, FILM COATED ORAL DAILY
Qty: 90 TABLET | Refills: 1 | Status: SHIPPED | OUTPATIENT
Start: 2024-05-28

## 2024-05-28 ASSESSMENT — PATIENT HEALTH QUESTIONNAIRE - PHQ9
1. LITTLE INTEREST OR PLEASURE IN DOING THINGS: NOT AT ALL
2. FEELING DOWN, DEPRESSED OR HOPELESS: NOT AT ALL
SUM OF ALL RESPONSES TO PHQ QUESTIONS 1-9: 0
SUM OF ALL RESPONSES TO PHQ9 QUESTIONS 1 & 2: 0

## 2024-05-29 NOTE — PROGRESS NOTES
Terri Guillen is a 73 y.o. female who presents with the following  Chief Complaint   Patient presents with    Follow-up     Patient is here with her  following up for Alzheimer's. They both report that her memory has gotten a little worse.        HPI      FU with .   Namenda 10 mg BID   Aricept 10 mg   Switched Aricept to daytime dosing, not as many dreams and doing well.      She does live with her  and son and daughter   They do notice that she has more trouble with short-term  Retaining and expressing  They know that she has no concerns with functional deficits  She is able to do everything by herself  She is eating well  Not driving.     Her anxiety and depression has gotten a lot better per their report  Stress can increase, she gets irritated at her  when he is not around her and doing things with her.   The only thing is she is trying to be more physically and mentally active so they are having trouble with figuring this out  Does not like to do much more then watch tv   Will go on drives with him and likes this  Takes care of her two dogs.          Allergies   Allergen Reactions    Latex Hives    Codeine Other (See Comments)     Severe Headache  Other reaction(s): Other (comments)  Severe Headache  Other reaction(s): Other (comments)  Severe Headache      Morphine Hives    Hydromorphone Other (See Comments)     Memory loss  Memory loss  Other reaction(s): Other (comments)  Memory loss    Shellfish Allergy Hives    Zolpidem Other (See Comments)     Severe behavior changes  Other reaction(s): Other (comments)  Severe behavior changes       Current Outpatient Medications   Medication Sig Dispense Refill    memantine (NAMENDA) 10 MG tablet Take 1 tablet by mouth 2 times daily 180 tablet 1    donepezil (ARICEPT) 10 MG tablet Take 1 tablet by mouth daily 90 tablet 1    lisinopril (PRINIVIL;ZESTRIL) 20 MG tablet Take 1 tablet by mouth daily 90 tablet 1    buPROPion (WELLBUTRIN

## 2024-05-31 ENCOUNTER — OFFICE VISIT (OUTPATIENT)
Age: 74
End: 2024-05-31

## 2024-05-31 VITALS
HEART RATE: 69 BPM | BODY MASS INDEX: 28.34 KG/M2 | TEMPERATURE: 97.9 F | SYSTOLIC BLOOD PRESSURE: 138 MMHG | DIASTOLIC BLOOD PRESSURE: 72 MMHG | HEIGHT: 64 IN | WEIGHT: 166 LBS | RESPIRATION RATE: 16 BRPM | OXYGEN SATURATION: 97 %

## 2024-05-31 DIAGNOSIS — F33.1 DEPRESSION, MAJOR, RECURRENT, MODERATE (HCC): ICD-10-CM

## 2024-05-31 RX ORDER — BUPROPION HYDROCHLORIDE 300 MG/1
300 TABLET ORAL EVERY MORNING
Qty: 90 TABLET | Refills: 0 | Status: SHIPPED | OUTPATIENT
Start: 2024-05-31

## 2024-05-31 ASSESSMENT — ANXIETY QUESTIONNAIRES
GAD7 TOTAL SCORE: 5
4. TROUBLE RELAXING: SEVERAL DAYS
1. FEELING NERVOUS, ANXIOUS, OR ON EDGE: NOT AT ALL
5. BEING SO RESTLESS THAT IT IS HARD TO SIT STILL: SEVERAL DAYS
2. NOT BEING ABLE TO STOP OR CONTROL WORRYING: SEVERAL DAYS
6. BECOMING EASILY ANNOYED OR IRRITABLE: SEVERAL DAYS
IF YOU CHECKED OFF ANY PROBLEMS ON THIS QUESTIONNAIRE, HOW DIFFICULT HAVE THESE PROBLEMS MADE IT FOR YOU TO DO YOUR WORK, TAKE CARE OF THINGS AT HOME, OR GET ALONG WITH OTHER PEOPLE: NOT DIFFICULT AT ALL
7. FEELING AFRAID AS IF SOMETHING AWFUL MIGHT HAPPEN: NOT AT ALL
3. WORRYING TOO MUCH ABOUT DIFFERENT THINGS: SEVERAL DAYS

## 2024-05-31 ASSESSMENT — PATIENT HEALTH QUESTIONNAIRE - PHQ9
4. FEELING TIRED OR HAVING LITTLE ENERGY: SEVERAL DAYS
SUM OF ALL RESPONSES TO PHQ QUESTIONS 1-9: 7
10. IF YOU CHECKED OFF ANY PROBLEMS, HOW DIFFICULT HAVE THESE PROBLEMS MADE IT FOR YOU TO DO YOUR WORK, TAKE CARE OF THINGS AT HOME, OR GET ALONG WITH OTHER PEOPLE: SOMEWHAT DIFFICULT
6. FEELING BAD ABOUT YOURSELF - OR THAT YOU ARE A FAILURE OR HAVE LET YOURSELF OR YOUR FAMILY DOWN: SEVERAL DAYS
SUM OF ALL RESPONSES TO PHQ9 QUESTIONS 1 & 2: 3
3. TROUBLE FALLING OR STAYING ASLEEP: SEVERAL DAYS
7. TROUBLE CONCENTRATING ON THINGS, SUCH AS READING THE NEWSPAPER OR WATCHING TELEVISION: NOT AT ALL
SUM OF ALL RESPONSES TO PHQ QUESTIONS 1-9: 7
SUM OF ALL RESPONSES TO PHQ QUESTIONS 1-9: 7
1. LITTLE INTEREST OR PLEASURE IN DOING THINGS: MORE THAN HALF THE DAYS
SUM OF ALL RESPONSES TO PHQ QUESTIONS 1-9: 7
2. FEELING DOWN, DEPRESSED OR HOPELESS: SEVERAL DAYS
8. MOVING OR SPEAKING SO SLOWLY THAT OTHER PEOPLE COULD HAVE NOTICED. OR THE OPPOSITE, BEING SO FIGETY OR RESTLESS THAT YOU HAVE BEEN MOVING AROUND A LOT MORE THAN USUAL: SEVERAL DAYS
5. POOR APPETITE OR OVEREATING: NOT AT ALL
9. THOUGHTS THAT YOU WOULD BE BETTER OFF DEAD, OR OF HURTING YOURSELF: NOT AT ALL

## 2024-05-31 NOTE — PROGRESS NOTES
Chief Complaint   Patient presents with    Follow-up      Vitals:    05/31/24 1027   BP: 138/72   Pulse: 69   Resp: 16   Temp: 97.9 °F (36.6 °C)   SpO2: 97%      Prior to Admission medications    Medication Sig Start Date End Date Taking? Authorizing Provider   memantine (NAMENDA) 10 MG tablet Take 1 tablet by mouth 2 times daily 5/28/24  Yes Beny Villar APRN - NP   donepezil (ARICEPT) 10 MG tablet Take 1 tablet by mouth daily 5/28/24  Yes Beny Villar APRN - NP   lisinopril (PRINIVIL;ZESTRIL) 20 MG tablet Take 1 tablet by mouth daily 5/8/24  Yes Viry Blandon MD   pantoprazole (PROTONIX) 40 MG tablet Take 1 tablet by mouth daily 12/13/23  Yes Viry Blandon MD   Plecanatide (TRULANCE) 3 MG TABS Take 1 tablet by mouth daily 12/13/23  Yes Viry Blandon MD   Multiple Vitamin (MULTIVITAMIN ADULT PO) Take 1 tablet by mouth daily   Yes Provider, MD Ramona   acetaminophen (TYLENOL) 500 MG tablet Take 2 tablets by mouth every 6 hours 5/8/21  Yes Automatic Reconciliation, Ar   buPROPion (WELLBUTRIN XL) 300 MG extended release tablet Take 1 tablet by mouth every morning  Patient not taking: Reported on 5/31/2024 3/1/24   Darlene Augustine APRN - NP      PHQ-9 Total Score: 7 (5/31/2024 10:28 AM)  Thoughts that you would be better off dead, or of hurting yourself in some way: 0 (5/31/2024 10:28 AM)           5/31/2024    10:30 AM 3/1/2024    10:37 AM 11/16/2023     9:00 AM   MICHELET-7 SCREENING   Feeling nervous, anxious, or on edge Not at all Not at all Several days   Not being able to stop or control worrying Several days Not at all Several days   Worrying too much about different things Several days Not at all More than half the days   Trouble relaxing Several days Several days More than half the days   Being so restless that it is hard to sit still Several days Not at all Not at all   Becoming easily annoyed or irritable Several days Several days More than half the days   Feeling afraid as if 
or call 911.     Patient was given time to ask questions and voice concerns. I believe all questions concerns were adequately addressed at this office visit. Patient verbalized agreement and understanding of the above-stated plan.    Follow-up and Dispositions    Return in about 3 months (around 8/31/2024).       5/31/2024  HERMELINDO Heard - NP

## 2024-06-05 ENCOUNTER — TELEPHONE (OUTPATIENT)
Age: 74
End: 2024-06-05

## 2024-06-05 NOTE — TELEPHONE ENCOUNTER
I contact [facility] requesting new patient appointment and accepted health insurance information. [facility] PSR Blaine states their location accepts patient insurance and will schedule patient for an appointment. I provided Blaine with patient information to schedule an appointment. Blaine requested patient call their office to complete registration process. I called patient to provide updated information in regards to therapy services. I informed patient to call [facility] to complete registration for new patient appointment. I provided patient [facility] office information. Patient states she will contact [facility] today.     [facility]   [address] [place] [address]    Phone: [phone]

## 2024-08-08 ENCOUNTER — HOSPITAL ENCOUNTER (OUTPATIENT)
Facility: HOSPITAL | Age: 74
Discharge: HOME | End: 2024-08-08
Payer: MEDICARE

## 2024-08-08 DIAGNOSIS — Z80.0 FAMILY HISTORY OF COLON CANCER: ICD-10-CM

## 2024-08-08 DIAGNOSIS — F03.90 DEMENTIA, UNSPECIFIED DEMENTIA SEVERITY, UNSPECIFIED DEMENTIA TYPE, UNSPECIFIED WHETHER BEHAVIORAL, PSYCHOTIC, OR MOOD DISTURBANCE OR ANXIETY (HCC): ICD-10-CM

## 2024-08-08 DIAGNOSIS — R19.4 CHANGE IN BOWEL HABITS: ICD-10-CM

## 2024-08-08 DIAGNOSIS — K58.1 IRRITABLE BOWEL SYNDROME WITH CONSTIPATION: ICD-10-CM

## 2024-08-08 PROCEDURE — 74018 RADEX ABDOMEN 1 VIEW: CPT

## 2024-10-18 DIAGNOSIS — F33.1 DEPRESSION, MAJOR, RECURRENT, MODERATE (HCC): ICD-10-CM

## 2024-10-18 RX ORDER — BUPROPION HYDROCHLORIDE 300 MG/1
300 TABLET ORAL EVERY MORNING
Qty: 90 TABLET | Refills: 3 | Status: SHIPPED | OUTPATIENT
Start: 2024-10-18

## 2024-12-02 ENCOUNTER — OFFICE VISIT (OUTPATIENT)
Age: 74
End: 2024-12-02
Payer: MEDICARE

## 2024-12-02 VITALS
OXYGEN SATURATION: 93 % | HEART RATE: 76 BPM | TEMPERATURE: 98.6 F | RESPIRATION RATE: 17 BRPM | SYSTOLIC BLOOD PRESSURE: 185 MMHG | DIASTOLIC BLOOD PRESSURE: 86 MMHG

## 2024-12-02 DIAGNOSIS — G30.1 ALZHEIMER'S DISEASE WITH LATE ONSET (CODE) (HCC): ICD-10-CM

## 2024-12-02 DIAGNOSIS — G30.9 ALZHEIMER'S DISEASE, UNSPECIFIED (CODE) (HCC): Primary | ICD-10-CM

## 2024-12-02 DIAGNOSIS — R26.9 GAIT ABNORMALITY: ICD-10-CM

## 2024-12-02 PROCEDURE — 1090F PRES/ABSN URINE INCON ASSESS: CPT | Performed by: NURSE PRACTITIONER

## 2024-12-02 PROCEDURE — G8419 CALC BMI OUT NRM PARAM NOF/U: HCPCS | Performed by: NURSE PRACTITIONER

## 2024-12-02 PROCEDURE — 3077F SYST BP >= 140 MM HG: CPT | Performed by: NURSE PRACTITIONER

## 2024-12-02 PROCEDURE — 99215 OFFICE O/P EST HI 40 MIN: CPT | Performed by: NURSE PRACTITIONER

## 2024-12-02 PROCEDURE — 1123F ACP DISCUSS/DSCN MKR DOCD: CPT | Performed by: NURSE PRACTITIONER

## 2024-12-02 PROCEDURE — G8484 FLU IMMUNIZE NO ADMIN: HCPCS | Performed by: NURSE PRACTITIONER

## 2024-12-02 PROCEDURE — 3079F DIAST BP 80-89 MM HG: CPT | Performed by: NURSE PRACTITIONER

## 2024-12-02 PROCEDURE — G8428 CUR MEDS NOT DOCUMENT: HCPCS | Performed by: NURSE PRACTITIONER

## 2024-12-02 PROCEDURE — G8399 PT W/DXA RESULTS DOCUMENT: HCPCS | Performed by: NURSE PRACTITIONER

## 2024-12-02 PROCEDURE — 1036F TOBACCO NON-USER: CPT | Performed by: NURSE PRACTITIONER

## 2024-12-02 RX ORDER — MEMANTINE HYDROCHLORIDE 10 MG/1
10 TABLET ORAL 2 TIMES DAILY
Qty: 180 TABLET | Refills: 1 | Status: SHIPPED | OUTPATIENT
Start: 2024-12-02

## 2024-12-02 RX ORDER — LINACLOTIDE 72 UG/1
72 CAPSULE, GELATIN COATED ORAL
COMMUNITY
Start: 2024-10-18

## 2024-12-02 RX ORDER — DONEPEZIL HYDROCHLORIDE 10 MG/1
10 TABLET, FILM COATED ORAL DAILY
Qty: 90 TABLET | Refills: 1 | Status: SHIPPED | OUTPATIENT
Start: 2024-12-02

## 2024-12-02 ASSESSMENT — PATIENT HEALTH QUESTIONNAIRE - PHQ9
SUM OF ALL RESPONSES TO PHQ QUESTIONS 1-9: 0
SUM OF ALL RESPONSES TO PHQ9 QUESTIONS 1 & 2: 0
1. LITTLE INTEREST OR PLEASURE IN DOING THINGS: NOT AT ALL
SUM OF ALL RESPONSES TO PHQ QUESTIONS 1-9: 0
SUM OF ALL RESPONSES TO PHQ QUESTIONS 1-9: 0
2. FEELING DOWN, DEPRESSED OR HOPELESS: NOT AT ALL
SUM OF ALL RESPONSES TO PHQ QUESTIONS 1-9: 0

## 2024-12-02 NOTE — PROGRESS NOTES
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 Multiplanar reconstructions were obtained.    FINDINGS:    There has been previous embolization of the left cavernous sinus. Previous  asymmetric fullness/filling defect left cavernous sinus has resolved, with no  arterial flow related signal in the left cavernous sinus.. Similarly, left  superior ophthalmic vein is now normal caliber compared to the normal right  vein. The internal carotid arteries, anterior cerebral arteries, and middle  cerebral arteries are patent. Tricks sequence demonstrates is no evidence of  arteriovenous shunting. The vertebral arteries, basilar artery, and posterior  cerebral arteries are patent. No evidence of intracranial aneurysm or vascular  malformation.    Impression  Status post embolization of the left cavernous sinus, with no evidence of  residual arteriovenous shunting. Left superior ophthalmic vein is normal  caliber.      CT Result (most recent):  CT HEAD WO CONTRAST 11/22/2022    Narrative  EXAM: CT HEAD WO CONT    INDICATION: vision loss    COMPARISON: MRI brain 5/6/2021, CT head 5/6/2020.    CONTRAST: None.    TECHNIQUE: Unenhanced CT of the head was performed using 5 mm images. Brain and  bone windows were generated. Coronal and sagittal reformats. CT dose reduction  was achieved through use of a standardized protocol tailored for this  examination and automatic exposure control for dose modulation.    FINDINGS:  Generalized volume loss. Scattered white matter hypodensities may reflect  chronic microangiopathic change. There is no intracranial hemorrhage,  extra-axial collection, or mass effect. The basilar cisterns are open. No CT  evidence of

## 2024-12-04 ENCOUNTER — HOSPITAL ENCOUNTER (OUTPATIENT)
Facility: HOSPITAL | Age: 74
Discharge: HOME | End: 2024-12-07
Payer: MEDICARE

## 2024-12-04 DIAGNOSIS — R10.9 STOMACH ACHE: ICD-10-CM

## 2024-12-04 PROCEDURE — 76700 US EXAM ABDOM COMPLETE: CPT

## 2024-12-18 ENCOUNTER — OFFICE VISIT (OUTPATIENT)
Age: 74
End: 2024-12-18
Payer: MEDICARE

## 2024-12-18 DIAGNOSIS — G30.9 ALZHEIMER'S DISEASE, UNSPECIFIED (CODE) (HCC): Primary | ICD-10-CM

## 2024-12-18 PROCEDURE — 90791 PSYCH DIAGNOSTIC EVALUATION: CPT | Performed by: CLINICAL NEUROPSYCHOLOGIST

## 2024-12-18 NOTE — PROGRESS NOTES
daily, Disp: 90 tablet, Rfl: 1    pantoprazole (PROTONIX) 40 MG tablet, Take 1 tablet by mouth daily, Disp: 90 tablet, Rfl: 1    Multiple Vitamin (MULTIVITAMIN ADULT PO), Take 1 tablet by mouth daily, Disp: , Rfl:     acetaminophen (TYLENOL) 500 MG tablet, Take 2 tablets by mouth every 6 hours, Disp: , Rfl:     Pertinent Neurological History:  Seizure: Never  Stroke: Never  TBI: Never    Neurodiagnostic Findings:  Imaging:   MRI brain (11/22/2022) was notable for \"moderate white matter disease\" and \"focus blooming on gradient sequence in the left katlyn may reflect a focus of microhemorrhage or hemosiderin.\"  CT head (11/22/2022) was interpreted as \"generalized volume loss\" and \"no acute abnormality.\"    Pertinent Labs:  Lab Date Result   TSH 5/8/2024 Within reference range   A1c 5/8/2024 Within reference range     PSYCHOSOCIAL HISTORY:  Birth/Development: Born and raised in [place]  Language: English  Education: Graduated high school  Academic Problems: None  Occupation: Retired in 2008.  Previously employed as a  and    Service: None  Relationship Status:  55 years  Children: 2 adult children  Housing: Private residence with .  Their son also lives with them at this time to provide care.  Legal/financial issues: None    Substance Use:  Alcohol: Rare.  No history of consistent heavy consumption  Nicotine: Quit smoking in 2007  Recreational/Illicit Substances: None  Treatment: None    BEHAVIORAL OBSERVATIONS:  Appearance: Casually dressed, Well-groomed, and Appeared stated age  Orientation: Correctly identified the date and location.  She was oriented to person.  Not fully oriented to circumstance.  Motor: Ambulated with cane.  Adequate gait, Adequate posture, and No involuntary movements  Thought Processes: Clear, Coherent, Logical, and Organized  Hearing and Vision: Adequate  Speech: Appropriate for Rate, Tone, Prosody, and Volume  Comprehension:

## 2024-12-26 ENCOUNTER — HOSPITAL ENCOUNTER (OUTPATIENT)
Facility: HOSPITAL | Age: 74
Discharge: HOME | End: 2024-12-29
Payer: MEDICARE

## 2024-12-26 DIAGNOSIS — Z86.0100 PERSONAL HISTORY OF COLON POLYPS, UNSPECIFIED: ICD-10-CM

## 2024-12-26 DIAGNOSIS — Z80.0 FAMILY HISTORY OF COLON CANCER: ICD-10-CM

## 2024-12-26 DIAGNOSIS — K59.00 CONSTIPATION, UNSPECIFIED CONSTIPATION TYPE: ICD-10-CM

## 2024-12-26 DIAGNOSIS — R19.4 CHANGE IN BOWEL HABITS: ICD-10-CM

## 2024-12-26 DIAGNOSIS — R10.31 CHRONIC RLQ PAIN: ICD-10-CM

## 2024-12-26 DIAGNOSIS — R63.4 WEIGHT LOSS: ICD-10-CM

## 2024-12-26 DIAGNOSIS — G89.29 CHRONIC RLQ PAIN: ICD-10-CM

## 2024-12-26 DIAGNOSIS — F03.90 SENILE DEMENTIA (HCC): ICD-10-CM

## 2024-12-26 LAB — CREAT BLD-MCNC: 1.2 MG/DL (ref 0.6–1.3)

## 2024-12-26 PROCEDURE — 82565 ASSAY OF CREATININE: CPT

## 2024-12-26 PROCEDURE — 2500000003 HC RX 250 WO HCPCS: Performed by: PHYSICIAN ASSISTANT

## 2024-12-26 PROCEDURE — 74177 CT ABD & PELVIS W/CONTRAST: CPT

## 2024-12-26 PROCEDURE — 6360000004 HC RX CONTRAST MEDICATION: Performed by: PHYSICIAN ASSISTANT

## 2024-12-26 RX ORDER — IOPAMIDOL 755 MG/ML
100 INJECTION, SOLUTION INTRAVASCULAR
Status: COMPLETED | OUTPATIENT
Start: 2024-12-26 | End: 2024-12-26

## 2024-12-26 RX ADMIN — BARIUM SULFATE 900 ML: 20 SUSPENSION ORAL at 09:40

## 2024-12-26 RX ADMIN — IOPAMIDOL 100 ML: 755 INJECTION, SOLUTION INTRAVENOUS at 09:40

## 2025-01-13 ENCOUNTER — PROCEDURE VISIT (OUTPATIENT)
Age: 75
End: 2025-01-13

## 2025-01-13 DIAGNOSIS — G30.9 ALZHEIMER'S DISEASE, UNSPECIFIED (CODE) (HCC): Primary | ICD-10-CM

## 2025-01-21 ENCOUNTER — PROCEDURE VISIT (OUTPATIENT)
Age: 75
End: 2025-01-21
Payer: MEDICARE

## 2025-01-21 DIAGNOSIS — F34.1 PERSISTENT DEPRESSIVE DISORDER WITH ANXIOUS DISTRESS, CURRENTLY MODERATE: ICD-10-CM

## 2025-01-21 DIAGNOSIS — G30.9 ALZHEIMER'S DISEASE, UNSPECIFIED (CODE) (HCC): Primary | ICD-10-CM

## 2025-01-21 PROCEDURE — 96138 PSYCL/NRPSYC TECH 1ST: CPT | Performed by: CLINICAL NEUROPSYCHOLOGIST

## 2025-01-21 PROCEDURE — 96132 NRPSYC TST EVAL PHYS/QHP 1ST: CPT | Performed by: CLINICAL NEUROPSYCHOLOGIST

## 2025-01-21 PROCEDURE — 96139 PSYCL/NRPSYC TST TECH EA: CPT | Performed by: CLINICAL NEUROPSYCHOLOGIST

## 2025-01-21 PROCEDURE — 96133 NRPSYC TST EVAL PHYS/QHP EA: CPT | Performed by: CLINICAL NEUROPSYCHOLOGIST

## 2025-02-04 NOTE — PROGRESS NOTES
Neuropsychological Evaluation Report      Patient Name: Terri Guillen  YOB: 1950    Age: 74 y.o.  Date of Intake: 2024   Education: 12 Date of Testin2025   Gender: Female Ethnicity: White     Referring Provider: MICHAEL Alvarado     REASON FOR REFERRAL AND EVALUATION PROCEDURES:  Terri Guillen  was referred for neuropsychological evaluation by her Neurology Provider to obtain a quantitative assessment of her current level of neurocognitive functioning, assist in differential diagnosis, and aid in individualized treatment planning. The patient understood the rationale and procedures for evaluation, as well as the limits to confidentiality, and they agreed to participate. The patient consented to have this report made available to her  treating providers through her  electronic medical records. This evaluation was completed with the patient by Bradley Griffiths PsyD with the exception of testing by technician, which was completed by PRISCILLA Berkowitz under the supervision of Dr. Griffiths.  History Sources: Patient, Relative (son), Medical Record, and Test Data    SUMMARY AND IMPRESSION:  The following section is a summary of the patient's pertinent test results and impressions. A more thorough review of the patient's background and test scores can be found below.    The patient's neuropsychological evaluation revealed variable but generally mild impairment on tests of learning (~1.5 standard deviations below the mean) and moderate to profound impairment on tests of free recall (2-3 standard deviations below the mean).  List recognition/discrimination was profoundly impaired but other measures of recognition/discrimination were well within normal limits.  Executive functioning was notable for moderate to profound impairment.  Semantic verbal fluency was mildly impaired while letter verbal fluency was inefficient and confrontation naming was within normal limits.  Executive

## 2025-02-05 ENCOUNTER — OFFICE VISIT (OUTPATIENT)
Age: 75
End: 2025-02-05
Payer: MEDICARE

## 2025-02-05 DIAGNOSIS — G30.9 ALZHEIMER'S DISEASE, UNSPECIFIED (CODE) (HCC): Primary | ICD-10-CM

## 2025-02-05 DIAGNOSIS — F34.1 PERSISTENT DEPRESSIVE DISORDER WITH ANXIOUS DISTRESS, CURRENTLY MODERATE: ICD-10-CM

## 2025-02-05 PROCEDURE — 96132 NRPSYC TST EVAL PHYS/QHP 1ST: CPT | Performed by: CLINICAL NEUROPSYCHOLOGIST

## 2025-02-05 NOTE — PROGRESS NOTES
Neuropsychological testing session 1/.  Time spent [id number] = 160 min.  Second testing appointment scheduled for 2025.  Report will be attached to that encounter

## 2025-02-05 NOTE — PROGRESS NOTES
Chief complaint: Patient presented for review of previously completed neuropsychological evaluation and discussion of impressions/recommendations.    Prior to seeing the patient for today's visit, I reviewed pertinent records, including the previously completed report, the records in Epic, and any updated visits from other providers since the patient's last visit.    I provided feedback services related to the previously completed report. Attendees included: Patient, Spouse, and Children. Education was provided regarding my diagnostic impressions, and we discussed treatment plan/options. Attendees were provided with the opportunity to ask questions, which were answered to the best of my ability.    We discussed, in detail, the following:  Reviewed findings from evaluation including test results, diagnosis, and suspected contributing factors  Discussed recommendations outlined in report  Answered questions to the best of my ability    The patient needs to:   Follow up with referring provider for ongoing management, Initiate psychotherapy using resources (See handout), Use practical strategies to compensate for cognitive weaknesses (See handout), and Emphasize modifiable risk and protective factors for cognitive functioning (e.g., exercise, diet, cognitive stimulation; see handout)    The patient had the following reactions to recommendations: Patient and family reported understanding results and recommendations.  Patient recalled a surprising level of detail about the testing and everyday life.  This is not a clear-cut case of Alzheimer's disease and I think further workup is indicated.    ASSESSMENT:  Possible Alzheimer's dementia  Persistent depressive disorder with anxious distress    TIME SPENT PROVIDING SERVICES:   40 minutes     BILLING:  96132 x 1 Units    *Code 89659 Neuropsychological testing evaluation services include: Integration of patient data, interpretation of standardized test results and clinical

## 2025-04-09 ENCOUNTER — TRANSCRIBE ORDERS (OUTPATIENT)
Facility: HOSPITAL | Age: 75
End: 2025-04-09

## 2025-04-09 ENCOUNTER — HOSPITAL ENCOUNTER (OUTPATIENT)
Facility: HOSPITAL | Age: 75
Discharge: HOME | End: 2025-04-12
Payer: MEDICARE

## 2025-04-09 DIAGNOSIS — K59.04 CHRONIC IDIOPATHIC CONSTIPATION: Primary | ICD-10-CM

## 2025-04-09 DIAGNOSIS — K59.04 CHRONIC IDIOPATHIC CONSTIPATION: ICD-10-CM

## 2025-04-09 PROCEDURE — 74018 RADEX ABDOMEN 1 VIEW: CPT

## 2025-06-24 ENCOUNTER — OFFICE VISIT (OUTPATIENT)
Age: 75
End: 2025-06-24
Payer: MEDICARE

## 2025-06-24 VITALS
DIASTOLIC BLOOD PRESSURE: 72 MMHG | OXYGEN SATURATION: 99 % | SYSTOLIC BLOOD PRESSURE: 122 MMHG | HEART RATE: 78 BPM | RESPIRATION RATE: 20 BRPM

## 2025-06-24 DIAGNOSIS — R26.9 GAIT ABNORMALITY: Primary | ICD-10-CM

## 2025-06-24 DIAGNOSIS — M79.2 NERVE PAIN: ICD-10-CM

## 2025-06-24 DIAGNOSIS — G30.9 ALZHEIMER'S DISEASE, UNSPECIFIED (CODE) (HCC): ICD-10-CM

## 2025-06-24 PROCEDURE — 3074F SYST BP LT 130 MM HG: CPT | Performed by: NURSE PRACTITIONER

## 2025-06-24 PROCEDURE — 3078F DIAST BP <80 MM HG: CPT | Performed by: NURSE PRACTITIONER

## 2025-06-24 PROCEDURE — 1123F ACP DISCUSS/DSCN MKR DOCD: CPT | Performed by: NURSE PRACTITIONER

## 2025-06-24 PROCEDURE — G8421 BMI NOT CALCULATED: HCPCS | Performed by: NURSE PRACTITIONER

## 2025-06-24 PROCEDURE — 1036F TOBACCO NON-USER: CPT | Performed by: NURSE PRACTITIONER

## 2025-06-24 PROCEDURE — 1090F PRES/ABSN URINE INCON ASSESS: CPT | Performed by: NURSE PRACTITIONER

## 2025-06-24 PROCEDURE — G8427 DOCREV CUR MEDS BY ELIG CLIN: HCPCS | Performed by: NURSE PRACTITIONER

## 2025-06-24 PROCEDURE — 1159F MED LIST DOCD IN RCRD: CPT | Performed by: NURSE PRACTITIONER

## 2025-06-24 PROCEDURE — 99215 OFFICE O/P EST HI 40 MIN: CPT | Performed by: NURSE PRACTITIONER

## 2025-06-24 PROCEDURE — G8399 PT W/DXA RESULTS DOCUMENT: HCPCS | Performed by: NURSE PRACTITIONER

## 2025-06-24 PROCEDURE — 1125F AMNT PAIN NOTED PAIN PRSNT: CPT | Performed by: NURSE PRACTITIONER

## 2025-06-24 RX ORDER — MIRABEGRON 50 MG/1
TABLET, FILM COATED, EXTENDED RELEASE ORAL
COMMUNITY
Start: 2024-12-02

## 2025-06-25 NOTE — PROGRESS NOTES
Terri Guillen is a 74 y.o. female who presents with the following  Chief Complaint   Patient presents with    alzheimer's     Patient is accompanied by her , he states she has good days and bad days. She states she does not eat as much as she use to.       HPI       FU with , son   Namenda 10 mg BID   Aricept 10 mg      She does live with her  and son and daughter   They do notice that she has more trouble with short-term  Retaining and expressing  They know that she has no concerns with functional deficits  She is able to do everything by herself  She is eating well  Not driving.      Stress can increase, she gets irritated at her  when he is not around her and doing things with her.       Does not like to do much more then watch tv   Will go on drives with him and likes this  Takes care of her two dogs.   Uses a cane to ambulate for balance.  No falls.         Allergies   Allergen Reactions    Latex Hives    Codeine Other (See Comments)     Severe Headache  Other reaction(s): Other (comments)  Severe Headache  Other reaction(s): Other (comments)  Severe Headache      Morphine Hives    Hydromorphone Other (See Comments)     Memory loss  Memory loss  Other reaction(s): Other (comments)  Memory loss    Shellfish Allergy Hives    Zolpidem Other (See Comments)     Severe behavior changes  Other reaction(s): Other (comments)  Severe behavior changes       Current Outpatient Medications   Medication Sig Dispense Refill    mirabegron (MYRBETRIQ) 50 MG TB24 Take 1 tablet every day by oral route in the morning.      LINZESS 72 MCG CAPS capsule Take 1 capsule by mouth every morning (before breakfast)      memantine (NAMENDA) 10 MG tablet Take 1 tablet by mouth 2 times daily 180 tablet 1    donepezil (ARICEPT) 10 MG tablet Take 1 tablet by mouth daily 90 tablet 1    buPROPion (WELLBUTRIN XL) 300 MG extended release tablet TAKE 1 TABLET EVERY MORNING 90 tablet 3    lisinopril

## 2025-06-27 ENCOUNTER — TELEPHONE (OUTPATIENT)
Age: 75
End: 2025-06-27

## 2025-06-30 ENCOUNTER — TELEPHONE (OUTPATIENT)
Age: 75
End: 2025-06-30

## 2025-06-30 DIAGNOSIS — M79.2 NERVE PAIN: Primary | ICD-10-CM

## 2025-06-30 RX ORDER — DICLOFENAC SODIUM 75 MG/1
75 TABLET, DELAYED RELEASE ORAL 2 TIMES DAILY
Qty: 60 TABLET | Refills: 3 | Status: SHIPPED | OUTPATIENT
Start: 2025-06-30

## 2025-06-30 NOTE — TELEPHONE ENCOUNTER
6/28/25  8:23 PM  As of June 28 2025 [facility] has not received the voltaern prescription Dr Hunter has ordered for Terri Guillen on Jun 24,2025. the [facility] address is [address]     Thanks   Enzo Guillen

## 2025-07-23 ENCOUNTER — HOSPITAL ENCOUNTER (OUTPATIENT)
Facility: HOSPITAL | Age: 75
Discharge: HOME | End: 2025-07-26
Payer: MEDICARE

## 2025-07-23 VITALS — HEIGHT: 64 IN | BODY MASS INDEX: 28.34 KG/M2 | WEIGHT: 166 LBS

## 2025-07-23 DIAGNOSIS — G30.9 ALZHEIMER'S DISEASE, UNSPECIFIED (CODE) (HCC): ICD-10-CM

## 2025-07-23 PROCEDURE — 6360000002 HC RX W HCPCS: Performed by: NURSE PRACTITIONER

## 2025-07-23 PROCEDURE — 78814 PET IMAGE W/CT LMTD: CPT

## 2025-07-23 PROCEDURE — A9586 FLORBETAPIR F18: HCPCS | Performed by: NURSE PRACTITIONER

## 2025-07-23 RX ADMIN — FLORBETAPIR F 18 10 MILLICURIE: 51 INJECTION, SOLUTION INTRAVENOUS at 14:12

## 2025-07-31 ENCOUNTER — HOSPITAL ENCOUNTER (OUTPATIENT)
Facility: HOSPITAL | Age: 75
Discharge: HOME | End: 2025-07-31
Payer: MEDICARE

## 2025-07-31 VITALS
OXYGEN SATURATION: 100 % | WEIGHT: 130.29 LBS | BODY MASS INDEX: 23.09 KG/M2 | HEART RATE: 82 BPM | DIASTOLIC BLOOD PRESSURE: 82 MMHG | TEMPERATURE: 98.4 F | RESPIRATION RATE: 20 BRPM | SYSTOLIC BLOOD PRESSURE: 188 MMHG | HEIGHT: 63 IN

## 2025-07-31 LAB
ALBUMIN SERPL-MCNC: 3.2 G/DL (ref 3.5–5.2)
ALBUMIN/GLOB SERPL: 1 (ref 1.1–2.2)
ALP SERPL-CCNC: 219 U/L (ref 35–104)
ALT SERPL-CCNC: 80 U/L (ref 10–35)
ANION GAP SERPL CALC-SCNC: 12 MMOL/L (ref 2–14)
APPEARANCE UR: CLEAR
AST SERPL-CCNC: 39 U/L (ref 10–35)
BACTERIA URNS QL MICRO: ABNORMAL /HPF
BILIRUB SERPL-MCNC: 0.4 MG/DL (ref 0–1.2)
BILIRUB UR QL: NEGATIVE
BUN SERPL-MCNC: 12 MG/DL (ref 8–23)
BUN/CREAT SERPL: 13 (ref 12–20)
CALCIUM SERPL-MCNC: 9.6 MG/DL (ref 8.8–10.2)
CHLORIDE SERPL-SCNC: 108 MMOL/L (ref 98–107)
CO2 SERPL-SCNC: 22 MMOL/L (ref 20–29)
COLOR UR: ABNORMAL
CREAT SERPL-MCNC: 0.96 MG/DL (ref 0.6–1)
EKG ATRIAL RATE: 67 BPM
EKG DIAGNOSIS: NORMAL
EKG P AXIS: 6 DEGREES
EKG P-R INTERVAL: 136 MS
EKG Q-T INTERVAL: 410 MS
EKG QRS DURATION: 98 MS
EKG QTC CALCULATION (BAZETT): 433 MS
EKG R AXIS: -39 DEGREES
EKG T AXIS: 13 DEGREES
EKG VENTRICULAR RATE: 67 BPM
EPITH CASTS URNS QL MICRO: ABNORMAL /LPF
ERYTHROCYTE [DISTWIDTH] IN BLOOD BY AUTOMATED COUNT: 14.2 % (ref 11.5–14.5)
EST. AVERAGE GLUCOSE BLD GHB EST-MCNC: 107 MG/DL
GLOBULIN SER CALC-MCNC: 3.1 G/DL (ref 2–4)
GLUCOSE SERPL-MCNC: 89 MG/DL (ref 65–100)
GLUCOSE UR STRIP.AUTO-MCNC: NEGATIVE MG/DL
HBA1C MFR BLD: 5.4 % (ref 4–5.6)
HCT VFR BLD AUTO: 33.7 % (ref 35–47)
HGB BLD-MCNC: 10.7 G/DL (ref 11.5–16)
HGB UR QL STRIP: NEGATIVE
INR PPP: 1 (ref 0.9–1.1)
KETONES UR QL STRIP.AUTO: ABNORMAL MG/DL
LEUKOCYTE ESTERASE UR QL STRIP.AUTO: NEGATIVE
MCH RBC QN AUTO: 30 PG (ref 26–34)
MCHC RBC AUTO-ENTMCNC: 31.8 G/DL (ref 30–36.5)
MCV RBC AUTO: 94.4 FL (ref 80–99)
NITRITE UR QL STRIP.AUTO: POSITIVE
NRBC # BLD: 0 K/UL (ref 0–0.01)
NRBC BLD-RTO: 0 PER 100 WBC
PH UR STRIP: 6 (ref 5–8)
PLATELET # BLD AUTO: 484 K/UL (ref 150–400)
PMV BLD AUTO: 9.4 FL (ref 8.9–12.9)
POTASSIUM SERPL-SCNC: 4 MMOL/L (ref 3.5–5.1)
PROT SERPL-MCNC: 6.3 G/DL (ref 6.4–8.3)
PROT UR STRIP-MCNC: 30 MG/DL
PROTHROMBIN TIME: 10.8 SEC (ref 9.2–11.2)
RBC # BLD AUTO: 3.57 M/UL (ref 3.8–5.2)
RBC #/AREA URNS HPF: ABNORMAL /HPF (ref 0–5)
SODIUM SERPL-SCNC: 141 MMOL/L (ref 136–145)
SP GR UR REFRACTOMETRY: 1.02
URINE CULTURE IF INDICATED: ABNORMAL
UROBILINOGEN UR QL STRIP.AUTO: 1 EU/DL (ref 0.2–1)
WBC # BLD AUTO: 5.9 K/UL (ref 3.6–11)
WBC URNS QL MICRO: ABNORMAL /HPF (ref 0–4)

## 2025-07-31 PROCEDURE — 83036 HEMOGLOBIN GLYCOSYLATED A1C: CPT

## 2025-07-31 PROCEDURE — 80053 COMPREHEN METABOLIC PANEL: CPT

## 2025-07-31 PROCEDURE — 87086 URINE CULTURE/COLONY COUNT: CPT

## 2025-07-31 PROCEDURE — 97161 PT EVAL LOW COMPLEX 20 MIN: CPT

## 2025-07-31 PROCEDURE — 87088 URINE BACTERIA CULTURE: CPT

## 2025-07-31 PROCEDURE — 81001 URINALYSIS AUTO W/SCOPE: CPT

## 2025-07-31 PROCEDURE — 85027 COMPLETE CBC AUTOMATED: CPT

## 2025-07-31 PROCEDURE — 36415 COLL VENOUS BLD VENIPUNCTURE: CPT

## 2025-07-31 PROCEDURE — 85610 PROTHROMBIN TIME: CPT

## 2025-07-31 PROCEDURE — 87186 SC STD MICRODIL/AGAR DIL: CPT

## 2025-07-31 PROCEDURE — 97530 THERAPEUTIC ACTIVITIES: CPT

## 2025-07-31 RX ORDER — PRUCALOPRIDE 2 MG/1
2 TABLET, FILM COATED ORAL DAILY
COMMUNITY

## 2025-07-31 RX ORDER — ROSUVASTATIN CALCIUM 20 MG/1
20 TABLET, COATED ORAL DAILY
COMMUNITY

## 2025-07-31 RX ORDER — CEFAZOLIN SODIUM/WATER 2 G/20 ML
2000 SYRINGE (ML) INTRAVENOUS ONCE
OUTPATIENT
Start: 2025-08-07

## 2025-07-31 RX ORDER — ACETAMINOPHEN 500 MG
1000 TABLET ORAL ONCE
OUTPATIENT
Start: 2025-08-07

## 2025-07-31 RX ORDER — CELECOXIB 200 MG/1
400 CAPSULE ORAL ONCE
OUTPATIENT
Start: 2025-08-07

## 2025-07-31 RX ORDER — SODIUM CHLORIDE, SODIUM LACTATE, POTASSIUM CHLORIDE, CALCIUM CHLORIDE 600; 310; 30; 20 MG/100ML; MG/100ML; MG/100ML; MG/100ML
INJECTION, SOLUTION INTRAVENOUS CONTINUOUS
OUTPATIENT
Start: 2025-08-07

## 2025-07-31 ASSESSMENT — PAIN DESCRIPTION - DESCRIPTORS: DESCRIPTORS: STABBING

## 2025-07-31 ASSESSMENT — PAIN DESCRIPTION - PAIN TYPE: TYPE: ACUTE PAIN

## 2025-07-31 ASSESSMENT — HOOS JR
HOOS JR TOTAL INTERVAL SCORE: 36.363
HOOS JR RAW SCORE: 17
LYING IN BED (TURNING OVER, MAINTAINING HIP POSITION): EXTREME
WALKING ON UNEVEN SURFACE: SEVERE
BENDING TO THE FLOOR TO PICK UP OBJECT: SEVERE
HOOS JR RAW SCORE: 17
SITTING: SEVERE
RISING FROM SITTING: MODERATE
GOING UP OR DOWN STAIRS: MODERATE

## 2025-07-31 ASSESSMENT — PAIN DESCRIPTION - LOCATION: LOCATION: HIP

## 2025-07-31 ASSESSMENT — PROMIS GLOBAL HEALTH SCALE
IN GENERAL, WOULD YOU SAY YOUR QUALITY OF LIFE IS...[ON A SCALE OF 1 (POOR) TO 5 (EXCELLENT)]: FAIR
IN THE PAST 7 DAYS, HOW WOULD YOU RATE YOUR PAIN ON AVERAGE [ON A SCALE FROM 0 (NO PAIN) TO 10 (WORST IMAGINABLE PAIN)]?: 10 WORST IMAGINABLE PAIN
TO WHAT EXTENT ARE YOU ABLE TO CARRY OUT YOUR EVERYDAY PHYSICAL ACTIVITIES SUCH AS WALKING, CLIMBING STAIRS, CARRYING GROCERIES, OR MOVING A CHAIR [ON A SCALE OF 1 (NOT AT ALL) TO 5 (COMPLETELY)]?: A LITTLE
IN GENERAL, HOW WOULD YOU RATE YOUR PHYSICAL HEALTH [ON A SCALE OF 1 (POOR) TO 5 (EXCELLENT)]?: FAIR
IN GENERAL, HOW WOULD YOU RATE YOUR MENTAL HEALTH, INCLUDING YOUR MOOD AND YOUR ABILITY TO THINK [ON A SCALE OF 1 (POOR) TO 5 (EXCELLENT)]?: FAIR
IN GENERAL, WOULD YOU SAY YOUR HEALTH IS...[ON A SCALE OF 1 (POOR) TO 5 (EXCELLENT)]: FAIR
IN GENERAL, PLEASE RATE HOW WELL YOU CARRY OUT YOUR USUAL SOCIAL ACTIVITIES (INCLUDES ACTIVITIES AT HOME, AT WORK, AND IN YOUR COMMUNITY, AND RESPONSIBILITIES AS A PARENT, CHILD, SPOUSE, EMPLOYEE, FRIEND, ETC) [ON A SCALE OF 1 (POOR) TO 5 (EXCELLENT)]?: FAIR
SUM OF RESPONSES TO QUESTIONS 2, 4, 5, & 10: 8
IN THE PAST 7 DAYS, HOW WOULD YOU RATE YOUR FATIGUE ON AVERAGE [ON A SCALE FROM 1 (NONE) TO 5 (VERY SEVERE)]?: SEVERE
SUM OF RESPONSES TO QUESTIONS 3, 6, 7, & 8: 16
IN GENERAL, HOW WOULD YOU RATE YOUR SATISFACTION WITH YOUR SOCIAL ACTIVITIES AND RELATIONSHIPS [ON A SCALE OF 1 (POOR) TO 5 (EXCELLENT)]?: FAIR
IN THE PAST 7 DAYS, HOW OFTEN HAVE YOU BEEN BOTHERED BY EMOTIONAL PROBLEMS, SUCH AS FEELING ANXIOUS, DEPRESSED, OR IRRITABLE [ON A SCALE FROM 1 (NEVER) TO 5 (ALWAYS)]?: OFTEN
WHO IS THE PERSON COMPLETING THE PROMIS V1.1 SURVEY?: SELF
HOW IS THE PROMIS V1.1 BEING ADMINISTERED?: PAPER

## 2025-07-31 ASSESSMENT — PAIN DESCRIPTION - FREQUENCY: FREQUENCY: CONTINUOUS

## 2025-07-31 ASSESSMENT — PAIN SCALES - GENERAL: PAINLEVEL_OUTOF10: 10

## 2025-07-31 ASSESSMENT — PAIN DESCRIPTION - ORIENTATION: ORIENTATION: RIGHT

## 2025-07-31 ASSESSMENT — PAIN DESCRIPTION - ONSET: ONSET: AWAKENED FROM SLEEP

## 2025-07-31 NOTE — PROGRESS NOTES

## 2025-07-31 NOTE — PROGRESS NOTES
[facility]  Physical Therapy Pre-surgery evaluation  [address] [place] [address]    PHYSICAL THERAPY PRE THR SURGERY EVALUATION    Date: 2025  Patient: Terri Guillen (74 y.o. female)  : 1950  Medical Diagnosis: No admission diagnoses are documented for this encounter.  Procedure(s) (LRB):  RIGHT TOTAL HIP ARTHROPLASTY ANTERIOR APPROACH (Right)     Treatment Diagnosis: M25.551  RIGHT HIP PAIN    Referral Source: Kvng Campos MD  Provider #: Kvng Campos   NPI #: [id number]   Precautions:    anterior hip    ASSESSMENT :  Based on the objective data described below, the patient presents with impaired gait, balance, pain, and overall high level functional mobility due to end stage degenerative joint disease in the  right hip.     Discussed anticipated disposition to home with possible discharge within a 0 to 2 day time frame post-surgery. Patient's  was present for the session. Patient and  in agreement.  Patient has been educated on the recommendation for reliable help following surgery and has arranged for at least 24 hours of care after discharge.        The patient indicated she is  interested to discharge day of surgery if all discharge criteria met. Patient voiced good  understanding of therapy specific criteria to discharge day of surgery. Patient with fair potential for discharging same day of surgery based on PMH.    Fast Track pathway: [] YES [] NO   Patient agrees to arrange at least 24 hours of care following surgery: [] YES [] NO   Patient has outpatient PT appointments scheduled:  [] YES [] NO       GOALS: (Goals have been discussed and agreed upon with patient.)  DISCHARGE GOALS: Time Frame: 1 DAY  Patient will demonstrate increased strength, range of motion, and pain control via a home exercise program in order to minimize functional deficits in preparation for their upcoming surgery. This will be achieved by using education,  Spinal stenosis of lumbar region with neurogenic claudication 08/10/2017    Vitamin D deficiency 11/18/2021     Past Surgical History:   Procedure Laterality Date    CERVICAL FUSION  2011    CHOLECYSTECTOMY  2006    COLONOSCOPY  11/2010    Dr. Topete-     COLONOSCOPY N/A 6/21/2016    COLONOSCOPY performed by Jose Topete MD at [facility] ENDOSCOPY    COLONOSCOPY N/A 6/27/2018    COLONOSCOPY performed by Jose Topete MD at [facility] ENDOSCOPY    COLONOSCOPY N/A 06/21/2023    COLONOSCOPY performed by Jose Topete MD at [facility] ENDOSCOPY    GI  X3  LAST ONE 12/10    COLONOSCOPY    GYN  1982    D&C WITH SUCTION    HYSTERECTOMY (CERVIX STATUS UNKNOWN)      IR FLUOROSCOPY GUIDED SPINAL INJECTION  2/5/2021    IR FLUOROSCOPY GUIDED SPINAL INJECTION  1/8/2021    IR FLUOROSCOPY GUIDED SPINAL INJECTION  8/28/2020    LUMBAR FUSION  2017    L4-L5    ORTHOPEDIC SURGERY      right foot surgery    TONSILLECTOMY      TOTAL COLECTOMY  2007    Dr. Sarah/ diverticulitis/ removed a foot of colon    UPPER GASTROINTESTINAL ENDOSCOPY         Prior Level of Function/Home Situation: uses cane all the time, no falls, states she uses cane for balance; does own ADLS; little to no IADLs; son lives with them and does most of IADLS  Personal factors and/or comorbidities impacting plan of care: previous neck and back surgeries (had two part surgery with Dr. Reyes and did not do well with the 2 consecutive days of anesthesia - was prolonged in hosp; memory issues    Home Situation:  Type of Home: House  Home Layout: One level;   Home Access: 1 steps to enter with ramp access up to that step in the mud room  Lives with: spouse and son  Home Equipment: straight cane, rollator, and rolling walker (they think the walker is a RW but they will check)  Bathroom Set up: walk in shower       EXAMINATION/PRESENTATION/DECISION MAKING:     ADLs (Current Functional Status):   Bathing/Showering:   [x] Independent  [] Requires Assistance from Someone  []

## 2025-07-31 NOTE — PERIOP NOTE
Pt had an elevated BP during PAT visit, but pt was rating pain 10/10 and had forgotten to take her BP medication this morning. Laura Chaudhary, NP, aware and okay witih vital signs because of before mentioned information. Pt verbalizes understanding that she needs to make sure that she remembers her BP meds daily, especially on DOS.

## 2025-07-31 NOTE — PROGRESS NOTES
[facility]  Joint/Spine Preoperative Instructions        Surgery Date 08/07/25          Time of Arrival To be called by 08/06/25 after 2pm  Contact: [phone]   1. On the day of your surgery, please report to the Surgical Services Registration Desk and sign in at your designated time. The Surgery Center is located to the right of the Emergency Room.     2. You must have someone with you to drive you home. You should not drive a car for 24 hours following surgery. Please make arrangements for a friend or family member to stay with you for the first 24 hours after your surgery.    3. No food after midnight 08/06/25.  Medications morning of surgery should be taken with a sip of water.  Please follow pre-surgery drink instructions that were given at your Pre Admission Testing appointment.      4. We recommend you do not drink any alcoholic beverages for 24 hours before and after your surgery.    5. Contact your surgeon’s office for instructions on the following medications: non-steroidal anti-inflammatory drugs (i.e. Advil, Aleve), vitamins, and supplements. (Some surgeon’s will want you to stop these medications prior to surgery and others may allow you to take them)  **If you are currently taking Plavix, Coumadin, Aspirin and/or other blood-thinning agents, contact your surgeon for instructions.** Your surgeon will partner with the physician prescribing these medications to determine if it is safe to stop or if you need to continue taking.  Please do not stop taking these medications without instructions from your surgeon    6. Wear comfortable clothes.  Wear glasses instead of contacts.  Do not bring any money or jewelry. Please bring picture ID, insurance card, and any prearranged co-payment or hospital payment.  Do not wear make-up, particularly mascara the morning of your surgery.  Do not wear nail polish, particularly if you are having foot /hand surgery.  Wear your hair loose

## 2025-07-31 NOTE — PERIOP NOTE
[facility] Nurse Practitioner   Pre-Operative Chart Review/Assessment            Patient Name:  Terri Guillen          Age:   74 y.o.    :  1950    Surgeon:   Andres     Primary Care Provider:    Humera Pereira FNP    Date of PAT:  25     Date of Surgery:    2025     Procedure(s):  RIGHT TOTAL HIP ARTHROPLASTY ANTERIOR APPROACH     Anesthesia:  Choice     Pertinent Medical History:         Anemia         Memory issues         CKD stage 2           Allergies:    Allergies   Allergen Reactions    Latex Hives    Codeine Other (See Comments)     Severe Headache  Other reaction(s): Other (comments)  Severe Headache  Other reaction(s): Other (comments)  Severe Headache      Morphine Hives    Hydromorphone Other (See Comments)     Memory loss  Memory loss  Other reaction(s): Other (comments)  Memory loss    Shellfish Allergy Hives    Zolpidem Other (See Comments)     Severe behavior changes  Other reaction(s): Other (comments)  Severe behavior changes              Chart Review and Plan:     Pertinent abnormal Lab findings: *** +UA---> Staph.  Bactrim 800/160 BID x 5 days to pharmacy on file.  Patient notified, hydration encouraged.     mildly elevated LFTs.  These were sent forward to PCP for review.       1)  Cardiac Review :  Denies active cardiac symptoms, limited functional status due to hip discomfort.     EKG ***       2)  Sleep apnea screening:  STOP BANG: 3      3)  Program for Diabetes Health Consult:  Not indicated, A1c 5.4       5) Discharge plan: uses cane, no falls; does own ADLs, little to no IADLs; lives in one level home, 1 step to enter; with spouse and son.  Home equipment includes straight cane, rollator, and RW.       6) Medication recommendations prior to surgery:   TAKE ALL MEDICATIONS THE DAY OF SURGERY EXCEPT: Diclofenac, multivitamin(last day 25), Tylenol(last day 25)      7) Additional Concerns:  BP was elevated today, endorses pain and not takign her BP medications  VIEW)    INDICATION: Abdominal distention.    COMPARISON: None.    TECHNIQUE: Supine frontal abdomen (KUB).    FINDINGS: Prior cholecystectomy. Prior lower back surgery. Gas pattern shows  some mild fecal stasis nonspecific otherwise.    Impression  Mild fecal stasis.      Electronically signed by Gorge Stanford MD         Skin:  Denies open wounds, cuts, sores, rashes or other areas of concern in PAT assessment.          Laura Chaudhary, MSN, APRN  Nurse Practitioner for Pre-Admission Testing  [phone]

## 2025-07-31 NOTE — PROGRESS NOTES
The [facility] \"Your Path to a More Active Life\" orthopedic total knee or total hip educational video and the [facility] patient handbook provided & reviewed during the patients pre-admission testing (PAT) appointment. An opportunity for questions was provided, patient verbalized understanding.

## 2025-07-31 NOTE — PROGRESS NOTES
Patient reports during prehab, unsure if has 2-wheel RW at home for use after DOS 8/7/2025.  Call to patient, sp/w son and spouse, states have both 2-wheel RW and Rollator, advised to please bring 2-wheel RW to hospital on DOS.  Both verbalized understanding.  PROM/HOOs completed 7/31/2025.  HIP DISABILITY AND OSTEOARTHRITIS OUTCOME SCORE    Pain - What amount of hip pain have you experienced the last week during the following activities?  1. Going up or down stairs: 2  2. Walking on an uneven surface: 3        Function, daily living - Please indicate the degree of difficulty you have experienced in the last week due to your hip  3. Rising from sitting : 2  4. Bending to the floor/ an object: 3  5. Lying in bed (turning over, maintaining hip position): 4  6. Sitting : 3        Raw Score  HOOS Jr. Raw Score: 17      PROMIS Questions    In general, would you say your health is:: 2    In general, would you say your quality of life is:: 2    In general, how would you rate your physical health?: 2    In general, how would you rate your mental health, including your mood and your ability to think?: 2    To what extent are you able to carry out your everyday physical activities such as walking, climbing stairs, carrying groceries, or moving a chair?: 2    In the past 7 days how often have you been bothered by emotional problems such as feeling anxious, depressed or irritable?: 2    In the past 7 days how would you rate your fatigue on average?: 2    In the past 7 days how would you rate your pain on average?: 10    In general, please rate how well you carry out your usual social activities and roles. (This includes activities at home, at work and in your community, and responsibilities as a parent, child, spouse, employee, friend, etc.): 2    In general, how would you rate your satisfaction with your social activities and relationships?: 2    How comfortable are you filling out medical forms by yourself?: 3    What

## 2025-07-31 NOTE — PROGRESS NOTES

## 2025-08-01 DIAGNOSIS — N30.00 ACUTE CYSTITIS WITHOUT HEMATURIA: Primary | ICD-10-CM

## 2025-08-01 RX ORDER — SULFAMETHOXAZOLE AND TRIMETHOPRIM 800; 160 MG/1; MG/1
1 TABLET ORAL 2 TIMES DAILY
Qty: 10 TABLET | Refills: 0 | Status: SHIPPED | OUTPATIENT
Start: 2025-08-01 | End: 2025-08-06

## 2025-08-02 LAB
BACTERIA SPEC CULT: ABNORMAL
CC UR VC: ABNORMAL
SERVICE CMNT-IMP: ABNORMAL

## 2025-08-05 LAB
EKG ATRIAL RATE: 67 BPM
EKG DIAGNOSIS: NORMAL
EKG P AXIS: 6 DEGREES
EKG P-R INTERVAL: 136 MS
EKG Q-T INTERVAL: 410 MS
EKG QRS DURATION: 98 MS
EKG QTC CALCULATION (BAZETT): 433 MS
EKG R AXIS: -39 DEGREES
EKG T AXIS: 13 DEGREES
EKG VENTRICULAR RATE: 67 BPM

## 2025-08-07 ENCOUNTER — ANESTHESIA (OUTPATIENT)
Facility: HOSPITAL | Age: 75
End: 2025-08-07
Payer: MEDICARE

## 2025-08-07 ENCOUNTER — ANESTHESIA EVENT (OUTPATIENT)
Facility: HOSPITAL | Age: 75
End: 2025-08-07
Payer: MEDICARE

## 2025-08-07 ENCOUNTER — APPOINTMENT (OUTPATIENT)
Facility: HOSPITAL | Age: 75
End: 2025-08-07
Attending: ORTHOPAEDIC SURGERY
Payer: MEDICARE

## 2025-08-07 ENCOUNTER — HOSPITAL ENCOUNTER (OUTPATIENT)
Facility: HOSPITAL | Age: 75
Setting detail: OBSERVATION
Discharge: HOME | End: 2025-08-08
Attending: ORTHOPAEDIC SURGERY | Admitting: ORTHOPAEDIC SURGERY
Payer: MEDICARE

## 2025-08-07 DIAGNOSIS — Z96.641 S/P TOTAL RIGHT HIP ARTHROPLASTY: Primary | ICD-10-CM

## 2025-08-07 PROCEDURE — 7100000000 HC PACU RECOVERY - FIRST 15 MIN: Performed by: ORTHOPAEDIC SURGERY

## 2025-08-07 PROCEDURE — 2720000010 HC SURG SUPPLY STERILE: Performed by: ORTHOPAEDIC SURGERY

## 2025-08-07 PROCEDURE — 6370000000 HC RX 637 (ALT 250 FOR IP): Performed by: ORTHOPAEDIC SURGERY

## 2025-08-07 PROCEDURE — 97530 THERAPEUTIC ACTIVITIES: CPT

## 2025-08-07 PROCEDURE — 73501 X-RAY EXAM HIP UNI 1 VIEW: CPT

## 2025-08-07 PROCEDURE — G0378 HOSPITAL OBSERVATION PER HR: HCPCS

## 2025-08-07 PROCEDURE — 3600000004 HC SURGERY LEVEL 4 BASE: Performed by: ORTHOPAEDIC SURGERY

## 2025-08-07 PROCEDURE — 6360000002 HC RX W HCPCS: Performed by: ORTHOPAEDIC SURGERY

## 2025-08-07 PROCEDURE — 2709999900 HC NON-CHARGEABLE SUPPLY: Performed by: ORTHOPAEDIC SURGERY

## 2025-08-07 PROCEDURE — 6360000002 HC RX W HCPCS: Performed by: NURSE ANESTHETIST, CERTIFIED REGISTERED

## 2025-08-07 PROCEDURE — 6360000002 HC RX W HCPCS: Performed by: ANESTHESIOLOGY

## 2025-08-07 PROCEDURE — 97535 SELF CARE MNGMENT TRAINING: CPT

## 2025-08-07 PROCEDURE — 2500000003 HC RX 250 WO HCPCS: Performed by: NURSE ANESTHETIST, CERTIFIED REGISTERED

## 2025-08-07 PROCEDURE — 97166 OT EVAL MOD COMPLEX 45 MIN: CPT

## 2025-08-07 PROCEDURE — 7100000001 HC PACU RECOVERY - ADDTL 15 MIN: Performed by: ORTHOPAEDIC SURGERY

## 2025-08-07 PROCEDURE — 64447 NJX AA&/STRD FEMORAL NRV IMG: CPT | Performed by: ANESTHESIOLOGY

## 2025-08-07 PROCEDURE — 3600000014 HC SURGERY LEVEL 4 ADDTL 15MIN: Performed by: ORTHOPAEDIC SURGERY

## 2025-08-07 PROCEDURE — 2500000003 HC RX 250 WO HCPCS: Performed by: ORTHOPAEDIC SURGERY

## 2025-08-07 PROCEDURE — 2580000003 HC RX 258: Performed by: STUDENT IN AN ORGANIZED HEALTH CARE EDUCATION/TRAINING PROGRAM

## 2025-08-07 PROCEDURE — 97162 PT EVAL MOD COMPLEX 30 MIN: CPT

## 2025-08-07 PROCEDURE — APPNB180 APP NON BILLABLE TIME > 60 MINS: Performed by: PHYSICIAN ASSISTANT

## 2025-08-07 PROCEDURE — 97116 GAIT TRAINING THERAPY: CPT

## 2025-08-07 PROCEDURE — 76000 FLUOROSCOPY <1 HR PHYS/QHP: CPT

## 2025-08-07 PROCEDURE — 2580000003 HC RX 258: Performed by: NURSE ANESTHETIST, CERTIFIED REGISTERED

## 2025-08-07 PROCEDURE — 2580000003 HC RX 258: Performed by: ORTHOPAEDIC SURGERY

## 2025-08-07 PROCEDURE — 3700000001 HC ADD 15 MINUTES (ANESTHESIA): Performed by: ORTHOPAEDIC SURGERY

## 2025-08-07 PROCEDURE — 3700000000 HC ANESTHESIA ATTENDED CARE: Performed by: ORTHOPAEDIC SURGERY

## 2025-08-07 RX ORDER — SODIUM CHLORIDE 0.9 % (FLUSH) 0.9 %
5-40 SYRINGE (ML) INJECTION PRN
Status: DISCONTINUED | OUTPATIENT
Start: 2025-08-07 | End: 2025-08-08 | Stop reason: HOSPADM

## 2025-08-07 RX ORDER — SODIUM CHLORIDE 9 MG/ML
INJECTION, SOLUTION INTRAVENOUS PRN
Status: DISCONTINUED | OUTPATIENT
Start: 2025-08-07 | End: 2025-08-07 | Stop reason: HOSPADM

## 2025-08-07 RX ORDER — ONDANSETRON 2 MG/ML
INJECTION INTRAMUSCULAR; INTRAVENOUS
Status: DISCONTINUED | OUTPATIENT
Start: 2025-08-07 | End: 2025-08-07 | Stop reason: SDUPTHER

## 2025-08-07 RX ORDER — TRANEXAMIC ACID 100 MG/ML
INJECTION, SOLUTION INTRAVENOUS
Status: DISCONTINUED | OUTPATIENT
Start: 2025-08-07 | End: 2025-08-07 | Stop reason: SDUPTHER

## 2025-08-07 RX ORDER — POLYETHYLENE GLYCOL 3350 17 G/17G
17 POWDER, FOR SOLUTION ORAL DAILY
Status: DISCONTINUED | OUTPATIENT
Start: 2025-08-07 | End: 2025-08-08 | Stop reason: HOSPADM

## 2025-08-07 RX ORDER — DONEPEZIL HYDROCHLORIDE 5 MG/1
10 TABLET, FILM COATED ORAL DAILY
Status: DISCONTINUED | OUTPATIENT
Start: 2025-08-08 | End: 2025-08-08 | Stop reason: HOSPADM

## 2025-08-07 RX ORDER — SODIUM CHLORIDE 0.9 % (FLUSH) 0.9 %
5-40 SYRINGE (ML) INJECTION EVERY 12 HOURS SCHEDULED
Status: DISCONTINUED | OUTPATIENT
Start: 2025-08-07 | End: 2025-08-07 | Stop reason: HOSPADM

## 2025-08-07 RX ORDER — MEMANTINE HYDROCHLORIDE 10 MG/1
10 TABLET ORAL 2 TIMES DAILY
Status: DISCONTINUED | OUTPATIENT
Start: 2025-08-07 | End: 2025-08-08 | Stop reason: HOSPADM

## 2025-08-07 RX ORDER — BUPIVACAINE HYDROCHLORIDE 5 MG/ML
INJECTION, SOLUTION EPIDURAL; INTRACAUDAL PRN
Status: DISCONTINUED | OUTPATIENT
Start: 2025-08-07 | End: 2025-08-07 | Stop reason: ALTCHOICE

## 2025-08-07 RX ORDER — PROCHLORPERAZINE EDISYLATE 5 MG/ML
5 INJECTION INTRAMUSCULAR; INTRAVENOUS
Status: DISCONTINUED | OUTPATIENT
Start: 2025-08-07 | End: 2025-08-07 | Stop reason: HOSPADM

## 2025-08-07 RX ORDER — DIPHENHYDRAMINE HCL 25 MG
25 CAPSULE ORAL EVERY 6 HOURS PRN
Status: DISCONTINUED | OUTPATIENT
Start: 2025-08-07 | End: 2025-08-08 | Stop reason: HOSPADM

## 2025-08-07 RX ORDER — DEXAMETHASONE SODIUM PHOSPHATE 4 MG/ML
INJECTION, SOLUTION INTRA-ARTICULAR; INTRALESIONAL; INTRAMUSCULAR; INTRAVENOUS; SOFT TISSUE
Status: DISCONTINUED | OUTPATIENT
Start: 2025-08-07 | End: 2025-08-07 | Stop reason: SDUPTHER

## 2025-08-07 RX ORDER — BUPROPION HYDROCHLORIDE 150 MG/1
300 TABLET ORAL EVERY MORNING
Status: DISCONTINUED | OUTPATIENT
Start: 2025-08-08 | End: 2025-08-08 | Stop reason: HOSPADM

## 2025-08-07 RX ORDER — ASPIRIN 81 MG/1
81 TABLET ORAL 2 TIMES DAILY
Qty: 60 TABLET | Refills: 0 | Status: SHIPPED | OUTPATIENT
Start: 2025-08-07 | End: 2025-09-06

## 2025-08-07 RX ORDER — CELECOXIB 200 MG/1
400 CAPSULE ORAL ONCE
Status: COMPLETED | OUTPATIENT
Start: 2025-08-07 | End: 2025-08-07

## 2025-08-07 RX ORDER — TRAMADOL HYDROCHLORIDE 50 MG/1
100 TABLET ORAL EVERY 6 HOURS PRN
Status: DISCONTINUED | OUTPATIENT
Start: 2025-08-07 | End: 2025-08-08 | Stop reason: HOSPADM

## 2025-08-07 RX ORDER — SODIUM CHLORIDE, SODIUM LACTATE, POTASSIUM CHLORIDE, CALCIUM CHLORIDE 600; 310; 30; 20 MG/100ML; MG/100ML; MG/100ML; MG/100ML
INJECTION, SOLUTION INTRAVENOUS CONTINUOUS
Status: DISCONTINUED | OUTPATIENT
Start: 2025-08-07 | End: 2025-08-07 | Stop reason: HOSPADM

## 2025-08-07 RX ORDER — FAMOTIDINE 20 MG/1
20 TABLET, FILM COATED ORAL 2 TIMES DAILY
Status: DISCONTINUED | OUTPATIENT
Start: 2025-08-07 | End: 2025-08-08 | Stop reason: HOSPADM

## 2025-08-07 RX ORDER — ROSUVASTATIN CALCIUM 20 MG/1
20 TABLET, COATED ORAL DAILY
Status: DISCONTINUED | OUTPATIENT
Start: 2025-08-08 | End: 2025-08-08 | Stop reason: HOSPADM

## 2025-08-07 RX ORDER — 0.9 % SODIUM CHLORIDE 0.9 %
500 INTRAVENOUS SOLUTION INTRAVENOUS ONCE AS NEEDED
Status: DISCONTINUED | OUTPATIENT
Start: 2025-08-07 | End: 2025-08-08 | Stop reason: HOSPADM

## 2025-08-07 RX ORDER — DIPHENHYDRAMINE HYDROCHLORIDE 50 MG/ML
25 INJECTION, SOLUTION INTRAMUSCULAR; INTRAVENOUS EVERY 6 HOURS PRN
Status: DISCONTINUED | OUTPATIENT
Start: 2025-08-07 | End: 2025-08-08 | Stop reason: HOSPADM

## 2025-08-07 RX ORDER — SODIUM CHLORIDE, SODIUM LACTATE, POTASSIUM CHLORIDE, CALCIUM CHLORIDE 600; 310; 30; 20 MG/100ML; MG/100ML; MG/100ML; MG/100ML
INJECTION, SOLUTION INTRAVENOUS
Status: DISCONTINUED | OUTPATIENT
Start: 2025-08-07 | End: 2025-08-07 | Stop reason: SDUPTHER

## 2025-08-07 RX ORDER — ACETAMINOPHEN 325 MG/1
650 TABLET ORAL EVERY 6 HOURS
Status: DISCONTINUED | OUTPATIENT
Start: 2025-08-07 | End: 2025-08-08 | Stop reason: HOSPADM

## 2025-08-07 RX ORDER — TRAMADOL HYDROCHLORIDE 50 MG/1
50 TABLET ORAL EVERY 6 HOURS PRN
Status: DISCONTINUED | OUTPATIENT
Start: 2025-08-07 | End: 2025-08-08 | Stop reason: HOSPADM

## 2025-08-07 RX ORDER — TROSPIUM CHLORIDE 20 MG/1
20 TABLET, FILM COATED ORAL NIGHTLY
Status: DISCONTINUED | OUTPATIENT
Start: 2025-08-08 | End: 2025-08-08 | Stop reason: HOSPADM

## 2025-08-07 RX ORDER — ASPIRIN 81 MG/1
81 TABLET ORAL 2 TIMES DAILY
Status: DISCONTINUED | OUTPATIENT
Start: 2025-08-07 | End: 2025-08-08 | Stop reason: HOSPADM

## 2025-08-07 RX ORDER — BUPIVACAINE HYDROCHLORIDE 5 MG/ML
INJECTION, SOLUTION EPIDURAL; INTRACAUDAL; PERINEURAL
Status: DISCONTINUED | OUTPATIENT
Start: 2025-08-07 | End: 2025-08-07 | Stop reason: SDUPTHER

## 2025-08-07 RX ORDER — LISINOPRIL 20 MG/1
20 TABLET ORAL DAILY
Status: DISCONTINUED | OUTPATIENT
Start: 2025-08-08 | End: 2025-08-08 | Stop reason: HOSPADM

## 2025-08-07 RX ORDER — SODIUM CHLORIDE 9 MG/ML
INJECTION, SOLUTION INTRAVENOUS CONTINUOUS
Status: DISCONTINUED | OUTPATIENT
Start: 2025-08-07 | End: 2025-08-08 | Stop reason: HOSPADM

## 2025-08-07 RX ORDER — ONDANSETRON 4 MG/1
4 TABLET, ORALLY DISINTEGRATING ORAL EVERY 8 HOURS PRN
Status: DISCONTINUED | OUTPATIENT
Start: 2025-08-07 | End: 2025-08-08 | Stop reason: HOSPADM

## 2025-08-07 RX ORDER — FENTANYL CITRATE 50 UG/ML
INJECTION, SOLUTION INTRAMUSCULAR; INTRAVENOUS
Status: DISCONTINUED | OUTPATIENT
Start: 2025-08-07 | End: 2025-08-07 | Stop reason: SDUPTHER

## 2025-08-07 RX ORDER — PROPOFOL 10 MG/ML
INJECTION, EMULSION INTRAVENOUS
Status: DISCONTINUED | OUTPATIENT
Start: 2025-08-07 | End: 2025-08-07 | Stop reason: SDUPTHER

## 2025-08-07 RX ORDER — PRUCALOPRIDE 2 MG/1
2 TABLET, FILM COATED ORAL DAILY
Status: DISCONTINUED | OUTPATIENT
Start: 2025-08-08 | End: 2025-08-08 | Stop reason: HOSPADM

## 2025-08-07 RX ORDER — SODIUM CHLORIDE 0.9 % (FLUSH) 0.9 %
5-40 SYRINGE (ML) INJECTION EVERY 12 HOURS SCHEDULED
Status: DISCONTINUED | OUTPATIENT
Start: 2025-08-07 | End: 2025-08-08 | Stop reason: HOSPADM

## 2025-08-07 RX ORDER — ACETAMINOPHEN 650 MG
TABLET, EXTENDED RELEASE ORAL PRN
Status: DISCONTINUED | OUTPATIENT
Start: 2025-08-07 | End: 2025-08-07 | Stop reason: ALTCHOICE

## 2025-08-07 RX ORDER — PANTOPRAZOLE SODIUM 40 MG/1
40 TABLET, DELAYED RELEASE ORAL
Status: DISCONTINUED | OUTPATIENT
Start: 2025-08-08 | End: 2025-08-08 | Stop reason: HOSPADM

## 2025-08-07 RX ORDER — DEXMEDETOMIDINE HYDROCHLORIDE 100 UG/ML
INJECTION, SOLUTION INTRAVENOUS
Status: DISCONTINUED | OUTPATIENT
Start: 2025-08-07 | End: 2025-08-07 | Stop reason: SDUPTHER

## 2025-08-07 RX ORDER — SODIUM CHLORIDE 0.9 % (FLUSH) 0.9 %
5-40 SYRINGE (ML) INJECTION PRN
Status: DISCONTINUED | OUTPATIENT
Start: 2025-08-07 | End: 2025-08-07 | Stop reason: HOSPADM

## 2025-08-07 RX ORDER — FENTANYL CITRATE 50 UG/ML
50 INJECTION, SOLUTION INTRAMUSCULAR; INTRAVENOUS EVERY 5 MIN PRN
Status: COMPLETED | OUTPATIENT
Start: 2025-08-07 | End: 2025-08-07

## 2025-08-07 RX ORDER — PHENYLEPHRINE HYDROCHLORIDE 10 MG/ML
INJECTION INTRAVENOUS
Status: DISCONTINUED | OUTPATIENT
Start: 2025-08-07 | End: 2025-08-07 | Stop reason: SDUPTHER

## 2025-08-07 RX ORDER — ACETAMINOPHEN 500 MG
1000 TABLET ORAL ONCE
Status: COMPLETED | OUTPATIENT
Start: 2025-08-07 | End: 2025-08-07

## 2025-08-07 RX ORDER — KETOROLAC TROMETHAMINE 30 MG/ML
15 INJECTION, SOLUTION INTRAMUSCULAR; INTRAVENOUS EVERY 6 HOURS PRN
Status: CANCELLED | OUTPATIENT
Start: 2025-08-07 | End: 2025-08-12

## 2025-08-07 RX ORDER — LIDOCAINE HYDROCHLORIDE 20 MG/ML
INJECTION, SOLUTION EPIDURAL; INFILTRATION; INTRACAUDAL; PERINEURAL
Status: DISCONTINUED | OUTPATIENT
Start: 2025-08-07 | End: 2025-08-07 | Stop reason: SDUPTHER

## 2025-08-07 RX ORDER — TRAMADOL HYDROCHLORIDE 50 MG/1
50-100 TABLET ORAL EVERY 6 HOURS PRN
Qty: 35 TABLET | Refills: 0 | Status: SHIPPED | OUTPATIENT
Start: 2025-08-07 | End: 2025-08-14

## 2025-08-07 RX ORDER — HYDROMORPHONE HYDROCHLORIDE 1 MG/ML
0.25 INJECTION, SOLUTION INTRAMUSCULAR; INTRAVENOUS; SUBCUTANEOUS EVERY 5 MIN PRN
Status: COMPLETED | OUTPATIENT
Start: 2025-08-07 | End: 2025-08-07

## 2025-08-07 RX ORDER — MIDAZOLAM HYDROCHLORIDE 1 MG/ML
INJECTION, SOLUTION INTRAMUSCULAR; INTRAVENOUS
Status: DISCONTINUED | OUTPATIENT
Start: 2025-08-07 | End: 2025-08-07 | Stop reason: SDUPTHER

## 2025-08-07 RX ORDER — ONDANSETRON 2 MG/ML
4 INJECTION INTRAMUSCULAR; INTRAVENOUS EVERY 6 HOURS PRN
Status: DISCONTINUED | OUTPATIENT
Start: 2025-08-07 | End: 2025-08-08 | Stop reason: HOSPADM

## 2025-08-07 RX ORDER — BISACODYL 10 MG
10 SUPPOSITORY, RECTAL RECTAL DAILY PRN
Status: DISCONTINUED | OUTPATIENT
Start: 2025-08-08 | End: 2025-08-08 | Stop reason: HOSPADM

## 2025-08-07 RX ORDER — SENNA AND DOCUSATE SODIUM 50; 8.6 MG/1; MG/1
1 TABLET, FILM COATED ORAL 2 TIMES DAILY
Qty: 28 TABLET | Refills: 0 | Status: SHIPPED
Start: 2025-08-07 | End: 2025-08-21

## 2025-08-07 RX ORDER — SENNA AND DOCUSATE SODIUM 50; 8.6 MG/1; MG/1
1 TABLET, FILM COATED ORAL 2 TIMES DAILY
Status: DISCONTINUED | OUTPATIENT
Start: 2025-08-07 | End: 2025-08-08 | Stop reason: HOSPADM

## 2025-08-07 RX ADMIN — MIDAZOLAM HYDROCHLORIDE 2 MG: 1 INJECTION, SOLUTION INTRAMUSCULAR; INTRAVENOUS at 07:46

## 2025-08-07 RX ADMIN — PROPOFOL 40 MCG/KG/MIN: 10 INJECTION, EMULSION INTRAVENOUS at 08:08

## 2025-08-07 RX ADMIN — ACETAMINOPHEN 650 MG: 325 TABLET ORAL at 13:41

## 2025-08-07 RX ADMIN — PROPOFOL 25 MG: 10 INJECTION, EMULSION INTRAVENOUS at 08:07

## 2025-08-07 RX ADMIN — SODIUM CHLORIDE, POTASSIUM CHLORIDE, SODIUM LACTATE AND CALCIUM CHLORIDE: 600; 310; 30; 20 INJECTION, SOLUTION INTRAVENOUS at 08:00

## 2025-08-07 RX ADMIN — FENTANYL CITRATE 50 MCG: 50 INJECTION, SOLUTION INTRAMUSCULAR; INTRAVENOUS at 10:31

## 2025-08-07 RX ADMIN — HYDROMORPHONE HYDROCHLORIDE 0.25 MG: 1 INJECTION, SOLUTION INTRAMUSCULAR; INTRAVENOUS; SUBCUTANEOUS at 10:29

## 2025-08-07 RX ADMIN — TRANEXAMIC ACID 1000 MG: 1 INJECTION, SOLUTION INTRAVENOUS at 08:43

## 2025-08-07 RX ADMIN — DEXMEDETOMIDINE 10 MCG: 100 INJECTION, SOLUTION INTRAVENOUS at 08:07

## 2025-08-07 RX ADMIN — ACETAMINOPHEN 1000 MG: 500 TABLET ORAL at 07:02

## 2025-08-07 RX ADMIN — TRAMADOL HYDROCHLORIDE 100 MG: 50 TABLET, COATED ORAL at 20:53

## 2025-08-07 RX ADMIN — DEXAMETHASONE SODIUM PHOSPHATE 12 MG: 4 INJECTION INTRA-ARTICULAR; INTRALESIONAL; INTRAMUSCULAR; INTRAVENOUS; SOFT TISSUE at 08:22

## 2025-08-07 RX ADMIN — SODIUM CHLORIDE: 0.9 INJECTION, SOLUTION INTRAVENOUS at 13:51

## 2025-08-07 RX ADMIN — PHENYLEPHRINE HYDROCHLORIDE 150 MCG: 10 INJECTION INTRAVENOUS at 08:44

## 2025-08-07 RX ADMIN — DEXMEDETOMIDINE 0.15 MCG/KG/HR: 100 INJECTION, SOLUTION INTRAVENOUS at 08:10

## 2025-08-07 RX ADMIN — SENNOSIDES AND DOCUSATE SODIUM 1 TABLET: 8.6; 5 TABLET ORAL at 13:41

## 2025-08-07 RX ADMIN — SODIUM CHLORIDE, PRESERVATIVE FREE 10 ML: 5 INJECTION INTRAVENOUS at 20:50

## 2025-08-07 RX ADMIN — PHENYLEPHRINE HYDROCHLORIDE 150 MCG: 10 INJECTION INTRAVENOUS at 08:33

## 2025-08-07 RX ADMIN — Medication 3 AMPULE: at 06:54

## 2025-08-07 RX ADMIN — TRAMADOL HYDROCHLORIDE 100 MG: 50 TABLET, COATED ORAL at 10:54

## 2025-08-07 RX ADMIN — DEXMEDETOMIDINE 5 MCG: 100 INJECTION, SOLUTION INTRAVENOUS at 08:12

## 2025-08-07 RX ADMIN — FAMOTIDINE 20 MG: 20 TABLET, FILM COATED ORAL at 13:41

## 2025-08-07 RX ADMIN — MEMANTINE 10 MG: 10 TABLET ORAL at 20:47

## 2025-08-07 RX ADMIN — FENTANYL CITRATE 50 MCG: 50 INJECTION, SOLUTION INTRAMUSCULAR; INTRAVENOUS at 07:46

## 2025-08-07 RX ADMIN — LIDOCAINE HYDROCHLORIDE 80 MG: 20 INJECTION, SOLUTION EPIDURAL; INFILTRATION; INTRACAUDAL; PERINEURAL at 08:07

## 2025-08-07 RX ADMIN — BUPIVACAINE HYDROCHLORIDE 10 ML: 5 INJECTION, SOLUTION EPIDURAL; INTRACAUDAL; PERINEURAL at 07:55

## 2025-08-07 RX ADMIN — FENTANYL CITRATE 50 MCG: 50 INJECTION, SOLUTION INTRAMUSCULAR; INTRAVENOUS at 10:09

## 2025-08-07 RX ADMIN — ASPIRIN 81 MG: 81 TABLET, COATED ORAL at 20:47

## 2025-08-07 RX ADMIN — PHENYLEPHRINE HYDROCHLORIDE 25 MCG/MIN: 10 INJECTION INTRAVENOUS at 08:34

## 2025-08-07 RX ADMIN — CELECOXIB 400 MG: 200 CAPSULE ORAL at 07:02

## 2025-08-07 RX ADMIN — HYDROMORPHONE HYDROCHLORIDE 0.25 MG: 1 INJECTION, SOLUTION INTRAMUSCULAR; INTRAVENOUS; SUBCUTANEOUS at 10:36

## 2025-08-07 RX ADMIN — FAMOTIDINE 20 MG: 20 TABLET, FILM COATED ORAL at 20:47

## 2025-08-07 RX ADMIN — FENTANYL CITRATE 50 MCG: 50 INJECTION, SOLUTION INTRAMUSCULAR; INTRAVENOUS at 07:52

## 2025-08-07 RX ADMIN — VANCOMYCIN HYDROCHLORIDE 1000 MG: 1 INJECTION, POWDER, LYOPHILIZED, FOR SOLUTION INTRAVENOUS at 06:53

## 2025-08-07 RX ADMIN — PROPOFOL 25 MG: 10 INJECTION, EMULSION INTRAVENOUS at 08:25

## 2025-08-07 RX ADMIN — GENTAMICIN SULFATE 300 MG: 40 INJECTION, SOLUTION INTRAMUSCULAR; INTRAVENOUS at 08:05

## 2025-08-07 RX ADMIN — DEXMEDETOMIDINE 10 MCG: 100 INJECTION, SOLUTION INTRAVENOUS at 08:00

## 2025-08-07 RX ADMIN — WATER 2000 MG: 1 INJECTION INTRAMUSCULAR; INTRAVENOUS; SUBCUTANEOUS at 13:42

## 2025-08-07 RX ADMIN — MEPIVACAINE HYDROCHLORIDE 2.6 ML: 20 INJECTION, SOLUTION EPIDURAL; INFILTRATION at 07:51

## 2025-08-07 RX ADMIN — SODIUM CHLORIDE, SODIUM LACTATE, POTASSIUM CHLORIDE, AND CALCIUM CHLORIDE: .6; .31; .03; .02 INJECTION, SOLUTION INTRAVENOUS at 06:52

## 2025-08-07 RX ADMIN — ASPIRIN 81 MG: 81 TABLET, COATED ORAL at 13:41

## 2025-08-07 RX ADMIN — ACETAMINOPHEN 650 MG: 325 TABLET ORAL at 20:46

## 2025-08-07 RX ADMIN — ONDANSETRON 4 MG: 2 INJECTION, SOLUTION INTRAMUSCULAR; INTRAVENOUS at 08:22

## 2025-08-07 RX ADMIN — TRANEXAMIC ACID 1000 MG: 1 INJECTION, SOLUTION INTRAVENOUS at 08:10

## 2025-08-07 RX ADMIN — BUPIVACAINE 10 ML: 13.3 INJECTION, SUSPENSION, LIPOSOMAL INFILTRATION at 07:55

## 2025-08-07 ASSESSMENT — PAIN DESCRIPTION - FREQUENCY: FREQUENCY: CONTINUOUS

## 2025-08-07 ASSESSMENT — PAIN DESCRIPTION - ORIENTATION
ORIENTATION: RIGHT

## 2025-08-07 ASSESSMENT — PAIN DESCRIPTION - LOCATION
LOCATION: HIP
LOCATION: HIP
LOCATION: KNEE;HIP

## 2025-08-07 ASSESSMENT — PAIN SCALES - GENERAL
PAINLEVEL_OUTOF10: 7
PAINLEVEL_OUTOF10: 9
PAINLEVEL_OUTOF10: 5
PAINLEVEL_OUTOF10: 2
PAINLEVEL_OUTOF10: 4
PAINLEVEL_OUTOF10: 7
PAINLEVEL_OUTOF10: 10

## 2025-08-07 ASSESSMENT — PAIN DESCRIPTION - PAIN TYPE: TYPE: SURGICAL PAIN

## 2025-08-07 ASSESSMENT — PAIN - FUNCTIONAL ASSESSMENT: PAIN_FUNCTIONAL_ASSESSMENT: NONE - DENIES PAIN

## 2025-08-07 ASSESSMENT — PAIN DESCRIPTION - DESCRIPTORS
DESCRIPTORS: ACHING;DISCOMFORT
DESCRIPTORS: ACHING
DESCRIPTORS: ACHING;DISCOMFORT

## 2025-08-07 ASSESSMENT — PAIN DESCRIPTION - ONSET: ONSET: GRADUAL

## 2025-08-08 VITALS
TEMPERATURE: 97.9 F | HEIGHT: 63 IN | SYSTOLIC BLOOD PRESSURE: 146 MMHG | RESPIRATION RATE: 16 BRPM | HEART RATE: 67 BPM | OXYGEN SATURATION: 92 % | DIASTOLIC BLOOD PRESSURE: 82 MMHG | WEIGHT: 126.1 LBS | BODY MASS INDEX: 22.34 KG/M2

## 2025-08-08 LAB
ANION GAP SERPL CALC-SCNC: 11 MMOL/L (ref 2–14)
BUN SERPL-MCNC: 15 MG/DL (ref 8–23)
BUN/CREAT SERPL: 13 (ref 12–20)
CALCIUM SERPL-MCNC: 9.4 MG/DL (ref 8.8–10.2)
CHLORIDE SERPL-SCNC: 103 MMOL/L (ref 98–107)
CO2 SERPL-SCNC: 19 MMOL/L (ref 20–29)
CREAT SERPL-MCNC: 1.18 MG/DL (ref 0.6–1)
GLUCOSE SERPL-MCNC: 112 MG/DL (ref 65–100)
HCT VFR BLD AUTO: 31.4 % (ref 35–47)
HGB BLD-MCNC: 10 G/DL (ref 11.5–16)
POTASSIUM SERPL-SCNC: 4.5 MMOL/L (ref 3.5–5.1)
SODIUM SERPL-SCNC: 133 MMOL/L (ref 136–145)

## 2025-08-08 PROCEDURE — 85018 HEMOGLOBIN: CPT

## 2025-08-08 PROCEDURE — 97116 GAIT TRAINING THERAPY: CPT

## 2025-08-08 PROCEDURE — 80048 BASIC METABOLIC PNL TOTAL CA: CPT

## 2025-08-08 PROCEDURE — 36415 COLL VENOUS BLD VENIPUNCTURE: CPT

## 2025-08-08 PROCEDURE — APPNB180 APP NON BILLABLE TIME > 60 MINS: Performed by: PHYSICIAN ASSISTANT

## 2025-08-08 PROCEDURE — 6370000000 HC RX 637 (ALT 250 FOR IP): Performed by: ORTHOPAEDIC SURGERY

## 2025-08-08 PROCEDURE — 97530 THERAPEUTIC ACTIVITIES: CPT

## 2025-08-08 PROCEDURE — 85014 HEMATOCRIT: CPT

## 2025-08-08 PROCEDURE — 2500000003 HC RX 250 WO HCPCS: Performed by: ORTHOPAEDIC SURGERY

## 2025-08-08 PROCEDURE — 94760 N-INVAS EAR/PLS OXIMETRY 1: CPT

## 2025-08-08 PROCEDURE — G0378 HOSPITAL OBSERVATION PER HR: HCPCS

## 2025-08-08 PROCEDURE — 6360000002 HC RX W HCPCS: Performed by: ORTHOPAEDIC SURGERY

## 2025-08-08 RX ADMIN — ACETAMINOPHEN 650 MG: 325 TABLET ORAL at 05:56

## 2025-08-08 RX ADMIN — TRAMADOL HYDROCHLORIDE 100 MG: 50 TABLET, COATED ORAL at 05:55

## 2025-08-08 RX ADMIN — ROSUVASTATIN CALCIUM 20 MG: 20 TABLET, FILM COATED ORAL at 09:49

## 2025-08-08 RX ADMIN — SODIUM CHLORIDE, PRESERVATIVE FREE 10 ML: 5 INJECTION INTRAVENOUS at 09:48

## 2025-08-08 RX ADMIN — SENNOSIDES AND DOCUSATE SODIUM 1 TABLET: 8.6; 5 TABLET ORAL at 09:49

## 2025-08-08 RX ADMIN — DONEPEZIL HYDROCHLORIDE 10 MG: 5 TABLET ORAL at 09:49

## 2025-08-08 RX ADMIN — ACETAMINOPHEN 650 MG: 325 TABLET ORAL at 00:48

## 2025-08-08 RX ADMIN — FAMOTIDINE 20 MG: 20 TABLET, FILM COATED ORAL at 09:49

## 2025-08-08 RX ADMIN — MEMANTINE 10 MG: 10 TABLET ORAL at 09:49

## 2025-08-08 RX ADMIN — PANTOPRAZOLE SODIUM 40 MG: 40 TABLET, DELAYED RELEASE ORAL at 05:56

## 2025-08-08 RX ADMIN — WATER 2000 MG: 1 INJECTION INTRAMUSCULAR; INTRAVENOUS; SUBCUTANEOUS at 00:00

## 2025-08-08 RX ADMIN — ASPIRIN 81 MG: 81 TABLET, COATED ORAL at 09:49

## 2025-08-08 RX ADMIN — BUPROPION HYDROCHLORIDE 300 MG: 150 TABLET, EXTENDED RELEASE ORAL at 09:50

## 2025-08-08 ASSESSMENT — PAIN DESCRIPTION - ONSET: ONSET: GRADUAL

## 2025-08-08 ASSESSMENT — PAIN SCALES - GENERAL
PAINLEVEL_OUTOF10: 2
PAINLEVEL_OUTOF10: 7

## 2025-08-08 ASSESSMENT — PAIN DESCRIPTION - FREQUENCY: FREQUENCY: CONTINUOUS

## 2025-08-08 ASSESSMENT — PAIN DESCRIPTION - PAIN TYPE: TYPE: SURGICAL PAIN

## 2025-08-08 ASSESSMENT — PAIN DESCRIPTION - DESCRIPTORS: DESCRIPTORS: ACHING

## 2025-08-08 ASSESSMENT — PAIN DESCRIPTION - LOCATION: LOCATION: HIP

## 2025-08-08 ASSESSMENT — PAIN DESCRIPTION - ORIENTATION: ORIENTATION: RIGHT

## 2025-08-19 RX ORDER — DICLOFENAC SODIUM 75 MG/1
75 TABLET, DELAYED RELEASE ORAL 2 TIMES DAILY
Qty: 180 TABLET | Refills: 1 | Status: SHIPPED | OUTPATIENT
Start: 2025-08-19 | End: 2025-09-18

## (undated) DEVICE — SYR 10ML LUER LOK 1/5ML GRAD --

## (undated) DEVICE — STERILE POLYISOPRENE POWDER-FREE SURGICAL GLOVES: Brand: PROTEXIS

## (undated) DEVICE — GOWN,SIRUS,NONRNF,SETINSLV,2XL,18/CS: Brand: MEDLINE

## (undated) DEVICE — HANDLE LT SNAP ON ULT DURABLE LENS FOR TRUMPF ALC DISPOSABLE

## (undated) DEVICE — STERILE POLYISOPRENE POWDER-FREE SURGICAL GLOVES WITH EMOLLIENT COATING: Brand: PROTEXIS

## (undated) DEVICE — 450 ML BOTTLE OF 0.05% CHLORHEXIDINE GLUCONATE IN 99.95% STERILE WATER FOR IRRIGATION, USP AND APPLICATOR.: Brand: IRRISEPT ANTIMICROBIAL WOUND LAVAGE

## (undated) DEVICE — SNARE ENDOSCP M L240CM W27MM SHTH DIA2.4MM CHN 2.8MM OVL

## (undated) DEVICE — SKIN MARKER,REGULAR TIP WITH RULER AND LABELS: Brand: DEVON

## (undated) DEVICE — FLOSEAL MATRIX IS INDICATED IN SURGICAL PROCEDURES (OTHER THAN IN OPHTHALMIC) AS AN ADJUNCT TO HEMOSTASIS WHEN CONTROL OF BLEEDING BY LIGATURE OR CONVENTIONALPROCEDURES IS INEFFECTIVE OR IMPRACTICAL.: Brand: FLOSEAL HEMOSTATIC MATRIX

## (undated) DEVICE — SUT ETHLN 3-0 18IN PS2 BLK --

## (undated) DEVICE — COVER,TABLE,60X90,STERILE: Brand: MEDLINE

## (undated) DEVICE — KIT JACK TBL PT CARE

## (undated) DEVICE — NEEDLE HYPO 18GA L1.5IN PNK S STL HUB POLYPR SHLD REG BVL

## (undated) DEVICE — SOLUTION IV 1000ML 0.9% SOD CHL

## (undated) DEVICE — SYSTEM REPROC CBL 3 LD DISPOSABLE

## (undated) DEVICE — STRAINER URIN CALC RNL MSH -- CONVERT TO ITEM 357634

## (undated) DEVICE — SUTURE VCRL SZ 1 L18IN ABSRB VLT CT-1 L36MM 1/2 CIR J741D

## (undated) DEVICE — SYSTEM SKIN CLSR 22CM DERMBND PRINEO

## (undated) DEVICE — SNAP KOVER: Brand: UNBRANDED

## (undated) DEVICE — DRAPE MON DISP FOR EXCELSIUSGPS ROBOTIC NAVIGATION PLATFRM

## (undated) DEVICE — DRESSING,GAUZE,XEROFORM,CURAD,1"X8",ST: Brand: CURAD

## (undated) DEVICE — FORCEPS BX L160CM DIA8MM GRSP DISECT CUP TIP NONLOCKING ROT

## (undated) DEVICE — STRAP,POSITIONING,KNEE/BODY,FOAM,4X60": Brand: MEDLINE

## (undated) DEVICE — SURGIFOAM SPNG SZ 100

## (undated) DEVICE — DEVICE TRNSF SPIK STL 2008S] MICROTEK MEDICAL INC]

## (undated) DEVICE — GAUZE SPONGES,12 PLY: Brand: CURITY

## (undated) DEVICE — TOOL 14MH30 LEGEND 14CM 3MM: Brand: MIDAS REX ™

## (undated) DEVICE — LABEL MED MRMC ORTH STRL

## (undated) DEVICE — SMOKE EVACUATION PENCIL: Brand: VALLEYLAB

## (undated) DEVICE — ELECTRODE BLDE L4IN NONINSULATED EDGE

## (undated) DEVICE — TUBING, SUCTION, 1/4" X 10', STRAIGHT: Brand: MEDLINE

## (undated) DEVICE — SYR 3ML LL TIP 1/10ML GRAD --

## (undated) DEVICE — BIPOLAR FORCEPS CORD: Brand: VALLEYLAB

## (undated) DEVICE — STAPLER SKIN 35CT WD STRL DISP -- MULTIFIRE PREMIUM

## (undated) DEVICE — MEDI-VAC NON-CONDUCTIVE SUCTION TUBING: Brand: CARDINAL HEALTH

## (undated) DEVICE — CATH IV AUTOGRD BC BLU 22GA 25 -- INSYTE

## (undated) DEVICE — 3M™ STERI-DRAPE™ INSTRUMENT POUCH 1018: Brand: STERI-DRAPE™

## (undated) DEVICE — SET ADMIN 16ML TBNG L100IN 2 Y INJ SITE IV PIGGY BK DISP

## (undated) DEVICE — TOWEL SURG W17XL27IN STD BLU COT NONFENESTRATED PREWASHED

## (undated) DEVICE — Device

## (undated) DEVICE — SUTURE V-LOC 180 SZ 0 L12IN ABSRB GRN L37MM GS-21 1/2 CIR VLOCL0316

## (undated) DEVICE — PREP SKN CHLRAPRP APL 26ML STR --

## (undated) DEVICE — HAND I-LF: Brand: MEDLINE INDUSTRIES, INC.

## (undated) DEVICE — BNDG ELAS HK LOOP 2X5YD NS -- MATRIX

## (undated) DEVICE — BNDG ADH FABRIC 2X4IN ST LF --

## (undated) DEVICE — BLADE ES L4IN INSUL EDGE

## (undated) DEVICE — DRAPE,LAP,CHOLE,W/TROUGHS,STERILE: Brand: MEDLINE

## (undated) DEVICE — RETRACTOR KIT FOR INTERCONTINENTAL PLATE SPACER SYS MARS 3V

## (undated) DEVICE — BASIN EMSIS 16OZ GRAPHITE PLAS KID SHP MOLD GRAD FOR ORAL

## (undated) DEVICE — DRAPE,LAPAROTOMY,PCH,STERILE: Brand: MEDLINE

## (undated) DEVICE — INFECTION CONTROL KIT SYS

## (undated) DEVICE — KENDALL RADIOLUCENT FOAM MONITORING ELECTRODE RECTANGULAR SHAPE: Brand: KENDALL

## (undated) DEVICE — BONE MARROW KIT ASPIR 11 GA

## (undated) DEVICE — SOLUTION LACTATED RINGERS INJECTION USP

## (undated) DEVICE — BAG SPEC BIOHZRD 10 X 10 IN --

## (undated) DEVICE — LAMINECTOMY RICHMOND-LF: Brand: MEDLINE INDUSTRIES, INC.

## (undated) DEVICE — Z DISCONTINUED PER MEDLINE LINE GAS SAMPLING O2/CO2 LNG AD 13 FT NSL W/ TBNG FILTERLINE

## (undated) DEVICE — TRAY CATH 16F DRN BG LTX -- CONVERT TO ITEM 363158

## (undated) DEVICE — NEEDLE BX 11GA L152MM STREAMLINED SUP SHRP T HNDL TRCR TAPR

## (undated) DEVICE — 1200 GUARD II KIT W/5MM TUBE W/O VAC TUBE: Brand: GUARDIAN

## (undated) DEVICE — DISSECTOR LAP DIA5MM BLNT TIP ENDOPATH

## (undated) DEVICE — SYR 50ML LR LCK 1ML GRAD NSAF --

## (undated) DEVICE — SOL IRR STRL H2O 1000ML BTL --

## (undated) DEVICE — 3M™ TEGADERM™ TRANSPARENT FILM DRESSING FRAME STYLE, 1624W, 2-3/8 IN X 2-3/4 IN (6 CM X 7 CM), 100/CT 4CT/CASE: Brand: 3M™ TEGADERM™

## (undated) DEVICE — BLOCK BITE ENDOSCP AD 21 MM W/ DIL BLU LF DISP

## (undated) DEVICE — SOL IRR SOD CL 0.9% 1000ML BTL --

## (undated) DEVICE — SUTURE MCRYL SZ 2-0 L36IN ABSRB UD L36MM CT-1 1/2 CIR Y945H

## (undated) DEVICE — 3.5 DRILL

## (undated) DEVICE — 4-PORT MANIFOLD: Brand: NEPTUNE 2

## (undated) DEVICE — BIPOLAR FORCEPS CORD,BANANA LEADS: Brand: VALLEYLAB

## (undated) DEVICE — SPONGE GZ W4XL4IN COT 12 PLY TYP VII WVN C FLD DSGN

## (undated) DEVICE — 3.0MM PRECISION NEURO (MATCH HEAD)

## (undated) DEVICE — NDL SPNE QNCKE 18GX3.5IN LF --

## (undated) DEVICE — TUBING IRRIG L77IN DIA0.241IN L BOR FOR CYSTO W/ NVENT

## (undated) DEVICE — COVER,MAYO STAND,STERILE: Brand: MEDLINE

## (undated) DEVICE — COVER LT HNDL PLAS RIG 1 PER PK

## (undated) DEVICE — SURGIFOAM SPNG SZ 12-7

## (undated) DEVICE — SUTURE STRATAFIX SPRL MCRYL + SZ 2-0 L18IN ABSRB UD CT-1 SXMP1B413

## (undated) DEVICE — BONE WAX WHITE: Brand: BONE WAX WHITE

## (undated) DEVICE — SUTURE VCRL SZ 2-0 L18IN ABSRB UD CT-1 L36MM 1/2 CIR J839D

## (undated) DEVICE — DRAPE,CHEST,FENES,15X10,STERIL: Brand: MEDLINE

## (undated) DEVICE — KENDALL SCD EXPRESS SLEEVES, KNEE LENGTH, MEDIUM: Brand: KENDALL SCD

## (undated) DEVICE — TOTAL TRAY, 16FR 10ML SIL FOLEY, URN: Brand: MEDLINE

## (undated) DEVICE — TRAP FLUID BUFFALO FLTR

## (undated) DEVICE — SOLUTION IRRIG 3000ML 0.9% SOD CHL FLX CONT 0797208] ICU MEDICAL INC]

## (undated) DEVICE — NEEDLE HYPO 22GA L1.5IN BLK S STL HUB POLYPR SHLD REG BVL

## (undated) DEVICE — CATHETER IV 22GA L1IN OD0.8382-0.9144MM ID0.6096-0.6858MM

## (undated) DEVICE — SOLIDIFIER MEDC 1200ML -- CONVERT TO 356117

## (undated) DEVICE — Z CONVERTED USE 2107985 COVER FLROSCP W36XL28IN 4 SIDE ADH

## (undated) DEVICE — CUFF BLD PRSS AD CLTH SGL TB W/ BAYNT CONN ROUNDED CORNER

## (undated) DEVICE — IV START KIT: Brand: MEDLINE

## (undated) DEVICE — CONTINU-FLO SOLUTION SET, 2 INJECTION SITES, MALE LUER LOCK ADAPTER WITH RETRACTABLE COLLAR, LARGE BORE STOPCOCK WITH ROTATING MALE LUER LOCK EXTENSION SET, 2 INJECTION SITES, MALE LUER LOCK ADAPTER WITH RETRACTABLE COLLAR: Brand: INTERLINK/CONTINU-FLO

## (undated) DEVICE — SPHERE STEALTH 12PK/TY --

## (undated) DEVICE — GARMENT,MEDLINE,DVT,INT,CALF,MED, GEN2: Brand: MEDLINE

## (undated) DEVICE — SOLUTION IRRIG 1000ML H2O STRL BLT

## (undated) DEVICE — MEDI-VAC YANK SUCT HNDL W/TPRD BULBOUS TIP: Brand: CARDINAL HEALTH

## (undated) DEVICE — (D)PREP SKN CHLRAPRP APPL 26ML -- CONVERT TO ITEM 371833

## (undated) DEVICE — DRAPE C ARM DISP FOR EXCELSIUSGPS ROBOTIC NAVIGATION PLATFRM

## (undated) DEVICE — TRAP SUC MUCOUS 70ML -- MEDICHOICE MEDLINE

## (undated) DEVICE — CONTAINER SPEC 20 ML LID NEUT BUFF FORMALIN 10 % POLYPR STS

## (undated) DEVICE — ENDOSCOPIC KIT COMPLIANCE ENDOKIT

## (undated) DEVICE — ZIMMER® STERILE DISPOSABLE TOURNIQUET CUFF WITH PLC, DUAL PORT, SINGLE BLADDER, 18 IN. (46 CM)

## (undated) DEVICE — TOWEL 4 PLY TISS 19X30 SUE WHT

## (undated) DEVICE — C-ARMOR C-ARM EQUIPMENT COVERS CLEAR STERILE UNIVERSAL FIT 12 PER CASE: Brand: C-ARMOR

## (undated) DEVICE — 3M™ BAIR HUGGER® UNDERBODY BLANKET, SPINAL, 10 PER CASE 57501: Brand: BAIR HUGGER™

## (undated) DEVICE — KENDALL DL ECG CABLE AND LEAD WIRE SYSTEM, 3-LEAD, SINGLE PATIENT USE: Brand: KENDALL